# Patient Record
Sex: FEMALE | Race: WHITE | HISPANIC OR LATINO | Employment: UNEMPLOYED | URBAN - METROPOLITAN AREA
[De-identification: names, ages, dates, MRNs, and addresses within clinical notes are randomized per-mention and may not be internally consistent; named-entity substitution may affect disease eponyms.]

---

## 2017-01-11 ENCOUNTER — ALLSCRIPTS OFFICE VISIT (OUTPATIENT)
Dept: OTHER | Facility: OTHER | Age: 47
End: 2017-01-11

## 2017-01-11 ENCOUNTER — TRANSCRIBE ORDERS (OUTPATIENT)
Dept: ADMINISTRATIVE | Facility: HOSPITAL | Age: 47
End: 2017-01-11

## 2017-01-11 DIAGNOSIS — C50.812 MALIGNANT NEOPLASM OF OVERLAPPING SITES OF LEFT FEMALE BREAST (HCC): Primary | ICD-10-CM

## 2017-04-20 ENCOUNTER — GENERIC CONVERSION - ENCOUNTER (OUTPATIENT)
Dept: OTHER | Facility: OTHER | Age: 47
End: 2017-04-20

## 2017-04-20 ENCOUNTER — APPOINTMENT (OUTPATIENT)
Dept: RADIATION ONCOLOGY | Facility: CLINIC | Age: 47
End: 2017-04-20
Attending: RADIOLOGY
Payer: COMMERCIAL

## 2017-04-20 PROCEDURE — 99214 OFFICE O/P EST MOD 30 MIN: CPT | Performed by: RADIOLOGY

## 2017-04-28 ENCOUNTER — ALLSCRIPTS OFFICE VISIT (OUTPATIENT)
Dept: OTHER | Facility: OTHER | Age: 47
End: 2017-04-28

## 2017-05-22 ENCOUNTER — ALLSCRIPTS OFFICE VISIT (OUTPATIENT)
Dept: OTHER | Facility: OTHER | Age: 47
End: 2017-05-22

## 2017-07-10 ENCOUNTER — HOSPITAL ENCOUNTER (OUTPATIENT)
Dept: MAMMOGRAPHY | Facility: CLINIC | Age: 47
Discharge: HOME | End: 2017-07-10
Payer: COMMERCIAL

## 2017-07-10 DIAGNOSIS — C50.812 MALIGNANT NEOPLASM OF OVERLAPPING SITES OF LEFT FEMALE BREAST (HCC): ICD-10-CM

## 2017-07-10 PROCEDURE — G0279 TOMOSYNTHESIS, MAMMO: HCPCS

## 2017-07-10 PROCEDURE — G0206 DX MAMMO INCL CAD UNI: HCPCS

## 2017-07-12 ENCOUNTER — ALLSCRIPTS OFFICE VISIT (OUTPATIENT)
Dept: OTHER | Facility: OTHER | Age: 47
End: 2017-07-12

## 2017-09-26 ENCOUNTER — ALLSCRIPTS OFFICE VISIT (OUTPATIENT)
Dept: OTHER | Facility: OTHER | Age: 47
End: 2017-09-26

## 2017-10-27 NOTE — PROGRESS NOTES
Assessment  1  Encounter to discuss breast reconstruction (V65 49) (Z71 89)    Plan  Carcinoma of left breast, estrogen receptor positive, Encounter to discuss breast  reconstruction, Malignant neoplasm of overlapping sites of left female breast    · Breast prosthesis, mastectomy bra, with integrated breast prosthesis form, bilateral, any  size, any type; Status:Complete - Retrospective By Protocol Authorization;   Done:  [id number]   Perform:Not Applicable; Order Comments:left breast prosthesis with mastectomy bra; Y:[id number]; Last Updated By:Jesus Rose; 9/26/2017 10:25:18 AM;Ordered; For:Carcinoma of left breast, estrogen receptor positive, Encounter to discuss breast reconstruction, Malignant neoplasm of overlapping sites of left female breast; Ordered By:Zena Morel;    Discussion/Summary  Discussion Summary:   Please see history of present illness  At her previous visit we discussed a variety of options for breast reconstruction, her preference at that time was to first try a mastectomy bra and prosthesis  It sounds like there was some confusion with her insurance and she was unable to obtain the bra and prosthesis, however since that time she has located a facility that will accept her insurance  Her preference would be to try the mastectomy bra and prosthesis for 2 months and to follow up here in the office to discuss options going forward in regard to continued use of the mastectomy bra and prosthesis versus potential breast reconstruction   Counseling Documentation With Imm: The patient, patient's family was counseled regarding instructions for management,-- patient and family education,-- impressions,-- risks and benefits of treatment options  total time of encounter was 25 minutes-- and-- 15 minutes was spent counseling  Chief Complaint  Chief Complaint Free Text Note Form: Status post left mastectomy/radiation, without reconstruction        History of Present Illness  HPI: [place] returns for a follow-up visit, she is again accompanied by her daughter  We utilized the blue phone translation service, [employer] #[id number]  Active Problems  1  Acute pain (338 19) (R52)   2  BRCA1 negative (V82 71) (Z13 71)   3  BRCA2 negative (V82 71) (Z13 71)   4  Carcinoma of left breast, estrogen receptor positive (174 9,V86 0) (C50 912,Z17 0)   5  Encounter to discuss breast reconstruction (V65 49) (Z71 89)   6  Limb swelling (729 81) (M79 89)   7  Malignant neoplasm of overlapping sites of left female breast (174 8) (C50 812)   8  Prophylactic use of anastrozole (Arimidex) (V07 52) (J25 478)    Past Medical History  1  History of Age At First Period 13 Years Old (Menarche)   2  History of Age At First Pregnancy 19 Years Old   3  History of Bulky or enlarged uterus (621 2) (N85 2)   4  History of Carcinoma of upper-outer quadrant of left female breast (174 4) (C50 412)   5  History of Granulation tissue (701 5) (L92 9)   6  History of  3 (V22 2) (Z33 1)   7  History of chemotherapy (V87 41) (Z92 21)   8  History of malignant neoplasm of female breast (V10 3) (Z85 3)   9  History of radiation therapy (V15 3) (Z92 3)   10  History of Leiomyoma of uterus (218 9) (D25 9)   11  History of Mammogram abnormal (793 80) (R92 8)   12  History of Postoperative visit (V58 49) (Z48 89)    Surgical History  1  History of Appendectomy   2  History of  Section   3  History of Exploratory Laparotomy   4  History of Percutaneous Portal Vein Catheter Placement   5  History of [place] Lymph Node Biopsy   6  History of Simple Mastectomy Left Breast   7  History of Total Abdominal Hysterectomy With Removal Of Both Ovaries   8  History of Tunneled Central IV Removal With Port    Family History  Mother    1  Family history of Lung cancer  Family History    2  Family history of Heart Disease (V17 49)   3   Family history of Lung Cancer (V16 1)    Social History   · Native Language Belarusian   · Never A Smoker   · Never Drank Alcohol    Current Meds   1  Anastrozole 1 MG Oral Tablet; Take 1 tablet by mouth  every day; Therapy: 29JIU2013 to (Evaluate:92Reg3148)  Requested for: 17Tig6026; Last   Rx:15Tnw7098 Ordered    Allergies  1  Aspirin TABS    Vitals  Vital Signs    Recorded: 73FRX8341 10:03AM   BP Comments unobtainable   Height 5 ft 4 in   Weight 160 lb 8 oz   BMI Calculated 27 55   BSA Calculated 1 78     Future Appointments    Date/Time Provider Specialty Site   11/20/2017 10:20 AM JING Aleman   Hematology Oncology [facility]   01/18/2018 11:30 AM Jeferson Aviles MD Surgical Oncology [facility]     Signatures   Electronically signed by : JING Ashby ; Sep 26 2017 10:36AM EST                       (Author)

## 2017-11-20 ENCOUNTER — ALLSCRIPTS OFFICE VISIT (OUTPATIENT)
Dept: OTHER | Facility: OTHER | Age: 47
End: 2017-11-20

## 2017-11-21 NOTE — PROGRESS NOTES
Assessment    1  Carcinoma of left breast, estrogen receptor positive (174 9,V86 0) (C50 912,Z17 0)    Plan  Carcinoma of left breast, estrogen receptor positive    · Follow-up visit in 6 months Evaluation and Treatment  Follow-up  Status: Complete Done: 12NEJ5586 10:40AM   Ordered;Carcinoma of left breast, estrogen receptor positive; Ordered By: Shelby Sotelo Performed:  Due: 09CZC6051; Last Updated By: Neri Abrams; 11/20/2017 11:27:18 AM                  Hematology/Oncology Follow up Evaluation and Treatment  Follow-up  Status: Hold For - Scheduling  Requested for: 09Kpq1566 Ordered; For: Infiltrating Duct Carcinoma Of The Breast (174 9); Ordered By: Shelby Sotelo  Performed:   Due: 65CCH2057   Discussion/Summary  Discussion Summary:   A 50year old surgically postmenopausal woman with locally advanced left breast cancer, grade 2, ER/PA positive HER-2 negative disease  She has no evidence of BRCA mutation  She underwent neoadjuvant chemotherapy with AC followed by paclitaxel resulting in clinical partial response  However, when she had mastectomy, she had significant residual disease, as well as 2 positive lymph node  She completed postmastectomy radiation therapy  She was previously on adjuvant hormonal therapy with tamoxifen, until she underwent hysterectomy and bilateral oophorectomy  Currently, she is on anastrozole with minimal side effects  She has no evidence recurrent disease, based on her symptomatology and physical examinations  I recommended her to continue with anastrozole 1 mg once a day  Because of her high risk disease, I would strongly consider 10 years of aromatase inhibitors  I will see her again in 6 months for routine follow-up  Chief Complaint  Chief Complaint: Chief Complaint:  The patient presents to the office today with 1  Locally advanced left breast cancer with skin involvement, grade 2, ER/PA positive HER-2 negative disease  Diagnosed in June 2013  BRCA mutation negative  Chief Complaint Free Text Note Form: Locally advanced left breast cancer with skin involvement, ER/[place] positive HER-2 negative disease  Diagnosed in June 2013  History of Present Illness  HPI: A 53year old premenopausal  speaking woman with recent diagnosis of locally advanced left breast cancer    She first noticed a lump which was small approximately 2 years ago  Over the time she developed very large breast mass with skin involvement as well as skin dimpling  She also developed of breast pain  She underwent biopsy which showed invasive ductal carcinoma with grade 2  This was ER/[place] positive HER-2 negative disease  She was seen by Dr Mai Heard who thought that neoadjuvant chemotherapy is the most appropriate treatment  Therefore she was referred to me  She underwent CT scan of chest abdomen and pelvis as well as bone scan both of which were negative for distant metastasis  She has no weight loss fever chills or night sweats  She denied any respiratory symptoms  She has no bone pain  Her performance status is normal  she has no family history of breast cancer or ovarian cancer  Previous Therapy:   1  Neoadjuvant chemotherapy with dose dense a c  X4 followed by weekly paclitaxel x12  Completed in December 2013  adjuvant hormonal therapy with tamoxifen from March 2014 through May 2015  postmastectomy radiation therapy completed in May 2014  Current Therapy: Adjuvant hormonal therapy with anastrozole 1 mg once a day since May 2015  Disease Status: 1  Partial response on neoadjuvant chemotherapy  ENMANUEL, status post mastectomy with sentinel lymph node biopsy  Interval History: A 50year old surgically postmenopausal woman with locally advanced left breast cancer, grade 2, ER/[place] positive HER-2 negative disease  She had very large left breast mass which occupied her entire left breast at the time of initial presentation   She had neoadjuvant chemotherapy with dose dense AC followed by paclitaxel resulting in clinical response  She underwent left mastectomy with sentinel lymph node biopsy  She still had significant residual tumor measuring 11 5 cm with multiple residual foci of tumor with 2 sentinel lymph node with subcentimeter metastatic disease  She did not have reconstruction  Since May 2014, she was on adjuvant hormonal therapy with tamoxifen , since she was perimenopausal at that time  She had very large fibroid in the uterus  In February 2015, she underwent hysterectomy and bilateral oophorectomy  Subsequently, adjuvant hormonal therapy was changed to anastrozole  She came in today for followup  He continued to have mild hot flashes  She has occasional left knee pain  Otherwise, she feels well  Her weight is stable  She has no cough, sputum production or shortness of breath  Her performance status is normal       Review of Systems  Complete-Female:  Constitutional: Hot flashes, but-- as noted in HPI  Eyes: No complaints of eye pain, no red eyes, no eyesight problems, no discharge, no dry eyes, no itching of eyes  ENT: no complaints of earache, no loss of hearing, no nose bleeds, no nasal discharge, no sore throat, no hoarseness  Cardiovascular: No complaints of slow heart rate, no fast heart rate, no chest pain, no palpitations, no leg claudication, no lower extremity edema  Respiratory: No complaints of shortness of breath, no wheezing, no cough, no SOB on exertion, no orthopnea, no PND  Gastrointestinal: No complaints of abdominal pain, no constipation, no nausea or vomiting, no diarrhea, no bloody stools  Genitourinary: No complaints of dysuria, no incontinence, no pelvic pain, no dysmenorrhea, no vaginal discharge or bleeding  Musculoskeletal: No complaints of arthralgias, no myalgias, no joint swelling or stiffness, no limb pain or swelling  Integumentary: No complaints of skin rash or lesions, no itching, no skin wounds, no breast pain or lump    Neurological: No complaints of headache, no confusion, no convulsions, no numbness, no dizziness or fainting, no tingling, no limb weakness, no difficulty walking  Psychiatric: Not suicidal, no sleep disturbance, no anxiety or depression, no change in personality, no emotional problems  Endocrine: No complaints of proptosis, no hot flashes, no muscle weakness, no deepening of the voice, no feelings of weakness  Hematologic/Lymphatic: No complaints of swollen glands, no swollen glands in the neck, does not bleed easily, does not bruise easily  ROS Reviewed:   ROS reviewed  Active Problems  1  Acute pain (338 19) (R52)   2  BRCA1 negative (V82 71) (Z13 71)   3  BRCA2 negative (V82 71) (Z13 71)   4  Carcinoma of left breast, estrogen receptor positive (174 9,V86 0) (C50 912,Z17 0)   5  Encounter to discuss breast reconstruction (V65 49) (Z71 89)   6  Limb swelling (729 81) (M79 89)   7  Malignant neoplasm of overlapping sites of left female breast (174 8) (C50 812)   8  Prophylactic use of anastrozole (Arimidex) (V07 52) (R60 554)    Past Medical History  1  History of Age At First Period 13 Years Old (Menarche)   2  History of Age At First Pregnancy 19 Years Old   3  History of Bulky or enlarged uterus (621 2) (N85 2)   4  History of Carcinoma of upper-outer quadrant of left female breast (174 4) (C50 412)   5  History of Granulation tissue (701 5) (L92 9)   6  History of  3 (V22 2) (Z33 1)   7  History of chemotherapy (V87 41) (Z92 21)   8  History of malignant neoplasm of female breast (V10 3) (Z85 3)   9  History of radiation therapy (V15 3) (Z92 3)   10  History of Leiomyoma of uterus (218 9) (D25 9)   11  History of Mammogram abnormal (793 80) (R92 8)   12  History of Postoperative visit (V58 49) (Z48 89)  Past Medical History Reviewed: The past medical history was reviewed and updated today  Surgical History    1  History of Appendectomy   2  History of  Section   3  History of Exploratory Laparotomy   4   History of Percutaneous Portal Vein Catheter Placement   5  History of [place] Lymph Node Biopsy   6  History of Simple Mastectomy Left Breast   7  History of Total Abdominal Hysterectomy With Removal Of Both Ovaries   8  History of Tunneled Central IV Removal With Port    Family History  Mother    1  Family history of Lung cancer  Family History    2  Family history of Heart Disease (V17 49)   3  Family history of Lung Cancer (V16 1)  Family History Reviewed: The family history was reviewed and updated today  Social History     · Native Language Bulgarian   · Never A Smoker   · Never Drank Alcohol  Social History Reviewed: The social history was reviewed and updated today  The social history was reviewed and is unchanged  Current Meds   1  Anastrozole 1 MG Oral Tablet; Take 1 tablet by mouth  every day; Therapy: 79ZEO3260 to (Evaluate:34Dan5915)  Requested for: 96Gtc7905; Last Rx:24Oja5938 Ordered  Medication List Reviewed: The medication list was reviewed and updated today  Allergies  1  Aspirin TABS    Vitals  Vital Signs    Recorded: 20Nov2017 10:58AM   Temperature 97 7 F   Heart Rate 128   Respiration 16   Systolic 510   Diastolic 90   Height 5 ft 4 in   Weight 159 lb    BMI Calculated 27 29   BSA Calculated 1 77   O2 Saturation 100       Physical Exam   Constitutional  General appearance: No acute distress, well appearing and well nourished  Eyes  Conjunctiva and lids: No swelling, erythema or discharge  Pupils and irises: Equal, round and reactive to light  Ears, Nose, Mouth, and Throat  External inspection of ears and nose: Normal    Otoscopic examination: Tympanic membranes translucent with normal light reflex  Canals patent without erythema  Nasal mucosa, septum, and turbinates: Normal without edema or erythema  Oropharynx: Normal with no erythema, edema, exudate or lesions  Pulmonary  Respiratory effort: No increased work of breathing or signs of respiratory distress     Auscultation of lungs: Clear to auscultation  Cardiovascular  Palpation of heart: Normal PMI, no thrills  Auscultation of heart: Normal rate and rhythm, normal S1 and S2, without murmurs  Examination of extremities for edema and/or varicosities: Normal    Carotid pulses: Normal    Abdomen  Abdomen: Non-tender, no masses  Liver and spleen: No hepatomegaly or splenomegaly  Lymphatic  Palpation of lymph nodes in neck: No lymphadenopathy  Musculoskeletal  Gait and station: Normal    Digits and nails: Normal without clubbing or cyanosis  Inspection/palpation of joints, bones, and muscles: Normal    Skin  Skin and subcutaneous tissue: Normal without rashes or lesions  Neurologic  Cranial nerves: Cranial nerves 2-12 intact  Reflexes: 2+ and symmetric  Sensation: No sensory loss  Psychiatric  Orientation to person, place, and time: Normal    Mood and affect: Normal    Additional Exam:  Breast exam; status post left mastectomy without reconstruction  No palpable abnormalities in her left chest wall  No palpable metastatic very adenopathy  Right breast exam is negative  Diabetic Foot Screen: Normal       ECOG 0       Results/Data  Results Form:   Results  Pathology Uterus bilateral fallopian tube and ovaries showed no evidence of malignancy  Radiology Mammography in July 2017 was benign  BI-RAD 1        Future Appointments    Date/Time Provider Specialty Site   01/18/2018 11:30 AM Ciara Allen MD Surgical Oncology [facility]       Signatures   Electronically signed by : JING Guevara ; Nov 20 2017 11:29AM EST                       (Author)

## 2018-01-12 VITALS
HEART RATE: 105 BPM | HEIGHT: 64 IN | BODY MASS INDEX: 27.86 KG/M2 | WEIGHT: 163.2 LBS | OXYGEN SATURATION: 99 % | RESPIRATION RATE: 18 BRPM | SYSTOLIC BLOOD PRESSURE: 148 MMHG | DIASTOLIC BLOOD PRESSURE: 88 MMHG

## 2018-01-12 VITALS — HEIGHT: 64 IN | BODY MASS INDEX: 27.4 KG/M2 | WEIGHT: 160.5 LBS

## 2018-01-12 VITALS
DIASTOLIC BLOOD PRESSURE: 90 MMHG | SYSTOLIC BLOOD PRESSURE: 134 MMHG | HEART RATE: 115 BPM | WEIGHT: 159 LBS | BODY MASS INDEX: 27.14 KG/M2 | RESPIRATION RATE: 18 BRPM | TEMPERATURE: 98 F | HEIGHT: 64 IN | OXYGEN SATURATION: 98 %

## 2018-01-12 VITALS — WEIGHT: 163.2 LBS | BODY MASS INDEX: 27.86 KG/M2 | HEIGHT: 64 IN

## 2018-01-13 VITALS
SYSTOLIC BLOOD PRESSURE: 144 MMHG | HEIGHT: 64 IN | TEMPERATURE: 97.7 F | OXYGEN SATURATION: 100 % | BODY MASS INDEX: 27.14 KG/M2 | RESPIRATION RATE: 16 BRPM | DIASTOLIC BLOOD PRESSURE: 90 MMHG | WEIGHT: 159 LBS | HEART RATE: 128 BPM

## 2018-01-13 VITALS
HEIGHT: 64 IN | RESPIRATION RATE: 18 BRPM | WEIGHT: 161 LBS | SYSTOLIC BLOOD PRESSURE: 138 MMHG | TEMPERATURE: 97 F | DIASTOLIC BLOOD PRESSURE: 80 MMHG | BODY MASS INDEX: 27.49 KG/M2 | OXYGEN SATURATION: 100 % | HEART RATE: 118 BPM

## 2018-01-14 VITALS
TEMPERATURE: 98.9 F | DIASTOLIC BLOOD PRESSURE: 88 MMHG | RESPIRATION RATE: 16 BRPM | HEIGHT: 64 IN | BODY MASS INDEX: 28.22 KG/M2 | WEIGHT: 165.31 LBS | SYSTOLIC BLOOD PRESSURE: 138 MMHG | HEART RATE: 108 BPM | OXYGEN SATURATION: 98 %

## 2018-01-18 ENCOUNTER — GENERIC CONVERSION - ENCOUNTER (OUTPATIENT)
Dept: OTHER | Facility: OTHER | Age: 48
End: 2018-01-18

## 2018-01-18 ENCOUNTER — TRANSCRIBE ORDERS (OUTPATIENT)
Dept: ADMINISTRATIVE | Facility: HOSPITAL | Age: 48
End: 2018-01-18

## 2018-01-18 DIAGNOSIS — C50.812 MALIGNANT NEOPLASM OF OVERLAPPING SITES OF LEFT FEMALE BREAST, UNSPECIFIED ESTROGEN RECEPTOR STATUS (HCC): Primary | ICD-10-CM

## 2018-01-24 VITALS
DIASTOLIC BLOOD PRESSURE: 90 MMHG | BODY MASS INDEX: 26.29 KG/M2 | RESPIRATION RATE: 16 BRPM | HEIGHT: 64 IN | WEIGHT: 154 LBS | TEMPERATURE: 98 F | HEART RATE: 124 BPM | SYSTOLIC BLOOD PRESSURE: 142 MMHG

## 2018-02-26 ENCOUNTER — OFFICE VISIT (OUTPATIENT)
Dept: PLASTIC SURGERY | Facility: CLINIC | Age: 48
End: 2018-02-26
Payer: COMMERCIAL

## 2018-02-26 VITALS — HEIGHT: 65 IN | WEIGHT: 156.2 LBS | BODY MASS INDEX: 26.02 KG/M2

## 2018-02-26 DIAGNOSIS — Z71.89 ENCOUNTER TO DISCUSS BREAST RECONSTRUCTION: Primary | ICD-10-CM

## 2018-02-26 PROCEDURE — 99214 OFFICE O/P EST MOD 30 MIN: CPT | Performed by: SURGERY

## 2018-02-26 RX ORDER — ANASTROZOLE 1 MG/1
1 TABLET ORAL DAILY
COMMUNITY
Start: 2015-05-11 | End: 2018-05-21 | Stop reason: SDUPTHER

## 2018-02-27 NOTE — PROGRESS NOTES
Assessment/Plan:  Please see HPI  Her daughter is present with her again today, and helps with translation/interpretation  Mamadou Soni has been utilizing a mastectomy bra and prosthesis without difficulty, however she is interested in ultimately undergoing breast reconstruction  Given the prior radiation, lack of abdominal pannus, etc, we have discussed the to some its Reagan flap and tissue expander reconstruction  We again discussed the procedure, out was performed, as well as potential risks, complications and limitations  They voiced their understanding, however given the potential from this interpretation we have agreed to schedule another visit in our best let him office where we will have the translation line available  There are no diagnoses linked to this encounter  Subjective:  Status post mastectomy and radiation without reconstruction     Patient ID: Gaby Larkin is a 50 y o  female  HPI status post mastectomy and radiation without reconstruction    The following portions of the patient's history were reviewed and updated as appropriate: allergies, current medications, past family history, past medical history, past social history, past surgical history and problem list     Review of Systems   Constitutional: Negative for chills and fever  HENT: Negative for hearing loss  Eyes: Negative for discharge and visual disturbance  Respiratory: Negative for chest tightness and shortness of breath  Cardiovascular: Negative for chest pain and leg swelling  Genitourinary: Negative for dysuria  Musculoskeletal: Negative for gait problem  Skin: Negative for rash  Allergic/Immunologic: Negative for immunocompromised state  Neurological: Negative for seizures and headaches  Hematological: Does not bruise/bleed easily  Psychiatric/Behavioral: Positive for dysphoric mood  The patient is not nervous/anxious            Objective:  See above      Ht 5' 5" (1 651 m)   Wt 70 9 kg (156 lb 3 2 oz)   BMI 25 99 kg/m²          Physical Exam   Constitutional: She is oriented to person, place, and time  She appears well-developed and well-nourished  HENT:   Head: Normocephalic  Eyes: Pupils are equal, round, and reactive to light  Neck: Normal range of motion  Neurological: She is alert and oriented to person, place, and time  Skin: Skin is warm  Psychiatric: She has a normal mood and affect

## 2018-04-19 PROBLEM — C50.912 CARCINOMA OF LEFT BREAST, ESTROGEN RECEPTOR POSITIVE (HCC): Status: ACTIVE | Noted: 2017-07-12

## 2018-04-19 PROBLEM — Z17.0 CARCINOMA OF LEFT BREAST, ESTROGEN RECEPTOR POSITIVE (HCC): Status: ACTIVE | Noted: 2017-07-12

## 2018-04-23 ENCOUNTER — RADIATION ONCOLOGY FOLLOW-UP (OUTPATIENT)
Dept: RADIATION ONCOLOGY | Facility: CLINIC | Age: 48
End: 2018-04-23

## 2018-04-23 ENCOUNTER — APPOINTMENT (OUTPATIENT)
Dept: RADIATION ONCOLOGY | Facility: CLINIC | Age: 48
End: 2018-04-23
Attending: RADIOLOGY
Payer: COMMERCIAL

## 2018-04-23 VITALS
DIASTOLIC BLOOD PRESSURE: 74 MMHG | SYSTOLIC BLOOD PRESSURE: 136 MMHG | BODY MASS INDEX: 25.87 KG/M2 | RESPIRATION RATE: 18 BRPM | TEMPERATURE: 98.5 F | HEART RATE: 110 BPM | HEIGHT: 65 IN | WEIGHT: 155.3 LBS

## 2018-04-23 DIAGNOSIS — C50.812 CARCINOMA OF OVERLAPPING SITES OF LEFT BREAST IN FEMALE, ESTROGEN RECEPTOR POSITIVE (HCC): Primary | ICD-10-CM

## 2018-04-23 DIAGNOSIS — Z17.0 CARCINOMA OF OVERLAPPING SITES OF LEFT BREAST IN FEMALE, ESTROGEN RECEPTOR POSITIVE (HCC): Primary | ICD-10-CM

## 2018-04-23 PROCEDURE — 99215 OFFICE O/P EST HI 40 MIN: CPT | Performed by: RADIOLOGY

## 2018-04-23 NOTE — PROGRESS NOTES
Dayton Iverson  1970   Ms Wilson Samuels is a 50 y o  female       Chief Complaint   Patient presents with    Breast Cancer     radiation follow up        No matching staging information was found for the patient  Oncology History    Stage IIIA left breast carcinoma who completed post-mastectomy and post chemotherapy radiation therapy in May 2014  Last seen on 4/20/17    7/10/17 Right mammogram - benign     17 Follow up with Dr Verlena Apgar and returns 18  continue with anastrozole 1 mg once a day; recommending 10 years     18 Surgical Oncology follow up with Syeda Marc  Returns 18 Follow up with Dr Danielle Looney and returns 18  Discussing reconstruction    Patient is strongly considering reconstruction surgery  States she feels "alot of heat" from left chest area  Occasional dizziness from anastrozole, and when she gets dizziness, hands turn red  Frequent hot flashes  2 months ago she had a small amount of vaginal bleeding, it did not last longer than 1 day  She did not report this to any of her doctors at the time  18 Mammogram scheduled    [facility] # [id number]        Carcinoma of left breast, estrogen receptor positive ([facility] [address] )    2013 Initial Diagnosis     Carcinoma of left breast, estrogen receptor positive ([facility] [address] )         2013 Biopsy     Left breast biopsy  Invasive mammary carcinoma with lobular features  Grade 2  ER/IN positive  HER2 negative         2013 - 2013 Chemotherapy     Dose dense AC x4 followed by paclitaxel x12         2014 Surgery     Left mastectomy with sentinel node biopsy  Invasive lobular carcinoma  Grade 2  Invasive carcinoma involves the dermis         2014 -  Cancer Staged     IIIA ypT3, ypN1a (i+) (sn) G2         4/3/2014 - 2014 Radiation     Chest wall and draining lymph nodes    Total dose 6040 cGy         2014 - 2015 Hormone Therapy     Tamoxifen         2015 -  Hormone Therapy     anastrozole Clinical Trial: no        Screening  Tobacco  Current tobacco user: no  If yes, brief counseling provided: NA    Hypertension  Hypertension screening performed: yes  Normotensive:  no  If no, referred to PCP: yes    Depression Screening  Screened for depression using PHQ-2: yes    Screened for depression using PHQ-9:  n/a  Screening positive or negative:  negative  If score >4, was any of the following actions taken? Additional evaluation for depression, suicide risk assesment, referral to PCP or psychiatry, medication started:  n/a    Advanced Care Planning for Patients >65 years  Advanced Care Planning Discussed:  n/a  Patient named surrogate decision maker or care plan in chart: n/a      Health Maintenance   Topic Date Due    HIV SCREENING  1970    PNEUMOCOCCAL POLYSACCHARIDE VACCINE X 2 AGE 11-64 YEARS IMMUNOCOMPROMISED (1) 04/29/1979    Depression Screening PHQ-9  04/29/1980    DTaP,Tdap,and Td Vaccines (1 - Tdap) 04/29/1989    INFLUENZA VACCINE  09/01/2017       Patient Active Problem List   Diagnosis    [facility]     Past Medical History:   Diagnosis Date    [facility]     History of external beam radiation therapy      Past Surgical History:   Procedure Laterality Date    APPENDECTOMY      HYSTERECTOMY      MASTECTOMY      OOPHORECTOMY       Family History   Problem Relation Age of Onset    Lung cancer Mother      Social History     Social History    Marital status: /Civil Union     Spouse name: N/A    Number of children: N/A    Years of education: N/A     Occupational History    Not on file       Social History Main Topics    Smoking status: Never Smoker    Smokeless tobacco: Never Used    Alcohol use No    Drug use: No    Sexual activity: Not on file     Other Topics Concern    Not on file     Social History Narrative    No narrative on file       Current Outpatient Prescriptions:     anastrozole (ARIMIDEX) 1 mg tablet, Take 1 tablet by mouth daily, Disp: , Rfl:   Allergies   Allergen Reactions    Aspirin      Other reaction(s): Palpitations    Penicillins        Review of Systems:  Review of Systems   Constitutional: Positive for fatigue  HENT: Negative  Eyes: Negative  Respiratory: Positive for shortness of breath (occasional)  Cardiovascular: Negative  Gastrointestinal: Positive for constipation (mild)  Endocrine: Negative  Genitourinary: Positive for vaginal bleeding (2 months ago)  Musculoskeletal: Positive for arthralgias  Skin: Positive for color change (hyperpigmentation to left chest wall)  Allergic/Immunologic: Negative  Neurological: Positive for dizziness  Hematological: Negative  Psychiatric/Behavioral: Negative  Vitals:    04/23/18 1121   BP: 136/74   Pulse: (!) 110   Resp: 18   Temp: 98 5 °F (36 9 °C)   TempSrc: Temporal   Weight: 70 4 kg (155 lb 4 8 oz)   Height: 5' 5" (1 651 m)       Imaging:No results found

## 2018-04-24 NOTE — PROGRESS NOTES
Follow-up - Radiation Oncology   Zulay Davenport 1970 50 y o  female [id number]    CC: Follow-Up for Stage IIIA left breast carcinoma who completed post-mastectomy and post chemotherapy radiation therapy in May 2014  History of Present Illness     Zulay Davenport is a 50y o  year old female with a locally advanced stage IIIA left breast carcinoma who initially presented with a 7 cm mass that was thought to be T4, N0, M0 disease  Due to the size of her mass and partial fixation to the underlying musculature, recommendations were made for neoadjuvant chemotherapy  She received neoadjuvant chemotherapy starting July 29, 2013 that was completed on December 9, 2013  She had a partial response but still required a mastectomy  She had mastectomy with sentinel lymph node biopsy February 18, 2014 which revealed residual disease measuring 11 5 cm and two out of three sentinel lymph nodes positive for metastatic disease with one lymph node showing extranodal extension  Her margins of resection were negative with the closest margin being the deep margin and 2 mm  Her disease is hormone receptor positive and recommendations were made for Tamoxifen which she just started March 16, 2014  We recommended post-mastectomy radiation therapy for her stage IIIA, ypT3, ypN1a, (i+) (sn), M0, grade 2 disease  She completed treatment on May 20, 2014 and returns today for follow-up examination  She was last seen on 4/20/17    Interval History:  7/10/17 Right mammogram - benign      11/20/17 Follow up with Dr Ricky Shelby and returns 5/21/18  continue with anastrozole 1 mg once a day; recommending 10 years      1/18/18 Surgical Oncology follow up with Parvez Plunkett  Returns 7/23/18 2/26/18 Follow up with Dr Dawna Selby and returns 5/1/18  Discussing reconstruction with latissimus dorsi flap and tissue expander      Patient is strongly considering reconstruction surgery  States she feels "alot of heat" from left chest area at times  She denies any right breast masses nor left chest wall nodules  Occasional dizziness from anastrozole, and when she gets dizziness, hands turn red  Frequent hot flashes  2 months ago she had a small amount of vaginal bleeding, it did not last longer than 1 day  She did not report this to any of her doctors at the time  She reports no additional vaginal bleeding since that time  She denies any vaginal discharge  She states she needs to make a follow-up gyn appointment      7/17/18 Mammogram scheduled     Knowta  # [id number]    Screening  Tobacco  Current tobacco user: no  If yes, brief counseling provided: NA     Hypertension  Hypertension screening performed: yes  Normotensive:  no  If no, referred to PCP: yes     Depression Screening  Screened for depression using PHQ-2: yes     Screened for depression using PHQ-9:  n/a  Screening positive or negative:  negative  If score >4, was any of the following actions taken? Additional evaluation for depression, suicide risk assesment, referral to PCP or psychiatry, medication started:  n/a     Advanced Care Planning for Patients >65 years  Advanced Care Planning Discussed:  n/a  Patient named surrogate decision maker or care plan in chart: n/a     Historical Information      Carcinoma of left breast, estrogen receptor positive ([facility] [address] )    6/18/2013 Initial Diagnosis     Carcinoma of left breast, estrogen receptor positive ([facility] [address] )         6/18/2013 Biopsy     Left breast biopsy  Invasive mammary carcinoma with lobular features  Grade 2  ER/WI positive  HER2 negative         7/29/2013 - 12/9/2013 Chemotherapy     Dose dense AC x4 followed by paclitaxel x12         2/18/2014 Surgery     Left mastectomy with sentinel node biopsy  Invasive lobular carcinoma  Grade 2  Invasive carcinoma involves the dermis         2/18/2014 -  Cancer Staged     IIIA ypT3, ypN1a (i+) (sn) G2         4/3/2014 - 5/20/2014 Radiation     Chest wall and draining lymph nodes  Total dose 6040 cGy         5/19/2014 - 5/2015 Hormone Therapy     Tamoxifen         5/2015 -  Hormone Therapy     anastrozole             Past Medical History:   Diagnosis Date    Breast cancer ([facility] [address] )     History of external beam radiation therapy      Past Surgical History:   Procedure Laterality Date    APPENDECTOMY      HYSTERECTOMY      MASTECTOMY      OOPHORECTOMY         Social History   History   Alcohol Use No     History   Drug Use No     History   Smoking Status    Never Smoker   Smokeless Tobacco    Never Used     Meds/Allergies     Current Outpatient Prescriptions:     anastrozole (ARIMIDEX) 1 mg tablet, Take 1 tablet by mouth daily, Disp: , Rfl:   Allergies   Allergen Reactions    Aspirin      Other reaction(s): Palpitations    Penicillins      Review of Systems   Constitutional: Positive for fatigue  HENT: Negative  Eyes: Negative  Respiratory: Positive for shortness of breath (occasional)  Cardiovascular: Negative  Gastrointestinal: Positive for constipation (mild)  Endocrine: Negative  Genitourinary: Positive for vaginal bleeding (2 months ago)  Musculoskeletal: Positive for arthralgias  Skin: Positive for color change (hyperpigmentation to left chest wall)  Allergic/Immunologic: Negative  Neurological: Positive for dizziness  Hematological: Negative  Psychiatric/Behavioral: Negative  OBJECTIVE:   /74   Pulse (!) 110   Temp 98 5 °F (36 9 °C) (Temporal)   Resp 18   Ht 5' 5" (1 651 m)   Wt 70 4 kg (155 lb 4 8 oz)   BMI 25 84 kg/m²   Pain Assessment:  0  ECOG/Zubrod/WHO: 0 - Asymptomatic    Physical Exam   Constitutional: She is oriented to person, place, and time  She appears well-developed and well-nourished  No distress  HENT:   Head: Normocephalic and atraumatic  Mouth/Throat: No oropharyngeal exudate  Eyes: Conjunctivae and EOM are normal  Pupils are equal, round, and reactive to light  No scleral icterus     Neck: Normal range of motion  Neck supple  No tracheal deviation present  No thyromegaly present  Cardiovascular: Normal rate, regular rhythm and normal heart sounds  Pulmonary/Chest: Effort normal and breath sounds normal  No respiratory distress  She has no wheezes  She has no rales  There are no right breast masses and there is no discharge from the right areola  The left chest wall reveals a well-healed mastectomy incision with no underlying induration nor any nodularity  There is hyperpigmentation of the skin consistent with post treatment changes on the left side  Abdominal: Soft  Bowel sounds are normal  She exhibits no distension and no mass  There is no tenderness  Musculoskeletal: Normal range of motion  She exhibits no edema or tenderness  Lymphadenopathy:     She has no cervical adenopathy  Neurological: She is alert and oriented to person, place, and time  No cranial nerve deficit  Coordination normal    Skin: Skin is warm and dry  No rash noted  She is not diaphoretic  No erythema  No pallor  Psychiatric: She has a normal mood and affect  Her behavior is normal  Judgment and thought content normal    Nursing note and vitals reviewed  RESULTS    Lab Results: No results found for this or any previous visit (from the past 672 hour(s))  Imaging Studies:  Mammo Diagnostic Right W 3d & Cad    Result Date: 7/10/2017  Patient History: Patient has history of breast cancer at age 37 and had previous chemotherapy at age 38  No known family history of cancer  Malignant mastectomy of the left breast, February 18, 2014  Malignant breast guidance of the left breast, June 18, 2013  Pathology Report of both breasts, June 18, 2013  Chemotherapy, 2013  Radiation therapy of the left breast, 2013  Taking tamoxifen for 2 years beginning at age 53  Reason for exam: history of breast cancer, mastectomy  24-year-old woman status post left mastectomy in 2014    She presents for annual evaluation of the right breast  Mammo Diagnostic Right W DBT and CAD: July 10, 2017 - Check In #: [de-identified] 2D/3D Procedure 3D views: MLO view(s) were taken of the right breast  2D views: CC and XCCL view(s) were taken of the right breast  Technologist: HUMBERTO Peter (R)(M) Prior study comparison: July 8, 2016, mammo diagnostic right W CAD performed at [address] [place] [address]  July 8, 2015, right breast unilateral diagnostic mammogram performed at [address] [place] [address]  June 30, 2014, right breast unilateral diagnostic mammogram performed at [address] [place] [address]  June 11, 2013, bilateral WB digitl bilat jey performed at [address] [place] [address]  There are scattered fibroglandular densities  There are no suspicious masses, grouped microcalcifications or areas of distortion in the right breast  IMPRESSION: No mammographic evidence of malignancy in the right breast  ACR BI-RADS® Assessments: BiRad:1 - Negative Recommendation: Follow-up diagnostic mammogram of the right breast in 1 year  The patient is scheduled in a reminder system  8-10% of cancers will be missed on mammography  Management of a palpable abnormality must be based on clinical grounds  Patients will be notified of their results via letter from our facility  Accredited by [employer] of Radiology and FDA  Transcription Location: [address] [place], [place] [address]way: [id number] Risk Value(s): Myriad Table: 4 7%, MRS : Based on personal and/or family history, consideration of hereditary risk assessment may be warranted  Assessment/Plan:  No orders of the defined types were placed in this encounter  Roberto Queen is a 50y o  year old female with a stage IIIA left breast carcinoma who completed post-mastectomy and post chemotherapy radiation therapy in May 2014  She is doing well and has no clinical evidence of any recurrent disease  She will continue to apply moisturizer and massage the left chest wall on a daily basis   She will continue anastrozole daily  She had a negative right breast mammogram in 2017  She has an appointment to see Dr Verlena Apgar for follow-up in 1 month  She has an appointment to see Dr Priya Coles in 3 months with a repeat mammogram  She is considering reconstruction of her left chest wall and has a follow-up appointment with Dr Danielle Looney on May 1, 2018  She will return here for follow-up in 12 months  Isaias Hopkins MD  3/45/4991,0:32 PM    Portions of the record may have been created with voice recognition software   Occasional wrong word or "sound a like" substitutions may have occurred due to the inherent limitations of voice recognition software   Read the chart carefully and recognize, using context, where substitutions have occurred

## 2018-05-01 ENCOUNTER — OFFICE VISIT (OUTPATIENT)
Dept: PLASTIC SURGERY | Facility: CLINIC | Age: 48
End: 2018-05-01
Payer: COMMERCIAL

## 2018-05-01 DIAGNOSIS — Z71.89 ENCOUNTER TO DISCUSS BREAST RECONSTRUCTION: Primary | ICD-10-CM

## 2018-05-01 PROCEDURE — 99214 OFFICE O/P EST MOD 30 MIN: CPT | Performed by: SURGERY

## 2018-05-01 NOTE — PROGRESS NOTES
Assessment/Plan:  Please see HP I  We had a lengthy discussion utilizing [employer] service David Smith number [id number]  We discussed the anticipated latissimus dorsi flap procedure, tissue expander, potential risks, complications and limitations  These included, but are not limited to infection, bleeding, scarring, asymmetry, contour deformity, wound healing problems, partial or complete flap loss, seroma, hematoma, need for multiple stages of surgery including expander/implant exchange, likely fat grafting, surgery on the opposite breast, etc etc   We talked about the use of drains, the anticipated length of stay 2-3 days, recovery, etc   She had a chance to it ask questions, they have been answered to her satisfaction  I would prefer that we see her 1 additional time with a family member present prior to scheduling her for the procedure  She is in agreement with this plan  There are no diagnoses linked to this encounter  Subjective:  Absent left breast status post mastectomy and radiation     Patient ID: Laura Vail is a 46 y o  female  HPI she presents today to discuss latissimus dorsi flap reconstruction, her daughter had accompanied her on her previous visits and was not able to be present today    The following portions of the patient's history were reviewed and updated as appropriate: allergies, current medications, past family history, past medical history, past social history, past surgical history and problem list     Review of Systems       Objective: There were no vitals taken for this visit  Physical Exam   Constitutional: She appears well-developed  HENT:   Head: Normocephalic  Eyes: Pupils are equal, round, and reactive to light  Neck: Normal range of motion  Cardiovascular: Normal rate and regular rhythm  Pulmonary/Chest: Effort normal and breath sounds normal    Abdominal: Soft  Musculoskeletal: Normal range of motion  Neurological: She is alert  Skin: Skin is warm and dry  The absent left breast status post mastectomy and radiation, stigmata of radiation with hyperpigmentation, fibrosis is minimal to moderate    Latissimus Reagan contractions are strong

## 2018-05-18 ENCOUNTER — OFFICE VISIT (OUTPATIENT)
Dept: PLASTIC SURGERY | Facility: CLINIC | Age: 48
End: 2018-05-18
Payer: COMMERCIAL

## 2018-05-18 VITALS — WEIGHT: 157 LBS | HEIGHT: 65 IN | BODY MASS INDEX: 26.16 KG/M2

## 2018-05-18 DIAGNOSIS — Z71.89 ENCOUNTER TO DISCUSS BREAST RECONSTRUCTION: Primary | ICD-10-CM

## 2018-05-18 PROCEDURE — 99214 OFFICE O/P EST MOD 30 MIN: CPT | Performed by: SURGERY

## 2018-05-18 NOTE — LETTER
May 18, 2018     Roseanna Belle MD  [address] [facility] [id number]    Patient: Michelle Fletcher   YOB: 1970   Date of Visit: 5/18/2018       Dear Dr Brett Chan: Thank you for referring Debi Becerril to me for evaluation  Below are my notes for this consultation  If you have questions, please do not hesitate to call me  I look forward to following your patient along with you           Sincerely,        Guero Kruger MD        CC: No Recipients

## 2018-05-18 NOTE — PROGRESS NOTES
Assessment/Plan:  Please see HPI  She remains interested in latissimus Reagan/tissue expander reconstruction of the left breast   She has some recent complaints of dizziness, this occurs intermittently, she has no other associated symptoms  She has not been seen by her family physician for the past year or so, and will make an appointment to see Dr Maurice Downing for medical clearance  We will await the results of that visit prior to scheduling her surgery  There are no diagnoses linked to this encounter  Subjective:  Preoperative visit prior to left breast reconstruction with latissimus Reagan flap/tissue expander     Patient ID: Chong Carrel is a 46 y o  female  HPI she had been seen recently utilizing the  service via phone, I have asked her to return with a family member for further discussion  She presents today with her daughter Diego Dean  The following portions of the patient's history were reviewed and updated as appropriate: allergies, current medications, past family history, past medical history, past social history, past surgical history and problem list     Review of Systems   Constitutional: Negative for chills and fever  HENT: Negative for hearing loss  Eyes: Positive for visual disturbance  Negative for discharge  Respiratory: Negative for chest tightness and shortness of breath  Cardiovascular: Negative for chest pain and leg swelling  Gastrointestinal: Negative for blood in stool, constipation, diarrhea and nausea  Endocrine: Negative for cold intolerance  Genitourinary: Negative for dysuria  Musculoskeletal: Negative for gait problem  Skin: Negative for rash  Allergic/Immunologic: Negative for immunocompromised state  Neurological: Positive for dizziness  Negative for seizures and headaches  Hematological: Does not bruise/bleed easily  Psychiatric/Behavioral: Positive for dysphoric mood  The patient is nervous/anxious  Objective:      Ht 5' 5" (1 651 m)   Wt 71 2 kg (157 lb)   BMI 26 13 kg/m²          Physical Exam   Constitutional: She is oriented to person, place, and time  She appears well-developed and well-nourished  HENT:   Head: Normocephalic  Neck: Normal range of motion  Cardiovascular: Normal rate and regular rhythm  Pulmonary/Chest: Effort normal and breath sounds normal    Abdominal: Soft  Musculoskeletal: Normal range of motion  Neurological: She is alert and oriented to person, place, and time  Skin: Skin is warm and dry     Absent left breast status post mastectomy, grade 2/3 ptosis right breast

## 2018-05-21 ENCOUNTER — TELEPHONE (OUTPATIENT)
Dept: HEMATOLOGY ONCOLOGY | Facility: CLINIC | Age: 48
End: 2018-05-21

## 2018-05-21 ENCOUNTER — OFFICE VISIT (OUTPATIENT)
Dept: HEMATOLOGY ONCOLOGY | Facility: CLINIC | Age: 48
End: 2018-05-21
Payer: COMMERCIAL

## 2018-05-21 VITALS
OXYGEN SATURATION: 99 % | HEART RATE: 115 BPM | WEIGHT: 155 LBS | DIASTOLIC BLOOD PRESSURE: 100 MMHG | RESPIRATION RATE: 18 BRPM | SYSTOLIC BLOOD PRESSURE: 148 MMHG | TEMPERATURE: 97.7 F | BODY MASS INDEX: 25.83 KG/M2 | HEIGHT: 65 IN

## 2018-05-21 DIAGNOSIS — C50.812 CARCINOMA OF OVERLAPPING SITES OF LEFT BREAST IN FEMALE, ESTROGEN RECEPTOR POSITIVE (HCC): Primary | ICD-10-CM

## 2018-05-21 DIAGNOSIS — Z17.0 CARCINOMA OF OVERLAPPING SITES OF LEFT BREAST IN FEMALE, ESTROGEN RECEPTOR POSITIVE (HCC): Primary | ICD-10-CM

## 2018-05-21 PROCEDURE — 99214 OFFICE O/P EST MOD 30 MIN: CPT | Performed by: INTERNAL MEDICINE

## 2018-05-21 RX ORDER — IBUPROFEN 200 MG
1 CAPSULE ORAL DAILY
COMMUNITY
End: 2018-07-30 | Stop reason: SDUPTHER

## 2018-05-21 RX ORDER — VENLAFAXINE HYDROCHLORIDE 37.5 MG/1
37.5 TABLET, EXTENDED RELEASE ORAL
Qty: 90 TABLET | Refills: 1 | Status: SHIPPED | OUTPATIENT
Start: 2018-05-21 | End: 2018-05-23

## 2018-05-21 RX ORDER — ANASTROZOLE 1 MG/1
1 TABLET ORAL DAILY
Qty: 90 TABLET | Refills: 1 | Status: SHIPPED | OUTPATIENT
Start: 2018-05-21 | End: 2018-11-26 | Stop reason: SDUPTHER

## 2018-05-21 NOTE — PROGRESS NOTES
Hematology / Oncology Outpatient Follow Up Note    Janak Bello 46 y o  female :1970 LEQ:[id number]         Date:  2018    Assessment / Plan:  A 46year old surgically postmenopausal woman with locally advanced left breast cancer, grade 2, ER/LA positive HER-2 negative disease  She has no evidence of BRCA mutation  She underwent neoadjuvant chemotherapy with AC followed by paclitaxel resulting in clinical partial response  However, when she had mastectomy, she had significant residual disease, as well as 2 positive lymph node  She completed postmastectomy radiation therapy  She was previously on adjuvant hormonal therapy with tamoxifen, until she underwent hysterectomy and bilateral oophorectomy  Currently, she is on anastrozole with significant hot flashes  We discussed antidepressant, venlafaxine to alleviate hot flashes  She wished to take this medications  I recommended her to continue with anastrozole 1 mg once a day  I also spoke with her brother, Manuel Hansen, who translated for us  I will see her again in 6 months for routine follow-up  She is in agreement with my recommendations  Subjective:     HPI:  A 53year old premenopausal  speaking woman with recent diagnosis of locally advanced left breast cancer    She first noticed a lump which was small approximately 2 years ago  Over the time she developed very large breast mass with skin involvement as well as skin dimpling  She also developed of breast pain  She underwent biopsy which showed invasive ductal carcinoma with grade 2  This was ER/LA positive HER-2 negative disease  She was seen by Dr Samantha Alvarenga who thought that neoadjuvant chemotherapy is the most appropriate treatment  Therefore she was referred to me  She underwent CT scan of chest abdomen and pelvis as well as bone scan both of which were negative for distant metastasis  She has no weight loss fever chills or night sweats  She denied any respiratory symptoms   She has no bone pain  Her performance status is normal  she has no family history of breast cancer or ovarian cancer  Interval History:  A 46year old surgically postmenopausal woman with locally advanced left breast cancer, grade 2, ER/[place] positive HER-2 negative disease  She had very large left breast mass which occupied her entire left breast at the time of initial presentation  She had neoadjuvant chemotherapy with dose dense AC followed by paclitaxel resulting in clinical response  She underwent left mastectomy with sentinel lymph node biopsy  She still had significant residual tumor measuring 11 5 cm with multiple residual foci of tumor with 2 sentinel lymph node with subcentimeter metastatic disease  She did not have reconstruction  Since May 2014, she was on adjuvant hormonal therapy with tamoxifen , since she was perimenopausal at that time  She had very large fibroid in the uterus  In February 2015, she underwent hysterectomy and bilateral oophorectomy  Subsequently, adjuvant hormonal therapy was changed to anastrozole  She came in today for followup  She generally feels well  However, she continued to have significant hot flashes  She also has mild-to-moderate musculoskeletal symptoms  She denied any pain  She has no respiratory symptoms  Her weight is stable  She takes calcium vitamin D on a regular basis  Her performance status is normal         Objective:     Primary Diagnosis:     1  Locally advanced left breast cancer with skin involvement, grade 2, ER/[place] positive HER-2 negative disease  Diagnosed in June 2013  2  BRCA mutation negative  Cancer Staging:  Cancer Staging  No matching staging information was found for the patient  Previous Hematologic/ Oncologic Treatment:     1  Neoadjuvant chemotherapy with dose dense a c  X4 followed by weekly paclitaxel x12  Completed in December 2013  2  adjuvant hormonal therapy with tamoxifen from March 2014 through May 2015    3  postmastectomy radiation therapy completed in May 2014  Current Hematologic/ Oncologic Treatment:      Adjuvant hormonal therapy with anastrozole 1 mg once a day since May 2015  Disease Status:     1  Partial response on neoadjuvant chemotherapy  2  ENMANUEL, status post mastectomy with sentinel lymph node biopsy  Test Results:    Pathology:        Radiology:    Mammography in July 2017 was benign  BI-RAD 1  Laboratory:        Physical Exam:      General Appearance:    Alert, oriented        Eyes:    PERRL   Ears:    Normal external ear canals, both ears   Nose:   Nares normal, septum midline   Throat:   Mucosa moist  Pharynx without injection  Neck:   Supple       Lungs:     Clear to auscultation bilaterally   Chest Wall:    No tenderness or deformity    Heart:    Regular rate and rhythm       Abdomen:     Soft, non-tender, bowel sounds +, no organomegaly           Extremities:   Extremities no cyanosis or edema       Skin:   no rash or icterus  Lymph nodes:   Cervical, supraclavicular, and axillary nodes normal   Neurologic:   CNII-XII intact, normal strength, sensation and reflexes     Throughout          Breast exam:   status post left mastectomy without reconstruction  No palpable abnormalities in her left chest wall  No palpable metastatic very adenopathy  Right breast exam is negative  ROS: Review of Systems   Constitutional:        Hot flashes   Musculoskeletal:        Mild musculoskeletal symptoms  All other systems reviewed and are negative  Imaging: No results found        Labs:   Lab Results   Component Value Date    WBC 4 19 (L) 02/03/2015    HGB 11 0 (L) 02/03/2015    HCT 33 6 (L) 02/03/2015    MCV 89 02/03/2015     02/03/2015     Lab Results   Component Value Date     02/03/2015    K 3 9 02/03/2015     02/03/2015    CO2 31 02/03/2015    ANIONGAP 3 (L) 02/03/2015    BUN 6 02/03/2015    CREATININE 0 53 (L) 02/03/2015    GLUCOSE 89 02/03/2015    CALCIUM 8 6 02/03/2015    AST 28 01/27/2015    ALT 33 01/27/2015    ALKPHOS 85 01/27/2015    PROT 8 5 (H) 01/27/2015    BILITOT 0 2 01/27/2015       Lab Results   Component Value Date    HCG <2 02/19/2014         Current Medications: Reviewed  Allergies: Reviewed  PMH/FH/SH:  Reviewed      Vital Sign:    Body surface area is 1 77 meters squared      Wt Readings from Last 3 Encounters:   05/21/18 70 3 kg (155 lb)   05/18/18 71 2 kg (157 lb)   04/23/18 70 4 kg (155 lb 4 8 oz)        Temp Readings from Last 3 Encounters:   05/21/18 97 7 °F (36 5 °C) (Tympanic)   04/23/18 98 5 °F (36 9 °C) (Temporal)   01/18/18 98 °F (36 7 °C)        BP Readings from Last 3 Encounters:   05/21/18 148/100   04/23/18 136/74   01/18/18 142/90         Pulse Readings from Last 3 Encounters:   05/21/18 (!) 115   04/23/18 (!) 110   01/18/18 (!) 124     @FUDAFKZ0(7)@

## 2018-05-23 DIAGNOSIS — T50.905A HOT FLASH DUE TO MEDICATION: Primary | ICD-10-CM

## 2018-05-23 DIAGNOSIS — R23.2 HOT FLASH DUE TO MEDICATION: Primary | ICD-10-CM

## 2018-05-23 RX ORDER — VENLAFAXINE HYDROCHLORIDE 37.5 MG/1
37.5 CAPSULE, EXTENDED RELEASE ORAL DAILY
Qty: 90 CAPSULE | Refills: 1 | Status: SHIPPED | OUTPATIENT
Start: 2018-05-23 | End: 2018-11-26

## 2018-05-30 ENCOUNTER — DOCUMENTATION (OUTPATIENT)
Dept: HEMATOLOGY ONCOLOGY | Facility: CLINIC | Age: 48
End: 2018-05-30

## 2018-05-30 NOTE — PROGRESS NOTES
On 5/21/2018 received notification from clinical that [employer] is requesting an authorization for the venlafaxine 37 5 mg  Sent auth form to Dr Rufus Cranker for signature  5/23/2018 resent the form to the dr for his signature, received it back, and submitted for au th    Received denial of tablets  The medication must be written for capsules since the capsules are formulary  Notified clinical to send a rx for the capsules instead of the tablet  5/29/2018 reprinted [employer] form for capsules,sent it for dr signature, received the form back, and submitted for auth  5/30/2018 received notification from [facility] that no Felipe Kinds is needed for the capsules since they are formulary  Notified clinical    Called [employer] at 10:37 ([phone]) informed them no Felipe Kinds is needed  Ruddy Mendez was stating that she was still having trouble getting the claim to go through  Sent the notification from [employer] over to her so they call the insurance to find out why they can not get the claim to go through

## 2018-06-14 ENCOUNTER — TELEPHONE (OUTPATIENT)
Dept: HEMATOLOGY ONCOLOGY | Facility: CLINIC | Age: 48
End: 2018-06-14

## 2018-06-14 NOTE — TELEPHONE ENCOUNTER
Hey!! Thank you for the message!! The patient doesn't speak english  Can you call and let her know, one of the uses for the Venlafaxine is for hot flashes, and that's the medication Dr Leonora Gomes uses to help with them

## 2018-06-14 NOTE — TELEPHONE ENCOUNTER
Stated that she had asked Dr Farzana Alarcon for a medication for hot flashes  She was prescribed venlafaxine but she read on the bottle that it also says that its for Depression, anxiety, panic attacks etc   She is concerned because she doesn't have any of that  Also the side effects on the bottle are concerning to her  She wanted to know if he could prescribe something else  She stated that she has a friend that is on the Primrose 1000mg and wanted to know if that would be ok for her to take

## 2018-06-14 NOTE — TELEPHONE ENCOUNTER
I would tell her no  The Venlafaxine will help with the hot flashes, which is what she wanted the assistance with

## 2018-06-22 LAB
ALBUMIN SERPL BCP-MCNC: 4.3 G/DL (ref 3–5.2)
ALP SERPL-CCNC: 77 U/L (ref 43–122)
ALT SERPL W P-5'-P-CCNC: 43 U/L (ref 9–52)
ANION GAP SERPL CALCULATED.3IONS-SCNC: 12 MMOL/L (ref 5–14)
AST SERPL W P-5'-P-CCNC: 20 U/L (ref 14–36)
BILIRUB SERPL-MCNC: 0.2 MG/DL
BUN SERPL-MCNC: 13 MG/DL (ref 5–25)
CALCIUM SERPL-MCNC: 9.6 MG/DL (ref 8.4–10.2)
CHLORIDE SERPL-SCNC: 103 MEQ/L (ref 97–108)
CHOLEST SERPL-MCNC: 166 MG/DL
CHOLEST/HDLC SERPL: 2.4 {RATIO}
CO2 SERPL-SCNC: 27 MMOL/L (ref 22–30)
CREATINE, SERUM (HISTORICAL): 0.68 MG/DL (ref 0.6–1.2)
DEPRECATED RDW RBC AUTO: 14 %
EGFR (HISTORICAL): >60 ML/MIN/1.73 M2
GLUCOSE SERPL-MCNC: 81 MG/DL (ref 70–99)
HCT VFR BLD AUTO: 40.2 % (ref 36–46)
HDLC SERPL-MCNC: 68 MG/DL
HGB BLD-MCNC: 13.3 G/DL (ref 12–16)
LDL/HDL RATIO (HISTORICAL): 1.3
LDLC SERPL CALC-MCNC: 88 MG/DL
MCH RBC QN AUTO: 29.3 PG (ref 26–34)
MCHC RBC AUTO-ENTMCNC: 33 % (ref 31–36)
MCV RBC AUTO: 89 FL (ref 80–100)
PLATELET # BLD AUTO: 221 K/MCL (ref 150–450)
POTASSIUM SERPL-SCNC: 4.2 MEQ/L (ref 3.6–5)
RBC # BLD AUTO: 4.52 M/MCL (ref 4–5.2)
SODIUM SERPL-SCNC: 142 MEQ/L (ref 137–147)
TOTAL PROTEIN (HISTORICAL): 8.1 G/DL (ref 5.9–8.4)
TRIGL SERPL-MCNC: 52 MG/DL
VLDLC SERPL CALC-MCNC: 10 MG/DL (ref 0–40)
WBC # BLD AUTO: 5.3 K/MCL (ref 4.5–11)

## 2018-07-02 ENCOUNTER — TELEPHONE (OUTPATIENT)
Dept: FAMILY MEDICINE CLINIC | Facility: CLINIC | Age: 48
End: 2018-07-02

## 2018-07-02 DIAGNOSIS — E28.39 ESTROGEN DEFICIENCY: Primary | ICD-10-CM

## 2018-07-02 NOTE — TELEPHONE ENCOUNTER
Patient called into the office requesting results  Please contact pt with results           Patient then called back into the office again stating that the medication Dr Mau Tian sent called   Jed Wall (pt doesn't want to take)  Pt requesting the following:  Calcium 500mg

## 2018-07-05 RX ORDER — CALCIUM CARBONATE 200(500)MG
1 TABLET,CHEWABLE ORAL DAILY
Qty: 30 TABLET | Refills: 3 | Status: SHIPPED | OUTPATIENT
Start: 2018-07-05 | End: 2018-11-26

## 2018-07-05 NOTE — TELEPHONE ENCOUNTER
Called the patient , she does not speak english, gave er daughter on the phone, advised about blood results  Thank you

## 2018-07-17 ENCOUNTER — HOSPITAL ENCOUNTER (OUTPATIENT)
Dept: MAMMOGRAPHY | Facility: CLINIC | Age: 48
Discharge: HOME | End: 2018-07-17
Payer: COMMERCIAL

## 2018-07-17 DIAGNOSIS — C50.812 MALIGNANT NEOPLASM OF OVERLAPPING SITES OF LEFT FEMALE BREAST (HCC): ICD-10-CM

## 2018-07-17 PROCEDURE — 77065 DX MAMMO INCL CAD UNI: CPT

## 2018-07-17 PROCEDURE — G0279 TOMOSYNTHESIS, MAMMO: HCPCS

## 2018-07-18 PROBLEM — Z79.811 USE OF ANASTROZOLE (ARIMIDEX): Status: ACTIVE | Noted: 2018-07-18

## 2018-07-23 ENCOUNTER — OFFICE VISIT (OUTPATIENT)
Dept: SURGICAL ONCOLOGY | Facility: CLINIC | Age: 48
End: 2018-07-23
Payer: COMMERCIAL

## 2018-07-23 VITALS
RESPIRATION RATE: 16 BRPM | SYSTOLIC BLOOD PRESSURE: 142 MMHG | WEIGHT: 156 LBS | HEIGHT: 65 IN | HEART RATE: 80 BPM | BODY MASS INDEX: 25.99 KG/M2 | DIASTOLIC BLOOD PRESSURE: 84 MMHG | TEMPERATURE: 98.4 F

## 2018-07-23 DIAGNOSIS — Z79.811 USE OF ANASTROZOLE (ARIMIDEX): ICD-10-CM

## 2018-07-23 DIAGNOSIS — L81.9 PIGMENTED SKIN LESION: ICD-10-CM

## 2018-07-23 DIAGNOSIS — C50.812 CARCINOMA OF OVERLAPPING SITES OF LEFT BREAST IN FEMALE, ESTROGEN RECEPTOR POSITIVE (HCC): Primary | ICD-10-CM

## 2018-07-23 DIAGNOSIS — Z17.0 CARCINOMA OF OVERLAPPING SITES OF LEFT BREAST IN FEMALE, ESTROGEN RECEPTOR POSITIVE (HCC): Primary | ICD-10-CM

## 2018-07-23 PROCEDURE — 99214 OFFICE O/P EST MOD 30 MIN: CPT | Performed by: SURGERY

## 2018-07-23 NOTE — LETTER
July 23, 2018     Roberto Aguilar MD  [address] [facility] [address]    Patient: Lauren Doughertyelor   YOB: 1970   Date of Visit: 7/23/2018       Dear Dr Meron Soriano: Thank you for referring Nida Murphy to me for evaluation  Below are my notes for this consultation  If you have questions, please do not hesitate to call me  I look forward to following your patient along with you  Sincerely,        Eulalia Vallejo MD        CC: No Recipients  Eulalia Vallejo MD  7/23/2018 11:18 AM  Sign at close encounter     Surgical Oncology Follow Up       [address] [place] [address] [address]  1970  [id number]  [address] [place] [address] SURGICAL ONCOLOGY [address] [address]    Chief Complaint   Patient presents with    Breast Cancer     6 month follow up    Follow-up          Assessment & Plan:   Patient presents for a follow-up visit  Her mammogram from this month showed no worrisome findings  She has no complaints referable to her breast or her mastectomy site  She continues to take her anti hormonal therapy and does complain of some hot flashes  She has seen Dr Francisca Cruz who is coordinated medical clearance for reconstruction which she is undergone and they will be scheduling that surgery in the near future  Patient also brings to our attention a mole on her left back which has always been there though she thinks it may be turning slightly darker  The conversations were done with the language line using a net  [id number]  Given the atypical appearance of the left back small of recommended she see a dermatologist   We will see her back in 6 months  She is agreeable to seeing Maryam Leiva at that visit      Cancer History:        Carcinoma of left breast, estrogen receptor positive ([facility] [address] )    6/18/2013 Biopsy     Left breast biopsy  Invasive mammary carcinoma with lobular features  Grade 2  ER/ID positive  HER2 negative         7/29/2013 - 12/9/2013 Chemotherapy     Dose dense AC x4 followed by paclitaxel x12         2/18/2014 Surgery     Left mastectomy with sentinel node biopsy  Invasive lobular carcinoma  Grade 2  Invasive carcinoma involves the dermis         2/18/2014 -  Cancer Staged     IIIA ypT3, ypN1a (i+) (sn) G2         4/3/2014 - 5/20/2014 Radiation     Chest wall and draining lymph nodes  Total dose 6040 cGy         5/19/2014 - 5/2015 Hormone Therapy     Tamoxifen         5/2015 -  Hormone Therapy     anastrozole               Interval History:   See above    Review of Systems:   Review of Systems   Constitutional: Negative for activity change, appetite change and fatigue  HENT: Negative  Eyes: Negative  Respiratory: Negative for cough, shortness of breath and wheezing  Cardiovascular: Negative for chest pain and leg swelling  Gastrointestinal: Negative  Endocrine: Negative  Genitourinary: Negative  Musculoskeletal:        No new changes or complaints of bone pain   Skin: Negative  Allergic/Immunologic: Negative  Neurological: Negative  Hematological: Negative  Psychiatric/Behavioral: Negative          Past Medical History     Patient Active Problem List   Diagnosis    Carcinoma of left breast, estrogen receptor positive (HCC)    Use of anastrozole (Arimidex)     Past Medical History:   Diagnosis Date    Breast cancer ([facility] [address] ) 07/2013    left-chemotherapy    Fibroma     History of external beam radiation therapy      Past Surgical History:   Procedure Laterality Date    APPENDECTOMY      HYSTERECTOMY      MASTECTOMY      OOPHORECTOMY       Family History   Problem Relation Age of Onset    Lung cancer Mother      Social History     Social History    Marital status: /Civil Union     Spouse name: N/A    Number of children: N/A    Years of education: N/A     Occupational History          unemployed     Social History Main Topics    Smoking status: Never Smoker    Smokeless tobacco: Never Used    Alcohol use No    Drug use: No    Sexual activity: Not on file     Other Topics Concern    Not on file     Social History Narrative    No narrative on file       Current Outpatient Prescriptions:     anastrozole (ARIMIDEX) 1 mg tablet, Take 1 tablet (1 mg total) by mouth daily, Disp: 90 tablet, Rfl: 1    calcium carbonate (OS-RAFA) 1250 (500 Ca) MG tablet, Take 1 tablet by mouth daily, Disp: , Rfl:     calcium carbonate (TUMS) 500 mg chewable tablet, Chew 1 tablet (500 mg total) daily, Disp: 30 tablet, Rfl: 3    venlafaxine (EFFEXOR-XR) 37 5 mg 24 hr capsule, Take 1 capsule (37 5 mg total) by mouth daily, Disp: 90 capsule, Rfl: 1  Allergies   Allergen Reactions    Aspirin Rash     Other reaction(s): Palpitations       Physical Exam:     Vitals:    07/23/18 1045   BP: 142/84   Pulse: 80   Resp: 16   Temp: 98 4 °F (36 9 °C)     Physical Exam   Constitutional: She is oriented to person, place, and time  She appears well-developed and well-nourished  HENT:   Head: Normocephalic and atraumatic  Mouth/Throat: Oropharynx is clear and moist    Eyes: EOM are normal  Pupils are equal, round, and reactive to light  Neck: Normal range of motion  Neck supple  No JVD present  No tracheal deviation present  No thyromegaly present  Cardiovascular: Normal rate, regular rhythm, normal heart sounds and intact distal pulses  Exam reveals no gallop and no friction rub  No murmur heard  Pulmonary/Chest: Effort normal and breath sounds normal  No respiratory distress  She has no wheezes  She has no rales  Examination of the left mastectomy site shows no evidence of local regional recurrence  The right breast shows no skin changes dominant masses or axillary adenopathy  She does have a mole on her right breast which she says is been stable    This brought up the question regarding the mole on her left back which was evaluated is pigmented and somewhat atypical    Abdominal: Soft  She exhibits no distension and no mass  There is no hepatomegaly  There is no tenderness  There is no rebound and no guarding  Musculoskeletal: Normal range of motion  She exhibits no edema or tenderness  Lymphadenopathy:     She has no cervical adenopathy  Neurological: She is alert and oriented to person, place, and time  No cranial nerve deficit  Skin: Skin is warm and dry  No rash noted  No erythema  Psychiatric: She has a normal mood and affect  Her behavior is normal    Vitals reviewed  Results:             Advance Care Planning/Advance Directives:  I discussed the disease status, treatment plans and follow-up with the patient

## 2018-07-23 NOTE — PROGRESS NOTES
Surgical Oncology Follow Up       [address] [facility]  [facility] SURGICAL ONCOL[facility] [address] [place] Alexis  1970  [id number]  [address] [facility] [facility]  [facility] SURGICAL ONCOLOGY [place] [id number]    Chief Complaint   Patient presents with    Breast Cancer     6 month follow up    Follow-up          Assessment & Plan:   Patient presents for a follow-up visit  Her mammogram from this month showed no worrisome findings  She has no complaints referable to her breast or her mastectomy site  She continues to take her anti hormonal therapy and does complain of some hot flashes  She has seen Dr Sharmila Morrell who is coordinated medical clearance for reconstruction which she is undergone and they will be scheduling that surgery in the near future  Patient also brings to our attention a mole on her left back which has always been there though she thinks it may be turning slightly darker  The conversations were done with the language line using a net  [id number]  Given the atypical appearance of the left back small of recommended she see a dermatologist   We will see her back in 6 months  She is agreeable to seeing Trinity Stevenson at that visit  Cancer History:        Carcinoma of left breast, estrogen receptor positive ([facility] [address] )    6/18/2013 Biopsy     Left breast biopsy  Invasive mammary carcinoma with lobular features  Grade 2  ER/AK positive  HER2 negative         7/29/2013 - 12/9/2013 Chemotherapy     Dose dense AC x4 followed by paclitaxel x12         2/18/2014 Surgery     Left mastectomy with sentinel node biopsy  Invasive lobular carcinoma  Grade 2  Invasive carcinoma involves the dermis         2/18/2014 -  Cancer Staged     IIIA ypT3, ypN1a (i+) (sn) G2         4/3/2014 - 5/20/2014 Radiation     Chest wall and draining lymph nodes    Total dose 6040 cGy         5/19/2014 - 5/2015 Hormone Therapy Tamoxifen         5/2015 -  Hormone Therapy     anastrozole               Interval History:   See above    Review of Systems:   Review of Systems   Constitutional: Negative for activity change, appetite change and fatigue  HENT: Negative  Eyes: Negative  Respiratory: Negative for cough, shortness of breath and wheezing  Cardiovascular: Negative for chest pain and leg swelling  Gastrointestinal: Negative  Endocrine: Negative  Genitourinary: Negative  Musculoskeletal:        No new changes or complaints of bone pain   Skin: Negative  Allergic/Immunologic: Negative  Neurological: Negative  Hematological: Negative  Psychiatric/Behavioral: Negative          Past Medical History     Patient Active Problem List   Diagnosis    Carcinoma of left breast, estrogen receptor positive ([facility] [address] )    Use of anastrozole (Arimidex)     Past Medical History:   Diagnosis Date    [facility]2013    left-chemotherapy    Fibroma     History of external beam radiation therapy      Past Surgical History:   Procedure Laterality Date    APPENDECTOMY      HYSTERECTOMY      MASTECTOMY      OOPHORECTOMY       Family History   Problem Relation Age of Onset    Lung cancer Mother      Social History     Social History    Marital status: /Civil Union     Spouse name: N/A    Number of children: N/A    Years of education: N/A     Occupational History          unemployed     Social History Main Topics    Smoking status: Never Smoker    Smokeless tobacco: Never Used    Alcohol use No    Drug use: No    Sexual activity: Not on file     Other Topics Concern    Not on file     Social History Narrative    No narrative on file       Current Outpatient Prescriptions:     anastrozole (ARIMIDEX) 1 mg tablet, Take 1 tablet (1 mg total) by mouth daily, Disp: 90 tablet, Rfl: 1    calcium carbonate (OS-RAFA) 1250 (500 Ca) MG tablet, Take 1 tablet by mouth daily, Disp: , Rfl:     calcium carbonate (TUMS) 500 mg chewable tablet, Chew 1 tablet (500 mg total) daily, Disp: 30 tablet, Rfl: 3    venlafaxine (EFFEXOR-XR) 37 5 mg 24 hr capsule, Take 1 capsule (37 5 mg total) by mouth daily, Disp: 90 capsule, Rfl: 1  Allergies   Allergen Reactions    Aspirin Rash     Other reaction(s): Palpitations       Physical Exam:     Vitals:    07/23/18 1045   BP: 142/84   Pulse: 80   Resp: 16   Temp: 98 4 °F (36 9 °C)     Physical Exam   Constitutional: She is oriented to person, place, and time  She appears well-developed and well-nourished  HENT:   Head: Normocephalic and atraumatic  Mouth/Throat: Oropharynx is clear and moist    Eyes: EOM are normal  Pupils are equal, round, and reactive to light  Neck: Normal range of motion  Neck supple  No JVD present  No tracheal deviation present  No thyromegaly present  Cardiovascular: Normal rate, regular rhythm, normal heart sounds and intact distal pulses  Exam reveals no gallop and no friction rub  No murmur heard  Pulmonary/Chest: Effort normal and breath sounds normal  No respiratory distress  She has no wheezes  She has no rales  Examination of the left mastectomy site shows no evidence of local regional recurrence  The right breast shows no skin changes dominant masses or axillary adenopathy  She does have a mole on her right breast which she says is been stable  This brought up the question regarding the mole on her left back which was evaluated is pigmented and somewhat atypical    Abdominal: Soft  She exhibits no distension and no mass  There is no hepatomegaly  There is no tenderness  There is no rebound and no guarding  Musculoskeletal: Normal range of motion  She exhibits no edema or tenderness  Lymphadenopathy:     She has no cervical adenopathy  Neurological: She is alert and oriented to person, place, and time  No cranial nerve deficit  Skin: Skin is warm and dry  No rash noted  No erythema     Psychiatric: She has a normal mood and affect  Her behavior is normal    Vitals reviewed  Results:             Advance Care Planning/Advance Directives:  I discussed the disease status, treatment plans and follow-up with the patient

## 2018-07-30 ENCOUNTER — OFFICE VISIT (OUTPATIENT)
Dept: FAMILY MEDICINE CLINIC | Facility: CLINIC | Age: 48
End: 2018-07-30
Payer: COMMERCIAL

## 2018-07-30 VITALS
HEART RATE: 128 BPM | DIASTOLIC BLOOD PRESSURE: 86 MMHG | HEIGHT: 65 IN | SYSTOLIC BLOOD PRESSURE: 150 MMHG | TEMPERATURE: 98.1 F | OXYGEN SATURATION: 99 % | RESPIRATION RATE: 16 BRPM | WEIGHT: 160 LBS | BODY MASS INDEX: 26.66 KG/M2

## 2018-07-30 DIAGNOSIS — I10 HYPERTENSION, UNSPECIFIED TYPE: Primary | ICD-10-CM

## 2018-07-30 PROCEDURE — 1036F TOBACCO NON-USER: CPT | Performed by: INTERNAL MEDICINE

## 2018-07-30 PROCEDURE — 3008F BODY MASS INDEX DOCD: CPT | Performed by: INTERNAL MEDICINE

## 2018-07-30 PROCEDURE — 99213 OFFICE O/P EST LOW 20 MIN: CPT | Performed by: INTERNAL MEDICINE

## 2018-07-30 RX ORDER — LANOLIN ALCOHOL/MO/W.PET/CERES
1 CREAM (GRAM) TOPICAL 2 TIMES DAILY
Qty: 60 TABLET | Refills: 2 | Status: SHIPPED | OUTPATIENT
Start: 2018-07-30 | End: 2020-02-17 | Stop reason: SDUPTHER

## 2018-07-30 RX ORDER — HYDROCHLOROTHIAZIDE 12.5 MG/1
12.5 TABLET ORAL DAILY
Qty: 30 TABLET | Refills: 1 | Status: SHIPPED | OUTPATIENT
Start: 2018-07-30 | End: 2018-11-26

## 2018-07-30 NOTE — ASSESSMENT & PLAN NOTE
Patient has elevated BP at every visit to the office with normal BP reading at home  Considering elevated BP in the office, will start on low dose HCTZ a day 12 5 mg  Patient did not want to start medication at this time  Advised her that the was a study recently, which showed that the people with situational HTN have increased incidence of the heart events  Patient agreed to think about starting medication until next appointment  Follow up in 4 weeks

## 2018-07-30 NOTE — PATIENT INSTRUCTIONS
Hipertensión crónica   CUIDADO AMBULATORIO:   Hipertensión  es la presión arterial romero  La presión arterial es la fuerza que ejerce la laurel contra las spear de las arterias  La presión arterial normal debería estar a menos de 120/80  La pre-hipertensión estaría entre 120/80 y 139/ 80  La presión arterial romero estaría a 140/90 o más romero  La hipertensión causa que troy presión arterial se eleve tanto que troy corazón se ve forzado a trabajar [employer] de lo normal  [place] puede dañar troy corazón  La hipertensión crónica es gabriella condición de mo plazo que usted puede controlar con un estilo de angela dereck o con medicamentos  La presión [place] a proteger cassie órganos jesus troy corazón, pulmones, cerebro, y riñones  Los síntomas más comunes incluyen los siguientes:   · Dolor de gracia     · Visión borrosa    · Dolor de pecho     · Mareos o debilidad     · Dificultad para respirar     · Hemorragias nasales (sangrado de la Jeanann Dials)  Llame al 911 en carmelo de presentar lo siguiente:   · Usted tiene malestar en el pecho que se siente jesus estrujamiento, presión, Randi Ponto o dolor  · Usted se siente confundido o tiene dificultad para hablar  · Repentinamente se siente aturdido o con dificultad para respirar  · Usted tiene dolor o [employer] espalda, [place], [place], abdomen o [place]  Busque atención médica de inmediato si:   · Usted tiene un kaylee dolor de gracia o pérdida de la visión  · Usted tiene debilidad en un brazo o en gabriella pierna  Pregúntele a troy Ailyn Vazquez vitaminas y minerales son adecuados para usted  · Usted se siente mareado, confundido, somnoliento o jesus si se fuera a desmayar  · Usted se ha tomado troy medicamento para la presión arterial anny troy presión arterial todavía está más romero de lo que le indicó troy médico     · Usted tiene preguntas o inquietudes acerca de troy condición o cuidado    El tratamiento para la hipertensión crónica  puede incluir medicamentos para bajar la presión arterial y reducir el nivel de colesterol  Un nivel bajo de colesterol ayuda a prevenir enfermedades cardíacas y facilita el control de la presión arterial  La enfermedad cardíaca puede dificultar el control de la presión arterial  Es probable que usted también necesite hacer algunos cambios en troy estilo de angela  Tómese cassie medicamentos exactamente jesus se lo indicaron  Controle la hipertensión crónica:  Hable con troy médico sobre las siguientes recomendaciones y otras formas de controlar la hipertensión:  · Tómese la presión arterial en troy casa  Siéntese y descanse por 5 minutos antes de tomarse la presión arterial  Extienda troy brazo y apóyelo en gabriella superficie plana  Troy brazo debe estar a la misma altura que troy corazón  Siga las instrucciones que vienen con el monitor para la presión arterial o tensiómetro  Si es posible tome por lo menos 2 lecturas de la presión cada vez  Tómese la presión arterial por lo Lindsey Shell al día a la misma hora todos los días, gabriella en la mañana y la otra en la noche  Mantenga un registro de las lecturas de troy presión arterial y llévelo consigo a cassie consultas  Pregúntele a troy médico cuál debería ser troy presión arterial            · Limite el sodio (la sal) jesus se le haya indicado  Demasiado sodio puede afectar el equilibrio de líquidos  Revise las etiquetas para buscar alimentos bajos en sodio o sin sal agregada  Algunos alimentos bajos en sodio utilizan sales de potasio para añadir sabor  Demasiado potasio también puede causar problemas de [place]  Troy médico le dirá qué cantidad de sodio y potasio es montez para el consumo en un día  Él puede recomendarle que limite el sodio a 2,300 mg al día  · Siga el plan de comidas recomendado por troy médico   Un dietista o médico puede darle más información sobre planes de bajo contenido de sodio o el plan de alimentación DASH (enfoques dietéticos para detener la hipertensión)   El plan DASH es bajo en sodio, grasas saturadas y grasa total  Es alto en potasio, calcio y [place]  · Ejercítese para mantener un peso saludable  Realice actividad física por lo menos 30 minutos al día, la mayoría de los días de la [place]  [place] ayudará a bajar jeronimo presión arterial  Pida más información acerca de un plan de ejercicio adecuado para usted  · [address] [facility] estr[address]  [place] podría ayudarlo a bajar jeronimo presión arterial  Aprenda sobre formas de relajarse, jesus respiración profunda o escuchar música  · Limite el consumo de alcohol  Las mujeres deberían limitar el consumo de alcohol a 1 bebida por día  Los hombres deberían limitar el consumo de alcohol a 2 tragos al día  Un trago equivale a 12 onzas de cerveza, 5 onzas de vino o 1 onza y ½ de licor  · No fume  La nicotina y otros químicos en los cigarrillos y cigarros pueden aumentar jeronimo presión arterial y también pueden provocar daño al pulmón  Pida información a jeronimo médico si usted actualmente fuma y necesita ayuda para dejar de fumar  Los cigarrillos electrónicos o tabaco sin humo todavía contienen nicotina  Consulte con jeronimo médico antes de [employer]  Acuda a cassie consultas de control con jeronimo médico según le indicaron  Usted tendrá que regresar para que le revisen la presión arterial y para que le amy otras pruebas de laboratorio  Anote cassie preguntas para que se acuerde de hacerlas lori cassie visitas  © 2017 [address] Information is for End User's use only and may not be sold, redistributed or otherwise used for commercial purposes  All illustrations and images included in CareNotes® are the copyrighted property of A D A M , Inc  or Scott Lisa  Esta información es sólo para uso en educación  Jeronimo intención no es darle un consejo médico sobre enfermedades o tratamientos  Colsulte con jeronimo Zelpha Roch farmacéutico antes de seguir cualquier régimen médico para saber si es seguro y efectivo para usted

## 2018-07-30 NOTE — PROGRESS NOTES
Assessment/Plan:    Hypertension  Patient has elevated BP at every visit to the office with normal BP reading at home  Considering elevated BP in the office, will start on low dose HCTZ a day 12 5 mg  Patient did not want to start medication at this time  Advised her that the was a study recently, which showed that the people with situational HTN have increased incidence of the heart events  Patient agreed to think about starting medication until next appointment  Follow up in 4 weeks  Diagnoses and all orders for this visit:    Hypertension, unspecified type  -     hydrochlorothiazide (HYDRODIURIL) 12 5 mg tablet; Take 1 tablet (12 5 mg total) by mouth daily  -     calcium citrate-vitamin D (CITRACAL+D) 315-200 MG-UNIT per tablet; Take 1 tablet by mouth 2 (two) times a day          Subjective:      Patient ID: Rhonda Mccall is a 46 y o  female  Patient was checking BP at home  BP readings 130-140/70-80  Denies chest pain, SOB on exertion  The following portions of the patient's history were reviewed and updated as appropriate: allergies, current medications and problem list     Review of Systems   Constitutional: Negative for activity change, appetite change, fever and unexpected weight change  HENT: Negative for ear pain, hearing loss and sore throat  Eyes: Negative for pain and visual disturbance  Respiratory: Negative for cough, shortness of breath and wheezing  Cardiovascular: Negative for chest pain, palpitations and leg swelling  Gastrointestinal: Negative for abdominal pain, blood in stool, constipation, diarrhea, nausea and vomiting  Genitourinary: Negative for dysuria  Musculoskeletal: Negative for arthralgias, joint swelling and myalgias  Skin: Negative for rash and wound  Neurological: Negative for dizziness, weakness and numbness  Hematological: Does not bruise/bleed easily  Psychiatric/Behavioral: Negative for behavioral problems           Objective:      /86 (BP Location: Right arm, Patient Position: Sitting, Cuff Size: Adult)   Pulse (!) 128   Temp 98 1 °F (36 7 °C) (Tympanic)   Resp 16   Ht 5' 5" (1 651 m)   Wt 72 6 kg (160 lb)   SpO2 99%   BMI 26 63 kg/m²          Physical Exam   Constitutional: She appears well-developed and well-nourished  No distress  HENT:   Head: Normocephalic and atraumatic  Eyes: Conjunctivae and EOM are normal  Pupils are equal, round, and reactive to light  Neck: Normal range of motion  Neck supple  Cardiovascular: Normal rate, regular rhythm and normal heart sounds  Exam reveals no gallop and no friction rub  No murmur heard  Pulmonary/Chest: Effort normal and breath sounds normal  No respiratory distress  She has no wheezes  She has no rales  Abdominal: Soft  Bowel sounds are normal  She exhibits no distension and no mass  There is no tenderness  There is no rebound and no guarding  Musculoskeletal: She exhibits no edema, tenderness or deformity  Neurological: She is alert  Skin: No rash noted  She is not diaphoretic  No erythema

## 2018-11-26 ENCOUNTER — OFFICE VISIT (OUTPATIENT)
Dept: HEMATOLOGY ONCOLOGY | Facility: CLINIC | Age: 48
End: 2018-11-26
Payer: COMMERCIAL

## 2018-11-26 VITALS
WEIGHT: 159 LBS | BODY MASS INDEX: 26.49 KG/M2 | HEIGHT: 65 IN | DIASTOLIC BLOOD PRESSURE: 94 MMHG | OXYGEN SATURATION: 98 % | TEMPERATURE: 97.6 F | HEART RATE: 122 BPM | SYSTOLIC BLOOD PRESSURE: 144 MMHG | RESPIRATION RATE: 16 BRPM

## 2018-11-26 DIAGNOSIS — C50.812 CARCINOMA OF OVERLAPPING SITES OF LEFT BREAST IN FEMALE, ESTROGEN RECEPTOR POSITIVE (HCC): Primary | ICD-10-CM

## 2018-11-26 DIAGNOSIS — Z17.0 CARCINOMA OF OVERLAPPING SITES OF LEFT BREAST IN FEMALE, ESTROGEN RECEPTOR POSITIVE (HCC): Primary | ICD-10-CM

## 2018-11-26 PROCEDURE — 99214 OFFICE O/P EST MOD 30 MIN: CPT | Performed by: INTERNAL MEDICINE

## 2018-11-26 RX ORDER — ANASTROZOLE 1 MG/1
1 TABLET ORAL DAILY
Qty: 90 TABLET | Refills: 3 | Status: SHIPPED | OUTPATIENT
Start: 2018-11-26 | End: 2019-06-04 | Stop reason: SDUPTHER

## 2018-11-26 NOTE — PROGRESS NOTES
Hematology / Oncology Outpatient Follow Up Note    Donna Hall 46 y o  female :1970 [id number]         Date:  2018    Assessment / Plan:  A 46year old surgically postmenopausal woman with locally advanced left breast cancer, grade 2, ER/MO positive HER-2 negative disease  She has no evidence of BRCA mutation  She underwent neoadjuvant chemotherapy with AC followed by paclitaxel resulting in clinical partial response  However, when she had mastectomy, she had significant residual disease, as well as 2 positive lymph node  She completed postmastectomy radiation therapy  She was previously on adjuvant hormonal therapy with tamoxifen, until she underwent hysterectomy and bilateral oophorectomy  Currently, she is on anastrozole with significant hot flashes  Clinically, she has no evidence recurrent disease  Because of her risk of disease, she may stay on adjuvant hormonal therapy for 10 years  I recommended her to continue with anastrozole 1 mg once a day  She is in agreement with my recommendation  I will see her again in a year for routine follow-up           Subjective:      HPI:  A 53year old premenopausal  speaking woman with recent diagnosis of locally advanced left breast cancer    She first noticed a lump which was small approximately 2 years ago  Over the time she developed very large breast mass with skin involvement as well as skin dimpling  She also developed of breast pain  She underwent biopsy which showed invasive ductal carcinoma with grade 2  This was ER/MO positive HER-2 negative disease  She was seen by Dr Kiana Dubon who thought that neoadjuvant chemotherapy is the most appropriate treatment  Therefore she was referred to me  She underwent CT scan of chest abdomen and pelvis as well as bone scan both of which were negative for distant metastasis  She has no weight loss fever chills or night sweats  She denied any respiratory symptoms   She has no bone pain  Her performance status is normal  she has no family history of breast cancer or ovarian cancer             Interval History:  A 46year old surgically postmenopausal woman with locally advanced left breast cancer, grade 2, ER/[place] positive HER-2 negative disease  She had very large left breast mass which occupied her entire left breast at the time of initial presentation  She had neoadjuvant chemotherapy with dose dense AC followed by paclitaxel resulting in clinical response  She underwent left mastectomy with sentinel lymph node biopsy  She still had significant residual tumor measuring 11 5 cm with multiple residual foci of tumor with 2 sentinel lymph node with subcentimeter metastatic disease  She did not have reconstruction  Since May 2014, she was on adjuvant hormonal therapy with tamoxifen , since she was perimenopausal at that time  She had very large fibroid in the uterus  In February 2015, she underwent hysterectomy and bilateral oophorectomy  Subsequently, adjuvant hormonal therapy was changed to anastrozole  She came in today for followup  She feels well  However, she continued to have moderate hot flashes  She has very minimal musculoskeletal symptoms  She denied bone pain  Her weight is stable  She has no respiratory symptoms  Her performance status is normal                            Objective:      Primary Diagnosis:      1  Locally advanced left breast cancer with skin involvement, grade 2, ER/[place] positive HER-2 negative disease  Diagnosed in June 2013  2  BRCA mutation negative       Cancer Staging:  Cancer Staging  No matching staging information was found for the patient         Previous Hematologic/ Oncologic Treatment:      1  Neoadjuvant chemotherapy with dose dense a c  X4 followed by weekly paclitaxel x12  Completed in December 2013  2  adjuvant hormonal therapy with tamoxifen from March 2014 through May 2015  3  postmastectomy radiation therapy completed in May 2014     Current Hematologic/ Oncologic Treatment:       Adjuvant hormonal therapy with anastrozole 1 mg once a day since May 2015       Disease Status:      1  Partial response on neoadjuvant chemotherapy  2  ENMANUEL, status post mastectomy with sentinel lymph node biopsy       Test Results:     Pathology:           Radiology:     Mammography in July 2018 was benign  BI-RAD 1       Laboratory:           Physical Exam:        General Appearance:    Alert, oriented          Eyes:    PERRL   Ears:    Normal external ear canals, both ears   Nose:   Nares normal, septum midline   Throat:   Mucosa moist  Pharynx without injection  Neck:   Supple         Lungs:     Clear to auscultation bilaterally   Chest Wall:    No tenderness or deformity    Heart:    Regular rate and rhythm         Abdomen:     Soft, non-tender, bowel sounds +, no organomegaly               Extremities:   Extremities no cyanosis or edema         Skin:   no rash or icterus  Lymph nodes:   Cervical, supraclavicular, and axillary nodes normal   Neurologic:   CNII-XII intact, normal strength, sensation and reflexes     Throughout             Breast exam:   status post left mastectomy without reconstruction  No palpable abnormalities in her left chest wall  No palpable metastatic very adenopathy  Right breast exam is negative  ROS: Review of Systems        Imaging: No results found        Labs:   Lab Results   Component Value Date    WBC 5 3 06/22/2018    HGB 13 3 06/22/2018    HCT 40 2 06/22/2018    MCV 89 06/22/2018     06/22/2018     Lab Results   Component Value Date     06/22/2018    K 4 2 06/22/2018     06/22/2018    CO2 27 06/22/2018    ANIONGAP 12 06/22/2018    BUN 13 06/22/2018    CREATININE 0 53 (L) 02/03/2015    GLUCOSE 81 06/22/2018    CALCIUM 9 6 06/22/2018    AST 20 06/22/2018    ALT 43 06/22/2018    ALKPHOS 77 06/22/2018    PROT 8 1 06/22/2018    BILITOT 0 2 06/22/2018         Lab Results   Component Value Date HCG <2 02/19/2014       Current Medications: Reviewed  Allergies: Reviewed  PMH/FH/SH:  Reviewed      Vital Sign:    Body surface area is 1 79 meters squared      Wt Readings from Last 3 Encounters:   11/26/18 72 1 kg (159 lb)   07/30/18 72 6 kg (160 lb)   07/23/18 70 8 kg (156 lb)        Temp Readings from Last 3 Encounters:   11/26/18 97 6 °F (36 4 °C) (Tympanic)   07/30/18 98 1 °F (36 7 °C) (Tympanic)   07/23/18 98 4 °F (36 9 °C)        BP Readings from Last 3 Encounters:   11/26/18 144/94   07/30/18 150/86   07/23/18 142/84         Pulse Readings from Last 3 Encounters:   11/26/18 (!) 122   07/30/18 (!) 128   07/23/18 80     @LASTSAO2(3)@

## 2019-02-05 ENCOUNTER — OFFICE VISIT (OUTPATIENT)
Dept: SURGICAL ONCOLOGY | Facility: CLINIC | Age: 49
End: 2019-02-05
Payer: COMMERCIAL

## 2019-02-05 VITALS
HEART RATE: 112 BPM | DIASTOLIC BLOOD PRESSURE: 80 MMHG | TEMPERATURE: 98.2 F | SYSTOLIC BLOOD PRESSURE: 140 MMHG | WEIGHT: 159 LBS | HEIGHT: 65 IN | RESPIRATION RATE: 16 BRPM | BODY MASS INDEX: 26.49 KG/M2

## 2019-02-05 DIAGNOSIS — Z17.0 CARCINOMA OF OVERLAPPING SITES OF LEFT BREAST IN FEMALE, ESTROGEN RECEPTOR POSITIVE (HCC): Primary | ICD-10-CM

## 2019-02-05 DIAGNOSIS — Z79.811 USE OF ANASTROZOLE (ARIMIDEX): ICD-10-CM

## 2019-02-05 DIAGNOSIS — C50.812 CARCINOMA OF OVERLAPPING SITES OF LEFT BREAST IN FEMALE, ESTROGEN RECEPTOR POSITIVE (HCC): Primary | ICD-10-CM

## 2019-02-05 PROCEDURE — 99214 OFFICE O/P EST MOD 30 MIN: CPT | Performed by: NURSE PRACTITIONER

## 2019-02-05 NOTE — PROGRESS NOTES
Surgical Oncology Follow Up       [address] [facility]  CANCER CARE [facility] SURGICAL ONCOLOGY [address] Renee Beatty,[facility] Marceil Osler  1970  [id number]  [address] [place] [address],[address] [facility]  CANCER [facility] SURGICAL ONCOLOGY [place] [id number]    Chief Complaint   Patient presents with    Breast Cancer     Pt is here for 6 month follow up        Assessment/Plan:  1  Carcinoma of overlapping sites of left breast in female, estrogen receptor positive ([facility] [address] )  - Mammo diagnostic right w 3d & cad; Future  - 1 year f/u visit    2  Use of anastrozole (Arimidex)  - continue use    Discussion/Summary: Patient is a 46year old female who presents today for a 6 month follow-up for left breast cancer diagnosed in 2013  She underwent neoadjuvant chemotherapy and underwent a left mastectomy and sentinel node biopsy in 2014  Her tumor size was 11 5 cm and she had 2 of 3 positive lymph nodes  It was determined she would not benefit from a complete axillary dissection but rather completed post mastectomy radiation therapy  She tested negative for the BRCA genetic mutation  She is currently taking anastrozole  She had a right 3D diagnostic mammogram on July 17, 2018 which was BI-RADS 1  She has no new complaints today  Reports occasional headaches which is stable, denies back pain, bone pain, cough, shortness of breath, abdominal pain  Reports occasional hot classes which she attributes to anastrozole use  She does not appreciate any changes on her self breast exam   There are no worrisome findings on today's exam   She is now over 5 years from her diagnosis and therefore we will order a right 3D diagnostic mammogram for July 2019 and we will see the patient back in 1 year or sooner if the need arises  She will also be following with a dermatologist in May for a left back mole  She was instructed to call with any new concerns or symptoms    She is in agreement with this plan   All of her questions were answered     Tawana Rojas #[id number] utilized for today's visit  History of Present Illness:        Carcinoma of left breast, estrogen receptor positive ([facility] [address] )    6/18/2013 Biopsy     Left breast biopsy  Invasive mammary carcinoma with lobular features  Grade 2  ER/[place] positive  HER2 negative         7/29/2013 - 12/9/2013 Chemotherapy     Dose dense AC x4 followed by paclitaxel x12         2/18/2014 Surgery     Left mastectomy with sentinel node biopsy  Invasive lobular carcinoma  Grade 2  Invasive carcinoma involves the dermis         2/18/2014 -  Cancer Staged     IIIA ypT3, ypN1a (i+) (sn) G2         4/3/2014 - 5/20/2014 Radiation     Chest wall and draining lymph nodes  Total dose 6040 cGy         5/19/2014 - 5/2015 Hormone Therapy     Tamoxifen         5/2015 -  Hormone Therapy     anastrozole              -Interval History:  Patient presents today for six-month follow-up visit for left breast cancer diagnosed in 2013  She had a right 3D diagnostic mammogram performed in July 2018 which was BI-RADS 1  She has no new complaints today  She reports hot flashes which she attributes to anastrozole use  Denies persistent headaches, back pain, bone pain, cough, shortness of breath, abdominal pain  She states that she becomes extremely anxious when coming to doctor's appointments and was noted to be tachycardic at 112- She denies chest pain or persistent symptoms  Review of Systems:  Review of Systems   Constitutional: Negative for activity change, appetite change, chills, fatigue, fever and unexpected weight change  HENT: Negative for trouble swallowing  Eyes: Negative for pain, redness and visual disturbance  Respiratory: Negative for cough, shortness of breath and wheezing  Cardiovascular: Negative for chest pain, palpitations and leg swelling  Gastrointestinal: Negative for abdominal pain, constipation, diarrhea, nausea and vomiting     Endocrine: Negative for cold intolerance and heat intolerance  Musculoskeletal: Negative for arthralgias, back pain, gait problem and myalgias  Skin: Negative for color change and rash  Neurological: Positive for headaches (occasional)  Negative for dizziness, syncope, light-headedness and numbness  Hematological: Negative for adenopathy  Psychiatric/Behavioral: Negative for agitation and confusion  All other systems reviewed and are negative        Patient Active Problem List   Diagnosis    Carcinoma of left breast, estrogen receptor positive (HCC)    Use of anastrozole (Arimidex)    Limb swelling    Malignant neoplasm of overlapping sites of left female breast ([facility] )    Tachycardia    Uterine leiomyoma    Hypertension     Past Medical History:   Diagnosis Date    Breast cancer ([place] [id number] ) 07/2013    left-chemotherapy    Fibroma     History of external beam radiation therapy      Past Surgical History:   Procedure Laterality Date    APPENDECTOMY      HYSTERECTOMY      MASTECTOMY      OOPHORECTOMY       Family History   Problem Relation Age of Onset    Lung cancer Mother      Social History     Social History    Marital status: /Civil Union     Spouse name: N/A    Number of children: N/A    Years of education: N/A     Occupational History          unemployed     Social History Main Topics    Smoking status: Never Smoker    Smokeless tobacco: Never Used    Alcohol use No    Drug use: No    Sexual activity: Not on file     Other Topics Concern    Not on file     Social History Narrative    No narrative on file       Current Outpatient Prescriptions:     anastrozole (ARIMIDEX) 1 mg tablet, Take 1 tablet (1 mg total) by mouth daily, Disp: 90 tablet, Rfl: 3    calcium citrate-vitamin D (CITRACAL+D) 315-200 MG-UNIT per tablet, Take 1 tablet by mouth 2 (two) times a day, Disp: 60 tablet, Rfl: 2  Allergies   Allergen Reactions    Aspirin Rash     Other reaction(s): Palpitations     Vitals:    02/05/19 1436 BP:    Pulse: (!) 112   Resp:    Temp:        Physical Exam   Constitutional: She is oriented to person, place, and time  Vital signs are normal  She appears well-developed and well-nourished  No distress  HENT:   Head: Normocephalic and atraumatic  Neck: Normal range of motion  Cardiovascular: Normal rate, regular rhythm and normal heart sounds  Pulmonary/Chest: Effort normal and breath sounds normal    Left mastectomy site free of masses, skin nodules or skin changes  Right breast examined in the sitting and supine position  There are no masses, skin nodules, nipple changes or nipple discharge  There is no bilateral supraclavicular or axillary lymphadenopathy noted  Abdominal: Soft  Normal appearance  She exhibits no mass  There is no hepatosplenomegaly  There is no tenderness  Musculoskeletal: Normal range of motion  Lymphadenopathy:     She has no axillary adenopathy  Right: No supraclavicular adenopathy present  Left: No supraclavicular adenopathy present  Neurological: She is alert and oriented to person, place, and time  Skin: Skin is warm, dry and intact  No rash noted  She is not diaphoretic  Left upper back skin lesion- has become darker per patient- seeing a dermatologist in May   Psychiatric: She has a normal mood and affect  Her speech is normal    Vitals reviewed  Advance Care Planning/Advance Directives:  Discussed disease status, cancer treatment plans and/or cancer treatment goals with the patient

## 2019-03-15 ENCOUNTER — OFFICE VISIT (OUTPATIENT)
Dept: PLASTIC SURGERY | Facility: CLINIC | Age: 49
End: 2019-03-15
Payer: COMMERCIAL

## 2019-03-15 VITALS — BODY MASS INDEX: 27.22 KG/M2 | WEIGHT: 163.4 LBS | HEIGHT: 65 IN

## 2019-03-15 DIAGNOSIS — Z71.89 ENCOUNTER TO DISCUSS BREAST RECONSTRUCTION: Primary | ICD-10-CM

## 2019-03-15 PROCEDURE — 99215 OFFICE O/P EST HI 40 MIN: CPT | Performed by: SURGERY

## 2019-03-15 NOTE — PROGRESS NOTES
Assessment/Plan:  Please see HPI  She remains interested in reconstructing the absent left breast/radiated chest with a latissimus dorsi flap and tissue expander  We utilized the heather,  King Nagi [id number]  We discussed the procedure, how it is performed, as well as potential risks, complications and limitations including, but not limited to infection, bleeding, scarring, asymmetry, contour deformity, seroma, hematoma, wound healing problems, need for multiple stages, i e  fat grafting, expander/implant exchange, etc   She understands, and is interested in proceeding  Her questions have been answered to her satisfaction and consent was obtained  She will work with our surgical coordinators in regard to scheduling a date for the procedure after she has been seen by her family physician for medical clearance  There are no diagnoses linked to this encounter  Subjective:   Absent left breast     Patient ID: Seth Sahni is a 46 y o  female  HPI status post left mastectomy and radiation without reconstruction    The following portions of the patient's history were reviewed and updated as appropriate: allergies, current medications, past family history, past medical history, past social history, past surgical history and problem list     Review of Systems   Constitutional: Negative for chills and fever  HENT: Negative for hearing loss  Eyes: Positive for visual disturbance  Negative for discharge  Respiratory: Negative for chest tightness and shortness of breath  Cardiovascular: Negative for chest pain and leg swelling  Genitourinary: Negative for dysuria  Musculoskeletal: Negative for gait problem  Skin: Negative for rash  Allergic/Immunologic: Negative for immunocompromised state  Neurological: Negative for seizures and headaches  Hematological: Does not bruise/bleed easily  Psychiatric/Behavioral: Positive for dysphoric mood  The patient is nervous/anxious  Objective:      Ht 5' 5" (1 651 m)   Wt 74 1 kg (163 lb 6 4 oz)   BMI 27 19 kg/m²          Physical Exam   Constitutional: She appears well-developed and well-nourished  HENT:   Head: Normocephalic  Eyes: Pupils are equal, round, and reactive to light  Neck: Normal range of motion  Pulmonary/Chest: Effort normal    Abdominal: Soft  Musculoskeletal: Normal range of motion  Neurological: She is alert  Skin: Skin is warm  Absent left breast, stigmata of prior radiation with hyperpigmentation and fibrosis, latissimus dorsi contractions strong   Psychiatric: She has a normal mood and affect

## 2019-03-26 PROBLEM — Z71.89 OTHER SPECIFIED COUNSELING: Status: ACTIVE | Noted: 2019-03-26

## 2019-03-28 ENCOUNTER — TELEPHONE (OUTPATIENT)
Dept: FAMILY MEDICINE CLINIC | Facility: CLINIC | Age: 49
End: 2019-03-28

## 2019-03-28 NOTE — TELEPHONE ENCOUNTER
We receive a fax from the [facility]'s  plastic and reconstructive surgery  Requesting a clearence for surgery  Pt last OV 07/2018 with Dr Claudene Punt  Contact pt to make an appt didn't  Answer leave a vm to call the office   Thx

## 2019-04-17 ENCOUNTER — HOSPITAL ENCOUNTER (OUTPATIENT)
Facility: HOSPITAL | Age: 49
Setting detail: OUTPATIENT SURGERY
End: 2019-04-17
Attending: SURGERY | Admitting: SURGERY
Payer: COMMERCIAL

## 2019-04-25 ENCOUNTER — APPOINTMENT (OUTPATIENT)
Dept: RADIATION ONCOLOGY | Facility: CLINIC | Age: 49
End: 2019-04-25
Attending: RADIOLOGY
Payer: COMMERCIAL

## 2019-04-25 VITALS
SYSTOLIC BLOOD PRESSURE: 140 MMHG | TEMPERATURE: 98.4 F | HEART RATE: 122 BPM | BODY MASS INDEX: 27.46 KG/M2 | WEIGHT: 165 LBS | DIASTOLIC BLOOD PRESSURE: 90 MMHG

## 2019-04-25 DIAGNOSIS — C50.812 CARCINOMA OF OVERLAPPING SITES OF LEFT BREAST IN FEMALE, ESTROGEN RECEPTOR POSITIVE (HCC): Primary | ICD-10-CM

## 2019-04-25 DIAGNOSIS — Z17.0 CARCINOMA OF OVERLAPPING SITES OF LEFT BREAST IN FEMALE, ESTROGEN RECEPTOR POSITIVE (HCC): Primary | ICD-10-CM

## 2019-04-25 PROCEDURE — 99215 OFFICE O/P EST HI 40 MIN: CPT | Performed by: RADIOLOGY

## 2019-05-01 PROBLEM — Z85.3 PERSONAL HISTORY OF MALIGNANT NEOPLASM OF BREAST: Status: ACTIVE | Noted: 2019-04-17

## 2019-05-03 ENCOUNTER — TRANSCRIBE ORDERS (OUTPATIENT)
Dept: INTERNAL MEDICINE CLINIC | Facility: CLINIC | Age: 49
End: 2019-05-03

## 2019-05-03 DIAGNOSIS — L81.9 DISORDER OF PIGMENTATION: Primary | ICD-10-CM

## 2019-05-07 ENCOUNTER — TELEPHONE (OUTPATIENT)
Dept: PLASTIC SURGERY | Facility: CLINIC | Age: 49
End: 2019-05-07

## 2019-05-10 ENCOUNTER — ANNUAL EXAM (OUTPATIENT)
Dept: FAMILY MEDICINE CLINIC | Facility: CLINIC | Age: 49
End: 2019-05-10

## 2019-05-10 VITALS
RESPIRATION RATE: 16 BRPM | SYSTOLIC BLOOD PRESSURE: 140 MMHG | OXYGEN SATURATION: 97 % | BODY MASS INDEX: 27.79 KG/M2 | WEIGHT: 167 LBS | HEART RATE: 124 BPM | TEMPERATURE: 97.5 F | DIASTOLIC BLOOD PRESSURE: 80 MMHG

## 2019-05-10 DIAGNOSIS — Z01.419 WELL FEMALE EXAM WITH ROUTINE GYNECOLOGICAL EXAM: Primary | ICD-10-CM

## 2019-05-10 DIAGNOSIS — Z11.51 SPECIAL SCREENING EXAMINATION FOR HUMAN PAPILLOMAVIRUS (HPV): ICD-10-CM

## 2019-05-10 PROCEDURE — G0124 SCREEN C/V THIN LAYER BY MD: HCPCS | Performed by: FAMILY MEDICINE

## 2019-05-10 PROCEDURE — 99396 PREV VISIT EST AGE 40-64: CPT | Performed by: FAMILY MEDICINE

## 2019-05-10 PROCEDURE — G0145 SCR C/V CYTO,THINLAYER,RESCR: HCPCS | Performed by: FAMILY MEDICINE

## 2019-05-10 PROCEDURE — 87624 HPV HI-RISK TYP POOLED RSLT: CPT | Performed by: FAMILY MEDICINE

## 2019-05-10 RX ORDER — OYSTER SHELL CALCIUM WITH VITAMIN D 500; 200 MG/1; [IU]/1
TABLET, FILM COATED ORAL EVERY 12 HOURS
COMMUNITY
Start: 2018-06-22 | End: 2021-11-22 | Stop reason: ALTCHOICE

## 2019-05-13 LAB
HPV HR 12 DNA CVX QL NAA+PROBE: NEGATIVE
HPV16 DNA CVX QL NAA+PROBE: NEGATIVE
HPV18 DNA CVX QL NAA+PROBE: NEGATIVE

## 2019-05-14 ENCOUNTER — HOSPITAL ENCOUNTER (OUTPATIENT)
Dept: NON INVASIVE DIAGNOSTICS | Facility: HOSPITAL | Age: 49
Discharge: HOME | End: 2019-05-14
Attending: SURGERY
Payer: COMMERCIAL

## 2019-05-14 ENCOUNTER — APPOINTMENT (OUTPATIENT)
Dept: LAB | Facility: HOSPITAL | Age: 49
End: 2019-05-14
Attending: SURGERY
Payer: COMMERCIAL

## 2019-05-14 DIAGNOSIS — Z71.89 OTHER SPECIFIED COUNSELING: ICD-10-CM

## 2019-05-14 LAB
ALBUMIN SERPL BCP-MCNC: 4.1 G/DL (ref 3.5–5)
ANION GAP SERPL CALCULATED.3IONS-SCNC: 10 MMOL/L (ref 4–13)
BASOPHILS # BLD AUTO: 0.03 THOUSANDS/ΜL (ref 0–0.1)
BASOPHILS NFR BLD AUTO: 1 % (ref 0–1)
BUN SERPL-MCNC: 13 MG/DL (ref 5–25)
CALCIUM ALBUM COR SERPL-MCNC: 10.2 MG/DL (ref 8.3–10.1)
CALCIUM SERPL-MCNC: 10.3 MG/DL (ref 8.3–10.1)
CALCIUM SERPL-MCNC: 10.3 MG/DL (ref 8.3–10.1)
CHLORIDE SERPL-SCNC: 101 MMOL/L (ref 100–108)
CO2 SERPL-SCNC: 30 MMOL/L (ref 21–32)
CREAT SERPL-MCNC: 0.73 MG/DL (ref 0.6–1.3)
EOSINOPHIL # BLD AUTO: 0.01 THOUSAND/ΜL (ref 0–0.61)
EOSINOPHIL NFR BLD AUTO: 0 % (ref 0–6)
ERYTHROCYTE [DISTWIDTH] IN BLOOD BY AUTOMATED COUNT: 13.1 % (ref 11.6–15.1)
GFR SERPL CREATININE-BSD FRML MDRD: 96 ML/MIN/1.73SQ M
GLUCOSE P FAST SERPL-MCNC: 85 MG/DL (ref 65–99)
HCT VFR BLD AUTO: 45.2 % (ref 34.8–46.1)
HGB BLD-MCNC: 13.9 G/DL (ref 11.5–15.4)
IMM GRANULOCYTES # BLD AUTO: 0.01 THOUSAND/UL (ref 0–0.2)
IMM GRANULOCYTES NFR BLD AUTO: 0 % (ref 0–2)
LAB AP GYN PRIMARY INTERPRETATION: NORMAL
LYMPHOCYTES # BLD AUTO: 2.31 THOUSANDS/ΜL (ref 0.6–4.47)
LYMPHOCYTES NFR BLD AUTO: 42 % (ref 14–44)
Lab: NORMAL
MCH RBC QN AUTO: 29 PG (ref 26.8–34.3)
MCHC RBC AUTO-ENTMCNC: 30.8 G/DL (ref 31.4–37.4)
MCV RBC AUTO: 94 FL (ref 82–98)
MONOCYTES # BLD AUTO: 0.58 THOUSAND/ΜL (ref 0.17–1.22)
MONOCYTES NFR BLD AUTO: 11 % (ref 4–12)
NEUTROPHILS # BLD AUTO: 2.57 THOUSANDS/ΜL (ref 1.85–7.62)
NEUTS SEG NFR BLD AUTO: 46 % (ref 43–75)
NRBC BLD AUTO-RTO: 0 /100 WBCS
PLATELET # BLD AUTO: 270 THOUSANDS/UL (ref 149–390)
PMV BLD AUTO: 10.8 FL (ref 8.9–12.7)
POTASSIUM SERPL-SCNC: 4.1 MMOL/L (ref 3.5–5.3)
RBC # BLD AUTO: 4.79 MILLION/UL (ref 3.81–5.12)
SODIUM SERPL-SCNC: 141 MMOL/L (ref 136–145)
WBC # BLD AUTO: 5.51 THOUSAND/UL (ref 4.31–10.16)

## 2019-05-14 PROCEDURE — 82040 ASSAY OF SERUM ALBUMIN: CPT

## 2019-05-14 PROCEDURE — NC001 PR NO CHARGE: Performed by: PHYSICIAN ASSISTANT

## 2019-05-14 PROCEDURE — 36415 COLL VENOUS BLD VENIPUNCTURE: CPT

## 2019-05-14 PROCEDURE — 80048 BASIC METABOLIC PNL TOTAL CA: CPT

## 2019-05-14 PROCEDURE — 85025 COMPLETE CBC W/AUTO DIFF WBC: CPT

## 2019-05-14 RX ORDER — SODIUM CHLORIDE, SODIUM LACTATE, POTASSIUM CHLORIDE, CALCIUM CHLORIDE 600; 310; 30; 20 MG/100ML; MG/100ML; MG/100ML; MG/100ML
125 INJECTION, SOLUTION INTRAVENOUS CONTINUOUS
Status: CANCELLED | OUTPATIENT
Start: 2019-05-14

## 2019-05-14 RX ORDER — CEFAZOLIN SODIUM 1 G/50ML
1000 SOLUTION INTRAVENOUS ONCE
Status: CANCELLED | OUTPATIENT
Start: 2019-05-14 | End: 2019-05-14

## 2019-05-16 ENCOUNTER — TELEPHONE (OUTPATIENT)
Dept: PLASTIC SURGERY | Facility: CLINIC | Age: 49
End: 2019-05-16

## 2019-05-17 ENCOUNTER — CONSULT (OUTPATIENT)
Dept: FAMILY MEDICINE CLINIC | Facility: CLINIC | Age: 49
End: 2019-05-17

## 2019-05-17 VITALS
HEART RATE: 88 BPM | RESPIRATION RATE: 18 BRPM | SYSTOLIC BLOOD PRESSURE: 130 MMHG | BODY MASS INDEX: 27.66 KG/M2 | OXYGEN SATURATION: 98 % | WEIGHT: 166 LBS | HEIGHT: 65 IN | TEMPERATURE: 98 F | DIASTOLIC BLOOD PRESSURE: 80 MMHG

## 2019-05-17 DIAGNOSIS — Z17.0 CARCINOMA OF OVERLAPPING SITES OF LEFT BREAST IN FEMALE, ESTROGEN RECEPTOR POSITIVE (HCC): ICD-10-CM

## 2019-05-17 DIAGNOSIS — Z01.818 PRE-OP EXAM: Primary | ICD-10-CM

## 2019-05-17 DIAGNOSIS — C50.812 CARCINOMA OF OVERLAPPING SITES OF LEFT BREAST IN FEMALE, ESTROGEN RECEPTOR POSITIVE (HCC): ICD-10-CM

## 2019-05-17 PROCEDURE — 99214 OFFICE O/P EST MOD 30 MIN: CPT | Performed by: PHYSICIAN ASSISTANT

## 2019-06-04 ENCOUNTER — TELEPHONE (OUTPATIENT)
Dept: HEMATOLOGY ONCOLOGY | Facility: CLINIC | Age: 49
End: 2019-06-04

## 2019-06-04 DIAGNOSIS — C50.812 CARCINOMA OF OVERLAPPING SITES OF LEFT BREAST IN FEMALE, ESTROGEN RECEPTOR POSITIVE (HCC): Primary | ICD-10-CM

## 2019-06-04 DIAGNOSIS — Z17.0 CARCINOMA OF OVERLAPPING SITES OF LEFT BREAST IN FEMALE, ESTROGEN RECEPTOR POSITIVE (HCC): Primary | ICD-10-CM

## 2019-06-04 RX ORDER — ANASTROZOLE 1 MG/1
1 TABLET ORAL DAILY
Qty: 90 TABLET | Refills: 3 | Status: SHIPPED | OUTPATIENT
Start: 2019-06-04 | End: 2020-06-23 | Stop reason: SDUPTHER

## 2019-07-23 ENCOUNTER — HOSPITAL ENCOUNTER (OUTPATIENT)
Dept: MAMMOGRAPHY | Facility: CLINIC | Age: 49
Discharge: HOME | End: 2019-07-23
Payer: COMMERCIAL

## 2019-07-23 VITALS — HEIGHT: 65 IN | BODY MASS INDEX: 27.66 KG/M2 | WEIGHT: 166 LBS

## 2019-07-23 DIAGNOSIS — C50.812 CARCINOMA OF OVERLAPPING SITES OF LEFT BREAST IN FEMALE, ESTROGEN RECEPTOR POSITIVE (HCC): ICD-10-CM

## 2019-07-23 DIAGNOSIS — Z17.0 CARCINOMA OF OVERLAPPING SITES OF LEFT BREAST IN FEMALE, ESTROGEN RECEPTOR POSITIVE (HCC): ICD-10-CM

## 2019-07-23 PROCEDURE — 77065 DX MAMMO INCL CAD UNI: CPT

## 2019-07-23 PROCEDURE — G0279 TOMOSYNTHESIS, MAMMO: HCPCS

## 2019-08-15 ENCOUNTER — TELEPHONE (OUTPATIENT)
Dept: HEMATOLOGY ONCOLOGY | Facility: CLINIC | Age: 49
End: 2019-08-15

## 2019-08-15 NOTE — TELEPHONE ENCOUNTER
Called patient on 8/15 left a message stating that her 11/25 appointment with dr malcolm needed to be rescheduled-due to dr malcolm not being in the office that day  I reschedule patient's appointment for 12/2 at 9:20am  Stated htat if this date and or time does not work for patient to please give our office a call back

## 2019-12-20 DIAGNOSIS — C50.812 CARCINOMA OF OVERLAPPING SITES OF LEFT BREAST IN FEMALE, ESTROGEN RECEPTOR POSITIVE (HCC): Primary | ICD-10-CM

## 2019-12-20 DIAGNOSIS — Z17.0 CARCINOMA OF OVERLAPPING SITES OF LEFT BREAST IN FEMALE, ESTROGEN RECEPTOR POSITIVE (HCC): Primary | ICD-10-CM

## 2020-01-16 ENCOUNTER — OFFICE VISIT (OUTPATIENT)
Dept: HEMATOLOGY ONCOLOGY | Facility: CLINIC | Age: 50
End: 2020-01-16
Payer: COMMERCIAL

## 2020-01-16 VITALS
TEMPERATURE: 97.1 F | BODY MASS INDEX: 27.49 KG/M2 | HEART RATE: 121 BPM | OXYGEN SATURATION: 99 % | RESPIRATION RATE: 18 BRPM | HEIGHT: 65 IN | SYSTOLIC BLOOD PRESSURE: 138 MMHG | WEIGHT: 165 LBS | DIASTOLIC BLOOD PRESSURE: 88 MMHG

## 2020-01-16 DIAGNOSIS — C50.812 MALIGNANT NEOPLASM OF OVERLAPPING SITES OF LEFT BREAST IN FEMALE, ESTROGEN RECEPTOR POSITIVE (HCC): Primary | ICD-10-CM

## 2020-01-16 DIAGNOSIS — Z17.0 MALIGNANT NEOPLASM OF OVERLAPPING SITES OF LEFT BREAST IN FEMALE, ESTROGEN RECEPTOR POSITIVE (HCC): Primary | ICD-10-CM

## 2020-01-16 PROCEDURE — 99214 OFFICE O/P EST MOD 30 MIN: CPT | Performed by: INTERNAL MEDICINE

## 2020-01-16 NOTE — PROGRESS NOTES
Hematology / Oncology Outpatient Follow Up Note    Tai Duarte 44 y o  female :1970 [id number]         Date:  2020    Assessment / Plan:  A 45 year old surgically postmenopausal woman with locally advanced left breast cancer, grade 2, ER/[place] positive HER-2 negative disease  She has no evidence of BRCA mutation  She underwent neoadjuvant chemotherapy with AC followed by paclitaxel resulting in clinical partial response  However, when she had mastectomy, she had significant residual disease, as well as 2 positive lymph node  She completed postmastectomy radiation therapy  She was previously on adjuvant hormonal therapy with tamoxifen, until she underwent hysterectomy and bilateral oophorectomy for fibroid  Currently, she is on anastrozole with stable hot flashes  Clinically, she has no evidence recurrent disease  I recommended her to continue with anastrozole 1 mg once a day until   She is in agreement with my recommendation  I will see her again in a year for routine follow-up         Subjective:      HPI: [de-identified] 53year old premenopausal  speaking woman with recent diagnosis of locally advanced left breast cancer    She first noticed a lump which was small approximately 2 years ago  Over the time she developed very large breast mass with skin involvement as well as skin dimpling  She also developed of breast pain  She underwent biopsy which showed invasive ductal carcinoma with grade 2  This was ER/RI positive HER-2 negative disease  She was seen by Dr David Childress who thought that neoadjuvant chemotherapy is the most appropriate treatment  Therefore she was referred to me  She underwent CT scan of chest abdomen and pelvis as well as bone scan both of which were negative for distant metastasis  She has no weight loss fever chills or night sweats  She denied any respiratory symptoms  She has no bone pain   Her performance status is normal  she has no family history of breast cancer or ovarian cancer             Interval History:  A 45 year old surgically postmenopausal woman with locally advanced left breast cancer, grade 2, ER/[place] positive HER-2 negative disease  She had very large left breast mass which occupied her entire left breast at the time of initial presentation  She had neoadjuvant chemotherapy with dose dense AC followed by paclitaxel resulting in clinical response  She underwent left mastectomy with sentinel lymph node biopsy  She still had significant residual tumor measuring 11 5 cm with multiple residual foci of tumor with 2 sentinel lymph node with subcentimeter metastatic disease  She did not have reconstruction  Since May 2014, she was on adjuvant hormonal therapy with tamoxifen , since she was perimenopausal at that time  She had very large fibroid in the uterus  In February 2015, she underwent hysterectomy and bilateral oophorectomy  Subsequently, adjuvant hormonal therapy was changed to anastrozole  She came in today for followup  She continued to do well  She has stable hot flashes  She denied musculoskeletal symptoms  She has no complaint of pain  Her weight is stable  She has normal performance status         Objective:      Primary Diagnosis:      1  Locally advanced left breast cancer with skin involvement, grade 2, ER/[place] positive HER-2 negative disease  Diagnosed in June 2013  2  BRCA mutation negative       Cancer Staging:  Cancer Staging  No matching staging information was found for the patient         Previous Hematologic/ Oncologic Treatment:      1  Neoadjuvant chemotherapy with dose dense a c  X4 followed by weekly paclitaxel x12  Completed in December 2013  2  adjuvant hormonal therapy with tamoxifen from March 2014 through May 2015    3  postmastectomy radiation therapy completed in May 2014       Current Hematologic/ Oncologic Treatment:       Adjuvant hormonal therapy with anastrozole 1 mg once a day since May 2015       Disease Status:      1  Partial response on neoadjuvant chemotherapy  2  ENMANUEL, status post mastectomy with sentinel lymph node biopsy       Test Results:     Pathology:           Radiology:     Mammography in July 2019  was benign  BI-RAD 1       Laboratory:           Physical Exam:        General Appearance:    Alert, oriented          Eyes:    PERRL   Ears:    Normal external ear canals, both ears   Nose:   Nares normal, septum midline   Throat:   Mucosa moist  Pharynx without injection  Neck:   Supple         Lungs:     Clear to auscultation bilaterally   Chest Wall:    No tenderness or deformity    Heart:    Regular rate and rhythm         Abdomen:     Soft, non-tender, bowel sounds +, no organomegaly               Extremities:   Extremities no cyanosis or edema         Skin:   no rash or icterus  Lymph nodes:   Cervical, supraclavicular, and axillary nodes normal   Neurologic:   CNII-XII intact, normal strength, sensation and reflexes     Throughout             Breast exam:   status post left mastectomy without reconstruction  No palpable abnormalities in her left chest wall  No palpable metastatic very adenopathy  Right breast exam is negative  ROS: Review of Systems   Constitutional:        Hot flashes   All other systems reviewed and are negative  Imaging: No results found        Labs:   Lab Results   Component Value Date    WBC 5 51 05/14/2019    HGB 13 9 05/14/2019    HCT 45 2 05/14/2019    MCV 94 05/14/2019     05/14/2019     Lab Results   Component Value Date     06/22/2018    K 4 1 05/14/2019     05/14/2019    CO2 30 05/14/2019    ANIONGAP 12 06/22/2018    BUN 13 05/14/2019    CREATININE 0 73 05/14/2019    GLUCOSE 81 06/22/2018    GLUF 85 05/14/2019    CALCIUM 10 3 (H) 05/14/2019    CORRECTEDCA 10 2 (H) 05/14/2019    AST 20 06/22/2018    ALT 43 06/22/2018    ALKPHOS 77 06/22/2018    PROT 8 1 06/22/2018    BILITOT 0 2 06/22/2018    EGFR 96 05/14/2019         Lab Results   Component Value Date HCG <2 02/19/2014         Current Medications: Reviewed  Allergies: Reviewed  PMH/FH/SH:  Reviewed      Vital Sign:    Body surface area is 1 82 meters squared      Wt Readings from Last 3 Encounters:   01/16/20 74 8 kg (165 lb)   07/23/19 75 3 kg (166 lb)   05/17/19 75 3 kg (166 lb)        Temp Readings from Last 3 Encounters:   01/16/20 (!) 97 1 °F (36 2 °C) (Tympanic)   05/17/19 98 °F (36 7 °C) (Skin)   05/10/19 97 5 °F (36 4 °C)        BP Readings from Last 3 Encounters:   01/16/20 138/88   05/17/19 130/80   05/10/19 140/80         Pulse Readings from Last 3 Encounters:   01/16/20 (!) 121   05/17/19 88   05/10/19 (!) 124     @LASTSAO2(3)@

## 2020-02-06 ENCOUNTER — OFFICE VISIT (OUTPATIENT)
Dept: SURGICAL ONCOLOGY | Facility: CLINIC | Age: 50
End: 2020-02-06
Payer: COMMERCIAL

## 2020-02-06 VITALS
TEMPERATURE: 98 F | DIASTOLIC BLOOD PRESSURE: 98 MMHG | HEART RATE: 114 BPM | HEIGHT: 65 IN | BODY MASS INDEX: 27.16 KG/M2 | RESPIRATION RATE: 16 BRPM | SYSTOLIC BLOOD PRESSURE: 142 MMHG | WEIGHT: 163 LBS

## 2020-02-06 DIAGNOSIS — Z17.0 CARCINOMA OF OVERLAPPING SITES OF LEFT BREAST IN FEMALE, ESTROGEN RECEPTOR POSITIVE (HCC): Primary | ICD-10-CM

## 2020-02-06 DIAGNOSIS — C50.812 CARCINOMA OF OVERLAPPING SITES OF LEFT BREAST IN FEMALE, ESTROGEN RECEPTOR POSITIVE (HCC): Primary | ICD-10-CM

## 2020-02-06 DIAGNOSIS — Z79.811 USE OF ANASTROZOLE (ARIMIDEX): ICD-10-CM

## 2020-02-06 DIAGNOSIS — Z12.31 VISIT FOR SCREENING MAMMOGRAM: ICD-10-CM

## 2020-02-06 PROCEDURE — 1036F TOBACCO NON-USER: CPT | Performed by: NURSE PRACTITIONER

## 2020-02-06 PROCEDURE — 3077F SYST BP >= 140 MM HG: CPT | Performed by: NURSE PRACTITIONER

## 2020-02-06 PROCEDURE — 3080F DIAST BP >= 90 MM HG: CPT | Performed by: NURSE PRACTITIONER

## 2020-02-06 PROCEDURE — 99214 OFFICE O/P EST MOD 30 MIN: CPT | Performed by: NURSE PRACTITIONER

## 2020-02-06 PROCEDURE — 3008F BODY MASS INDEX DOCD: CPT | Performed by: NURSE PRACTITIONER

## 2020-02-06 NOTE — PROGRESS NOTES
Surgical Oncology Follow Up       [address]  [facility] [place]  [address] [facility] [place] [address]    Jane Ordonez  1970  [id number]  [address] [facility] [facility]  [facility] [place]  [phone]    Chief Complaint   Patient presents with    Breast Cancer     Pt is here for a one year follow up       Assessment/Plan:  1  Carcinoma of overlapping sites of left breast in female, estrogen receptor positive ([facility] )  - 1 year f/u visit    2  Use of anastrozole (Arimidex)  - Continue use per medical oncology    3  Visit for screening mammogram  - Mammo screening right w 3d & cad; Future      Discussion/Summary: Patient is a 44year old female who presents today for a one year follow-up for left breast cancer diagnosed in 2013  She underwent neoadjuvant chemotherapy and underwent a left mastectomy and sentinel node biopsy in 2014  Her tumor size was 11 5 cm and she had 2 of 3 positive lymph nodes  It was determined she would not benefit from a complete axillary dissection but rather completed post mastectomy radiation therapy  She tested negative for the BRCA genetic mutation  She is currently taking anastrozole  She had a right 3D diagnostic mammogram on 7/23/ 2019 which was BIRADS 1, category 3 density  She has no new complaints today and there are no concerns on today's exam   I will order a right 3D screening mammogram for July we will plan to see the patient back in 1 year or sooner if the need arises  She was instructed to call with any new concerns or symptoms prior to that time  All of her questions were answered today   # [id number] for used for today's visit  History of Present Illness:        Carcinoma of left breast, estrogen receptor positive ([facility] )    6/18/2013 Biopsy     Left breast biopsy  Invasive mammary carcinoma with lobular features  Grade 2  ER/MA positive    HER2 negative 7/29/2013 - 12/9/2013 Chemotherapy     Dose dense AC x4 followed by paclitaxel x12      2/18/2014 Surgery     Left mastectomy with sentinel node biopsy  Invasive lobular carcinoma  Grade 2  Invasive carcinoma involves the dermis      2/18/2014 -  Cancer Staged     IIIA ypT3, ypN1a (i+) (sn) G2      4/3/2014 - 5/20/2014 Radiation     Chest wall and draining lymph nodes  Total dose 6040 cGy      5/19/2014 - 5/2015 Hormone Therapy     Tamoxifen      5/2015 -  Hormone Therapy     anastrozole           -Interval History:  Patient presents today for a six-month follow-up visit for left breast cancer diagnosed in 2013  She notices no changes on self-exam   Denies headaches, back pain, bone pain, cough, shortness of breath, abdominal pain  She had a right 3D diagnostic mammogram on 7/23/ 2019 which was BIRADS 1, category 3 density  She continues to take anastrozole  She states that she becomes tachycardic at all of her appointments but at home her heart rate is normal     Review of Systems:  Review of Systems   Constitutional: Negative for activity change, appetite change, chills, fatigue, fever and unexpected weight change  HENT: Negative for trouble swallowing  Eyes: Negative for pain, redness and visual disturbance  Respiratory: Negative for cough, shortness of breath and wheezing  Cardiovascular: Negative for chest pain, palpitations and leg swelling  Gastrointestinal: Negative for abdominal pain, constipation, diarrhea, nausea and vomiting  Endocrine: Negative for cold intolerance and heat intolerance  Musculoskeletal: Negative for arthralgias, back pain, gait problem and myalgias  Skin: Negative for color change and rash  Neurological: Negative for dizziness, syncope, light-headedness, numbness and headaches  Hematological: Negative for adenopathy  Psychiatric/Behavioral: Negative for agitation and confusion  All other systems reviewed and are negative        Patient Active Problem List Diagnosis    Carcinoma of left breast, estrogen receptor positive (HCC)    Use of anastrozole (Arimidex)    Limb swelling    Malignant neoplasm of overlapping sites of left female breast ([facility] )    Tachycardia    Uterine leiomyoma    Hypertension    Other specified counseling    Personal history of malignant neoplasm of breast    Well female exam with routine gynecological exam     Past Medical History:   Diagnosis Date    Breast cancer ([facility] ) 07/2013    left-chemotherapy    Fibroma     History of chemotherapy     History of external beam radiation therapy      Past Surgical History:   Procedure Laterality Date    APPENDECTOMY      BREAST SURGERY  2014    Left breast 2/2 breast CA    HYSTERECTOMY  2015    Due to fibroma    MASTECTOMY      OOPHORECTOMY       Family History   Problem Relation Age of Onset    Lung cancer Mother     No Known Problems Father     No Known Problems Sister     No Known Problems Daughter     Cancer Maternal Grandmother     Uterine cancer Maternal Aunt      Social History     Socioeconomic History    Marital status: /Civil Union     Spouse name: Not on file    Number of children: 3    Years of education: Not on file    Highest education level: 12th grade   Occupational History     Comment: unemployed   Social Needs    Financial resource strain: Not on file    Food insecurity:     Worry: Not on file     Inability: Not on file   [employer] needs:     Medical: Not on file     Non-medical: Not on file   Tobacco Use    Smoking status: Never Smoker    Smokeless tobacco: Never Used   Substance and Sexual Activity    Alcohol use: No     Frequency: Never    Drug use: No    Sexual activity: Yes     Partners: Male     Birth control/protection: Surgical   Lifestyle    Physical activity:     Days per week: Not on file     Minutes per session: Not on file    Stress: Not on file   Relationships    Social connections:     Talks on phone: Not on file     Gets together: Not on file     Attends Quaker service: Not on file     Active member of club or organization: Not on file     Attends meetings of clubs or organizations: Not on file     Relationship status: Not on file    Intimate partner violence:     Fear of current or ex partner: Not on file     Emotionally abused: Not on file     Physically abused: Not on file     Forced sexual activity: Not on file   Other Topics Concern    Not on file   Social History Narrative    Not on file       Current Outpatient Medications:     anastrozole (ARIMIDEX) 1 mg tablet, Take 1 tablet (1 mg total) by mouth daily, Disp: 90 tablet, Rfl: 3    calcium citrate-vitamin D (CITRACAL+D) 315-200 MG-UNIT per tablet, Take 1 tablet by mouth 2 (two) times a day, Disp: 60 tablet, Rfl: 2    calcium-vitamin D (OSCAL 500/200 D-3) 500 mg-200 units per tablet, Every 12 hours, Disp: , Rfl:   Allergies   Allergen Reactions    Aspirin Rash     Other reaction(s): Palpitations     There were no vitals filed for this visit  Physical Exam   Constitutional: She is oriented to person, place, and time  Vital signs are normal  She appears well-developed and well-nourished  No distress  HENT:   Head: Normocephalic and atraumatic  Neck: Normal range of motion  Cardiovascular: Normal rate, regular rhythm and normal heart sounds  Pulmonary/Chest: Effort normal and breath sounds normal    Left mastectomy site free of masses, skin nodules or skin changes  Right breast examined in the sitting and supine position  There are no masses, skin nodules, nipple changes or nipple discharge  There is no bilateral supraclavicular or axillary lymphadenopathy noted  Abdominal: Soft  Normal appearance  She exhibits no mass  There is no hepatosplenomegaly  There is no tenderness  Musculoskeletal: Normal range of motion  Lymphadenopathy:     She has no axillary adenopathy  Right: No supraclavicular adenopathy present          Left: No supraclavicular adenopathy present  Neurological: She is alert and oriented to person, place, and time  Skin: Skin is warm, dry and intact  No rash noted  She is not diaphoretic  Psychiatric: She has a normal mood and affect  Her speech is normal    Vitals reviewed  Advance Care Planning/Advance Directives:  Discussed disease status, cancer treatment plans and/or cancer treatment goals with the patient

## 2020-02-14 ENCOUNTER — TELEPHONE (OUTPATIENT)
Dept: FAMILY MEDICINE CLINIC | Facility: CLINIC | Age: 50
End: 2020-02-14

## 2020-02-14 DIAGNOSIS — I10 HYPERTENSION, UNSPECIFIED TYPE: ICD-10-CM

## 2020-02-14 NOTE — TELEPHONE ENCOUNTER
calcium citrate-vitamin D (CITRACAL+D) 315-200 MG-UNIT per tablet    I inform pharmacy patient should contact us and schedule an appointment, Pt last seen 6 months ago  I tried to call patient myself and schedule an appointment but telephone number on file not working I will mail out letter

## 2020-02-17 RX ORDER — LANOLIN ALCOHOL/MO/W.PET/CERES
1 CREAM (GRAM) TOPICAL 2 TIMES DAILY
Qty: 60 TABLET | Refills: 2 | Status: SHIPPED | OUTPATIENT
Start: 2020-02-17 | End: 2022-06-21 | Stop reason: SDUPTHER

## 2020-05-05 NOTE — TELEPHONE ENCOUNTER
Can she take the Primrose as well? HISTORY  52y Female with COVID-19(+) pneumonia requiring intubation.    24 HOUR EVENTS:    SUBJECTIVE/ROS: Due to intubation with sedation, subjective information was not able to be obtained from the patient. History was obtained, to the extent possible, from review of the chart and collateral sources of information.      NEURO  Exam: sedated, no acute distress  Meds: acetaminophen    Suspension .. 650 milliGRAM(s) Oral every 6 hours PRN Temp greater or equal to 38C (100.4F)  fentaNYL   Infusion 0.5 MICROgram(s)/kG/Hr IV Continuous <Continuous>    Adequacy of sedation and pain control has been assessed and adjusted.      RESPIRATORY  RR: 35 (17 - 35)  SpO2: 93% (86% - 95%)  Exam: without dissynchrony  Mechanical Ventilation: Mode: AC/ CMV (Assist Control/ Continuous Mandatory Ventilation), RR (machine): 35, TV (machine): 400, FiO2: 100, PEEP: 10  Predicted Body Weight:  Tidal Volume/PBW ratio:  PaO2 to FiO2 ratio:  Extubation readiness assessed.  ABG - ( 05 May 2020 04:02 )  pH: 7.14  /  pCO2: 66    /  pO2: 175   / HCO3: 21    / Base Excess: -5.4  /  SaO2: NR      Lactate: x                CARDIOVASCULAR  HR: 100 (72 - 124)  ABP: 115/61 (82/44 - 132/62)  ABP(mean): 80 (56 - 88)  Cardiac Rhythm: sinus  Most recent QTc:   Meds: midodrine  norepinephrine Infusion (4.04 mL/Hr)        GI/NUTRITION  Diet: Diet, NPO with Tube Feed:   Tube Feeding Modality: Nasogastric  Nepro with Carb Steady  Total Volume for 24 Hours (mL): 960  Continuous  Starting Tube Feed Rate mL per Hour: 10  Increase Tube Feed Rate by (mL): 10     Every hour  Until Goal Tube Feed Rate (mL per Hour): 40  Tube Feed Duration (in Hours): 24  Tube Feed Start Time: 09:30  No Carb Prosource (1pkg = 15gms Protein)     Qty per Day:  2 (05-04-20 @ 09:09)    Most recent bowel movement:  Meds/stress ulcer prophylaxis: calcium acetate 667 milliGRAM(s) Oral three times a day with meals  pantoprazole  Injectable 40 milliGRAM(s) IV Push daily  polyethylene glycol 3350 17 Gram(s) Oral daily  senna 2 Tablet(s) Oral at bedtime        GENITOURINARY  I&O's Detail    05-04 @ 07:01  -  05-05 @ 07:00  --------------------------------------------------------  IN:    fentaNYL  Infusion: 905.1 mL    Nepro with Carb Steady: 460 mL    norepinephrine Infusion: 99 mL  Total IN: 1464.1 mL    OUT:  Total OUT: 0 mL    Total NET: 1464.1 mL          05-05    128<L>  |  94<L>  |  54<H>  ----------------------------<  100<H>  4.8   |  20<L>  |  5.92<H>    Ca    8.7      05 May 2020 06:31  Phos  8.5     05-05  Mg     2.8     05-05    TPro  8.1  /  Alb  1.9<L>  /  TBili  3.5<H>  /  DBili  x   /  AST  66<H>  /  ALT  55  /  AlkPhos  272<H>  05-05    [x] Stephenson catheter, indication: urine output monitoring in critically ill patient.  Meds: calcium acetate 667 milliGRAM(s) Oral three times a day with meals          HEMATOLOGIC  Meds/VTE prophylaxis: apixaban 2.5 milliGRAM(s) Oral two times a day                          8.7    15.10 )-----------( 274      ( 05 May 2020 06:31 )             29.4     PT/INR - ( 04 May 2020 06:30 )   PT: 14.0 sec;   INR: 1.23 ratio         PTT - ( 04 May 2020 06:30 )  PTT:31.8 sec  [id number] @ 09:39        INFECTIOUS DISEASES  T(C): 37.3, Max: 37.3 (05-05-20 @ 03:44)  WBC Count: 15.10 (05-05-20 @ 06:31)  WBC Count: 13.15 (05-04-20 @ 06:30)    Recent Cultures:    Meds: cefepime   IVPB 2000 milliGRAM(s) IV Intermittent <User Schedule>    Procalcitonin, Serum: 2.42 ng/mL (05-04-20 @ 09:40)        ENDOCRINE  Fingersticks: 85, 133, 104, 96, 105  Meds:       ACCESS DEVICES:  [ ] Peripheral IV  [x] Central Venous Line	[ ] R	[ ] L	[ ] IJ	[ ] Fem	[ ] SC	Placed:   [x] Arterial Line		[ ] R	[ ] L	[ ] Fem	[ ] Rad	[ ] Ax	Placed:   [ ] Shiley HD Access             [ ] R [ ] L [ ] IJ  [ ] Fem     Placed:  [x] Urinary Catheter, Date Placed:   Necessity of urinary, arterial, and venous catheters discussed.      CODE STATUS:     IMAGING: HISTORY  52y Female with COVID-19(+) pneumonia requiring intubation.    24 HOUR EVENTS:    SUBJECTIVE/ROS: Due to intubation with sedation, subjective information was not able to be obtained from the patient. History was obtained, to the extent possible, from review of the chart and collateral sources of information.      NEURO  Exam: sedated, no acute distress  Meds: acetaminophen    Suspension .. 650 milliGRAM(s) Oral every 6 hours PRN Temp greater or equal to 38C (100.4F)  fentaNYL   Infusion 0.5 MICROgram(s)/kG/Hr IV Continuous <Continuous>    Adequacy of sedation and pain control has been assessed and adjusted.      RESPIRATORY  RR: 35 (17 - 35)  SpO2: 93% (86% - 95%)  Exam: without dissynchrony  Mechanical Ventilation: Mode: AC/ CMV (Assist Control/ Continuous Mandatory Ventilation), RR (machine): 32/30/100/10  Predicted Body Weight:  Tidal Volume/PBW ratio:  PaO2 to FiO2 ratio:  Extubation readiness assessed.  ABG - ( 05 May 2020 04:02 )  pH: 7.14  /  pCO2: 66    /  pO2: 175   / HCO3: 21    / Base Excess: -5.4  /  SaO2: NR      Lactate: x                CARDIOVASCULAR  HR: 100 (72 - 124)  ABP: 115/61 (82/44 - 132/62)  ABP(mean): 80 (56 - 88)  Cardiac Rhythm: sinus  Most recent QTc:   Meds: midodrine  norepinephrine Infusion (4.04 mL/Hr)        GI/NUTRITION  Diet: Diet, NPO with Tube Feed:   Tube Feeding Modality: Nasogastric  Nepro with Carb Steady  Total Volume for 24 Hours (mL): 960  Continuous  Starting Tube Feed Rate mL per Hour: 10  Increase Tube Feed Rate by (mL): 10     Every hour  Until Goal Tube Feed Rate (mL per Hour): 40  Tube Feed Duration (in Hours): 24  Tube Feed Start Time: 09:30  No Carb Prosource (1pkg = 15gms Protein)     Qty per Day:  2 (05-04-20 @ 09:09)    Most recent bowel movement:  Meds/stress ulcer prophylaxis: calcium acetate 667 milliGRAM(s) Oral three times a day with meals  pantoprazole  Injectable 40 milliGRAM(s) IV Push daily  polyethylene glycol 3350 17 Gram(s) Oral daily  senna 2 Tablet(s) Oral at bedtime        GENITOURINARY  I&O's Detail    05-04 @ 07:01  -  05-05 @ 07:00  --------------------------------------------------------  IN:    fentaNYL  Infusion: 905.1 mL    Nepro with Carb Steady: 460 mL    norepinephrine Infusion: 99 mL  Total IN: 1464.1 mL    OUT:  Total OUT: 0 mL    Total NET: 1464.1 mL          05-05    128<L>  |  94<L>  |  54<H>  ----------------------------<  100<H>  4.8   |  20<L>  |  5.92<H>    Ca    8.7      05 May 2020 06:31  Phos  8.5     05-05  Mg     2.8     05-05    TPro  8.1  /  Alb  1.9<L>  /  TBili  3.5<H>  /  DBili  x   /  AST  66<H>  /  ALT  55  /  AlkPhos  272<H>  05-05    [x] Renner catheter, indication: urine output monitoring in critically ill patient.  Meds: calcium acetate 667 milliGRAM(s) Oral three times a day with meals          HEMATOLOGIC  Meds/VTE prophylaxis: apixaban 2.5 milliGRAM(s) Oral two times a day                          8.7    15.10 )-----------( 274      ( 05 May 2020 06:31 )             29.4     PT/INR - ( 04 May 2020 06:30 )   PT: 14.0 sec;   INR: 1.23 ratio         PTT - ( 04 May 2020 06:30 )  PTT:31.8 sec  [id number] @ 09:39        INFECTIOUS DISEASES  T(C): 37.3, Max: 37.3 (05-05-20 @ 03:44)  WBC Count: 15.10 (05-05-20 @ 06:31)  WBC Count: 13.15 (05-04-20 @ 06:30)    Recent Cultures:    Meds: cefepime   IVPB 2000 milliGRAM(s) IV Intermittent <User Schedule>    Procalcitonin, Serum: 2.42 ng/mL (05-04-20 @ 09:40)        ENDOCRINE  Fingersticks: 85, 133, 104, 96, 105  Meds:       ACCESS DEVICES:  CENTRAL LINE: [x ] YES LOCATION: [facility]  DATE INSERTED: 4/30  REMOVE: NO     RENNER: [ x] YES    REMOVE: NO     A-LINE: [ x] YES LOCATION: R axillary  DATE INSERTED: 4/26  REMOVE: NO  EXPLAIN:  [ ] Shiley HD Access             [x ] R [ ] L [ ] IJ  [ ] Fem     Placed:4/24  [x] Urinary Catheter, Date Placed:   Necessity of urinary, arterial, and venous catheters discussed.      CODE STATUS: FULL CODE     IMAGING:

## 2020-06-23 ENCOUNTER — TELEPHONE (OUTPATIENT)
Dept: HEMATOLOGY ONCOLOGY | Facility: CLINIC | Age: 50
End: 2020-06-23

## 2020-06-23 DIAGNOSIS — Z17.0 CARCINOMA OF OVERLAPPING SITES OF LEFT BREAST IN FEMALE, ESTROGEN RECEPTOR POSITIVE (HCC): ICD-10-CM

## 2020-06-23 DIAGNOSIS — C50.812 CARCINOMA OF OVERLAPPING SITES OF LEFT BREAST IN FEMALE, ESTROGEN RECEPTOR POSITIVE (HCC): ICD-10-CM

## 2020-06-23 RX ORDER — ANASTROZOLE 1 MG/1
1 TABLET ORAL DAILY
Qty: 90 TABLET | Refills: 3 | Status: SHIPPED | OUTPATIENT
Start: 2020-06-23 | End: 2021-06-29 | Stop reason: SDUPTHER

## 2020-07-27 ENCOUNTER — HOSPITAL ENCOUNTER (OUTPATIENT)
Dept: MAMMOGRAPHY | Facility: CLINIC | Age: 50
Discharge: HOME | End: 2020-07-27
Payer: COMMERCIAL

## 2020-07-27 VITALS — HEIGHT: 64 IN | TEMPERATURE: 97 F | BODY MASS INDEX: 28 KG/M2 | WEIGHT: 164 LBS

## 2020-07-27 DIAGNOSIS — Z12.31 VISIT FOR SCREENING MAMMOGRAM: ICD-10-CM

## 2020-07-27 PROCEDURE — 77063 BREAST TOMOSYNTHESIS BI: CPT

## 2020-07-27 PROCEDURE — 77067 SCR MAMMO BI INCL CAD: CPT

## 2020-08-04 PROBLEM — Z71.89 OTHER SPECIFIED COUNSELING: Status: RESOLVED | Noted: 2019-03-26 | Resolved: 2020-08-04

## 2020-08-05 ENCOUNTER — OFFICE VISIT (OUTPATIENT)
Dept: FAMILY MEDICINE CLINIC | Facility: CLINIC | Age: 50
End: 2020-08-05

## 2020-08-05 VITALS
DIASTOLIC BLOOD PRESSURE: 90 MMHG | OXYGEN SATURATION: 98 % | WEIGHT: 165 LBS | HEART RATE: 106 BPM | RESPIRATION RATE: 17 BRPM | HEIGHT: 64 IN | BODY MASS INDEX: 28.17 KG/M2 | TEMPERATURE: 97.9 F | SYSTOLIC BLOOD PRESSURE: 158 MMHG

## 2020-08-05 DIAGNOSIS — R03.0 ELEVATED BLOOD PRESSURE READING IN OFFICE WITHOUT DIAGNOSIS OF HYPERTENSION: ICD-10-CM

## 2020-08-05 DIAGNOSIS — Z02.4 ENCOUNTER FOR DRIVER'S LICENSE HISTORY AND PHYSICAL: Primary | ICD-10-CM

## 2020-08-05 PROCEDURE — 1036F TOBACCO NON-USER: CPT | Performed by: FAMILY MEDICINE

## 2020-08-05 PROCEDURE — 3008F BODY MASS INDEX DOCD: CPT | Performed by: NURSE PRACTITIONER

## 2020-08-05 PROCEDURE — 3077F SYST BP >= 140 MM HG: CPT | Performed by: FAMILY MEDICINE

## 2020-08-05 PROCEDURE — 99213 OFFICE O/P EST LOW 20 MIN: CPT | Performed by: FAMILY MEDICINE

## 2020-08-05 PROCEDURE — 3008F BODY MASS INDEX DOCD: CPT | Performed by: FAMILY MEDICINE

## 2020-08-05 PROCEDURE — 3080F DIAST BP >= 90 MM HG: CPT | Performed by: FAMILY MEDICINE

## 2020-08-05 NOTE — PROGRESS NOTES
Assessment/Plan:    Encounter for 's license history and physical  Form filled out   No hx of convulsions, epilepsy, or other medical conditions impairing her from driving     Elevated blood pressure reading in office without diagnosis of hypertension  Elevated BP in the office, normal at home   Upon rechecking BP, noted to trend down to 142/90  Will not start antihypertensive regimen at this time   Avised low salt diet and daily exercise   F/u in 3 months        Diagnoses and all orders for this visit:    Encounter for 's license history and physical    Elevated blood pressure reading in office without diagnosis of hypertension          Subjective:      Patient ID: Cora Cohn is a 44 y o  female who presents today for DL physical      HPI     Patient states she is overall doing well  She is awaiting breast reconstruction which was cancelled in May 2019  Tolerates well Anastrozole for the past 5 yrs with intermittent hot flashes  Was told that she will be taken off the medication soon  Denies any complaints or concerns at this time  States she feels nervous when she comes to doctor's office and her Bp as well as HR are elevated  She is not on any HTN meds  The following portions of the patient's history were reviewed and updated as appropriate: allergies, current medications, past family history, past medical history, past social history, past surgical history and problem list     Review of Systems   Constitutional: Negative for chills, fatigue and fever  HENT: Negative for sore throat and trouble swallowing  Eyes: Negative for visual disturbance  Respiratory: Negative for cough and shortness of breath  Cardiovascular: Negative for chest pain, palpitations and leg swelling  Gastrointestinal: Negative for abdominal distention, abdominal pain, blood in stool, constipation, diarrhea, nausea and vomiting  Genitourinary: Negative for dysuria and hematuria     Musculoskeletal: Negative for gait problem  Skin: Negative for rash  Neurological: Negative for dizziness, weakness and headaches  Psychiatric/Behavioral: Negative for agitation  Objective:      /90 (BP Location: Left arm, Patient Position: Sitting, Cuff Size: Adult)   Pulse (!) 106   Temp 97 9 °F (36 6 °C) (Temporal)   Resp 17   Ht 5' 4" (1 626 m)   Wt 74 8 kg (165 lb)   SpO2 98%   BMI 28 32 kg/m²      Physical Exam   Constitutional: She is oriented to person, place, and time  She appears well-developed  No distress  HENT:   Head: Normocephalic and atraumatic  Nose: Nose normal    Eyes: Conjunctivae are normal    Neck: Normal range of motion  Neck supple  Cardiovascular: Regular rhythm, normal heart sounds and normal pulses  Tachycardia present  Pulmonary/Chest: Effort normal and breath sounds normal  No respiratory distress  Abdominal: Soft  Bowel sounds are normal  There is no abdominal tenderness  There is no guarding  Musculoskeletal: Normal range of motion  General: No tenderness  Right lower leg: No edema  Left lower leg: No edema  Neurological: She is alert and oriented to person, place, and time  Skin: Skin is warm  She is not diaphoretic  Psychiatric: Her behavior is normal  Judgment and thought content normal    Nursing note and vitals reviewed

## 2020-08-06 PROBLEM — Z02.4 ENCOUNTER FOR DRIVER'S LICENSE HISTORY AND PHYSICAL: Status: ACTIVE | Noted: 2020-08-06

## 2020-08-06 PROBLEM — R03.0 ELEVATED BLOOD PRESSURE READING IN OFFICE WITHOUT DIAGNOSIS OF HYPERTENSION: Status: ACTIVE | Noted: 2018-07-30

## 2020-08-06 NOTE — ASSESSMENT & PLAN NOTE
Form filled out   No hx of convulsions, epilepsy, or other medical conditions impairing her from driving

## 2020-08-06 NOTE — ASSESSMENT & PLAN NOTE
Elevated BP in the office, normal at home   Upon rechecking BP, noted to trend down to 142/90  Will not start antihypertensive regimen at this time   Avised low salt diet and daily exercise   F/u in 3 months

## 2021-01-15 ENCOUNTER — TELEPHONE (OUTPATIENT)
Dept: HEMATOLOGY ONCOLOGY | Facility: CLINIC | Age: 51
End: 2021-01-15

## 2021-01-18 ENCOUNTER — OFFICE VISIT (OUTPATIENT)
Dept: HEMATOLOGY ONCOLOGY | Facility: CLINIC | Age: 51
End: 2021-01-18
Payer: COMMERCIAL

## 2021-01-18 VITALS
HEART RATE: 131 BPM | WEIGHT: 166 LBS | DIASTOLIC BLOOD PRESSURE: 98 MMHG | OXYGEN SATURATION: 98 % | HEIGHT: 64 IN | BODY MASS INDEX: 28.34 KG/M2 | RESPIRATION RATE: 18 BRPM | SYSTOLIC BLOOD PRESSURE: 162 MMHG | TEMPERATURE: 97.5 F

## 2021-01-18 DIAGNOSIS — Z17.0 MALIGNANT NEOPLASM OF OVERLAPPING SITES OF LEFT BREAST IN FEMALE, ESTROGEN RECEPTOR POSITIVE (HCC): Primary | ICD-10-CM

## 2021-01-18 DIAGNOSIS — C50.812 MALIGNANT NEOPLASM OF OVERLAPPING SITES OF LEFT BREAST IN FEMALE, ESTROGEN RECEPTOR POSITIVE (HCC): Primary | ICD-10-CM

## 2021-01-18 PROCEDURE — 3080F DIAST BP >= 90 MM HG: CPT | Performed by: INTERNAL MEDICINE

## 2021-01-18 PROCEDURE — 3077F SYST BP >= 140 MM HG: CPT | Performed by: INTERNAL MEDICINE

## 2021-01-18 PROCEDURE — 3008F BODY MASS INDEX DOCD: CPT | Performed by: INTERNAL MEDICINE

## 2021-01-18 PROCEDURE — 99214 OFFICE O/P EST MOD 30 MIN: CPT | Performed by: INTERNAL MEDICINE

## 2021-01-18 NOTE — PROGRESS NOTES
Hematology / Oncology Outpatient Follow Up Note    Mikaela Frias 44 y o  female :1970 CL:[id number]         Date:  2021    Assessment / Plan:  A 51 year old surgically postmenopausal woman with locally advanced left breast cancer, grade 2, ER/LA positive HER-2 negative disease, diagnosed in   She has no evidence of BRCA mutation  She underwent neoadjuvant chemotherapy with AC followed by paclitaxel resulting in clinical partial response  However, when she had mastectomy, she had significant residual disease, as well as 2 positive lymph node  She completed postmastectomy radiation therapy  She was previously on adjuvant hormonal therapy with tamoxifen, until she underwent hysterectomy and bilateral oophorectomy for fibroid  Currently, she is on anastrozole with stable hot flashes  She has no evidence recurrent disease, based on her symptoms in physical examinations  I recommended her to continue with anastrozole 1 mg once a day  She is in agreement with my recommendations  I will see her again in a year for routine follow-up        Subjective:      HPI: [de-identified] 53year old premenopausal  speaking woman with recent diagnosis of locally advanced left breast cancer    She first noticed a lump which was small approximately 2 years ago  Over the time she developed very large breast mass with skin involvement as well as skin dimpling  She also developed of breast pain  She underwent biopsy which showed invasive ductal carcinoma with grade 2  This was ER/LA positive HER-2 negative disease  She was seen by Dr Ralph Rivero who thought that neoadjuvant chemotherapy is the most appropriate treatment  Therefore she was referred to me  She underwent CT scan of chest abdomen and pelvis as well as bone scan both of which were negative for distant metastasis  She has no weight loss fever chills or night sweats  She denied any respiratory symptoms  She has no bone pain   Her performance status is normal  she has no family history of breast cancer or ovarian cancer             Interval History:  A 51 year old surgically postmenopausal woman with locally advanced left breast cancer, grade 2, ER/SD positive HER-2 negative disease  She had very large left breast mass which occupied her entire left breast at the time of initial presentation  She had neoadjuvant chemotherapy with dose dense AC followed by paclitaxel resulting in clinical response  She underwent left mastectomy with sentinel lymph node biopsy  She still had significant residual tumor measuring 11 5 cm with multiple residual foci of tumor with 2 sentinel lymph node with subcentimeter metastatic disease  She did not have reconstruction  Since May 2014, she was on adjuvant hormonal therapy with tamoxifen , since she was perimenopausal at that time  She had very large fibroid in the uterus  In February 2015, she underwent hysterectomy and bilateral oophorectomy  Subsequently, adjuvant hormonal therapy was changed to anastrozole  She came in today for followup  She feels well with no new complaint  She continued to have stable hot flashes  She denied bone pain  She has no respiratory symptoms  Her weight is stable  Her performance status is normal        Objective:      Primary Diagnosis:      1  Locally advanced left breast cancer with skin involvement, grade 2, ER/SD positive HER-2 negative disease  Diagnosed in June 2013  2  BRCA mutation negative       Cancer Staging:  Cancer Staging  No matching staging information was found for the patient         Previous Hematologic/ Oncologic Treatment:      1  Neoadjuvant chemotherapy with dose dense AC X4 followed by weekly paclitaxel x12  Completed in December 2013  2  adjuvant hormonal therapy with tamoxifen from March 2014 through May 2015    3  postmastectomy radiation therapy completed in May 2014       Current Hematologic/ Oncologic Treatment:       Adjuvant hormonal therapy with anastrozole 1 mg once a day since May 2015       Disease Status:      1  Partial response on neoadjuvant chemotherapy  2  ENMANUEL, status post mastectomy with sentinel lymph node biopsy       Test Results:     Pathology:           Radiology:     Mammography in July 2020 was benign  BI-RAD 1       Laboratory:           Physical Exam:        General Appearance:    Alert, oriented          Eyes:    PERRL   Ears:    Normal external ear canals, both ears   Nose:   Nares normal, septum midline   Throat:   Mucosa moist  Pharynx without injection  Neck:   Supple         Lungs:     Clear to auscultation bilaterally   Chest Wall:    No tenderness or deformity    Heart:    Regular rate and rhythm         Abdomen:     Soft, non-tender, bowel sounds +, no organomegaly               Extremities:   Extremities no cyanosis or edema         Skin:   no rash or icterus  Lymph nodes:   Cervical, supraclavicular, and axillary nodes normal   Neurologic:   CNII-XII intact, normal strength, sensation and reflexes     Throughout             Breast exam:   status post left mastectomy without reconstruction  No palpable abnormalities in her left chest wall  No palpable metastatic very adenopathy  Right breast exam is negative  ROS: Review of Systems   All other systems reviewed and are negative  Imaging: No results found        Labs:   Lab Results   Component Value Date    WBC 5 51 05/14/2019    HGB 13 9 05/14/2019    HCT 45 2 05/14/2019    MCV 94 05/14/2019     05/14/2019     Lab Results   Component Value Date     06/22/2018    K 4 1 05/14/2019     05/14/2019    CO2 30 05/14/2019    ANIONGAP 12 06/22/2018    BUN 13 05/14/2019    CREATININE 0 73 05/14/2019    GLUCOSE 81 06/22/2018    GLUF 85 05/14/2019    CALCIUM 10 3 (H) 05/14/2019    CORRECTEDCA 10 2 (H) 05/14/2019    AST 20 06/22/2018    ALT 43 06/22/2018    ALKPHOS 77 06/22/2018    PROT 8 1 06/22/2018    BILITOT 0 2 06/22/2018    EGFR 96 05/14/2019         Lab Results   Component Value Date    HCG <2 02/19/2014         Current Medications: Reviewed  Allergies: Reviewed  PMH/FH/SH:  Reviewed      Vital Sign:    Body surface area is 1 81 meters squared      Wt Readings from Last 3 Encounters:   01/18/21 75 3 kg (166 lb)   08/05/20 74 8 kg (165 lb)   07/27/20 74 4 kg (164 lb)        Temp Readings from Last 3 Encounters:   01/18/21 97 5 °F (36 4 °C) (Tympanic Core)   08/05/20 97 9 °F (36 6 °C) (Temporal)   07/27/20 (!) 97 °F (36 1 °C)        BP Readings from Last 3 Encounters:   01/18/21 162/98   08/05/20 158/90   02/06/20 142/98         Pulse Readings from Last 3 Encounters:   01/18/21 (!) 131   08/05/20 (!) 106   02/06/20 (!) 114     @KTBNRXI5(3)@

## 2021-02-09 ENCOUNTER — TELEPHONE (OUTPATIENT)
Dept: SURGICAL ONCOLOGY | Facility: CLINIC | Age: 51
End: 2021-02-09

## 2021-02-09 NOTE — TELEPHONE ENCOUNTER
Patient is not  having any symptoms of  fever, cough, shortness of breath, chills, repeated shaking with chills, muscle pain, headache, sore throat, or new loss of taste/smell  Patient has not been tested for COVID -19  Patient has not been in contact with someone confirmed to have COVID-19  Patient has not travelled out of state in the past 14 days  Reviewed masking policy with patient  Reviewed visitor policy with patient

## 2021-02-11 ENCOUNTER — OFFICE VISIT (OUTPATIENT)
Dept: SURGICAL ONCOLOGY | Facility: CLINIC | Age: 51
End: 2021-02-11
Payer: COMMERCIAL

## 2021-02-11 VITALS
TEMPERATURE: 97.7 F | HEART RATE: 110 BPM | HEIGHT: 64 IN | BODY MASS INDEX: 26.31 KG/M2 | WEIGHT: 154.1 LBS | SYSTOLIC BLOOD PRESSURE: 160 MMHG | RESPIRATION RATE: 18 BRPM | DIASTOLIC BLOOD PRESSURE: 88 MMHG

## 2021-02-11 DIAGNOSIS — Z12.31 VISIT FOR SCREENING MAMMOGRAM: ICD-10-CM

## 2021-02-11 DIAGNOSIS — C50.812 CARCINOMA OF OVERLAPPING SITES OF LEFT BREAST IN FEMALE, ESTROGEN RECEPTOR POSITIVE (HCC): Primary | ICD-10-CM

## 2021-02-11 DIAGNOSIS — Z17.0 CARCINOMA OF OVERLAPPING SITES OF LEFT BREAST IN FEMALE, ESTROGEN RECEPTOR POSITIVE (HCC): Primary | ICD-10-CM

## 2021-02-11 DIAGNOSIS — Z79.811 USE OF ANASTROZOLE (ARIMIDEX): ICD-10-CM

## 2021-02-11 PROCEDURE — 1036F TOBACCO NON-USER: CPT | Performed by: NURSE PRACTITIONER

## 2021-02-11 PROCEDURE — 99214 OFFICE O/P EST MOD 30 MIN: CPT | Performed by: NURSE PRACTITIONER

## 2021-02-11 PROCEDURE — 3008F BODY MASS INDEX DOCD: CPT | Performed by: NURSE PRACTITIONER

## 2021-02-11 NOTE — PROGRESS NOTES
Surgical Oncology Follow Up       [address] [facility]  [facility] [place]  [address]  [place]  [place] [id number]    Thamas Bending  1970  [id number]  [address] [facility],[address] [place] [address]  [facility] [place]  [address] [place] [id number]    Chief Complaint   Patient presents with    Breast Cancer       Assessment/Plan:  1  Carcinoma of overlapping sites of left breast in female, estrogen receptor positive ([facility] [address] )  - 1 year f/u visit    2  Use of anastrozole (Arimidex)  - Continue use per medical oncology    3  Visit for screening mammogram  - Mammo screening right w 3d & cad; Future      Discussion/Summary: Patient is a 51 year old female who presents today for a one year follow-up for left breast cancer diagnosed in 2013  She underwent neoadjuvant chemotherapy and underwent a left mastectomy and sentinel node biopsy in 2014  Her tumor size was 11 5 cm and she had 2 of 3 positive lymph nodes  It was determined she would not benefit from a complete axillary dissection but rather completed post mastectomy radiation therapy  She tested negative for the BRCA genetic mutation  She is currently taking anastrozole   She had a right 3D diagnostic mammogram on  July 27, 2020 which was BI-RADS 1, category 3 density  She offers no new complaints today and there are no concerns on today's exam   I will order a right screening mammogram for July and I will plan to see the patient back again in 1 year or sooner should the need arise  She was instructed to call with any new concerns or symptoms prior to that time  All of her questions were answered today   # [id number] used for today's visit  History of Present Illness:     Oncology History   Carcinoma of left breast, estrogen receptor positive ([facility] [address] )   6/18/2013 Biopsy    Left breast biopsy  Invasive mammary carcinoma with lobular features  Grade 2  ER/[place] positive    HER2 negative     7/29/2013 - 12/9/2013 Chemotherapy    Dose dense AC x4 followed by paclitaxel x12     2/18/2014 Surgery    Left mastectomy with sentinel node biopsy  Invasive lobular carcinoma  Grade 2  Invasive carcinoma involves the dermis     2/18/2014 -  Cancer Staged    IIIA ypT3, ypN1a (i+) (sn) G2     4/3/2014 - 5/20/2014 Radiation    Chest wall and draining lymph nodes  Total dose 6040 cGy     5/19/2014 - 5/2015 Hormone Therapy    Tamoxifen     5/2015 -  Hormone Therapy    anastrozole           -Interval History:  Patient presents today for follow-up visit for left breast cancer diagnosed in 2013  She notices no changes on her self exam   She continues to take anastrozole  She denies persistent headaches, back pain or bone pain, cough or shortness of breath, abdominal pain  She does report hot flashes  Review of Systems:  Review of Systems   Constitutional: Negative for activity change, appetite change, chills, fatigue, fever and unexpected weight change  Respiratory: Negative for cough and shortness of breath  Cardiovascular: Negative for chest pain  Gastrointestinal: Negative for abdominal pain, constipation, diarrhea, nausea and vomiting  Endocrine: Positive for heat intolerance (Hot flashes from anastrozole use)  Musculoskeletal: Negative for arthralgias, back pain, gait problem and myalgias  Skin: Negative for color change and rash  Neurological: Negative for dizziness and headaches  Hematological: Negative for adenopathy  Psychiatric/Behavioral: Negative for agitation and confusion  All other systems reviewed and are negative        Patient Active Problem List   Diagnosis    Carcinoma of left breast, estrogen receptor positive (HCC)    Use of anastrozole (Arimidex)    Limb swelling    Malignant neoplasm of overlapping sites of left female breast ([facility] [address] )    Uterine leiomyoma    Elevated blood pressure reading in office without diagnosis of hypertension    Personal history of malignant neoplasm of breast    Well female exam with routine gynecological exam    Encounter for 's license history and physical     Past Medical History:   Diagnosis Date    Breast cancer ([facility] [address] ) 07/2013    left-chemotherapy    Fibroma     History of chemotherapy     History of external beam radiation therapy      Past Surgical History:   Procedure Laterality Date    APPENDECTOMY      BREAST SURGERY  2014    Left breast 2/2 breast CA    HYSTERECTOMY  2015    Due to fibroma    MASTECTOMY      OOPHORECTOMY       Family History   Problem Relation Age of Onset    Lung cancer Mother     No Known Problems Father     No Known Problems Sister     No Known Problems Daughter     Cancer Maternal Grandmother     Uterine cancer Maternal Aunt      Social History     Socioeconomic History    Marital status: /Civil Union     Spouse name: Not on file    Number of children: 3    Years of education: Not on file    Highest education level: 12th grade   Occupational History     Comment: unemployed   Social Needs    Financial resource strain: Not on file    Food insecurity     Worry: Not on file     Inability: Not on file   [employer] needs     Medical: Not on file     Non-medical: Not on file   Tobacco Use    Smoking status: Never Smoker    Smokeless tobacco: Never Used   Substance and Sexual Activity    Alcohol use: No     Frequency: Never    Drug use: No    Sexual activity: Yes     Partners: Male     Birth control/protection: Surgical   Lifestyle    Physical activity     Days per week: Not on file     Minutes per session: Not on file    Stress: Not on file   Relationships    Social connections     Talks on phone: Not on file     Gets together: Not on file     Attends Tenriism service: Not on file     Active member of club or organization: Not on file     Attends meetings of clubs or organizations: Not on file     Relationship status: Not on file    Intimate partner violence     Fear of current or ex partner: Not on file     Emotionally abused: Not on file     Physically abused: Not on file     Forced sexual activity: Not on file   Other Topics Concern    Not on file   Social History Narrative    Not on file       Current Outpatient Medications:     anastrozole (ARIMIDEX) 1 mg tablet, Take 1 tablet (1 mg total) by mouth daily, Disp: 90 tablet, Rfl: 3    calcium citrate-vitamin D (CITRACAL+D) 315-200 MG-UNIT per tablet, Take 1 tablet by mouth 2 (two) times a day, Disp: 60 tablet, Rfl: 2    calcium-vitamin D (OSCAL 500/200 D-3) 500 mg-200 units per tablet, Every 12 hours, Disp: , Rfl:   Allergies   Allergen Reactions    Aspirin Rash     Other reaction(s): Palpitations     Vitals:    02/11/21 1157   BP: 160/88   Pulse: (!) 110   Resp: 18   Temp: 97 7 °F (36 5 °C)       Physical Exam  Vitals signs reviewed  Constitutional:       General: She is not in acute distress  Appearance: Normal appearance  She is well-developed  She is not diaphoretic  HENT:      Head: Normocephalic and atraumatic  Neck:      Musculoskeletal: Normal range of motion  Cardiovascular:      Rate and Rhythm: Regular rhythm  Tachycardia present  Heart sounds: Normal heart sounds  Comments: Pt reports tachycardia from nervousness at appts only    Pulmonary:      Effort: Pulmonary effort is normal       Breath sounds: Normal breath sounds  Chest:      Breasts:         Right: No swelling, bleeding, inverted nipple, mass, nipple discharge, skin change or tenderness  Comments: Left mastectomy site free of masses, skin changes, skin lesions  Abdominal:      Palpations: Abdomen is soft  There is no mass  Tenderness: There is no abdominal tenderness  Musculoskeletal: Normal range of motion  Lymphadenopathy:      Upper Body:      Right upper body: No supraclavicular or axillary adenopathy  Left upper body: No supraclavicular or axillary adenopathy  Skin:     General: Skin is warm and dry        Findings: No rash  Neurological:      Mental Status: She is alert and oriented to person, place, and time  Psychiatric:         Speech: Speech normal              Advance Care Planning/Advance Directives:  Discussed disease status, cancer treatment plans and/or cancer treatment goals with the patient

## 2021-04-01 ENCOUNTER — TELEPHONE (OUTPATIENT)
Dept: PLASTIC SURGERY | Facility: CLINIC | Age: 51
End: 2021-04-01

## 2021-04-02 ENCOUNTER — OFFICE VISIT (OUTPATIENT)
Dept: PLASTIC SURGERY | Facility: CLINIC | Age: 51
End: 2021-04-02
Payer: COMMERCIAL

## 2021-04-02 VITALS
WEIGHT: 164 LBS | HEART RATE: 130 BPM | HEIGHT: 65 IN | BODY MASS INDEX: 27.32 KG/M2 | DIASTOLIC BLOOD PRESSURE: 88 MMHG | TEMPERATURE: 97.2 F | SYSTOLIC BLOOD PRESSURE: 135 MMHG

## 2021-04-02 DIAGNOSIS — Z90.10 STATUS POST MASTECTOMY, UNSPECIFIED LATERALITY: Primary | ICD-10-CM

## 2021-04-02 PROCEDURE — 99213 OFFICE O/P EST LOW 20 MIN: CPT | Performed by: SURGERY

## 2021-04-02 PROCEDURE — 3008F BODY MASS INDEX DOCD: CPT | Performed by: SURGERY

## 2021-04-02 PROCEDURE — 1036F TOBACCO NON-USER: CPT | Performed by: SURGERY

## 2021-04-02 NOTE — PROGRESS NOTES
Evelyne Spurling returns with her daughter, Michael Em  We were unable to connect with the  service/ [facility]  Due to this, we agreed to schedule another date to discuss breast reconstruction options  This will need to be in the [place] office so that we may utilize the  service

## 2021-06-29 ENCOUNTER — TELEPHONE (OUTPATIENT)
Dept: HEMATOLOGY ONCOLOGY | Facility: CLINIC | Age: 51
End: 2021-06-29

## 2021-06-29 DIAGNOSIS — C50.812 CARCINOMA OF OVERLAPPING SITES OF LEFT BREAST IN FEMALE, ESTROGEN RECEPTOR POSITIVE (HCC): ICD-10-CM

## 2021-06-29 DIAGNOSIS — Z17.0 CARCINOMA OF OVERLAPPING SITES OF LEFT BREAST IN FEMALE, ESTROGEN RECEPTOR POSITIVE (HCC): ICD-10-CM

## 2021-06-29 RX ORDER — ANASTROZOLE 1 MG/1
1 TABLET ORAL DAILY
Qty: 90 TABLET | Refills: 3 | Status: SHIPPED | OUTPATIENT
Start: 2021-06-29

## 2021-06-29 RX ORDER — ANASTROZOLE 1 MG/1
1 TABLET ORAL DAILY
Qty: 90 TABLET | Refills: 3 | Status: CANCELLED | OUTPATIENT
Start: 2021-06-29

## 2021-06-29 NOTE — TELEPHONE ENCOUNTER
1/17/22 next follow up appointment  Prescription pended to Dr Lukasz Banda RN notified as Jerson Olivas

## 2021-06-29 NOTE — TELEPHONE ENCOUNTER
Medication Refill     Who is Calling  pharmacy    Medication  anastrozole (ARIMIDEX) 1 mg tablet      How many pills left  n/a   Preferred Pharmacy / Address  Emeterio Ferrara [facility], [address] [place] [address]    Call back number [phone]   Relevant Information

## 2021-07-09 ENCOUNTER — OFFICE VISIT (OUTPATIENT)
Dept: PLASTIC SURGERY | Facility: CLINIC | Age: 51
End: 2021-07-09
Payer: COMMERCIAL

## 2021-07-09 DIAGNOSIS — Z90.10 STATUS POST MASTECTOMY, UNSPECIFIED LATERALITY: Primary | ICD-10-CM

## 2021-07-09 PROCEDURE — 1036F TOBACCO NON-USER: CPT | Performed by: SURGERY

## 2021-07-09 PROCEDURE — 99215 OFFICE O/P EST HI 40 MIN: CPT | Performed by: SURGERY

## 2021-07-09 NOTE — PROGRESS NOTES
Assessment/Plan: please see HPI  We discussed latissimus dorsi flap/tissue expander reconstruction, how the procedures performed, where the incisions /scars will be located, as well as potential risks, complications  And limitations-including, but not limited to infection, bleeding, scarring, asymmetry, contour deformity, the need for multiple procedures including the initial flap/ expander placement, need for multiple subsequent visits for tissue expansion, expander/ implant exchange, the likelihood of revisional procedures and fat grafting, as well as surgery on the contralateral breast such as mastopexy for symmetry purposes  We spent considerable time discussing the significant limitations given the prior history of radiation, the likelihood that nipple reconstruction would not be performed given the prior radiation, however she would course be a candidate for nipple areolar complex tattooing  We reviewed some photographs of latissimus dorsi reconstruction, they would like some additional time to contemplate the potential risks, versus the benefit of reconstruction, etcetera  We mutually agreed to meet again in 3-4 months  There are no diagnoses linked to this encounter  Subjective:   Status post left mastectomy/radiation, without reconstruction     Patient ID: Rich Hubbard is a 46 y o  female  HPI Jannet Zarate returns today for further discussion regarding latissimus dorsi flap/ tissue expander reconstruction of the previously radiated left chest/breast   She is accompanied by her daughter Kristi Howard  Today we utilized the Eliseo Wallace [id number]  Served as our   The following portions of the patient's history were reviewed and updated as appropriate: allergies, current medications, past family history, past medical history, past social history, past surgical history and problem list     Review of Systems   Constitutional: Negative for chills and fever  HENT: Negative for hearing loss  Eyes: Positive for visual disturbance  Negative for discharge  Respiratory: Negative for chest tightness and shortness of breath  Cardiovascular: Negative for chest pain and leg swelling  Gastrointestinal: Positive for constipation  Negative for blood in stool, diarrhea and nausea  Genitourinary: Negative for dysuria  Musculoskeletal: Negative for gait problem  Skin: Negative for rash  Allergic/Immunologic: Negative for immunocompromised state  Neurological: Negative for seizures and headaches  Hematological: Does not bruise/bleed easily  Psychiatric/Behavioral: Negative for dysphoric mood  The patient is nervous/anxious  Objective: There were no vitals taken for this visit  Physical Exam  Constitutional:       Appearance: Normal appearance  HENT:      Head: Normocephalic and atraumatic  Eyes:      Extraocular Movements: Extraocular movements intact  Pupils: Pupils are equal, round, and reactive to light  Cardiovascular:      Rate and Rhythm: Normal rate  Pulmonary:      Effort: Pulmonary effort is normal    Abdominal:      Palpations: Abdomen is soft  Musculoskeletal:         General: Normal range of motion  Cervical back: Normal range of motion  Skin:     General: Skin is warm  Comments: Absent left breast, stigmata of prior radiation with hyperpigmentation and fibrosis, see previous photos   Neurological:      Mental Status: She is alert     Psychiatric:         Mood and Affect: Mood normal

## 2021-08-09 ENCOUNTER — HOSPITAL ENCOUNTER (OUTPATIENT)
Dept: RADIOLOGY | Age: 51
Discharge: HOME | End: 2021-08-09
Payer: COMMERCIAL

## 2021-08-09 VITALS — BODY MASS INDEX: 27.32 KG/M2 | HEIGHT: 65 IN | WEIGHT: 164 LBS

## 2021-08-09 DIAGNOSIS — Z12.31 VISIT FOR SCREENING MAMMOGRAM: ICD-10-CM

## 2021-08-09 DIAGNOSIS — Z12.31 ENCOUNTER FOR SCREENING MAMMOGRAM FOR MALIGNANT NEOPLASM OF BREAST: ICD-10-CM

## 2021-08-09 PROCEDURE — 77063 BREAST TOMOSYNTHESIS BI: CPT

## 2021-08-09 PROCEDURE — 77067 SCR MAMMO BI INCL CAD: CPT

## 2021-08-11 PROBLEM — E66.3 OVERWEIGHT (BMI 25.0-29.9): Status: ACTIVE | Noted: 2021-08-11

## 2021-08-12 ENCOUNTER — OFFICE VISIT (OUTPATIENT)
Dept: FAMILY MEDICINE CLINIC | Facility: CLINIC | Age: 51
End: 2021-08-12

## 2021-08-12 VITALS
WEIGHT: 164.2 LBS | SYSTOLIC BLOOD PRESSURE: 142 MMHG | HEART RATE: 116 BPM | OXYGEN SATURATION: 99 % | TEMPERATURE: 97.5 F | BODY MASS INDEX: 27.36 KG/M2 | DIASTOLIC BLOOD PRESSURE: 86 MMHG | RESPIRATION RATE: 20 BRPM | HEIGHT: 65 IN

## 2021-08-12 DIAGNOSIS — Z11.4 ENCOUNTER FOR SCREENING FOR HIV: ICD-10-CM

## 2021-08-12 DIAGNOSIS — R03.0 ELEVATED BLOOD PRESSURE READING IN OFFICE WITHOUT DIAGNOSIS OF HYPERTENSION: Primary | ICD-10-CM

## 2021-08-12 DIAGNOSIS — E66.3 OVERWEIGHT (BMI 25.0-29.9): ICD-10-CM

## 2021-08-12 DIAGNOSIS — Z11.59 ENCOUNTER FOR HEPATITIS C SCREENING TEST FOR LOW RISK PATIENT: ICD-10-CM

## 2021-08-12 DIAGNOSIS — Z12.11 ENCOUNTER FOR SCREENING COLONOSCOPY: ICD-10-CM

## 2021-08-12 DIAGNOSIS — R30.0 DYSURIA: ICD-10-CM

## 2021-08-12 LAB
SL AMB  POCT GLUCOSE, UA: NORMAL
SL AMB LEUKOCYTE ESTERASE,UA: NEGATIVE
SL AMB POCT BILIRUBIN,UA: NEGATIVE
SL AMB POCT BLOOD,UA: NEGATIVE
SL AMB POCT CLARITY,UA: CLEAR
SL AMB POCT COLOR,UA: YELLOW
SL AMB POCT KETONES,UA: NEGATIVE
SL AMB POCT NITRITE,UA: NEGATIVE
SL AMB POCT PH,UA: 8
SL AMB POCT SPECIFIC GRAVITY,UA: 1
SL AMB POCT URINE PROTEIN: NEGATIVE
SL AMB POCT UROBILINOGEN: NORMAL

## 2021-08-12 PROCEDURE — 3079F DIAST BP 80-89 MM HG: CPT | Performed by: FAMILY MEDICINE

## 2021-08-12 PROCEDURE — 3077F SYST BP >= 140 MM HG: CPT | Performed by: FAMILY MEDICINE

## 2021-08-12 PROCEDURE — 81002 URINALYSIS NONAUTO W/O SCOPE: CPT | Performed by: FAMILY MEDICINE

## 2021-08-12 PROCEDURE — 99213 OFFICE O/P EST LOW 20 MIN: CPT | Performed by: FAMILY MEDICINE

## 2021-08-12 NOTE — ASSESSMENT & PLAN NOTE
BP goal <150/90  Patient gets nervous when she comes for doctor appt   Currently BP stable, at goal   Will not start antihypertensive regimen at this time   Avised low salt diet and daily exercise   F/u in 3 months

## 2021-08-12 NOTE — PATIENT INSTRUCTIONS
Lifestyle Medicine Tip Sheet- [place]    1  Coma alimentos predominantemente menos procesados, jesus comida rápida, cenas de televisión y tocino  2  Coma cerca de la naturaleza (mercados de agricultores, productos frescos o congelados)    3  Coma gabriella dieta predominantemente basada en plantas  a  Verdes frondosos oscuros gosia   b  Frutas y vegetales  c   Granos integrales: kavita integral, apenas, bayas de kavita, quinua, abilio cortada en rebecca, arroz integral, pasta integral   d  Legumbres: frijoles, frijoles pintos, frijoles blancos, frijoles negros, garbanzos (garbanzos), frijoles lima (maduros, secos), arvejas, lentejas y edamame (frijoles de soya verdes)      4  Al menos la mitad del plato debe contener frutas o verduras  5  El líquido debe ser predominantemente agua (limite el refresco y [place])    6  Celine el tamaño de la porción  7  ¿Qué alimentos keke evitar o limitar? - Grasas: Específicamente saturadas y grasas trans  Se encuentran en margarinas, muchas comidas rápidas y algunos productos horneados comprados en la rojelio  Las grasas saturadas y grasas trans pueden elevar troy nivel de colesterol y troy probabilidad de contraer enfermedades cardíacas  - Cuando cocine, es mejor no usar aceites, anny si es necesario, trate de limitar la cantidad de aceite utilizado, ya que el aceite contiene muchas calorías por volumen y es muy poco saludable cuando se calienta lori la cocción   - Azúcar: limite o evite el azúcar, los dulces y los granos refinados  Los granos refinados se encuentran en el pan brown, el arroz brown, la mayoría de las formas de pasta y la mayoría de los alimentos "aperitivos" envasados  - Intente no cocinar con dedra y evite agregar dedra adicional a cassie comidas  - Lindsayia Will: los estudios cramer demostrado que comer mucha hilary Garcia riesgo de ciertos problemas de [place], jesus enfermedades cardíacas y cáncer  8  7-9 horas de sueño    9   Ejercicio diario mínimo de 30 minutos (caminando alrededor de la diego)    10  Socialización (amigos y familiares)    - Explora tu vecindario  Ve al parque, pasa tiempo en la biblioteca  Si está interesado, puede leer más sobre las opciones de alimentos saludables en los siguientes sitios web:  a  NutritionContrib  org  b  Home cooking recipes: https://www Health As We Age/  c  http://johnathon info/  d  Familydoctor  org

## 2021-08-12 NOTE — ASSESSMENT & PLAN NOTE
BMI Counseling: Body mass index is 27 32 kg/m²  The BMI is above normal  Nutrition recommendations include reducing portion sizes, decreasing overall calorie intake, 3-5 servings of fruits/vegetables daily, reducing fast food intake, consuming healthier snacks, decreasing soda and/or juice intake, moderation in carbohydrate intake, increasing intake of lean protein and reducing intake of saturated fat and trans fat  Exercise recommendations include exercising 3-5 times per week

## 2021-08-12 NOTE — PROGRESS NOTES
Assessment/Plan:    Elevated blood pressure reading in office without diagnosis of hypertension  BP goal <150/90  Patient gets nervous when she comes for doctor appt  Currently BP stable, at goal   Will not start antihypertensive regimen at this time   Avised low salt diet and daily exercise   F/u in 3 months     Overweight (BMI 25 0-29  9)  BMI Counseling: Body mass index is 27 32 kg/m²  The BMI is above normal  Nutrition recommendations include reducing portion sizes, decreasing overall calorie intake, 3-5 servings of fruits/vegetables daily, reducing fast food intake, consuming healthier snacks, decreasing soda and/or juice intake, moderation in carbohydrate intake, increasing intake of lean protein and reducing intake of saturated fat and trans fat  Exercise recommendations include exercising 3-5 times per week  Diagnoses and all orders for this visit:    Elevated blood pressure reading in office without diagnosis of hypertension    Overweight (BMI 25 0-29  9)    Dysuria  Comments:  Resolved  POCT urine dip unremarkable  Increase PO intake and monitor if sx recur  Orders:  -     POCT urine dip    Encounter for screening for HIV  -     HIV 1/2 Antigen/Antibody (4th Generation) w Reflex [facility]; Future    Encounter for hepatitis C screening test for low risk patient  -     Hepatitis C antibody; Future    Encounter for screening colonoscopy  -     Ambulatory referral to General Surgery; Future          Subjective:      Patient ID: Altaf Milligan is a 46 y o  female  HPI     Patient presents today to the office for chronic conditions f/u visit  She endorses no immediate concerns at this time  Preventative Care  HIV: will order today  Hep C Ab: will order today  Colonoscopy: will place referral today  Mammogram: 08/09/2021 with normal findings  Current concerns   Patient noted she had dysuria and urinary frequency 2 weeks which now has resolved  Denies fever, hematuria, suprapubic pain   Currently she has no urinary sx  She increased her PO water intake  The following portions of the patient's history were reviewed and updated as appropriate: allergies, current medications, past family history, past medical history, past social history, past surgical history and problem list     Review of Systems   Constitutional: Negative for chills, fatigue and fever  HENT: Negative for sore throat and trouble swallowing  Eyes: Negative for visual disturbance  Respiratory: Negative for cough and shortness of breath  Cardiovascular: Negative for chest pain and leg swelling  Gastrointestinal: Negative for abdominal pain, blood in stool, constipation, diarrhea, nausea and vomiting  Genitourinary: Positive for dysuria (2 weeks ago, now resolved)  Negative for hematuria  Musculoskeletal: Negative for gait problem  Skin: Negative for rash  Neurological: Negative for dizziness and headaches  Psychiatric/Behavioral: Negative for agitation  Objective:      /86 (BP Location: Right arm, Patient Position: Sitting, Cuff Size: Standard)   Pulse (!) 116   Temp 97 5 °F (36 4 °C) (Temporal)   Resp 20   Ht 5' 5" (1 651 m)   Wt 74 5 kg (164 lb 3 2 oz)   SpO2 99%   BMI 27 32 kg/m²          Physical Exam  Vitals and nursing note reviewed  Constitutional:       General: She is not in acute distress  Appearance: Normal appearance  She is well-developed  She is not ill-appearing, toxic-appearing or diaphoretic  HENT:      Head: Normocephalic and atraumatic  Nose: Nose normal    Eyes:      General:         Right eye: No discharge  Left eye: No discharge  Conjunctiva/sclera: Conjunctivae normal    Cardiovascular:      Rate and Rhythm: Normal rate and regular rhythm  Heart sounds: Normal heart sounds  Pulmonary:      Effort: Pulmonary effort is normal  No respiratory distress  Breath sounds: Normal breath sounds     Abdominal:      General: Bowel sounds are normal  Palpations: Abdomen is soft  Tenderness: There is no abdominal tenderness  Musculoskeletal:         General: No tenderness  Normal range of motion  Cervical back: Normal range of motion and neck supple  Right lower leg: No edema  Left lower leg: No edema  Skin:     General: Skin is warm  Findings: No rash  Neurological:      Mental Status: She is alert and oriented to person, place, and time  Psychiatric:         Mood and Affect: Mood normal          Behavior: Behavior normal          Thought Content:  Thought content normal          Judgment: Judgment normal

## 2021-08-20 ENCOUNTER — APPOINTMENT (OUTPATIENT)
Dept: LAB | Facility: HOSPITAL | Age: 51
End: 2021-08-20
Payer: COMMERCIAL

## 2021-08-20 DIAGNOSIS — Z11.59 ENCOUNTER FOR HEPATITIS C SCREENING TEST FOR LOW RISK PATIENT: ICD-10-CM

## 2021-08-20 DIAGNOSIS — Z11.4 ENCOUNTER FOR SCREENING FOR HIV: ICD-10-CM

## 2021-08-20 LAB — HCV AB SER QL: NORMAL

## 2021-08-20 PROCEDURE — 86803 HEPATITIS C AB TEST: CPT

## 2021-08-20 PROCEDURE — 36415 COLL VENOUS BLD VENIPUNCTURE: CPT

## 2021-08-20 PROCEDURE — 87389 HIV-1 AG W/HIV-1&-2 AB AG IA: CPT

## 2021-08-21 LAB — HIV 1+2 AB+HIV1 P24 AG SERPL QL IA: NORMAL

## 2021-08-27 ENCOUNTER — TELEPHONE (OUTPATIENT)
Dept: FAMILY MEDICINE CLINIC | Facility: CLINIC | Age: 51
End: 2021-08-27

## 2021-09-01 ENCOUNTER — PREP FOR PROCEDURE (OUTPATIENT)
Dept: SURGERY | Facility: CLINIC | Age: 51
End: 2021-09-01

## 2021-09-01 ENCOUNTER — CONSULT (OUTPATIENT)
Dept: SURGERY | Facility: CLINIC | Age: 51
End: 2021-09-01
Payer: COMMERCIAL

## 2021-09-01 VITALS
HEART RATE: 98 BPM | WEIGHT: 164 LBS | DIASTOLIC BLOOD PRESSURE: 88 MMHG | BODY MASS INDEX: 27.32 KG/M2 | SYSTOLIC BLOOD PRESSURE: 140 MMHG | TEMPERATURE: 97.4 F | HEIGHT: 65 IN

## 2021-09-01 DIAGNOSIS — Z12.11 ENCOUNTER FOR SCREENING COLONOSCOPY: Primary | ICD-10-CM

## 2021-09-01 PROCEDURE — 99244 OFF/OP CNSLTJ NEW/EST MOD 40: CPT | Performed by: SURGERY

## 2021-09-01 RX ORDER — POLYETHYLENE GLYCOL 3350 17 G/17G
238 POWDER, FOR SOLUTION ORAL SEE ADMIN INSTRUCTIONS
Qty: 238 G | Refills: 0 | Status: SHIPPED | OUTPATIENT
Start: 2021-09-01 | End: 2021-11-03 | Stop reason: HOSPADM

## 2021-09-02 NOTE — PROGRESS NOTES
Assessment/Plan:  Discussed risks and benefits of colonoscopy including low risk of bleeding if polypectomy performed, abdominal discomfort/bloating and very small risk of colon perforation  Explained bowel prep in detail and gave instructions detailing appropriate pre-procedure diet and medication use  Prescription for bowel prep will be sent to the pharmacy  Procedure will be scheduled at earliest convenience  No problem-specific Assessment & Plan notes found for this encounter  Diagnoses and all orders for this visit:    Encounter for screening colonoscopy  -     Ambulatory referral to General Surgery  -     polyethylene glycol (GLYCOLAX) 17 GM/SCOOP powder; Take 238 g by mouth see administration instructions Mix 238 g with 64 oz of clear liquid  Drink half starting at noon on the day prior to colonoscopy  Drink remaining half 6 hours prior to procedure on day of colonoscopy  -     bisacodyl (DULCOLAX) 5 mg EC tablet; Take 1 tablet (5 mg total) by mouth see administration instructions Take 2 tablets at 12:00 p m  on day prior to colonoscopy  Take 2 tablets at 4:00 p m  on day prior to colonoscopy          Subjective:      Patient ID: Jeramy Novoa is a 46 y o  female  She presents to discuss screening colonoscopy  She has had no prior colon cancer screening  She does have a history of breast cancer status post left mastectomy and radiation  She denies any abdominal pain or rectal pain, no blood in her stool  A chart review was performed and previous primary care visit notes were reviewed  All applicable imaging studies were reviewed and images were reviewed personally  All applicable laboratory studies were reviewed personally  Care everywhere review was performed if  available and all pertinent notes were reviewed      The following portions of the patient's history were reviewed and updated as appropriate:   She  has a past medical history of Breast cancer ([facility] [address] ) (07/2013), Fibroma, History of chemotherapy, and History of external beam radiation therapy  She   Patient Active Problem List    Diagnosis Date Noted    Overweight (BMI 25 0-29 9) 08/11/2021    Encounter for 's license history and physical 08/06/2020    Well female exam with routine gynecological exam 05/10/2019    Personal history of malignant neoplasm of breast 04/17/2019    Elevated blood pressure reading in office without diagnosis of hypertension 07/30/2018    Use of anastrozole (Arimidex) 07/18/2018    Carcinoma of left breast, estrogen receptor positive ([facility] [address] ) 07/12/2017    Malignant neoplasm of overlapping sites of left female breast [facility] ) 01/08/2015    Uterine leiomyoma 10/15/2014    Limb swelling 11/20/2013     She  has a past surgical history that includes Mastectomy; Appendectomy; Oophorectomy; Hysterectomy (2015); and Breast surgery (2014)  Her family history includes Cancer in her maternal grandmother; Lung cancer in her mother; No Known Problems in her daughter, father, and sister; Uterine cancer in her maternal aunt  She  reports that she has never smoked  She has never used smokeless tobacco  She reports that she does not drink alcohol and does not use drugs  Current Outpatient Medications   Medication Sig Dispense Refill    anastrozole (ARIMIDEX) 1 mg tablet Take 1 tablet (1 mg total) by mouth daily 90 tablet 3    calcium citrate-vitamin D (CITRACAL+D) 315-200 MG-UNIT per tablet Take 1 tablet by mouth 2 (two) times a day 60 tablet 2    calcium-vitamin D (OSCAL 500/200 D-3) 500 mg-200 units per tablet Every 12 hours      bisacodyl (DULCOLAX) 5 mg EC tablet Take 1 tablet (5 mg total) by mouth see administration instructions Take 2 tablets at 12:00 p m  on day prior to colonoscopy    Take 2 tablets at 4:00 p m  on day prior to colonoscopy 4 tablet 0    polyethylene glycol (GLYCOLAX) 17 GM/SCOOP powder Take 238 g by mouth see administration instructions Mix 238 g with 64 oz of clear liquid  Drink half starting at noon on the day prior to colonoscopy  Drink remaining half 6 hours prior to procedure on day of colonoscopy  238 g 0     No current facility-administered medications for this visit  She is allergic to aspirin       Review of Systems   Gastrointestinal: Negative for abdominal distention, abdominal pain, anal bleeding, blood in stool, constipation, diarrhea and rectal pain  All other systems reviewed and are negative  Objective:      /88 (BP Location: Left arm, Patient Position: Sitting, Cuff Size: Adult)   Pulse 98   Temp (!) 97 4 °F (36 3 °C) (Tympanic)   Ht 5' 5" (1 651 m)   Wt 74 4 kg (164 lb)   BMI 27 29 kg/m²          Physical Exam  Vitals reviewed  Constitutional:       General: She is not in acute distress  Appearance: Normal appearance  She is normal weight  HENT:      Head: Normocephalic and atraumatic  Eyes:      Extraocular Movements: Extraocular movements intact  Conjunctiva/sclera: Conjunctivae normal       Pupils: Pupils are equal, round, and reactive to light  Cardiovascular:      Rate and Rhythm: Regular rhythm  Tachycardia present  Pulmonary:      Effort: Pulmonary effort is normal       Breath sounds: Normal breath sounds  Abdominal:      General: Abdomen is flat  There is no distension  Palpations: Abdomen is soft  Tenderness: There is no abdominal tenderness  Musculoskeletal:         General: Normal range of motion  Cervical back: Normal range of motion and neck supple  Skin:     General: Skin is warm and dry  Neurological:      General: No focal deficit present  Mental Status: She is alert and oriented to person, place, and time

## 2021-09-08 ENCOUNTER — TELEPHONE (OUTPATIENT)
Dept: OTOLARYNGOLOGY | Facility: CLINIC | Age: 51
End: 2021-09-08

## 2021-09-20 ENCOUNTER — TELEPHONE (OUTPATIENT)
Dept: PREADMISSION TESTING | Facility: HOSPITAL | Age: 51
End: 2021-09-20

## 2021-10-11 ENCOUNTER — ANESTHESIA (OUTPATIENT)
Dept: ANESTHESIOLOGY | Facility: HOSPITAL | Age: 51
End: 2021-10-11

## 2021-10-11 ENCOUNTER — ANESTHESIA EVENT (OUTPATIENT)
Dept: ANESTHESIOLOGY | Facility: HOSPITAL | Age: 51
End: 2021-10-11

## 2021-10-11 RX ORDER — SODIUM CHLORIDE 9 MG/ML
125 INJECTION, SOLUTION INTRAVENOUS CONTINUOUS
Status: CANCELLED | OUTPATIENT
Start: 2021-10-11

## 2021-11-02 ENCOUNTER — TELEPHONE (OUTPATIENT)
Dept: GASTROENTEROLOGY | Facility: HOSPITAL | Age: 51
End: 2021-11-02

## 2021-11-03 ENCOUNTER — HOSPITAL ENCOUNTER (OUTPATIENT)
Dept: GASTROENTEROLOGY | Facility: HOSPITAL | Age: 51
Setting detail: OUTPATIENT SURGERY
Discharge: HOME | End: 2021-11-03
Attending: SURGERY | Admitting: SURGERY
Payer: COMMERCIAL

## 2021-11-03 ENCOUNTER — ANESTHESIA (OUTPATIENT)
Dept: GASTROENTEROLOGY | Facility: HOSPITAL | Age: 51
End: 2021-11-03

## 2021-11-03 ENCOUNTER — ANESTHESIA EVENT (OUTPATIENT)
Dept: GASTROENTEROLOGY | Facility: HOSPITAL | Age: 51
End: 2021-11-03

## 2021-11-03 VITALS
TEMPERATURE: 97.8 F | RESPIRATION RATE: 20 BRPM | OXYGEN SATURATION: 99 % | HEIGHT: 65 IN | SYSTOLIC BLOOD PRESSURE: 134 MMHG | WEIGHT: 164 LBS | HEART RATE: 90 BPM | BODY MASS INDEX: 27.32 KG/M2 | DIASTOLIC BLOOD PRESSURE: 80 MMHG

## 2021-11-03 DIAGNOSIS — Z12.11 ENCOUNTER FOR SCREENING COLONOSCOPY: ICD-10-CM

## 2021-11-03 PROCEDURE — G0121 COLON CA SCRN NOT HI RSK IND: HCPCS | Performed by: SURGERY

## 2021-11-03 RX ORDER — PROPOFOL 10 MG/ML
INJECTION, EMULSION INTRAVENOUS AS NEEDED
Status: DISCONTINUED | OUTPATIENT
Start: 2021-11-03 | End: 2021-11-03

## 2021-11-03 RX ORDER — SODIUM CHLORIDE 9 MG/ML
125 INJECTION, SOLUTION INTRAVENOUS CONTINUOUS
Status: DISCONTINUED | OUTPATIENT
Start: 2021-11-03 | End: 2021-11-07 | Stop reason: HOSPADM

## 2021-11-03 RX ORDER — LIDOCAINE HYDROCHLORIDE 10 MG/ML
INJECTION, SOLUTION EPIDURAL; INFILTRATION; INTRACAUDAL; PERINEURAL AS NEEDED
Status: DISCONTINUED | OUTPATIENT
Start: 2021-11-03 | End: 2021-11-03

## 2021-11-03 RX ADMIN — PROPOFOL 100 MG: 10 INJECTION, EMULSION INTRAVENOUS at 10:31

## 2021-11-03 RX ADMIN — SODIUM CHLORIDE 125 ML/HR: 0.9 INJECTION, SOLUTION INTRAVENOUS at 10:16

## 2021-11-03 RX ADMIN — LIDOCAINE HYDROCHLORIDE 50 MG: 10 INJECTION, SOLUTION EPIDURAL; INFILTRATION; INTRACAUDAL; PERINEURAL at 10:31

## 2021-11-03 RX ADMIN — PROPOFOL 50 MG: 10 INJECTION, EMULSION INTRAVENOUS at 10:52

## 2021-11-03 RX ADMIN — PROPOFOL 50 MG: 10 INJECTION, EMULSION INTRAVENOUS at 10:34

## 2021-11-03 RX ADMIN — PROPOFOL 50 MG: 10 INJECTION, EMULSION INTRAVENOUS at 10:37

## 2021-11-03 RX ADMIN — PROPOFOL 50 MG: 10 INJECTION, EMULSION INTRAVENOUS at 10:42

## 2021-11-03 RX ADMIN — PROPOFOL 50 MG: 10 INJECTION, EMULSION INTRAVENOUS at 10:48

## 2021-11-03 RX ADMIN — PROPOFOL 50 MG: 10 INJECTION, EMULSION INTRAVENOUS at 10:45

## 2021-11-16 ENCOUNTER — TELEPHONE (OUTPATIENT)
Dept: SURGERY | Facility: CLINIC | Age: 51
End: 2021-11-16

## 2021-11-22 ENCOUNTER — OFFICE VISIT (OUTPATIENT)
Dept: FAMILY MEDICINE CLINIC | Facility: CLINIC | Age: 51
End: 2021-11-22

## 2021-11-22 VITALS
HEART RATE: 121 BPM | SYSTOLIC BLOOD PRESSURE: 138 MMHG | BODY MASS INDEX: 26.96 KG/M2 | HEIGHT: 65 IN | DIASTOLIC BLOOD PRESSURE: 92 MMHG | RESPIRATION RATE: 18 BRPM | OXYGEN SATURATION: 99 % | WEIGHT: 161.8 LBS | TEMPERATURE: 97.1 F

## 2021-11-22 DIAGNOSIS — Z00.00 ENCOUNTER FOR ANNUAL PHYSICAL EXAM: Primary | ICD-10-CM

## 2021-11-22 DIAGNOSIS — R03.0 ELEVATED BLOOD PRESSURE READING IN OFFICE WITHOUT DIAGNOSIS OF HYPERTENSION: ICD-10-CM

## 2021-11-22 PROCEDURE — 99396 PREV VISIT EST AGE 40-64: CPT | Performed by: FAMILY MEDICINE

## 2022-01-12 ENCOUNTER — TELEPHONE (OUTPATIENT)
Dept: HEMATOLOGY ONCOLOGY | Facility: CLINIC | Age: 52
End: 2022-01-12

## 2022-01-12 NOTE — TELEPHONE ENCOUNTER
Called and left a message with patient's daughter that Dr Gerard Lam has some openings in his [place] scheduled incase they would like to come in for an earlier appointment  We had rescheduled Shannan once and pushed her appointment back so we wanted to give her the opportunity to come into the office sooner if it worked with her schedule

## 2022-01-21 ENCOUNTER — OFFICE VISIT (OUTPATIENT)
Dept: HEMATOLOGY ONCOLOGY | Facility: CLINIC | Age: 52
End: 2022-01-21
Payer: COMMERCIAL

## 2022-01-21 VITALS
HEART RATE: 124 BPM | DIASTOLIC BLOOD PRESSURE: 82 MMHG | BODY MASS INDEX: 27.32 KG/M2 | RESPIRATION RATE: 18 BRPM | OXYGEN SATURATION: 99 % | HEIGHT: 65 IN | WEIGHT: 164 LBS | SYSTOLIC BLOOD PRESSURE: 142 MMHG | TEMPERATURE: 97.5 F

## 2022-01-21 DIAGNOSIS — C50.812 MALIGNANT NEOPLASM OF OVERLAPPING SITES OF LEFT BREAST IN FEMALE, ESTROGEN RECEPTOR POSITIVE (HCC): Primary | ICD-10-CM

## 2022-01-21 DIAGNOSIS — Z17.0 MALIGNANT NEOPLASM OF OVERLAPPING SITES OF LEFT BREAST IN FEMALE, ESTROGEN RECEPTOR POSITIVE (HCC): Primary | ICD-10-CM

## 2022-01-21 PROCEDURE — 99214 OFFICE O/P EST MOD 30 MIN: CPT | Performed by: INTERNAL MEDICINE

## 2022-01-21 NOTE — PROGRESS NOTES
Hematology / Oncology Outpatient Follow Up Note    Redd Pineda 46 y o  female :1970 OFO:[id number]         Date:  2022    Assessment / Plan:  A 46 year old surgically postmenopausal woman with locally advanced left breast cancer, grade 2, ER/IN positive HER-2 negative disease, diagnosed in   She has no evidence of BRCA mutation  She underwent neoadjuvant chemotherapy with AC followed by paclitaxel resulting in clinical partial response  However, when she had mastectomy, she had significant residual disease, as well as 2 positive lymph node  She completed postmastectomy radiation therapy  She was previously on adjuvant hormonal therapy with tamoxifen, until she underwent hysterectomy and bilateral oophorectomy for fibroid  Currently, she is on anastrozole with stable hot flashes  Clinically, she has no evidence recurrent disease  I recommended her to continue the anastrozole 1 mg once a day  She is in agreement with my recommendation  I will see her again in a year for routine follow-up         Subjective:      HPI: [de-identified] 53year old premenopausal  speaking woman with recent diagnosis of locally advanced left breast cancer    She first noticed a lump which was small approximately 2 years ago  Over the time she developed very large breast mass with skin involvement as well as skin dimpling  She also developed of breast pain  She underwent biopsy which showed invasive ductal carcinoma with grade 2  This was ER/IN positive HER-2 negative disease  She was seen by Dr Wiley Dawkins who thought that neoadjuvant chemotherapy is the most appropriate treatment  Therefore she was referred to me  She underwent CT scan of chest abdomen and pelvis as well as bone scan both of which were negative for distant metastasis  She has no weight loss fever chills or night sweats  She denied any respiratory symptoms  She has no bone pain   Her performance status is normal  she has no family history of breast cancer or ovarian cancer             Interval History:  A 46 year old surgically postmenopausal woman with locally advanced left breast cancer, grade 2, ER/WI positive HER-2 negative disease  She had very large left breast mass which occupied her entire left breast at the time of initial presentation  She had neoadjuvant chemotherapy with dose dense AC followed by paclitaxel resulting in clinical response  She underwent left mastectomy with sentinel lymph node biopsy  She still had significant residual tumor measuring 11 5 cm with multiple residual foci of tumor with 2 sentinel lymph node with subcentimeter metastatic disease  She did not have reconstruction  Since May 2014, she was on adjuvant hormonal therapy with tamoxifen, since she was perimenopausal at that time  She had very large fibroid in the uterus  In February 2015, she underwent hysterectomy and bilateral oophorectomy  Subsequently, adjuvant hormonal therapy was changed to anastrozole  She came in today for followup  She feels well with no new complaint  She has no bone pain  She denied any respiratory symptoms  Her weight is stable  Her performance status is normal         Objective:      Primary Diagnosis:      1  Locally advanced left breast cancer with skin involvement, grade 2, ER/WI positive HER-2 negative disease  Diagnosed in June 2013  2  BRCA mutation negative       Cancer Staging:  Cancer Staging  No matching staging information was found for the patient         Previous Hematologic/ Oncologic Treatment:      1  Neoadjuvant chemotherapy with dose dense AC X4 followed by weekly paclitaxel x12  Completed in December 2013  2  adjuvant hormonal therapy with tamoxifen from March 2014 through May 2015    3  postmastectomy radiation therapy completed in May 2014       Current Hematologic/ Oncologic Treatment:       Adjuvant hormonal therapy with anastrozole 1 mg once a day since May 2015       Disease Status:      1  Partial response on neoadjuvant chemotherapy  2  ENMANUEL, status post mastectomy with sentinel lymph node biopsy       Test Results:     Pathology:           Radiology:     Mammography in  August 2021 was benign  BI-RAD 1       Laboratory:           Physical Exam:        General Appearance:    Alert, oriented          Eyes:    PERRL   Ears:    Normal external ear canals, both ears   Nose:   Nares normal, septum midline   Throat:   Mucosa moist  Pharynx without injection  Neck:   Supple         Lungs:     Clear to auscultation bilaterally   Chest Wall:    No tenderness or deformity    Heart:    Regular rate and rhythm         Abdomen:     Soft, non-tender, bowel sounds +, no organomegaly               Extremities:   Extremities no cyanosis or edema         Skin:   no rash or icterus  Lymph nodes:   Cervical, supraclavicular, and axillary nodes normal   Neurologic:   CNII-XII intact, normal strength, sensation and reflexes     Throughout             Breast exam:   status post left mastectomy without reconstruction  No palpable abnormalities in her left chest wall  No palpable metastatic very adenopathy  Right breast exam is negative                ROS: Review of Systems   All other systems reviewed and are negative  Imaging: No results found        Labs:   Lab Results   Component Value Date    WBC 5 51 05/14/2019    HGB 13 9 05/14/2019    HCT 45 2 05/14/2019    MCV 94 05/14/2019     05/14/2019     Lab Results   Component Value Date     06/22/2018    K 4 1 05/14/2019     05/14/2019    CO2 30 05/14/2019    ANIONGAP 12 06/22/2018    BUN 13 05/14/2019    CREATININE 0 73 05/14/2019    GLUCOSE 81 06/22/2018    GLUF 85 05/14/2019    CALCIUM 10 3 (H) 05/14/2019    CORRECTEDCA 10 2 (H) 05/14/2019    AST 20 06/22/2018    ALT 43 06/22/2018    ALKPHOS 77 06/22/2018    PROT 8 1 06/22/2018    BILITOT 0 2 06/22/2018    EGFR 96 05/14/2019         Lab Results   Component Value Date    HCG <2 02/19/2014         Current Medications: Reviewed  Allergies: Reviewed  PMH/FH/SH:  Reviewed      Vital Sign:    Body surface area is 1 82 meters squared      Wt Readings from Last 3 Encounters:   01/21/22 74 4 kg (164 lb)   11/22/21 73 4 kg (161 lb 12 8 oz)   11/03/21 74 4 kg (164 lb)        Temp Readings from Last 3 Encounters:   01/21/22 97 5 °F (36 4 °C) (Tympanic Core)   11/22/21 (!) 97 1 °F (36 2 °C) (Temporal)   11/03/21 97 8 °F (36 6 °C) (Temporal)        BP Readings from Last 3 Encounters:   01/21/22 142/82   11/22/21 138/92   11/03/21 134/80         Pulse Readings from Last 3 Encounters:   01/21/22 (!) 124   11/22/21 (!) 121   11/03/21 90     @LASTSAO2(3)@

## 2022-01-22 NOTE — PROGRESS NOTES
Assessment/Plan:     Diagnoses and all orders for this visit:    Seborrheic keratoses, inflamed  Comments:  Lesion present on the RLQ, irritated by pants  Patient underwent shave lesion procedure  No need to send out the specimen for analysis due to benign nature    Skin lesion of neck  Comments:  Patient underwent shave lesion biopsy  Tissue biopsy sent due to umbilicated center to r/o skin malignancy   Orders:  -     Tissue Exam; Future  -     Tissue Exam    Skin lesion of face  Comments:  Most likely sebaceous cyst vs lipoma  Will send referral to dermatology for further management  Orders:  -     Ambulatory Referral to Dermatology; Future          Subjective:      Patient ID: Donna Hall is a 46 y o  female  HPI     Patient presents today to the office for lesion removal on the neck, abdomen and face  Mentions she has a few skin tags on the face, one irritated skin lesion on the posterior neck, and one on the RLQ  Patient also notes she has a new skin lesion on the forehead  She inquires if she can have this one removed as well today in the office  The following portions of the patient's history were reviewed and updated as appropriate: allergies, current medications, past family history, past medical history, past social history, past surgical history and problem list     Review of Systems   Constitutional: Negative for chills and fever  HENT: Negative for trouble swallowing  Respiratory: Negative for cough and shortness of breath  Cardiovascular: Negative for chest pain and leg swelling  Gastrointestinal: Negative for abdominal pain, blood in stool, constipation, diarrhea, nausea and vomiting  Genitourinary: Negative for dysuria and hematuria  Musculoskeletal: Negative for gait problem  Skin: Negative for rash  Skin tag posterior neck, face and one lesion on her abdomen   Neurological: Negative for dizziness and headaches  Psychiatric/Behavioral: Negative for agitation  Objective:    /88 (BP Location: Right arm, Patient Position: Sitting, Cuff Size: Standard)   Pulse (!) 124   Temp 97 6 °F (36 4 °C) (Temporal)   Resp 18   Ht 5' 5" (1 651 m)   Wt 73 9 kg (163 lb)   SpO2 99%   BMI 27 12 kg/m²      Physical Exam  Vitals and nursing note reviewed  Constitutional:       General: She is not in acute distress  Appearance: Normal appearance  She is well-developed  She is not ill-appearing, toxic-appearing or diaphoretic  HENT:      Head: Normocephalic and atraumatic  Nose: Nose normal    Eyes:      General:         Right eye: No discharge  Left eye: No discharge  Extraocular Movements: Extraocular movements intact  Cardiovascular:      Rate and Rhythm: Regular rhythm  Tachycardia present  Heart sounds: Normal heart sounds  Comments: Nervous about procedure  Pulmonary:      Effort: Pulmonary effort is normal  No respiratory distress  Breath sounds: Normal breath sounds  Musculoskeletal:         General: Normal range of motion  Cervical back: Normal range of motion and neck supple  Right lower leg: No edema  Left lower leg: No edema  Skin:     General: Skin is warm  Findings: Lesion (stuck on appearance, circular with regular margins, greasy lesion bellow belly button consistent with seborrheic keratosis  Pearly papule, umbilicated at center present on the back of neck) present  No rash  Comments: One skin lesion, mobile on the forehead  Please see photo   Neurological:      Mental Status: She is alert and oriented to person, place, and time  Psychiatric:         Mood and Affect: Mood normal          Behavior: Behavior normal          Thought Content: Thought content normal          Judgment: Judgment normal            Shave lesion    Date/Time: 1/24/2022 10:40 AM  Performed by: Adal Schwarz MD  Authorized by: Lissette Chavez MD   Universal Protocol:  Consent: Verbal consent obtained   Written consent not obtained  Risks and benefits: risks, benefits and alternatives were discussed  Consent given by: patient  Time out: Immediately prior to procedure a "time out" was called to verify the correct patient, procedure, equipment, support staff and site/side marked as required  Timeout called at: 1/24/2022 11:00 AM   Patient understanding: patient states understanding of the procedure being performed  Relevant documents: relevant documents present and verified  Site marked: the operative site was marked  Patient identity confirmed: verbally with patient      Number of Lesions: 2  Lesion 1:     Body area: head/neck    Head/neck location: neck    Initial size (mm): 10    Malignancy: malignancy unknown      Destruction method: shave removal    Lesion 2:     Body area: trunk    Trunk location: abdomen    Initial size (mm): 20    Malignancy: benign lesion      Destruction method: shave removal      Comments:  Patient tolerated the procedure well without immediate complications  Lesion Destruction    Date/Time: 1/24/2022 10:40 AM  Performed by: Vandana Kumar MD  Authorized by: Earline Topete MD   Universal Protocol:  Consent: Verbal consent obtained  Written consent not obtained  Risks and benefits: risks, benefits and alternatives were discussed  Consent given by: patient  Time out: Immediately prior to procedure a "time out" was called to verify the correct patient, procedure, equipment, support staff and site/side marked as required    Timeout called at: 1/24/2022 11:10 AM   Patient understanding: patient states understanding of the procedure being performed  Relevant documents: relevant documents present and verified  Site marked: the operative site was marked  Patient identity confirmed: verbally with patient      Procedure Details - Lesion Destruction:     Number of Lesions:  4  Lesion 1:     Body area:  [address]    Head/neck location:  L cheek    Malignancy: benign lesion      Destruction method: scissors used for extraction    Lesion 2:     Body area:  Head/neck    Head/neck location:  L cheek    Malignancy: benign lesion      Destruction method: scissors used for extraction    Lesion 3:     Body area:  Head/neck    Head/neck location:  L cheek    Malignancy: benign lesion      Destruction method: scissors used for extraction    Lesion 4:     Body area:  Head/neck    Head/neck location:  L cheek    Malignancy: benign lesion      Destruction method: scissors used for extraction

## 2022-01-24 ENCOUNTER — PROCEDURE VISIT (OUTPATIENT)
Dept: FAMILY MEDICINE CLINIC | Facility: CLINIC | Age: 52
End: 2022-01-24

## 2022-01-24 VITALS
WEIGHT: 163 LBS | SYSTOLIC BLOOD PRESSURE: 146 MMHG | DIASTOLIC BLOOD PRESSURE: 88 MMHG | HEART RATE: 124 BPM | TEMPERATURE: 97.6 F | BODY MASS INDEX: 27.16 KG/M2 | OXYGEN SATURATION: 99 % | HEIGHT: 65 IN | RESPIRATION RATE: 18 BRPM

## 2022-01-24 DIAGNOSIS — L98.9 SKIN LESION OF FACE: ICD-10-CM

## 2022-01-24 DIAGNOSIS — L98.9 SKIN LESION OF NECK: ICD-10-CM

## 2022-01-24 DIAGNOSIS — L82.0 SEBORRHEIC KERATOSES, INFLAMED: Primary | ICD-10-CM

## 2022-01-24 PROCEDURE — 88305 TISSUE EXAM BY PATHOLOGIST: CPT | Performed by: STUDENT IN AN ORGANIZED HEALTH CARE EDUCATION/TRAINING PROGRAM

## 2022-01-24 PROCEDURE — 11305 SHAVE SKIN LESION 0.5 CM/<: CPT | Performed by: INTERNAL MEDICINE

## 2022-01-24 PROCEDURE — 3077F SYST BP >= 140 MM HG: CPT | Performed by: INTERNAL MEDICINE

## 2022-01-24 PROCEDURE — 11300 SHAVE SKIN LESION 0.5 CM/<: CPT | Performed by: INTERNAL MEDICINE

## 2022-01-24 PROCEDURE — 99213 OFFICE O/P EST LOW 20 MIN: CPT | Performed by: INTERNAL MEDICINE

## 2022-01-24 PROCEDURE — 3079F DIAST BP 80-89 MM HG: CPT | Performed by: INTERNAL MEDICINE

## 2022-01-24 PROCEDURE — 17000 DESTRUCT PREMALG LESION: CPT | Performed by: INTERNAL MEDICINE

## 2022-01-24 PROCEDURE — 17110 DESTRUCTION B9 LES UP TO 14: CPT | Performed by: INTERNAL MEDICINE

## 2022-01-24 NOTE — PATIENT INSTRUCTIONS
Biopsia de piel   LO QUE NECESITA SABER:   Gabriella biopsia de piel es un procedimiento para extraer un pequeño trozo de piel con el fin de analizarlo  Es posible que se quite gabriella parte o la totalidad de la lesión de la piel (el área de la piel afectada)  Gabriella biopsia con sacabocados permite sandee un trozo muy pequeño de piel en todo troy espesor  INSTRUCCIONES SOBRE EL [facility]:   Medicamentos:   · AINEs (Analgésicos antiinflamatorios no esteroides) jesus el ibuprofeno, ayudan a disminuir la inflamación, el dolor y la fiebre  Jeronimo medicamento esta disponible con o sin gabriella receta médica  Los AINEs pueden causar sangrado estomacal o problemas renales en ciertas personas  Si usted esta tomando un anticoágulante,  siempre  pregunte si los AINEs son seguros para usted  Siempre nasir la etiqueta de jeronimo medicamento y [place] instrucciones  No administre jeronimo medicamento a niños menores de 6 meses de angela sin antes obtener la autorización de troy médico      · Acetaminofeno:  Jeronimo medicamento disminuye el dolor  El acetaminofeno puede obtener sin receta médica  Pregunte la cantidad y la frecuencia con que debe tomarlos  [address]  El acetaminofén puede causar daño en el hígado cuando no se modesto de forma correcta  · Gallina cassie medicamentos jesus se le haya indicado  Consulte con troy médico si usted keila que troy medicamento no le está ayudando o si presenta efectos secundarios  Infórmele si es alérgico a algún medicamento  Mantenga gabriella lista actualizada de los [employer], las vitaminas y los productos herbales que modesto  Incluya los siguientes datos de los medicamentos: cantidad, frecuencia y motivo de administración  Traiga con usted la lista o los envases de la píldoras a cassie citas de seguimiento  Lleve la lista de los medicamentos con usted en carmelo de gabriella emergencia    Programe gabriella khadra de seguimiento con troy médico o especialista, según lo indicado:  Es posible que usted necesite regresar para que [place] puntos de sutura  Anote cassie preguntas para que se acuerde de hacerlas lori cassie visitas  Cuidado de la herida:  Lave cuidadosamente la herida con agua y [place]  Seque el área y póngale vendajes nuevos y limpios jesus le indicaron  Cambie cassie vendajes cuando se mojen o ensucien  Pregúntele a jeronimo [place] vitaminas y minerales son adecuados para usted  · Usted tiene fiebre  · Usted tiene más hinchazón, enrojecimiento o sangrado en la herida  · Siente dolor que no desaparece o que no se calma con analgésicos  · La herida supura un líquido amarillento o verdoso  · Usted tiene preguntas o inquietudes acerca de jeronimo condición o cuidado  Regrese a la nikolay de emergencias si:   · Tiene líneas ogden en la piel que salen del área de la herida  · La [place] empapa el vendaje  © 2017 [address] [employer] Information is for End User's use only and may not be sold, redistributed or otherwise used for commercial purposes  All illustrations and images included in CareNotes® are the copyrighted property of A D A M , Inc  or Scott Lisa  Esta información es sólo para uso en educación  Jeronimo intención no es darle un consejo médico sobre enfermedades o tratamientos  Colsulte con jeronimo Zita Bauer farmacéutico antes de seguir cualquier régimen médico para saber si es seguro y efectivo para usted

## 2022-02-15 ENCOUNTER — OFFICE VISIT (OUTPATIENT)
Dept: PLASTIC SURGERY | Facility: CLINIC | Age: 52
End: 2022-02-15
Payer: COMMERCIAL

## 2022-02-15 DIAGNOSIS — Z90.10 STATUS POST MASTECTOMY, UNSPECIFIED LATERALITY: Primary | ICD-10-CM

## 2022-02-15 PROCEDURE — 99215 OFFICE O/P EST HI 40 MIN: CPT | Performed by: SURGERY

## 2022-02-15 NOTE — PROGRESS NOTES
Assessment/Plan:  Please see HPI  We again had a thorough discussion regarding reconstruction options, specifically latissimus dorsi flap reconstruction utilizing a tissue expander, I described the expansion process, postop recovery, use of drains, etcetera  She is interested in proceeding, however I  would like her to least discuss the option of  microsurgical reconstruction with a micro surgeon so that she is fully aware of the spectrum of potential reconstruction options  Given that we currently do not have a micro surgeon in the practice, I suggested that she consider seeing Dr Trina Baeza at [facility], she would prefer to keep her care closer to home and is interested in proceeding with latissimus dorsi flap reconstruction of the absent left breast, she is amenable however, to discussing microsurgical options, we are hopeful to have a micro surgeon on board in short order  She is agreeable to scheduling a follow-up visit in 4 months for further discussion, and knows to call should she have any additional questions  This visit was of 60 minutes duration, all of which was spent in direct patient counseling       There are no diagnoses linked to this encounter  Subjective:  Status post left mastectomy/radiation, without reconstruction     Patient ID: Lula Bradley is a 46 y o  female  HPI Francisca Puentes returns for further discussion regarding potential latissimus dorsi flap/expander reconstruction of the absent left breast   She is accompanied by her daughter Sergei Singh  BPM served as , and we also had a discussion with in the existing patient (LD) who had previously undergone latissimus dorsi reconstruction      The following portions of the patient's history were reviewed and updated as appropriate: allergies, current medications, past family history, past medical history, past social history, past surgical history and problem list     Review of Systems   Constitutional: Negative for chills and fever    HENT: Negative for hearing loss  Eyes: Positive for visual disturbance  Negative for discharge  Respiratory: Negative for chest tightness and shortness of breath  Cardiovascular: Negative for chest pain and leg swelling  Gastrointestinal: Negative for blood in stool, constipation, diarrhea and nausea  Genitourinary: Negative for dysuria  Musculoskeletal: Negative for gait problem  Skin: Negative for rash  Allergic/Immunologic: Negative for immunocompromised state  Neurological: Negative for seizures and headaches  Hematological: Does not bruise/bleed easily  Psychiatric/Behavioral: Positive for dysphoric mood  The patient is nervous/anxious  Objective: There were no vitals taken for this visit  Physical Exam  Constitutional:       Appearance: Normal appearance  HENT:      Head: Normocephalic and atraumatic  Eyes:      Extraocular Movements: Extraocular movements intact  Pupils: Pupils are equal, round, and reactive to light  Pulmonary:      Effort: Pulmonary effort is normal    Abdominal:      General: Abdomen is flat  Musculoskeletal:         General: Normal range of motion  Cervical back: Normal range of motion and neck supple  Skin:     General: Skin is warm  Comments: Absent breast, well-healed mastectomy site, stigmata prior radiation with fibrosis and hyperpigmentation, ptotic right breast, see previous visit for photos   Neurological:      Mental Status: She is alert and oriented to person, place, and time     Psychiatric:         Mood and Affect: Mood normal

## 2022-02-17 ENCOUNTER — OFFICE VISIT (OUTPATIENT)
Dept: SURGICAL ONCOLOGY | Facility: CLINIC | Age: 52
End: 2022-02-17
Payer: COMMERCIAL

## 2022-02-17 VITALS
RESPIRATION RATE: 18 BRPM | BODY MASS INDEX: 27.66 KG/M2 | DIASTOLIC BLOOD PRESSURE: 100 MMHG | OXYGEN SATURATION: 99 % | HEART RATE: 122 BPM | TEMPERATURE: 97.5 F | HEIGHT: 65 IN | WEIGHT: 166 LBS | SYSTOLIC BLOOD PRESSURE: 162 MMHG

## 2022-02-17 DIAGNOSIS — C50.812 CARCINOMA OF OVERLAPPING SITES OF LEFT BREAST IN FEMALE, ESTROGEN RECEPTOR POSITIVE (HCC): Primary | ICD-10-CM

## 2022-02-17 DIAGNOSIS — Z17.0 CARCINOMA OF OVERLAPPING SITES OF LEFT BREAST IN FEMALE, ESTROGEN RECEPTOR POSITIVE (HCC): Primary | ICD-10-CM

## 2022-02-17 DIAGNOSIS — Z79.811 USE OF ANASTROZOLE (ARIMIDEX): ICD-10-CM

## 2022-02-17 DIAGNOSIS — Z12.31 VISIT FOR SCREENING MAMMOGRAM: ICD-10-CM

## 2022-02-17 PROCEDURE — 99214 OFFICE O/P EST MOD 30 MIN: CPT | Performed by: NURSE PRACTITIONER

## 2022-02-17 NOTE — PROGRESS NOTES
Surgical Oncology Follow Up       [address] [facility]  [facility] [place]  [address] [facility] [id number]    Benito Bullard  1970  [id number]  [address] [place] [address]  [facility] [place]  [address] [facility] [id number]    Chief Complaint   Patient presents with    Breast Cancer     1 year follow up        Assessment/Plan:  1  Carcinoma of overlapping sites of left breast in female, estrogen receptor positive ([facility] [address] )  - 1 year f/u visit    2  Use of anastrozole (Arimidex)  - Continue use per medical oncology    3  Visit for screening mammogram  - Mammo screening right w 3d & cad; Future      Discussion/Summary: Patient is a 48 year old female who presents today for a one year follow-up for left breast cancer diagnosed in 2013  She underwent neoadjuvant chemotherapy and underwent a left mastectomy and sentinel node biopsy in 2014  Her tumor size was 11 5 cm and she had 2 of 3 positive lymph nodes  It was determined she would not benefit from a complete axillary dissection but rather completed post mastectomy radiation therapy  She tested negative for the BRCA genetic mutation  She is currently taking anastrozole   She had a right 3D diagnostic mammogram on   August 9, 2021 which was BI-RADS 1, category 2 density  She offers no new complaints today and there are no concerns on today's exam   I will make arrangements for her mammogram in August and plan to see her back in 1 year for a survivorship visit or sooner should the need arise  She was instructed to call with any new concerns or symptoms prior to that time  All of her questions were answered today  History of Present Illness:     Oncology History   Carcinoma of left breast, estrogen receptor positive ([facility] [address] )   6/18/2013 Biopsy    Left breast biopsy  Invasive mammary carcinoma with lobular features  Grade 2  ER/NC positive    HER2 negative     7/29/2013 - 12/9/2013 Chemotherapy    Dose dense AC x4 followed by paclitaxel x12     2/18/2014 Surgery    Left mastectomy with sentinel node biopsy  Invasive lobular carcinoma  Grade 2  Invasive carcinoma involves the dermis     2/18/2014 -  Cancer Staged    IIIA ypT3, ypN1a (i+) (sn) G2     4/3/2014 - 5/20/2014 Radiation    Chest wall and draining lymph nodes  Total dose 6040 cGy     5/19/2014 - 5/2015 Hormone Therapy    Tamoxifen     5/2015 -  Hormone Therapy    anastrozole           -Interval History:  Patient presents today for follow-up visit for left breast cancer  She notices no changes on her self breast exam   She denies persistent headaches, back pain or bone pain, cough or shortness of breath, abdominal pain  She had a right mammogram in August which was BI-RADS 2  Review of Systems:  Review of Systems   Constitutional: Negative for activity change, appetite change, chills, fatigue, fever and unexpected weight change  Respiratory: Negative for cough and shortness of breath  Cardiovascular: Positive for palpitations (only when comes for appts)  Negative for chest pain  Gastrointestinal: Negative for abdominal pain, constipation, diarrhea, nausea and vomiting  Musculoskeletal: Negative for arthralgias, back pain, gait problem and myalgias  Skin: Negative for color change and rash  Neurological: Negative for dizziness and headaches  Hematological: Negative for adenopathy  Psychiatric/Behavioral: Negative for agitation and confusion  All other systems reviewed and are negative        Patient Active Problem List   Diagnosis    Carcinoma of left breast, estrogen receptor positive (HCC)    Use of anastrozole (Arimidex)    Limb swelling    Malignant neoplasm of overlapping sites of left female breast ([address] )    Uterine leiomyoma    Elevated blood pressure reading in office without diagnosis of hypertension    Personal history of malignant neoplasm of breast    Well female exam with routine gynecological exam    Encounter for 's license history and physical    Overweight (BMI 25 0-29  9)     Past Medical History:   Diagnosis Date    Breast cancer ([facility] [address] ) 07/2013    left-chemotherapy    Fibroma     History of chemotherapy     History of external beam radiation therapy      Past Surgical History:   Procedure Laterality Date    APPENDECTOMY      BREAST SURGERY  2014    Left breast 2/2 breast CA    HYSTERECTOMY  2015    Due to fibroma    MASTECTOMY      OOPHORECTOMY       Family History   Problem Relation Age of Onset    Lung cancer Mother     No Known Problems Father     No Known Problems Sister     No Known Problems Daughter     Cancer Maternal Grandmother     Uterine cancer Maternal Aunt      Social History     Socioeconomic History    Marital status: /Civil Union     Spouse name: Not on file    Number of children: 3    Years of education: Not on file    Highest education level: 12th grade   Occupational History     Comment: unemployed   Tobacco Use    Smoking status: Never Smoker    Smokeless tobacco: Never Used   Vaping Use    Vaping Use: Not on file   Substance and Sexual Activity    Alcohol use: No    Drug use: No    Sexual activity: Yes     Partners: Male     Birth control/protection: Surgical   Other Topics Concern    Not on file   Social History Narrative    Not on file     Social Determinants of Health     Financial Resource Strain: Medium Risk    Difficulty of Paying Living Expenses: Somewhat hard   Food Insecurity: Unknown    Worried About Running Out of Food in the Last Year: Patient refused    Ran Out of Food in the Last Year: Patient refused   Transportation Needs: No Transportation Needs    Lack of Transportation (Medical): No    Lack of Transportation (Non-Medical):  No   Physical Activity: Not on file   Stress: Not on file   Social Connections: Not on file   Intimate Partner Violence: Not on file   Housing Stability: Not on file       Current Outpatient Medications:     anastrozole (ARIMIDEX) 1 mg tablet, Take 1 tablet (1 mg total) by mouth daily, Disp: 90 tablet, Rfl: 3    calcium citrate-vitamin D (CITRACAL+D) 315-200 MG-UNIT per tablet, Take 1 tablet by mouth 2 (two) times a day, Disp: 60 tablet, Rfl: 2  Allergies   Allergen Reactions    Aspirin Rash     Other reaction(s): Palpitations     Vitals:    02/17/22 1451   BP: 162/100   Pulse: (!) 122   Resp: 18   Temp: 97 5 °F (36 4 °C)   SpO2: 99%       Physical Exam  Vitals reviewed  Constitutional:       General: She is not in acute distress  Appearance: Normal appearance  She is well-developed  She is not diaphoretic  HENT:      Head: Normocephalic and atraumatic  Cardiovascular:      Rate and Rhythm: Regular rhythm  Tachycardia present  Heart sounds: Normal heart sounds  Comments: Pt reports tachycardia from nervousness at appts only    Pulmonary:      Effort: Pulmonary effort is normal       Breath sounds: Normal breath sounds  Chest:   Breasts:      Right: No swelling, bleeding, inverted nipple, mass, nipple discharge, skin change, tenderness, axillary adenopathy or supraclavicular adenopathy  Left: No axillary adenopathy or supraclavicular adenopathy  Comments: Left mastectomy site free of masses, skin changes, skin lesions  Abdominal:      Palpations: Abdomen is soft  There is no mass  Tenderness: There is no abdominal tenderness  Musculoskeletal:         General: Normal range of motion  Cervical back: Normal range of motion  Lymphadenopathy:      Upper Body:      Right upper body: No supraclavicular or axillary adenopathy  Left upper body: No supraclavicular or axillary adenopathy  Skin:     General: Skin is warm and dry  Findings: No rash  Neurological:      Mental Status: She is alert and oriented to person, place, and time     Psychiatric:         Speech: Speech normal            Advance Care Planning/Advance Directives:  Discussed disease status, cancer treatment plans and/or cancer treatment goals with the patient

## 2022-06-17 ENCOUNTER — OFFICE VISIT (OUTPATIENT)
Dept: PLASTIC SURGERY | Facility: CLINIC | Age: 52
End: 2022-06-17
Payer: COMMERCIAL

## 2022-06-17 VITALS
WEIGHT: 166 LBS | HEART RATE: 120 BPM | SYSTOLIC BLOOD PRESSURE: 140 MMHG | TEMPERATURE: 98 F | DIASTOLIC BLOOD PRESSURE: 80 MMHG | HEIGHT: 65 IN | BODY MASS INDEX: 27.66 KG/M2

## 2022-06-17 DIAGNOSIS — Z42.1 AFTERCARE POSTMASTECTOMY FOR BREAST RECONSTRUCTION: Primary | ICD-10-CM

## 2022-06-17 PROCEDURE — 99214 OFFICE O/P EST MOD 30 MIN: CPT | Performed by: SURGERY

## 2022-06-17 NOTE — PROGRESS NOTES
Assessment/Plan:  Please see HPI  We again discussed options for breast reconstruction, and with the pending addition of a fellowship trained micro surgeon to our practice, I have suggested that [facility] meet with Dr Dhaval Schaefer for further discussion/Education regarding potential microsurgical options  At this point she feels she would prefer to proceed with latissimus based reconstruction, however again, so that she understands all of the options, we will have her meet with Dr Dhaval Schaefer, and will then determine how [facility] would prefer to proceed  She understands, and their questions were answered to their satisfaction  We will help her arrange consultation with Dr Dhaval Schaefer  There are no diagnoses linked to this encounter  Subjective:  Breast reconstruction     Patient ID: Raymundo Teixeira is a 45 y o  female  HPI [facility] returns today for further discussion regarding breast reconstruction options, she is again accompanied by her daughter Marcia Osman PAC servesas   The following portions of the patient's history were reviewed and updated as appropriate: allergies, current medications, past family history, past medical history, past social history, past surgical history and problem list     Review of Systems   Constitutional: Negative for chills and fever  HENT: Negative for hearing loss  Eyes: Positive for visual disturbance  Negative for discharge  Respiratory: Negative for chest tightness and shortness of breath  Cardiovascular: Negative for chest pain and leg swelling  Gastrointestinal: Negative for blood in stool, constipation, diarrhea and nausea  Genitourinary: Negative for dysuria  Musculoskeletal: Negative for gait problem  Skin: Negative for rash  Allergic/Immunologic: Negative for immunocompromised state  Neurological: Positive for headaches  Negative for seizures  Hematological: Does not bruise/bleed easily     Psychiatric/Behavioral: Negative for dysphoric mood  The patient is nervous/anxious  Objective:      /80   Pulse (!) 120   Temp 98 °F (36 7 °C)   Ht 5' 5" (1 651 m)   Wt 75 3 kg (166 lb)   BMI 27 62 kg/m²          Physical Exam  Constitutional:       Appearance: Normal appearance  HENT:      Head: Normocephalic  Eyes:      Pupils: Pupils are equal, round, and reactive to light  Cardiovascular:      Rate and Rhythm: Normal rate  Pulmonary:      Effort: Pulmonary effort is normal    Abdominal:      Palpations: Abdomen is soft  Musculoskeletal:         General: Normal range of motion  Cervical back: Normal range of motion and neck supple  Skin:     General: Skin is warm  Neurological:      Mental Status: She is alert and oriented to person, place, and time     Psychiatric:         Mood and Affect: Mood normal

## 2022-06-21 DIAGNOSIS — I10 HYPERTENSION, UNSPECIFIED TYPE: ICD-10-CM

## 2022-06-21 RX ORDER — LANOLIN ALCOHOL/MO/W.PET/CERES
1 CREAM (GRAM) TOPICAL 2 TIMES DAILY
Qty: 60 TABLET | Refills: 3 | Status: SHIPPED | OUTPATIENT
Start: 2022-06-21

## 2022-08-10 ENCOUNTER — HOSPITAL ENCOUNTER (OUTPATIENT)
Dept: RADIOLOGY | Age: 52
Discharge: HOME | End: 2022-08-10
Payer: COMMERCIAL

## 2022-08-10 VITALS — HEIGHT: 65 IN | BODY MASS INDEX: 26.99 KG/M2 | WEIGHT: 162 LBS

## 2022-08-10 DIAGNOSIS — Z12.31 VISIT FOR SCREENING MAMMOGRAM: ICD-10-CM

## 2022-08-10 PROCEDURE — 77063 BREAST TOMOSYNTHESIS BI: CPT

## 2022-08-10 PROCEDURE — 77067 SCR MAMMO BI INCL CAD: CPT

## 2022-11-03 ENCOUNTER — OFFICE VISIT (OUTPATIENT)
Dept: PLASTIC SURGERY | Facility: CLINIC | Age: 52
End: 2022-11-03

## 2022-11-03 VITALS
DIASTOLIC BLOOD PRESSURE: 94 MMHG | HEIGHT: 65 IN | WEIGHT: 155 LBS | TEMPERATURE: 97.9 F | HEART RATE: 122 BPM | BODY MASS INDEX: 25.83 KG/M2 | SYSTOLIC BLOOD PRESSURE: 139 MMHG

## 2022-11-03 DIAGNOSIS — Z71.89 ENCOUNTER TO DISCUSS BREAST RECONSTRUCTION: ICD-10-CM

## 2022-11-03 DIAGNOSIS — Z85.3 HISTORY OF LEFT BREAST CANCER: Primary | ICD-10-CM

## 2022-11-03 DIAGNOSIS — Z92.3 HISTORY OF RADIATION THERAPY: ICD-10-CM

## 2022-11-03 DIAGNOSIS — Z90.12 ACQUIRED ABSENCE OF LEFT BREAST: ICD-10-CM

## 2022-11-05 NOTE — PROGRESS NOTES
Plastic Surgery H&P  [address] [place] [address], [address]      Assessment/Plan:    Assessment:  1  History of left breast cancer  2  Acquired absence of left breast  3  History of radiation therapy to left chest wall  4  Encounter to discuss breast reconstruction     Plan: We had a lengthy discussion regarding all potential reconstructive options  Due to her history of radiation therapy, autologous tissue reconstruction was recommended  Therefore, we discussed latissimus dorsi myocutaneous flap versus NASIR free flap reconstruction  All details of each of these procedures was discussed at length, including operative and recovery time, as well as all potential risks, benefits and alternatives  I specifically stated that with the latissimus dorsi flap, she would likely need a small implant to provide volume and projection of the breast   I discussed that it is my preference to place a tissue expander 1st to create an appropriate pocket for the implant placement  Therefore, this would be a 2 staged procedure  In regard to the microsurgical breast reconstruction, I discussed the risks of venous and arterial thrombosis requiring return to the operating room, and potential for total flap loss:  1-3%  The patient noted her understanding  She had opportunity for questions  All were answered  It appears that she is leaning towards latissimus dorsi flap reconstruction  She will follow up with Dr Valerie Sales for further surgical planning  She is welcome to call or return with any additional questions or concerns  A total of 60 mins was spent on the encounter, including reviewing the patient's records, documentation, and time spent in counseling coordination of care which represent greater than 50% of the visit  35 mins was spent in counseling and discussion of surgical procedures      Lucho      Adán Caro is a 45 y o  female who is referred by   Frankie Higgins in consultation for 2nd opinion regarding left breast reconstruction  Patient has a history of left breast cancer  She underwent a left mastectomy and adjuvant radiation therapy approximately 9 years ago  She never had breast reconstruction  She is now interested in proceeding with left breast reconstruction  She presents today to discuss her reconstructive options  Patient presents with her daughter at today's visit  A  was used for this visit      Past Medical History:   Diagnosis Date   • Breast cancer ([facility] [address] ) 07/2013    left-chemotherapy   • Fibroma    • History of chemotherapy    • History of external beam radiation therapy      Past Surgical History:   Procedure Laterality Date   • APPENDECTOMY     • BREAST SURGERY  2014    Left breast 2/2 breast CA   • HYSTERECTOMY  2015    Due to fibroma   • MASTECTOMY Left 2013   • OOPHORECTOMY         Current Outpatient Medications:   •  anastrozole (ARIMIDEX) 1 mg tablet, Take 1 tablet (1 mg total) by mouth daily (Patient not taking: Reported on 11/3/2022), Disp: 90 tablet, Rfl: 3  •  calcium citrate-vitamin D (CITRACAL+D) 315-200 MG-UNIT per tablet, Take 1 tablet by mouth 2 (two) times a day (Patient not taking: Reported on 11/3/2022), Disp: 60 tablet, Rfl: 3    Allergies   Allergen Reactions   • Aspirin Rash     Other reaction(s): Palpitations       Family History   Problem Relation Age of Onset   • Lung cancer Mother    • No Known Problems Father    • No Known Problems Sister    • No Known Problems Daughter    • No Known Problems Daughter    • Cancer Maternal Grandmother    • No Known Problems Maternal Grandfather    • No Known Problems Paternal Grandmother    • No Known Problems Paternal Grandfather    • Uterine cancer Maternal Aunt    • No Known Problems Son    • No Known Problems Paternal Aunt        Social History     Socioeconomic History   • Marital status: /Civil Union     Spouse name: Not on file   • Number of children: 3 • Years of education: Not on file   • Highest education level: 12th grade   Occupational History     Comment: unemployed   Tobacco Use   • Smoking status: Never Smoker   • Smokeless tobacco: Never Used   Vaping Use   • Vaping Use: Not on file   Substance and Sexual Activity   • Alcohol use: No   • Drug use: No   • Sexual activity: Yes     Partners: Male     Birth control/protection: Surgical   Other Topics Concern   • Not on file   Social History Narrative   • Not on file     Social Determinants of Health     Financial Resource Strain: Medium Risk   • Difficulty of Paying Living Expenses: Somewhat hard   Food Insecurity: Unknown   • Worried About Running Out of Food in the Last Year: Patient refused   • Ran Out of Food in the Last Year: Patient refused   Transportation Needs: No Transportation Needs   • Lack of Transportation (Medical): No   • Lack of Transportation (Non-Medical): No   Physical Activity: Not on file   Stress: Not on file   Social Connections: Not on file   Intimate Partner Violence: Not on file   Housing Stability: Not on file       Review of Systems  Pertinent items are noted in HPI  Objective     /94   Pulse (!) 122   Temp 97 9 °F (36 6 °C) (Tympanic)   Ht 5' 5" (1 651 m)   Wt 70 3 kg (155 lb)   BMI 25 79 kg/m²     General:  alert and oriented, in no acute distress   Skin:  normal and no rash or abnormalities   Eyes: conjunctivae/corneas clear  PERRL, EOM's intact  Fundi benign  Mouth: MMM no lesions   Lymph Nodes:  Cervical, supraclavicular, and axillary nodes normal    Lungs:  clear to auscultation bilaterally   Heart:  regular rate and rhythm, S1, S2 normal, no murmur, click, rub or gallop   Abdomen: soft, non-tender; bowel sounds normal; no masses,  no organomegaly there is a vertical lower midline scar from prior C-sections which is thickened and tethered  There is moderate adiposity and skin laxity of the lower abdominal wall     CVA:  absent   Genitourinary: defer exam Extremities:  extremities normal, warm and well-perfused; no cyanosis, clubbing, or edema   Neurologic:  Alert and oriented x3  Gait normal  Reflexes and motor strength normal and symmetric  Cranial nerves 2-12 and sensation grossly intact  Psychiatric:  normal mood, behavior, speech, dress, and thought processes   Right breast:  Soft, no masses palpable, no axillary adenopathy palpable  Right breast is small in volume, with mild pseudo ptosis, and grade 1 ptosis present  Left breast:  There is acquired absence of the left breast, there is a well-healed transverse mastectomy scar, there is diffuse hyperpigmentation of the left anterior chest wall extending through the axillary area consistent with prior radiation therapy

## 2023-01-11 ENCOUNTER — OFFICE VISIT (OUTPATIENT)
Dept: PLASTIC SURGERY | Facility: CLINIC | Age: 53
End: 2023-01-11

## 2023-01-11 VITALS
TEMPERATURE: 98.2 F | WEIGHT: 154 LBS | SYSTOLIC BLOOD PRESSURE: 140 MMHG | HEIGHT: 65 IN | DIASTOLIC BLOOD PRESSURE: 96 MMHG | HEART RATE: 110 BPM | RESPIRATION RATE: 16 BRPM | BODY MASS INDEX: 25.66 KG/M2

## 2023-01-11 DIAGNOSIS — Z42.1 AFTERCARE POSTMASTECTOMY FOR BREAST RECONSTRUCTION: Primary | ICD-10-CM

## 2023-01-11 NOTE — PROGRESS NOTES
Assessment/Plan: Please see HPI  She is interested in proceeding with left breast reconstruction utilizing a latissimus dorsi flap and tissue expander  We discussed the procedure in detail, how it is performed, where the incision/scars will be located, as well as potential risks, complications and limitations, all of which are increased due to her history of prior radiation  These would include, but are not limited to infection, bleeding, scarring, asymmetry, contour deformity, wound healing problems, total flap loss, need for multiple procedures, hematoma, seroma, potential for wound breakdown which may require expander/implant removal, potential revisional surgical procedures, she is aware that the reconstruction will be planned in a staged fashion such that the latissimus dorsi flap and expander would be the first stage, followed by serial expander fills, and eventual expander/implant exchange, and potential benefit from balancing surgery on the right breast   We reviewed diagrams and photographs of reasonable results of latissimus dorsi flap reconstruction cases, their questions were answered to their satisfaction and consent was obtained  She will work with our coordinator regarding date for the procedure  We will have her return for preoperative visit/preoperative markings       There are no diagnoses linked to this encounter  Subjective: Status post left mastectomy/radiation  Carl Low is accompanied today by her daughter  The entire visit was conducted utilizing [employer] translation line #[id number]/[facility]  Medical assistant/SS was present for the entire visit  Patient ID: Gerber Borges is a 45 y o  female  HPI Carl Low presents today for a preoperative visit to discuss left breast reconstruction options  She had previously been seen by Dr [place] in regard to microsurgical options, her preference would be to proceed with latissimus dorsi flap/tissue expander reconstruction      The following portions of the patient's history were reviewed and updated as appropriate: allergies, current medications, past family history, past medical history, past social history, past surgical history and problem list     Review of Systems   Constitutional: Negative for chills and fever  HENT: Negative for hearing loss  Eyes: Positive for visual disturbance  Negative for discharge  Respiratory: Negative for chest tightness and shortness of breath  Cardiovascular: Negative for chest pain and leg swelling  Gastrointestinal: Negative for blood in stool, constipation, diarrhea and nausea  Genitourinary: Negative for dysuria  Musculoskeletal: Negative for gait problem  Skin: Negative for rash  Allergic/Immunologic: Negative for immunocompromised state  Neurological: Negative for seizures and headaches  Hematological: Does not bruise/bleed easily  Psychiatric/Behavioral: Positive for dysphoric mood  The patient is nervous/anxious  Objective:      /96 (BP Location: Left arm, Patient Position: Sitting, Cuff Size: Standard)   Pulse (!) 110   Temp 98 2 °F (36 8 °C) (Tympanic)   Resp 16   Ht 5' 5" (1 651 m)   Wt 69 9 kg (154 lb)   BMI 25 63 kg/m²          Physical Exam  Constitutional:       Appearance: Normal appearance  HENT:      Head: Normocephalic  Eyes:      Extraocular Movements: Extraocular movements intact  Pupils: Pupils are equal, round, and reactive to light  Cardiovascular:      Rate and Rhythm: Normal rate  Pulmonary:      Effort: Pulmonary effort is normal    Abdominal:      Palpations: Abdomen is soft  Musculoskeletal:         General: Normal range of motion  Cervical back: Normal range of motion and neck supple  Skin:     General: Skin is warm        Comments: Absent left breast, status postmastectomy and radiation, stigmata of prior radiation with hyperpigmentation fibrosis, very thin skin flaps, see photos   Neurological:      Mental Status: She is alert and oriented to person, place, and time     Psychiatric:         Mood and Affect: Mood normal

## 2023-01-23 ENCOUNTER — OFFICE VISIT (OUTPATIENT)
Dept: HEMATOLOGY ONCOLOGY | Facility: CLINIC | Age: 53
End: 2023-01-23

## 2023-01-23 VITALS
TEMPERATURE: 97.4 F | HEIGHT: 65 IN | HEART RATE: 118 BPM | RESPIRATION RATE: 16 BRPM | DIASTOLIC BLOOD PRESSURE: 72 MMHG | BODY MASS INDEX: 25.83 KG/M2 | WEIGHT: 155 LBS | SYSTOLIC BLOOD PRESSURE: 138 MMHG | OXYGEN SATURATION: 100 %

## 2023-01-23 DIAGNOSIS — Z17.0 MALIGNANT NEOPLASM OF OVERLAPPING SITES OF LEFT BREAST IN FEMALE, ESTROGEN RECEPTOR POSITIVE (HCC): Primary | ICD-10-CM

## 2023-01-23 DIAGNOSIS — C50.812 CARCINOMA OF OVERLAPPING SITES OF LEFT BREAST IN FEMALE, ESTROGEN RECEPTOR POSITIVE (HCC): ICD-10-CM

## 2023-01-23 DIAGNOSIS — C50.812 MALIGNANT NEOPLASM OF OVERLAPPING SITES OF LEFT BREAST IN FEMALE, ESTROGEN RECEPTOR POSITIVE (HCC): Primary | ICD-10-CM

## 2023-01-23 DIAGNOSIS — Z17.0 CARCINOMA OF OVERLAPPING SITES OF LEFT BREAST IN FEMALE, ESTROGEN RECEPTOR POSITIVE (HCC): ICD-10-CM

## 2023-01-23 RX ORDER — ANASTROZOLE 1 MG/1
1 TABLET ORAL DAILY
Qty: 90 TABLET | Refills: 3 | Status: SHIPPED | OUTPATIENT
Start: 2023-01-23

## 2023-01-23 NOTE — PROGRESS NOTES
Hematology / Oncology Outpatient Follow Up Note    Freda Henry 45 y o  female :1970 [id number]         Date:  2023    Assessment / Plan:  A 49 year old surgically postmenopausal woman with locally advanced left breast cancer, grade 2, ER/IL positive HER-2 negative disease, diagnosed in   She has no evidence of BRCA mutation  She underwent neoadjuvant chemotherapy with AC followed by paclitaxel resulting in clinical partial response  However, when she had mastectomy, she had significant residual disease, as well as 2 positive lymph node  She completed postmastectomy radiation therapy  She was previously on adjuvant hormonal therapy with tamoxifen, until she underwent hysterectomy and bilateral oophorectomy for fibroid  Subsequently, she was on anastrozole since  through 2022  For some reason, she stopped anastrozole  Clinically, she has no evidence of recurrent disease  I recommended her to restart anastrozole 1 mg once a day for 1 more year to complete 10 years of adjuvant hormonal therapy  She is in agreement with my recommendation  I will see her again in a year for routine follow-up       Subjective:      HPI: [de-identified] 53year old premenopausal  speaking woman with recent diagnosis of locally advanced left breast cancer    She first noticed a lump which was small approximately 2 years ago  Over the time she developed very large breast mass with skin involvement as well as skin dimpling  She also developed of breast pain  She underwent biopsy which showed invasive ductal carcinoma with grade 2  This was ER/IL positive HER-2 negative disease  She was seen by Dr Jovanni Whiting who thought that neoadjuvant chemotherapy is the most appropriate treatment  Therefore she was referred to me  She underwent CT scan of chest abdomen and pelvis as well as bone scan both of which were negative for distant metastasis  She has no weight loss fever chills or night sweats   She denied any respiratory symptoms  She has no bone pain  Her performance status is normal  she has no family history of breast cancer or ovarian cancer             Interval History:  A 49 year old surgically postmenopausal woman with locally advanced left breast cancer, grade 2, ER/UT positive HER-2 negative disease  She had very large left breast mass which occupied her entire left breast at the time of initial presentation  She had neoadjuvant chemotherapy with dose dense AC followed by paclitaxel resulting in clinical response  She underwent left mastectomy with sentinel lymph node biopsy  She still had significant residual tumor measuring 11 5 cm with multiple residual foci of tumor with 2 sentinel lymph node with subcentimeter metastatic disease  She did not have reconstruction  Since May 2014, she was on adjuvant hormonal therapy with tamoxifen, since she was perimenopausal at that time  She had very large fibroid in the uterus  In February 2015, she underwent hysterectomy and bilateral oophorectomy  Subsequently, adjuvant hormonal therapy was changed to anastrozole  She came in today for followup  For some reason, she stopped anastrozole in October 2022  She feels well with no new complaints  She denied any pain  Her weight is stable  She has no respiratory symptoms  She is going to have left breast reconstruction probably in March 2023  Her performance status is normal       Objective:      Primary Diagnosis:      1  Locally advanced left breast cancer with skin involvement, grade 2, ER/UT positive HER-2 negative disease  Diagnosed in June 2013  2  BRCA mutation negative       Cancer Staging:  Cancer Staging  No matching staging information was found for the patient         Previous Hematologic/ Oncologic Treatment:      1  Neoadjuvant chemotherapy with dose dense AC X4 followed by weekly paclitaxel x12  Completed in December 2013  2  adjuvant hormonal therapy with tamoxifen from March 2014 through May 2015    3  postmastectomy radiation therapy completed in May 2014       Current Hematologic/ Oncologic Treatment:       Adjuvant hormonal therapy with anastrozole 1 mg once a day since May 2015       Disease Status:      1  Partial response on neoadjuvant chemotherapy  2  ENMANUEL, status post mastectomy with sentinel lymph node biopsy       Test Results:     Pathology:           Radiology:     Mammography in  August 2022 was benign  BI-RAD 1       Laboratory:           Physical Exam:        General Appearance:    Alert, oriented          Eyes:    PERRL   Ears:    Normal external ear canals, both ears   Nose:   Nares normal, septum midline   Throat:   Mucosa moist  Pharynx without injection  Neck:   Supple         Lungs:     Clear to auscultation bilaterally   Chest Wall:    No tenderness or deformity    Heart:    Regular rate and rhythm         Abdomen:     Soft, non-tender, bowel sounds +, no organomegaly               Extremities:   Extremities no cyanosis or edema         Skin:   no rash or icterus  Lymph nodes:   Cervical, supraclavicular, and axillary nodes normal   Neurologic:   CNII-XII intact, normal strength, sensation and reflexes     Throughout             Breast exam:   status post left mastectomy without reconstruction  No palpable abnormalities in her left chest wall  No palpable metastatic very adenopathy  Right breast exam is negative                ROS: Review of Systems   All other systems reviewed and are negative  Imaging: No results found        Labs:   Lab Results   Component Value Date    WBC 5 51 05/14/2019    HGB 13 9 05/14/2019    HCT 45 2 05/14/2019    MCV 94 05/14/2019     05/14/2019     Lab Results   Component Value Date     06/22/2018    K 4 1 05/14/2019     05/14/2019    CO2 30 05/14/2019    ANIONGAP 12 06/22/2018    BUN 13 05/14/2019    CREATININE 0 73 05/14/2019    GLUCOSE 81 06/22/2018    GLUF 85 05/14/2019    CALCIUM 10 3 (H) 05/14/2019    CORRECTEDCA 10 2 (H) 05/14/2019    AST 20 06/22/2018    ALT 43 06/22/2018    ALKPHOS 77 06/22/2018    PROT 8 1 06/22/2018    BILITOT 0 2 06/22/2018    EGFR 96 05/14/2019         Lab Results   Component Value Date    HCG <2 02/19/2014         Current Medications: Reviewed  Allergies: Reviewed  PMH/FH/SH:  Reviewed      Vital Sign:    Body surface area is 1 77 meters squared      Wt Readings from Last 3 Encounters:   01/23/23 70 3 kg (155 lb)   01/11/23 69 9 kg (154 lb)   11/03/22 70 3 kg (155 lb)        Temp Readings from Last 3 Encounters:   01/23/23 (!) 97 4 °F (36 3 °C) (Temporal)   01/11/23 98 2 °F (36 8 °C) (Tympanic)   11/03/22 97 9 °F (36 6 °C) (Tympanic)        BP Readings from Last 3 Encounters:   01/23/23 138/72   01/11/23 140/96   11/03/22 139/94         Pulse Readings from Last 3 Encounters:   01/23/23 (!) 118   01/11/23 (!) 110   11/03/22 (!) 122     @HWASQVS0(3)@

## 2023-02-15 ENCOUNTER — PATIENT OUTREACH (OUTPATIENT)
Dept: CASE MANAGEMENT | Facility: OTHER | Age: 53
End: 2023-02-15

## 2023-02-15 ENCOUNTER — ONCOLOGY SURVIVORSHIP (OUTPATIENT)
Dept: SURGICAL ONCOLOGY | Facility: CLINIC | Age: 53
End: 2023-02-15

## 2023-02-15 VITALS
WEIGHT: 155 LBS | BODY MASS INDEX: 25.83 KG/M2 | HEIGHT: 65 IN | SYSTOLIC BLOOD PRESSURE: 144 MMHG | OXYGEN SATURATION: 99 % | RESPIRATION RATE: 16 BRPM | DIASTOLIC BLOOD PRESSURE: 80 MMHG | HEART RATE: 112 BPM | TEMPERATURE: 98.8 F

## 2023-02-15 DIAGNOSIS — Z12.31 VISIT FOR SCREENING MAMMOGRAM: ICD-10-CM

## 2023-02-15 DIAGNOSIS — C50.812 CARCINOMA OF OVERLAPPING SITES OF LEFT BREAST IN FEMALE, ESTROGEN RECEPTOR POSITIVE (HCC): Primary | ICD-10-CM

## 2023-02-15 DIAGNOSIS — Z17.0 CARCINOMA OF OVERLAPPING SITES OF LEFT BREAST IN FEMALE, ESTROGEN RECEPTOR POSITIVE (HCC): Primary | ICD-10-CM

## 2023-02-15 DIAGNOSIS — Z79.811 USE OF ANASTROZOLE (ARIMIDEX): ICD-10-CM

## 2023-02-15 NOTE — PROGRESS NOTES
Assessment/Plan:    Diagnoses and all orders for this visit:    Carcinoma of overlapping sites of left breast in female, estrogen receptor positive [facility])    Patient is a 45year old female that was diagnosed with a left breast cancer in 2013  Her pathology revealed invasive mammary carcinoma, ER/MA positive, HER-2 negative  She underwent neoadjuvant chemotherapy and underwent a left mastectomy and sentinel node biopsy in 2014  Her tumor size was 11 5 cm and she had 2 of 3 positive lymph nodes  It was determined she would not benefit from a complete axillary dissection but rather completed post mastectomy radiation therapy  She tested negative for the BRCA genetic mutation  She is currently taking anastrozole  She had a right 3d screening mammogram in August of 2022, BIRADS 2  She offers no new complaints today  She is planning on undergoing reconstruction in the near future  There are no worrisome findings on today's exam  Breast cancer survivorship visit performed today and treatment summary and care plan were reviewed with patient  She is up to date on colorectal and cervical cancer screenings  She is interested in the [facility] program but states she has to arrange for transportation before scheduling  I have also recommended updating her genetic testing and she is agreeable; referral placed  Script given for mammogram and I will see her back in 1 year or sooner should the need arise  [facility] nterpreter # [id number] utilized for today's visit  -     Ambulatory Referral to Oncology Genetics; Future  -     Ambulatory referral to oncology social worker; Future    Use of anastrozole (Arimidex)    Visit for screening mammogram  -     Mammo screening right w 3d & cad; Future        REASON FOR VISIT:   Survivorship      Previous therapy:  Oncology History   Carcinoma of left breast, estrogen receptor positive ([facility] [address] )   6/18/2013 Biopsy    Left breast biopsy  Invasive mammary carcinoma with lobular features  Grade 2  ER 90%  %    HER2 negative     7/29/2013 - 12/9/2013 Chemotherapy    Dose dense AC x4 followed by paclitaxel x12     8/2013 Genetic Testing    Negative for BRCA 1 and BRCA 2 mutation     2/18/2014 Surgery    Left mastectomy with sentinel node biopsy  - Margins clear  - 2/3 lymph nodes  Dr Ryan [place]     2/18/2014 -  Cancer Staged    IIIA ypT3, ypN1a (i+) (sn) G2     4/3/2014 - 5/20/2014 Radiation    Chest wall and draining lymph nodes  Total dose 6040 cGy  Dr Rafi Morrell     5/19/2014 - 5/2015 Hormone Therapy    Tamoxifen     5/2015 -  Hormone Therapy    Anastrozole   Dr Christiana Merino           Patient ID: Salo Nava is a 45 y o  female  [facility]  She notices no changes on self exam  She had a right sided mammogram in August which was BIRADS 2  She met with plastic surgery and is planning for lat dorsi flap reconstruction in the future  Review of Systems   Constitutional: Negative for activity change, appetite change, chills, fatigue, fever and unexpected weight change  Respiratory: Negative for cough and shortness of breath  Cardiovascular: Negative for chest pain  Gastrointestinal: Negative for abdominal pain, constipation, diarrhea, nausea and vomiting  Musculoskeletal: Negative for arthralgias, back pain, gait problem and myalgias  Skin: Negative for color change and rash  Neurological: Negative for dizziness and headaches  Hematological: Negative for adenopathy  Psychiatric/Behavioral: Negative for agitation and confusion  All other systems reviewed and are negative  Objective:    Blood pressure 144/80, pulse (!) 112, temperature 98 8 °F (37 1 °C), temperature source Tympanic, resp  rate 16, height 5' 5" (1 651 m), weight 70 3 kg (155 lb), SpO2 99 %  Body mass index is 25 79 kg/m²  Physical Exam  Vitals reviewed  Constitutional:       General: She is not in acute distress  Appearance: Normal appearance  She is well-developed   She is not diaphoretic  HENT:      Head: Normocephalic and atraumatic  Cardiovascular:      Rate and Rhythm: Regular rhythm  Tachycardia present  Heart sounds: Normal heart sounds  Comments: Pt reports tachycardia from nervousness at appts only    Pulmonary:      Effort: Pulmonary effort is normal       Breath sounds: Normal breath sounds  Chest:   Breasts:     Right: No swelling, bleeding, inverted nipple, mass, nipple discharge, skin change or tenderness  Comments: Left mastectomy site free of masses, skin changes, skin lesions  Abdominal:      Palpations: Abdomen is soft  There is no mass  Tenderness: There is no abdominal tenderness  Musculoskeletal:         General: Normal range of motion  Cervical back: Normal range of motion  Lymphadenopathy:      Upper Body:      Right upper body: No supraclavicular or axillary adenopathy  Left upper body: No supraclavicular or axillary adenopathy  Skin:     General: Skin is warm and dry  Findings: No rash  Neurological:      Mental Status: She is alert and oriented to person, place, and time     Psychiatric:         Speech: Speech normal          Discussed symptoms related to disease recurrence, Yes    Evaluated for late effects related to cancer treatment, Yes, describe: discussed effects of surgery, chemo, RT, AI therapy    Screening current for cervical cancer, Yes, describe: pap  5/2019    Screening current for colon cancer, Yes, describe: colonoscopy 11/2021, repeat in 10 years    Cancer rehabilitation services addressed, Yes, describe: patient will explore transportation options and call to schedule appt if able    Screening current for osteoporosis, Yes, describe: encouraged patient to discuss DXA with pcp at next visit    Oncology Treatment Summary reviewed with patient and copy provided, Yes    Referral placed for psychosocial evaluation/screening to oncology social work  Yes    I have spent 60 minutes with Patient  today in which greater than 50% of this time was spent in counseling/coordination of care regarding breast cancer survivorship

## 2023-02-16 ENCOUNTER — TELEPHONE (OUTPATIENT)
Dept: GENETICS | Facility: CLINIC | Age: 53
End: 2023-02-16

## 2023-02-16 NOTE — TELEPHONE ENCOUNTER
I called Kirby Ramos to schedule a new patient appointment with the Cancer Risk and Genetics Program       Outcome:   I left a voice message encouraging the patient to call the genetics team at [phone] to schedule this appointment  Follow-up:   At this time the referral will be closed and we will wait to hear back from the patient regarding scheduling this appointment

## 2023-03-02 ENCOUNTER — PATIENT OUTREACH (OUTPATIENT)
Dept: CASE MANAGEMENT | Facility: OTHER | Age: 53
End: 2023-03-02

## 2023-03-02 NOTE — PROGRESS NOTES
OSW placed outreach TC to pt this morning using the  service- # [id number]  The  received pts VM  She left a detailed message stating who is calling and that OSW will call again at a later time

## 2023-03-09 ENCOUNTER — PATIENT OUTREACH (OUTPATIENT)
Dept: CASE MANAGEMENT | Facility: OTHER | Age: 53
End: 2023-03-09

## 2023-03-14 ENCOUNTER — TELEPHONE (OUTPATIENT)
Dept: HEMATOLOGY ONCOLOGY | Facility: CLINIC | Age: 53
End: 2023-03-14

## 2023-03-14 NOTE — TELEPHONE ENCOUNTER
Patient Call Form   Reason for patient call? Patient is calling in requesting a letter of care     Patient's primary oncologist? Rosio Stinson, [address]    Name of person the patient was calling for? Does the patient issue require a call back? Yes   If call back required, inform patient that the message will be forwarded to the team and someone will get back to them as soon as possible    Did you relay this information to the patient?  Yes

## 2023-04-26 ENCOUNTER — TELEPHONE (OUTPATIENT)
Dept: PLASTIC SURGERY | Facility: CLINIC | Age: 53
End: 2023-04-26

## 2023-05-22 ENCOUNTER — PREP FOR PROCEDURE (OUTPATIENT)
Dept: PLASTIC SURGERY | Facility: CLINIC | Age: 53
End: 2023-05-22

## 2023-05-22 DIAGNOSIS — Z85.3 PERSONAL HISTORY OF MALIGNANT NEOPLASM OF BREAST: Primary | ICD-10-CM

## 2023-06-19 ENCOUNTER — CONSULT (OUTPATIENT)
Dept: FAMILY MEDICINE CLINIC | Facility: CLINIC | Age: 53
End: 2023-06-19

## 2023-06-19 VITALS
WEIGHT: 156 LBS | HEART RATE: 102 BPM | OXYGEN SATURATION: 98 % | RESPIRATION RATE: 18 BRPM | DIASTOLIC BLOOD PRESSURE: 86 MMHG | SYSTOLIC BLOOD PRESSURE: 138 MMHG | BODY MASS INDEX: 25.99 KG/M2 | HEIGHT: 65 IN | TEMPERATURE: 97.8 F

## 2023-06-19 DIAGNOSIS — R35.0 URINARY FREQUENCY: ICD-10-CM

## 2023-06-19 DIAGNOSIS — Z01.818 PREOP EXAMINATION: Primary | ICD-10-CM

## 2023-06-19 PROCEDURE — 99243 OFF/OP CNSLTJ NEW/EST LOW 30: CPT

## 2023-06-19 NOTE — PROGRESS NOTES
Presurgical Evaluation    Subjective:      Patient ID: Melody Rodriguez is a 52 y o  female  Chief Complaint   Patient presents with   • Pre-op Exam       Melody Rodriguez 52 y o  female  has a past medical history of Breast cancer ([address] ) (07/2013), Fibroma, History of chemotherapy, and History of external beam radiation therapy  Presenting today for PreOp clearance  Pt reports over the past two weeks has been experiencing urinary burning, urgency and frequency  Denies fever, fatigue, headaches, dizziness, blurred vision, nausea, palpitation, chest pain, SOB, urinary changes, weakness, bowel changes, sleep problems,  sick contacts, red flag signs,  or recent travel  Overall patient reports feeling well   Patient has no further complaints other than what is mentioned in the ROS  Urinary Frequency   This is a new problem  The current episode started 1 to 4 weeks ago (2 weeks)  The problem has been gradually improving  The quality of the pain is described as burning  The pain is at a severity of 0/10  The patient is experiencing no pain  There has been no fever  She is not sexually active  There is no history of pyelonephritis  Associated symptoms include chills, frequency and urgency  Pertinent negatives include no discharge, flank pain, hematuria, nausea or vomiting  She has tried nothing for the symptoms  The treatment provided no relief         The following portions of the patient's history were reviewed and updated as appropriate: allergies, current medications, past family history, past medical history, past social history, past surgical history and problem list     Procedure date: 07/17/23     Surgeon: Rasta Fontana MD  Planned procedure:LEFT BREAST RECONSTRUCTION  WITH LATISSIMUS DORSI FLAP, TISSUE EXPANDER, POSSIBLE ACELLULAR DERMAL MATRIX  Diagnosis for procedure:  Personal history of malignant neoplasm of breast    Prior anesthesia: Yes   General; Complications:  None / Samuel Heal well    CAD History: None   NOTE: Patient should see Cardiology if time available before surgery, and if appropriate  Pulmonary History: None    Renal history: None    Diabetes History:  None     Neurological History: None     On Immunosuppressant meds/biologics: Yes      Review of Systems   Constitutional: Positive for chills  Negative for fever  HENT: Negative for ear pain and sore throat  Eyes: Negative for pain and visual disturbance  Respiratory: Negative for cough and shortness of breath  Cardiovascular: Negative for chest pain and palpitations  Gastrointestinal: Negative for abdominal pain, nausea and vomiting  Genitourinary: Positive for frequency and urgency  Negative for dysuria, flank pain and hematuria  Musculoskeletal: Negative for arthralgias and back pain  Skin: Negative for color change and rash  Neurological: Negative for seizures and syncope  All other systems reviewed and are negative  Current Outpatient Medications   Medication Sig Dispense Refill   • anastrozole (ARIMIDEX) 1 mg tablet Take 1 tablet (1 mg total) by mouth daily 90 tablet 3   • calcium citrate-vitamin D (CITRACAL+D) 315-200 MG-UNIT per tablet Take 1 tablet by mouth 2 (two) times a day 60 tablet 3     No current facility-administered medications for this visit  Allergies on file:   Aspirin    Patient Active Problem List   Diagnosis   • Carcinoma of left breast, estrogen receptor positive (HCC)   • Use of anastrozole (Arimidex)   • Limb swelling   • Malignant neoplasm of overlapping sites of left female breast [facility])   • Uterine leiomyoma   • Elevated blood pressure reading in office without diagnosis of hypertension   • Personal history of malignant neoplasm of breast   • Well female exam with routine gynecological exam   • Encounter for 's license history and physical   • Overweight (BMI 25 0-29  9)   • Preop examination   • Urinary frequency        Past Medical History:   Diagnosis Date   • [facility] ([facility] ) "07/2013    left-chemotherapy   • Fibroma    • History of chemotherapy    • History of external beam radiation therapy        Past Surgical History:   Procedure Laterality Date   • APPENDECTOMY     • BREAST SURGERY  2014    Left breast 2/2 breast CA   • HYSTERECTOMY  2015    Due to fibroma   • MASTECTOMY Left 2013   • OOPHORECTOMY         Family History   Problem Relation Age of Onset   • Lung cancer Mother    • No Known Problems Father    • No Known Problems Sister    • No Known Problems Daughter    • No Known Problems Daughter    • Cancer Maternal Grandmother    • No Known Problems Maternal Grandfather    • No Known Problems Paternal Grandmother    • No Known Problems Paternal Grandfather    • Uterine cancer Maternal Aunt    • No Known Problems Son    • No Known Problems Paternal Aunt        Social History     Tobacco Use   • Smoking status: Never     Passive exposure: Never   • Smokeless tobacco: Never   Substance Use Topics   • Alcohol use: No   • Drug use: No       Objective:    Vitals:    06/19/23 1100 06/19/23 1120   BP: 148/90 138/86   BP Location: Right arm    Patient Position: Sitting    Cuff Size: Standard    Pulse: 102    Resp: 18    Temp: 97 8 °F (36 6 °C)    TempSrc: Temporal    SpO2: 98%    Weight: 70 8 kg (156 lb)    Height: 5' 5\" (1 651 m)         Physical Exam  Vitals and nursing note reviewed  Constitutional:       General: She is not in acute distress  Appearance: Normal appearance  She is not ill-appearing  HENT:      Head: Normocephalic and atraumatic  Right Ear: Tympanic membrane, ear canal and external ear normal       Left Ear: Tympanic membrane, ear canal and external ear normal       Nose: Nose normal       Mouth/Throat:      Mouth: Mucous membranes are moist    Eyes:      General:         Right eye: No discharge  Left eye: No discharge  Pupils: Pupils are equal, round, and reactive to light  Cardiovascular:      Rate and Rhythm: Normal rate and regular rhythm   " Pulses: Normal pulses  Heart sounds: Normal heart sounds  Pulmonary:      Effort: Pulmonary effort is normal  No respiratory distress  Breath sounds: Normal breath sounds  No wheezing  Abdominal:      General: Bowel sounds are normal       Palpations: Abdomen is soft  Tenderness: There is no abdominal tenderness  There is no right CVA tenderness or left CVA tenderness  Musculoskeletal:         General: Normal range of motion  Cervical back: Normal range of motion  Skin:     General: Skin is warm and dry  Neurological:      General: No focal deficit present  Mental Status: She is alert and oriented to person, place, and time             Preop labs/testing available and reviewed: no  Lab Results   Component Value Date     06/22/2018    SODIUM 139 06/20/2023    K 3 9 06/20/2023     06/20/2023    CO2 28 06/20/2023    ANIONGAP 12 06/22/2018    AGAP 4 06/20/2023    BUN 11 06/20/2023    CREATININE 0 84 06/20/2023    GLUF 79 06/20/2023    CALCIUM 9 7 06/20/2023    AST 20 06/20/2023    ALT 23 06/20/2023    ALKPHOS 111 06/20/2023    PROT 8 1 06/22/2018    TP 8 5 (H) 06/20/2023    BILITOT 0 2 06/22/2018    TBILI 0 43 06/20/2023    EGFR 78 06/20/2023     Lab Results   Component Value Date    WBC 5 72 06/20/2023    HGB 12 4 06/20/2023    HCT 41 7 06/20/2023    MCV 94 06/20/2023     06/20/2023     Lab Results   Component Value Date    HGBA1C 5 1 06/20/2023     UA   Component Ref Range & Units 6/20/23  9:49 AM   Color, UA  Light Yellow    Clarity, UA  Clear    Specific [place], UA 1 003 - 1 030 1 014    pH, UA 4 5, 5 0, 5 5, 6 0, 6 5, 7 0, 7 5, 8 0 6 0    Leukocytes, UA Negative Negative    Nitrite, UA Negative Negative    Protein, UA Negative mg/dl Negative    Glucose, UA Negative mg/dl Negative    Ketones, UA Negative mg/dl Negative    Urobilinogen, UA <2 0 mg/dl mg/dl <2 0    Bilirubin, UA Negative Negative    Occult Blood, UA Negative Negative                   EKG no    Echo no    Stress test/cath no    PFT/Chet no    Functional capacity: Shovel snow                          6 Mets   Pick the highest level patient can comfortably perform   4 mets or greater for surgery    RCRI  High Risk surgery? 1 Point  CAD History:         1 Point   MI; Positive Stress Test; CP due to Mi;  Nitrate Usage to control Angina; Pathologic Q wave on EKG  CHF Active:         1 Point   Pulm Edema; Paroxysmal Nocturnal Dyspnea;  Bibasilar Rales (crackles);S3; CHF on CXR  Cerebrovascular Disease (TIA or CVA):     1 Point  DM on Insulin:        1 Point  Serum Creat >2 0 mg/dl:       1 Point          Total Points: 1     Scorin: Class I, Very Low Risk (0 4%)     1: Class II, Low risk (0 9%)     2: Class III Moderate (6 6%)     3: Class IV High (>11%)      JOANNE Risk:  GFR:        For PCP:  If GFR>60, Hold ACE/ARB/Diuretic on the day of surgery, and NSAIDS 10 days before  If GFR<45, Consider PRE and POST op Nephrology Consult  If 46 <GFR> 59 : Has Patient had JOANNE in last 6 Months? no   If YES: Preop Nephrology consult   If No:  Tamara [facility] Nephrology consult  Assessment/Plan:    Patient is medically optimized (cleared) for the planned procedure  Further testing/evaluation is not required  Postop concerns: no    Problem List Items Addressed This Visit        Other    Preop examination - Primary     PreOp work up ordered- future, if no abnormalities pt cleared for sx            Relevant Orders    CBC and differential    Comprehensive metabolic panel    Hemoglobin A1C    ECG 12 lead    Urinary frequency     Urinary frequency, urgency and burning with urination   UA with reflex - future          Relevant Orders    UA w Reflex to Microscopic w Reflex to Culture -Lab Collect   Other Visit Diagnoses     BMI 25 0-25 9,adult        Relevant Orders    Lipid panel    TSH, 3rd generation with Free T4 reflex           Diagnoses and all orders for this visit:    Preop examination  -     CBC "and differential; Future  -     Comprehensive metabolic panel; Future  -     Hemoglobin A1C; Future  -     ECG 12 lead; Future    Urinary frequency  -     UA w Reflex to Microscopic w Reflex to Culture -Lab Collect; Future    BMI 25 0-25 9,adult  -     Lipid panel; Future  -     TSH, 3rd generation with Free T4 reflex; Future          No outpatient medications have been marked as taking for the 6/19/23 encounter (Appointment) with Kiara Morrell  NOTE: Please use the above to review important meds for your specialty, the remainder \"per anesthesia Guidelines  \"    NOTE: Please place an Inbasket message for \"SOC\" pool for complicated patients      "

## 2023-06-20 ENCOUNTER — LAB (OUTPATIENT)
Dept: LAB | Facility: CLINIC | Age: 53
End: 2023-06-20
Payer: COMMERCIAL

## 2023-06-20 ENCOUNTER — APPOINTMENT (OUTPATIENT)
Dept: LAB | Facility: HOSPITAL | Age: 53
End: 2023-06-20
Payer: COMMERCIAL

## 2023-06-20 DIAGNOSIS — Z01.818 PREOP EXAMINATION: ICD-10-CM

## 2023-06-20 DIAGNOSIS — R35.0 URINARY FREQUENCY: ICD-10-CM

## 2023-06-20 LAB
ALBUMIN SERPL BCP-MCNC: 3.6 G/DL (ref 3.5–5)
ALP SERPL-CCNC: 111 U/L (ref 46–116)
ALT SERPL W P-5'-P-CCNC: 23 U/L (ref 12–78)
ANION GAP SERPL CALCULATED.3IONS-SCNC: 4 MMOL/L
AST SERPL W P-5'-P-CCNC: 20 U/L (ref 5–45)
BASOPHILS # BLD AUTO: 0.02 THOUSANDS/ÂΜL (ref 0–0.1)
BASOPHILS NFR BLD AUTO: 0 % (ref 0–1)
BILIRUB SERPL-MCNC: 0.43 MG/DL (ref 0.2–1)
BILIRUB UR QL STRIP: NEGATIVE
BUN SERPL-MCNC: 11 MG/DL (ref 5–25)
CALCIUM SERPL-MCNC: 9.7 MG/DL (ref 8.3–10.1)
CHLORIDE SERPL-SCNC: 107 MMOL/L (ref 96–108)
CHOLEST SERPL-MCNC: 149 MG/DL
CLARITY UR: CLEAR
CO2 SERPL-SCNC: 28 MMOL/L (ref 21–32)
COLOR UR: NORMAL
CREAT SERPL-MCNC: 0.84 MG/DL (ref 0.6–1.3)
EOSINOPHIL # BLD AUTO: 0.13 THOUSAND/ÂΜL (ref 0–0.61)
EOSINOPHIL NFR BLD AUTO: 2 % (ref 0–6)
ERYTHROCYTE [DISTWIDTH] IN BLOOD BY AUTOMATED COUNT: 13.2 % (ref 11.6–15.1)
GFR SERPL CREATININE-BSD FRML MDRD: 78 ML/MIN/1.73SQ M
GLUCOSE P FAST SERPL-MCNC: 79 MG/DL (ref 65–99)
GLUCOSE UR STRIP-MCNC: NEGATIVE MG/DL
HCT VFR BLD AUTO: 41.7 % (ref 34.8–46.1)
HDLC SERPL-MCNC: 63 MG/DL
HGB BLD-MCNC: 12.4 G/DL (ref 11.5–15.4)
HGB UR QL STRIP.AUTO: NEGATIVE
IMM GRANULOCYTES # BLD AUTO: 0.02 THOUSAND/UL (ref 0–0.2)
IMM GRANULOCYTES NFR BLD AUTO: 0 % (ref 0–2)
KETONES UR STRIP-MCNC: NEGATIVE MG/DL
LDLC SERPL CALC-MCNC: 75 MG/DL (ref 0–100)
LEUKOCYTE ESTERASE UR QL STRIP: NEGATIVE
LYMPHOCYTES # BLD AUTO: 2.21 THOUSANDS/ÂΜL (ref 0.6–4.47)
LYMPHOCYTES NFR BLD AUTO: 39 % (ref 14–44)
MCH RBC QN AUTO: 27.9 PG (ref 26.8–34.3)
MCHC RBC AUTO-ENTMCNC: 29.7 G/DL (ref 31.4–37.4)
MCV RBC AUTO: 94 FL (ref 82–98)
MONOCYTES # BLD AUTO: 0.6 THOUSAND/ÂΜL (ref 0.17–1.22)
MONOCYTES NFR BLD AUTO: 11 % (ref 4–12)
NEUTROPHILS # BLD AUTO: 2.74 THOUSANDS/ÂΜL (ref 1.85–7.62)
NEUTS SEG NFR BLD AUTO: 48 % (ref 43–75)
NITRITE UR QL STRIP: NEGATIVE
NONHDLC SERPL-MCNC: 86 MG/DL
NRBC BLD AUTO-RTO: 0 /100 WBCS
PH UR STRIP.AUTO: 6 [PH]
PLATELET # BLD AUTO: 380 THOUSANDS/UL (ref 149–390)
PMV BLD AUTO: 10.2 FL (ref 8.9–12.7)
POTASSIUM SERPL-SCNC: 3.9 MMOL/L (ref 3.5–5.3)
PROT SERPL-MCNC: 8.5 G/DL (ref 6.4–8.4)
PROT UR STRIP-MCNC: NEGATIVE MG/DL
RBC # BLD AUTO: 4.45 MILLION/UL (ref 3.81–5.12)
SODIUM SERPL-SCNC: 139 MMOL/L (ref 135–147)
SP GR UR STRIP.AUTO: 1.01 (ref 1–1.03)
TRIGL SERPL-MCNC: 53 MG/DL
UROBILINOGEN UR STRIP-ACNC: <2 MG/DL
WBC # BLD AUTO: 5.72 THOUSAND/UL (ref 4.31–10.16)

## 2023-06-20 PROCEDURE — 80061 LIPID PANEL: CPT

## 2023-06-20 PROCEDURE — 81003 URINALYSIS AUTO W/O SCOPE: CPT

## 2023-06-20 PROCEDURE — 85025 COMPLETE CBC W/AUTO DIFF WBC: CPT

## 2023-06-20 PROCEDURE — 83036 HEMOGLOBIN GLYCOSYLATED A1C: CPT

## 2023-06-20 PROCEDURE — 80053 COMPREHEN METABOLIC PANEL: CPT

## 2023-06-20 PROCEDURE — 84443 ASSAY THYROID STIM HORMONE: CPT

## 2023-06-20 PROCEDURE — 36415 COLL VENOUS BLD VENIPUNCTURE: CPT

## 2023-06-21 LAB
EST. AVERAGE GLUCOSE BLD GHB EST-MCNC: 100 MG/DL
HBA1C MFR BLD: 5.1 %
TSH SERPL DL<=0.05 MIU/L-ACNC: 2.17 UIU/ML (ref 0.45–4.5)

## 2023-06-21 NOTE — RESULT ENCOUNTER NOTE
CBC -normal no signs of anemia   CMP-Liver/kidney function stable  Cholesterol -normal   Thyroid -normal   A1C(diabetes) - 5 1 normal   Urine - neg for infection

## 2023-06-29 ENCOUNTER — TELEPHONE (OUTPATIENT)
Dept: FAMILY MEDICINE CLINIC | Facility: CLINIC | Age: 53
End: 2023-06-29

## 2023-06-29 NOTE — TELEPHONE ENCOUNTER
PCP SIGNATURE NEEDED FOR [facility] FORM RECEIVED VIA FAX AND PLACED IN PCP FOLDER TO BE DELIVERED AT ASSIGNED TIMES

## 2023-07-03 NOTE — TELEPHONE ENCOUNTER
FAXED ON 07/03/23 TO [facility] with 06/19/23 Consult Notes at [phone]. FAX CONFIRMATION RECEIVED.       Form scanned into pt chart

## 2023-08-14 ENCOUNTER — HOSPITAL ENCOUNTER (OUTPATIENT)
Dept: MAMMOGRAPHY | Facility: CLINIC | Age: 53
Discharge: HOME | End: 2023-08-14
Payer: COMMERCIAL

## 2023-08-14 VITALS — WEIGHT: 156 LBS | BODY MASS INDEX: 25.99 KG/M2 | HEIGHT: 65 IN

## 2023-08-14 DIAGNOSIS — Z12.31 VISIT FOR SCREENING MAMMOGRAM: ICD-10-CM

## 2023-08-14 PROCEDURE — 77067 SCR MAMMO BI INCL CAD: CPT

## 2023-08-14 PROCEDURE — 77063 BREAST TOMOSYNTHESIS BI: CPT

## 2023-09-19 ENCOUNTER — TELEPHONE (OUTPATIENT)
Dept: FAMILY MEDICINE CLINIC | Facility: CLINIC | Age: 53
End: 2023-09-19

## 2023-09-19 PROBLEM — R52 BODY ACHES: Status: ACTIVE | Noted: 2023-09-19

## 2023-09-19 NOTE — TELEPHONE ENCOUNTER
09/19/23    PT LEFT MESSAGE REQUESTING TO [facility] AN MDAISON      SPOKE TO PT     INFORMED PT THE REASON OF MY CALL    APPT [facility] 09/20/23

## 2023-09-19 NOTE — ASSESSMENT & PLAN NOTE
· Onset  · Localization  · Associated with  · Triggers  · Improves with  · Denies sick contacts or recent travel  · Physical exam significant for/ unremarkable    Plan:

## 2023-09-19 NOTE — PROGRESS NOTES
Name: Lashonda Verdin      : 1970      MRN: [id number]  Encounter Provider: Sydney Sanford MD  Encounter Date: 2023   Encounter department: [address] [facility],[address]     1. Viral infection  Assessment & Plan:  · Generalized body and subjective fevers for 1 week. Reports dry cough for the first 2 days which has now resolved  · Reports that her grandson had the same symptoms and is now feeling well  · Patient reports that she feels like she is improving  · Lung auscultation, heart, ears, nose and mouth examination were unremarkable    Plan:  · Supportive care with Tylenol 650 mg q 8h PRN  · Oral hydration   · Follow up in 2 months for annual physical     Orders:  -     acetaminophen (TYLENOL) 650 mg CR tablet; Take 1 tablet (650 mg total) by mouth every 8 (eight) hours as needed for mild pain    2. Use of anastrozole (Arimidex)  -     calcium citrate-vitamin D (Calcitrate Plus D) 315 mg-5 mcg tablet; Take 1 tablet by mouth 2 (two) times a day         Subjective      Homero Keenan is a 52 y.o female with PMH of breast cancer and overweight who presents today for evaluation of body aches. Patient reports generalized body aches, mostly in the back, for the past week associated with subjective fevers. Patient states that a week ago she had a dry cough, but it is not present anymore. Patient reports that she is improving. She reports that her grandson was also sick with the same symptoms, who does not have any more symptoms. Review of Systems   Constitutional: Positive for fever. Negative for chills. HENT: Negative for ear pain, rhinorrhea, sinus pain, sore throat and trouble swallowing. Eyes: Negative for pain and visual disturbance. Respiratory: Negative for cough and shortness of breath. Cardiovascular: Negative for chest pain and palpitations. Gastrointestinal: Negative for abdominal pain, constipation, diarrhea, nausea and vomiting.    Genitourinary: Negative for dysuria and hematuria. Musculoskeletal: Negative for arthralgias and back pain. Skin: Negative for color change and rash. Neurological: Negative for seizures and syncope. All other systems reviewed and are negative. Current Outpatient Medications on File Prior to Visit   Medication Sig   • anastrozole (ARIMIDEX) 1 mg tablet Take 1 tablet (1 mg total) by mouth daily   • [DISCONTINUED] calcium citrate-vitamin D (CITRACAL+D) 315-200 MG-UNIT per tablet Take 1 tablet by mouth 2 (two) times a day       Objective     /80 (BP Location: Left arm, Patient Position: Sitting, Cuff Size: Standard)   Pulse 99   Temp 98 °F (36.7 °C) (Temporal)   Resp 22   Ht 5' 5" (1.651 m)   Wt 68 kg (150 lb)   SpO2 99%   BMI 24.96 kg/m²     Physical Exam  Constitutional:       General: She is not in acute distress. Appearance: Normal appearance. She is not toxic-appearing. HENT:      Head: Normocephalic and atraumatic. Right Ear: Tympanic membrane normal.      Left Ear: Tympanic membrane normal.      Nose: Nose normal. No congestion or rhinorrhea. Mouth/Throat:      Mouth: Mucous membranes are moist.      Pharynx: Oropharynx is clear. No oropharyngeal exudate or posterior oropharyngeal erythema. Eyes:      General: No scleral icterus. Conjunctiva/sclera: Conjunctivae normal.   Cardiovascular:      Rate and Rhythm: Normal rate and regular rhythm. Pulses: Normal pulses. Heart sounds: Normal heart sounds. No murmur heard. No gallop. Pulmonary:      Effort: Pulmonary effort is normal. No respiratory distress. Breath sounds: Normal breath sounds. No wheezing or rales. Abdominal:      General: Abdomen is flat. Bowel sounds are normal. There is no distension. Palpations: Abdomen is soft. Tenderness: There is no abdominal tenderness. There is no guarding. Musculoskeletal:      Cervical back: Normal range of motion and neck supple.       Right lower leg: No edema. Left lower leg: No edema. Skin:     General: Skin is warm and dry. Capillary Refill: Capillary refill takes less than 2 seconds. Coloration: Skin is not jaundiced or pale. Neurological:      General: No focal deficit present. Mental Status: She is alert and oriented to person, place, and time.    Psychiatric:         Mood and Affect: Mood normal.         Behavior: Behavior normal.       Lucius Molina MD

## 2023-09-20 ENCOUNTER — OFFICE VISIT (OUTPATIENT)
Dept: FAMILY MEDICINE CLINIC | Facility: CLINIC | Age: 53
End: 2023-09-20

## 2023-09-20 VITALS
TEMPERATURE: 98 F | HEART RATE: 99 BPM | BODY MASS INDEX: 24.99 KG/M2 | DIASTOLIC BLOOD PRESSURE: 80 MMHG | HEIGHT: 65 IN | OXYGEN SATURATION: 99 % | RESPIRATION RATE: 22 BRPM | WEIGHT: 150 LBS | SYSTOLIC BLOOD PRESSURE: 158 MMHG

## 2023-09-20 DIAGNOSIS — Z79.811 USE OF ANASTROZOLE (ARIMIDEX): ICD-10-CM

## 2023-09-20 DIAGNOSIS — B34.9 VIRAL INFECTION: Primary | ICD-10-CM

## 2023-09-20 PROBLEM — R52 BODY ACHES: Status: RESOLVED | Noted: 2023-09-19 | Resolved: 2023-09-20

## 2023-09-20 PROCEDURE — 3079F DIAST BP 80-89 MM HG: CPT | Performed by: FAMILY MEDICINE

## 2023-09-20 PROCEDURE — 3077F SYST BP >= 140 MM HG: CPT | Performed by: FAMILY MEDICINE

## 2023-09-20 PROCEDURE — 99213 OFFICE O/P EST LOW 20 MIN: CPT | Performed by: FAMILY MEDICINE

## 2023-09-20 RX ORDER — LANOLIN ALCOHOL/MO/W.PET/CERES
1 CREAM (GRAM) TOPICAL 2 TIMES DAILY
Qty: 60 TABLET | Refills: 0 | Status: SHIPPED | OUTPATIENT
Start: 2023-09-20

## 2023-09-20 RX ORDER — SENNOSIDES 8.6 MG
650 CAPSULE ORAL EVERY 8 HOURS PRN
Qty: 30 TABLET | Refills: 0 | Status: SHIPPED | OUTPATIENT
Start: 2023-09-20

## 2023-09-20 NOTE — ASSESSMENT & PLAN NOTE
· Generalized body and subjective fevers for 1 week.  Reports dry cough for the first 2 days which has now resolved  · Reports that her grandson had the same symptoms and is now feeling well  · Patient reports that she feels like she is improving  · Lung auscultation, heart, ears, nose and mouth examination were unremarkable    Plan:  · Supportive care with Tylenol 650 mg q 8h PRN  · Oral hydration   · Follow up in 2 months for annual physical

## 2023-10-06 ENCOUNTER — APPOINTMENT (EMERGENCY)
Dept: CT IMAGING | Facility: HOSPITAL | Age: 53
End: 2023-10-06
Payer: COMMERCIAL

## 2023-10-06 ENCOUNTER — HOSPITAL ENCOUNTER (INPATIENT)
Facility: HOSPITAL | Age: 53
LOS: 1 days | Discharge: LEFT BEFORE DISCHARGE | End: 2023-10-06
Attending: EMERGENCY MEDICINE | Admitting: INTERNAL MEDICINE
Payer: COMMERCIAL

## 2023-10-06 VITALS
TEMPERATURE: 97.8 F | HEART RATE: 111 BPM | RESPIRATION RATE: 18 BRPM | DIASTOLIC BLOOD PRESSURE: 82 MMHG | WEIGHT: 144 LBS | BODY MASS INDEX: 23.99 KG/M2 | SYSTOLIC BLOOD PRESSURE: 129 MMHG | HEIGHT: 65 IN | OXYGEN SATURATION: 100 %

## 2023-10-06 DIAGNOSIS — R91.8 LUNG MASS: ICD-10-CM

## 2023-10-06 DIAGNOSIS — S22.070A CLOSED WEDGE COMPRESSION FRACTURE OF T10 VERTEBRA, INITIAL ENCOUNTER (HCC): ICD-10-CM

## 2023-10-06 DIAGNOSIS — R93.89 ABNORMAL CT SCAN: ICD-10-CM

## 2023-10-06 DIAGNOSIS — C79.9 METASTATIC CANCER (HCC): ICD-10-CM

## 2023-10-06 DIAGNOSIS — J90 PLEURAL EFFUSION: ICD-10-CM

## 2023-10-06 DIAGNOSIS — N30.90 CYSTITIS: Primary | ICD-10-CM

## 2023-10-06 PROBLEM — M54.9 ACUTE BACK PAIN: Status: ACTIVE | Noted: 2023-10-06

## 2023-10-06 PROBLEM — N32.89 BLADDER WALL THICKENING: Status: ACTIVE | Noted: 2023-10-06

## 2023-10-06 LAB
ALBUMIN SERPL BCP-MCNC: 4.2 G/DL (ref 3.5–5)
ALP SERPL-CCNC: 123 U/L (ref 34–104)
ALT SERPL W P-5'-P-CCNC: 19 U/L (ref 7–52)
ANION GAP SERPL CALCULATED.3IONS-SCNC: 13 MMOL/L
AST SERPL W P-5'-P-CCNC: 26 U/L (ref 13–39)
BASOPHILS # BLD AUTO: 0.02 THOUSANDS/ÂΜL (ref 0–0.1)
BASOPHILS NFR BLD AUTO: 0 % (ref 0–1)
BILIRUB SERPL-MCNC: 0.45 MG/DL (ref 0.2–1)
BILIRUB UR QL STRIP: NEGATIVE
BUN SERPL-MCNC: 10 MG/DL (ref 5–25)
CALCIUM SERPL-MCNC: 10 MG/DL (ref 8.4–10.2)
CHLORIDE SERPL-SCNC: 95 MMOL/L (ref 96–108)
CLARITY UR: CLEAR
CO2 SERPL-SCNC: 25 MMOL/L (ref 21–32)
COLOR UR: YELLOW
CREAT SERPL-MCNC: 0.77 MG/DL (ref 0.6–1.3)
EOSINOPHIL # BLD AUTO: 0 THOUSAND/ÂΜL (ref 0–0.61)
EOSINOPHIL NFR BLD AUTO: 0 % (ref 0–6)
ERYTHROCYTE [DISTWIDTH] IN BLOOD BY AUTOMATED COUNT: 15.4 % (ref 11.6–15.1)
FLUAV RNA RESP QL NAA+PROBE: NEGATIVE
FLUBV RNA RESP QL NAA+PROBE: NEGATIVE
GFR SERPL CREATININE-BSD FRML MDRD: 87 ML/MIN/1.73SQ M
GLUCOSE SERPL-MCNC: 92 MG/DL (ref 65–140)
GLUCOSE UR STRIP-MCNC: NEGATIVE MG/DL
HCT VFR BLD AUTO: 36.4 % (ref 34.8–46.1)
HGB BLD-MCNC: 11 G/DL (ref 11.5–15.4)
HGB UR QL STRIP.AUTO: NEGATIVE
IMM GRANULOCYTES # BLD AUTO: 0.06 THOUSAND/UL (ref 0–0.2)
IMM GRANULOCYTES NFR BLD AUTO: 1 % (ref 0–2)
KETONES UR STRIP-MCNC: NEGATIVE MG/DL
LEUKOCYTE ESTERASE UR QL STRIP: NEGATIVE
LYMPHOCYTES # BLD AUTO: 2.78 THOUSANDS/ÂΜL (ref 0.6–4.47)
LYMPHOCYTES NFR BLD AUTO: 28 % (ref 14–44)
MCH RBC QN AUTO: 25.8 PG (ref 26.8–34.3)
MCHC RBC AUTO-ENTMCNC: 30.2 G/DL (ref 31.4–37.4)
MCV RBC AUTO: 85 FL (ref 82–98)
MONOCYTES # BLD AUTO: 1.07 THOUSAND/ÂΜL (ref 0.17–1.22)
MONOCYTES NFR BLD AUTO: 11 % (ref 4–12)
NEUTROPHILS # BLD AUTO: 6.07 THOUSANDS/ÂΜL (ref 1.85–7.62)
NEUTS SEG NFR BLD AUTO: 60 % (ref 43–75)
NITRITE UR QL STRIP: NEGATIVE
NRBC BLD AUTO-RTO: 0 /100 WBCS
PH UR STRIP.AUTO: 5.5 [PH] (ref 4.5–8)
PLATELET # BLD AUTO: 473 THOUSANDS/UL (ref 149–390)
PMV BLD AUTO: 8.5 FL (ref 8.9–12.7)
POTASSIUM SERPL-SCNC: 3.8 MMOL/L (ref 3.5–5.3)
PROT SERPL-MCNC: 9.1 G/DL (ref 6.4–8.4)
PROT UR STRIP-MCNC: NEGATIVE MG/DL
RBC # BLD AUTO: 4.26 MILLION/UL (ref 3.81–5.12)
RSV RNA RESP QL NAA+PROBE: NEGATIVE
SARS-COV-2 RNA RESP QL NAA+PROBE: NEGATIVE
SODIUM SERPL-SCNC: 133 MMOL/L (ref 135–147)
SP GR UR STRIP.AUTO: <=1.005 (ref 1–1.03)
UROBILINOGEN UR QL STRIP.AUTO: 0.2 E.U./DL
WBC # BLD AUTO: 10 THOUSAND/UL (ref 4.31–10.16)

## 2023-10-06 PROCEDURE — G1004 CDSM NDSC: HCPCS

## 2023-10-06 PROCEDURE — 81003 URINALYSIS AUTO W/O SCOPE: CPT

## 2023-10-06 PROCEDURE — 99285 EMERGENCY DEPT VISIT HI MDM: CPT

## 2023-10-06 PROCEDURE — 0241U HB NFCT DS VIR RESP RNA 4 TRGT: CPT

## 2023-10-06 PROCEDURE — 96360 HYDRATION IV INFUSION INIT: CPT

## 2023-10-06 PROCEDURE — 99284 EMERGENCY DEPT VISIT MOD MDM: CPT

## 2023-10-06 PROCEDURE — 80053 COMPREHEN METABOLIC PANEL: CPT

## 2023-10-06 PROCEDURE — 96361 HYDRATE IV INFUSION ADD-ON: CPT

## 2023-10-06 PROCEDURE — 74176 CT ABD & PELVIS W/O CONTRAST: CPT

## 2023-10-06 PROCEDURE — 85025 COMPLETE CBC W/AUTO DIFF WBC: CPT

## 2023-10-06 PROCEDURE — 36415 COLL VENOUS BLD VENIPUNCTURE: CPT

## 2023-10-06 PROCEDURE — 99223 1ST HOSP IP/OBS HIGH 75: CPT | Performed by: INTERNAL MEDICINE

## 2023-10-06 PROCEDURE — 71260 CT THORAX DX C+: CPT

## 2023-10-06 RX ORDER — CEPHALEXIN 250 MG/1
500 CAPSULE ORAL ONCE
Status: COMPLETED | OUTPATIENT
Start: 2023-10-06 | End: 2023-10-06

## 2023-10-06 RX ORDER — OXYCODONE HYDROCHLORIDE 5 MG/1
5 TABLET ORAL EVERY 6 HOURS PRN
Status: DISCONTINUED | OUTPATIENT
Start: 2023-10-06 | End: 2023-10-06 | Stop reason: HOSPADM

## 2023-10-06 RX ORDER — ENOXAPARIN SODIUM 100 MG/ML
40 INJECTION SUBCUTANEOUS DAILY
Status: DISCONTINUED | OUTPATIENT
Start: 2023-10-07 | End: 2023-10-06 | Stop reason: HOSPADM

## 2023-10-06 RX ORDER — ANASTROZOLE 1 MG/1
1 TABLET ORAL DAILY
Status: DISCONTINUED | OUTPATIENT
Start: 2023-10-07 | End: 2023-10-06 | Stop reason: HOSPADM

## 2023-10-06 RX ORDER — ACETAMINOPHEN 650 MG/20.3ML
650 SUSPENSION ORAL EVERY 6 HOURS PRN
Status: DISCONTINUED | OUTPATIENT
Start: 2023-10-06 | End: 2023-10-06 | Stop reason: HOSPADM

## 2023-10-06 RX ORDER — CEPHALEXIN 500 MG/1
500 CAPSULE ORAL EVERY 12 HOURS SCHEDULED
Qty: 14 CAPSULE | Refills: 0 | Status: ON HOLD | OUTPATIENT
Start: 2023-10-06 | End: 2023-10-13

## 2023-10-06 RX ADMIN — SODIUM CHLORIDE 1000 ML: 0.9 INJECTION, SOLUTION INTRAVENOUS at 13:07

## 2023-10-06 RX ADMIN — IOHEXOL 85 ML: 350 INJECTION, SOLUTION INTRAVENOUS at 15:47

## 2023-10-06 RX ADMIN — CEPHALEXIN 500 MG: 250 CAPSULE ORAL at 17:43

## 2023-10-06 NOTE — ASSESSMENT & PLAN NOTE
· Large right lung mass extending from mediastinum to the lateral chest wall due to primary lung cancer versus breast cancer   · This is also associated with extensive bony metastatic disease. · Consult hematology and IR.   · She will need biopsy to determine type of cancer and treatment plan

## 2023-10-06 NOTE — ASSESSMENT & PLAN NOTE
· She has incidental bladder wall thickening on CT but with normal urinalysis. · Hold off on further antibiotic  · Follow-up on urine culture.

## 2023-10-06 NOTE — ED PROVIDER NOTES
History  Chief Complaint   Patient presents with   • Fever     Pt reports subjective fever "for a few week". Pt reports positive sick exposure to grandchildren who have also had fevers. Tylenol taken this AM   • Back Pain     Pt reporting R lower back pain. States has had increased frequency with urination. 52 YOF with PMH hysterectomy, breast cancer s/p chemo and radiation presents today with fever and back pain x2 weeks. Reports subjective fever- has not measured her temperature at home. Reports the pain in her back is mostly on the right side. Admits to urinary frequency and dysuria as well. Denies any hematuria, urgency, chest pain, nausea, vomiting, or diarrhea. Prior to Admission Medications   Prescriptions Last Dose Informant Patient Reported?  Taking?   acetaminophen (TYLENOL) 650 mg CR tablet   No No   Sig: Take 1 tablet (650 mg total) by mouth every 8 (eight) hours as needed for mild pain   anastrozole (ARIMIDEX) 1 mg tablet   No No   Sig: Take 1 tablet (1 mg total) by mouth daily   calcium citrate-vitamin D (Calcitrate Plus D) 315 mg-5 mcg tablet   No No   Sig: Take 1 tablet by mouth 2 (two) times a day      Facility-Administered Medications: None       Past Medical History:   Diagnosis Date   • Breast cancer ([facility]) 07/2013    left-chemotherapy   • Fibroma    • History of chemotherapy    • History of external beam radiation therapy        Past Surgical History:   Procedure Laterality Date   • APPENDECTOMY     • BREAST SURGERY  2014    Left breast 2/2 breast CA   • HYSTERECTOMY  2015    Due to fibroma   • MASTECTOMY Left 2013   • OOPHORECTOMY         Family History   Problem Relation Age of Onset   • Lung cancer Mother    • No Known Problems Father    • No Known Problems Sister    • No Known Problems Daughter    • No Known Problems Daughter    • Cancer Maternal Grandmother    • No Known Problems Maternal Grandfather    • No Known Problems Paternal Grandmother    • No Known Problems Paternal Grandfather    • Uterine cancer Maternal Aunt    • No Known Problems Son    • No Known Problems Paternal Aunt      I have reviewed and agree with the history as documented. E-Cigarette/Vaping   • E-Cigarette Use Never User      E-Cigarette/Vaping Substances   • Nicotine No    • THC No    • CBD No    • Flavoring No    • Other No    • Unknown No      Social History     Tobacco Use   • Smoking status: Never     Passive exposure: Never   • Smokeless tobacco: Never   Vaping Use   • Vaping Use: Never used   Substance Use Topics   • Alcohol use: No   • Drug use: No       Review of Systems   Constitutional: Positive for fever. Negative for chills. Gastrointestinal: Negative for abdominal pain, nausea and vomiting. Genitourinary: Positive for dysuria and frequency. Negative for hematuria and urgency. Musculoskeletal: Positive for back pain. All other systems reviewed and are negative. Physical Exam  Physical Exam  Vitals and nursing note reviewed. Constitutional:       General: She is not in acute distress. Appearance: Normal appearance. She is well-developed. She is not ill-appearing. HENT:      Head: Normocephalic and atraumatic. Eyes:      Conjunctiva/sclera: Conjunctivae normal.   Cardiovascular:      Rate and Rhythm: Normal rate and regular rhythm. Heart sounds: No murmur heard. Pulmonary:      Effort: Pulmonary effort is normal. No respiratory distress. Breath sounds: Normal breath sounds. Abdominal:      Palpations: Abdomen is soft. Tenderness: There is no abdominal tenderness. There is right CVA tenderness. Musculoskeletal:         General: No swelling. Cervical back: Normal range of motion and neck supple. Skin:     General: Skin is warm and dry. Capillary Refill: Capillary refill takes less than 2 seconds. Neurological:      Mental Status: She is alert.    Psychiatric:         Mood and Affect: Mood normal.         Behavior: Behavior normal.         Vital Signs  ED Triage Vitals [10/06/23 1239]   Temperature Pulse Respirations Blood Pressure SpO2   99 °F (37.2 °C) (!) 118 18 142/78 100 %      Temp Source Heart Rate Source Patient Position - Orthostatic VS BP Location FiO2 (%)   Oral Monitor Sitting Right arm --      Pain Score       --           Vitals:    10/06/23 1239 10/06/23 1630   BP: 142/78 140/76   Pulse: (!) 118 (!) 116   Patient Position - Orthostatic VS: Sitting Lying         Visual Acuity      ED Medications  Medications   cephalexin (KEFLEX) capsule 500 mg (has no administration in time range)   sodium chloride 0.9 % bolus 1,000 mL (1,000 mL Intravenous New Bag 10/6/23 1307)   iohexol (OMNIPAQUE) 350 MG/ML injection (SINGLE-DOSE) 85 mL (85 mL Intravenous Given 10/6/23 1547)       Diagnostic Studies  Results Reviewed     Procedure Component Value Units Date/Time    FLU/RSV/COVID - if FLU/RSV clinically relevant [[id number]]  (Normal) Collected: 10/06/23 1306    Lab Status: Final result Specimen: Nares from Nasopharyngeal Swab Updated: 10/06/23 1403     SARS-CoV-2 Negative     INFLUENZA A PCR Negative     INFLUENZA B PCR Negative     RSV PCR Negative    Narrative:      FOR PEDIATRIC PATIENTS - copy/paste COVID Guidelines URL to browser: [email or web address]    SARS-CoV-2 assay is a Nucleic Acid Amplification assay intended for the  qualitative detection of nucleic acid from SARS-CoV-2 in nasopharyngeal  swabs. Results are for the presumptive identification of SARS-CoV-2 RNA. Positive results are indicative of infection with SARS-CoV-2, the virus  causing COVID-19, but do not rule out bacterial infection or co-infection  with other viruses. Laboratories within the [facility] and its  territories are required to report all positive results to the appropriate  public health authorities.  Negative results do not preclude SARS-CoV-2  infection and should not be used as the sole basis for treatment or other  patient management decisions. Negative results must be combined with  clinical observations, patient history, and epidemiological information. This test has not been FDA cleared or approved. This test has been authorized by FDA under an Emergency Use Authorization  (EUA). This test is only authorized for the duration of time the  declaration that circumstances exist justifying the authorization of the  emergency use of an in vitro diagnostic tests for detection of SARS-CoV-2  virus and/or diagnosis of COVID-19 infection under section 564(b)(1) of  the Act, 21 U. S.C. 362FGF-3(B)(5), unless the authorization is terminated  or revoked sooner. The test has been validated but independent review by FDA  and CLIA is pending. Test performed using [employer]: This RT-PCR assay targets N2,  a region unique to SARS-CoV-2. A conserved region in the E-gene was chosen  for pan-Sarbecovirus detection which includes SARS-CoV-2. According to CMS-2020-01-R, this platform meets the definition of high-throughput technology.     Comprehensive metabolic panel [[id number]]  (Abnormal) Collected: 10/06/23 1306    Lab Status: Final result Specimen: Blood from Arm, Right Updated: 10/06/23 1334     Sodium 133 mmol/L      Potassium 3.8 mmol/L      Chloride 95 mmol/L      CO2 25 mmol/L      ANION GAP 13 mmol/L      BUN 10 mg/dL      Creatinine 0.77 mg/dL      Glucose 92 mg/dL      Calcium 10.0 mg/dL      AST 26 U/L      ALT 19 U/L      Alkaline Phosphatase 123 U/L      Total Protein 9.1 g/dL      Albumin 4.2 g/dL      Total Bilirubin 0.45 mg/dL      eGFR 87 ml/min/1.73sq m     Narrative:      [place] guidelines for Chronic Kidney Disease (CKD):   •  Stage 1 with normal or high GFR (GFR > 90 mL/min/1.73 square meters)  •  Stage 2 Mild CKD (GFR = 60-89 mL/min/1.73 square meters)  •  Stage 3A Moderate CKD (GFR = 45-59 mL/min/1.73 square meters)  •  Stage 3B Moderate CKD (GFR = 30-44 mL/min/1.73 square meters)  •  Stage 4 Severe CKD (GFR = 15-29 mL/min/1.73 square meters)  •  Stage 5 End Stage CKD (GFR <15 mL/min/1.73 square meters)  Note: GFR calculation is accurate only with a steady state creatinine    CBC and differential [[id number]]  (Abnormal) Collected: 10/06/23 1306    Lab Status: Final result Specimen: Blood from Arm, Right Updated: 10/06/23 1319     WBC 10.00 Thousand/uL      RBC 4.26 Million/uL      Hemoglobin 11.0 g/dL      Hematocrit 36.4 %      MCV 85 fL      MCH 25.8 pg      MCHC 30.2 g/dL      RDW 15.4 %      MPV 8.5 fL      Platelets 183 Thousands/uL      nRBC 0 /100 WBCs      Neutrophils Relative 60 %      Immat GRANS % 1 %      Lymphocytes Relative 28 %      Monocytes Relative 11 %      Eosinophils Relative 0 %      Basophils Relative 0 %      Neutrophils Absolute 6.07 Thousands/µL      Immature Grans Absolute 0.06 Thousand/uL      Lymphocytes Absolute 2.78 Thousands/µL      Monocytes Absolute 1.07 Thousand/µL      Eosinophils Absolute 0.00 Thousand/µL      Basophils Absolute 0.02 Thousands/µL     Urine Macroscopic, POC [[id number]] Collected: 10/06/23 1310    Lab Status: Final result Specimen: Urine Updated: 10/06/23 1311     Color, UA Yellow     Clarity, UA Clear     pH, UA 5.5     Leukocytes, UA Negative     Nitrite, UA Negative     Protein, UA Negative mg/dl      Glucose, UA Negative mg/dl      Ketones, UA Negative mg/dl      Urobilinogen, UA 0.2 E.U./dl      Bilirubin, UA Negative     Occult Blood, UA Negative     Specific Gravity, UA <=1.005    Narrative:      CLIN[facility]K RESULT                 CT chest with contrast   Final Result by Karon Skinner MD (10/06 1621)      7.8 x 5.1 cm right middle lobe mass extending to the lateral chest wall and mediastinum and across the major fissure into the right lower lobe, compatible with primary lung cancer or metastatic breast cancer with probable metastatic disease to a lower    esophageal node.       Extensive lytic bone metastases with anterior compression deformity at T10. Small right effusion. I notified Vibha Hunter by secure text on 10/6/2023 4:18 PM and she responded immediately. Workstation performed: [id number]         CT renal stone study abdomen pelvis wo contrast   Final Result by Wendy Ocasio MD (10/06 1411)      New extensive osseous lytic lesions throughout the visualized osseous structures. This is suspicious for either extensive metastatic disease or myeloma. Histologic sampling would offer a more definitive diagnosis. Small right pleural effusion with partially visualized patchy nodular airspace consolidation in the right lower lobe. Correlation for pneumonia advised. Soft tissue nodular density to the right of the distal esophagus at the level of the diaphragm, new since the prior study, indeterminate. An enlarged metastatic lymph node is possible. Consider a follow-up chest CT study for additional evaluation. No CT evidence of nephrolithiasis or obstructive uropathy. Left renal cyst. Mild bladder wall thickening. Correlation for cystitis advised. Workstation performed: [id number]                    Procedures  Procedures         ED Course  ED Course as of 10/06/23 1725   Fri Oct 06, 2023   1414 CT renal stone study abdomen pelvis wo contrast  New extensive osseous lytic lesions throughout the visualized osseous structures. This is suspicious for either extensive metastatic disease or myeloma. Histologic sampling would offer a more definitive diagnosis.     Small right pleural effusion with partially visualized patchy nodular airspace consolidation in the right lower lobe. Correlation for pneumonia advised.     Soft tissue nodular density to the right of the distal esophagus at the level of the diaphragm, new since the prior study, indeterminate. An enlarged metastatic lymph node is possible.  Consider a follow-up chest CT study for additional evaluation.     No CT evidence of nephrolithiasis or obstructive uropathy. Left renal cyst. Mild bladder wall thickening. Correlation for cystitis advised. 1424 Will get CT scan of chest.    1424 Reached out to on call oncology- they will let office know to contact the pt    1622 CT chest with contrast  7.8 x 5.1 cm right middle lobe mass extending to the lateral chest wall and mediastinum and across the major fissure into the right lower lobe, compatible with primary lung cancer or metastatic breast cancer with probable metastatic disease to a lower   esophageal node.     Extensive lytic bone metastases with anterior compression deformity at T10.     Small right effusion. 1628 TT to oncology- recommend SLIM admission   1720 Pt admitted to [facility] under Dr. Deonte Anglin 22yo+    Flowsheet Row Most Recent Value   Initial Alcohol Screen: US AUDIT-C     1. How often do you have a drink containing alcohol? 0 Filed at: 10/06/2023 1418   2. How many drinks containing alcohol do you have on a typical day you are drinking? 0 Filed at: 10/06/2023 1418   3b. FEMALE Any Age, or MALE 65+: How often do you have 4 or more drinks on one occassion? 0 Filed at: 10/06/2023 1418   Audit-C Score 0 Filed at: 10/06/2023 1418   ALEJO: How many times in the past year have you. .. Used an illegal drug or used a prescription medication for non-medical reasons? Never Filed at: 10/06/2023 1418                    Medical Decision Making  52 YOF with PMH hysterectomy, breast cancer s/p chemo and radiation presents today with fever and back pain x2 weeks along with urinary frequency and dysuria. CT scan showed cystitis- getting treated wtih Keflex. However it has also shown extensive metastatic disease wtih large lung mass, T10 compression from mets, other numerous osseous mets. I discussed wt oncology and they recocmend admission for faster work up given extent of the cancer now. Pt was admitted to [facility] under Dr. Katherin Hemphill.      Abnormal CT scan: acute illness or injury  Closed wedge compression fracture of T10 vertebra, initial encounter [facility]): acute illness or injury  Cystitis: acute illness or injury  Lung mass: undiagnosed new problem with uncertain prognosis  Metastatic cancer ([address]): undiagnosed new problem with uncertain prognosis  Pleural effusion: undiagnosed new problem with uncertain prognosis  Amount and/or Complexity of Data Reviewed  Labs: ordered. Radiology: ordered. Decision-making details documented in ED Course. Risk  Prescription drug management. Decision regarding hospitalization.           Disposition  Final diagnoses:   Cystitis   Pleural effusion   Abnormal CT scan   Metastatic cancer (HCC)   Lung mass   Closed wedge compression fracture of T10 vertebra, initial encounter ([address]) - from metastatic disease      Time reflects when diagnosis was documented in both MDM as applicable and the Disposition within this note     Time User Action Codes Description Comment    10/6/2023  2:16 PM Inell Edu Add [N30.90] Cystitis     10/6/2023  2:21 PM Inell Edu Add [J90] Recurrent right pleural effusion     10/6/2023  2:21 PM Inell Edu Remove [J90] Recurrent right pleural effusion     10/6/2023  2:21 PM Inell Edu Add [J90] Pleural effusion     10/6/2023  2:23 PM Inell Edu Add [R93.89] Abnormal CT scan     10/6/2023  4:24 PM Inell Edu Add [C79.9] Metastatic cancer ([address])     10/6/2023  4:28 PM Inell Edu Add [R91.8] Lung mass     10/6/2023  4:29 PM Inell Edu Add [S22.070A] Closed wedge compression fracture of T10 vertebra, initial encounter ([address])     10/6/2023  4:29 PM Inell Edu Modify [S22.070A] Closed wedge compression fracture of T10 vertebra, initial encounter [facility]) from metastatic disease       ED Disposition     ED Disposition   Admit    Condition   Stable    Date/Time   Fri Oct 6, 2023  5:19 PM    Comment   Case was discussed with JESÚS and the patient's admission status was agreed to be Admission Status: inpatient status to the service of Dr. Katherin Hemphill . Follow-up Information     Follow up With Specialties Details Why Contact Info Additional [address] [facility] Schedule an appointment as soon as possible for a visit   [address] [place] [id number] [phone] [facility] [place], [place], [address], [phone]          Patient's Medications   Discharge Prescriptions    CEPHALEXIN (KEFLEX) 500 MG CAPSULE    Take 1 capsule (500 mg total) by mouth every 12 (twelve) hours for 7 days       Start Date: 10/6/2023 End Date: 10/13/2023       Order Dose: 500 mg       Quantity: 14 capsule    Refills: 0       No discharge procedures on file.     PDMP Review     None          ED Provider  Electronically Signed by           Jorge Alberto Childress PA-C  10/06/23 7524

## 2023-10-06 NOTE — ASSESSMENT & PLAN NOTE
· History of breast cancer status post motor treatment with left mastectomy chemo and radiation. · On chronic treatment with anastrozole prior to admission. · He now has a large right lung mass due to possible recurrence of breast cancer versus new lung primary  · She will need tissue sampling to determine type of cancer and treatment plan.   · Oncology consult

## 2023-10-06 NOTE — H&P
[facility]  H&P  Name: Carolynn Rankin 52 y.o. female I MRN: [id number]  Unit/Bed#: E2 -01 I Date of Admission: 10/6/2023   Date of Service: 10/6/2023 I Hospital Day: 0      Assessment/Plan   Mass of right lung  Assessment & Plan  · Large right lung mass extending from mediastinum to the lateral chest wall due to primary lung cancer versus breast cancer   · This is also associated with extensive bony metastatic disease. · Consult hematology and IR. · She will need biopsy to determine type of cancer and treatment plan    Acute back pain  Assessment & Plan  · Persistent back pain for the past 2 weeks related to bony metastasis and large right lung mass  · She reported that the painwill improve with Tylenol. · Continue Tylenol for now for mild pain. · Add low-dose oxycodone for severe pain    Carcinoma of left breast, estrogen receptor positive   Assessment & Plan  · History of breast cancer status post motor treatment with left mastectomy chemo and radiation. · On chronic treatment with anastrozole prior to admission. · He now has a large right lung mass due to possible recurrence of breast cancer versus new lung primary  · She will need tissue sampling to determine type of cancer and treatment plan. · Oncology consult    Bladder wall thickening  Assessment & Plan  · She has incidental bladder wall thickening on CT but with normal urinalysis. · Hold off on further antibiotic  · Follow-up on urine culture. Sinus tachycardia  Assessment & Plan  She has tachycardia on exam.  I reviewed all her EKGs on file and these all showed sinus tachycardia. We will obtain another EKG now  Sinus tachycardia likely related to large lung mass metastatic disease.   She is not hypoxic,  without hemoptysis, without leg swelling  Check echocardiogram to see if the tumor is impinging on the heart       VTE Pharmacologic Prophylaxis: VTE Score: 3 Moderate Risk (Score 3-4) - Pharmacological DVT Prophylaxis Ordered: enoxaparin (Lovenox). Code Status: Level 1 - Full Code full code    Anticipated Length of Stay: Patient will be admitted on an inpatient basis with an anticipated length of stay of greater than 2 midnights secondary to Lung mass with metastatic disease. Chief Complaint: Neck pain    History of Present Illness:  Saskia Chan is a 52 y.o. female with a PMH of breast cancer, treated with left mastectomy, chemo and radiation approximately 10 years ago, currently maintained on anastrozole who presents with persistent upper and lower back pain for the past 2 weeks. This would occur on and off throughout the day and also at night which would wake her up from sleep. It would be as high as 8-9/10 in intensity and would be relieved by Tylenol. This was accompanied by subjective on and off fevers. She presented to the ER today and was found to have large right lung mass with metastatic disease. It also showed mild bladder wall thickening with normal urinalysis. When asked her specifically if she had burning or dysuria she could not be an answer. I talked to her via  [id number]. She asked what  can be possibly done for her and I told her that we need to consult oncology and she will need a biopsy determine what type of cancer she has this is recurrence of her breast cancer or totally new lung primary malignancy. Review of Systems:  Review of Systems   Constitutional: Negative. HENT: Negative. Eyes: Negative. Respiratory: Negative. Cardiovascular: Negative. Gastrointestinal: Negative. Genitourinary: Negative. Musculoskeletal: Positive for back pain. Neurological: Negative. Psychiatric/Behavioral: Negative. All other systems reviewed and are negative.       Past Medical and Surgical History:   Past Medical History:   Diagnosis Date   • Breast cancer ([address]) 07/2013    left-chemotherapy   • Fibroma    • History of chemotherapy    • History of external beam radiation therapy        Past Surgical History:   Procedure Laterality Date   • APPENDECTOMY     • BREAST SURGERY  2014    Left breast 2/2 breast CA   • HYSTERECTOMY  2015    Due to fibroma   • MASTECTOMY Left 2013   • OOPHORECTOMY         Meds/Allergies:  Prior to Admission medications    Medication Sig Start Date End Date Taking? Authorizing Provider   cephalexin (KEFLEX) 500 mg capsule Take 1 capsule (500 mg total) by mouth every 12 (twelve) hours for 7 days 10/6/23 10/13/23 Yes Leila Mcgarry PA-C   acetaminophen (TYLENOL) 650 mg CR tablet Take 1 tablet (650 mg total) by mouth every 8 (eight) hours as needed for mild pain 9/20/23   Edla Beryle Proffer, MD   anastrozole (ARIMIDEX) 1 mg tablet Take 1 tablet (1 mg total) by mouth daily 1/23/23   Rosemary Nazario MD   calcium citrate-vitamin D (Calcitrate Plus D) 315 mg-5 mcg tablet Take 1 tablet by mouth 2 (two) times a day 9/20/23   [address], MD     I have reviewed home medications with a medical source (PCP, Pharmacy, other). Allergies:    Allergies   Allergen Reactions   • Aspirin Rash     Other reaction(s): Palpitations       Social History:  Marital Status: /Civil Union   Occupation:  employed  Patient Pre-hospital Living Situation: Home  Patient Pre-hospital Level of Mobility: walks  Patient Pre-hospital Diet Restrictions: None  Substance Use History:   Social History     Substance and Sexual Activity   Alcohol Use No     Social History     Tobacco Use   Smoking Status Never   • Passive exposure: Never   Smokeless Tobacco Never     Social History     Substance and Sexual Activity   Drug Use No       Family History:  Family History   Problem Relation Age of Onset   • Lung cancer Mother    • No Known Problems Father    • No Known Problems Sister    • No Known Problems Daughter    • No Known Problems Daughter    • Cancer Maternal Grandmother    • No Known Problems Maternal Grandfather    • No Known Problems Paternal Grandmother    • No Known Problems Paternal Grandfather    • Uterine cancer Maternal Aunt    • No Known Problems Son    • No Known Problems Paternal Aunt        Physical Exam:     Vitals:   Blood Pressure: 129/82 (10/06/23 1843)  Pulse: (!) 111 (10/06/23 1843)  Temperature: 97.8 °F (36.6 °C) (10/06/23 1843)  Temp Source: Temporal (10/06/23 1843)  Respirations: 18 (10/06/23 1843)  Height: 5' 5" (165.1 cm) (10/06/23 1826)  Weight - Scale: 65.3 kg (144 lb) (10/06/23 1826)  SpO2: 100 % (10/06/23 1843)    Physical Exam  Vitals reviewed. Constitutional:       Appearance: She is not ill-appearing. HENT:      Head: Normocephalic and atraumatic. Nose: No congestion or rhinorrhea. Eyes:      General: No scleral icterus. Cardiovascular:      Rate and Rhythm: Normal rate and regular rhythm. Pulmonary:      Breath sounds: No wheezing, rhonchi or rales. Comments: + Tenderness to palpation in the right upper posterior chest, right flank, left flank  Musculoskeletal:      Cervical back: Neck supple. Skin:     General: Skin is warm and dry. Coloration: Skin is not jaundiced or pale. Neurological:      Mental Status: She is oriented to person, place, and time.    Psychiatric:         Mood and Affect: Mood normal.         Behavior: Behavior normal.         Additional Data:     Lab Results:  Results from last 7 days   Lab Units 10/06/23  1306   WBC Thousand/uL 10.00   HEMOGLOBIN g/dL 11.0*   HEMATOCRIT % 36.4   PLATELETS Thousands/uL 473*   NEUTROS PCT % 60   LYMPHS PCT % 28   MONOS PCT % 11   EOS PCT % 0     Results from last 7 days   Lab Units 10/06/23  1306   SODIUM mmol/L 133*   POTASSIUM mmol/L 3.8   CHLORIDE mmol/L 95*   CO2 mmol/L 25   BUN mg/dL 10   CREATININE mg/dL 0.77   ANION GAP mmol/L 13   CALCIUM mg/dL 10.0   ALBUMIN g/dL 4.2   TOTAL BILIRUBIN mg/dL 0.45   ALK PHOS U/L 123*   ALT U/L 19   AST U/L 26   GLUCOSE RANDOM mg/dL 92                       Lines/Drains:  Invasive Devices     Peripheral Intravenous Line Duration           Peripheral IV 10/06/23 Right Antecubital <1 day                    Imaging: Reviewed radiology reports from this admission including: chest CT scan  CT chest with contrast   Final Result by Sonia Hubbard MD (10/06 1621)      7.8 x 5.1 cm right middle lobe mass extending to the lateral chest wall and mediastinum and across the major fissure into the right lower lobe, compatible with primary lung cancer or metastatic breast cancer with probable metastatic disease to a lower    esophageal node. Extensive lytic bone metastases with anterior compression deformity at T10. Small right effusion. I notified Kimberlee Godinez by secure text on 10/6/2023 4:18 PM and she responded immediately. Workstation performed: [id number]         CT renal stone study abdomen pelvis wo contrast   Final Result by Alok Pate MD (10/06 1411)      New extensive osseous lytic lesions throughout the visualized osseous structures. This is suspicious for either extensive metastatic disease or myeloma. Histologic sampling would offer a more definitive diagnosis. Small right pleural effusion with partially visualized patchy nodular airspace consolidation in the right lower lobe. Correlation for pneumonia advised. Soft tissue nodular density to the right of the distal esophagus at the level of the diaphragm, new since the prior study, indeterminate. An enlarged metastatic lymph node is possible. Consider a follow-up chest CT study for additional evaluation. No CT evidence of nephrolithiasis or obstructive uropathy. Left renal cyst. Mild bladder wall thickening. Correlation for cystitis advised. Workstation performed: [id number]             EKG and Other Studies Reviewed on Admission:   ·  old EKGs reviewed.   Past 3 EKGs all showed sinus tachycardia  · Repeat EKG ordered and pending    ** Please Note: This note has been constructed using a voice recognition system.  **

## 2023-10-06 NOTE — ASSESSMENT & PLAN NOTE
· Persistent back pain for the past 2 weeks related to bony metastasis and large right lung mass  · She reported that the painwill improve with Tylenol. · Continue Tylenol for now for mild pain.   · Add low-dose oxycodone for severe pain

## 2023-10-06 NOTE — ASSESSMENT & PLAN NOTE
She has tachycardia on exam.  I reviewed all her EKGs on file and these all showed sinus tachycardia. We will obtain another EKG now  Sinus tachycardia likely related to large lung mass metastatic disease.   She is not hypoxic,  without hemoptysis, without leg swelling  Check echocardiogram to see if the tumor is impinging on the heart

## 2023-10-07 ENCOUNTER — HOSPITAL ENCOUNTER (INPATIENT)
Facility: HOSPITAL | Age: 53
LOS: 3 days | Discharge: HOME | DRG: 694 | End: 2023-10-10
Attending: EMERGENCY MEDICINE | Admitting: INTERNAL MEDICINE
Payer: COMMERCIAL

## 2023-10-07 DIAGNOSIS — C79.9 METASTASIS (HCC): ICD-10-CM

## 2023-10-07 DIAGNOSIS — B34.9 VIRAL INFECTION: ICD-10-CM

## 2023-10-07 DIAGNOSIS — M54.9 BACK PAIN: Primary | ICD-10-CM

## 2023-10-07 DIAGNOSIS — R91.8 MASS OF RIGHT LUNG: ICD-10-CM

## 2023-10-07 LAB
ANION GAP SERPL CALCULATED.3IONS-SCNC: 12 MMOL/L
ATRIAL RATE: 115 BPM
BASOPHILS # BLD AUTO: 0.03 THOUSANDS/ÂΜL (ref 0–0.1)
BASOPHILS NFR BLD AUTO: 0 % (ref 0–1)
BUN SERPL-MCNC: 8 MG/DL (ref 5–25)
CALCIUM SERPL-MCNC: 9.5 MG/DL (ref 8.4–10.2)
CHLORIDE SERPL-SCNC: 98 MMOL/L (ref 96–108)
CO2 SERPL-SCNC: 25 MMOL/L (ref 21–32)
CREAT SERPL-MCNC: 0.7 MG/DL (ref 0.6–1.3)
EOSINOPHIL # BLD AUTO: 0 THOUSAND/ÂΜL (ref 0–0.61)
EOSINOPHIL NFR BLD AUTO: 0 % (ref 0–6)
ERYTHROCYTE [DISTWIDTH] IN BLOOD BY AUTOMATED COUNT: 15.3 % (ref 11.6–15.1)
GFR SERPL CREATININE-BSD FRML MDRD: 97 ML/MIN/1.73SQ M
GLUCOSE SERPL-MCNC: 117 MG/DL (ref 65–140)
HCT VFR BLD AUTO: 34 % (ref 34.8–46.1)
HGB BLD-MCNC: 10.8 G/DL (ref 11.5–15.4)
IMM GRANULOCYTES # BLD AUTO: 0.06 THOUSAND/UL (ref 0–0.2)
IMM GRANULOCYTES NFR BLD AUTO: 1 % (ref 0–2)
LYMPHOCYTES # BLD AUTO: 1.92 THOUSANDS/ÂΜL (ref 0.6–4.47)
LYMPHOCYTES NFR BLD AUTO: 25 % (ref 14–44)
MCH RBC QN AUTO: 26.2 PG (ref 26.8–34.3)
MCHC RBC AUTO-ENTMCNC: 31.8 G/DL (ref 31.4–37.4)
MCV RBC AUTO: 83 FL (ref 82–98)
MONOCYTES # BLD AUTO: 0.8 THOUSAND/ÂΜL (ref 0.17–1.22)
MONOCYTES NFR BLD AUTO: 10 % (ref 4–12)
NEUTROPHILS # BLD AUTO: 5.01 THOUSANDS/ÂΜL (ref 1.85–7.62)
NEUTS SEG NFR BLD AUTO: 64 % (ref 43–75)
NRBC BLD AUTO-RTO: 0 /100 WBCS
P AXIS: 65 DEGREES
PLATELET # BLD AUTO: 516 THOUSANDS/UL (ref 149–390)
PMV BLD AUTO: 9.1 FL (ref 8.9–12.7)
POTASSIUM SERPL-SCNC: 4 MMOL/L (ref 3.5–5.3)
PR INTERVAL: 152 MS
QRS AXIS: 58 DEGREES
QRSD INTERVAL: 78 MS
QT INTERVAL: 312 MS
QTC INTERVAL: 431 MS
RBC # BLD AUTO: 4.12 MILLION/UL (ref 3.81–5.12)
SODIUM SERPL-SCNC: 135 MMOL/L (ref 135–147)
T WAVE AXIS: 48 DEGREES
VENTRICULAR RATE: 115 BPM
WBC # BLD AUTO: 7.82 THOUSAND/UL (ref 4.31–10.16)

## 2023-10-07 PROCEDURE — 93010 ELECTROCARDIOGRAM REPORT: CPT | Performed by: INTERNAL MEDICINE

## 2023-10-07 PROCEDURE — 93005 ELECTROCARDIOGRAM TRACING: CPT

## 2023-10-07 PROCEDURE — 80048 BASIC METABOLIC PNL TOTAL CA: CPT | Performed by: EMERGENCY MEDICINE

## 2023-10-07 PROCEDURE — 36415 COLL VENOUS BLD VENIPUNCTURE: CPT | Performed by: EMERGENCY MEDICINE

## 2023-10-07 PROCEDURE — 99284 EMERGENCY DEPT VISIT MOD MDM: CPT

## 2023-10-07 PROCEDURE — 99222 1ST HOSP IP/OBS MODERATE 55: CPT | Performed by: INTERNAL MEDICINE

## 2023-10-07 PROCEDURE — 85025 COMPLETE CBC W/AUTO DIFF WBC: CPT | Performed by: EMERGENCY MEDICINE

## 2023-10-07 PROCEDURE — 99285 EMERGENCY DEPT VISIT HI MDM: CPT | Performed by: EMERGENCY MEDICINE

## 2023-10-07 RX ORDER — ENOXAPARIN SODIUM 100 MG/ML
40 INJECTION SUBCUTANEOUS DAILY
Status: DISCONTINUED | OUTPATIENT
Start: 2023-10-08 | End: 2023-10-10 | Stop reason: HOSPADM

## 2023-10-07 RX ORDER — ACETAMINOPHEN 325 MG/1
650 TABLET ORAL EVERY 6 HOURS PRN
Status: DISCONTINUED | OUTPATIENT
Start: 2023-10-07 | End: 2023-10-10 | Stop reason: HOSPADM

## 2023-10-07 RX ORDER — ANASTROZOLE 1 MG/1
1 TABLET ORAL DAILY
Status: DISCONTINUED | OUTPATIENT
Start: 2023-10-08 | End: 2023-10-10 | Stop reason: HOSPADM

## 2023-10-07 RX ORDER — OXYCODONE HYDROCHLORIDE 5 MG/1
5 TABLET ORAL EVERY 6 HOURS PRN
Status: DISCONTINUED | OUTPATIENT
Start: 2023-10-07 | End: 2023-10-10 | Stop reason: HOSPADM

## 2023-10-07 RX ADMIN — ACETAMINOPHEN 325MG 650 MG: 325 TABLET ORAL at 20:14

## 2023-10-07 NOTE — DISCHARGE SUMMARY
[facility]  Discharge- Carolina Pedroza 1970, 52 y.o. female MRN: [id number]  Unit/Bed#: E2 -01 Encounter: [id number]  Primary Care Provider: Cruz Pillai MD   Date and time admitted to hospital: 10/6/2023 12:41 PM    Medical Problems     Resolved Problems  Date Reviewed: 10/6/2023   None       Discharging Physician / Practitioner: Brandon Hopkins PA-C  PCP: Cruz Pillai MD  Admission Date:   Admission Orders (From admission, onward)     Ordered        10/06/23 1719  INPATIENT ADMISSION  Once                      Discharge Date: 10/06/23  LEFT AMA    Consultations During Hospital Stay:  · Hem/onc  · ir    Procedures Performed:   · None left AMA    Significant Findings / Test Results:   Ct chest- w/iv contrast 7.8 x 5.1 cm right middle lobe mass extending to the lateral chest wall and mediastinum and across the major fissure into the right lower lobe, compatible with primary lung cancer or metastatic breast cancer with probable metastatic disease to a lower   esophageal node.     Extensive lytic bone metastases with anterior compression deformity at T10.     Small right effusion. CT A/P w/o contrast renal protocol- New extensive osseous lytic lesions throughout the visualized osseous structures. This is suspicious for either extensive metastatic disease or myeloma. Histologic sampling would offer a more definitive diagnosis.     Small right pleural effusion with partially visualized patchy nodular airspace consolidation in the right lower lobe. Correlation for pneumonia advised.     Soft tissue nodular density to the right of the distal esophagus at the level of the diaphragm, new since the prior study, indeterminate. An enlarged metastatic lymph node is possible. Consider a follow-up chest CT study for additional evaluation. No sepsis criteria throughout stay.   Negative covid/flu/rsv    Incidental Findings:   ·    ·     Test Results Pending at Discharge (will require follow up):   ·      Outpatient Tests Requested:  ·     Complications:      Reason for Admission: new lung mass w/osseous mets    Hospital Course:   Billy Clark is a 52 y.o. female patient who originally presented to the hospital on 10/6/2023 due to neck pain. Per initial HPI patient has a history of breast cancer treated with mastectomy chemo and radiation on anastrozole with 2 weeks of upper and lower back pain as well as neck pain. She also had subjective fevers. Her pain was well treated with Tylenol. She came to the ED and was found to have diffuse osseous mets throughout her spine as well as lung mass pleural effusion lymphadenopathy adjacent to esophagus. She was admitted with plans for IR biopsy and oncology consultation. Shortly after arriving to the floor patient requested to leave [address] [place] [address] if she needed proper clothing and to clean her house. She did come with a suitcase which is noted by staff as well as myself at time of discharge. Discussed with patient at bedside in [place] as well as her spouse and daughter who are at bedside. Confirmed with patient that it was okay to discuss risk of leaving AMA in light of her diagnosis which which patient was amenable. Patient is alert and oriented she request that she leaves the patient did not realize that she was going to have to stay on proper clothing as well as to have to her house. She reports that she has a son at home although she confirms he is older. D/w pt it may be more prudent if her spouse or daughter helps with these tasks as this is a delay in her care and also would potentially involve missing occult infection with her subjective fevers but pt was adamant that she would return in am after she did these things. AMA form signed after r/b were explained by myself regarding respiratory distress, delay in care occult infection or development of sepsis.       Left AMA    Please see above list of diagnoses and related plan for additional information. Condition at Discharge: serious    Discharge Day Visit / Exam:   * Please refer to separate progress note for these details *    Discussion with Family: Updated  ( and daughter) at bedside. Discharge instructions/Information to patient and family:   See after visit summary for information provided to patient and family. Provisions for Follow-Up Care:  See after visit summary for information related to follow-up care and any pertinent home health orders. Disposition:   Home w/AMA    Planned Readmission:    in AM hopefully if pt returns. Discharge Statement:  I spent  minutes discharging the patient. This time was spent on the day of discharge. I had direct contact with the patient on the day of discharge. Greater than 50% of the total time was spent examining patient, answering all patient questions, arranging and discussing plan of care with patient as well as directly providing post-discharge instructions. Additional time then spent on discharge activities. Discharge Medications:  See after visit summary for reconciled discharge medications provided to patient and/or family.       **Please Note: This note may have been constructed using a voice recognition system**

## 2023-10-07 NOTE — PROGRESS NOTES
Patient requesting to leave and come back tomorrow.  #[id number] used to educate patient on risks of leaving and necessity of hospital stay. Patient states she needs to go home and get clothing and she will be back tomorrow. JESÚS made aware.

## 2023-10-07 NOTE — ASSESSMENT & PLAN NOTE
Bladder wall thickening with normal urinalysis and no leukocytosis  Low suspicion for UTI.     Observe off antibiotics

## 2023-10-07 NOTE — ED NOTES
Multiple family members at bedside. Pt states she will stay for admission today.      Bryce Garcia RN  10/07/23 3612

## 2023-10-07 NOTE — ASSESSMENT & PLAN NOTE
Remote history of breast cancer treated with  mastectomy, chemo and radiation approximately 10 years ago. She is maintained on anastrozole daily and followed with Dr. Glenroy Pérez. She now has new large lung mass with metastatic disease to the lymph node and bone.     This could be recurrent breast cancer or new lung cancer

## 2023-10-07 NOTE — UTILIZATION REVIEW
Notification of Unplanned, Urgent, or   Emergency Inpatient Admission   [address]  [facility], [facility]  Tax ID: [id number]  NPI: [id number]  Place of Service: [address]  Admission Level of Care: Inpatient  Place of Service Code: 24     Attending Physician Information  Attending Name and NPI#: [id number], [place] [[id number]]  Phone: [phone]     Admission Information  Inpatient Admission Date/Time: 10/6/23  5:19 PM  Discharge Date/Time: 10/6/2023  9:19 PM  Admitting Diagnosis Code/Description:  Back pain [M54.9]  Lung mass [R91.8]  Metastatic cancer ([address]) [C79.9]  Pleural effusion [J90]  Cystitis [N30.90]  Abnormal CT scan [R93.89]  Fever [R50.9]  Closed wedge compression fracture of T10 vertebra, initial encounter [facility]) [S22.070A]     Utilization Review Contact  Ly Lynne, Utilization   Phone: [phone]  Fax: [phone]  Email: [email or web address]  Contact for approvals/pending authorizations, clinical reviews, and discharge. Physician Advisory Services Contact  Medical Necessity Denial & Gswf-ze-Ewdf Discussion  Phone: [phone]  Fax: [phone]  Email: [email or web address]     DISCHARGE SUPPORT TEAM:  For Patients Discharge Needs & Updates  Phone: [phone] opt. 2 Fax: [phone]  Email: [email or web address]

## 2023-10-07 NOTE — PLAN OF CARE
Problem: PAIN - ADULT  Goal: Verbalizes/displays adequate comfort level or baseline comfort level  Description: Interventions:  - Encourage patient to monitor pain and request assistance  - Assess pain using appropriate pain scale  - Administer analgesics based on type and severity of pain and evaluate response  - Implement non-pharmacological measures as appropriate and evaluate response  - Consider cultural and social influences on pain and pain management  - Notify physician/advanced practitioner if interventions unsuccessful or patient reports new pain  Outcome: Progressing     Problem: SAFETY ADULT  Goal: Patient will remain free of falls  Description: INTERVENTIONS:  - Educate patient/family on patient safety including physical limitations  - Instruct patient to call for assistance with activity   - Consult OT/PT to assist with strengthening/mobility   - Keep Call bell within reach  - Keep bed low and locked with side rails adjusted as appropriate  - Keep care items and personal belongings within reach  - Initiate and maintain comfort rounds  - Make Fall Risk Sign visible to staff  - Offer Toileting every 2 Hours, in advance of need  - Initiate/Maintain bed alarm  - Obtain necessary fall risk management equipment: non slip socks, call bell   - Apply yellow socks and bracelet for high fall risk patients  - Consider moving patient to room near nurses station  Outcome: Progressing  Goal: Maintain or return to baseline ADL function  Description: INTERVENTIONS:  -  Assess patient's ability to carry out ADLs; assess patient's baseline for ADL function and identify physical deficits which impact ability to perform ADLs (bathing, care of mouth/teeth, toileting, grooming, dressing, etc.)  - Assess/evaluate cause of self-care deficits   - Assess range of motion  - Assess patient's mobility; develop plan if impaired  - Assess patient's need for assistive devices and provide as appropriate  - Encourage maximum independence but intervene and supervise when necessary  - Involve family in performance of ADLs  - Assess for home care needs following discharge   - Consider OT consult to assist with ADL evaluation and planning for discharge  - Provide patient education as appropriate  Outcome: Progressing  Goal: Maintains/Returns to pre admission functional level  Description: INTERVENTIONS:  - Perform BMAT or MOVE assessment daily.   - Set and communicate daily mobility goal to care team and patient/family/caregiver. - Collaborate with rehabilitation services on mobility goals if consulted  - Perform Range of Motion 3 times a day. - Reposition patient every 2 hours.   - Dangle patient 3 times a day  - Stand patient 3 times a day  - Ambulate patient 3 times a day  - Out of bed to chair 3 times a day   - Out of bed for meals 3 times a day  - Out of bed for toileting  - Record patient progress and toleration of activity level   Outcome: Progressing

## 2023-10-07 NOTE — UTILIZATION REVIEW
Initial Clinical Review    Admission: Date/Time/Statement:   Admission Orders (From admission, onward)     Ordered        10/06/23 1719  INPATIENT ADMISSION  Once                      Orders Placed This Encounter   Procedures   • INPATIENT ADMISSION     Standing Status:   Standing     Number of Occurrences:   1     Order Specific Question:   Level of Care     Answer:   Med Surg [16]     Order Specific Question:   Estimated length of stay     Answer:   More than 2 Midnights     Order Specific Question:   Certification     Answer:   I certify that inpatient services are medically necessary for this patient for a duration of greater than two midnights. See H&P and MD Progress Notes for additional information about the patient's course of treatment. ED Arrival Information     Expected   -    Arrival   10/6/2023 12:24    Acuity   Urgent            Means of arrival   Walk-In    Escorted by   Family Member    Service   Hospitalist    Admission type   Emergency            Arrival complaint   Fever           Chief Complaint   Patient presents with   • Fever     Pt reports subjective fever "for a few week". Pt reports positive sick exposure to grandchildren who have also had fevers. Tylenol taken this AM   • Back Pain     Pt reporting R lower back pain. States has had increased frequency with urination. Initial Presentation: 52 y.o. female w/ PMH of breast ca s/p L mastectomy on anastrozole presented to the ED from home w/ persistent upper and lower back pain for the past 2 weeks. Pt reports pain is on and off throughout the day and also at night which would wake her up from sleep, pain intensity as high as 8-9/10 relieved by Tylenol. In the ED, she  found to have large right lung mass with metastatic disease. Also showed mild bladder wall thickening with normal urinalysis. EKG w/ Sinus tachycardia likely related to large lung mass metastatic disease.   On exam,  + Tenderness to palpation in the right upper posterior chest, right flank, left flank. Given 1L IVf bolus, po Keflex. Admitted as Inpatient for evaluation and treatment of mass of R lung, acute back pain, bladder wall thickening, sinus tachycardia. Plan: Consult hematology and IR. Consult Oncology. will need biopsy to determine type of cancer and treatment plan. Pain control prn. Hold off on further antibiotic. Follow-up on urine culture. obtain another EKG now. Check echocardiogram to see if the tumor is impinging on the heart. 10/06: Patient LEFT AMA. ED Triage Vitals   Temperature Pulse Respirations Blood Pressure SpO2   10/06/23 1239 10/06/23 1239 10/06/23 1239 10/06/23 1239 10/06/23 1239   99 °F (37.2 °C) (!) 118 18 142/78 100 %      Temp Source Heart Rate Source Patient Position - Orthostatic VS BP Location FiO2 (%)   10/06/23 1239 10/06/23 1239 10/06/23 1239 10/06/23 1239 --   Oral Monitor Sitting Right arm       Pain Score       10/06/23 1826       6          Wt Readings from Last 1 Encounters:   10/06/23 65.3 kg (144 lb)     Additional Vital Signs:   Date/Time Temp Pulse Resp BP MAP (mmHg) SpO2 O2 Device Patient Position - Orthostatic VS   10/06/23 1843 97.8 °F (36.6 °C) 111 Abnormal  18 129/82 -- 100 % None (Room air) Lying   10/06/23 1630 -- 116 Abnormal  18 140/76 102 100 % None (Room air) Lying         Pertinent Labs/Diagnostic Test Results:   CT chest with contrast   Final Result by Angela Wan MD (10/06 1621)      7.8 x 5.1 cm right middle lobe mass extending to the lateral chest wall and mediastinum and across the major fissure into the right lower lobe, compatible with primary lung cancer or metastatic breast cancer with probable metastatic disease to a lower    esophageal node. Extensive lytic bone metastases with anterior compression deformity at T10. Small right effusion. I notified Kymberly Banks by secure text on 10/6/2023 4:18 PM and she responded immediately.                Workstation performed: [id number]         CT renal stone study abdomen pelvis wo contrast   Final Result by Prince Childers MD (10/06 1411)      New extensive osseous lytic lesions throughout the visualized osseous structures. This is suspicious for either extensive metastatic disease or myeloma. Histologic sampling would offer a more definitive diagnosis. Small right pleural effusion with partially visualized patchy nodular airspace consolidation in the right lower lobe. Correlation for pneumonia advised. Soft tissue nodular density to the right of the distal esophagus at the level of the diaphragm, new since the prior study, indeterminate. An enlarged metastatic lymph node is possible. Consider a follow-up chest CT study for additional evaluation. No CT evidence of nephrolithiasis or obstructive uropathy. Left renal cyst. Mild bladder wall thickening. Correlation for cystitis advised.                   Workstation performed: [id number]           Results from last 7 days   Lab Units 10/06/23  1306   SARS-COV-2  Negative     Results from last 7 days   Lab Units 10/06/23  1306   WBC Thousand/uL 10.00   HEMOGLOBIN g/dL 11.0*   HEMATOCRIT % 36.4   PLATELETS Thousands/uL 473*   NEUTROS ABS Thousands/µL 6.07         Results from last 7 days   Lab Units 10/06/23  1306   SODIUM mmol/L 133*   POTASSIUM mmol/L 3.8   CHLORIDE mmol/L 95*   CO2 mmol/L 25   ANION GAP mmol/L 13   BUN mg/dL 10   CREATININE mg/dL 0.77   EGFR ml/min/1.73sq m 87   CALCIUM mg/dL 10.0     Results from last 7 days   Lab Units 10/06/23  1306   AST U/L 26   ALT U/L 19   ALK PHOS U/L 123*   TOTAL PROTEIN g/dL 9.1*   ALBUMIN g/dL 4.2   TOTAL BILIRUBIN mg/dL 0.45         Results from last 7 days   Lab Units 10/06/23  1306   GLUCOSE RANDOM mg/dL 92                Results from last 7 days   Lab Units 10/06/23  1310   CLARITY UA  Clear   COLOR UA  Yellow   SPEC GRAV UA  <=1.005   PH UA  5.5   GLUCOSE UA mg/dl Negative   KETONES UA mg/dl Negative   BLOOD UA  Negative PROTEIN UA mg/dl Negative   NITRITE UA  Negative   BILIRUBIN UA  Negative   UROBILINOGEN UA E.U./dl 0.2   LEUKOCYTES UA  Negative     Results from last 7 days   Lab Units 10/06/23  1306   INFLUENZA A PCR  Negative   INFLUENZA B PCR  Negative   RSV PCR  Negative       ED Treatment:   Medication Administration from 10/06/2023 1224 to 10/06/2023 1806       Date/Time Order Dose Route Action     10/06/2023 1749 EDT sodium chloride 0.9 % bolus 1,000 mL 0 mL Intravenous Stopped     10/06/2023 1307 EDT sodium chloride 0.9 % bolus 1,000 mL 1,000 mL Intravenous New Bag     10/06/2023 1547 EDT iohexol (OMNIPAQUE) 350 MG/ML injection (SINGLE-DOSE) 85 mL 85 mL Intravenous Given     10/06/2023 1743 EDT cephalexin (KEFLEX) capsule 500 mg 500 mg Oral Given        Past Medical History:   Diagnosis Date   • Breast cancer ([address] [facility]) 07/2013    left-chemotherapy   • Fibroma    • History of chemotherapy    • History of external beam radiation therapy      Present on Admission:  **None**      Admitting Diagnosis: Back pain [M54.9]  Lung mass [R91.8]  Metastatic cancer (HCC) [C79.9]  Pleural effusion [J90]  Cystitis [N30.90]  Abnormal CT scan [R93.89]  Fever [R50.9]  Closed wedge compression fracture of T10 vertebra, initial encounter ([address] [facility]) [S22.070A]  Age/Sex: 52 y.o. female  Admission Orders:    Scheduled Medications:  anastrozole (ARIMIDEX) tablet 1 mg daily po  enoxaparin (LOVENOX) subcutaneous injection 40 mg daily SC    Continuous IV Infusions: none      PRN Meds:        None    Network Utilization Review Department  ATTENTION: Please call with any questions or concerns to [phone] and carefully listen to the prompts so that you are directed to the right person. All voicemails are confidential.   For Discharge needs, contact Care Management DC Support Team at [phone] opt.  2  Send all requests for admission clinical reviews, approved or denied determinations and any other requests to dedicated fax number below belonging to the [place] where the patient is receiving treatment.  List of dedicated fax numbers for the Facilities:  [place] DENIALS (Administrative/Medical Necessity) [phone]   DISCHARGE SUPPORT TEAM (NETWORK) [address] Ven[address]ata Collins (Maternity/NICU/Pediatrics) [phone]   [address] [place] [address] Drive [address] [facility] 1000 [facility] [phone]   1502 [facility] [address] [facility] [address] [address] [facility] [address] [place] [address] [facility] [phone]

## 2023-10-07 NOTE — ASSESSMENT & PLAN NOTE
Secondary to osseous metastatic disease.   Start Tylenol for mild pain, oxycodone 2.5 mg for moderate pain, 5 mg for severe pain

## 2023-10-07 NOTE — ASSESSMENT & PLAN NOTE
She has had persistent sinus tachycardia even on previous EKGs. Order echocardiogram  due to the large size of the mass to see if this is impinging on the heart/pericardium  BP stable.

## 2023-10-07 NOTE — ED PROVIDER NOTES
History  Chief Complaint   Patient presents with   • Back Pain     Patient reports seen yesterday here for lower back pain. Patient states she was given oral medications and was advised she can return to ER today. History provided by:  Patient   used: Yes (iPad #: [id number])    Back Pain  Quality:  Aching  Pain severity:  Moderate  Onset quality:  Gradual  Timing:  Intermittent  Progression:  Waxing and waning  Chronicity:  New  Context comment:  Lesions on spine  Relieved by:  Nothing  Worsened by:  Nothing  Ineffective treatments:  None tried  Associated symptoms: no abdominal pain, no chest pain, no dysuria, no fever and no headaches    Risk factors: hx of cancer    Risk factors comment:  Osseous mets on CT yesterday      Prior to Admission Medications   Prescriptions Last Dose Informant Patient Reported? Taking?    Multiple Vitamin (MULTIVITAMIN ADULT PO)   Yes Yes   Sig: Take 1 tablet by mouth in the morning   acetaminophen (TYLENOL) 650 mg CR tablet   No Yes   Sig: Take 1 tablet (650 mg total) by mouth every 8 (eight) hours as needed for mild pain   anastrozole (ARIMIDEX) 1 mg tablet   No Yes   Sig: Take 1 tablet (1 mg total) by mouth daily   calcium citrate-vitamin D (Calcitrate Plus D) 315 mg-5 mcg tablet Not Taking  No No   Sig: Take 1 tablet by mouth 2 (two) times a day   Patient not taking: Reported on 10/7/2023   cephalexin (KEFLEX) 500 mg capsule Not Taking  No No   Sig: Take 1 capsule (500 mg total) by mouth every 12 (twelve) hours for 7 days   Patient not taking: Reported on 10/7/2023      Facility-Administered Medications: None       Past Medical History:   Diagnosis Date   • Breast cancer ([address]) 07/2013    left-chemotherapy   • Fibroma    • History of chemotherapy    • History of external beam radiation therapy        Past Surgical History:   Procedure Laterality Date   • APPENDECTOMY     • BREAST SURGERY  2014    Left breast 2/2 breast CA   • HYSTERECTOMY  2015    Due to fibroma   • MASTECTOMY Left 2013   • OOPHORECTOMY         Family History   Problem Relation Age of Onset   • Lung cancer Mother    • No Known Problems Father    • No Known Problems Sister    • No Known Problems Daughter    • No Known Problems Daughter    • Cancer Maternal Grandmother    • No Known Problems Maternal Grandfather    • No Known Problems Paternal Grandmother    • No Known Problems Paternal Grandfather    • Uterine cancer Maternal Aunt    • No Known Problems Son    • No Known Problems Paternal Aunt      I have reviewed and agree with the history as documented. E-Cigarette/Vaping   • E-Cigarette Use Never User      E-Cigarette/Vaping Substances   • Nicotine No    • THC No    • CBD No    • Flavoring No    • Other No    • Unknown No      Social History     Tobacco Use   • Smoking status: Never     Passive exposure: Never   • Smokeless tobacco: Never   Vaping Use   • Vaping Use: Never used   Substance Use Topics   • Alcohol use: No   • Drug use: No       Review of Systems   Constitutional: Negative for chills and fever. HENT: Negative for facial swelling, sore throat and trouble swallowing. Eyes: Negative for pain and visual disturbance. Respiratory: Negative for cough and shortness of breath. Cardiovascular: Negative for chest pain and leg swelling. Gastrointestinal: Negative for abdominal pain, diarrhea, nausea and vomiting. Genitourinary: Negative for dysuria and flank pain. Musculoskeletal: Positive for back pain. Negative for neck pain and neck stiffness. Skin: Negative for pallor and rash. Allergic/Immunologic: Negative for environmental allergies and immunocompromised state. Neurological: Negative for dizziness and headaches. Hematological: Negative for adenopathy. Does not bruise/bleed easily. Psychiatric/Behavioral: Negative for agitation and behavioral problems. All other systems reviewed and are negative.       Physical Exam  Physical Exam  Vitals and nursing note reviewed. Constitutional:       General: She is not in acute distress. Appearance: She is well-developed. HENT:      Head: Normocephalic and atraumatic. Eyes:      Extraocular Movements: Extraocular movements intact. Conjunctiva/sclera: Conjunctivae normal.   Cardiovascular:      Rate and Rhythm: Normal rate and regular rhythm. Heart sounds: No murmur heard. Pulmonary:      Effort: Pulmonary effort is normal. No respiratory distress. Abdominal:      Palpations: Abdomen is soft. Tenderness: There is no abdominal tenderness. There is no guarding or rebound. Musculoskeletal:         General: No swelling. Normal range of motion. Cervical back: Normal range of motion and neck supple. Skin:     General: Skin is warm and dry. Capillary Refill: Capillary refill takes less than 2 seconds. Neurological:      General: No focal deficit present. Mental Status: She is alert and oriented to person, place, and time.    Psychiatric:         Mood and Affect: Mood normal.         Behavior: Behavior normal.         Vital Signs  ED Triage Vitals [10/07/23 1507]   Temperature Pulse Respirations Blood Pressure SpO2   98.5 °F (36.9 °C) (!) 124 18 144/68 100 %      Temp Source Heart Rate Source Patient Position - Orthostatic VS BP Location FiO2 (%)   Oral Monitor Sitting Left arm --      Pain Score       5           Vitals:    10/07/23 1507 10/07/23 1735 10/07/23 1815   BP: 144/68 135/80 135/93   Pulse: (!) 124 105 (!) 110   Patient Position - Orthostatic VS: Sitting  Sitting         Visual Acuity      ED Medications  Medications   acetaminophen (TYLENOL) tablet 650 mg (650 mg Oral Given 10/7/23 2014)   anastrozole (ARIMIDEX) tablet 1 mg (has no administration in time range)   enoxaparin (LOVENOX) subcutaneous injection 40 mg (has no administration in time range)   oxyCODONE (ROXICODONE) split tablet 2.5 mg (has no administration in time range)   oxyCODONE (ROXICODONE) IR tablet 5 mg (has no administration in time range)       Diagnostic Studies  Results Reviewed     Procedure Component Value Units Date/Time    Basic metabolic panel [[id number]] Collected: 10/07/23 1630    Lab Status: Final result Specimen: Blood from Arm, Right Updated: 10/07/23 1657     Sodium 135 mmol/L      Potassium 4.0 mmol/L      Chloride 98 mmol/L      CO2 25 mmol/L      ANION GAP 12 mmol/L      BUN 8 mg/dL      Creatinine 0.70 mg/dL      Glucose 117 mg/dL      Calcium 9.5 mg/dL      eGFR 97 ml/min/1.73sq m     Narrative:      [place] guidelines for Chronic Kidney Disease (CKD):   •  Stage 1 with normal or high GFR (GFR > 90 mL/min/1.73 square meters)  •  Stage 2 Mild CKD (GFR = 60-89 mL/min/1.73 square meters)  •  Stage 3A Moderate CKD (GFR = 45-59 mL/min/1.73 square meters)  •  Stage 3B Moderate CKD (GFR = 30-44 mL/min/1.73 square meters)  •  Stage 4 Severe CKD (GFR = 15-29 mL/min/1.73 square meters)  •  Stage 5 End Stage CKD (GFR <15 mL/min/1.73 square meters)  Note: GFR calculation is accurate only with a steady state creatinine    CBC and differential [[id number]]  (Abnormal) Collected: 10/07/23 1630    Lab Status: Final result Specimen: Blood from Arm, Right Updated: 10/07/23 1637     WBC 7.82 Thousand/uL      RBC 4.12 Million/uL      Hemoglobin 10.8 g/dL      Hematocrit 34.0 %      MCV 83 fL      MCH 26.2 pg      MCHC 31.8 g/dL      RDW 15.3 %      MPV 9.1 fL      Platelets 211 Thousands/uL      nRBC 0 /100 WBCs      Neutrophils Relative 64 %      Immat GRANS % 1 %      Lymphocytes Relative 25 %      Monocytes Relative 10 %      Eosinophils Relative 0 %      Basophils Relative 0 %      Neutrophils Absolute 5.01 Thousands/µL      Immature Grans Absolute 0.06 Thousand/uL      Lymphocytes Absolute 1.92 Thousands/µL      Monocytes Absolute 0.80 Thousand/µL      Eosinophils Absolute 0.00 Thousand/µL      Basophils Absolute 0.03 Thousands/µL                  No orders to display Procedures  Procedures         ED Course                               SBIRT 20yo+    Flowsheet Row Most Recent Value   Initial Alcohol Screen: US AUDIT-C     1. How often do you have a drink containing alcohol? 0 Filed at: 10/07/2023 1505   2. How many drinks containing alcohol do you have on a typical day you are drinking? 0 Filed at: 10/07/2023 1506   3b. FEMALE Any Age, or MALE 65+: How often do you have 4 or more drinks on one occassion? 0 Filed at: 10/07/2023 1506   Audit-C Score 0 Filed at: 10/07/2023 1506   ALEJO: How many times in the past year have you. .. Used an illegal drug or used a prescription medication for non-medical reasons? Never Filed at: 10/07/2023 1507                    Medical Decision Making  Patient is a 68-year-old female, history of breast cancer, new osseous lesions/mets on CT scan yesterday, was seen here for back pain, was admitted for work-up, signed AMA from the floor last night, comes back for readmission, states she had to take care of stuff at home, willing to be admitted. Exam, patient is conscious, alert, vital signs are stable, no acute distress, O2 sats 100%, no increased work of breathing, no midline C-spine tenderness, no CVA tenderness. Impression: New osseous lesions, possible metastasis, admission for work-up. Back pain: acute illness or injury  Amount and/or Complexity of Data Reviewed  Labs: ordered. Risk  Decision regarding hospitalization.           Disposition  Final diagnoses:   Back pain   Metastasis ([address]) - Osseous lesions     Time reflects when diagnosis was documented in both MDM as applicable and the Disposition within this note     Time User Action Codes Description Comment    10/7/2023  3:53 PM Evone Room Add [M54.9] Back pain     10/7/2023  4:24 PM Evone Room Add [C79.9] Metastasis ([address])     10/7/2023  4:25 PM Evone Room Modify [C79.9] Metastasis ([address]) Osseous lesions    10/7/2023  5:23 PM Raford Flicker Add [R91.8] Mass of right lung       ED Disposition     ED Disposition   Admit    Condition   Stable    Date/Time   Sat Oct 7, 2023  3:53 PM    Comment   Case was discussed with Dr. Bouchra Shankar and the patient's admission status was agreed to be Admission Status: inpatient status to the service of Dr. Bouchra Shankar. Follow-up Information    None         Current Discharge Medication List      CONTINUE these medications which have NOT CHANGED    Details   acetaminophen (TYLENOL) 650 mg CR tablet Take 1 tablet (650 mg total) by mouth every 8 (eight) hours as needed for mild pain  Qty: 30 tablet, Refills: 0    Associated Diagnoses: Viral infection      anastrozole (ARIMIDEX) 1 mg tablet Take 1 tablet (1 mg total) by mouth daily  Qty: 90 tablet, Refills: 3    Associated Diagnoses: Carcinoma of overlapping sites of left breast in female, estrogen receptor positive       Multiple Vitamin (MULTIVITAMIN ADULT PO) Take 1 tablet by mouth in the morning      calcium citrate-vitamin D (Calcitrate Plus D) 315 mg-5 mcg tablet Take 1 tablet by mouth 2 (two) times a day  Qty: 60 tablet, Refills: 0    Associated Diagnoses: Use of anastrozole (Arimidex)      cephalexin (KEFLEX) 500 mg capsule Take 1 capsule (500 mg total) by mouth every 12 (twelve) hours for 7 days  Qty: 14 capsule, Refills: 0    Associated Diagnoses: Cystitis             No discharge procedures on file.     PDMP Review     None          ED Provider  Electronically Signed by           Steffany Rosario MD  10/07/23 8785

## 2023-10-07 NOTE — H&P
[facility]  H&P  Name: Naun Pineda 52 y.o. female I MRN: [id number]  Unit/Bed#: ED-03 I Date of Admission: 10/7/2023   Date of Service: 10/7/2023 I Hospital Day: 0      Assessment/Plan   Mass of right lung  Assessment & Plan  · Large right lung mass with extensive osseous metastasis. · This could be either recurrent breast cancer versus new lung cancer with metastatic disease. · She is hemodynamically stable with no hypoxia hemoptysis or respiratory distress,  · Consult hematology  · Consult IR for biopsy. Acute back pain  Assessment & Plan  Secondary to osseous metastatic disease. Start Tylenol for mild pain, oxycodone 2.5 mg for moderate pain, 5 mg for severe pain    Carcinoma of left breast, estrogen receptor positive   Assessment & Plan  Remote history of breast cancer treated with  mastectomy, chemo and radiation approximately 10 years ago. She is maintained on anastrozole daily and followed with Dr. Vladimir Moore. She now has new large lung mass with metastatic disease to the lymph node and bone. This could be recurrent breast cancer or new lung cancer     Bladder wall thickening  Assessment & Plan  Bladder wall thickening with normal urinalysis and no leukocytosis  Low suspicion for UTI. Observe off antibiotics    Sinus tachycardia  Assessment & Plan  She has had persistent sinus tachycardia even on previous EKGs. Order echocardiogram  due to the large size of the mass to see if this is impinging on the heart/pericardium  BP stable. VTE Pharmacologic Prophylaxis: VTE Score: 3 Moderate Risk (Score 3-4) - Pharmacological DVT Prophylaxis Ordered: enoxaparin (Lovenox). Code Status: Prior full code  Discussion with family: Updated  (sister) at bedside. Anticipated Length of Stay: Patient will be admitted on an inpatient basis with an anticipated length of stay of greater than 2 midnights secondary to Metastatic disease.     Chief Complaint: She is ready to be admitted    History of Present Illness:  Ricardo Leslie is a 52 y.o. female with a PMH of  left breast cancer status post mastectomy, chemo and radiation who  was admitted yesterday for persistent right upper posterior chest wall and mid to upper back pain for the past 2 weeks. This would be as high as 8-9/10 in intensity be relieved by Tylenol. This was accompanied by on and off subjective fevers. She was found to have large right lung mass with metastatic disease to the lymph node into the bones and was admitted for work-up. Shortly after being admitted and arriving at the [address] [facility], she changed her mind and signed out [facility]. Today she came back with her family and she  admitted that she left because she had a severe panic attack yesterday after hearing the bad news. She is now ready to be admitted and pursue her work-up. Prior to this admission she was only taking anastrozole for her history of breast cancer. She also followed with Dr. Shell Sullivan. Review of Systems:  Review of Systems   Constitutional: Negative. HENT: Negative. Eyes: Negative. Respiratory: Negative. Cardiovascular: Negative. Gastrointestinal: Negative. Genitourinary: Negative. Musculoskeletal: Positive for back pain. Neurological: Negative. Psychiatric/Behavioral: Negative. All other systems reviewed and are negative. Past Medical and Surgical History:   Past Medical History:   Diagnosis Date   • Breast cancer ([address]) 07/2013    left-chemotherapy   • Fibroma    • History of chemotherapy    • History of external beam radiation therapy        Past Surgical History:   Procedure Laterality Date   • APPENDECTOMY     • BREAST SURGERY  2014    Left breast 2/2 breast CA   • HYSTERECTOMY  2015    Due to fibroma   • MASTECTOMY Left 2013   • OOPHORECTOMY         Meds/Allergies:  Prior to Admission medications    Medication Sig Start Date End Date Taking?  Authorizing Provider acetaminophen (TYLENOL) 650 mg CR tablet Take 1 tablet (650 mg total) by mouth every 8 (eight) hours as needed for mild pain 9/20/23  Yes Lisset Martinez MD   anastrozole (ARIMIDEX) 1 mg tablet Take 1 tablet (1 mg total) by mouth daily 1/23/23  Yes Phyllis Martinez MD   Multiple Vitamin (MULTIVITAMIN ADULT PO) Take 1 tablet by mouth in the morning   Yes Historical Provider, MD   calcium citrate-vitamin D (Calcitrate Plus D) 315 mg-5 mcg tablet Take 1 tablet by mouth 2 (two) times a day  Patient not taking: Reported on 10/7/2023 9/20/23   Todd Thakkar MD   cephalexin [facility]) 500 mg capsule Take 1 capsule (500 mg total) by mouth every 12 (twelve) hours for 7 days  Patient not taking: Reported on 10/7/2023 10/6/23 10/13/23  Bebeto Frost PA-C     I have reviewed home medications with a medical source (PCP, Pharmacy, other). Allergies:    Allergies   Allergen Reactions   • Aspirin Rash     Other reaction(s): Palpitations       Social History:  Marital Status: /Civil Union   Occupation: Employed   Patient Pre-hospital Living Situation: Home  Patient Pre-hospital Level of Mobility: walks  Patient Pre-hospital Diet Restrictions: None  Substance Use History:   Social History     Substance and Sexual Activity   Alcohol Use No     Social History     Tobacco Use   Smoking Status Never   • Passive exposure: Never   Smokeless Tobacco Never     Social History     Substance and Sexual Activity   Drug Use No       Family History:  Family History   Problem Relation Age of Onset   • Lung cancer Mother    • No Known Problems Father    • No Known Problems Sister    • No Known Problems Daughter    • No Known Problems Daughter    • Cancer Maternal Grandmother    • No Known Problems Maternal Grandfather    • No Known Problems Paternal Grandmother    • No Known Problems Paternal Grandfather    • Uterine cancer Maternal Aunt    • No Known Problems Son    • No Known Problems Paternal Aunt        Physical Exam: Vitals:   Blood Pressure: 144/68 (10/07/23 1507)  Pulse: (!) 124 (10/07/23 1507)  Temperature: 98.5 °F (36.9 °C) (10/07/23 1507)  Temp Source: Oral (10/07/23 1507)  Respirations: 18 (10/07/23 1507)  Weight - Scale: 65.2 kg (143 lb 11.8 oz) (10/07/23 1507)  SpO2: 100 % (10/07/23 1507)    Physical Exam  Vitals reviewed. Constitutional:       Appearance: She is not ill-appearing. HENT:      Head: Normocephalic and atraumatic. Nose: No congestion or rhinorrhea. Eyes:      General: No scleral icterus. Cardiovascular:      Rate and Rhythm: Regular rhythm. Tachycardia present. Pulmonary:      Effort: No respiratory distress. Breath sounds: Normal breath sounds. No wheezing. Musculoskeletal:         General: No swelling or tenderness. Cervical back: Neck supple. Right lower leg: No edema. Left lower leg: No edema. Skin:     General: Skin is warm and dry. Coloration: Skin is not jaundiced or pale. Neurological:      Mental Status: She is oriented to person, place, and time.    Psychiatric:         Mood and Affect: Mood normal.         Behavior: Behavior normal.       Additional Data:     Lab Results:  Results from last 7 days   Lab Units 10/07/23  1630   WBC Thousand/uL 7.82   HEMOGLOBIN g/dL 10.8*   HEMATOCRIT % 34.0*   PLATELETS Thousands/uL 516*   NEUTROS PCT % 64   LYMPHS PCT % 25   MONOS PCT % 10   EOS PCT % 0     Results from last 7 days   Lab Units 10/07/23  1630 10/06/23  1306   SODIUM mmol/L 135 133*   POTASSIUM mmol/L 4.0 3.8   CHLORIDE mmol/L 98 95*   CO2 mmol/L 25 25   BUN mg/dL 8 10   CREATININE mg/dL 0.70 0.77   ANION GAP mmol/L 12 13   CALCIUM mg/dL 9.5 10.0   ALBUMIN g/dL  --  4.2   TOTAL BILIRUBIN mg/dL  --  0.45   ALK PHOS U/L  --  123*   ALT U/L  --  19   AST U/L  --  26   GLUCOSE RANDOM mg/dL 117 92                       Lines/Drains:  Invasive Devices     Peripheral Intravenous Line  Duration           Peripheral IV 10/06/23 Right Antecubital 1 day Peripheral IV 10/07/23 Right;Ventral (anterior) Forearm <1 day                    Imaging: Personally reviewed the following imaging: chest CT scan and abdominal/pelvic CT  No orders to display   CT images findings discussed with the patient and family at the bedside in simple terms    EKG and Other Studies Reviewed on Admission:   ·  previous EKGs also sinus tachycardia. · Repeat EKG ordered today. · EKG was ordered yesterday was not done since she left AMA. ** Please Note: This note has been constructed using a voice recognition system.  **

## 2023-10-07 NOTE — ASSESSMENT & PLAN NOTE
· Large right lung mass with extensive osseous metastasis. · This could be either recurrent breast cancer versus new lung cancer with metastatic disease. · She is hemodynamically stable with no hypoxia hemoptysis or respiratory distress,  · Consult hematology  · Consult IR for biopsy.

## 2023-10-08 ENCOUNTER — APPOINTMENT (INPATIENT)
Dept: NON INVASIVE DIAGNOSTICS | Facility: HOSPITAL | Age: 53
DRG: 694 | End: 2023-10-08
Payer: COMMERCIAL

## 2023-10-08 LAB
AORTIC ROOT: 2.3 CM
APICAL FOUR CHAMBER EJECTION FRACTION: 64 %
E WAVE DECELERATION TIME: 57 MS
E/A RATIO: 1.19
FRACTIONAL SHORTENING: 43 % (ref 28–44)
INTERVENTRICULAR SEPTUM IN DIASTOLE (PARASTERNAL SHORT AXIS VIEW): 0.7 CM
INTERVENTRICULAR SEPTUM: 0.7 CM (ref 0.6–1.1)
LAAS-AP2: 8.9 CM2
LAAS-AP4: 16.4 CM2
LEFT ATRIUM AREA SYSTOLE SINGLE PLANE A4C: 16.3 CM2
LEFT ATRIUM SIZE: 3.2 CM
LEFT ATRIUM VOLUME (MOD BIPLANE): 34 ML
LEFT ATRIUM VOLUME INDEX (MOD BIPLANE): 19.8 ML/M2
LEFT INTERNAL DIMENSION IN SYSTOLE: 2.6 CM (ref 2.1–4)
LEFT VENTRICULAR INTERNAL DIMENSION IN DIASTOLE: 4.6 CM (ref 3.5–6)
LEFT VENTRICULAR POSTERIOR WALL IN END DIASTOLE: 0.7 CM
LEFT VENTRICULAR STROKE VOLUME: 75 ML
LVSV (TEICH): 75 ML
MV E'TISSUE VEL-SEP: 13 CM/S
MV PEAK A VEL: 0.54 M/S
MV PEAK E VEL: 64 CM/S
MV STENOSIS PRESSURE HALF TIME: 17 MS
MV VALVE AREA P 1/2 METHOD: 12.94 CM2
RIGHT ATRIUM AREA SYSTOLE A4C: 13.6 CM2
RIGHT VENTRICLE ID DIMENSION: 3 CM
SL CV LEFT ATRIUM LENGTH A2C: 3.3 CM
SL CV PED ECHO LEFT VENTRICLE DIASTOLIC VOLUME (MOD BIPLANE) 2D: 99 ML
SL CV PED ECHO LEFT VENTRICLE SYSTOLIC VOLUME (MOD BIPLANE) 2D: 24 ML
TRICUSPID ANNULAR PLANE SYSTOLIC EXCURSION: 2.2 CM

## 2023-10-08 PROCEDURE — 93306 TTE W/DOPPLER COMPLETE: CPT | Performed by: INTERNAL MEDICINE

## 2023-10-08 PROCEDURE — 99255 IP/OBS CONSLTJ NEW/EST HI 80: CPT | Performed by: INTERNAL MEDICINE

## 2023-10-08 PROCEDURE — NC001 PR NO CHARGE: Performed by: RADIOLOGY

## 2023-10-08 PROCEDURE — 99232 SBSQ HOSP IP/OBS MODERATE 35: CPT | Performed by: STUDENT IN AN ORGANIZED HEALTH CARE EDUCATION/TRAINING PROGRAM

## 2023-10-08 PROCEDURE — 93306 TTE W/DOPPLER COMPLETE: CPT

## 2023-10-08 RX ADMIN — ACETAMINOPHEN 325MG 650 MG: 325 TABLET ORAL at 22:11

## 2023-10-08 RX ADMIN — ENOXAPARIN SODIUM 40 MG: 40 INJECTION SUBCUTANEOUS at 09:03

## 2023-10-08 RX ADMIN — ACETAMINOPHEN 325MG 650 MG: 325 TABLET ORAL at 09:08

## 2023-10-08 RX ADMIN — ANASTROZOLE 1 MG: 1 TABLET, COATED ORAL at 09:03

## 2023-10-08 NOTE — ASSESSMENT & PLAN NOTE
Patient is a 78-year-old female with history of left breast cancer status postmastectomy and due to her ED as a result of right upper quadrant posterior chest wall and mid to upper back pain for the last 2 weeks. Ridging findings show evidence of a large right lung mass with extensive osseous meta stasis. · Besides pain, patient currently on room air. · Interventional radiology was consulted for biopsy. · Pulmonary was consulted who will consider performing biopsy via bronchoscopy should IR biopsy yield negative results  · Hematology-oncology evaluation pending.   · Continue supportive care

## 2023-10-08 NOTE — PLAN OF CARE
Problem: PAIN - ADULT  Goal: Verbalizes/displays adequate comfort level or baseline comfort level  Description: Interventions:  - Encourage patient to monitor pain and request assistance  - Assess pain using appropriate pain scale  - Administer analgesics based on type and severity of pain and evaluate response  - Implement non-pharmacological measures as appropriate and evaluate response  - Consider cultural and social influences on pain and pain management  - Notify physician/advanced practitioner if interventions unsuccessful or patient reports new pain  10/8/2023 0935 by Zoie Arevalo RN  Outcome: Progressing  10/8/2023 0934 by Zoie Arevalo RN  Outcome: Progressing     Problem: SAFETY ADULT  Goal: Patient will remain free of falls  Description: INTERVENTIONS:  - Educate patient/family on patient safety including physical limitations  - Instruct patient to call for assistance with activity   - Consult OT/PT to assist with strengthening/mobility   - Keep Call bell within reach  - Keep bed low and locked with side rails adjusted as appropriate  - Keep care items and personal belongings within reach  - Initiate and maintain comfort rounds  - Make Fall Risk Sign visible to staff  10/8/2023 0934 by Zoie Arevalo RN  Outcome: Progressing     Problem: Knowledge Deficit  Goal: Patient/family/caregiver demonstrates understanding of disease process, treatment plan, medications, and discharge instructions  Description: Complete learning assessment and assess knowledge base.   Interventions:  - Provide teaching at level of understanding  - Provide teaching via preferred learning methods  Outcome: Progressing     Problem: DISCHARGE PLANNING  Goal: Discharge to home or other facility with appropriate resources  Description: INTERVENTIONS:  - Identify barriers to discharge w/patient and caregiver  - Arrange for needed discharge resources and transportation as appropriate  - Identify discharge learning needs (meds, wound care, etc.)  - Arrange for interpretive services to assist at discharge as needed  - Refer to Case Management Department for coordinating discharge planning if the patient needs post-hospital services based on physician/advanced practitioner order or complex needs related to functional status, cognitive ability, or social support system  Outcome: Progressing

## 2023-10-08 NOTE — UTILIZATION REVIEW
Initial Clinical Review    Admission: Date/Time/Statement:   Admission Orders (From admission, onward)     Ordered        10/07/23 1626  INPATIENT ADMISSION  Once                      Orders Placed This Encounter   Procedures   • INPATIENT ADMISSION     Standing Status:   Standing     Number of Occurrences:   1     Order Specific Question:   Level of Care     Answer:   Med Surg [16]     Order Specific Question:   Estimated length of stay     Answer:   More than 2 Midnights     Order Specific Question:   Certification     Answer:   I certify that inpatient services are medically necessary for this patient for a duration of greater than two midnights. See H&P and MD Progress Notes for additional information about the patient's course of treatment. ED Arrival Information     Expected   -    Arrival   10/7/2023 14:51    Acuity   Urgent            Means of arrival   Walk-In    Escorted by   Family Member    Service   Hospitalist    Admission type   Emergency            Arrival complaint   fallow up           Chief Complaint   Patient presents with   • Back Pain     Patient reports seen yesterday here for lower back pain. Patient states she was given oral medications and was advised she can return to ER today. Initial Presentation: 52 y.o. female w/ PMH of L breast ca s/p mastectomy, chemo and radiation presented to ED from home w/  large right lung mass with metastatic disease to the lymph node into the bones found on CT scan yesterday. Pt was admitted yesterday d/t persistent right upper posterior chest wall and mid to upper back pain x 2 weeks. CT showed Large R lung mass. Left AMA. Reports that she had a severe anxiety attack yesterday after hearing the bad news. She is now ready to be admitted and pursue her work-up. In the ED, on exam, tachycardia w/ , normal breath sounds. Admitted as Inpatient for Mass of R lung, acute back pain. Plan: Consult hematology. Consult IR for biopsy.  Start Tylenol for mild pain, oxycodone 2.5 mg for moderate pain, 5 mg for severe pain. Order echocardiogram .    Date: 10/08  Day 2:   IR Consult: CF findings of new right lung mass and multiple osseous lytic lesions. Biopsy possibly on Tues. Can target osseous lesions. Pulmonology Consult: Right hilar lung mass with adenopathy and distal atelectasis. Findings are highly suspicious for primary bronchogenic carcinoma with distant bony metastases. bone biopsy to be performed on Tuesday. bronchoscopy if bone biopsy is negative. On exam, Breath sounds equal bilaterally. Clear to auscultation. ED Triage Vitals [10/07/23 1507]   Temperature Pulse Respirations Blood Pressure SpO2   98.5 °F (36.9 °C) (!) 124 18 144/68 100 %      Temp Source Heart Rate Source Patient Position - Orthostatic VS BP Location FiO2 (%)   Oral Monitor Sitting Left arm --      Pain Score       5          Wt Readings from Last 1 Encounters:   10/08/23 64.9 kg (143 lb)     Additional Vital Signs:   Date/Time Temp Pulse Resp BP MAP (mmHg) SpO2 O2 Device Patient Position - Orthostatic VS   10/08/23 1410 -- 100 -- 126/81 -- -- -- --   10/08/23 0707 97.7 °F (36.5 °C) 96 14 126/81 90 99 % None (Room air) Lying   10/07/23 2305 97.2 °F (36.2 °C) Abnormal  97 16 109/64 80 97 % None (Room air) Lying   10/07/23 1815 97.5 °F (36.4 °C) 110 Abnormal  18 135/93 102 100 % None (Room air) Sitting   10/07/23 1735 -- 105 16 135/80 -- 99 % -- --         Pertinent Labs/Diagnostic Test Results:   IR biopsy bone    (Results Pending)     10/06   CT renal stone study abdomen pelvis:  New extensive osseous lytic lesions throughout the visualized osseous structures. This is suspicious for either extensive metastatic disease or myeloma. Histologic sampling would offer a more definitive diagnosis.     Small right pleural effusion with partially visualized patchy nodular airspace consolidation in the right lower lobe.  Correlation for pneumonia advised.     Soft tissue nodular density to the right of the distal esophagus at the level of the diaphragm, new since the prior study, indeterminate. An enlarged metastatic lymph node is possible. Consider a follow-up chest CT study for additional evaluation.     No CT evidence of nephrolithiasis or obstructive uropathy. Left renal cyst. Mild bladder wall thickening. Correlation for cystitis advised. CT chest:  7.8 x 5.1 cm right middle lobe mass extending to the lateral chest wall and mediastinum and across the major fissure into the right lower lobe, compatible with primary lung cancer or metastatic breast cancer with probable metastatic disease to a lower   esophageal node.     Extensive lytic bone metastases with anterior compression deformity at T10.     Small right effusion.     Results from last 7 days   Lab Units 10/06/23  1306   SARS-COV-2  Negative     Results from last 7 days   Lab Units 10/07/23  1630 10/06/23  1306   WBC Thousand/uL 7.82 10.00   HEMOGLOBIN g/dL 10.8* 11.0*   HEMATOCRIT % 34.0* 36.4   PLATELETS Thousands/uL 516* 473*   NEUTROS ABS Thousands/µL 5.01 6.07         Results from last 7 days   Lab Units 10/07/23  1630 10/06/23  1306   SODIUM mmol/L 135 133*   POTASSIUM mmol/L 4.0 3.8   CHLORIDE mmol/L 98 95*   CO2 mmol/L 25 25   ANION GAP mmol/L 12 13   BUN mg/dL 8 10   CREATININE mg/dL 0.70 0.77   EGFR ml/min/1.73sq m 97 87   CALCIUM mg/dL 9.5 10.0     Results from last 7 days   Lab Units 10/06/23  1306   AST U/L 26   ALT U/L 19   ALK PHOS U/L 123*   TOTAL PROTEIN g/dL 9.1*   ALBUMIN g/dL 4.2   TOTAL BILIRUBIN mg/dL 0.45         Results from last 7 days   Lab Units 10/07/23  1630 10/06/23  1306   GLUCOSE RANDOM mg/dL 117 92                Results from last 7 days   Lab Units 10/06/23  1310   CLARITY UA  Clear   COLOR UA  Yellow   SPEC GRAV UA  <=1.005   PH UA  5.5   GLUCOSE UA mg/dl Negative   KETONES UA mg/dl Negative   BLOOD UA  Negative   PROTEIN UA mg/dl Negative   NITRITE UA  Negative   BILIRUBIN UA  Negative UROBILINOGEN UA E.U./dl 0.2   LEUKOCYTES UA  Negative     Results from last 7 days   Lab Units 10/06/23  1306   INFLUENZA A PCR  Negative   INFLUENZA B PCR  Negative   RSV PCR  Negative            ED Treatment:   Medication Administration from 10/07/2023 1450 to 10/07/2023 1756     None        Past Medical History:   Diagnosis Date   • Breast cancer ([address]) 07/2013    left-chemotherapy   • Fibroma    • History of chemotherapy    • History of external beam radiation therapy      Present on Admission:  • Mass of right lung  • Acute back pain  • Sinus tachycardia  • Bladder wall thickening      Admitting Diagnosis: Back pain [M54.9]  Metastasis (HCC) [C79.9]  Mass of right lung [R91.8]  Age/Sex: 52 y.o. female  Admission Orders:    Scheduled Medications:  anastrozole, 1 mg, Oral, Daily  enoxaparin, 40 mg, Subcutaneous, Daily      Continuous IV Infusions: none     PRN Meds:  acetaminophen, 650 mg, Oral, Q6H PRN 10/07 x 1, 10/08 x 1  oxyCODONE, 5 mg, Oral, Q6H PRN  oxyCODONE, 2.5 mg, Oral, Q6H PRN        IP CONSULT TO ONCOLOGY  INPATIENT CONSULT TO IR  IP CONSULT TO PULMONOLOGY    Network Utilization Review Department  ATTENTION: Please call with any questions or concerns to [phone] and carefully listen to the prompts so that you are directed to the right person. All voicemails are confidential.   For Discharge needs, contact Care Management DC Support Team at [phone] opt. 2  Send all requests for admission clinical reviews, approved or denied determinations and any other requests to dedicated fax number below belonging to the campus where the patient is receiving treatment.  List of dedicated fax numbers for the Facilities:  [place] DENIALS (Administrative/Medical Necessity) [phone]   DISCHARGE SUPPORT TEAM (NETWORK) [id number] (Maternity/NICU/Pediatrics) [address] [phone]   [address] [phone]   [address] [place] [address] [phone]   [address] [address] [phone]

## 2023-10-08 NOTE — CONSULTS
Consultation - [address] 52 y.o. female MRN: [id number]  Unit/Bed#: E2 -01 Encounter: [id number]  Referring physician: Fredrick Jimenes internal medicine in [facility]  Date of service 10/8/2023  Reason for Consult: Most likely new lung cancer with metastatic disease. HPI: Kamlesh Palmer is a 52y.o. year old female. I saw patient earlier today in the afternoon. Patient's  and daughter were in the room. Patient's daughter is bilingual and she helped with interpretation. Patient is on adjuvant Arimidex for breast cancer from Dr. Alcye Richardson. Here are the notes of Dr. Alyce Richardson of patient's last visit with him in January 2023:   urgically postmenopausal woman with locally advanced left breast cancer, grade 2, ER/IL positive HER-2 negative disease, diagnosed in 2013. She has no evidence of BRCA mutation. She underwent neoadjuvant chemotherapy with AC followed by paclitaxel resulting in clinical partial response. However, when she had mastectomy, she had significant residual disease, as well as 2 positive lymph node. She completed postmastectomy radiation therapy. She was previously on adjuvant hormonal therapy with tamoxifen, until she underwent hysterectomy and bilateral oophorectomy for fibroid. Subsequently, she was on anastrozole since 2015 through October 2022. For some reason, she stopped anastrozole. Clinically, she has no evidence of recurrent disease. I recommended her to restart anastrozole 1 mg once a day for 1 more year to complete 10 years of adjuvant hormonal therapy. Now she has a new large mass in right lung with regional lymphadenopathy and metastatic disease to bones/thoracic spine with compression of T10. Patient has pain in her mid back that is manageable now with pain medications. She does not have pulmonary symptoms. She is a non-smoker. She is maintaining her weight.     ROS:  10/08/23 Reviewed 12 systems: See symptoms in HPI  Presently no other neurological, cardiac, pulmonary, GI and  symptoms other than listed in HPI. Other symptoms are in HPI. No  fever, chills, bleeding,  skin rash, weight loss, night sweats, arthritic symptoms,  tiredness , weakness, numbness, claudication and gait problem. No frequent infections. Not unusually sensitive to heat or cold. No swelling of the ankles. No swollen glands. Patient is anxious.        Historical Information   Past Medical History:   Diagnosis Date   • Breast cancer ([address]) 07/2013    left-chemotherapy   • Fibroma    • History of chemotherapy    • History of external beam radiation therapy      Past Surgical History:   Procedure Laterality Date   • APPENDECTOMY     • BREAST SURGERY  2014    Left breast 2/2 breast CA   • HYSTERECTOMY  2015    Due to fibroma   • MASTECTOMY Left 2013   • OOPHORECTOMY       Social History   Social History     Substance and Sexual Activity   Alcohol Use No     Social History     Substance and Sexual Activity   Drug Use No     Social History     Tobacco Use   Smoking Status Never   • Passive exposure: Never   Smokeless Tobacco Never     Family History:   Family History   Problem Relation Age of Onset   • Lung cancer Mother    • No Known Problems Father    • No Known Problems Sister    • No Known Problems Daughter    • No Known Problems Daughter    • Cancer Maternal Grandmother    • No Known Problems Maternal Grandfather    • No Known Problems Paternal Grandmother    • No Known Problems Paternal Grandfather    • Uterine cancer Maternal Aunt    • No Known Problems Son    • No Known Problems Paternal Aunt          Current Facility-Administered Medications:   •  acetaminophen (TYLENOL) tablet 650 mg, 650 mg, Oral, Q6H PRN, Rahul Blevins MD, 650 mg at 10/08/23 0908  •  anastrozole (ARIMIDEX) tablet 1 mg, 1 mg, Oral, Daily, Ivis Schreiber MD, 1 mg at 10/08/23 3788  •  enoxaparin (LOVENOX) subcutaneous injection 40 mg, 40 mg, Subcutaneous, Daily, Giovanny Mitchell Ranjan Zapata MD, 40 mg at 10/08/23 0646  •  oxyCODONE (ROXICODONE) IR tablet 5 mg, 5 mg, Oral, Q6H PRN, Chuy Fermin MD  •  oxyCODONE (ROXICODONE) split tablet 2.5 mg, 2.5 mg, Oral, Q6H PRN, Chuy Fermin MD    Allergies   Allergen Reactions   • Aspirin Rash     Other reaction(s): Palpitations     @ ROS@  Physical Exam:  Vitals:    10/07/23 2305 10/08/23 0707 10/08/23 1410 10/08/23 1500   BP: 109/64 126/81 126/81 142/92   BP Location: Right arm Right arm  Right arm   Pulse: 97 96 100 105   Resp: 16 14  16   Temp: (!) 97.2 °F (36.2 °C) 97.7 °F (36.5 °C)  (!) 96.9 °F (36.1 °C)   TempSrc: Temporal Temporal  Temporal   SpO2: 97% 99%  100%   Weight:   64.9 kg (143 lb)    Height:   5' 5" (1.651 m)      Alert, oriented, not in distress, vitals are above, no icterus, no oral thrush, no palpable neck mass, decreased breath sounds right upper lung, clear left lung fields, regular heart rate, abdomen  soft and non tender, no palpable abdominal mass, no ascites, no edema of ankles, no calf tenderness, no focal neurological deficit, no skin rash, no palpable lymphadenopathy in the neck and axillary areas,  no clubbing. Patient is anxious. Performance status 1 at present with control of pain. Lab Results: I have reviewed all pertinent labs.   LABS:  Results for orders placed or performed during the hospital encounter of 10/07/23   CBC and differential   Result Value Ref Range    WBC 7.82 4.31 - 10.16 Thousand/uL    RBC 4.12 3.81 - 5.12 Million/uL    Hemoglobin 10.8 (L) 11.5 - 15.4 g/dL    Hematocrit 34.0 (L) 34.8 - 46.1 %    MCV 83 82 - 98 fL    MCH 26.2 (L) 26.8 - 34.3 pg    MCHC 31.8 31.4 - 37.4 g/dL    RDW 15.3 (H) 11.6 - 15.1 %    MPV 9.1 8.9 - 12.7 fL    Platelets 123 (H) 764 - 390 Thousands/uL    nRBC 0 /100 WBCs    Neutrophils Relative 64 43 - 75 %    Immat GRANS % 1 0 - 2 %    Lymphocytes Relative 25 14 - 44 %    Monocytes Relative 10 4 - 12 %    Eosinophils Relative 0 0 - 6 %    Basophils Relative 0 0 - 1 %    Neutrophils Absolute 5.01 1.85 - 7.62 Thousands/µL    Immature Grans Absolute 0.06 0.00 - 0.20 Thousand/uL    Lymphocytes Absolute 1.92 0.60 - 4.47 Thousands/µL    Monocytes Absolute 0.80 0.17 - 1.22 Thousand/µL    Eosinophils Absolute 0.00 0.00 - 0.61 Thousand/µL    Basophils Absolute 0.03 0.00 - 0.10 Thousands/µL   Basic metabolic panel   Result Value Ref Range    Sodium 135 135 - 147 mmol/L    Potassium 4.0 3.5 - 5.3 mmol/L    Chloride 98 96 - 108 mmol/L    CO2 25 21 - 32 mmol/L    ANION GAP 12 mmol/L    BUN 8 5 - 25 mg/dL    Creatinine 0.70 0.60 - 1.30 mg/dL    Glucose 117 65 - 140 mg/dL    Calcium 9.5 8.4 - 10.2 mg/dL    eGFR 97 ml/min/1.73sq m   ECG 12 lead   Result Value Ref Range    Ventricular Rate 115 BPM    Atrial Rate 115 BPM    WV Interval 152 ms    QRSD Interval 78 ms    QT Interval 312 ms    QTC Interval 431 ms    P Axis 65 degrees    QRS Axis 58 degrees    T Wave Axis 48 degrees   Echo complete w/ contrast if indicated   Result Value Ref Range    LA Volume Index (BP) 19.8 mL/m2    LA size 3.2 cm    LVPWd 0.70 cm    Left Atrium Area-systolic Apical Two Chamber 8.9 cm2    Left Atrium Area-systolic Four Chamber 73.1 cm2    MV E' Tissue Velocity Septal 13 cm/s    Tricuspid annular plane systolic excursion 4.04 cm    IVSd 2.73 cm    LV DIASTOLIC VOLUME (MOD BIPLANE) 2D 99 mL    LEFT VENTRICLE SYSTOLIC VOLUME (MOD BIPLANE) 2D 24 mL    Left ventricular stroke volume (2D) 75.00 mL    A4C EF 64 %    LA length (A2C) 3.30 cm    LVIDd 4.60 cm    IVS 0.7 cm    LVIDS 2.60 cm    FS 43 28 - 44 %    LA volume (BP) 34 mL    Ao root 2.30 cm    RVID d 3.0 cm    MV valve area p 1/2 method 12.94 cm2    E/A ratio 1.19     E wave deceleration time 57 ms    MV Peak E Jaden 64 cm/s    MV Peak A Jaden 0.54 m/s    BING A4C 16.3 cm2    RAA A4C 13.6 cm2    MV stenosis pressure 1/2 time 17 ms    LVSV, 2D 75 mL         Imaging Studies: I have personally reviewed pertinent reports.     IMPRESSION:     7.8 x 5.1 cm right middle lobe mass extending to the lateral chest wall and mediastinum and across the major fissure into the right lower lobe, compatible with primary lung cancer or metastatic breast cancer with probable metastatic disease to a lower   esophageal node.     Extensive lytic bone metastases with anterior compression deformity at T10.     Small right effusion.     I notified Bebeto Frost by secure text on 10/6/2023 4:18 PM and she responded immediately.              Workstation performed: [id number]        Imaging    CT chest with contrast (Order: [id number]) - 10/6/2023    Status: Final result     PACS Images     Show images for CT renal stone study abdomen pelvis wo contrast  Study Result    Narrative & Impression   CT ABDOMEN AND PELVIS WITHOUT IV CONTRAST - LOW DOSE RENAL STONE     INDICATION:   Flank pain, kidney stone suspected  back pain, urinary frequency.        COMPARISON: 2/2/2015.     TECHNIQUE:  Low radiation dose thin section CT examination of the abdomen and pelvis was performed without intravenous or oral contrast according to a protocol specifically designed to evaluate for urinary tract calculus. Axial, sagittal, and coronal 2D   reformatted images were created from the source data and submitted for interpretation. Evaluation for pathology in the abdomen and pelvis that is unrelated to urinary tract calculi is limited.     Radiation dose length product (DLP) for this visit:  280 mGy-cm . This examination, like all CT scans performed in the [facility], was performed utilizing techniques to minimize radiation dose exposure, including the use of iterative   reconstruction and automated exposure control.     URINARY TRACT FINDINGS:     RIGHT KIDNEY AND URETER:  No urinary tract calculi. No hydronephrosis or hydroureter.     LEFT KIDNEY AND URETER:  No urinary tract calculi. No hydronephrosis or hydroureter.  There is a 1.4 cm cyst laterally in the left kidney, slightly increased in size.     URINARY BLADDER: No bladder calculus but there may be mild bladder wall thickening. Correlation for cystitis advised.        ADDITIONAL FINDINGS:     LOWER CHEST: Small right pleural effusion. Patchy nodular airspace consolidation partially seen in the right upper lobe. Correlation for pneumonia advised.     SOLID VISCERA: Limited low radiation dose noncontrast CT evaluation demonstrates no clinically significant abnormality of the imaged portions of the liver, spleen, pancreas, or adrenal glands.     GALLBLADDER/BILIARY TREE:  No calcified gallstones. No pericholecystic inflammatory change. No biliary dilatation.     STOMACH AND BOWEL: There is no bowel obstruction or focal inflammatory process.     APPENDIX:  No findings to suggest appendicitis.     ABDOMINOPELVIC CAVITY: There is no significant free fluid or free air. There is a 2.9 x 1.4 cm soft tissue density seen to the right of the distal esophagus and to the right of the descending thoracic aorta at the level of the diaphragm on series 2,   image 16. This is indeterminate. This could reflect an enlarged lymph node. No other enlarged lymph nodes are seen.     VESSELS: Unremarkable for patient's age.     REPRODUCTIVE ORGANS: Status post hysterectomy     ABDOMINAL WALL/INGUINAL REGIONS:  Unremarkable.     OSSEOUS STRUCTURES: There are extensive lytic lesions throughout the visualized osseous structures, new since the prior study. The findings most likely reflect either metastatic disease or myeloma.     IMPRESSION:     New extensive osseous lytic lesions throughout the visualized osseous structures. This is suspicious for either extensive metastatic disease or myeloma. Histologic sampling would offer a more definitive diagnosis.     Small right pleural effusion with partially visualized patchy nodular airspace consolidation in the right lower lobe.  Correlation for pneumonia advised.     Soft tissue nodular density to the right of the distal esophagus at the level of the diaphragm, new since the prior study, indeterminate. An enlarged metastatic lymph node is possible. Consider a follow-up chest CT study for additional evaluation.     No CT evidence of nephrolithiasis or obstructive uropathy. Left renal cyst. Mild bladder wall thickening. Correlation for cystitis advised.                 Workstation performed: [id number]        Imaging    CT renal stone study abdomen pelvis wo contrast (Order: [id number]) - 10/6/2023  IMPRESSION:  No mammographic evidence of malignancy.           ASSESSMENT/BI-RADS CATEGORY:  Right: 1 - Negative  Overall: 1 - Negative     RECOMMENDATION:       - Routine screening mammogram in 1 year for the right breast.     Workstation ID: [id number]        Imaging    Mammo screening right w 3d & cad (Order: [id number]) - 8/14/2023      Pathology, and Other Studies: I have personally reviewed pertinent reports. Reviewed test results. Assessment and Plan:  See diagnoses, orders instructions below   Patient is on adjuvant Arimidex for breast cancer from Dr. Alvin Gaitan. Here are the notes of Dr. Alvin Gaitan of patient's last visit with him in January 2023:   urgically postmenopausal woman with locally advanced left breast cancer, grade 2, ER/MS positive HER-2 negative disease, diagnosed in 2013. She has no evidence of BRCA mutation. She underwent neoadjuvant chemotherapy with AC followed by paclitaxel resulting in clinical partial response. However, when she had mastectomy, she had significant residual disease, as well as 2 positive lymph node. She completed postmastectomy radiation therapy. She was previously on adjuvant hormonal therapy with tamoxifen, until she underwent hysterectomy and bilateral oophorectomy for fibroid. Subsequently, she was on anastrozole since 2015 through October 2022. For some reason, she stopped anastrozole. Clinically, she has no evidence of recurrent disease.   I recommended her to restart anastrozole 1 mg once a day for 1 more year to complete 10 years of adjuvant hormonal therapy. Now she has a new large mass in right lung with regional lymphadenopathy and metastatic disease to bones/thoracic spine with compression of T10. Patient has pain in her mid back that is manageable now with pain medications. She does not have pulmonary symptoms. She is a non-smoker. She is maintaining her weight. Physical examination and test results are as recorded and discussed in detail. In my opinion she has a new lung cancer unrelated to her previous breast cancer and she needs tissue diagnosis and to find out extent of her disease. I see there is a consult for IR. I would also suggest whole-body bone scan, MRI scan of thoracic spine and MRI scan of the brain. Please send tissue sample for molecular testing/NGS. She may need palliative radiation to T10 in addition to systemic therapy and systemic therapy will depend upon type of malignancy and NGS. All discussed with patient and her family in detail. Questions were answered. .  Suspecting stage IV lung cancer  . Suspecting metastatic disease to bones. .  Previous history of breast cancer         Patient will continue to follow with  primary physician and other consultants. Patient voiced understanding and agrees      Disclaimer: This document was prepared using a dictation device. If a word or phrase is confusing, or does not make sense, this is likely due to recognition error which was not discovered during the providers review. If you believe an error has occurred, please Contact me through [employer] service for [place]? cation. Counseling / Coordination of Care  . Desmond Gibson   Provided counseling and support

## 2023-10-08 NOTE — ASSESSMENT & PLAN NOTE
Bladder wall thickening with normal urinalysis and no leukocytosis.   · Low suspicion for UTI, no indication for antibiotics at this time

## 2023-10-08 NOTE — PLAN OF CARE
Problem: PAIN - ADULT  Goal: Verbalizes/displays adequate comfort level or baseline comfort level  Description: Interventions:  - Encourage patient to monitor pain and request assistance  - Assess pain using appropriate pain scale  - Administer analgesics based on type and severity of pain and evaluate response  - Implement non-pharmacological measures as appropriate and evaluate response  - Consider cultural and social influences on pain and pain management  - Notify physician/advanced practitioner if interventions unsuccessful or patient reports new pain  Outcome: Progressing     Problem: SAFETY ADULT  Goal: Patient will remain free of falls  Description: INTERVENTIONS:  - Educate patient/family on patient safety including physical limitations  - Instruct patient to call for assistance with activity   - Consult OT/PT to assist with strengthening/mobility   - Keep Call bell within reach  - Keep bed low and locked with side rails adjusted as appropriate  - Keep care items and personal belongings within reach  - Initiate and maintain comfort rounds  - Make Fall Risk Sign visible to staff  - Offer Toileting every 2 Hours, in advance of need  - Initiate/Maintain bed alarm  - Obtain necessary fall risk management equipment  - Apply yellow socks and bracelet for high fall risk patients  - Consider moving patient to room near nurses station  Outcome: Progressing  Goal: Maintain or return to baseline ADL function  Description: INTERVENTIONS:  -  Assess patient's ability to carry out ADLs; assess patient's baseline for ADL function and identify physical deficits which impact ability to perform ADLs (bathing, care of mouth/teeth, toileting, grooming, dressing, etc.)  - Assess/evaluate cause of self-care deficits   - Assess range of motion  - Assess patient's mobility; develop plan if impaired  - Assess patient's need for assistive devices and provide as appropriate  - Encourage maximum independence but intervene and supervise when necessary  - Involve family in performance of ADLs  - Assess for home care needs following discharge   - Consider OT consult to assist with ADL evaluation and planning for discharge  - Provide patient education as appropriate  Outcome: Progressing  Goal: Maintains/Returns to pre admission functional level  Description: INTERVENTIONS:  - Perform BMAT or MOVE assessment daily.   - Set and communicate daily mobility goal to care team and patient/family/caregiver. - Collaborate with rehabilitation services on mobility goals if consulted  - Perform Range of Motion 3 times a day. - Reposition patient every 2 hours.   - Dangle patient 3 times a day  - Stand patient 3 times a day  - Ambulate patient 3 times a day  - Out of bed to chair 3 times a day   - Out of bed for meals 3 times a day  - Out of bed for toileting  - Record patient progress and toleration of activity level   Outcome: Progressing

## 2023-10-08 NOTE — TELEMEDICINE
e-Consult ([facility])  - Interventional Radiology  Isela Holbrook 52 y.o. female MRN: [id number]  Unit/Bed#: E2 -01 Encounter: [id number]          Interventional Radiology has been consulted to evaluate Isela Holbrook    We were consulted by Dr. Sultana Ernst concerning this patient with new lesions. Inpatient Consult to IR  Consult performed by: Ansuh John DO  Consult ordered by: Ashwin Monteiro MD        10/08/23    Assessment/Recommendation:   52 y F with history of breast CA presents with posterior chest wall pain with CF findings of new right lung mass and multiple osseous lytic lesions. Request for biopsy. Can perform this week, possibly Tue. Can target osseous lesions. 5-10 minutes, >50% of the total time devoted to medical consultative verbal/EMR discussion between providers. Written report will be generated in the EMR. Thank you for allowing Interventional Radiology to participate in the care of Isela Holbrook. Please don't hesitate to call or TigerText us with any questions.      Anshu John DO

## 2023-10-08 NOTE — CONSULTS
Consultation - Pulmonary Medicine   Jj Nelson 52 y.o. female MRN: [id number]  Unit/Bed#: E2 -01 Encounter: [id number]      Physician Requesting Consult: Dr. Luis Silver  Reason for Consult: Lung mass    Assessment/Plan:    1. Right hilar lung mass with adenopathy and distal atelectasis  • Findings are highly suspicious for primary bronchogenic carcinoma with distant bony metastases  • This would be amenable to bronchoscopic biopsy. • Would pursue bronchoscopy if bone biopsy is negative  • This could be done as an outpatient after discharge  2. Bony metastasis  • Has recommended bone biopsy to be performed on Tuesday. • If diagnostic, would not need bronchoscopy as this would serve as staging and primary diagnostic test  3. History of breast cancer, remote  • Breast cancer history is greater than 10 years ago and was considered cured  • Has not had any problems with mammography  4. Primary breast cancer, although possible, is considered less likely  ______________________________________________________________________    HPI: [employer]  was utilized for history. Jj Nelson is a 52 y.o. female who presented for back pain. She had CT imaging with suggested bony metastases and ultimately was admitted for work-up and management. She has remote history of breast cancer which was treated with chemotherapy and radiation in the past.  This was more than 10 years ago. She had been maintained on anastrozole. Further work-up with chest CAT scan revealed right hilar mass with atelectasis suggesting primary bronchogenic carcinoma. Also found to have para-aortic/paraesophageal adenopathy which is confluent and masslike as well. Multiple lytic bony metastases were also noted    She is a lifelong non-smoker. She has no history of occupational exposure to dust, fumes, or other known pulmonary carcinogens. No headache or neurologic symptoms. No fever, chills, or infection symptoms.   Denies abdominal pain. She has been losing some weight inconsistently    Review of Systems:  Full 12 point review of systems was performed. Aside from what was mentioned in the HPI, it is otherwise negative. Historical Information   Past Medical History:   Diagnosis Date   • Breast cancer ([address]) 07/2013    left-chemotherapy   • Fibroma    • History of chemotherapy    • History of external beam radiation therapy      Past Surgical History:   Procedure Laterality Date   • APPENDECTOMY     • BREAST SURGERY  2014    Left breast 2/2 breast CA   • HYSTERECTOMY  2015    Due to fibroma   • MASTECTOMY Left 2013   • OOPHORECTOMY       Social History   Social History     Substance and Sexual Activity   Alcohol Use No     Social History     Tobacco Use   Smoking Status Never   • Passive exposure: Never   Smokeless Tobacco Never       Family History:   Family History   Problem Relation Age of Onset   • Lung cancer Mother    • No Known Problems Father    • No Known Problems Sister    • No Known Problems Daughter    • No Known Problems Daughter    • Cancer Maternal Grandmother    • No Known Problems Maternal Grandfather    • No Known Problems Paternal Grandmother    • No Known Problems Paternal Grandfather    • Uterine cancer Maternal Aunt    • No Known Problems Son    • No Known Problems Paternal Aunt        Medications: The patient's active and prehospital medications were reviewed.   Current Facility-Administered Medications   Medication Dose Route Frequency Provider Last Rate   • acetaminophen  650 mg Oral Q6H PRN Shabnam Hill MD     • anastrozole  1 mg Oral Daily Shabnam Hill MD     • enoxaparin  40 mg Subcutaneous Daily Shabnam Hill MD     • oxyCODONE  5 mg Oral Q6H PRN Shabnam Hill MD     • oxyCODONE  2.5 mg Oral Q6H PRN Shabnam Hill MD           PhysicalExamination:  Vitals:   Vitals:    10/07/23 1735 10/07/23 1815 10/07/23 2305 10/08/23 0707   BP: 135/80 135/93 109/64 126/81   BP Location:  Right arm Right arm Right arm   Pulse: 105 (!) 110 97 96   Resp: 16 18 16 14   Temp:  97.5 °F (36.4 °C) (!) 97.2 °F (36.2 °C) 97.7 °F (36.5 °C)   TempSrc:  Temporal Temporal Temporal   SpO2: 99% 100% 97% 99%   Weight:         Body mass index is 23.92 kg/m². Temp  Min: 97.2 °F (36.2 °C)  Max: 98.5 °F (36.9 °C)       SpO2: 99 %,   SpO2 Activity: At Rest,   O2 Device: None (Room air)    Gen: She appears comfortable on room air. No respiratory distress  HEENT: Gaze. No scleral icterus  Neck: Symmetric chest wall excursion. No adenopathy appreciated  Chest: Breath sounds equal bilaterally. Clear to auscultation  Cardiac: Regular  Abd: Soft, benign  Ext: No edema  Neuro: Nonfocal  Skin: No rash    Diagnostic Data:  CBC:   Results from last 7 days   Lab Units 10/07/23  1630 10/06/23  1306   WBC Thousand/uL 7.82 10.00   HEMOGLOBIN g/dL 10.8* 11.0*   HEMATOCRIT % 34.0* 36.4   PLATELETS Thousands/uL 516* 473*       CMP:   Results from last 7 days   Lab Units 10/07/23  1630 10/06/23  1306   SODIUM mmol/L 135 133*   POTASSIUM mmol/L 4.0 3.8   CHLORIDE mmol/L 98 95*   CO2 mmol/L 25 25   BUN mg/dL 8 10   CREATININE mg/dL 0.70 0.77   CALCIUM mg/dL 9.5 10.0   ALK PHOS U/L  --  123*   ALT U/L  --  19   AST U/L  --  26         Microbiology:          Results from last 7 days   Lab Units 10/06/23  1306   SARS-COV-2  Negative   INFLUENZA A PCR  Negative   INFLUENZA B PCR  Negative   RSV PCR  Negative       Imaging: The pertinent imaging studies were reviewed personally on the PACS system  CAT scan was viewed on the PACS system and does showRight hilar lung mass with likely distal atelectasis transfer text. There is confluent adenopathy in the periaortic/distal esophageal region.   Probable lytic metastases noted      Blake Cannon MD

## 2023-10-08 NOTE — PROGRESS NOTES
[facility]  Progress Note  Name: Bk Willard  MRN: [id number]  Unit/Bed#: E2 -01 I Date of Admission: 10/7/2023   Date of Service: 10/8/2023 I Hospital Day: 1    Assessment/Plan   * Mass of right lung  Assessment & Plan  Patient is a 64-year-old female with history of left breast cancer status postmastectomy and due to her ED as a result of right upper quadrant posterior chest wall and mid to upper back pain for the last 2 weeks. Ridging findings show evidence of a large right lung mass with extensive osseous meta stasis. · Besides pain, patient currently on room air. · Interventional radiology was consulted for biopsy. · Pulmonary was consulted who will consider performing biopsy via bronchoscopy should IR biopsy yield negative results  · Hematology-oncology evaluation pending. · Continue supportive care    Acute back pain  Assessment & Plan  Likely secondary to possible metastatic disease  · Continue pain control    Bladder wall thickening  Assessment & Plan  Bladder wall thickening with normal urinalysis and no leukocytosis. · Low suspicion for UTI, no indication for antibiotics at this time    Carcinoma of left breast, estrogen receptor positive   Assessment & Plan  Remote history of breast cancer treated with  mastectomy, chemo and radiation approximately 10 years ago.   · Maintained on anastrozole daily  · Continue outpatient follow-up           VTE Pharmacologic Prophylaxis:   Pharmacologic: Enoxaparin (Lovenox)  Mechanical VTE Prophylaxis in Place: Yes    Discussions with Specialists or Other Care Team Provider: nursing    Education and Discussions with Family / Patient: patient, family    Current Length of Stay: 1 day(s)    Current Patient Status: Inpatient   Certification Statement: The patient will continue to require additional inpatient hospital stay due to ir biopsy, hematology evaluation    Discharge Plan: active    Code Status: Level 1 - Full Code      Subjective:   Patient seen and examined at bedside with her family. Patient reports that her pain is better controlled today with her current regimen. She is aware of the treatment plan. I utilized [employer]  services to communicate with her. Objective:     Vitals:   Temp (24hrs), Av.3 °F (36.3 °C), Min:96.9 °F (36.1 °C), Max:97.7 °F (36.5 °C)    Temp:  [96.9 °F (36.1 °C)-97.7 °F (36.5 °C)] 96.9 °F (36.1 °C)  HR:  [] 105  Resp:  [14-18] 16  BP: (109-142)/(64-93) 142/92  SpO2:  [97 %-100 %] 100 %  Body mass index is 23.8 kg/m². Input and Output Summary (last 24 hours): Intake/Output Summary (Last 24 hours) at 10/8/2023 1656  Last data filed at 10/8/2023 7524  Gross per 24 hour   Intake 240 ml   Output --   Net 240 ml       Physical Exam:     Physical Exam  Vitals reviewed. HENT:      Head: Normocephalic. Nose: Nose normal.      Mouth/Throat:      Mouth: Mucous membranes are moist.   Eyes:      General: No scleral icterus. Cardiovascular:      Rate and Rhythm: Normal rate and regular rhythm. Pulmonary:      Effort: Pulmonary effort is normal. No respiratory distress. Breath sounds: No wheezing or rales. Abdominal:      General: There is no distension. Palpations: Abdomen is soft. Tenderness: There is no abdominal tenderness. Musculoskeletal:      Right lower leg: No edema. Left lower leg: No edema. Skin:     General: Skin is warm. Neurological:      Mental Status: She is alert. Mental status is at baseline.    Psychiatric:         Mood and Affect: Mood normal.         Behavior: Behavior normal.       Additional Data:     Labs:    Results from last 7 days   Lab Units 10/07/23  1630   WBC Thousand/uL 7.82   HEMOGLOBIN g/dL 10.8*   HEMATOCRIT % 34.0*   PLATELETS Thousands/uL 516*   NEUTROS PCT % 64   LYMPHS PCT % 25   MONOS PCT % 10   EOS PCT % 0     Results from last 7 days   Lab Units 10/07/23  1630 10/06/23  1306   SODIUM mmol/L 135 133* POTASSIUM mmol/L 4.0 3.8   CHLORIDE mmol/L 98 95*   CO2 mmol/L 25 25   BUN mg/dL 8 10   CREATININE mg/dL 0.70 0.77   ANION GAP mmol/L 12 13   CALCIUM mg/dL 9.5 10.0   ALBUMIN g/dL  --  4.2   TOTAL BILIRUBIN mg/dL  --  0.45   ALK PHOS U/L  --  123*   ALT U/L  --  19   AST U/L  --  26   GLUCOSE RANDOM mg/dL 117 92                           * I Have Reviewed All Lab Data Listed Above. * Additional Pertinent Lab Tests Reviewed: [address] Admission Reviewed      Lines:   Invasive Devices     Peripheral Intravenous Line  Duration           Peripheral IV 10/06/23 Right Antecubital 2 days    Peripheral IV 10/07/23 Right;Ventral (anterior) Forearm 1 day                   Imaging:    Imaging Reports Reviewed Today Include: ct chest, ct renal stone study    Recent Cultures (last 7 days):           Last 24 Hours Medication List:   Current Facility-Administered Medications   Medication Dose Route Frequency Provider Last Rate   • acetaminophen  650 mg Oral Q6H PRN Anita Jaramillo MD     • anastrozole  1 mg Oral Daily Anita Jaramillo MD     • enoxaparin  40 mg Subcutaneous Daily Anita Jaramillo MD     • oxyCODONE  5 mg Oral Q6H PRN Anita Jaramillo MD     • oxyCODONE  2.5 mg Oral Q6H PRN Anita Jaramillo MD          Today, Patient Was Seen By: Jed Hinds MD    ** Please Note: Dictation voice to text software may have been used in the creation of this document.  **

## 2023-10-08 NOTE — ASSESSMENT & PLAN NOTE
Remote history of breast cancer treated with  mastectomy, chemo and radiation approximately 10 years ago.   · Maintained on anastrozole daily  · Continue outpatient follow-up

## 2023-10-09 ENCOUNTER — APPOINTMENT (OUTPATIENT)
Dept: NUCLEAR MEDICINE | Facility: HOSPITAL | Age: 53
DRG: 694 | End: 2023-10-09
Payer: COMMERCIAL

## 2023-10-09 ENCOUNTER — APPOINTMENT (INPATIENT)
Dept: NUCLEAR MEDICINE | Facility: HOSPITAL | Age: 53
DRG: 694 | End: 2023-10-09
Payer: COMMERCIAL

## 2023-10-09 ENCOUNTER — APPOINTMENT (INPATIENT)
Dept: CT IMAGING | Facility: HOSPITAL | Age: 53
DRG: 694 | End: 2023-10-09
Attending: RADIOLOGY
Payer: COMMERCIAL

## 2023-10-09 LAB
INR PPP: 1.06 (ref 0.84–1.19)
PLATELET # BLD AUTO: 462 THOUSANDS/UL (ref 149–390)
PMV BLD AUTO: 8.7 FL (ref 8.9–12.7)
PROTHROMBIN TIME: 13.8 SECONDS (ref 11.6–14.5)

## 2023-10-09 PROCEDURE — 99152 MOD SED SAME PHYS/QHP 5/>YRS: CPT | Performed by: RADIOLOGY

## 2023-10-09 PROCEDURE — A9503 TC99M MEDRONATE: HCPCS

## 2023-10-09 PROCEDURE — 88344 IMHCHEM/IMCYTCHM EA MLT ANTB: CPT | Performed by: PATHOLOGY

## 2023-10-09 PROCEDURE — 88307 TISSUE EXAM BY PATHOLOGIST: CPT | Performed by: PATHOLOGY

## 2023-10-09 PROCEDURE — 20220 BONE BIOPSY TROCAR/NDL SUPFC: CPT | Performed by: RADIOLOGY

## 2023-10-09 PROCEDURE — 88342 IMHCHEM/IMCYTCHM 1ST ANTB: CPT | Performed by: PATHOLOGY

## 2023-10-09 PROCEDURE — 88341 IMHCHEM/IMCYTCHM EA ADD ANTB: CPT | Performed by: PATHOLOGY

## 2023-10-09 PROCEDURE — 85610 PROTHROMBIN TIME: CPT | Performed by: RADIOLOGY

## 2023-10-09 PROCEDURE — 99232 SBSQ HOSP IP/OBS MODERATE 35: CPT | Performed by: INTERNAL MEDICINE

## 2023-10-09 PROCEDURE — 85049 AUTOMATED PLATELET COUNT: CPT | Performed by: INTERNAL MEDICINE

## 2023-10-09 PROCEDURE — G1004 CDSM NDSC: HCPCS

## 2023-10-09 PROCEDURE — 77012 CT SCAN FOR NEEDLE BIOPSY: CPT | Performed by: RADIOLOGY

## 2023-10-09 PROCEDURE — 78306 BONE IMAGING WHOLE BODY: CPT

## 2023-10-09 PROCEDURE — 88360 TUMOR IMMUNOHISTOCHEM/MANUAL: CPT | Performed by: PATHOLOGY

## 2023-10-09 PROCEDURE — 88374 M/PHMTRC ALYS ISHQUANT/SEMIQ: CPT | Performed by: PATHOLOGY

## 2023-10-09 PROCEDURE — 99153 MOD SED SAME PHYS/QHP EA: CPT

## 2023-10-09 PROCEDURE — 99152 MOD SED SAME PHYS/QHP 5/>YRS: CPT

## 2023-10-09 PROCEDURE — 88311 DECALCIFY TISSUE: CPT | Performed by: PATHOLOGY

## 2023-10-09 RX ORDER — LIDOCAINE WITH 8.4% SOD BICARB 0.9%(10ML)
SYRINGE (ML) INJECTION AS NEEDED
Status: COMPLETED | OUTPATIENT
Start: 2023-10-09 | End: 2023-10-09

## 2023-10-09 RX ORDER — FENTANYL CITRATE 50 UG/ML
INJECTION, SOLUTION INTRAMUSCULAR; INTRAVENOUS AS NEEDED
Status: COMPLETED | OUTPATIENT
Start: 2023-10-09 | End: 2023-10-09

## 2023-10-09 RX ORDER — MIDAZOLAM HYDROCHLORIDE 2 MG/2ML
INJECTION, SOLUTION INTRAMUSCULAR; INTRAVENOUS AS NEEDED
Status: COMPLETED | OUTPATIENT
Start: 2023-10-09 | End: 2023-10-09

## 2023-10-09 RX ADMIN — ANASTROZOLE 1 MG: 1 TABLET, COATED ORAL at 08:48

## 2023-10-09 RX ADMIN — FENTANYL CITRATE 50 MCG: 50 INJECTION INTRAMUSCULAR; INTRAVENOUS at 13:56

## 2023-10-09 RX ADMIN — MIDAZOLAM 0.5 MG: 1 INJECTION INTRAMUSCULAR; INTRAVENOUS at 14:01

## 2023-10-09 RX ADMIN — FENTANYL CITRATE 25 MCG: 50 INJECTION INTRAMUSCULAR; INTRAVENOUS at 14:01

## 2023-10-09 RX ADMIN — Medication 10 ML: at 13:59

## 2023-10-09 RX ADMIN — MIDAZOLAM 1 MG: 1 INJECTION INTRAMUSCULAR; INTRAVENOUS at 13:56

## 2023-10-09 NOTE — PLAN OF CARE
Problem: PAIN - ADULT  Goal: Verbalizes/displays adequate comfort level or baseline comfort level  Description: Interventions:  - Encourage patient to monitor pain and request assistance  - Assess pain using appropriate pain scale  - Administer analgesics based on type and severity of pain and evaluate response  - Implement non-pharmacological measures as appropriate and evaluate response  - Consider cultural and social influences on pain and pain management  - Notify physician/advanced practitioner if interventions unsuccessful or patient reports new pain  Outcome: Progressing     Problem: SAFETY ADULT  Goal: Patient will remain free of falls  Description: INTERVENTIONS:  - Educate patient/family on patient safety including physical limitations  - Instruct patient to call for assistance with activity   - Consult OT/PT to assist with strengthening/mobility   - Keep Call bell within reach  - Keep bed low and locked with side rails adjusted as appropriate  - Keep care items and personal belongings within reach  - Initiate and maintain comfort rounds  - Offer Toileting every 2 Hours, in advance of need  Outcome: Progressing     Problem: Knowledge Deficit  Goal: Patient/family/caregiver demonstrates understanding of disease process, treatment plan, medications, and discharge instructions  Description: Complete learning assessment and assess knowledge base.   Interventions:  - Provide teaching at level of understanding  - Provide teaching via preferred learning methods  Outcome: Progressing     Problem: DISCHARGE PLANNING  Goal: Discharge to home or other facility with appropriate resources  Description: INTERVENTIONS:  - Identify barriers to discharge w/patient and caregiver  - Arrange for needed discharge resources and transportation as appropriate  - Identify discharge learning needs  - Arrange for interpretive services ([place]) to assist at discharge as needed  - Refer to Case Management Department for coordinating discharge planning if the patient needs post-hospital services based on physician/advanced practitioner order or complex needs related to functional status, cognitive ability, or social support system  Outcome: Progressing

## 2023-10-09 NOTE — UTILIZATION REVIEW
Notification of Unplanned, Urgent, or   Emergency Inpatient Admission   [address] [facility], [facility]  Tax ID: [id number]  NPI: [id number]  Place of Service: [address]  Admission Level of Care: Inpatient  Place of Service Code: 21     Attending Physician Information  Attending Name and NPI#: Sue Maciel [[id number]  Phone: [phone]     Admission Information  Inpatient Admission Date/Time: 10/7/23  4:26 PM  Discharge Date/Time: No discharge date for patient encounter. Admitting Diagnosis Code/Description:  Back pain [M54.9]  Metastasis ([address]) [C79.9]  Mass of right lung [R91.8]     Utilization Review Contact  Didi Allison Utilization   Phone: [phone]  Fax: [phone]  Email: [email or web address]  Contact for approvals/pending authorizations, clinical reviews, and discharge. Physician Advisory Services Contact  Medical Necessity Denial & Xnnv-we-Dpss Discussion  Phone: [phone]  Fax: [phone]  Email: [email or web address]     DISCHARGE SUPPORT TEAM:  For Patients Discharge Needs & Updates  Phone: [phone] opt. 2 Fax: [phone]  Email: [email or web address]

## 2023-10-09 NOTE — DISCHARGE INSTRUCTIONS
POST BIOPSY    Care after your procedure:    1. Limit your activities for 24 hours after your biopsy. 2. No driving day of biopsy. 3. Return to your normal diet. Small sips of flat soda will help with mild nausea. 4. Remove band-aid or dressing 24 hours after procedure. Contact Interventional Radiology at [phone] Bert PATIENTS: Contact Interventional Radiology at [phone]) Alex Flaherty PATIENTS: Contact Interventional Radiology at [phone]) if:    1. Difficulty breathing, nausea or vomiting. 2. Chills or fever above 101 degrees F.     3. Pain at biopsy site not relieved by medication. 4. Develop any redness, swelling, heat, unusual drainage, heavy bruising or bleeding from biopsy site. Moderate Sedation   WHAT YOU NEED TO KNOW:   Moderate sedation, or conscious sedation, is medicine used during procedures to help you feel relaxed and calm. You will be awake and able to follow directions without anxiety or pain. You will remember little to none of the procedure. You may feel tired, weak, or unsteady on your feet after you get sedation. You may also have trouble concentrating or short-term memory loss. These symptoms should go away in 24 hours or less. DISCHARGE INSTRUCTIONS:   Call 911 or have someone else call for any of the following: You have sudden trouble breathing. You cannot be woken. Seek care immediately if:   You have a severe headache or dizziness. Your heart is beating faster than usual.  Contact your healthcare provider if:   You have a fever. You have nausea or are vomiting for more than 8 hours after the procedure. Your skin is itchy, swollen, or you have a rash. You have questions or concerns about your condition or care. Self-care:   Have someone stay with you for 24 hours. This person can drive you to errands and help you do things around the house. This person can also watch for problems.       Rest and do quiet activities for 24 hours. Do not exercise, ride a bike, or play sports. Stand up slowly to prevent dizziness and falls. Take short walks around the house with another person. Slowly return to your usual activities the next day. Do not drive or use dangerous machines or tools for 24 hours. You may injure yourself or others. Examples include a lawnmower, saw, or drill. Do not return to work for 24 hours if you use dangerous machines or tools for work. Do not make important decisions for 24 hours. For example, do not sign important papers or invest money. Drink liquids as directed. Liquids help flush the sedation medicine out of your body. Ask how much liquid to drink each day and which liquids are best for you. Eat small, frequent meals to prevent nausea and vomiting. Start with clear liquids such as juice or broth. If you do not vomit after clear liquids, you can eat your usual foods. Do not drink alcohol or take medicines that make you drowsy. This includes medicines that help you sleep and anxiety medicines. Ask your healthcare provider if it is safe for you to take pain medicine. Follow up with your healthcare provider as directed: Write down your questions so you remember to ask them during your visits. © 2017 [facility] Information is for End User's use only and may not be sold, redistributed or otherwise used for commercial purposes. All illustrations and images included in CareNotes® are the copyrighted property of [employer].D.A[employer]M., Inc. or Scott [employer]. The above information is an  only. It is not intended as medical advice for individual conditions or treatments. Talk to your doctor, nurse or pharmacist before following any medical regimen to see if it is safe and effective for you.

## 2023-10-09 NOTE — PLAN OF CARE
Problem: PAIN - ADULT  Goal: Verbalizes/displays adequate comfort level or baseline comfort level  Description: Interventions:  - Encourage patient to monitor pain and request assistance  - Assess pain using appropriate pain scale  - Administer analgesics based on type and severity of pain and evaluate response  - Implement non-pharmacological measures as appropriate and evaluate response  - Consider cultural and social influences on pain and pain management  - Notify physician/advanced practitioner if interventions unsuccessful or patient reports new pain  Outcome: Progressing     Problem: SAFETY ADULT  Goal: Patient will remain free of falls  Description: INTERVENTIONS:  - Educate patient/family on patient safety including physical limitations  - Instruct patient to call for assistance with activity   - Consult OT/PT to assist with strengthening/mobility   - Keep Call bell within reach  - Keep bed low and locked with side rails adjusted as appropriate  - Keep care items and personal belongings within reach  - Initiate and maintain comfort rounds  - Offer Toileting every 2 Hours, in advance of need  Outcome: Progressing  Goal: Maintains/Returns to pre admission functional level  Description: INTERVENTIONS:  - Perform BMAT or MOVE assessment daily.   - Set and communicate daily mobility goal to care team and patient/family/caregiver. - Collaborate with rehabilitation services on mobility goals if consulted  - Remind patient to turn and reposition every 2 hours while in bed.    - Ambulate patient 3 times a day  - Out of bed to chair 3 times a day  - Out of bed for meals  - Out of bed for toileting  - Record patient progress and toleration of activity level   Outcome: Progressing     Problem: COPING  Goal: Pt/Family able to verbalize concerns and demonstrate effective coping strategies  Description: INTERVENTIONS:  - Assist patient/family to identify coping skills, available support systems and cultural and spiritual values  - Provide emotional support, including active listening and acknowledgement of concerns of patient and caregivers  - Reduce environmental stimuli, as able  - Provide patient education  - Assess for spiritual pain/suffering and initiate spiritual care, including notification of Pastoral Care or nicholas based community as needed  - Assess effectiveness of coping strategies  Outcome: Progressing  Goal: Will report anxiety at manageable levels  Description: INTERVENTIONS:  - Administer medication as ordered  - Teach and encourage coping skills  - Provide emotional support  - Assess patient/family for anxiety and ability to cope  Outcome: Progressing

## 2023-10-09 NOTE — RESTORATIVE TECHNICIAN NOTE
Restorative Technician Note      Patient Name: Declan Butts     Restorative Tech Visit Date: 10/09/23  Note Type: Mobility  Patient Position Upon Consult: Supine  Mobility / Activity Provided: assisted pt in ambulating around [facility] 2, pt states she can do more but is waiting until she is able to eat  Activity Performed: Ambulated  Assistive Device: Other (Comment) (none)  Patient Position at End of Consult: Supine;  All needs within reach

## 2023-10-09 NOTE — ASSESSMENT & PLAN NOTE
Patient is a 69-year-old female with history of left breast cancer status postmastectomy and due to her ED as a result of right upper quadrant posterior chest wall and mid to upper back pain for the last 2 weeks. Ridging findings show evidence of a large right lung mass with extensive osseous meta stasis. Yarelis Puentes · Interventional radiology was consulted for biopsy.     · Pulmonary was consulted who will consider performing biopsy via bronchoscopy should IR biopsy yield negative results  · Oncology recommends MRI brain and thoracic spine, bone scan

## 2023-10-09 NOTE — BRIEF OP NOTE (RAD/CATH)
INTERVENTIONAL RADIOLOGY PROCEDURE NOTE    Date: 10/9/2023    Procedure:   Procedure Summary     Date:  Room / Location:     Anesthesia Start:  Anesthesia Stop:     Procedure:  Diagnosis:     Scheduled Providers:  Responsible Provider:     Anesthesia Type: Not recorded ASA Status: Not recorded          Preoperative diagnosis:   1. Back pain    2. Metastasis ([address])    3. Mass of right lung    4. Metastasis ([address])         Postoperative diagnosis: Same. Surgeon: Rosario Chen MD     Assistant: None. No qualified resident was available. Blood loss: Minimal    Specimens: 12-gauge core biopsy in formalin    Findings: Pathologic lesions in the posterior right iliac spine sampled with CT guidance. 12-gauge core samples were obtained and submitted directly to the lab in formalin. Complications: None immediate.     Anesthesia: conscious sedation

## 2023-10-09 NOTE — PROGRESS NOTES
[facility]  Progress Note  Name: Tavon Robison  MRN: [id number]  Unit/Bed#: E2 -01 I Date of Admission: 10/7/2023   Date of Service: 10/9/2023 I Hospital Day: 2    Assessment/Plan   * Mass of right lung  Assessment & Plan  Patient is a 38-year-old female with history of left breast cancer status postmastectomy and due to her ED as a result of right upper quadrant posterior chest wall and mid to upper back pain for the last 2 weeks. Ridging findings show evidence of a large right lung mass with extensive osseous meta stasis. [facility] Speaker · Interventional radiology was consulted for biopsy. · Pulmonary was consulted who will consider performing biopsy via bronchoscopy should IR biopsy yield negative results  · Oncology recommends MRI brain and thoracic spine, bone scan    Acute back pain  Assessment & Plan  Likely secondary to possible metastatic disease  · Adequately controlled with current regimen    Carcinoma of left breast, estrogen receptor positive   Assessment & Plan  Remote history of breast cancer treated with  mastectomy, chemo and radiation approximately 10 years ago. · Maintained on anastrozole daily  · Continue outpatient follow-up       VTE Pharmacologic Prophylaxis: lovenox     Patient Centered Rounds:  Patient care rounds were performed with nursing    Education and Discussions with Family / Patient: Patient and family at the bedside     Time Spent for Care: I have spent a total time of 39 minutes on 10/09/23 in caring for this patient including Diagnostic results, Risks and benefits of tx options, Counseling / Coordination of care, Documenting in the medical record, Reviewing / ordering tests, medicine, procedures  , Obtaining or reviewing history   and Communicating with other healthcare professionals .       Current Length of Stay: 2 day(s)    Current Patient Status: Inpatient   Certification Statement: The patient will continue to require additional inpatient hospital stay due to need for inpatient procedures    Discharge Plan: Hopeful discharge in next 24 hours    Code Status: Level 1 - Full Code      Subjective:   Patient seen and evaluated at bedside. Much more comfortable today. Objective:     Vitals:   Temp (24hrs), Av.3 °F (36.3 °C), Min:96.9 °F (36.1 °C), Max:97.6 °F (36.4 °C)    Temp:  [96.9 °F (36.1 °C)-97.6 °F (36.4 °C)] 97.3 °F (36.3 °C)  HR:  [] 104  Resp:  [12-16] 12  BP: (126-142)/(81-92) 128/86  SpO2:  [100 %] 100 %  Body mass index is 23.8 kg/m². Input and Output Summary (last 24 hours):     No intake or output data in the 24 hours ending 10/09/23 1254    Physical Exam:     Physical Exam  Vitals reviewed. Constitutional:       General: She is not in acute distress. Appearance: She is well-developed. She is not ill-appearing, toxic-appearing or diaphoretic. HENT:      Head: Normocephalic and atraumatic. Mouth/Throat:      Mouth: Mucous membranes are moist.   Eyes:      General: No scleral icterus. Extraocular Movements: Extraocular movements intact. Cardiovascular:      Rate and Rhythm: Normal rate and regular rhythm. Heart sounds: Normal heart sounds. Pulmonary:      Effort: Pulmonary effort is normal. No respiratory distress. Breath sounds: Normal breath sounds. No wheezing or rales. Abdominal:      General: There is no distension. Palpations: Abdomen is soft. Tenderness: There is no abdominal tenderness. There is no guarding or rebound. Musculoskeletal:         General: No swelling, tenderness or deformity. Skin:     General: Skin is warm and dry. Neurological:      General: No focal deficit present. Mental Status: She is alert. Mental status is at baseline. Psychiatric:         Mood and Affect: Mood normal.         Behavior: Behavior normal.         Thought Content: Thought content normal.         Judgment: Judgment normal.         Additional Data:     Labs:  I have reviewed pertinent results     Results from last 7 days   Lab Units 10/09/23  0645 10/07/23  1630   WBC Thousand/uL  --  7.82   HEMOGLOBIN g/dL  --  10.8*   HEMATOCRIT %  --  34.0*   PLATELETS Thousands/uL 462* 516*   NEUTROS PCT %  --  64   LYMPHS PCT %  --  25   MONOS PCT %  --  10   EOS PCT %  --  0     Results from last 7 days   Lab Units 10/07/23  1630 10/06/23  1306   SODIUM mmol/L 135 133*   POTASSIUM mmol/L 4.0 3.8   CHLORIDE mmol/L 98 95*   CO2 mmol/L 25 25   BUN mg/dL 8 10   CREATININE mg/dL 0.70 0.77   ANION GAP mmol/L 12 13   CALCIUM mg/dL 9.5 10.0   ALBUMIN g/dL  --  4.2   TOTAL BILIRUBIN mg/dL  --  0.45   ALK PHOS U/L  --  123*   ALT U/L  --  19   AST U/L  --  26   GLUCOSE RANDOM mg/dL 117 92     Results from last 7 days   Lab Units 10/09/23  0645   INR  1.06                     Imaging: I have reviewed pertinent imaging       Recent Cultures (last 7 days):           Last 24 Hours Medication List:   Current Facility-Administered Medications   Medication Dose Route Frequency Provider Last Rate   • acetaminophen  650 mg Oral Q6H PRN Claire Flynn MD     • anastrozole  1 mg Oral Daily Claire Flynn MD     • enoxaparin  40 mg Subcutaneous Daily Claire Flynn MD     • oxyCODONE  5 mg Oral Q6H PRN Claire Flynn MD     • oxyCODONE  2.5 mg Oral Q6H PRN Claire Flynn MD          Today, Patient Was Seen By: Merline Sames, DO    ** Please Note: Dictation voice to text software may have been used in the creation of this document.  **

## 2023-10-09 NOTE — UTILIZATION REVIEW
NOTIFICATION OF ADMISSION DISCHARGE   This is a Notification of Discharge from [facility] Jade julia. Please be advised that this patient has been discharge from our facility. Below you will find the admission and discharge date and time including the patient’s disposition. UTILIZATION REVIEW CONTACT:  Portia Moser  Utilization   Network Utilization Review Department  Phone: [phone] x carefully listen to the prompts. All voicemails are confidential.  Email: [email or web address]. Kuaidi Dache     ADMISSION INFORMATION  PRESENTATION DATE: 10/6/2023 12:41 PM  OBERVATION ADMISSION DATE:   INPATIENT ADMISSION DATE: 10/6/23  5:19 PM   DISCHARGE DATE: 10/6/2023  9:19 PM   DISPOSITION:Left against medical advice or discontinued care    Network Utilization Review Department  ATTENTION: Please call with any questions or concerns to [phone] and carefully listen to the prompts so that you are directed to the right person. All voicemails are confidential.   For Discharge needs, contact Care Management DC Support Team at [phone] opt. 2  Send all requests for admission clinical reviews, approved or denied determinations and any other requests to dedicated fax number below belonging to the campus where the patient is receiving treatment.  List of dedicated fax numbers for the Facilities:  [place] DENIALS (Administrative/Medical Necessity) [phone]   DISCHARGE SUPPORT TEAM (Network) [phone]   [address] [facility] (Maternity/NICU/Pediatrics) [phone]   [address] [place] [address] Drive [phone]   [facility] [phone] [address] [place] [phone]   [address] [address] [facility] [phone]

## 2023-10-10 ENCOUNTER — APPOINTMENT (INPATIENT)
Dept: MRI IMAGING | Facility: HOSPITAL | Age: 53
DRG: 694 | End: 2023-10-10
Payer: COMMERCIAL

## 2023-10-10 ENCOUNTER — TELEPHONE (OUTPATIENT)
Dept: HEMATOLOGY ONCOLOGY | Facility: CLINIC | Age: 53
End: 2023-10-10

## 2023-10-10 ENCOUNTER — TELEPHONE (OUTPATIENT)
Dept: FAMILY MEDICINE CLINIC | Facility: CLINIC | Age: 53
End: 2023-10-10

## 2023-10-10 VITALS
SYSTOLIC BLOOD PRESSURE: 142 MMHG | BODY MASS INDEX: 23.82 KG/M2 | DIASTOLIC BLOOD PRESSURE: 94 MMHG | WEIGHT: 143 LBS | RESPIRATION RATE: 16 BRPM | OXYGEN SATURATION: 99 % | TEMPERATURE: 97.6 F | HEIGHT: 65 IN | HEART RATE: 117 BPM

## 2023-10-10 PROCEDURE — 72146 MRI CHEST SPINE W/O DYE: CPT

## 2023-10-10 PROCEDURE — 0QB23ZX EXCISION OF RIGHT PELVIC BONE, PERCUTANEOUS APPROACH, DIAGNOSTIC: ICD-10-PCS | Performed by: INTERNAL MEDICINE

## 2023-10-10 PROCEDURE — 99239 HOSP IP/OBS DSCHRG MGMT >30: CPT | Performed by: INTERNAL MEDICINE

## 2023-10-10 PROCEDURE — A9585 GADOBUTROL INJECTION: HCPCS | Performed by: INTERNAL MEDICINE

## 2023-10-10 PROCEDURE — 99232 SBSQ HOSP IP/OBS MODERATE 35: CPT | Performed by: INTERNAL MEDICINE

## 2023-10-10 PROCEDURE — 70553 MRI BRAIN STEM W/O & W/DYE: CPT

## 2023-10-10 RX ORDER — OXYCODONE HYDROCHLORIDE 5 MG/1
2.5 TABLET ORAL EVERY 6 HOURS PRN
Qty: 10 TABLET | Refills: 0 | Status: SHIPPED | OUTPATIENT
Start: 2023-10-10 | End: 2023-10-20

## 2023-10-10 RX ORDER — GADOBUTROL 604.72 MG/ML
6 INJECTION INTRAVENOUS
Status: COMPLETED | OUTPATIENT
Start: 2023-10-10 | End: 2023-10-10

## 2023-10-10 RX ORDER — SENNOSIDES 8.6 MG
650 CAPSULE ORAL EVERY 8 HOURS PRN
Qty: 30 TABLET | Refills: 0 | Status: SHIPPED | OUTPATIENT
Start: 2023-10-10

## 2023-10-10 RX ADMIN — GADOBUTROL 6 ML: 604.72 INJECTION INTRAVENOUS at 10:01

## 2023-10-10 RX ADMIN — ACETAMINOPHEN 325MG 650 MG: 325 TABLET ORAL at 08:43

## 2023-10-10 RX ADMIN — ANASTROZOLE 1 MG: 1 TABLET, COATED ORAL at 08:45

## 2023-10-10 NOTE — ASSESSMENT & PLAN NOTE
Patient is a 40-year-old female with history of left breast cancer status postmastectomy and due to her ED as a result of right upper quadrant posterior chest wall and mid to upper back pain for the last 2 weeks. Ridging findings show evidence of a large right lung mass with extensive osseous meta stasis. Perri Lezama · Interventional radiology was consulted for biopsy.   Performed 10/9/23-results pending  · Pulmonary was consulted who will consider performing biopsy via bronchoscopy should IR biopsy yield negative results  · MRI brain unremarkable for metastasis  · MRI Spine with widespread thoracic, cervical and lumbar spinal metastasis, osseous metastasis in partially imaged rib cage and scapula  · Outpatient follow-up with hematology/oncology and pulmonology

## 2023-10-10 NOTE — TELEPHONE ENCOUNTER
Hosp: [facility]  Admitted: 10/7/23  D/C'd: 10/10/23 to: HOME  Verify how patient is feeling and if they are in need of anything before their visit. Please send appointment date and patient questions/concerns to JHONATHAN to complete TCM.

## 2023-10-10 NOTE — ASSESSMENT & PLAN NOTE
Likely related to osseous metastasis  · Well-controlled with as needed Tylenol   · Provided prescription for Tylenol and short course of as needed oxycodone  · Referred to palliative care for symptom management

## 2023-10-10 NOTE — PLAN OF CARE
Problem: PAIN - ADULT  Goal: Verbalizes/displays adequate comfort level or baseline comfort level  Description: Interventions:  - Encourage patient to monitor pain and request assistance  - Assess pain using appropriate pain scale  - Administer analgesics based on type and severity of pain and evaluate response  - Implement non-pharmacological measures as appropriate and evaluate response  - Consider cultural and social influences on pain and pain management  - Notify physician/advanced practitioner if interventions unsuccessful or patient reports new pain  Outcome: Progressing     Problem: SAFETY ADULT  Goal: Patient will remain free of falls  Description: INTERVENTIONS:  - Educate patient/family on patient safety including physical limitations  - Instruct patient to call for assistance with activity   - Consult OT/PT to assist with strengthening/mobility   - Keep Call bell within reach  - Keep bed low and locked with side rails adjusted as appropriate  - Keep care items and personal belongings within reach  - Initiate and maintain comfort rounds  - Offer Toileting every 2 Hours, in advance of need  Outcome: Progressing  Goal: Maintains/Returns to pre admission functional level  Description: INTERVENTIONS:  - Perform BMAT or MOVE assessment daily.   - Set and communicate daily mobility goal to care team and patient/family/caregiver. - Collaborate with rehabilitation services on mobility goals if consulted  - Remind patient to turn and reposition every 2 hours while in bed.    - Ambulate patient 3 times a day  - Out of bed to chair 3 times a day  - Out of bed for meals  - Out of bed for toileting  - Record patient progress and toleration of activity level   Outcome: Progressing     Problem: Knowledge Deficit  Goal: Patient/family/caregiver demonstrates understanding of disease process, treatment plan, medications, and discharge instructions  Description: Complete learning assessment and assess knowledge base.  Interventions:  - Provide teaching at level of understanding  - Provide teaching via preferred learning methods  Outcome: Progressing     Problem: COPING  Goal: Pt/Family able to verbalize concerns and demonstrate effective coping strategies  Description: INTERVENTIONS:  - Assist patient/family to identify coping skills, available support systems and cultural and spiritual values  - Provide emotional support, including active listening and acknowledgement of concerns of patient and caregivers  - Reduce environmental stimuli, as able  - Provide patient education  - Assess for spiritual pain/suffering and initiate spiritual care, including notification of Pastoral Care or [place] based community as needed  - Assess effectiveness of coping strategies  Outcome: Progressing  Goal: Will report anxiety at manageable levels  Description: INTERVENTIONS:  - Administer medication as ordered  - Teach and encourage coping skills  - Provide emotional support  - Assess patient/family for anxiety and ability to cope  Outcome: Progressing

## 2023-10-10 NOTE — TELEPHONE ENCOUNTER
MOY  DR magdi SIMS   Who are you speaking with? Patient   If it is not the patient, are they listed on an active communication consent form? Yes   Is this a MOY or DR magdi SIMS MOY   Which provider is patient currently scheduled or established with? Dr. Abdiel Sharma   What is the original appointment date and time? 1/25/23   At which location is the appointment scheduled to take place? [facility]   Which provider is the patient transitioning care to? Dr. Gerard Thomas   What is the new appointment date and time? 10/23/23   At which location is the new appointment scheduled to take place? [facility]   What is the reason for this change?  Was advised from the doctor she needed a sooner appt/ New referral in

## 2023-10-10 NOTE — PROGRESS NOTES
Progress Note - Pulmonary   Chioma Ochoa 52 y.o. female MRN: [id number]  Unit/Bed#: E2 -01 Encounter: [id number]    Assessment/Plan:    1. Acute pulmonary insufficiency likely multifaceted as listed below        -Currently on room air-94%, does not wear home O2        -Continue saturations greater than 88%        -Pulmonary toileting: Deep breathing cough, OOB as tolerated, IS Q 1 hr    2. Right hilar lung mass w/ lymphadenopathy        -7.8x 5.1 centimeter right middle lobe lung mass extending into mediastinum        -Concerning for bronchogenic carcinoma with metastasis        -We will await bone marrow biopsy        -Bone marrow biopsy negative-will need EBUS        -MRI of brain and thoracic spine scheduled for today        -Oncology following    3. Bony metastasis        -10/9/2023-right iliac spine biopsy        -Pathology still pending        -Continue analgesics as needed      -Outpatient follow-up or discharge instruction  -Pulmonary will sign off        Chief Complaint:    "I am having a little pleuritic pain"    Subjective:    Brayan Krishnan was comfortably sitting in her bed. She reports she is having a little bit of pain where the bone marrow was. No significant overnight events reported. Currently denying any fevers, chills, abscess, headaches and night sweats, or palpations. Objective:    Vitals: Blood pressure 142/94, pulse (!) 117, temperature 97.6 °F (36.4 °C), temperature source Temporal, resp. rate 16, height 5' 5" (1.651 m), weight 64.9 kg (143 lb), SpO2 99 %. ra,Body mass index is 23.8 kg/m². No intake or output data in the 24 hours ending 10/10/23 0917    Invasive Devices     Peripheral Intravenous Line  Duration           Peripheral IV 10/06/23 Right Antecubital 3 days    Peripheral IV 10/07/23 Right;Ventral (anterior) Forearm 2 days                Physical Exam:   Physical Exam  Constitutional:       General: She is not in acute distress. Appearance: Normal appearance.  She is normal weight. She is not ill-appearing. HENT:      Head: Normocephalic and atraumatic. Nose: Nose normal. No congestion or rhinorrhea. Mouth/Throat:      Mouth: Mucous membranes are dry. Pharynx: Oropharynx is clear. No oropharyngeal exudate or posterior oropharyngeal erythema. Cardiovascular:      Rate and Rhythm: Normal rate and regular rhythm. Pulses: Normal pulses. Heart sounds: Normal heart sounds. No murmur heard. No friction rub. No gallop. Pulmonary:      Effort: Pulmonary effort is normal. No tachypnea, bradypnea, accessory muscle usage or respiratory distress. Breath sounds: Normal breath sounds. No stridor, decreased air movement or transmitted upper airway sounds. No decreased breath sounds, wheezing, rhonchi or rales. Comments: Overall clear breath sounds  Chest:      Chest wall: No tenderness. Abdominal:      General: Abdomen is flat. Bowel sounds are normal. There is no distension. Palpations: Abdomen is soft. There is no mass. Musculoskeletal:         General: No swelling or tenderness. Normal range of motion. Cervical back: Normal range of motion. No rigidity or tenderness. Skin:     General: Skin is warm and dry. Coloration: Skin is not jaundiced or pale. Neurological:      General: No focal deficit present. Mental Status: She is alert and oriented to person, place, and time. Mental status is at baseline. Psychiatric:         Mood and Affect: Mood normal.         Behavior: Behavior normal.         Labs: I have personally reviewed pertinent lab results. , ABG: No results found for: "PHART", "ZLC2NWS", "PO2ART", "KIC8GWY", "K8VRVBCR", "BEART", "SOURCE", BNP: No results found for: "BNP", CBC: No results found for: "WBC", "HGB", "HCT", "MCV", "PLT", "ADJUSTEDWBC", "RBC", "MCH", "MCHC", "RDW", "MPV", "NRBC", CMP: No results found for: "SODIUM", "K", "CL", "CO2", "ANIONGAP", "BUN", "CREATININE", "GLUCOSE", "CALCIUM", "AST", "ALT", "ALKPHOS", "PROT", "BILITOT", "EGFR"      Imaging and other studies: I have personally reviewed pertinent films in PACS     Chest CT-right middle lobe lung mass

## 2023-10-10 NOTE — DISCHARGE INSTR - AVS FIRST PAGE
Dear Naun Pineda,     It was our pleasure to care for you here at [facility]. It is our hope that we were always able to exceed the expected standards for your care during your stay. You were hospitalized due to new cancer diagnosis. You were cared for on the *** floor under the service of Berna Bartholomew DO with the [facility] who covers for your primary care physician (PCP), Fanny Guerrier MD, while you were hospitalized. If you have any questions or concerns related to this hospitalization, you may contact us at [phone]. For follow up as well as medication refills, we recommend that you follow up with your primary care physician. A registered nurse will reach out to you by phone within a few days after your discharge to answer any additional questions that you may have after going home. However, at this time we provide for you here, the most important instructions / recommendations at discharge:     Notable Medication Adjustments -   Tylenol 650 mg every 6 hours for mild to moderate pain do not exceed more than 4 g in 1 day  Short course of oxycodone 2.5 mg every 6 hours as needed for severe breakthrough pain  Testing Required after Discharge -   No further testing at this time   Important Follow Up Information -   The oncology office will call you to set up an appointment  Please review this entire after visit summary as additional general instructions including medication list, appointments, activity, diet, any pertinent wound care, and other additional recommendations from your care team that may be provided for you.       Sincerely,     Berna Bartholomew DO

## 2023-10-10 NOTE — PLAN OF CARE
Problem: PAIN - ADULT  Goal: Verbalizes/displays adequate comfort level or baseline comfort level  Description: Interventions:  - Encourage patient to monitor pain and request assistance  - Assess pain using appropriate pain scale  - Administer analgesics based on type and severity of pain and evaluate response  - Implement non-pharmacological measures as appropriate and evaluate response  - Consider cultural and social influences on pain and pain management  - Notify physician/advanced practitioner if interventions unsuccessful or patient reports new pain  Outcome: Progressing     Problem: DISCHARGE PLANNING  Goal: Discharge to home or other facility with appropriate resources  Description: INTERVENTIONS:  - Identify barriers to discharge w/patient and caregiver  - Arrange for needed discharge resources and transportation as appropriate  - Identify discharge learning needs  - Arrange for interpretive services (South African) to assist at discharge as needed  - Refer to Case Management Department for coordinating discharge planning if the patient needs post-hospital services based on physician/advanced practitioner order or complex needs related to functional status, cognitive ability, or social support system  Outcome: Progressing     Problem: COPING  Goal: Will report anxiety at manageable levels  Description: INTERVENTIONS:  - Administer medication as ordered  - Teach and encourage coping skills  - Provide emotional support  - Assess patient/family for anxiety and ability to cope  Outcome: Progressing

## 2023-10-10 NOTE — DISCHARGE SUMMARY
[address] [facility]  Discharge- Omie Hazard 1970, 52 y.o. female MRN: [id number]  Unit/Bed#: E2 -01 Encounter: [id number]  Primary Care Provider: Landen Hamilton MD   Date and time admitted to hospital: 10/7/2023  3:10 PM    * Mass of right lung  Assessment & Plan  Patient is a 57-year-old female with history of left breast cancer status postmastectomy and due to her ED as a result of right upper quadrant posterior chest wall and mid to upper back pain for the last 2 weeks. Ridging findings show evidence of a large right lung mass with extensive osseous meta stasis. Stacie Rosado · Interventional radiology was consulted for biopsy. Performed 10/9/23-results pending  · Pulmonary was consulted who will consider performing biopsy via bronchoscopy should IR biopsy yield negative results  · MRI brain unremarkable for metastasis  · MRI Spine with widespread thoracic, cervical and lumbar spinal metastasis, osseous metastasis in partially imaged rib cage and scapula  · Outpatient follow-up with hematology/oncology and pulmonology    Acute back pain  Assessment & Plan  Likely related to osseous metastasis  · Well-controlled with as needed Tylenol   · Provided prescription for Tylenol and short course of as needed oxycodone  · Referred to palliative care for symptom management    Carcinoma of left breast, estrogen receptor positive   Assessment & Plan  Remote history of breast cancer treated with  mastectomy, chemo and radiation approximately 10 years ago.   · Maintained on anastrozole daily  · Continue outpatient follow-up      Discharging Physician / Practitioner: Mary Jeong DO  PCP: Landen Hamilton MD  Admission Date:   Admission Orders (From admission, onward)     Ordered        10/07/23 1626  INPATIENT ADMISSION  Once                      Discharge Date: 10/10/23    Medical Problems     Resolved Problems  Date Reviewed: 10/8/2023   None         Consultations During Hospital Stay:   Surendar Che TO ONCOLOGY  INPATIENT CONSULT TO IR  IP CONSULT TO PULMONOLOGY    Procedures Performed: IR biopsy    Significant Findings / Test Results:     MRI brain w wo contrast    Result Date: 10/10/2023  Impression: 1. No intracranial metastasis. 2.  Nonspecific solitary focus of FLAIR hyperintensity in the right frontal subcortical white matter likely sequela of remote ischemic/inflammatory insult or microangiopathy. 3.  Chiari I malformation. Workstation performed: [id number]     MRI thoracic spine wo contrast    Result Date: 10/10/2023  Impression: 1. Partially limited assessment due to lack of IV contrast. 2.  Widespread thoracic and visualized cervical and lumbar spine metastasis. Pathologic wedge compression fracture at level T10 with moderate height loss. T7 superior endplate pathologic fracture with minimal height loss. 3.  Left posterior vertebral body buckling at level T6 without apparent epidural tumor. Left posterior superior vertebral body buckling/cortical disruption at level T8 with possible minimal epidural tumor. 4.  Osseous metastasis in the partially imaged rib cage and scapula. 5.  Right lung mass and right pleural effusion. See recent chest CT report. Workstation performed: [id number]     IR biopsy bone    Result Date: 10/10/2023  Impression: Impression: Image-guided bone biopsy right posterior iliac spine Workstation performed: [id number]     NM bone scan whole body    Result Date: 10/9/2023  Impression: Widespread osseous metastatic disease as above Workstation performed: [id number]     Test Results Pending at Discharge (will require follow up): Bx results      Outpatient Tests Requested: None    Reason for Admission: Back Pain     Hospital Course:     Kin Sequeira is a 52 y.o. female With past medical history of breast cancer status postmastectomy and chemo/radiation who presented with acute back pain x2 weeks. Patient was found to have a large right lung mass with extensive osseous metastasis. Pulmonology, IR, medical oncology consulted. Patient underwent IR biopsy of right posterior iliac spine. Nuclear medicine bone scan as well as MRI spine confirmed diffuse osseous metastasis. Patient will follow-up with oncology and pulmonology outpatient to discuss biopsy results and treatment plan. Please see above list of diagnoses and related plan for additional information. Condition at Discharge: stable     Discharge Day Visit / Exam:     Subjective: Seen and evaluated at bedside. She feels well. Inadequately controlled. We discussed MRI findings. Vitals: Blood Pressure: 142/94 (10/10/23 0735)  Pulse: (!) 117 (10/10/23 0735)  Temperature: 97.6 °F (36.4 °C) (10/10/23 0735)  Temp Source: Temporal (10/10/23 0735)  Respirations: 16 (10/10/23 0735)  Height: 5' 5" (165.1 cm) (10/08/23 1410)  Weight - Scale: 64.9 kg (143 lb) (10/08/23 1410)  SpO2: 99 % (10/10/23 0735)  Exam:   Physical Exam  Vitals reviewed. Constitutional:       General: She is not in acute distress. Appearance: She is well-developed. She is not ill-appearing, toxic-appearing or diaphoretic. HENT:      Head: Normocephalic and atraumatic. Mouth/Throat:      Mouth: Mucous membranes are moist.   Eyes:      General: No scleral icterus. Extraocular Movements: Extraocular movements intact. Cardiovascular:      Rate and Rhythm: Normal rate and regular rhythm. Heart sounds: Normal heart sounds. Pulmonary:      Effort: Pulmonary effort is normal. No respiratory distress. Breath sounds: Normal breath sounds. No wheezing or rales. Abdominal:      General: There is no distension. Palpations: Abdomen is soft. Tenderness: There is no abdominal tenderness. There is no guarding or rebound. Musculoskeletal:         General: No swelling, tenderness or deformity. Skin:     General: Skin is warm and dry. Neurological:      General: No focal deficit present. Mental Status: She is alert.  Mental status is at baseline. Psychiatric:         Mood and Affect: Mood normal.         Behavior: Behavior normal.         Thought Content: Thought content normal.         Judgment: Judgment normal.           Discharge instructions/Information to patient and family:   See after visit summary for information provided to patient and family. Provisions for Follow-Up Care:  See after visit summary for information related to follow-up care and any pertinent home health orders. Disposition:     Home     Discharge Statement:  I spent 60 minutes discharging the patient. This time was spent on the day of discharge. I had direct contact with the patient on the day of discharge. Greater than 50% of the total time was spent examining patient, answering all patient questions, arranging and discussing plan of care with patient as well as directly providing post-discharge instructions. Additional time then spent on discharge activities. Discharge Medications:  See after visit summary for reconciled discharge medications provided to patient and family.       ** Please Note: This note has been constructed using a voice recognition system **

## 2023-10-11 ENCOUNTER — TRANSITIONAL CARE MANAGEMENT (OUTPATIENT)
Dept: FAMILY MEDICINE CLINIC | Facility: CLINIC | Age: 53
End: 2023-10-11

## 2023-10-11 NOTE — UTILIZATION REVIEW
NOTIFICATION OF ADMISSION DISCHARGE   This is a Notification of Discharge from [address] [facility]. Please be advised that this patient has been discharge from our facility. Below you will find the admission and discharge date and time including the patient’s disposition. UTILIZATION REVIEW CONTACT:  Delisa Reyes  Utilization   Network Utilization Review Department  Phone: [phone] x carefully listen to the prompts. All voicemails are confidential.  Email: [email or web address]     ADMISSION INFORMATION  PRESENTATION DATE: 10/7/2023  3:10 PM  OBERVATION ADMISSION DATE:   INPATIENT ADMISSION DATE: 10/7/23  4:26 PM   DISCHARGE DATE: 10/10/2023  2:30 PM   DISPOSITION:Home/Self Care    Network Utilization Review Department  ATTENTION: Please call with any questions or concerns to [phone] and carefully listen to the prompts so that you are directed to the right person. All voicemails are confidential.   For Discharge needs, contact Care Management DC Support Team at [phone] opt. 2  Send all requests for admission clinical reviews, approved or denied determinations and any other requests to dedicated fax number below belonging to the campus where the patient is receiving treatment.  List of dedicated fax numbers for the Facilities:  [place] DENIALS (Administrative/Medical Necessity) [phone]   DISCHARGE SUPPORT TEAM (Network) [phone] [address] [facility] (Maternity/NICU/Pediatrics) [phone]   [address] [facility] [address] [phone] [id number] [facility] [phone]   [address] [place] [address]  [address]  [phone]

## 2023-10-12 PROCEDURE — 88342 IMHCHEM/IMCYTCHM 1ST ANTB: CPT | Performed by: PATHOLOGY

## 2023-10-12 PROCEDURE — 88341 IMHCHEM/IMCYTCHM EA ADD ANTB: CPT | Performed by: PATHOLOGY

## 2023-10-12 PROCEDURE — 88307 TISSUE EXAM BY PATHOLOGIST: CPT | Performed by: PATHOLOGY

## 2023-10-12 PROCEDURE — 88344 IMHCHEM/IMCYTCHM EA MLT ANTB: CPT | Performed by: PATHOLOGY

## 2023-10-12 PROCEDURE — 88311 DECALCIFY TISSUE: CPT | Performed by: PATHOLOGY

## 2023-10-13 ENCOUNTER — TELEPHONE (OUTPATIENT)
Dept: HEMATOLOGY ONCOLOGY | Facility: CLINIC | Age: 53
End: 2023-10-13

## 2023-10-13 ENCOUNTER — OFFICE VISIT (OUTPATIENT)
Dept: FAMILY MEDICINE CLINIC | Facility: CLINIC | Age: 53
End: 2023-10-13

## 2023-10-13 VITALS
WEIGHT: 142 LBS | TEMPERATURE: 98.1 F | SYSTOLIC BLOOD PRESSURE: 140 MMHG | BODY MASS INDEX: 23.66 KG/M2 | DIASTOLIC BLOOD PRESSURE: 86 MMHG | HEIGHT: 65 IN | HEART RATE: 134 BPM | OXYGEN SATURATION: 98 % | RESPIRATION RATE: 16 BRPM

## 2023-10-13 DIAGNOSIS — Z17.0 CARCINOMA OF LEFT BREAST IN FEMALE, ESTROGEN RECEPTOR POSITIVE, UNSPECIFIED SITE OF BREAST: ICD-10-CM

## 2023-10-13 DIAGNOSIS — C50.912 CARCINOMA OF LEFT BREAST IN FEMALE, ESTROGEN RECEPTOR POSITIVE, UNSPECIFIED SITE OF BREAST: ICD-10-CM

## 2023-10-13 DIAGNOSIS — R91.8 MASS OF RIGHT LUNG: Primary | ICD-10-CM

## 2023-10-13 DIAGNOSIS — M54.9 ACUTE BACK PAIN, UNSPECIFIED BACK LOCATION, UNSPECIFIED BACK PAIN LATERALITY: ICD-10-CM

## 2023-10-13 PROCEDURE — 99496 TRANSJ CARE MGMT HIGH F2F 7D: CPT | Performed by: FAMILY MEDICINE

## 2023-10-13 NOTE — TELEPHONE ENCOUNTER
I called Yasmine Garcia in response to a referral that was received for patient to establish care with Thoracic Surgery. Outreach was made to schedule a consultation. I left a voicemail explaining the reason for my call and advised patient to call [facility] at [phone]. Another attempt will be made to contact patient.

## 2023-10-13 NOTE — TELEPHONE ENCOUNTER
Call Transfer   Who are you speaking with? Patient   If it is not the patient, are they listed on an active communication consent form? N/A   Who is the patients HemOnc/SurgOnc provider? N/A   What is the reason for this call? Patient requested to be transferred to Pulmonary    Person/Department that the call was transferred to? Time that call was transferred? Pulmonary @3:07PM    Your call will be transferred now. If you receive a voicemail, please leave a detailed message and a member of the team will return your call as soon as possible. Did you relay this information to the caller?   Yes

## 2023-10-13 NOTE — PROGRESS NOTES
Assessment & Plan     1. Mass of right lung  Assessment & Plan:  Patient reports that she has lost 13 pounds intentionally in order to have a better weight. CT chest: 7.8 x 5.1 cm right middle lobe mass extending to the lateral chest wall and mediastinum and across the major fissure into the right lower lobe, compatible with primary lung cancer or metastatic breast cancer with probable metastatic disease to a lower   esophageal node. Extensive lytic bone metastases with anterior compression deformity at T10. Iliac crest right biopsy: Metastatic lobular carcinoma of the breast. * Estrogen, progesterone & HER2 receptor studies pending, to be described in a separate receptor report. MRI spine: widespread thoracic, lumbar and cervical mets; osseous mets in partially imaged rib cage and scapula    -Pulmonology appointment 10/17/23  -Has appointment with Heme/Onc 10/23/23  -MRI brain unremarkable for brain mets      2. Acute back pain, unspecified back location, unspecified back pain laterality  Assessment & Plan:  Patient reports improvement of back pain. Home medications: Tylenol 650 mg q8h, oxycodone 5 mg IR prn  Patient reports she has not taken her oxycodone pills as she has not had significant pain since discharge     -Continue home medications   -Follow up w/heme onc       3. Carcinoma of left breast in female, estrogen receptor positive, unspecified site of breast   Assessment & Plan:  Left mastectomy 10 years ago due to prior history of estrogen receptor positive of left breast.  Home medications: Anastrozole qd    -Continue follow up with heme/onc  -Continue home medications             Subjective     Transitional Care Management Review:   Alisson Hoang is a 52 y.o. female here for TCM follow up.      During the TCM phone call patient stated:  TCM Call       Date and time call was made  10/11/2023 11:17 AM    Hospital care reviewed  Records reviewed    Patient was hospitialized at  [address] [place] [address] Date of Admission  10/07/23    Date of discharge  10/10/23    Diagnosis  mass of right lung    Disposition  Home    Were the patients medications reviewed and updated  Yes    Current Symptoms  None          TCM Call       Scheduled for follow up? Yes    I have advised the patient to call PCP with any new or worsening symptoms  JHONATHAN BECKFORD    Living Arrangements  Family members    Counseling  Patient          Ghanshyam Hodges is a 51 yo f with a pmh of breast cancerstatus postmastectomy and chemo/radion therapy who was recently hospitalized due to acute and sever back pain for apx 2 weeks. She was found to have a large right lung mass w/ extensive bone mets. She underwent IR biopsy of right posterior iliac spine  and mri which confirmed osseous mets. Patient currently reports significant improvemnet of back pain with tylenol. She is worried about results indicating cancer with mets. Patient reports a good support group and is surrounded with the love of her  and children. Review of Systems   Constitutional:  Negative for chills and fever. HENT:  Negative for ear pain and sore throat. Eyes:  Negative for pain and visual disturbance. Respiratory:  Negative for cough and shortness of breath. Cardiovascular:  Negative for chest pain and palpitations. Gastrointestinal:  Negative for abdominal pain and vomiting. Genitourinary:  Negative for dysuria and hematuria. Musculoskeletal:  Positive for back pain. Negative for arthralgias. Skin:  Negative for color change and rash. Neurological:  Negative for seizures and syncope. All other systems reviewed and are negative. Objective     /86 (BP Location: Left arm, Patient Position: Sitting, Cuff Size: Standard)   Pulse (!) 134   Temp 98.1 °F (36.7 °C) (Temporal)   Resp 16   Ht 5' 5" (1.651 m)   Wt 64.4 kg (142 lb)   SpO2 98%   BMI 23.63 kg/m²      Physical Exam  Vitals reviewed. HENT:      Head: Normocephalic.       Nose: Nose normal.   Eyes:      Conjunctiva/sclera: Conjunctivae normal.   Cardiovascular:      Rate and Rhythm: Normal rate and regular rhythm. Pulses: Normal pulses. Pulmonary:      Effort: Pulmonary effort is normal.   Abdominal:      General: Bowel sounds are normal.   Genitourinary:     General: Normal vulva. Musculoskeletal:         General: Normal range of motion. Cervical back: Normal range of motion. Skin:     General: Skin is warm. Capillary Refill: Capillary refill takes less than 2 seconds. Neurological:      General: No focal deficit present. Mental Status: She is alert.    Psychiatric:         Behavior: Behavior normal.       Medications have been reviewed by provider in current encounter    Laura Olivier MD

## 2023-10-13 NOTE — ASSESSMENT & PLAN NOTE
Patient reports that she has lost 13 pounds intentionally in order to have a better weight. CT chest: 7.8 x 5.1 cm right middle lobe mass extending to the lateral chest wall and mediastinum and across the major fissure into the right lower lobe, compatible with primary lung cancer or metastatic breast cancer with probable metastatic disease to a lower   esophageal node. Extensive lytic bone metastases with anterior compression deformity at T10. Iliac crest right biopsy: Metastatic lobular carcinoma of the breast. * Estrogen, progesterone & HER2 receptor studies pending, to be described in a separate receptor report.   MRI spine: widespread thoracic, lumbar and cervical mets; osseous mets in partially imaged rib cage and scapula    -Pulmonology appointment 10/17/23  -Has appointment with Heme/Onc 10/23/23  -MRI brain unremarkable for brain mets

## 2023-10-13 NOTE — ASSESSMENT & PLAN NOTE
Patient reports improvement of back pain.    Home medications: Tylenol 650 mg q8h, oxycodone 5 mg IR prn  Patient reports she has not taken her oxycodone pills as she has not had significant pain since discharge     -Continue home medications   -Follow up w/heme onc

## 2023-10-13 NOTE — TELEPHONE ENCOUNTER
I called Odilon Gee in response to a referral that was received for patient to establish care with Thoracic Surgery. Outreach was made to complete patient's intake questionnaire . Patient's intake questionnaire was reviewed and complete. Patient's intake has been sent to the team for clinical review.

## 2023-10-13 NOTE — TELEPHONE ENCOUNTER
Patient Call    Who are you speaking with? Patient and Daughter Sadi Bay   If it is not the patient, are they listed on an active communication consent form? N/A   What is the reason for this call? I called Odalys Minerva in regards to the referral for [facility] to be seen by Thoracic/ Med Onc. I informed patient that she did not need to be seen by Thoracic and she already has an appointment to follow up with Med Onc on 10/23/23 with Dr. Bush Doing. Patient verbalized her understanding. Does this require a call back? No   If a call back is required, please list best call back number N/a   If a call back is required, advise that a message will be forwarded to their care team and someone will return their call as soon as possible. Did you relay this information to the patient? N/A           Patient states she has used [facility] in the past.  Patient would like [facility] for this appointment.     Monday 10/23/23 at 10:20am Dr Millie Brannon

## 2023-10-13 NOTE — ASSESSMENT & PLAN NOTE
Left mastectomy 10 years ago due to prior history of estrogen receptor positive of left breast.  Home medications: Anastrozole qd    -Continue follow up with heme/onc  -Continue home medications

## 2023-10-17 ENCOUNTER — OFFICE VISIT (OUTPATIENT)
Dept: PULMONOLOGY | Facility: CLINIC | Age: 53
End: 2023-10-17
Payer: COMMERCIAL

## 2023-10-17 VITALS
HEIGHT: 65 IN | BODY MASS INDEX: 24.83 KG/M2 | SYSTOLIC BLOOD PRESSURE: 138 MMHG | HEART RATE: 127 BPM | DIASTOLIC BLOOD PRESSURE: 80 MMHG | WEIGHT: 149 LBS | OXYGEN SATURATION: 98 % | TEMPERATURE: 97.8 F

## 2023-10-17 DIAGNOSIS — C50.919 MALIGNANT NEOPLASM OF FEMALE BREAST, UNSPECIFIED ESTROGEN RECEPTOR STATUS, UNSPECIFIED LATERALITY, UNSPECIFIED SITE OF BREAST (HCC): Primary | ICD-10-CM

## 2023-10-17 DIAGNOSIS — J18.1 LUNG CONSOLIDATION (HCC): ICD-10-CM

## 2023-10-17 DIAGNOSIS — J90 PLEURAL EFFUSION, RIGHT: ICD-10-CM

## 2023-10-17 PROCEDURE — 99214 OFFICE O/P EST MOD 30 MIN: CPT | Performed by: INTERNAL MEDICINE

## 2023-10-17 NOTE — PROGRESS NOTES
Pulmonary Follow Up Note   Chioma Ochoa 52 y.o. female MRN: [id number]  10/17/2023      Assessment:    1. Malignant neoplasm of female breast, unspecified estrogen receptor status, unspecified laterality, unspecified site of breast ([address])  -I discussed with her that the results of the recent bone biopsy of the right iliac crest was positive for metastatic breast cancer. She has diffuse disease throughout her bones.  -We will obtain a PET/CT  -She has a follow-up scheduled with oncology on 10/23/2023  -     NM PET CT skull base to mid thigh; Future; Expected date: 10/17/2023    2. Pleural effusion, right  -She has a small right-sided pleural effusion. She has minimal symptoms. I will plan to repeat her chest x-ray in 1 month and if this has significantly accumulated and we could consider thoracentesis followed by a tunneled pleural catheter  -Order placed for repeat chest x-ray in 1 month  -     XR chest pa & lateral; Future; Expected date: 11/07/2023    3. Lung consolidation ([address])  -The right middle lobe. This is likely the focus of metastatic breast cancer.  -Plan for PET CT of the chest  -If enlarges will certainly begin to cause airway compromise and may require stent or radiation    Plan:    Diagnoses and all orders for this visit:    Malignant neoplasm of female breast, unspecified estrogen receptor status, unspecified laterality, unspecified site of breast ([address])  -     NM PET CT skull base to mid thigh; Future    Pleural effusion, right  -     XR chest pa & lateral; Future    Lung consolidation ([facility])        No follow-ups on file. History of Present Illness   HPI:  Chioma Ochoa is a 52 y.o. female who presents for hospital follow-up after she was recently admitted with a lung consolidation and multiple bone lesions. She underwent biopsy. Since then she reports that her breathing is okay. She has an occasional seldom cough. She denies any recent acute illnesses.     Review of Systems   Constitutional: Negative for chills and fever. HENT:  Negative for congestion, postnasal drip and rhinorrhea. Eyes:  Negative for itching. Respiratory:  Negative for cough, shortness of breath, wheezing and stridor. Cardiovascular:  Negative for chest pain, palpitations and leg swelling. Gastrointestinal:  Negative for abdominal distention, abdominal pain, nausea and vomiting. Genitourinary:  Negative for dysuria and flank pain. Musculoskeletal:  Negative for arthralgias and myalgias. Skin:  Negative for color change. Neurological:  Negative for dizziness, light-headedness and headaches. Psychiatric/Behavioral: Negative.          Historical Information   Past Medical History:   Diagnosis Date    Breast cancer ([address] [facility]) 07/2013    left-chemotherapy    Fibroma     History of chemotherapy     History of external beam radiation therapy      Past Surgical History:   Procedure Laterality Date    APPENDECTOMY      BREAST SURGERY  2014    Left breast 2/2 breast CA    HYSTERECTOMY  2015    Due to fibroma    IR BIOPSY BONE  10/9/2023    MASTECTOMY Left 2013    OOPHORECTOMY       Family History   Problem Relation Age of Onset    Lung cancer Mother     No Known Problems Father     No Known Problems Sister     No Known Problems Daughter     No Known Problems Daughter     Cancer Maternal Grandmother     No Known Problems Maternal Grandfather     No Known Problems Paternal Grandmother     No Known Problems Paternal Grandfather     Uterine cancer Maternal Aunt     No Known Problems Son     No Known Problems Paternal Aunt          Meds/Allergies     Current Outpatient Medications:     acetaminophen (TYLENOL) 650 mg CR tablet, Take 1 tablet (650 mg total) by mouth every 8 (eight) hours as needed for mild pain or moderate pain, Disp: 30 tablet, Rfl: 0    anastrozole (ARIMIDEX) 1 mg tablet, Take 1 tablet (1 mg total) by mouth daily, Disp: 90 tablet, Rfl: 3    Multiple Vitamin (MULTIVITAMIN ADULT PO), Take 1 tablet by mouth in the morning, Disp: , Rfl:     calcium citrate-vitamin D (Calcitrate Plus D) 315 mg-5 mcg tablet, Take 1 tablet by mouth 2 (two) times a day (Patient not taking: Reported on 10/7/2023), Disp: 60 tablet, Rfl: 0    oxyCODONE (ROXICODONE) 5 immediate release tablet, Take 0.5 tablets (2.5 mg total) by mouth every 6 (six) hours as needed for severe pain for up to 10 days Max Daily Amount: 10 mg (Patient not taking: Reported on 10/17/2023), Disp: 10 tablet, Rfl: 0  Allergies   Allergen Reactions    Aspirin Rash     Other reaction(s): Palpitations       Vitals: Blood pressure 138/80, pulse (!) 127, temperature 97.8 °F (36.6 °C), temperature source Tympanic, height 5' 5" (1.651 m), weight 67.6 kg (149 lb), SpO2 98 %. Body mass index is 24.79 kg/m². Oxygen Therapy  SpO2: 98 %  Oxygen Therapy: None (Room air)      Physical Exam  Physical Exam  Constitutional:       General: She is not in acute distress. Appearance: She is not diaphoretic. HENT:      Head: Normocephalic and atraumatic. Nose: Nose normal.      Mouth/Throat:      Pharynx: No oropharyngeal exudate. Eyes:      General: No scleral icterus. Conjunctiva/sclera: Conjunctivae normal.      Pupils: Pupils are equal, round, and reactive to light. Neck:      Thyroid: No thyromegaly. Vascular: No JVD. Trachea: No tracheal deviation. Cardiovascular:      Rate and Rhythm: Normal rate and regular rhythm. Heart sounds: Normal heart sounds. No murmur heard. No friction rub. No gallop. Pulmonary:      Effort: Pulmonary effort is normal. No respiratory distress. Breath sounds: Normal breath sounds. No stridor. No wheezing or rales. Abdominal:      General: Bowel sounds are normal. There is no distension. Palpations: Abdomen is soft. Tenderness: There is no abdominal tenderness. There is no guarding or rebound. Musculoskeletal:         General: No deformity. Normal range of motion.       Cervical back: Normal range of motion and neck supple. Lymphadenopathy:      Cervical: No cervical adenopathy. Skin:     General: Skin is warm. Findings: No erythema or rash. Neurological:      Mental Status: She is alert and oriented to person, place, and time. Cranial Nerves: No cranial nerve deficit. Sensory: No sensory deficit. Labs: I have personally reviewed pertinent lab results. Lab Results   Component Value Date    WBC 7.82 10/07/2023    HGB 10.8 (L) 10/07/2023    HCT 34.0 (L) 10/07/2023    MCV 83 10/07/2023     (H) 10/09/2023     Lab Results   Component Value Date    GLUCOSE 81 06/22/2018    CALCIUM 9.5 10/07/2023     06/22/2018    K 4.0 10/07/2023    CO2 25 10/07/2023    CL 98 10/07/2023    BUN 8 10/07/2023    CREATININE 0.70 10/07/2023     No results found for: "IGE"  Lab Results   Component Value Date    ALT 19 10/06/2023    AST 26 10/06/2023    ALKPHOS 123 (H) 10/06/2023    BILITOT 0.2 06/22/2018         Imaging and other studies: I have personally reviewed pertinent reports. and I have personally reviewed pertinent films in PACS  CT of the chest-right middle lobe dense consolidation    Pulmonary function testing: None on file    EKG, Pathology, and Other Studies: I have personally reviewed pertinent reports.    and I have personally reviewed pertinent films in PACS    Elba Valentine MD  Pulmonary and Critical Care   [facility] Pulmonary & Critical Care [facility]

## 2023-10-23 ENCOUNTER — TELEPHONE (OUTPATIENT)
Dept: HEMATOLOGY ONCOLOGY | Facility: CLINIC | Age: 53
End: 2023-10-23

## 2023-10-23 ENCOUNTER — OFFICE VISIT (OUTPATIENT)
Dept: HEMATOLOGY ONCOLOGY | Facility: CLINIC | Age: 53
End: 2023-10-23
Payer: COMMERCIAL

## 2023-10-23 VITALS
OXYGEN SATURATION: 99 % | SYSTOLIC BLOOD PRESSURE: 134 MMHG | WEIGHT: 140 LBS | HEART RATE: 129 BPM | HEIGHT: 65 IN | DIASTOLIC BLOOD PRESSURE: 80 MMHG | TEMPERATURE: 97.2 F | RESPIRATION RATE: 18 BRPM | BODY MASS INDEX: 23.32 KG/M2

## 2023-10-23 DIAGNOSIS — G89.3 PAIN FROM BONE METASTASES (HCC): ICD-10-CM

## 2023-10-23 DIAGNOSIS — C50.912 CARCINOMA OF LEFT BREAST IN FEMALE, ESTROGEN RECEPTOR POSITIVE, UNSPECIFIED SITE OF BREAST: ICD-10-CM

## 2023-10-23 DIAGNOSIS — Z85.3 PERSONAL HISTORY OF MALIGNANT NEOPLASM OF BREAST: Primary | ICD-10-CM

## 2023-10-23 DIAGNOSIS — Z79.811 USE OF ANASTROZOLE (ARIMIDEX): ICD-10-CM

## 2023-10-23 DIAGNOSIS — C50.812 MALIGNANT NEOPLASM OF OVERLAPPING SITES OF LEFT BREAST IN FEMALE, ESTROGEN RECEPTOR POSITIVE: ICD-10-CM

## 2023-10-23 DIAGNOSIS — D64.9 NORMOCYTIC ANEMIA: ICD-10-CM

## 2023-10-23 DIAGNOSIS — C79.51 PAIN FROM BONE METASTASES (HCC): ICD-10-CM

## 2023-10-23 DIAGNOSIS — Z17.0 MALIGNANT NEOPLASM OF OVERLAPPING SITES OF LEFT BREAST IN FEMALE, ESTROGEN RECEPTOR POSITIVE: ICD-10-CM

## 2023-10-23 DIAGNOSIS — C50.919 RECURRENT MALIGNANT NEOPLASM OF BREAST, UNSPECIFIED LATERALITY (HCC): ICD-10-CM

## 2023-10-23 DIAGNOSIS — R91.8 RIGHT LOWER LOBE LUNG MASS: Primary | ICD-10-CM

## 2023-10-23 DIAGNOSIS — Z17.0 CARCINOMA OF LEFT BREAST IN FEMALE, ESTROGEN RECEPTOR POSITIVE, UNSPECIFIED SITE OF BREAST: ICD-10-CM

## 2023-10-23 PROCEDURE — 99215 OFFICE O/P EST HI 40 MIN: CPT | Performed by: INTERNAL MEDICINE

## 2023-10-23 RX ORDER — LAMOTRIGINE 25 MG/1
500 TABLET ORAL ONCE
Status: CANCELLED | OUTPATIENT
Start: 2023-10-30

## 2023-10-23 RX ORDER — LANOLIN ALCOHOL/MO/W.PET/CERES
1 CREAM (GRAM) TOPICAL 2 TIMES DAILY
Qty: 180 TABLET | Refills: 0 | Status: SHIPPED | OUTPATIENT
Start: 2023-10-23 | End: 2024-01-21

## 2023-10-23 NOTE — TELEPHONE ENCOUNTER
What would be a preferred day of the week that would work best for your infusion appointment? Monday or Tuesday  Do you prefer mornings or afternoons for your appointments? Early AM  Are there any days or dates that do not work for your schedule, including any upcoming vacations? N/A  We are going to try our best to schedule you at the infusion center closest to your home. In the event that we are unable to what would be your next preferred infusion site or sites? [facility]  Do you have transportation to take you to all of your appointments?  yes

## 2023-10-23 NOTE — PROGRESS NOTES
Hematology/Oncology Outpatient Follow-up  [facility] 52 y.o. female 1970 [id number]    Date:  10/23/2023        Assessment and Plan:  1. Right lower lobe lung mass  I had a lengthy discussion with the patient and her daughter and friend. The friend helped with the interpretation during this office visit. We did review the imaging of the CT scan of the chest extensively. She seems to have a rather large right lower lobe lung mass which is very suspicious for malignant process. The patient was told that we will need to pursue a biopsy from the lung mass to rule out second lung primary versus metastatic disease from her breast cancer. She will be seeing the interventional radiology team to pursue the IR guided biopsy hopefully as soon as possible. - Ambulatory referral to Interventional Radiology; Future    2. Recurrent malignant neoplasm of breast, unspecified laterality ([address])  I did review the pathology with the patient which was taken from the right iliac bone lesion and seems to be compatible with ER positive lobular carcinoma which is similar to the prior history of lobular carcinoma diagnosed in 2013. The patient was told that we are dealing with what it seems to be a recurrence of her breast cancer. She was told that we will send the biopsy for molecular evaluation which may guide with the treatment options in the future. At this point she will be treated with ribociclib 600 mg daily 3 weeks on 1 week off along with Faslodex. We had extensive discussion about prognosis. She was told that her disease at this point is incurable. However, treatable. The patient had multiple questions. All the questions were answered and translated through her friend. - Cancer antigen 15-3  - Cancer antigen 27.29  - Vitamin D 25 hydroxy; Future    3. Pain from bone metastases [facility])  She seems to be symptomatic from her widespread metastatic disease to the bone.   We did discuss pursuing Xgeva on every 4-week basis.  She will be also seeing the palliative care team for symptom management. At this point she seems to have a prescription for oxycodone which can be used along with the Tylenol.  - Ambulatory referral to Palliative Care; Future  - LD,Blood; Future    4. Normocytic anemia  Anemia was found which will be worked up. - Folate; Future  - Vitamin B12; Future  - Reticulocyte Count with Retic HGB; Future  - Iron Panel (Includes Ferritin, Iron Sat%, Iron, and TIBC); Future  - CBC and differential; Future    5. Use of anastrozole (Arimidex)  She was encouraged to take vitamin D supplements. - calcium citrate-vitamin D (Calcitrate Plus D) 315 mg-5 mcg tablet; Take 1 tablet by mouth 2 (two) times a day  Dispense: 180 tablet; Refill: 0        HPI:  The patient came in today to establish oncological care since her oncologist left the practice. Oncology history:   52year old surgically postmenopausal woman with locally advanced left breast cancer, grade 2, ER/DE positive HER-2 negative disease, diagnosed in 2013. She has no evidence of BRCA mutation. She underwent neoadjuvant chemotherapy with AC followed by paclitaxel resulting in clinical partial response. However, when she had mastectomy, she had significant residual disease, as well as 2 positive lymph node. She completed postmastectomy radiation therapy. She was previously on adjuvant hormonal therapy with tamoxifen, until she underwent hysterectomy and bilateral oophorectomy for fibroid. Subsequently, she was on anastrozole since 2015 through October 2022. For some reason, she stopped anastrozole and was placed back on it. Interval history:  The patient apparently was found to have a large mass in the right lung with regional lymphadenopathy and metastatic bone disease during the recent hospitalization.   CT scan of the chest on 10/6/2023 showed:  IMPRESSION:     7.8 x 5.1 cm right middle lobe mass extending to the lateral chest wall and mediastinum and across the major fissure into the right lower lobe, compatible with primary lung cancer or metastatic breast cancer with probable metastatic disease to a lower   esophageal node. Extensive lytic bone metastases with anterior compression deformity at T10. Small right effusion. Bone scan on 10/9/2023 showed widespread osseous metastasis. MRI of the brain was negative for metastatic disease. Biopsy of the right iliac bone lesion on 10/9/2023 showed metastatic lobular carcinoma, ER +40%, NM -0%, HER2/hari 2+ by IHC and negative by FISH amplification. Thoracic MRI was done on 10/10/2023 when showed:  IMPRESSION:     1. Partially limited assessment due to lack of IV contrast.  2.  Widespread thoracic and visualized cervical and lumbar spine metastasis. Pathologic wedge compression fracture at level T10 with moderate height loss. T7 superior endplate pathologic fracture with minimal height loss. 3.  Left posterior vertebral body buckling at level T6 without apparent epidural tumor. Left posterior superior vertebral body buckling/cortical disruption at level T8 with possible minimal epidural tumor. 4.  Osseous metastasis in the partially imaged rib cage and scapula. 5.  Right lung mass and right pleural effusion. Oncology History   Carcinoma of left breast, estrogen receptor positive    6/18/2013 Biopsy    Left breast biopsy  Invasive mammary carcinoma with lobular features. Grade 2. ER 90%  %    HER2 negative     7/29/2013 - 12/9/2013 Chemotherapy    Dose dense AC x4 followed by paclitaxel x12     8/2013 Genetic Testing    Negative for BRCA 1 and BRCA 2 mutation     2/18/2014 Surgery    Left mastectomy with sentinel node biopsy  - Margins clear  - 2/3 lymph nodes  Dr. Refugio Valencia     2/18/2014 -  Cancer Staged    IIIA ypT3, ypN1a (i+) (sn) G2     4/3/2014 - 5/20/2014 Radiation    Chest wall and draining lymph nodes.   Total dose 6040 cGy  Dr. Emmanuel Quesada     5/19/2014 - 5/2015 Hormone Therapy Tamoxifen     5/2015 -  Hormone Therapy    Anastrozole   Dr. Agustina Kay             ROS: Review of Systems   Constitutional:  Negative for chills and fever. HENT:  Negative for ear pain and sore throat. Eyes:  Negative for pain and visual disturbance. Respiratory:  Negative for cough and shortness of breath. Cardiovascular:  Negative for chest pain and palpitations. Gastrointestinal:  Negative for abdominal pain and vomiting. Genitourinary:  Negative for dysuria and hematuria. Musculoskeletal:  Positive for back pain. Negative for arthralgias. Skin:  Negative for color change and rash. Neurological:  Negative for seizures and syncope. All other systems reviewed and are negative.       Past Medical History:   Diagnosis Date    Breast cancer ([address]) 07/2013    left-chemotherapy    Fibroma     History of chemotherapy     History of external beam radiation therapy        Past Surgical History:   Procedure Laterality Date    APPENDECTOMY      BREAST SURGERY  2014    Left breast 2/2 breast CA    HYSTERECTOMY  2015    Due to fibroma    IR BIOPSY BONE  10/9/2023    MASTECTOMY Left 2013    OOPHORECTOMY         Social History     Socioeconomic History    Marital status: /Civil Union     Spouse name: None    Number of children: 3    Years of education: None    Highest education level: 12th grade   Occupational History     Comment: unemployed   Tobacco Use    Smoking status: Never     Passive exposure: Never    Smokeless tobacco: Never   Vaping Use    Vaping Use: Never used   Substance and Sexual Activity    Alcohol use: No    Drug use: No    Sexual activity: Yes     Partners: Male     Birth control/protection: Surgical   Other Topics Concern    None   Social History Narrative    None     Social Determinants of Health     Financial Resource Strain: Low Risk  (6/19/2023)    Overall Financial Resource Strain (CARDIA)     Difficulty of Paying Living Expenses: Not very hard   Food Insecurity: No Food Insecurity (6/19/2023)    Hunger Vital Sign     Worried About Running Out of Food in the Last Year: Never true     Ran Out of Food in the Last Year: Never true   Transportation Needs: No Transportation Needs (6/19/2023)    PRAPARE - Transportation     Lack of Transportation (Medical): No     Lack of Transportation (Non-Medical): No   Physical Activity: Not on file   Stress: Not on file   Social Connections: Not on file   Intimate Partner Violence: Not on file   Housing Stability: Not on file       Family History   Problem Relation Age of Onset    Lung cancer Mother     No Known Problems Father     No Known Problems Sister     No Known Problems Daughter     No Known Problems Daughter     Cancer Maternal Grandmother     No Known Problems Maternal Grandfather     No Known Problems Paternal Grandmother     No Known Problems Paternal Grandfather     Uterine cancer Maternal Aunt     No Known Problems Son     No Known Problems Paternal Aunt        Allergies   Allergen Reactions    Aspirin Rash     Other reaction(s): Palpitations         Current Outpatient Medications:     acetaminophen (TYLENOL) 650 mg CR tablet, Take 1 tablet (650 mg total) by mouth every 8 (eight) hours as needed for mild pain or moderate pain, Disp: 30 tablet, Rfl: 0    anastrozole (ARIMIDEX) 1 mg tablet, Take 1 tablet (1 mg total) by mouth daily, Disp: 90 tablet, Rfl: 3    calcium citrate-vitamin D (Calcitrate Plus D) 315 mg-5 mcg tablet, Take 1 tablet by mouth 2 (two) times a day, Disp: 180 tablet, Rfl: 0    Multiple Vitamin (MULTIVITAMIN ADULT PO), Take 1 tablet by mouth in the morning, Disp: , Rfl:       Physical Exam:  /80 (BP Location: Right arm, Patient Position: Sitting, Cuff Size: Adult)   Pulse (!) 129   Temp (!) 97.2 °F (36.2 °C)   Resp 18   Ht 5' 5" (1.651 m)   Wt 63.5 kg (140 lb)   SpO2 99%   BMI 23.30 kg/m²     Physical Exam  Constitutional:       General: She is not in acute distress. Appearance: She is well-developed. She is not diaphoretic. HENT:      Head: Normocephalic and atraumatic. Nose: Nose normal.   Eyes:      General: No scleral icterus. Right eye: No discharge. Left eye: No discharge. Conjunctiva/sclera: Conjunctivae normal.      Pupils: Pupils are equal, round, and reactive to light. Neck:      Thyroid: No thyromegaly. Vascular: No JVD. Trachea: No tracheal deviation. Cardiovascular:      Rate and Rhythm: Normal rate and regular rhythm. Heart sounds: Normal heart sounds. No murmur heard. No friction rub. Pulmonary:      Effort: Pulmonary effort is normal. No respiratory distress. Breath sounds: Normal breath sounds. No stridor. No wheezing or rales. Chest:      Chest wall: No tenderness. Abdominal:      General: There is no distension. Palpations: Abdomen is soft. There is no hepatomegaly or splenomegaly. Tenderness: There is no abdominal tenderness. There is no guarding or rebound. Musculoskeletal:         General: No tenderness or deformity. Normal range of motion. Cervical back: Normal range of motion and neck supple. Lymphadenopathy:      Cervical: No cervical adenopathy. Skin:     General: Skin is warm and dry. Coloration: Skin is not pale. Findings: No erythema or rash. Neurological:      Mental Status: She is alert and oriented to person, place, and time. Cranial Nerves: No cranial nerve deficit. Coordination: Coordination normal.      Deep Tendon Reflexes: Reflexes are normal and symmetric. Psychiatric:         Behavior: Behavior normal.         Thought Content:  Thought content normal.         Judgment: Judgment normal.           Labs:  Lab Results   Component Value Date    WBC 7.82 10/07/2023    HGB 10.8 (L) 10/07/2023    HCT 34.0 (L) 10/07/2023    MCV 83 10/07/2023     (H) 10/09/2023     Lab Results   Component Value Date     06/22/2018    K 4.0 10/07/2023    CL 98 10/07/2023    CO2 25 10/07/2023    ANIONGAP 12 06/22/2018    BUN 8 10/07/2023    CREATININE 0.70 10/07/2023    GLUCOSE 81 06/22/2018    GLUF 79 06/20/2023    CALCIUM 9.5 10/07/2023    CORRECTEDCA 10.2 (H) 05/14/2019    AST 26 10/06/2023    ALT 19 10/06/2023    ALKPHOS 123 (H) 10/06/2023    PROT 8.1 06/22/2018    BILITOT 0.2 06/22/2018    EGFR 97 10/07/2023     No results found for: "TSH"    Patient voiced understanding and agreement in the above discussion. Aware to contact our office with questions/symptoms in the interim.

## 2023-10-23 NOTE — PROGRESS NOTES
Chemotherapy education provided to patient and daughter    Plan is for Xgeva, Faslodex, and Ribociclib       Provided and reviewed medication information sheets, phone contact sheet and diahrrea management sheet to patient. Chemotherapy consent signed.       No additional questions at this time

## 2023-10-24 DIAGNOSIS — G89.3 PAIN FROM BONE METASTASES (HCC): ICD-10-CM

## 2023-10-24 DIAGNOSIS — C50.912 CARCINOMA OF LEFT BREAST IN FEMALE, ESTROGEN RECEPTOR POSITIVE, UNSPECIFIED SITE OF BREAST: ICD-10-CM

## 2023-10-24 DIAGNOSIS — Z85.3 PERSONAL HISTORY OF MALIGNANT NEOPLASM OF BREAST: Primary | ICD-10-CM

## 2023-10-24 DIAGNOSIS — Z17.0 CARCINOMA OF LEFT BREAST IN FEMALE, ESTROGEN RECEPTOR POSITIVE, UNSPECIFIED SITE OF BREAST: ICD-10-CM

## 2023-10-24 DIAGNOSIS — Z17.0 MALIGNANT NEOPLASM OF OVERLAPPING SITES OF LEFT BREAST IN FEMALE, ESTROGEN RECEPTOR POSITIVE: Primary | ICD-10-CM

## 2023-10-24 DIAGNOSIS — C79.51 PAIN FROM BONE METASTASES (HCC): ICD-10-CM

## 2023-10-24 DIAGNOSIS — C50.812 MALIGNANT NEOPLASM OF OVERLAPPING SITES OF LEFT BREAST IN FEMALE, ESTROGEN RECEPTOR POSITIVE: Primary | ICD-10-CM

## 2023-10-24 RX ORDER — SODIUM CHLORIDE 9 MG/ML
20 INJECTION, SOLUTION INTRAVENOUS ONCE
Status: CANCELLED | OUTPATIENT
Start: 2023-10-30

## 2023-10-24 NOTE — TELEPHONE ENCOUNTER
Tried calling patient with [phone] [address] . Patient answered and requested one moment to review the schedule. She then disconnected the phone. Another attempt to contact patient was made and no answer to her phone. Call was disconnected.

## 2023-10-26 ENCOUNTER — HOSPITAL ENCOUNTER (OUTPATIENT)
Dept: NUCLEAR MEDICINE | Facility: HOSPITAL | Age: 53
Discharge: HOME | End: 2023-10-26
Attending: INTERNAL MEDICINE
Payer: COMMERCIAL

## 2023-10-26 DIAGNOSIS — C50.919 MALIGNANT NEOPLASM OF FEMALE BREAST, UNSPECIFIED ESTROGEN RECEPTOR STATUS, UNSPECIFIED LATERALITY, UNSPECIFIED SITE OF BREAST (HCC): ICD-10-CM

## 2023-10-26 LAB — GLUCOSE SERPL-MCNC: 98 MG/DL (ref 65–140)

## 2023-10-26 PROCEDURE — G1004 CDSM NDSC: HCPCS

## 2023-10-26 PROCEDURE — A9552 F18 FDG: HCPCS

## 2023-10-26 PROCEDURE — 78815 PET IMAGE W/CT SKULL-THIGH: CPT

## 2023-10-26 PROCEDURE — 82948 REAGENT STRIP/BLOOD GLUCOSE: CPT

## 2023-10-27 ENCOUNTER — PREP FOR PROCEDURE (OUTPATIENT)
Dept: RADIOLOGY | Facility: HOSPITAL | Age: 53
End: 2023-10-27

## 2023-10-27 DIAGNOSIS — Z17.0 MALIGNANT NEOPLASM OF OVERLAPPING SITES OF LEFT BREAST IN FEMALE, ESTROGEN RECEPTOR POSITIVE: Primary | ICD-10-CM

## 2023-10-27 DIAGNOSIS — Z17.0 CARCINOMA OF LEFT BREAST IN FEMALE, ESTROGEN RECEPTOR POSITIVE, UNSPECIFIED SITE OF BREAST: Primary | ICD-10-CM

## 2023-10-27 DIAGNOSIS — C50.812 MALIGNANT NEOPLASM OF OVERLAPPING SITES OF LEFT BREAST IN FEMALE, ESTROGEN RECEPTOR POSITIVE: Primary | ICD-10-CM

## 2023-10-27 DIAGNOSIS — C50.912 CARCINOMA OF LEFT BREAST IN FEMALE, ESTROGEN RECEPTOR POSITIVE, UNSPECIFIED SITE OF BREAST: Primary | ICD-10-CM

## 2023-10-27 RX ORDER — SODIUM CHLORIDE 9 MG/ML
30 INJECTION, SOLUTION INTRAVENOUS CONTINUOUS
OUTPATIENT
Start: 2023-10-27

## 2023-10-28 ENCOUNTER — HOSPITAL ENCOUNTER (EMERGENCY)
Facility: HOSPITAL | Age: 53
Discharge: HOME | End: 2023-10-28
Attending: EMERGENCY MEDICINE | Admitting: EMERGENCY MEDICINE
Payer: COMMERCIAL

## 2023-10-28 ENCOUNTER — APPOINTMENT (EMERGENCY)
Dept: CT IMAGING | Facility: HOSPITAL | Age: 53
End: 2023-10-28
Payer: COMMERCIAL

## 2023-10-28 VITALS
OXYGEN SATURATION: 97 % | DIASTOLIC BLOOD PRESSURE: 67 MMHG | TEMPERATURE: 97.7 F | HEART RATE: 115 BPM | RESPIRATION RATE: 18 BRPM | SYSTOLIC BLOOD PRESSURE: 135 MMHG

## 2023-10-28 DIAGNOSIS — M54.9 BACK PAIN: Primary | ICD-10-CM

## 2023-10-28 DIAGNOSIS — C79.51 METASTASIS TO SPINAL COLUMN (HCC): ICD-10-CM

## 2023-10-28 PROCEDURE — 72131 CT LUMBAR SPINE W/O DYE: CPT

## 2023-10-28 PROCEDURE — 99284 EMERGENCY DEPT VISIT MOD MDM: CPT | Performed by: EMERGENCY MEDICINE

## 2023-10-28 PROCEDURE — 96372 THER/PROPH/DIAG INJ SC/IM: CPT

## 2023-10-28 PROCEDURE — 99284 EMERGENCY DEPT VISIT MOD MDM: CPT

## 2023-10-28 PROCEDURE — 72128 CT CHEST SPINE W/O DYE: CPT

## 2023-10-28 PROCEDURE — G1004 CDSM NDSC: HCPCS

## 2023-10-28 RX ORDER — OXYCODONE HYDROCHLORIDE 5 MG/1
5 TABLET ORAL ONCE
Status: COMPLETED | OUTPATIENT
Start: 2023-10-28 | End: 2023-10-28

## 2023-10-28 RX ORDER — IBUPROFEN 600 MG/1
600 TABLET ORAL EVERY 6 HOURS PRN
Qty: 30 TABLET | Refills: 0 | Status: SHIPPED | OUTPATIENT
Start: 2023-10-28

## 2023-10-28 RX ORDER — OXYCODONE HYDROCHLORIDE 5 MG/1
5 TABLET ORAL EVERY 4 HOURS PRN
Qty: 20 TABLET | Refills: 0 | Status: SHIPPED | OUTPATIENT
Start: 2023-10-28 | End: 2023-11-07

## 2023-10-28 RX ORDER — DOCUSATE SODIUM 100 MG/1
100 CAPSULE, LIQUID FILLED ORAL EVERY 12 HOURS
Qty: 60 CAPSULE | Refills: 0 | Status: SHIPPED | OUTPATIENT
Start: 2023-10-28

## 2023-10-28 RX ORDER — KETOROLAC TROMETHAMINE 30 MG/ML
30 INJECTION, SOLUTION INTRAMUSCULAR; INTRAVENOUS ONCE
Status: COMPLETED | OUTPATIENT
Start: 2023-10-28 | End: 2023-10-28

## 2023-10-28 RX ADMIN — KETOROLAC TROMETHAMINE 30 MG: 30 INJECTION, SOLUTION INTRAMUSCULAR; INTRAVENOUS at 14:21

## 2023-10-28 RX ADMIN — OXYCODONE HYDROCHLORIDE 5 MG: 5 TABLET ORAL at 14:21

## 2023-10-28 NOTE — ED PROVIDER NOTES
History  Chief Complaint   Patient presents with    Back Pain     Back pain r/t cancer, pt reports pain increasing. Biopsy 2 weeks ago. Has not been taking her pain as prescribed medications "because they make me drowsy". 44y F here for back pain - mildly increased since having a biopsy 10/9 to confirm bony metastatic spread of her previously treated breast cancer. Pt initially reported her pain was the same but then stated her pain was mildly increased since the biopsy. Takes acetaminophen and occas oxycodone but states it doesn't take the pain completely away and makes her sleepy. Denies any f/c/s, no redness or swelling over the biopsy site. Denies any radiation of the pain, denies any extremity weakness. Reports intermittent tingling in the right leg, but no numbness and her only trouble walking is 2/2 to the back pain. Denies any b/b incont, no saddle/perineal anesthesia/paresthesias. Hasn't taken anything for her pain yet today. Hx - in 2013 dxd w/ locally advanced left breast cancer, grade 2, ER/NE positive HER-2 negative disease, diagnosed in 2013. She has no evidence of BRCA mutation. She underwent neoadjuvant chemotherapy with AC followed by paclitaxel resulting in clinical partial response. However, when she had mastectomy, she had significant residual disease, as well as 2 positive lymph node. She completed postmastectomy radiation therapy. She was previously on adjuvant hormonal therapy with tamoxifen, until she underwent hysterectomy and bilateral oophorectomy for fibroid. Subsequently, she was on anastrozole since 2015 through October 2022. She stopped her anastrazole in October 2022 for unknown reasons. She was admitted 10/6/ - 10/10 for evaluation after CT found mult lytic lesions throughout her spine concerning for mets and a new lung mass w/ effusion.   The biopsy done 10/9 showed findings compatible with ER positive lobular carcinoma which is similar to the prior history of lobular carcinoma diagnosed in 2013. As per the 10/23 Heme/Onc note: "At this point she will be treated with ribociclib 600 mg daily 3 weeks on 1 week off along with Faslodex. We had extensive discussion about prognosis. She was told that her disease at this point is incurable. However, treatable". Pt has her first infusion scheduled for 10/30        History provided by:  Patient, relative and medical records   used: Yes ([id number])    Back Pain      Prior to Admission Medications   Prescriptions Last Dose Informant Patient Reported? Taking?    Multiple Vitamin (MULTIVITAMIN ADULT PO)  Self Yes No   Sig: Take 1 tablet by mouth in the morning   Ribociclib Succ, 600 MG Dose, 200 MG TBPK   No No   Sig: Take 600 mg by mouth in the morning For 3 weeks and then 1 week off   acetaminophen (TYLENOL) 650 mg CR tablet  Self No No   Sig: Take 1 tablet (650 mg total) by mouth every 8 (eight) hours as needed for mild pain or moderate pain   anastrozole (ARIMIDEX) 1 mg tablet  Self No No   Sig: Take 1 tablet (1 mg total) by mouth daily   calcium citrate-vitamin D (Calcitrate Plus D) 315 mg-5 mcg tablet   No No   Sig: Take 1 tablet by mouth 2 (two) times a day      Facility-Administered Medications: None       Past Medical History:   Diagnosis Date    Breast cancer ([address]) 07/2013    left-chemotherapy    Fibroma     History of chemotherapy     History of external beam radiation therapy        Past Surgical History:   Procedure Laterality Date    APPENDECTOMY      BREAST SURGERY  2014    Left breast 2/2 breast CA    HYSTERECTOMY  2015    Due to fibroma    IR BIOPSY BONE  10/9/2023    MASTECTOMY Left 2013    OOPHORECTOMY         Family History   Problem Relation Age of Onset    Lung cancer Mother     No Known Problems Father     No Known Problems Sister     No Known Problems Daughter     No Known Problems Daughter     Cancer Maternal Grandmother     No Known Problems Maternal Grandfather     No Known Problems Paternal Grandmother     No Known Problems Paternal Grandfather     Uterine cancer Maternal Aunt     No Known Problems Son     No Known Problems Paternal Aunt      I have reviewed and agree with the history as documented. E-Cigarette/Vaping    E-Cigarette Use Never User      E-Cigarette/Vaping Substances    Nicotine No     THC No     CBD No     Flavoring No     Other No     Unknown No      Social History     Tobacco Use    Smoking status: Never     Passive exposure: Never    Smokeless tobacco: Never   Vaping Use    Vaping Use: Never used   Substance Use Topics    Alcohol use: No    Drug use: No       Review of Systems   Musculoskeletal:  Positive for back pain. All other systems reviewed and are negative. Physical Exam  Physical Exam  Vitals and nursing note reviewed. Constitutional:       General: She is not in acute distress. Appearance: Normal appearance. She is not ill-appearing, toxic-appearing or diaphoretic. HENT:      Nose: Nose normal.   Eyes:      Conjunctiva/sclera: Conjunctivae normal.   Cardiovascular:      Rate and Rhythm: Regular rhythm. Tachycardia present. Pulmonary:      Effort: Pulmonary effort is normal.   Abdominal:      Palpations: Abdomen is soft. Musculoskeletal:         General: No swelling. Cervical back: Neck supple. Thoracic back: Tenderness present. Decreased range of motion. Lumbar back: Tenderness and bony tenderness present. Decreased range of motion. Back:    Skin:     General: Skin is warm. Neurological:      General: No focal deficit present. Mental Status: She is alert and oriented to person, place, and time. Sensory: No sensory deficit. Motor: No weakness.    Psychiatric:         Mood and Affect: Mood normal.         Vital Signs  ED Triage Vitals   Temperature Pulse Respirations Blood Pressure SpO2   10/28/23 1259 10/28/23 1253 10/28/23 1253 10/28/23 1253 10/28/23 1253   97.7 °F (36.5 °C) (!) 122 18 130/73 99 %      Temp Source Heart Rate Source Patient Position - Orthostatic VS BP Location FiO2 (%)   10/28/23 1259 10/28/23 1253 10/28/23 1253 10/28/23 1253 --   Oral Monitor Sitting Right arm       Pain Score       10/28/23 1421       10 - Worst Possible Pain           Vitals:    10/28/23 1253 10/28/23 1531   BP: 130/73 135/67   Pulse: (!) 122 (!) 115   Patient Position - Orthostatic VS: Sitting Lying         Visual Acuity      ED Medications  Medications   oxyCODONE (ROXICODONE) IR tablet 5 mg (5 mg Oral Given 10/28/23 1421)   ketorolac (TORADOL) injection 30 mg (30 mg Intramuscular Given 10/28/23 1421)       Diagnostic Studies  Results Reviewed       None                   CT spine thoracic & lumbar wo contrast   ED Interpretation by Patric Watts DO (10/28 1538)   See below      Final Result by Jody Guo MD (10/28 1520)   Diffuse thoracolumbar destructive metastatic lesions are unchanged since recent MRI 10/6/2023. Compression fractures at T10 and T7 are unchanged. Diffuse metastatic disease throughout the sacrum and iliac. Workstation performed: [id number]                    Procedures  Procedures         ED Course  ED Course as of 10/28/23 2102   Sat Oct 28, 2023   1530 Pt persistently tachycardic since her admission earlier this month (outpt visits, etc). No hypoxia, tachypnea or chest pain, no need to eval for PE at this time   1538 CT spine thoracic & lumbar wo contrast  Unchanged from previous    D/w Dr. Carlos Abernathy, Palliative care. Will place Ambulatory referral as STAT and he will alert the office to reach out on Monday and get her scheduled for eval/intake ASAP                                             Medical Decision Making  Back pain w/ known spinal mets. Will get ct t/l spine to eval for any changes francesco w/ biopsy on the 9th.      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 Details: recent admission reviewed  Radiology: ordered. Decision-making details documented in ED Course. Discussion of management or test interpretation with external provider(s): D/w Dr. Celeste Bower, palliative care    Risk  OTC drugs. Prescription drug management. Disposition  Final diagnoses:   Back pain   Metastasis to spinal column [facility])     Time reflects when diagnosis was documented in both MDM as applicable and the Disposition within this note       Time User Action Codes Description Comment    10/28/2023  3:39 PM Ava Grissom Add [M54.9] Back pain     10/28/2023  3:40 PM Ava Grissom Add [C79.51] Metastasis to [facility])           ED Disposition       ED Disposition   Discharge    Condition   Stable    Date/Time   Sat Oct 28, 2023  3:39 PM    [address] [facility]  discharge to home/self care.                    Follow-up Information    None         Discharge Medication List as of 10/28/2023  3:57 PM        START taking these medications    Details   docusate sodium (COLACE) 100 mg capsule Take 1 capsule (100 mg total) by mouth every 12 (twelve) hours, Starting Sat 10/28/2023, Normal      ibuprofen (MOTRIN) 600 mg tablet Take 1 tablet (600 mg total) by mouth every 6 (six) hours as needed for moderate pain, Starting Sat 10/28/2023, Normal      oxyCODONE (Roxicodone) 5 immediate release tablet Take 1 tablet (5 mg total) by mouth every 4 (four) hours as needed for severe pain for up to 10 days Max Daily Amount: 30 mg, Starting Sat 10/28/2023, Until Tue 11/7/2023 at 2359, Normal           CONTINUE these medications which have NOT CHANGED    Details   acetaminophen (TYLENOL) 650 mg CR tablet Take 1 tablet (650 mg total) by mouth every 8 (eight) hours as needed for mild pain or moderate pain, Starting Tue 10/10/2023, Normal      anastrozole (ARIMIDEX) 1 mg tablet Take 1 tablet (1 mg total) by mouth daily, Starting Mon 1/23/2023, Normal      calcium citrate-vitamin D (Calcitrate Plus D) 315 mg-5 mcg tablet Take 1 tablet by mouth 2 (two) times a day, Starting Mon 10/23/2023, Until Sun 1/21/2024, Normal      Multiple Vitamin (MULTIVITAMIN ADULT PO) Take 1 tablet by mouth in the morning, Historical Med      Ribociclib Succ, 600 MG Dose, 200 MG TBPK Take 600 mg by mouth in the morning For 3 weeks and then 1 week off, Starting Fri 10/27/2023, Normal                 PDMP Review       None            ED Provider  Electronically Signed by             Patric Watts DO  10/28/23 2103

## 2023-10-28 NOTE — DISCHARGE INSTRUCTIONS
Alguien de [employer] debería comunicarse con usted el lunes para programar gabriella khadra lo antes posible. Ltanya Matthias con el manejo del dolor y con atención adicional relacionada con troy cáncer recurrente. Puede alternar paracetamol 1000 mg e ibuprofeno 600 mg cada 3 horas para el dolor. Trate de cronometrar troy ibuprofeno para que pueda tomarlo cuando coma. Además, puede aplicar hielo lori 15 a 20 minutos cada 1 a 2 horas mientras está despierto para un control adicional del dolor. Si todavía tiene un dolor significativo a pesar de las [employer], puede sandee 5 mg de oxicodona cada 4 a 6 horas para el dolor irruptivo/intenso. No conduzca ni opere maquinaria mientras esté tomando [employer]. Causará estreñimiento, así que asegúrese de comenzar a sandee un ablandador de heces y un laxante de inmediato.

## 2023-10-30 ENCOUNTER — HOSPITAL ENCOUNTER (OUTPATIENT)
Dept: INFUSION CENTER | Facility: HOSPITAL | Age: 53
Discharge: HOME | End: 2023-10-30
Attending: INTERNAL MEDICINE
Payer: COMMERCIAL

## 2023-10-30 VITALS
OXYGEN SATURATION: 98 % | HEART RATE: 121 BPM | DIASTOLIC BLOOD PRESSURE: 90 MMHG | SYSTOLIC BLOOD PRESSURE: 143 MMHG | TEMPERATURE: 98 F | RESPIRATION RATE: 18 BRPM

## 2023-10-30 DIAGNOSIS — C79.51 PAIN FROM BONE METASTASES (HCC): ICD-10-CM

## 2023-10-30 DIAGNOSIS — C50.912 CARCINOMA OF LEFT BREAST IN FEMALE, ESTROGEN RECEPTOR POSITIVE, UNSPECIFIED SITE OF BREAST: ICD-10-CM

## 2023-10-30 DIAGNOSIS — Z17.0 MALIGNANT NEOPLASM OF OVERLAPPING SITES OF LEFT BREAST IN FEMALE, ESTROGEN RECEPTOR POSITIVE: Primary | ICD-10-CM

## 2023-10-30 DIAGNOSIS — Z85.3 PERSONAL HISTORY OF MALIGNANT NEOPLASM OF BREAST: ICD-10-CM

## 2023-10-30 DIAGNOSIS — C50.812 MALIGNANT NEOPLASM OF OVERLAPPING SITES OF LEFT BREAST IN FEMALE, ESTROGEN RECEPTOR POSITIVE: Primary | ICD-10-CM

## 2023-10-30 DIAGNOSIS — Z17.0 CARCINOMA OF LEFT BREAST IN FEMALE, ESTROGEN RECEPTOR POSITIVE, UNSPECIFIED SITE OF BREAST: ICD-10-CM

## 2023-10-30 DIAGNOSIS — G89.3 PAIN FROM BONE METASTASES (HCC): ICD-10-CM

## 2023-10-30 PROCEDURE — 96402 CHEMO HORMON ANTINEOPL SQ/IM: CPT

## 2023-10-30 PROCEDURE — 96365 THER/PROPH/DIAG IV INF INIT: CPT

## 2023-10-30 RX ORDER — LAMOTRIGINE 25 MG/1
500 TABLET ORAL ONCE
OUTPATIENT
Start: 2023-11-13

## 2023-10-30 RX ORDER — ZOLEDRONIC ACID 0.04 MG/ML
4 INJECTION, SOLUTION INTRAVENOUS ONCE
OUTPATIENT
Start: 2023-11-27 | End: 2023-11-27

## 2023-10-30 RX ORDER — LAMOTRIGINE 25 MG/1
500 TABLET ORAL ONCE
Status: COMPLETED | OUTPATIENT
Start: 2023-10-30 | End: 2023-10-30

## 2023-10-30 RX ORDER — SODIUM CHLORIDE 9 MG/ML
20 INJECTION, SOLUTION INTRAVENOUS ONCE
OUTPATIENT
Start: 2023-11-27

## 2023-10-30 RX ORDER — SODIUM CHLORIDE 9 MG/ML
20 INJECTION, SOLUTION INTRAVENOUS ONCE
Status: COMPLETED | OUTPATIENT
Start: 2023-10-30 | End: 2023-10-30

## 2023-10-30 RX ADMIN — ZOLEDRONIC ACID 4 MG: 4 INJECTION, SOLUTION, CONCENTRATE INTRAVENOUS at 10:44

## 2023-10-30 RX ADMIN — FULVESTRANT 500 MG: 50 INJECTION, SOLUTION INTRAMUSCULAR at 11:22

## 2023-10-30 RX ADMIN — SODIUM CHLORIDE 20 ML/HR: 0.9 INJECTION, SOLUTION INTRAVENOUS at 10:43

## 2023-10-30 NOTE — PROGRESS NOTES
Pt tolerated treatment today with no adverse reactions. AVS provided and reviewed with pt and her guest. Left unit ambulatory with a steady gait.

## 2023-11-03 ENCOUNTER — TELEPHONE (OUTPATIENT)
Dept: HEMATOLOGY ONCOLOGY | Facility: CLINIC | Age: 53
End: 2023-11-03

## 2023-11-03 ENCOUNTER — TELEPHONE (OUTPATIENT)
Dept: INFUSION CENTER | Facility: HOSPITAL | Age: 53
End: 2023-11-03

## 2023-11-03 NOTE — TELEPHONE ENCOUNTER
Initial Outreach - [facility] in lengthy conversation with pt via [employer]      Consent uploaded: yes    Medication provided by: [facility] - scheduled for delivery 11/4     Confirmed drug, dose and schedule: yes, 600 mg (200 mg x3) 21 days on followed by 7 days off     Start date: 11/4    Adherence: language barrier - Telugu speaking only    Pt instructed not to cut/crush and take pill whole: yes    Pt instructed not to double up if a dose is missed: yes    Med storage, handling and disposal discussed: yes    Pt instructed to notify office prior to any procedures: yes    Confirmed pt reviewed med teaching sheet: Pt provider her email address but email came back undeliverable. Will mail [facility] Patient Brochure to her home. Side effect discussion: reviewed    Pt knows how to refill med: will follow     Pt has contact info for office RN, Homar and Oral Chemo Coordinator: yes     Other topics discussed: Pt states one site where she received Faslodex hurts more than the other. Denies redness or sign of infection. No fever. She will monitor and call with any further concerns. Patient asked about other meds on her med list - advised okay to take, but she should call us before taking anything new. She asked about hair loss - advised this is possible but more likely to thin than have total loss. Pt asked what to do if she has troublebreathing - instructed pt to go to ED for any difficulty breathing or other serious concern, otherwise can call. Provided my direct line and [employer] as well. Pt aware of follow up appt/labs/testing: Pt asked about rescheduling an Infusion appt - [facility] at [facility] to call her using  line.      Plan for next call: approx 7 days after starting med

## 2023-11-06 ENCOUNTER — TELEPHONE (OUTPATIENT)
Dept: HEMATOLOGY ONCOLOGY | Facility: CLINIC | Age: 53
End: 2023-11-06

## 2023-11-06 ENCOUNTER — HOSPITAL ENCOUNTER (INPATIENT)
Facility: HOSPITAL | Age: 53
LOS: 4 days | Discharge: HOME | DRG: 720 | End: 2023-11-10
Attending: EMERGENCY MEDICINE | Admitting: INTERNAL MEDICINE
Payer: COMMERCIAL

## 2023-11-06 ENCOUNTER — APPOINTMENT (EMERGENCY)
Dept: CT IMAGING | Facility: HOSPITAL | Age: 53
DRG: 720 | End: 2023-11-06
Payer: COMMERCIAL

## 2023-11-06 DIAGNOSIS — J90 PLEURAL EFFUSION: ICD-10-CM

## 2023-11-06 DIAGNOSIS — B34.9 VIRAL INFECTION: ICD-10-CM

## 2023-11-06 DIAGNOSIS — M54.9 BACK PAIN: ICD-10-CM

## 2023-11-06 DIAGNOSIS — R91.8 RIGHT LOWER LOBE LUNG MASS: ICD-10-CM

## 2023-11-06 DIAGNOSIS — R91.8 MASS OF RIGHT LUNG: ICD-10-CM

## 2023-11-06 DIAGNOSIS — G89.3 PAIN FROM BONE METASTASES (HCC): ICD-10-CM

## 2023-11-06 DIAGNOSIS — R79.89 LOW SERUM SODIUM: ICD-10-CM

## 2023-11-06 DIAGNOSIS — J18.9 PNEUMONIA: ICD-10-CM

## 2023-11-06 DIAGNOSIS — C79.51 PAIN FROM BONE METASTASES (HCC): ICD-10-CM

## 2023-11-06 DIAGNOSIS — C79.51 METASTASIS TO SPINAL COLUMN (HCC): ICD-10-CM

## 2023-11-06 DIAGNOSIS — R00.0 TACHYCARDIA: ICD-10-CM

## 2023-11-06 DIAGNOSIS — R06.00 DYSPNEA: Primary | ICD-10-CM

## 2023-11-06 LAB
2HR DELTA HS TROPONIN: 0 NG/L
ALBUMIN SERPL BCP-MCNC: 3.6 G/DL (ref 3.5–5)
ALP SERPL-CCNC: 126 U/L (ref 34–104)
ALT SERPL W P-5'-P-CCNC: 32 U/L (ref 7–52)
ANION GAP SERPL CALCULATED.3IONS-SCNC: 9 MMOL/L
APTT PPP: 35 SECONDS (ref 23–37)
AST SERPL W P-5'-P-CCNC: 54 U/L (ref 13–39)
ATRIAL RATE: 119 BPM
ATRIAL RATE: 120 BPM
BASOPHILS # BLD AUTO: 0.03 THOUSANDS/ÂΜL (ref 0–0.1)
BASOPHILS NFR BLD AUTO: 0 % (ref 0–1)
BILIRUB SERPL-MCNC: 0.47 MG/DL (ref 0.2–1)
BNP SERPL-MCNC: 33 PG/ML (ref 0–100)
BUN SERPL-MCNC: 10 MG/DL (ref 5–25)
CALCIUM SERPL-MCNC: 8.9 MG/DL (ref 8.4–10.2)
CARDIAC TROPONIN I PNL SERPL HS: 4 NG/L
CARDIAC TROPONIN I PNL SERPL HS: 4 NG/L
CHLORIDE SERPL-SCNC: 98 MMOL/L (ref 96–108)
CO2 SERPL-SCNC: 23 MMOL/L (ref 21–32)
CREAT SERPL-MCNC: 0.66 MG/DL (ref 0.6–1.3)
EOSINOPHIL # BLD AUTO: 0 THOUSAND/ÂΜL (ref 0–0.61)
EOSINOPHIL NFR BLD AUTO: 0 % (ref 0–6)
ERYTHROCYTE [DISTWIDTH] IN BLOOD BY AUTOMATED COUNT: 16.9 % (ref 11.6–15.1)
GFR SERPL CREATININE-BSD FRML MDRD: 99 ML/MIN/1.73SQ M
GLUCOSE SERPL-MCNC: 154 MG/DL (ref 65–140)
HCT VFR BLD AUTO: 30.9 % (ref 34.8–46.1)
HGB BLD-MCNC: 9.6 G/DL (ref 11.5–15.4)
IMM GRANULOCYTES # BLD AUTO: 0.2 THOUSAND/UL (ref 0–0.2)
IMM GRANULOCYTES NFR BLD AUTO: 2 % (ref 0–2)
INR PPP: 1.14 (ref 0.84–1.19)
LYMPHOCYTES # BLD AUTO: 1.82 THOUSANDS/ÂΜL (ref 0.6–4.47)
LYMPHOCYTES NFR BLD AUTO: 18 % (ref 14–44)
MCH RBC QN AUTO: 25.5 PG (ref 26.8–34.3)
MCHC RBC AUTO-ENTMCNC: 31.1 G/DL (ref 31.4–37.4)
MCV RBC AUTO: 82 FL (ref 82–98)
MONOCYTES # BLD AUTO: 1.15 THOUSAND/ÂΜL (ref 0.17–1.22)
MONOCYTES NFR BLD AUTO: 12 % (ref 4–12)
NEUTROPHILS # BLD AUTO: 6.73 THOUSANDS/ÂΜL (ref 1.85–7.62)
NEUTS SEG NFR BLD AUTO: 68 % (ref 43–75)
NRBC BLD AUTO-RTO: 0 /100 WBCS
P AXIS: 59 DEGREES
P AXIS: 65 DEGREES
PLATELET # BLD AUTO: 343 THOUSANDS/UL (ref 149–390)
PMV BLD AUTO: 9.3 FL (ref 8.9–12.7)
POTASSIUM SERPL-SCNC: 4.2 MMOL/L (ref 3.5–5.3)
PR INTERVAL: 144 MS
PR INTERVAL: 154 MS
PROT SERPL-MCNC: 7.8 G/DL (ref 6.4–8.4)
PROTHROMBIN TIME: 14.8 SECONDS (ref 11.6–14.5)
QRS AXIS: 69 DEGREES
QRS AXIS: 74 DEGREES
QRSD INTERVAL: 70 MS
QRSD INTERVAL: 70 MS
QT INTERVAL: 294 MS
QT INTERVAL: 300 MS
QTC INTERVAL: 415 MS
QTC INTERVAL: 422 MS
RBC # BLD AUTO: 3.77 MILLION/UL (ref 3.81–5.12)
SODIUM SERPL-SCNC: 130 MMOL/L (ref 135–147)
T WAVE AXIS: 18 DEGREES
T WAVE AXIS: 31 DEGREES
VENTRICULAR RATE: 119 BPM
VENTRICULAR RATE: 120 BPM
WBC # BLD AUTO: 9.93 THOUSAND/UL (ref 4.31–10.16)

## 2023-11-06 PROCEDURE — 36415 COLL VENOUS BLD VENIPUNCTURE: CPT | Performed by: EMERGENCY MEDICINE

## 2023-11-06 PROCEDURE — 80053 COMPREHEN METABOLIC PANEL: CPT | Performed by: EMERGENCY MEDICINE

## 2023-11-06 PROCEDURE — 36556 INSERT NON-TUNNEL CV CATH: CPT | Performed by: EMERGENCY MEDICINE

## 2023-11-06 PROCEDURE — 83880 ASSAY OF NATRIURETIC PEPTIDE: CPT | Performed by: EMERGENCY MEDICINE

## 2023-11-06 PROCEDURE — 93010 ELECTROCARDIOGRAM REPORT: CPT | Performed by: STUDENT IN AN ORGANIZED HEALTH CARE EDUCATION/TRAINING PROGRAM

## 2023-11-06 PROCEDURE — 85730 THROMBOPLASTIN TIME PARTIAL: CPT | Performed by: EMERGENCY MEDICINE

## 2023-11-06 PROCEDURE — 84484 ASSAY OF TROPONIN QUANT: CPT | Performed by: EMERGENCY MEDICINE

## 2023-11-06 PROCEDURE — 85610 PROTHROMBIN TIME: CPT | Performed by: EMERGENCY MEDICINE

## 2023-11-06 PROCEDURE — 06HN33Z INSERTION OF INFUSION DEVICE INTO LEFT FEMORAL VEIN, PERCUTANEOUS APPROACH: ICD-10-PCS | Performed by: EMERGENCY MEDICINE

## 2023-11-06 PROCEDURE — 96374 THER/PROPH/DIAG INJ IV PUSH: CPT

## 2023-11-06 PROCEDURE — 99285 EMERGENCY DEPT VISIT HI MDM: CPT | Performed by: EMERGENCY MEDICINE

## 2023-11-06 PROCEDURE — 85025 COMPLETE CBC W/AUTO DIFF WBC: CPT | Performed by: EMERGENCY MEDICINE

## 2023-11-06 PROCEDURE — 96361 HYDRATE IV INFUSION ADD-ON: CPT

## 2023-11-06 PROCEDURE — 93005 ELECTROCARDIOGRAM TRACING: CPT

## 2023-11-06 PROCEDURE — 71275 CT ANGIOGRAPHY CHEST: CPT

## 2023-11-06 PROCEDURE — 99285 EMERGENCY DEPT VISIT HI MDM: CPT

## 2023-11-06 RX ORDER — LIDOCAINE HYDROCHLORIDE 10 MG/ML
INJECTION, SOLUTION EPIDURAL; INFILTRATION; INTRACAUDAL; PERINEURAL
Status: COMPLETED
Start: 2023-11-06 | End: 2023-11-06

## 2023-11-06 RX ORDER — ONDANSETRON 2 MG/ML
4 INJECTION INTRAMUSCULAR; INTRAVENOUS EVERY 6 HOURS PRN
Status: DISCONTINUED | OUTPATIENT
Start: 2023-11-06 | End: 2023-11-10 | Stop reason: HOSPADM

## 2023-11-06 RX ORDER — HYDROMORPHONE HCL/PF 1 MG/ML
0.5 SYRINGE (ML) INJECTION ONCE
Status: COMPLETED | OUTPATIENT
Start: 2023-11-06 | End: 2023-11-06

## 2023-11-06 RX ORDER — HEPARIN SODIUM 5000 [USP'U]/ML
5000 INJECTION, SOLUTION INTRAVENOUS; SUBCUTANEOUS EVERY 8 HOURS SCHEDULED
Status: DISCONTINUED | OUTPATIENT
Start: 2023-11-07 | End: 2023-11-10 | Stop reason: HOSPADM

## 2023-11-06 RX ADMIN — SODIUM CHLORIDE 500 ML: 0.9 INJECTION, SOLUTION INTRAVENOUS at 22:01

## 2023-11-06 RX ADMIN — LIDOCAINE HYDROCHLORIDE 50 MG: 10 INJECTION, SOLUTION EPIDURAL; INFILTRATION; INTRACAUDAL at 22:08

## 2023-11-06 RX ADMIN — IOHEXOL 85 ML: 350 INJECTION, SOLUTION INTRAVENOUS at 22:27

## 2023-11-06 RX ADMIN — HYDROMORPHONE HYDROCHLORIDE 0.5 MG: 1 INJECTION, SOLUTION INTRAMUSCULAR; INTRAVENOUS; SUBCUTANEOUS at 22:36

## 2023-11-06 NOTE — Clinical Note
Case was discussed with JESÚS and the patient's admission status was agreed to be Admission Status: inpatient status to the service of Dr. Nupur Hodges .

## 2023-11-06 NOTE — TELEPHONE ENCOUNTER
Called pt again via [employer] . Pt started Farrah Mccain on Saturday 11/4. She reports fatigue, shortness of breath and states her neck hurts - like a heaviness on the left side of her neck up to her head. I asked if she is able to climb a flight of stairs without stopping and she said no, she does not think so. Advised I will review with Dr. Geremias Pineda and call her back either today or tomorrow morning. Instructed pt to go to the ED if symptoms worsen before she hears back from us and she verbalized understanding.

## 2023-11-07 ENCOUNTER — APPOINTMENT (INPATIENT)
Dept: RADIOLOGY | Facility: HOSPITAL | Age: 53
DRG: 720 | End: 2023-11-07
Attending: INTERNAL MEDICINE
Payer: COMMERCIAL

## 2023-11-07 ENCOUNTER — APPOINTMENT (INPATIENT)
Dept: RADIOLOGY | Facility: HOSPITAL | Age: 53
DRG: 720 | End: 2023-11-07
Payer: COMMERCIAL

## 2023-11-07 PROBLEM — R06.00 DYSPNEA: Status: ACTIVE | Noted: 2023-11-07

## 2023-11-07 PROBLEM — J90 PLEURAL EFFUSION ON RIGHT: Status: ACTIVE | Noted: 2023-11-07

## 2023-11-07 LAB
4HR DELTA HS TROPONIN: 1 NG/L
ANION GAP SERPL CALCULATED.3IONS-SCNC: 9 MMOL/L
ATRIAL RATE: 121 BPM
BACTERIA UR QL AUTO: ABNORMAL /HPF
BASOPHILS # BLD AUTO: 0.03 THOUSANDS/ÂΜL (ref 0–0.1)
BASOPHILS NFR BLD AUTO: 0 % (ref 0–1)
BILIRUB UR QL STRIP: NEGATIVE
BUN SERPL-MCNC: 6 MG/DL (ref 5–25)
CALCIUM SERPL-MCNC: 8.4 MG/DL (ref 8.4–10.2)
CARDIAC TROPONIN I PNL SERPL HS: 5 NG/L
CHLORIDE SERPL-SCNC: 99 MMOL/L (ref 96–108)
CLARITY UR: CLEAR
CO2 SERPL-SCNC: 24 MMOL/L (ref 21–32)
COLOR UR: YELLOW
CREAT SERPL-MCNC: 0.58 MG/DL (ref 0.6–1.3)
EOSINOPHIL # BLD AUTO: 0 THOUSAND/ÂΜL (ref 0–0.61)
EOSINOPHIL NFR BLD AUTO: 0 % (ref 0–6)
ERYTHROCYTE [DISTWIDTH] IN BLOOD BY AUTOMATED COUNT: 17 % (ref 11.6–15.1)
GFR SERPL CREATININE-BSD FRML MDRD: 104 ML/MIN/1.73SQ M
GLUCOSE SERPL-MCNC: 122 MG/DL (ref 65–140)
GLUCOSE UR STRIP-MCNC: NEGATIVE MG/DL
HCT VFR BLD AUTO: 29.7 % (ref 34.8–46.1)
HGB BLD-MCNC: 9.1 G/DL (ref 11.5–15.4)
HGB UR QL STRIP.AUTO: ABNORMAL
IMM GRANULOCYTES # BLD AUTO: 0.15 THOUSAND/UL (ref 0–0.2)
IMM GRANULOCYTES NFR BLD AUTO: 2 % (ref 0–2)
KETONES UR STRIP-MCNC: ABNORMAL MG/DL
LDH SERPL-CCNC: 328 U/L (ref 140–271)
LEUKOCYTE ESTERASE UR QL STRIP: NEGATIVE
LYMPHOCYTES # BLD AUTO: 1.53 THOUSANDS/ÂΜL (ref 0.6–4.47)
LYMPHOCYTES NFR BLD AUTO: 16 % (ref 14–44)
MCH RBC QN AUTO: 25.3 PG (ref 26.8–34.3)
MCHC RBC AUTO-ENTMCNC: 30.6 G/DL (ref 31.4–37.4)
MCV RBC AUTO: 83 FL (ref 82–98)
MONOCYTES # BLD AUTO: 0.82 THOUSAND/ÂΜL (ref 0.17–1.22)
MONOCYTES NFR BLD AUTO: 8 % (ref 4–12)
MUCOUS THREADS UR QL AUTO: ABNORMAL
NEUTROPHILS # BLD AUTO: 7.22 THOUSANDS/ÂΜL (ref 1.85–7.62)
NEUTS SEG NFR BLD AUTO: 74 % (ref 43–75)
NITRITE UR QL STRIP: NEGATIVE
NON-SQ EPI CELLS URNS QL MICRO: ABNORMAL /HPF
NRBC BLD AUTO-RTO: 0 /100 WBCS
P AXIS: 55 DEGREES
PH UR STRIP.AUTO: 6.5 [PH]
PLATELET # BLD AUTO: 472 THOUSANDS/UL (ref 149–390)
PLATELET # BLD AUTO: 484 THOUSANDS/UL (ref 149–390)
PMV BLD AUTO: 8.3 FL (ref 8.9–12.7)
PMV BLD AUTO: 8.6 FL (ref 8.9–12.7)
POTASSIUM SERPL-SCNC: 3.9 MMOL/L (ref 3.5–5.3)
PR INTERVAL: 138 MS
PROCALCITONIN SERPL-MCNC: 0.1 NG/ML
PROCALCITONIN SERPL-MCNC: 0.11 NG/ML
PROT SERPL-MCNC: 7.3 G/DL (ref 6.4–8.4)
PROT UR STRIP-MCNC: ABNORMAL MG/DL
QRS AXIS: 59 DEGREES
QRSD INTERVAL: 66 MS
QT INTERVAL: 282 MS
QTC INTERVAL: 400 MS
RBC # BLD AUTO: 3.59 MILLION/UL (ref 3.81–5.12)
RBC #/AREA URNS AUTO: ABNORMAL /HPF
SODIUM SERPL-SCNC: 132 MMOL/L (ref 135–147)
SP GR UR STRIP.AUTO: 1.02 (ref 1–1.03)
T WAVE AXIS: 28 DEGREES
UROBILINOGEN UR STRIP-ACNC: <2 MG/DL
VENTRICULAR RATE: 121 BPM
WBC # BLD AUTO: 9.75 THOUSAND/UL (ref 4.31–10.16)
WBC #/AREA URNS AUTO: ABNORMAL /HPF

## 2023-11-07 PROCEDURE — 85025 COMPLETE CBC W/AUTO DIFF WBC: CPT | Performed by: PHYSICIAN ASSISTANT

## 2023-11-07 PROCEDURE — 93010 ELECTROCARDIOGRAM REPORT: CPT | Performed by: STUDENT IN AN ORGANIZED HEALTH CARE EDUCATION/TRAINING PROGRAM

## 2023-11-07 PROCEDURE — 83615 LACTATE (LD) (LDH) ENZYME: CPT | Performed by: PHYSICIAN ASSISTANT

## 2023-11-07 PROCEDURE — 93005 ELECTROCARDIOGRAM TRACING: CPT

## 2023-11-07 PROCEDURE — 84155 ASSAY OF PROTEIN SERUM: CPT | Performed by: NURSE PRACTITIONER

## 2023-11-07 PROCEDURE — 84145 PROCALCITONIN (PCT): CPT | Performed by: PHYSICIAN ASSISTANT

## 2023-11-07 PROCEDURE — 76937 US GUIDE VASCULAR ACCESS: CPT

## 2023-11-07 PROCEDURE — NC001 PR NO CHARGE: Performed by: RADIOLOGY

## 2023-11-07 PROCEDURE — 0BDF8ZX EXTRACTION OF RIGHT LOWER LUNG LOBE, VIA NATURAL OR ARTIFICIAL OPENING ENDOSCOPIC, DIAGNOSTIC: ICD-10-PCS | Performed by: INTERNAL MEDICINE

## 2023-11-07 PROCEDURE — 87040 BLOOD CULTURE FOR BACTERIA: CPT | Performed by: INTERNAL MEDICINE

## 2023-11-07 PROCEDURE — C1751 CATH, INF, PER/CENT/MIDLINE: HCPCS

## 2023-11-07 PROCEDURE — 81001 URINALYSIS AUTO W/SCOPE: CPT | Performed by: INTERNAL MEDICINE

## 2023-11-07 PROCEDURE — 99255 IP/OBS CONSLTJ NEW/EST HI 80: CPT | Performed by: INTERNAL MEDICINE

## 2023-11-07 PROCEDURE — 36415 COLL VENOUS BLD VENIPUNCTURE: CPT | Performed by: PHYSICIAN ASSISTANT

## 2023-11-07 PROCEDURE — 83615 LACTATE (LD) (LDH) ENZYME: CPT | Performed by: NURSE PRACTITIONER

## 2023-11-07 PROCEDURE — 99223 1ST HOSP IP/OBS HIGH 75: CPT | Performed by: INTERNAL MEDICINE

## 2023-11-07 PROCEDURE — 87070 CULTURE OTHR SPECIMN AEROBIC: CPT | Performed by: INTERNAL MEDICINE

## 2023-11-07 PROCEDURE — 80048 BASIC METABOLIC PNL TOTAL CA: CPT | Performed by: PHYSICIAN ASSISTANT

## 2023-11-07 PROCEDURE — 84484 ASSAY OF TROPONIN QUANT: CPT | Performed by: PHYSICIAN ASSISTANT

## 2023-11-07 PROCEDURE — 85049 AUTOMATED PLATELET COUNT: CPT | Performed by: PHYSICIAN ASSISTANT

## 2023-11-07 PROCEDURE — 83625 ASSAY OF LDH ENZYMES: CPT | Performed by: PHYSICIAN ASSISTANT

## 2023-11-07 RX ORDER — OXYCODONE HYDROCHLORIDE 5 MG/1
5 TABLET ORAL EVERY 4 HOURS PRN
Status: DISCONTINUED | OUTPATIENT
Start: 2023-11-07 | End: 2023-11-07

## 2023-11-07 RX ORDER — OXYCODONE HYDROCHLORIDE 5 MG/1
5 TABLET ORAL EVERY 4 HOURS PRN
Status: DISCONTINUED | OUTPATIENT
Start: 2023-11-07 | End: 2023-11-10 | Stop reason: HOSPADM

## 2023-11-07 RX ORDER — TEMAZEPAM 7.5 MG/1
7.5 CAPSULE ORAL
Status: DISCONTINUED | OUTPATIENT
Start: 2023-11-07 | End: 2023-11-10 | Stop reason: HOSPADM

## 2023-11-07 RX ORDER — CEFEPIME HYDROCHLORIDE 2 G/50ML
2000 INJECTION, SOLUTION INTRAVENOUS EVERY 12 HOURS
Status: DISCONTINUED | OUTPATIENT
Start: 2023-11-07 | End: 2023-11-10 | Stop reason: HOSPADM

## 2023-11-07 RX ORDER — LIDOCAINE 50 MG/G
2 PATCH TOPICAL DAILY
Status: DISCONTINUED | OUTPATIENT
Start: 2023-11-07 | End: 2023-11-10 | Stop reason: HOSPADM

## 2023-11-07 RX ORDER — ACETAMINOPHEN 325 MG/1
975 TABLET ORAL EVERY 8 HOURS SCHEDULED
Status: DISCONTINUED | OUTPATIENT
Start: 2023-11-07 | End: 2023-11-10 | Stop reason: HOSPADM

## 2023-11-07 RX ORDER — ANASTROZOLE 1 MG/1
1 TABLET ORAL DAILY
Status: DISCONTINUED | OUTPATIENT
Start: 2023-11-07 | End: 2023-11-10 | Stop reason: HOSPADM

## 2023-11-07 RX ORDER — SODIUM CHLORIDE 9 MG/ML
100 INJECTION, SOLUTION INTRAVENOUS CONTINUOUS
Status: DISPENSED | OUTPATIENT
Start: 2023-11-07 | End: 2023-11-09

## 2023-11-07 RX ORDER — ACETAMINOPHEN 325 MG/1
650 TABLET ORAL EVERY 6 HOURS PRN
Status: DISCONTINUED | OUTPATIENT
Start: 2023-11-07 | End: 2023-11-07

## 2023-11-07 RX ORDER — IBUPROFEN 600 MG/1
600 TABLET ORAL EVERY 6 HOURS PRN
Status: DISCONTINUED | OUTPATIENT
Start: 2023-11-07 | End: 2023-11-08

## 2023-11-07 RX ADMIN — CEFEPIME HYDROCHLORIDE 2000 MG: 2 INJECTION, SOLUTION INTRAVENOUS at 22:30

## 2023-11-07 RX ADMIN — OXYCODONE HYDROCHLORIDE 5 MG: 5 TABLET ORAL at 04:47

## 2023-11-07 RX ADMIN — HEPARIN SODIUM 5000 UNITS: 5000 INJECTION INTRAVENOUS; SUBCUTANEOUS at 06:09

## 2023-11-07 RX ADMIN — OXYCODONE HYDROCHLORIDE 5 MG: 5 TABLET ORAL at 22:26

## 2023-11-07 RX ADMIN — ACETAMINOPHEN 325MG 650 MG: 325 TABLET ORAL at 10:33

## 2023-11-07 RX ADMIN — SODIUM CHLORIDE 100 ML/HR: 0.9 INJECTION, SOLUTION INTRAVENOUS at 11:46

## 2023-11-07 RX ADMIN — ACETAMINOPHEN 325MG 975 MG: 325 TABLET ORAL at 21:28

## 2023-11-07 RX ADMIN — ACETAMINOPHEN 325MG 650 MG: 325 TABLET ORAL at 02:08

## 2023-11-07 RX ADMIN — HEPARIN SODIUM 5000 UNITS: 5000 INJECTION INTRAVENOUS; SUBCUTANEOUS at 22:25

## 2023-11-07 RX ADMIN — SODIUM CHLORIDE 100 ML/HR: 0.9 INJECTION, SOLUTION INTRAVENOUS at 22:27

## 2023-11-07 RX ADMIN — LIDOCAINE PATCH 5% 2 PATCH: 700 PATCH TOPICAL at 15:30

## 2023-11-07 RX ADMIN — HEPARIN SODIUM 5000 UNITS: 5000 INJECTION INTRAVENOUS; SUBCUTANEOUS at 15:30

## 2023-11-07 RX ADMIN — HEPARIN SODIUM 5000 UNITS: 5000 INJECTION INTRAVENOUS; SUBCUTANEOUS at 00:55

## 2023-11-07 RX ADMIN — ACETAMINOPHEN 325MG 975 MG: 325 TABLET ORAL at 15:29

## 2023-11-07 RX ADMIN — CEFEPIME HYDROCHLORIDE 2000 MG: 2 INJECTION, SOLUTION INTRAVENOUS at 11:51

## 2023-11-07 NOTE — ASSESSMENT & PLAN NOTE
Patient was diagnosed with widely metastatic disease to her bones, secondary to primary recurrent breast cancer  CT scan had diffuse thoracolumbar destructive metastatic lesions, compression fractures at T10, and T7. Diffuse metastatic disease throughout the sacrum and iliac  PET CT scan 10/26 revealed multifocal hypermetabolic/metastatic disease involving the chest and fairly diffuse disease involving the skeletal system  MRI 10/9 revealed widespread thoracic, cervical, and lumbar spine metastasis. Wedge compression fracture at T10. T7 superior endplate pathologic fracture.   Possible minimal epidural tumor at T8  Pt denies any numbness, new focal weakness  Continue pain control  Palliative care consultation

## 2023-11-07 NOTE — PLAN OF CARE
Problem: Potential for Falls  Goal: Patient will remain free of falls  Description: INTERVENTIONS:  - Educate patient/family on patient safety including physical limitations  - Instruct patient to call for assistance with activity   - Consult OT/PT to assist with strengthening/mobility   - Keep Call bell within reach  - Keep bed low and locked with side rails adjusted as appropriate  - Keep care items and personal belongings within reach  - Initiate and maintain comfort rounds  - Make Fall Risk Sign visible to staff  - Offer Toileting every 2 Hours, in advance of need  - Initiate/Maintain bed alarm  - Obtain necessary fall risk management equipment: alarm   - Apply yellow socks and bracelet for high fall risk patients  - Consider moving patient to room near nurses station  11/7/2023 0302 by Washington Lieberman RN  Outcome: Progressing  11/7/2023 0302 by Washington Lieberman RN  Outcome: Progressing     Problem: MOBILITY - ADULT  Goal: Maintain or return to baseline ADL function  Description: INTERVENTIONS:  -  Assess patient's ability to carry out ADLs; assess patient's baseline for ADL function and identify physical deficits which impact ability to perform ADLs (bathing, care of mouth/teeth, toileting, grooming, dressing, etc.)  - Assess/evaluate cause of self-care deficits   - Assess range of motion  - Assess patient's mobility; develop plan if impaired  - Assess patient's need for assistive devices and provide as appropriate  - Encourage maximum independence but intervene and supervise when necessary  - Involve family in performance of ADLs  - Assess for home care needs following discharge   - Consider OT consult to assist with ADL evaluation and planning for discharge  - Provide patient education as appropriate  11/7/2023 0302 by Washington Lieberman RN  Outcome: Progressing  11/7/2023 0302 by Washington Lieberman RN  Outcome: Progressing  Goal: Maintains/Returns to pre admission functional level  Description: INTERVENTIONS:  - Perform BMAT or MOVE assessment daily.   - Set and communicate daily mobility goal to care team and patient/family/caregiver. - Collaborate with rehabilitation services on mobility goals if consulted  - Perform Range of Motion 4 times a day. - Reposition patient every 2 hours.   - Dangle patient 3 times a day  - Stand patient 3 times a day  - Ambulate patient 3 times a day  - Out of bed to chair 3 times a day   - Out of bed for meals 3 times a day  - Out of bed for toileting  - Record patient progress and toleration of activity level   11/7/2023 0302 by Pedro Luis Jasso RN  Outcome: Progressing  11/7/2023 0302 by Pedro Luis Jasso RN  Outcome: Progressing     Problem: PAIN - ADULT  Goal: Verbalizes/displays adequate comfort level or baseline comfort level  Description: Interventions:  - Encourage patient to monitor pain and request assistance  - Assess pain using appropriate pain scale  - Administer analgesics based on type and severity of pain and evaluate response  - Implement non-pharmacological measures as appropriate and evaluate response  - Consider cultural and social influences on pain and pain management  - Notify physician/advanced practitioner if interventions unsuccessful or patient reports new pain  11/7/2023 0302 by Pedro Luis Jasso RN  Outcome: Progressing  11/7/2023 0302 by Pedro Luis Jasso RN  Outcome: Progressing     Problem: INFECTION - ADULT  Goal: Absence or prevention of progression during hospitalization  Description: INTERVENTIONS:  - Assess and monitor for signs and symptoms of infection  - Monitor lab/diagnostic results  - Monitor all insertion sites, i.e. indwelling lines, tubes, and drains  - Monitor endotracheal if appropriate and nasal secretions for changes in amount and color  - [place] appropriate cooling/warming therapies per order  - Administer medications as ordered  - Instruct and encourage patient and family to use good hand hygiene technique  - Identify and instruct in appropriate isolation precautions for identified infection/condition  11/7/2023 0302 by Yosef Roger RN  Outcome: Progressing  11/7/2023 0302 by Yosef Roger RN  Outcome: Progressing     Problem: SAFETY ADULT  Goal: Patient will remain free of falls  Description: INTERVENTIONS:  - Educate patient/family on patient safety including physical limitations  - Instruct patient to call for assistance with activity   - Consult OT/PT to assist with strengthening/mobility   - Keep Call bell within reach  - Keep bed low and locked with side rails adjusted as appropriate  - Keep care items and personal belongings within reach  - Initiate and maintain comfort rounds  - Make Fall Risk Sign visible to staff  - Offer Toileting every 2 Hours, in advance of need  - Initiate/Maintain bed alarm  - Obtain necessary fall risk management equipment: alarms   - Apply yellow socks and bracelet for high fall risk patients  - Consider moving patient to room near nurses station  11/7/2023 0302 by Yosef Roger RN  Outcome: Progressing  11/7/2023 0302 by Yosef Roger RN  Outcome: Progressing  Goal: Maintain or return to baseline ADL function  Description: INTERVENTIONS:  -  Assess patient's ability to carry out ADLs; assess patient's baseline for ADL function and identify physical deficits which impact ability to perform ADLs (bathing, care of mouth/teeth, toileting, grooming, dressing, etc.)  - Assess/evaluate cause of self-care deficits   - Assess range of motion  - Assess patient's mobility; develop plan if impaired  - Assess patient's need for assistive devices and provide as appropriate  - Encourage maximum independence but intervene and supervise when necessary  - Involve family in performance of ADLs  - Assess for home care needs following discharge   - Consider OT consult to assist with ADL evaluation and planning for discharge  - Provide patient education as appropriate  11/7/2023 0302 by Yosef Roger RN  Outcome: Progressing  11/7/2023 0302 by Alix Muller RN  Outcome: Progressing  Goal: Maintains/Returns to pre admission functional level  Description: INTERVENTIONS:  - Perform BMAT or MOVE assessment daily.   - Set and communicate daily mobility goal to care team and patient/family/caregiver. - Collaborate with rehabilitation services on mobility goals if consulted  - Perform Range of Motion 4 times a day. - Reposition patient every 2 hours. - Dangle patient 3 times a day  - Stand patient 3 times a day  - Ambulate patient 3 times a day  - Out of bed to chair 3 times a day   - Out of bed for meals 3 times a day  - Out of bed for toileting  - Record patient progress and toleration of activity level   11/7/2023 0302 by Alix Muller RN  Outcome: Progressing  11/7/2023 0302 by Alix Muller RN  Outcome: Progressing     Problem: DISCHARGE PLANNING  Goal: Discharge to home or other facility with appropriate resources  Description: INTERVENTIONS:  - Identify barriers to discharge w/patient and caregiver  - Arrange for needed discharge resources and transportation as appropriate  - Identify discharge learning needs (meds, wound care, etc.)  - Arrange for interpretive services to assist at discharge as needed  - Refer to Case Management Department for coordinating discharge planning if the patient needs post-hospital services based on physician/advanced practitioner order or complex needs related to functional status, cognitive ability, or social support system  11/7/2023 0302 by Alix Muller RN  Outcome: Progressing  11/7/2023 0302 by Alix Muller RN  Outcome: Progressing     Problem: Knowledge Deficit  Goal: Patient/family/caregiver demonstrates understanding of disease process, treatment plan, medications, and discharge instructions  Description: Complete learning assessment and assess knowledge base.   Interventions:  - Provide teaching at level of understanding  - Provide teaching via preferred learning methods  11/7/2023 0302 by Aisha Hernandez RN  Outcome: Progressing  11/7/2023 0302 by Aisha Hernandez RN  Outcome: Progressing     Problem: CARDIOVASCULAR - ADULT  Goal: Maintains optimal cardiac output and hemodynamic stability  Description: INTERVENTIONS:  - Monitor I/O, vital signs and rhythm  - Monitor for S/S and trends of decreased cardiac output  - Administer and titrate ordered vasoactive medications to optimize hemodynamic stability  - Assess quality of pulses, skin color and temperature  - Assess for signs of decreased coronary artery perfusion  - Instruct patient to report change in severity of symptoms  11/7/2023 0302 by Aisha Hernandez RN  Outcome: Progressing  11/7/2023 0302 by Aisha Hernandez RN  Outcome: Progressing  Goal: Absence of cardiac dysrhythmias or at baseline rhythm  Description: INTERVENTIONS:  - Continuous cardiac monitoring, vital signs, obtain 12 lead EKG if ordered  - Administer antiarrhythmic and heart rate control medications as ordered  - Monitor electrolytes and administer replacement therapy as ordered  11/7/2023 0302 by Aisha Hernandez RN  Outcome: Progressing  11/7/2023 0302 by Aisha Hernandez RN  Outcome: Progressing

## 2023-11-07 NOTE — ASSESSMENT & PLAN NOTE
follows with oncology for probable recurrent breast cancer, with diffuse bone mets, and right lung mass  Right lung mass positive on PET scan: Possible metastatic versus lung primary  CT scan on admission revealed, "stable size of right middle lobe mass extending to the lateral chest wall and mediastinum and crossing the major fissure into the right lower lobe"  Outpatient orders for IR lung biopsy: Discussed with IR, they recommend pulmonary consultation for bronchoscopy with biopsy rather than percutaneous biopsy  Patient presented with fever, and sepsis: Possible postobstructive pneumonia  Started on antibiotics

## 2023-11-07 NOTE — ASSESSMENT & PLAN NOTE
Patient presented with evolving sepsis with tachycardia, and then developed a fever of 102  CT scan with right lung mass, in the setting of metastatic breast cancer, with possible lung metastasis versus second lung primary  Sepsis possibly secondary to postobstructive pneumonia  Check blood cultures x2  Check sputum cultures  Consulted for evaluation for bronchoscopy  Continue broad-spectrum antibiotics  Femoral central line placed in ER due to lack of IV access:  d/w IR:  midline requested for today to dc femoral line

## 2023-11-07 NOTE — ASSESSMENT & PLAN NOTE
CT scan on admission revealed moderate right pleural effusion  Likely malignant effusion  Patient also presented with a fever: Could also be secondary to pneumonia  IR consulted for thoracentesis

## 2023-11-07 NOTE — ED PROVIDER NOTES
History  Chief Complaint   Patient presents with    Shortness of Breath     SOB and back pain x2 days. Started taking a new medication for breast cancer, and ever since has been having SOB and neck pain. Denies fevers. Advised by oncology to come to ED for further evaluation. History provided by:  Patient   used: Yes (iPad # [id number] Louise Madrid)    Shortness of Breath  Severity:  Moderate  Onset quality:  Gradual  Duration:  2 days  Timing:  Intermittent  Progression:  Waxing and waning  Chronicity:  New  Context comment:  SOB for past 2 days, feels after she started taking new Chemo drug for breast cancr  Relieved by:  Nothing  Worsened by:  Nothing  Ineffective treatments:  None tried  Associated symptoms: no abdominal pain, no chest pain, no cough, no fever, no headaches, no neck pain, no rash, no sore throat, no syncope and no vomiting    Risk factors comment:  Breast cancer      Prior to Admission Medications   Prescriptions Last Dose Informant Patient Reported? Taking?    Multiple Vitamin (MULTIVITAMIN ADULT PO) 11/6/2023 Self Yes Yes   Sig: Take 1 tablet by mouth in the morning   Ribociclib Succ, 600 MG Dose, 200 MG TBPK Past Week  No Yes   Sig: Take 600 mg by mouth in the morning For 3 weeks and then 1 week off   acetaminophen (TYLENOL) 650 mg CR tablet 11/6/2023 Self No Yes   Sig: Take 1 tablet (650 mg total) by mouth every 8 (eight) hours as needed for mild pain or moderate pain   anastrozole (ARIMIDEX) 1 mg tablet 11/6/2023 Self No Yes   Sig: Take 1 tablet (1 mg total) by mouth daily   calcium citrate-vitamin D (Calcitrate Plus D) 315 mg-5 mcg tablet 11/6/2023  No Yes   Sig: Take 1 tablet by mouth 2 (two) times a day   docusate sodium (COLACE) 100 mg capsule 11/6/2023  No Yes   Sig: Take 1 capsule (100 mg total) by mouth every 12 (twelve) hours   ibuprofen (MOTRIN) 600 mg tablet 11/6/2023  No Yes   Sig: Take 1 tablet (600 mg total) by mouth every 6 (six) hours as needed for moderate pain   oxyCODONE (Roxicodone) 5 immediate release tablet Past Week  No Yes   Sig: Take 1 tablet (5 mg total) by mouth every 4 (four) hours as needed for severe pain for up to 10 days Max Daily Amount: 30 mg      Facility-Administered Medications: None       Past Medical History:   Diagnosis Date    Breast cancer ([address]) 07/2013    left-chemotherapy    Fibroma     History of chemotherapy     History of external beam radiation therapy        Past Surgical History:   Procedure Laterality Date    APPENDECTOMY      BREAST SURGERY  2014    Left breast 2/2 breast CA    HYSTERECTOMY  2015    Due to fibroma    IR BIOPSY BONE  10/9/2023    IR THORACENTESIS  11/8/2023    MASTECTOMY Left 2013    OOPHORECTOMY         Family History   Problem Relation Age of Onset    Lung cancer Mother     No Known Problems Father     No Known Problems Sister     No Known Problems Daughter     No Known Problems Daughter     Cancer Maternal Grandmother     No Known Problems Maternal Grandfather     No Known Problems Paternal Grandmother     No Known Problems Paternal Grandfather     Uterine cancer Maternal Aunt     No Known Problems Son     No Known Problems Paternal Aunt      I have reviewed and agree with the history as documented. E-Cigarette/Vaping    E-Cigarette Use Never User      E-Cigarette/Vaping Substances    Nicotine No     THC No     CBD No     Flavoring No     Other No     Unknown No      Social History     Tobacco Use    Smoking status: Never     Passive exposure: Never    Smokeless tobacco: Never   Vaping Use    Vaping Use: Never used   Substance Use Topics    Alcohol use: Not Currently    Drug use: No       Review of Systems   Constitutional:  Negative for chills and fever. HENT:  Negative for facial swelling, sore throat and trouble swallowing. Eyes:  Negative for pain and visual disturbance. Respiratory:  Positive for shortness of breath. Negative for cough.     Cardiovascular:  Negative for chest pain, leg swelling and syncope. Gastrointestinal:  Negative for abdominal pain, diarrhea, nausea and vomiting. Genitourinary:  Negative for dysuria and flank pain. Musculoskeletal:  Negative for back pain, neck pain and neck stiffness. Skin:  Negative for pallor and rash. Allergic/Immunologic: Negative for environmental allergies and immunocompromised state. Neurological:  Negative for dizziness and headaches. Hematological:  Negative for adenopathy. Does not bruise/bleed easily. Psychiatric/Behavioral:  Negative for agitation and behavioral problems. All other systems reviewed and are negative. Physical Exam  Physical Exam  Vitals and nursing note reviewed. Constitutional:       General: She is not in acute distress. Appearance: She is well-developed. HENT:      Head: Normocephalic and atraumatic. Eyes:      Extraocular Movements: Extraocular movements intact. Cardiovascular:      Rate and Rhythm: Normal rate and regular rhythm. Heart sounds: Normal heart sounds. Pulmonary:      Effort: Pulmonary effort is normal.      Breath sounds: Normal breath sounds. Abdominal:      Palpations: Abdomen is soft. Tenderness: There is no abdominal tenderness. There is no guarding or rebound. Musculoskeletal:         General: Normal range of motion. Cervical back: Normal range of motion and neck supple. Skin:     General: Skin is warm and dry. Neurological:      General: No focal deficit present. Mental Status: She is alert and oriented to person, place, and time.    Psychiatric:         Mood and Affect: Mood normal.         Behavior: Behavior normal.         Vital Signs  ED Triage Vitals   Temperature Pulse Respirations Blood Pressure SpO2   11/06/23 1812 11/06/23 1812 11/06/23 1812 11/06/23 1812 11/06/23 1812   98.2 °F (36.8 °C) (!) 122 18 139/69 100 %      Temp Source Heart Rate Source Patient Position - Orthostatic VS BP Location FiO2 (%)   11/07/23 0355 11/06/23 2044 11/06/23 2044 11/06/23 2044 --   Oral Monitor;Right Sitting Right arm       Pain Score       11/06/23 2236       10 - Worst Possible Pain           Vitals:    11/10/23 2129 11/11/23 0804 11/11/23 1033 11/11/23 1528   BP: 120/62 130/68 119/58 138/74   Pulse: 60 61 63 62   Patient Position - Orthostatic VS:             Visual Acuity      ED Medications  Medications   sodium chloride 0.9 % infusion ( Intravenous Anesthesia Volume Adjustment 11/9/23 1601)   sodium chloride 0.9 % bolus 500 mL (0 mL Intravenous Stopped 11/6/23 2350)   lidocaine (PF) (XYLOCAINE-MPF) 1 % injection **ADS Override Pull** (50 mg  Given by Other 11/6/23 2208)   HYDROmorphone (DILAUDID) injection 0.5 mg (0.5 mg Intravenous Given 11/6/23 2236)   iohexol (OMNIPAQUE) 350 MG/ML injection (MULTI-DOSE) 85 mL (85 mL Intravenous Given 11/6/23 2227)   lidocaine 1% buffered (10 mL Infiltration Given 11/8/23 1123)       Diagnostic Studies  Results Reviewed       Procedure Component Value Units Date/Time    Body fluid culture (Pleural Fluid Culture) and Gram stain [[id number]] Collected: 11/08/23 1124    Lab Status: Final result Specimen: Body Fluid from Pleural, Right Updated: 11/11/23 0734     Body Fluid Culture, Sterile No growth     Gram Stain Result 1+ Polys      No bacteria seen    Lactate dehydrogenase, isoenzymes [[id number]]  (Abnormal) Collected: 11/07/23 0444    Lab Status: Final result Specimen: Blood from Central Venous Line Updated: 11/08/23 2006      IU/L      LD-1 13 %      LD-2 22 %      LD-3 27 %      LD-4 16 %      LD-5 22 %     Narrative:      Performed at:  [address]  [id number]  : Karyna Cervantes MD, Phone:  [phone]    Glucose, body fluid [[id number]] Collected: 11/08/23 1124    Lab Status: Final result Specimen: Body Fluid from Other Updated: 11/08/23 1808     Glucose, Fluid 112 mg/dL     LD (LDH), Body Fluid [[id number]] Collected: 11/08/23 1124    Lab Status: Final result Specimen:  Body Fluid from Other Updated: 11/08/23 1808     LD, Fluid 286 U/L     Total Protein, body fluid [[id number]] Collected: 11/08/23 1124    Lab Status: Final result Specimen: Body Fluid from Other Updated: 11/08/23 1808     Protein, Fluid 4.3 g/dL     Body fluid white cell count with differential [[id number]]  (Abnormal) Collected: 11/08/23 1124    Lab Status: Final result Specimen: Body Fluid from Pleural, Right Updated: 11/08/23 1320     Site Pleural-Right     Color, Fluid Yellow     Clarity, Fluid Cloudy     WBC, Fluid 1,986 /ul     pH, body fluid [[id number]] Collected: 11/08/23 1124    Lab Status: Final result Specimen:  Body Fluid from Other Updated: 11/08/23 1210     PH BODY FLUID 7.6    Procalcitonin [[id number]]  (Normal) Collected: 11/07/23 0444    Lab Status: Final result Specimen: Blood from Central Venous Line Updated: 11/07/23 0653     Procalcitonin 0.11 ng/ml     Basic metabolic panel [[id number]]  (Abnormal) Collected: 11/07/23 0444    Lab Status: Final result Specimen: Blood from Central Venous Line Updated: 11/07/23 0646     Sodium 132 mmol/L      Potassium 3.9 mmol/L      Chloride 99 mmol/L      CO2 24 mmol/L      ANION GAP 9 mmol/L      BUN 6 mg/dL      Creatinine 0.58 mg/dL      Glucose 122 mg/dL      Calcium 8.4 mg/dL      eGFR 104 ml/min/1.73sq m     Narrative:      [place] guidelines for Chronic Kidney Disease (CKD):     Stage 1 with normal or high GFR (GFR > 90 mL/min/1.73 square meters)    Stage 2 Mild CKD (GFR = 60-89 mL/min/1.73 square meters)    Stage 3A Moderate CKD (GFR = 45-59 mL/min/1.73 square meters)    Stage 3B Moderate CKD (GFR = 30-44 mL/min/1.73 square meters)    Stage 4 Severe CKD (GFR = 15-29 mL/min/1.73 square meters)    Stage 5 End Stage CKD (GFR <15 mL/min/1.73 square meters)  Note: GFR calculation is accurate only with a steady state creatinine    CBC and differential [[id number]]  (Abnormal) Collected: 11/07/23 0444    Lab Status: Final result Specimen: Blood from Central Venous Line Updated: 11/07/23 0607     WBC 9.75 Thousand/uL      RBC 3.59 Million/uL      Hemoglobin 9.1 g/dL      Hematocrit 29.7 %      MCV 83 fL      MCH 25.3 pg      MCHC 30.6 g/dL      RDW 17.0 %      MPV 8.6 fL      Platelets 298 Thousands/uL      nRBC 0 /100 WBCs      Neutrophils Relative 74 %      Immat GRANS % 2 %      Lymphocytes Relative 16 %      Monocytes Relative 8 %      Eosinophils Relative 0 %      Basophils Relative 0 %      Neutrophils Absolute 7.22 Thousands/µL      Immature Grans Absolute 0.15 Thousand/uL      Lymphocytes Absolute 1.53 Thousands/µL      Monocytes Absolute 0.82 Thousand/µL      Eosinophils Absolute 0.00 Thousand/µL      Basophils Absolute 0.03 Thousands/µL     Procalcitonin [[id number]]  (Normal) Collected: 11/07/23 0054    Lab Status: Final result Specimen: Blood from Central Venous Line Updated: 11/07/23 0132     Procalcitonin 0.10 ng/ml     HS Troponin I 4hr [[id number]]  (Normal) Collected: 11/07/23 0054    Lab Status: Final result Specimen: Blood from Central Venous Line Updated: 11/07/23 0131     hs TnI 4hr 5 ng/L      Delta 4hr hsTnI 1 ng/L     Platelet count [[id number]]  (Abnormal) Collected: 11/07/23 0054    Lab Status: Final result Specimen: Blood from Central Venous Line Updated: 11/07/23 0106     Platelets 779 Thousands/uL      MPV 8.3 fL     HS Troponin I 2hr [[id number]]  (Normal) Collected: 11/06/23 2204    Lab Status: Final result Specimen: Blood from Central Venous Line Updated: 11/06/23 2232     hs TnI 2hr 4 ng/L      Delta 2hr hsTnI 0 ng/L     Protime-INR [[id number]]  (Abnormal) Collected: 11/06/23 2204    Lab Status: Final result Specimen: Blood from Arm, Right Updated: 11/06/23 2228     Protime 14.8 seconds      INR 1.14    APTT [[id number]]  (Normal) Collected: 11/06/23 2204    Lab Status: Final result Specimen: Blood from Arm, Right Updated: 11/06/23 2228     PTT 35 seconds     Comprehensive metabolic panel [[id number]]  (Abnormal) Collected: 11/06/23 1944    Lab Status: Final result Specimen: Blood from Arm, Right Updated: 11/06/23 2015     Sodium 130 mmol/L      Potassium 4.2 mmol/L      Chloride 98 mmol/L      CO2 23 mmol/L      ANION GAP 9 mmol/L      BUN 10 mg/dL      Creatinine 0.66 mg/dL      Glucose 154 mg/dL      Calcium 8.9 mg/dL      AST 54 U/L      ALT 32 U/L      Alkaline Phosphatase 126 U/L      Total Protein 7.8 g/dL      Albumin 3.6 g/dL      Total Bilirubin 0.47 mg/dL      eGFR 99 ml/min/1.73sq m     Narrative:      National Kid[employer] guidelines for Chronic Kidney Disease (CKD):     Stage 1 with normal or high GFR (GFR > 90 mL/min/1.73 square meters)    Stage 2 Mild CKD (GFR = 60-89 mL/min/1.73 square meters)    Stage 3A Moderate CKD (GFR = 45-59 mL/min/1.73 square meters)    Stage 3B Moderate CKD (GFR = 30-44 mL/min/1.73 square meters)    Stage 4 Severe CKD (GFR = 15-29 mL/min/1.73 square meters)    Stage 5 End Stage CKD (GFR <15 mL/min/1.73 square meters)  Note: GFR calculation is accurate only with a steady state creatinine    HS Troponin 0hr (reflex protocol) [[id number]]  (Normal) Collected: 11/06/23 1944    Lab Status: Final result Specimen: Blood from Arm, Right Updated: 11/06/23 2015     hs TnI 0hr 4 ng/L     B-Type Natriuretic Peptide(BNP) [[id number]]  (Normal) Collected: 11/06/23 1944    Lab Status: Final result Specimen: Blood from Arm, Right Updated: 11/06/23 2014     BNP 33 pg/mL     CBC and differential [[id number]]  (Abnormal) Collected: 11/06/23 1944    Lab Status: Final result Specimen: Blood from Arm, Right Updated: 11/06/23 1950     WBC 9.93 Thousand/uL      RBC 3.77 Million/uL      Hemoglobin 9.6 g/dL      Hematocrit 30.9 %      MCV 82 fL      MCH 25.5 pg      MCHC 31.1 g/dL      RDW 16.9 %      MPV 9.3 fL      Platelets 771 Thousands/uL      nRBC 0 /100 WBCs      Neutrophils Relative 68 %      Immat GRANS % 2 %      Lymphocytes Relative 18 %      Monocytes Relative 12 %      Eosinophils Relative 0 % Basophils Relative 0 %      Neutrophils Absolute 6.73 Thousands/µL      Immature Grans Absolute 0.20 Thousand/uL      Lymphocytes Absolute 1.82 Thousands/µL      Monocytes Absolute 1.15 Thousand/µL      Eosinophils Absolute 0.00 Thousand/µL      Basophils Absolute 0.03 Thousands/µL                    IR IN-Patient Thoracentesis   Final Result by Claudette Hickey MD (11/08 1147)      Ultrasound-guided right thoracentesis with drainage of 220 mL of clear yellow pleural fluid. Plan:      Resume care by clinical team.      Workstation performed: [id number]         CTA ED chest PE study   Final Result by South Small MD (11/06 2259)      No evidence of pulmonary embolism      Stable size of right middle lobe mass extending to the lateral chest wall and mediastinum and crossing the major fissure into the right lower lobe. Extensive lytic bone metastasis again seen.       Moderate right pleural effusion      Increased size of right lower esophageal wall      Workstation performed: [id number]         IR other    (Results Pending)              Procedures  ECG 12 Lead Documentation Only    Date/Time: 11/6/2023 7:56 PM    Performed by: Karina Jackson MD  Authorized by: Karina Jackson MD    Indications / Diagnosis:  Dyspnea  ECG reviewed by me, the ED Provider: yes    Patient location:  ED  Interpretation:     Interpretation: normal    Rate:     ECG rate assessment: tachycardic    Rhythm:     Rhythm: sinus tachycardia    Ectopy:     Ectopy: none    QRS:     QRS axis:  Normal  Conduction:     Conduction: normal    ST segments:     ST segments:  Normal  T waves:     T waves: normal    Central Line    Date/Time: 11/6/2023 10:10 PM    Performed by: Karina Jackson MD  Authorized by: Karina Jackson MD    Patient location:  ED  Other Assisting Provider: No    Consent:     Consent obtained:  Written    Consent given by:  Patient    Risks discussed:  Bleeding, arterial puncture and infection    Alternatives discussed:  No treatment  Universal protocol:     Patient identity confirmed:  Verbally with patient  Pre-procedure details:     Skin preparation:  2% chlorhexidine    Skin preparation agent: Skin preparation agent completely dried prior to procedure    Indications:     Central line indications: no peripheral vascular access      Site selection rationale:  CTA Chest PE Study required  Anesthesia (see MAR for exact dosages): Anesthesia method:  None  Procedure details:     Location:  Left femoral    Vessel type: vein      Laterality:  Left    Approach: percutaneous technique used      Patient position:  Flat    Catheter type:  Triple lumen    Catheter size:  7 Fr    Landmarks identified: yes      Ultrasound guidance: yes      Ultrasound image availability:  Not obtained due to urgency    Manometry confirmation: no      Number of attempts:  2    Successful placement: yes    Post-procedure details:     Post-procedure:  Dressing applied    Assessment:  Blood return through all ports and free fluid flow    Post-procedure complications: none      Patient tolerance of procedure: Tolerated well, no immediate complications  Comments:      ANNE-MARIE Gayle was present at bedside during the whole procedure. ED Course  ED Course as of 11/11/23 2213   [facility] Nov 06, 2023 1951 WBC: 9.93   1951 Hemoglobin(!): 9.6   1951 Platelet Count: 553  CBC reviewed. 2021 Sodium(!): 130   2021 Potassium: 4.2   2021 BUN: 10   2021 Creatinine: 0.66   2021 Glucose, Random(!): 154   2021 hs TnI 0hr: 4   2021 BNP: 33  CMP, Trop, BNP reviewed. Na 130, will give 500 ml NSS.   2207 Patient persistently tachycardic with shortness of breath, unable to get IV access after multiple trials by several nurses using ultrasound as well, setting of breast cancer, mets, need to rule out PE; left femoral central line was inserted after signed procedure consent by patient.  CTA pending, case discussed with Dr. Darin Rawls for follow up of CT results and disposition. PERC Rule for PE      Flowsheet Row Most Recent Value   PERC Rule for PE    Age >=50 1 Filed at: 11/06/2023 1926   HR >=100 1 Filed at: 11/06/2023 1926   O2 Sat on room air < 95% 0 Filed at: 11/06/2023 1926   History of PE or DVT 0 Filed at: 11/06/2023 1926   Recent trauma or surgery 0 Filed at: 11/06/2023 1926   Hemoptysis 0 Filed at: 11/06/2023 1926   Exogenous estrogen 0 Filed at: 11/06/2023 1926   Unilateral leg swelling 0 Filed at: 11/06/2023 1926   PERC Rule for PE Results 2 Filed at: 11/06/2023 Chikis Mauricior' Criteria for PE      Flowsheet Row Most Recent Value   Wells' Criteria for PE    Clinical signs and symptoms of DVT 0 Filed at: 11/06/2023 1926   PE is primary diagnosis or equally likely 3 Filed at: 11/06/2023 1926   HR >100 1.5 Filed at: 11/06/2023 1926   Immobilization at least 3 days or Surgery in the previous 4 weeks 0 Filed at: 11/06/2023 1926   Previous, objectively diagnosed PE or DVT 0 Filed at: 11/06/2023 1926   Hemoptysis 0 Filed at: 11/06/2023 1926   Malignancy with treatment within 6 months or palliative 1 Filed at: 11/06/2023 1926   Khanh' Criteria Total 5.5 Filed at: 11/06/2023 [address] is a 78-year-old English-speaking female, translation iPad used for communication, history of breast cancer, on chemotherapy, comes in with complaints of dyspnea, patient states that she feels her symptoms started couple of days back, after she recently started taking new chemo drug Kisqali (Ribociclib), shortness of breath has been intermittent, denies chest pain, fever, cough, abdominal pain, back pain. On exam, patient is conscious, alert, vital signs noted for tachycardia, O2 sats 100% on room air, lungs are clear, heart sounds are regular, abdomen soft nontender, no calf tenderness or swelling.   Impression: Dyspnea, ACS, CHF, PE, anemia, electrolyte imbalance, we will check labs, EKG, CTA chest PE study.    Problems Addressed:  Dyspnea: acute illness or injury  Low serum sodium: acute illness or injury  Tachycardia: acute illness or injury    Amount and/or Complexity of Data Reviewed  Labs: ordered. Decision-making details documented in ED Course. Radiology: ordered. Decision-making details documented in ED Course. ECG/medicine tests: ordered and independent interpretation performed. Decision-making details documented in ED Course. Risk  Prescription drug management. Decision regarding hospitalization.              Disposition  Final diagnoses:   Dyspnea   Tachycardia   Low serum sodium   Pleural effusion   Mass of right lung     Time reflects when diagnosis was documented in both MDM as applicable and the Disposition within this note       Time User Action Codes Description Comment    11/6/2023  7:23 PM Noah Haydenel Add [R06.00] Dyspnea     11/6/2023  7:23 PM Noah Haydenel Add [R00.0] Tachycardia     11/6/2023  8:37 PM Noah Haydenel Add [R79.89] Low serum sodium     11/6/2023 11:14 PM Rigoberto Herman Add [J90] Pleural effusion     11/6/2023 11:15 PM Daniel Weinerin Add [R91.8] Mass of right lung     11/7/2023  8:23 AM Cathleen Geovani Add [R91.8] Right lower lobe lung mass     11/7/2023  2:03 PM Cathleen Geovani Add [G89.3,  C79.51] Pain from bone metastases ([address])     11/8/2023  7:54 PM Cathleen Geovani Add [M54.9] Back pain     11/8/2023  7:54 PM Cathleen Geovani Add [B34.9] Viral infection     11/8/2023  7:55 PM Cathleen Geovani Add [C79.51] Metastasis to spinal column ([address])     11/9/2023  3:54 PM Esparza Blanks Modify [R06.00] Dyspnea     11/9/2023  3:54 PM Newt Estrella Schwab Modify [R00.0] Tachycardia     11/9/2023  3:54 PM NewEbony Islas Modify [R79.89] Low serum sodium     11/9/2023  3:54 PM Newt Estrella Schwab Modify [J90] Pleural effusion     11/9/2023  3:54 PM Esparza Blanks Modify [R91.8] Mass of right lung     11/9/2023  3:54 PM Isaias Haider Modify [R91.8] Right lower lobe lung mass     11/9/2023  3:54 PM Redd Landis Modify [G89.3,  C79.51] Pain from bone metastases ([address])     11/9/2023  3:54 PM Redd Landis Modify [M54.9] Back pain     11/9/2023  3:54 PM Ulises NasimChava bañuelos Modify [B34.9] Viral infection     11/9/2023  3:54 PM Chava Ruffin Modify [C79.51] Metastasis to spinal column ([address])     11/10/2023 10:38 AM Delene McFarland, Alyce L Modify [R06.00] Dyspnea     11/10/2023 10:38 AM Delene McFarland, Alyce L Modify [R00.0] Tachycardia     11/10/2023 10:38 AM Ambron, Alyce L Modify [R79.89] Low serum sodium     11/10/2023 10:38 AM Ambron, Alyce L Modify [J90] Pleural effusion     11/10/2023 10:38 AM Ambron, Alyce L Modify [R91.8] Mass of right lung     11/10/2023 10:38 AM Ambron, Alyce L Modify [R91.8] Right lower lobe lung mass     11/10/2023 10:38 AM Ambron, Alyce L Modify [G89.3,  C79.51] Pain from bone metastases ([address])     11/10/2023 10:38 AM Delene McFarland, Alyce L Modify [M54.9] Back pain     11/10/2023 10:38 AM Ambron, Alyce L Modify [B34.9] Viral infection     11/10/2023 10:38 AM Ambron, Alyce L Modify [C79.51] Metastasis to spinal column ([address])     11/10/2023 10:38 AM Gualberto CASTILLO Add [J18.9] Pneumonia           ED Disposition       ED Disposition   Admit    Condition   Stable    Date/Time   Mon Nov 6, 2023 6529    Comment   Case was discussed with JESÚS and the patient's admission status was agreed to be Admission Status: inpatient status to the service of Dr. Malcom Gonzalez.                Follow-up Information       Follow up With Specialties Details Why Contact Info    Jimmie Gaspar MD Pulmonary Disease, Pulmonology, Critical Care Medicine, Intensive Care Follow up  [address] [facility]  [facility]      Jack Austin MD Hematology and Oncology, Hematology, Oncology, Internal Medicine Follow up  [address]  [phone]              Discharge Medication List as of 11/10/2023 11:26 AM        START taking these medications    Details acetaminophen (TYLENOL) 325 mg tablet Take 3 tablets (975 mg total) by mouth every 8 (eight) hours, Starting Wed 11/8/2023, Normal      levofloxacin (LEVAQUIN) 750 mg tablet Take 1 tablet (750 mg total) by mouth every 24 hours for 1 day, Starting Fri 11/10/2023, Until Sat 11/11/2023, Normal      lidocaine (LIDODERM) 5 % Apply 2 patches topically over 12 hours daily Remove & Discard patch within 12 hours or as directed by MD Do not start before November 9, 2023., Starting u 11/9/2023, Normal           CONTINUE these medications which have CHANGED    Details   ibuprofen (MOTRIN) 600 mg tablet Take 1 tablet (600 mg total) by mouth every 6 (six) hours as needed for moderate pain Take with food, Starting Wed 11/8/2023, Normal      oxyCODONE (Roxicodone) 5 immediate release tablet Take 1 tablet (5 mg total) by mouth every 4 (four) hours as needed for severe pain for up to 10 days Max Daily Amount: 30 mg, Starting Fri 11/10/2023, Until Mon 11/20/2023 at 2359, Normal           CONTINUE these medications which have NOT CHANGED    Details   anastrozole (ARIMIDEX) 1 mg tablet Take 1 tablet (1 mg total) by mouth daily, Starting Mon 1/23/2023, Normal      calcium citrate-vitamin D (Calcitrate Plus D) 315 mg-5 mcg tablet Take 1 tablet by mouth 2 (two) times a day, Starting Mon 10/23/2023, Until Sun 1/21/2024, Normal      docusate sodium (COLACE) 100 mg capsule Take 1 capsule (100 mg total) by mouth every 12 (twelve) hours, Starting Sat 10/28/2023, Normal      Multiple Vitamin (MULTIVITAMIN ADULT PO) Take 1 tablet by mouth in the morning, Historical Med      Ribociclib Succ, 600 MG Dose, 200 MG TBPK Take 600 mg by mouth in the morning For 3 weeks and then 1 week off, Starting Fri 10/27/2023, Normal           STOP taking these medications       acetaminophen (TYLENOL) 650 mg CR tablet Comments:   Reason for Stopping:               Outpatient Discharge Orders   Discharge Diet     Activity as tolerated       PDMP Review Value Time User    PDMP Reviewed  Yes 11/8/2023  8:39 AM Elie Langston MD            ED Provider  Electronically Signed by             Amari Prescott MD  11/07/23 1200       Amari Prescott MD  11/11/23 2185

## 2023-11-07 NOTE — ASSESSMENT & PLAN NOTE
Hx of ER/WV+ lobular carcinoma w/residual tumor previously and now iliac crest bone marrow bx concerning for ER+ lobular carcinoma concerning for stage 4 malignancy  -started on ribociclib 600 mg daily 3 weeks on 1 week off along with Faslodex.   -continue arimidex

## 2023-11-07 NOTE — CONSULTS
Consultation - Pulmonary Medicine   Carolynn Rankin 52 y.o. female MRN: [id number]  Unit/Bed#: [place] [address] -01 Encounter: [id number]      Assessment/Plan:    1. Acute pulmonary insufficiency likely multifaceted as listed below        -Currently maintained on room air-94%, does not wear home O2        -Maintain saturations greater than 88%        -Initiate pulmonary toileting: Incentive spirometry 3-hour, out of bed as tolerated, deep breathing cough    2. Moderate right-sided pleural effusion        -Concern for possible malignancy        -IR consulted for thoracentesis        -We will await pleural fluid studies        -If reaccumulates will need to consider Pleurx catheter if malignant    3. Right lung mass       -Unfortunately PET scan avid       -Second lung primary vs metastasis       -If mass were to enlarge could invade airway she may require stenting or radiation       -If indicated could have outpatient EBUS    4.  Recurrent cancer with bone metastasis        -Initially diagnosed 2013        -Follows with Dr. Paige France        -Currently initiated on: ribociclib 600 mg daily 3 weeks on 1 week off along with Faslodex        -Palliative care following          History of Present Illness   Physician Requesting Consult: Natalie Carranza MD  Reason for Consult / Principal Problem: Effusion  Hx and PE limited by: Language barrier  Chief Complaint: " I feel little short of breath"  HPI: Carolynn Rankin is a 52 y.o.  female who presented to [address] with complaints of shortness of breath. Patient has past medical history of breast cancer lung cancer. Presents to emergency department with shortness of breath and some mild chest discomfort. Patient reports that she felt her shortness of breath occurring approximately 2 to 3 days ago which began to increase in severity. Patient reports she started taking a new medication for breast cancer and since then she has developed increasing shortness of breath. Upon ED admission patient was noted to have moderate-sized pleural effusion. Pulmonary was consulted for right-sided effusion. Today upon examination patient reported that she was extremely fatigued and short of breath. Patient reports that she feels short of breath with minimal exertion. Patient did have significantly diminished breath sounds bilaterally at the bases. Patient currently denying any fevers, chills, mops this, headaches, night sweats, pleuritic chest pain, or palpations. From a pulmonary standpoint, patient follows with Dr. Ba Sanchez for her effusion. .  Patient has been a lifelong non-smoker and has never been diagnosed with COPD or asthma. Patient has never had any formal PFT testing. Patient is currently not maintained on any inhaled or oxygen therapies. 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 Patient denies any symptoms of GERD, LUCIE, seasonal allergies, or postnasal drip. Patient denies any recent acute exposures to dust, mold, spices, or silica. Inpatient consult to Pulmonology  Consult performed by: JANNETH Prajapati  Consult ordered by: Kaila Thornton MD          Review of Systems   Constitutional:  Negative for chills and fever. HENT:  Negative for ear pain and sore throat. Eyes:  Negative for pain and visual disturbance. Respiratory:  Positive for cough. Negative for apnea, choking, chest tightness, shortness of breath, wheezing and stridor. Cardiovascular:  Negative for chest pain and palpitations. Gastrointestinal:  Negative for abdominal pain and vomiting. Genitourinary:  Negative for dysuria and hematuria. Musculoskeletal:  Negative for arthralgias and back pain. Skin:  Negative for color change and rash. Neurological:  Negative for seizures and syncope. All other systems reviewed and are negative.       Historical Information   Past Medical History:   Diagnosis Date    Breast cancer ([address]) 07/2013 left-chemotherapy    Fibroma     History of chemotherapy     History of external beam radiation therapy      Past Surgical History:   Procedure Laterality Date    APPENDECTOMY      BREAST SURGERY  2014    Left breast 2/2 breast CA    HYSTERECTOMY  2015    Due to fibroma    IR BIOPSY BONE  10/9/2023    MASTECTOMY Left 2013    OOPHORECTOMY       Social History   Social History     Substance and Sexual Activity   Alcohol Use Not Currently     Social History     Substance and Sexual Activity   Drug Use No     Social History     Tobacco Use   Smoking Status Never    Passive exposure: Never   Smokeless Tobacco Never     E-Cigarette/Vaping    E-Cigarette Use Never User      E-Cigarette/Vaping Substances    Nicotine No     THC No     CBD No     Flavoring No     Other No     Unknown No      Occupational History: Noncontributory    Family History:   Family History   Problem Relation Age of Onset    Lung cancer Mother     No Known Problems Father     No Known Problems Sister     No Known Problems Daughter     No Known Problems Daughter     Cancer Maternal Grandmother     No Known Problems Maternal Grandfather     No Known Problems Paternal Grandmother     No Known Problems Paternal Grandfather     Uterine cancer Maternal Aunt     No Known Problems Son     No Known Problems Paternal Aunt        Meds/Allergies   pertinent pulmonary meds have been reviewed    Allergies   Allergen Reactions    Aspirin Rash     Other reaction(s): Palpitations       Objective   Vitals: Blood pressure 122/79, pulse (!) 131, temperature (!) 102.4 °F (39.1 °C), resp. rate 16, SpO2 97 %. ,There is no height or weight on file to calculate BMI.     Intake/Output Summary (Last 24 hours) at 11/7/2023 1131  Last data filed at 11/7/2023 0601  Gross per 24 hour   Intake 980 ml   Output 500 ml   Net 480 ml     Invasive Devices       Central Venous Catheter Line  Duration             [facility] Central Lines 11/06/23 Triple <1 day                    Physical Exam  Constitutional:       General: She is not in acute distress. Appearance: Normal appearance. She is normal weight. She is not ill-appearing. HENT:      Head: Normocephalic and atraumatic. Nose: Nose normal. No congestion or rhinorrhea. Mouth/Throat:      Mouth: Mucous membranes are moist.      Pharynx: Oropharynx is clear. No oropharyngeal exudate or posterior oropharyngeal erythema. Cardiovascular:      Rate and Rhythm: Normal rate and regular rhythm. Pulses: Normal pulses. Heart sounds: Normal heart sounds. No murmur heard. No friction rub. No gallop. Pulmonary:      Effort: Pulmonary effort is normal. No tachypnea, bradypnea, accessory muscle usage or respiratory distress. Breath sounds: Decreased air movement present. No stridor. Decreased breath sounds present. No wheezing, rhonchi or rales. Comments: Significantly diminished aeration throughout lung  Chest:      Chest wall: No tenderness. Abdominal:      General: Abdomen is flat. Bowel sounds are normal. There is no distension. Palpations: Abdomen is soft. There is no mass. Musculoskeletal:         General: No swelling or tenderness. Normal range of motion. Cervical back: Normal range of motion. No rigidity or tenderness. Skin:     General: Skin is warm and dry. Coloration: Skin is not jaundiced or pale. Neurological:      General: No focal deficit present. Mental Status: She is alert and oriented to person, place, and time. Mental status is at baseline. Psychiatric:         Mood and Affect: Mood normal.         Behavior: Behavior normal.         Lab Results: I have personally reviewed pertinent lab results. , ABG: No results found for: "PHART", "LSD5ORP", "PO2ART", "VIH5FMF", "G0XOTWII", "BEART", "SOURCE", BNP:   Lab Results   Component Value Date    BNP 33 11/06/2023   , CBC:   Lab Results   Component Value Date    WBC 9.75 11/07/2023    HGB 9.1 (L) 11/07/2023    HCT 29.7 (L) 11/07/2023    MCV 83 11/07/2023     (H) 11/07/2023    RBC 3.59 (L) 11/07/2023    MCH 25.3 (L) 11/07/2023    MCHC 30.6 (L) 11/07/2023    RDW 17.0 (H) 11/07/2023    MPV 8.6 (L) 11/07/2023    NRBC 0 11/07/2023   , CMP:   Lab Results   Component Value Date    SODIUM 132 (L) 11/07/2023    K 3.9 11/07/2023    CL 99 11/07/2023    CO2 24 11/07/2023    BUN 6 11/07/2023    CREATININE 0.58 (L) 11/07/2023    CALCIUM 8.4 11/07/2023    AST 54 (H) 11/06/2023    ALT 32 11/06/2023    ALKPHOS 126 (H) 11/06/2023    EGFR 104 11/07/2023           Imaging Studies: I have personally reviewed pertinent films in PACS    CTA chest right middle lobe lung mass right pleural effusion      EKG, Pathology, and Other Studies: I have personally reviewed pertinent films in PACS     10/8/2023-Echo EF 64%    Pulmonary Results (PFTs, PSG): I have personally reviewed pertinent films in PACS       No PFTs recorded    Code Status: Level 1 - Full Code    Portions of the record may have been created with voice recognition software. Occasional wrong word or "sound a like" substitutions may have occurred due to the inherent limitations of voice recognition software. Read the chart carefully and recognize, using context, where substitutions have occurred.

## 2023-11-07 NOTE — ASSESSMENT & PLAN NOTE
Sinus tach on EKG possibly reactive from effusion.   No fevers monitor off abx for now on telemetry for arrhythmia

## 2023-11-07 NOTE — ASSESSMENT & PLAN NOTE
Patient follows with Dr. Josey Corrigan from oncology  At most recent office visit 10/23/2023 she was diagnosed with metastatic cancer, presumed to be recurrent breast primary, with mets to the bones and lung  Biopsy from right iliac bone consistent with ER positive lobular carcinoma: Similar to her previous history diagnosed in 2013  started on ribociclib 600 mg daily 3 weeks on 1 week off along with Faslodex: Most recent infusion 10/30  Continued on arimidex  Outpatient oncology plan was to pursue biopsy of lung mass to rule out second primary  Current imaging this admission reveals diffuse bone mets, right lung mass, and increasing size of right lower esophageal wall: Likely metastatic involvement of breast primary: Also undergoing work-up for possible second primary lung malignancy  Pt is agreeable to meeting with palliative care.   Due to transportation issues she has not been able to get to office visit

## 2023-11-07 NOTE — ED NOTES
IV accesses attempted multiple times by multiple RN's with no success, MD will attempt placing Central Line on pt.      Qi Minium  06/51/12 0214       Qi Minium  01/99/91 [id number]

## 2023-11-07 NOTE — ED NOTES
Multiple IV attempts by multiple RNs and ultrasound used. No success.       Sharmaine Wallace RN  11/06/23 2046

## 2023-11-07 NOTE — ED CARE HANDOFF
Emergency Department Sign Out Note        Sign out and transfer of care from Dr. Catherine Arias. See Separate Emergency Department note. The patient, Lelo Diggs, was evaluated by the previous provider for SOB. Workup Completed:  labs    ED Course / Workup Pending (followup):  CT for PE    10:06 PM  Patient signed out to me. We will follow-up on her CT scan for PE. Labs reviewed. Slightly lower hemoglobin from her baseline but not by much. Labs otherwise around her baseline. Given her extremely symptomatic dyspnea, plan is admission to internal medicine once the CT is back. But there is a large pulmonary embolism that needs intervention then she might need transfer to [facility] but otherwise plan would be for admission for further work-up such as echo, etc.    11:17 PM  CT noted. Pleural effusion would explain her persistent shortness of breath. Will admit to internal medicine for further evaluation and treatment.                 PERC Rule for PE      Flowsheet Row Most Recent Value   PERC Rule for PE    Age >=50 1 Filed at: 11/06/2023 1926   HR >=100 1 Filed at: 11/06/2023 1926   O2 Sat on room air < 95% 0 Filed at: 11/06/2023 1926   History of PE or DVT 0 Filed at: 11/06/2023 1926   Recent trauma or surgery 0 Filed at: 11/06/2023 1926   Hemoptysis 0 Filed at: 11/06/2023 1926   Exogenous estrogen 0 Filed at: 11/06/2023 1926   Unilateral leg swelling 0 Filed at: 11/06/2023 1926   PERC Rule for PE Results 2 Filed at: 11/06/2023 Jose Carlos Covarrubias' Criteria for PE      Flowsheet Row Most Recent Value   Wells' Criteria for PE    Clinical signs and symptoms of DVT 0 Filed at: 11/06/2023 1926   PE is primary diagnosis or equally likely 3 Filed at: 11/06/2023 1926   HR >100 1.5 Filed at: 11/06/2023 1926   Immobilization at least 3 days or Surgery in the previous 4 weeks 0 Filed at: 11/06/2023 1926   Previous, objectively diagnosed PE or DVT 0 Filed at: 11/06/2023 1926   Hemoptysis 0 Filed at: 11/06/2023 1926   Malignancy with treatment within 6 months or palliative 1 Filed at: 11/06/2023 1926   Wells' Criteria Total 5.5 Filed at: 11/06/2023 1926                  ED Course as of 11/06/23 2316   Mon Nov 06, 202306, 2023 2311 HS Troponin I 2hr  normal     Procedures  Medical Decision Making  Amount and/or Complexity of Data Reviewed  Labs: ordered. Radiology: ordered. Disposition  Final diagnoses:   Dyspnea   Tachycardia   Low serum sodium     Time reflects when diagnosis was documented in both MDM as applicable and the Disposition within this note       Time User Action Codes Description Comment    11/6/2023  7:23 PM Radha Lipscomb Add [R06.00] Dyspnea     11/6/2023  7:23 PM Radha Lipscomb Add [R00.0] Tachycardia     11/6/2023  8:37 PM Radha Narvaez Add [R79.89] Low serum sodium           ED Disposition       None          Follow-up Information    None       Patient's Medications   Discharge Prescriptions    No medications on file     No discharge procedures on file.        ED Provider  Electronically Signed by     Sophie Avelar DO  11/06/23 1309

## 2023-11-07 NOTE — H&P
[address] [facility]  H&P  Name: Cecilio Alvarez 52 y.o. female I MRN: [id number]  Unit/Bed#: ED-10 I Date of Admission: 11/6/2023   Date of Service: 11/7/2023 I Hospital Day: 1      Assessment/Plan   * Pleural effusion on right  Assessment & Plan  Right pleural effusion in the setting of right lung mass unclear if metastatic from prior breast ca or new lung primary. Reactive vs malignant  Tachycardiac but no fever  -d/w pt that right esophageal ln is also slightly larger at 2.8cm from 2.2 prior. -monitor off abx, consult IR for diagnostic thoracentesis. Will appreciate IR recommendations if they can concomittantly biopsy lung mass again as noted below given prior bone marrow site was concern for breast instead of lung source    Right lower lobe lung mass  Assessment & Plan  In pt w/hx of contralateral breast ca. Had bone marrow bx in 10/23 however demonstrated concern for metastatic breast ca  Hem/onc actually wants molecular testing and repeat bx of mass to r/o primary lung vs mets from breast ca hx  Will ask IR for evaluation if possible to do both this and thoracentesis of right lung as this is scheduled for next week given wide spread mets to assist further in treatment of cancer    Sinus tachycardia  Assessment & Plan  Sinus tach on EKG possibly reactive from effusion. No fevers monitor off abx for now on telemetry for arrhythmia    Malignant neoplasm of overlapping sites of left female breast [facility])  Assessment & Plan  Hx of ER/UT+ lobular carcinoma w/residual tumor previously and now iliac crest bone marrow bx concerning for ER+ lobular carcinoma concerning for stage 4 malignancy  -started on ribociclib 600 mg daily 3 weeks on 1 week off along with Faslodex.   -continue arimidex           VTE Pharmacologic Prophylaxis: VTE Score: 3 Moderate Risk (Score 3-4) - Pharmacological DVT Prophylaxis Ordered: heparin.   Code Status: Level 1 - Full Code   Discussion with family: family at bedside    Anticipated Length of Stay: Patient will be admitted on an inpatient basis with an anticipated length of stay of greater than 2 midnights secondary to lung mass w/new pleural effusion. Total Time Spent on Date of Encounter in care of patient:  mins. This time was spent on one or more of the following: performing physical exam; counseling and coordination of care; obtaining or reviewing history; documenting in the medical record; reviewing/ordering tests, medications or procedures; communicating with other healthcare professionals and discussing with patient's family/caregivers. Chief Complaint: mild sob worsening discomfort in right ribs    History of Present Illness:  Amarilis Tripathi is a 52 y.o. female with a PMH of right lung mass, breast ca w/recently diagnosed bony metastases  who presents with sob/mild chest discomfort that is worsening. Pt Greek speaking only. Conversation occurred between the pt/myself in [address] [address]. Pt was sent in by hem/onc for her sob and was evaluated by pe study which was negative for PE but demonstrated new right pleural effusion. No LE edema. Does have some intermittent sore throat and somec hills but no fevers presently. Reports that this has been since she started her new chemo pill. Femoral access was required and obtained in ed for cta pe study by ed provider. Of note pt is very anxious and repeatedly asks to clarify that she has to stay and why she cannot leave and come back to do this. She did AMA earlier 2 weeks ago on initial diagnosis of lung mass w/bony mets and returned the next day for bone marrow bx confirming likely breast ca origin for bony mets but not if lung mass is mets or a second primary for which she has repeat Ir consult/bx in op setting pending.  Explained to her at length plan to mobilize access to periphery once obtained w/vascular access team.  Pt reports that this is very hard for her due to her prior hx of breast cancer and doing this is now opposed to one week later when her biopsy is to be scheduled gives her some anxiety. D/w her coordination of care benefits of staying and not leaving AMA as well as monitoring for fever and possibly moving up procedure for mass bx requested by hem/onc depending on IR evaluation multiple times as did ER nurses as well. Pt ultimately. acquiescing to stay in hospital for now. We will admit for right pleural effusion. Review of Systems:  Review of Systems   Constitutional:  Positive for chills (occasional chills). HENT:  Positive for sore throat (occasional sore throat). Respiratory:  Positive for cough and shortness of breath. Cardiovascular:  Positive for chest pain (right lateral rib/back pain). Gastrointestinal:  Negative for abdominal pain, diarrhea, nausea and vomiting. Musculoskeletal:  Positive for back pain. All other systems reviewed and are negative. Past Medical and Surgical History:   Past Medical History:   Diagnosis Date    Breast cancer ([address]) 07/2013    left-chemotherapy    Fibroma     History of chemotherapy     History of external beam radiation therapy        Past Surgical History:   Procedure Laterality Date    APPENDECTOMY      BREAST SURGERY  2014    Left breast 2/2 breast CA    HYSTERECTOMY  2015    Due to fibroma    IR BIOPSY BONE  10/9/2023    MASTECTOMY Left 2013    OOPHORECTOMY         Meds/Allergies:  Prior to Admission medications    Medication Sig Start Date End Date Taking?  Authorizing Provider   acetaminophen (TYLENOL) 650 mg CR tablet Take 1 tablet (650 mg total) by mouth every 8 (eight) hours as needed for mild pain or moderate pain 10/10/23   Corrinne Bales, DO   anastrozole (ARIMIDEX) 1 mg tablet Take 1 tablet (1 mg total) by mouth daily 1/23/23   Chinedu Webster MD   calcium citrate-vitamin D (Calcitrate Plus D) 315 mg-5 mcg tablet Take 1 tablet by mouth 2 (two) times a day 10/23/23 1/21/24  Carroll Lau MD   docusate sodium (COLACE) 100 mg capsule Take 1 capsule (100 mg total) by mouth every 12 (twelve) hours 10/28/23   Ava Grissom DO   ibuprofen (MOTRIN) 600 mg tablet Take 1 tablet (600 mg total) by mouth every 6 (six) hours as needed for moderate pain 10/28/23   Ava Grissom DO   Multiple Vitamin (MULTIVITAMIN ADULT PO) Take 1 tablet by mouth in the morning    Historical Provider, MD   oxyCODONE (Roxicodone) 5 immediate release tablet Take 1 tablet (5 mg total) by mouth every 4 (four) hours as needed for severe pain for up to 10 days Max Daily Amount: 30 mg 10/28/23 11/7/23  Ava Grissom DO   Ribociclib Succ, 600 MG Dose, 200 MG TBPK Take 600 mg by mouth in the morning For 3 weeks and then 1 week off 10/27/23   Any Hubbard MD     I have reviewed home medications with patient personally. Allergies:    Allergies   Allergen Reactions    Aspirin Rash     Other reaction(s): Palpitations       Social History:  Marital Status: /Civil Union   Occupation:   Patient Pre-hospital Living Situation:   Patient Pre-hospital Level of Mobility:   Patient Pre-hospital Diet Restrictions:   Substance Use History:   Social History     Substance and Sexual Activity   Alcohol Use No     Social History     Tobacco Use   Smoking Status Never    Passive exposure: Never   Smokeless Tobacco Never     Social History     Substance and Sexual Activity   Drug Use No       Family History:  Family History   Problem Relation Age of Onset    Lung cancer Mother     No Known Problems Father     No Known Problems Sister     No Known Problems Daughter     No Known Problems Daughter     Cancer Maternal Grandmother     No Known Problems Maternal Grandfather     No Known Problems Paternal Grandmother     No Known Problems Paternal Grandfather     Uterine cancer Maternal Aunt     No Known Problems Son     No Known Problems Paternal Aunt        Physical Exam:     Vitals:   Blood Pressure: 137/72 (11/07/23 0030)  Pulse: (!) 126 (11/07/23 0030)  Temperature: 98.2 °F (36.8 °C) (11/06/23 1812)  Respirations: (!) 26 (11/07/23 0030)  SpO2: 100 % (11/07/23 0030)    Physical Exam  Vitals reviewed. Constitutional:       General: She is not in acute distress. Appearance: She is not ill-appearing, toxic-appearing or diaphoretic. HENT:      Head: Normocephalic. Right Ear: External ear normal.      Left Ear: External ear normal.      Nose: Nose normal.   Cardiovascular:      Rate and Rhythm: Regular rhythm. Tachycardia present. Pulmonary:      Effort: No respiratory distress. Breath sounds: No stridor. No wheezing, rhonchi or rales. Comments: Decreased breath sounds in RLL  Abdominal:      General: There is no distension. Palpations: Abdomen is soft. There is no mass. Tenderness: There is no abdominal tenderness. There is no guarding or rebound. Hernia: No hernia is present. Musculoskeletal:      Right lower leg: No edema. Left lower leg: No edema. Skin:     General: Skin is warm and dry. Neurological:      Mental Status: She is alert. Mental status is at baseline.    Psychiatric:         Mood and Affect: Mood normal.      Comments: Anxious w/congruent affect          Additional Data:     Lab Results:  Results from last 7 days   Lab Units 11/06/23  1944   WBC Thousand/uL 9.93   HEMOGLOBIN g/dL 9.6*   HEMATOCRIT % 30.9*   PLATELETS Thousands/uL 343   NEUTROS PCT % 68   LYMPHS PCT % 18   MONOS PCT % 12   EOS PCT % 0     Results from last 7 days   Lab Units 11/06/23  1944   SODIUM mmol/L 130*   POTASSIUM mmol/L 4.2   CHLORIDE mmol/L 98   CO2 mmol/L 23   BUN mg/dL 10   CREATININE mg/dL 0.66   ANION GAP mmol/L 9   CALCIUM mg/dL 8.9   ALBUMIN g/dL 3.6   TOTAL BILIRUBIN mg/dL 0.47   ALK PHOS U/L 126*   ALT U/L 32   AST U/L 54*   GLUCOSE RANDOM mg/dL 154*     Results from last 7 days   Lab Units 11/06/23  2204   INR  1.14                   Lines/Drains:  Invasive Devices       Central Venous Catheter Line  Duration             CVC Central Lines 11/06/23 Triple <1 day              Peripheral Intravenous Line  Duration             Peripheral IV 10/30/23 Dorsal (posterior); Right Hand 7 days                    Central Line:  Goal for removal: Will discontinue when peripheral access obtained. Imaging: Reviewed radiology reports from this admission including: chest CT scanct chest report and images personally reviewed w/pt. On my review there is an effusion on right lung w/right mid/lower lung mass. Reviewed this w/pt personally. CTA ED chest PE study   Final Result by Rehana Tesfaye MD (11/06 2259)      No evidence of pulmonary embolism      Stable size of right middle lobe mass extending to the lateral chest wall and mediastinum and crossing the major fissure into the right lower lobe. Extensive lytic bone metastasis again seen. Moderate right pleural effusion      Increased size of right lower esophageal wall      Workstation performed: [id number]         IR IN-Patient Thoracentesis    (Results Pending)       EKG and Other Studies Reviewed on Admission:   EKG:  sinus tachycardia on my personal review. .    ** Please Note: This note has been constructed using a voice recognition system.  **

## 2023-11-07 NOTE — ASSESSMENT & PLAN NOTE
In pt w/hx of contralateral breast ca.   Had bone marrow bx in 10/23 however demonstrated concern for metastatic breast ca  Hem/onc actually wants molecular testing and repeat bx of mass to r/o primary lung vs mets from breast ca hx  Will ask IR for evaluation if possible to do both this and thoracentesis of right lung as this is scheduled for next week given wide spread mets to assist further in treatment of cancer

## 2023-11-07 NOTE — PROGRESS NOTES
[address] [facility]  Progress Note  Name: Elliot Araujo  MRN: [id number]  Unit/Bed#: [facility] 2 [address] [facility] [place] 203-01 I Date of Admission: 11/6/2023   Date of Service: 11/7/2023 I Hospital Day: 1    Assessment/Plan   * Dyspnea  Assessment & Plan  Patient is a 49-year-old female who presented to the ER for evaluation of dyspnea. Patient was recently diagnosed with recurrence of breast cancer, with "diffuse bone mets, as well as right lung mass either metastatic versus second primary. CT scan on admission was negative for PE. Stable size of right lung mass in the right middle lobe, extending to the lateral chest wall, mediastinum, and right lower lobe.     Patient also had moderate pleural effusion  had evolving sepsis, on admission with fever, and tachycardia  Patient dyspnea is likely multifactorial, secondary to her lung mass, pleural effusion, as well as possible postobstructive pneumonia  Continue evaluation under the guidance of the pulmonology team  Anticipate thoracentesis  Continue antibiotics  Consideration for bronchoscopy    Pleural effusion on right  Assessment & Plan  CT scan on admission revealed moderate right pleural effusion  Likely malignant effusion  Patient also presented with a fever: Could also be secondary to pneumonia  IR consulted for thoracentesis    Right lower lobe lung mass  Assessment & Plan  follows with oncology for probable recurrent breast cancer, with diffuse bone mets, and right lung mass  Right lung mass positive on PET scan: Possible metastatic versus lung primary  CT scan on admission revealed, "stable size of right middle lobe mass extending to the lateral chest wall and mediastinum and crossing the major fissure into the right lower lobe"  Outpatient orders for IR lung biopsy: Discussed with IR, they recommend pulmonary consultation for bronchoscopy with biopsy rather than percutaneous biopsy  Patient presented with fever, and sepsis: Possible postobstructive pneumonia  Started on antibiotics    Sepsis [facility])  Assessment & Plan  Patient presented with evolving sepsis with tachycardia, and then developed a fever of 102  CT scan with right lung mass, in the setting of metastatic breast cancer, with possible lung metastasis versus second lung primary  Sepsis possibly secondary to postobstructive pneumonia  Check blood cultures x2  Check sputum cultures  Consulted for evaluation for bronchoscopy  Continue broad-spectrum antibiotics  Femoral central line placed in ER due to lack of IV access:  d/w IR:  midline requested for today to dc femoral line    Pain from bone metastases [facility])  Assessment & Plan  Patient was diagnosed with widely metastatic disease to her bones, secondary to primary recurrent breast cancer  CT scan had diffuse thoracolumbar destructive metastatic lesions, compression fractures at T10, and T7. Diffuse metastatic disease throughout the sacrum and iliac  PET CT scan 10/26 revealed multifocal hypermetabolic/metastatic disease involving the chest and fairly diffuse disease involving the skeletal system  MRI 10/9 revealed widespread thoracic, cervical, and lumbar spine metastasis. Wedge compression fracture at T10. T7 superior endplate pathologic fracture.   Possible minimal epidural tumor at T8  Pt denies any numbness, new focal weakness  Continue pain control  Palliative care consultation    Malignant neoplasm of overlapping sites of left female breast [facility])  Assessment & Plan  Patient follows with Dr. Geremias Pineda from oncology  At most recent office visit 10/23/2023 she was diagnosed with metastatic cancer, presumed to be recurrent breast primary, with mets to the bones and lung  Biopsy from right iliac bone consistent with ER positive lobular carcinoma: Similar to her previous history diagnosed in 2013  started on ribociclib 600 mg daily 3 weeks on 1 week off along with Faslodex: Most recent infusion 10/30  Continued on arimidex  Outpatient oncology plan was to pursue biopsy of lung mass to rule out second primary  Current imaging this admission reveals diffuse bone mets, right lung mass, and increasing size of right lower esophageal wall: Likely metastatic involvement of breast primary: Also undergoing work-up for possible second primary lung malignancy  Pt is agreeable to meeting with palliative care. Due to transportation issues she has not been able to get to office visit                   Family:  updated pt, daughter and sister Rajeev Dangelo at bedside in [place]. Reviewed all test results and tx plan. Answered all questions    VTE Pharmacologic Prophylaxis: Heparin  VTE Mechanical Prophylaxis: sequential compression device        Certification Statement: The patient will continue to require additional inpatient hospital stay due to need for further acute intervention for sepsis, fever, tachycardia, lung mass    Status: inpatient     Total time spent today including review of outpatient hematology office notes 10/2023, review of outpatient pulmonary office notes with Dr. Viv Saini 10/2023, review of previous MRI report, PET scan report, admission CAT scan report, lab work, discussion with pulmonary team Dr. Disha Oden, and further history gathered from patient and her family at the bedside: Greater than 75 minutes    ===================================================================    Subjective:  Patient is examined and interviewed by me in Trinidadian at the bedside. Patient notes she has pain, predominantly in her lower back. She notes at home the ibuprofen helps her the most.  Tylenol also helps her pain. She states she does not wish to try oxycodone as it gave her side effects in the past.  Patient notes generalized weakness. She denies any focal weakness. She notes since the femoral line was placed in the ER she has had discomfort in her left leg. She denies any numbness in her leg or feet. She denies any pain anywhere else. She denies any chest pain.   Denies any abdominal pain. No current nausea, vomiting, diarrhea or constipation. Denies any dizziness or lightheadedness. She notes she presented to the ER with shortness of breath that worsened the past 2 days. She attributes the dyspnea to her chemotherapy oral pills that she was taking at home. Had her therapy injection 10/30: She notes after that she had nausea but no other symptoms. Physical Exam:   Temp:  [98.2 °F (36.8 °C)-102.4 °F (39.1 °C)] 99.9 °F (37.7 °C)  HR:  [111-131] 131  Resp:  [13-26] 21  BP: (110-140)/(69-85) 114/75    Gen:  Pleasant, non-tachypnic, non-dyspnic. Conversant. Able to speak in complete sentences without a stop for dyspnea. Heart: Tachycardic, regular rhythm, S1S2 present, no murmur, rub or gallop  Lungs: Decreased breath sounds at right base. Decreased air movement. No current wheezes, crackles, rhonchi. No accessory muscle use or respiratory distress. Abd: soft, non-tender, non-distended. NABS, no guarding, rebound or peritoneal signs. Extremities: no clubbing, cyanosis or edema. 2+pedal pulses bilaterally. Full range of motion  Neuro: awake, alert and oriented. Fluent speech. Bilateral ankle flexion 5/5. Moving all 4 extremities. Sensation intact to light touch bilateral lower extremities, and symmetric  Skin: warm and dry: no petechiae, purpura and rash.     LABS:   Results from last 7 days   Lab Units 11/07/23  0444 11/07/23  0054 11/06/23  1944   WBC Thousand/uL 9.75  --  9.93   HEMOGLOBIN g/dL 9.1*  --  9.6*   HEMATOCRIT % 29.7*  --  30.9*   PLATELETS Thousands/uL 484* 472* 343     Results from last 7 days   Lab Units 11/07/23  0444 11/06/23  1944   POTASSIUM mmol/L 3.9 4.2   CHLORIDE mmol/L 99 98   CO2 mmol/L 24 23   BUN mg/dL 6 10   CREATININE mg/dL 0.58* 0.66   CALCIUM mg/dL 8.4 8.9       Hospital Data:    11/6 CTA chest PE study  No evidence of pulmonary embolism  Stable size of right middle lobe mass extending to the lateral chest wall and mediastinum and crossing the major fissure into the right lower lobe. Extensive lytic bone metastasis again seen. Moderate right pleural effusion  Increased size of right lower esophageal wall        ---------------------------------------------------------------------------------------------------------------  This note has been constructed using a voice recognition system.

## 2023-11-07 NOTE — CONSULTS
e-Consult ([facility])  - Interventional Radiology  Veda Sin 52 y.o. female MRN: [id number]  Unit/Bed#: [address] [place] 203[address]01 Encounter: [id number]          Interventional Radiology has been consulted to evaluate Veda Sin    We were consulted by Tobin Willis PA-C concerning this patient with a right lung mass. Inpatient Consult to IR  Consult performed by: Darrel De Paz MD  Consult ordered by: Alejandra Hernandez PA-C        11/07/23    Assessment/Recommendation:   53 yo female with stage 4 metastatic breast cancer with recent bone marrow biopsy confirming diagnosis was admitted with tachycardia. A CT obtained and reviewed shows a small to moderate pleural effusion. There is also a large lung mass extending into the hilum which was positive on a recent outpatient PET/CT scan which was also reviewed. At this time would proceed with the thoracentesis as this potential will improve her clinical symptoms. The fluid will be sent for requested labs including cytology. The lung biopsy should be delayed as this is not an acute issue. The procedure can be done percutaneously, but would likely be safer from an endobronchial approach. Would appreciate oncology and pulmonary input to determine the necessity of a biopsy prior to proceeding with any biopsy. Please reconsult IR if a biopsy is required. This can be arranged as an outpatient procedure. 11-20 minutes, >50% of the total time devoted to medical consultative verbal/EMR discussion between providers. Written report will be generated in the EMR. Thank you for allowing Interventional Radiology to participate in the care of Veda Sin. Please don't hesitate to call or TigerText us with any questions.      Darrel De Paz MD

## 2023-11-07 NOTE — ASSESSMENT & PLAN NOTE
Patient is a 26-year-old female who presented to the ER for evaluation of dyspnea. Patient was recently diagnosed with recurrence of breast cancer, with "diffuse bone mets, as well as right lung mass either metastatic versus second primary. CT scan on admission was negative for PE. Stable size of right lung mass in the right middle lobe, extending to the lateral chest wall, mediastinum, and right lower lobe.     Patient also had moderate pleural effusion  had evolving sepsis, on admission with fever, and tachycardia  Patient dyspnea is likely multifactorial, secondary to her lung mass, pleural effusion, as well as possible postobstructive pneumonia  Continue evaluation under the guidance of the pulmonology team  Anticipate thoracentesis  Continue antibiotics  Consideration for bronchoscopy

## 2023-11-07 NOTE — ASSESSMENT & PLAN NOTE
Right pleural effusion in the setting of right lung mass unclear if metastatic from prior breast ca or new lung primary. Reactive vs malignant  Tachycardiac but no fever  -d/w pt that right esophageal ln is also slightly larger at 2.8cm from 2.2 prior. -monitor off abx, consult IR for diagnostic thoracentesis.   Will appreciate IR recommendations if they can concomittantly biopsy lung mass again as noted below given prior bone marrow site was concern for breast instead of lung source

## 2023-11-08 ENCOUNTER — APPOINTMENT (INPATIENT)
Dept: RADIOLOGY | Facility: HOSPITAL | Age: 53
DRG: 720 | End: 2023-11-08
Payer: COMMERCIAL

## 2023-11-08 ENCOUNTER — ANESTHESIA (INPATIENT)
Dept: GASTROENTEROLOGY | Facility: HOSPITAL | Age: 53
DRG: 720 | End: 2023-11-08
Payer: COMMERCIAL

## 2023-11-08 ENCOUNTER — ANESTHESIA EVENT (INPATIENT)
Dept: GASTROENTEROLOGY | Facility: HOSPITAL | Age: 53
DRG: 720 | End: 2023-11-08
Payer: COMMERCIAL

## 2023-11-08 DIAGNOSIS — R91.8 RIGHT LOWER LOBE LUNG MASS: ICD-10-CM

## 2023-11-08 DIAGNOSIS — C50.912 CARCINOMA OF LEFT BREAST IN FEMALE, ESTROGEN RECEPTOR POSITIVE, UNSPECIFIED SITE OF BREAST: Primary | ICD-10-CM

## 2023-11-08 DIAGNOSIS — G89.3 PAIN FROM BONE METASTASES (HCC): ICD-10-CM

## 2023-11-08 DIAGNOSIS — C79.51 PAIN FROM BONE METASTASES (HCC): ICD-10-CM

## 2023-11-08 DIAGNOSIS — Z17.0 CARCINOMA OF LEFT BREAST IN FEMALE, ESTROGEN RECEPTOR POSITIVE, UNSPECIFIED SITE OF BREAST: Primary | ICD-10-CM

## 2023-11-08 LAB
ANION GAP SERPL CALCULATED.3IONS-SCNC: 6 MMOL/L
APPEARANCE FLD: ABNORMAL
BASOPHILS # BLD AUTO: 0.04 THOUSANDS/ÂΜL (ref 0–0.1)
BASOPHILS NFR BLD AUTO: 0 % (ref 0–1)
BUN SERPL-MCNC: 7 MG/DL (ref 5–25)
CALCIUM SERPL-MCNC: 8.2 MG/DL (ref 8.4–10.2)
CHLORIDE SERPL-SCNC: 103 MMOL/L (ref 96–108)
CO2 SERPL-SCNC: 25 MMOL/L (ref 21–32)
COLOR FLD: YELLOW
CREAT SERPL-MCNC: 0.64 MG/DL (ref 0.6–1.3)
EOSINOPHIL # BLD AUTO: 0 THOUSAND/ÂΜL (ref 0–0.61)
EOSINOPHIL NFR BLD AUTO: 0 % (ref 0–6)
ERYTHROCYTE [DISTWIDTH] IN BLOOD BY AUTOMATED COUNT: 17.2 % (ref 11.6–15.1)
GFR SERPL CREATININE-BSD FRML MDRD: 100 ML/MIN/1.73SQ M
GLUCOSE FLD-MCNC: 112 MG/DL
GLUCOSE SERPL-MCNC: 135 MG/DL (ref 65–140)
HCT VFR BLD AUTO: 27.4 % (ref 34.8–46.1)
HGB BLD-MCNC: 8.5 G/DL (ref 11.5–15.4)
HISTIOCYTES NFR FLD: 65 %
IMM GRANULOCYTES # BLD AUTO: 0.11 THOUSAND/UL (ref 0–0.2)
IMM GRANULOCYTES NFR BLD AUTO: 1 % (ref 0–2)
L PNEUMO1 AG UR QL IA.RAPID: NEGATIVE
LDH FLD L TO P-CCNC: 286 U/L
LDH SERPL-CCNC: 310 IU/L (ref 119–226)
LDH1 CFR SERPL ELPH: 13 % (ref 17–32)
LDH2 CFR SERPL ELPH: 22 % (ref 25–40)
LDH3 CFR SERPL ELPH: 27 % (ref 17–27)
LDH4 CFR SERPL ELPH: 16 % (ref 5–13)
LDH5 CFR SERPL ELPH: 22 % (ref 4–20)
LYMPHOCYTES # BLD AUTO: 1.64 THOUSANDS/ÂΜL (ref 0.6–4.47)
LYMPHOCYTES NFR BLD AUTO: 18 % (ref 14–44)
LYMPHOCYTES NFR BLD AUTO: 5 %
MCH RBC QN AUTO: 25.6 PG (ref 26.8–34.3)
MCHC RBC AUTO-ENTMCNC: 31 G/DL (ref 31.4–37.4)
MCV RBC AUTO: 83 FL (ref 82–98)
MONO+MESO NFR FLD MANUAL: 10 %
MONOCYTES # BLD AUTO: 0.52 THOUSAND/ÂΜL (ref 0.17–1.22)
MONOCYTES NFR BLD AUTO: 6 % (ref 4–12)
NEUTROPHILS # BLD AUTO: 6.99 THOUSANDS/ÂΜL (ref 1.85–7.62)
NEUTS SEG NFR BLD AUTO: 5 %
NEUTS SEG NFR BLD AUTO: 75 % (ref 43–75)
NRBC BLD AUTO-RTO: 0 /100 WBCS
PATHOLOGY REVIEW: YES
PH BODY FLUID: 7.6
PLATELET # BLD AUTO: 434 THOUSANDS/UL (ref 149–390)
PMV BLD AUTO: 8.5 FL (ref 8.9–12.7)
POTASSIUM SERPL-SCNC: 4 MMOL/L (ref 3.5–5.3)
PROT FLD-MCNC: 4.3 G/DL
RBC # BLD AUTO: 3.32 MILLION/UL (ref 3.81–5.12)
S PNEUM AG UR QL: NEGATIVE
SITE: ABNORMAL
SODIUM SERPL-SCNC: 134 MMOL/L (ref 135–147)
TOTAL CELLS COUNTED SPEC: 100
WBC # BLD AUTO: 9.3 THOUSAND/UL (ref 4.31–10.16)
WBC # FLD MANUAL: 1986 /UL
WBC OTHER NFR FLD MANUAL: 15 %

## 2023-11-08 PROCEDURE — 85025 COMPLETE CBC W/AUTO DIFF WBC: CPT | Performed by: INTERNAL MEDICINE

## 2023-11-08 PROCEDURE — 89051 BODY FLUID CELL COUNT: CPT | Performed by: PHYSICIAN ASSISTANT

## 2023-11-08 PROCEDURE — 99255 IP/OBS CONSLTJ NEW/EST HI 80: CPT | Performed by: INTERNAL MEDICINE

## 2023-11-08 PROCEDURE — 82945 GLUCOSE OTHER FLUID: CPT | Performed by: PHYSICIAN ASSISTANT

## 2023-11-08 PROCEDURE — 88342 IMHCHEM/IMCYTCHM 1ST ANTB: CPT | Performed by: STUDENT IN AN ORGANIZED HEALTH CARE EDUCATION/TRAINING PROGRAM

## 2023-11-08 PROCEDURE — 32555 ASPIRATE PLEURA W/ IMAGING: CPT

## 2023-11-08 PROCEDURE — 99232 SBSQ HOSP IP/OBS MODERATE 35: CPT | Performed by: INTERNAL MEDICINE

## 2023-11-08 PROCEDURE — 84157 ASSAY OF PROTEIN OTHER: CPT | Performed by: PHYSICIAN ASSISTANT

## 2023-11-08 PROCEDURE — 87205 SMEAR GRAM STAIN: CPT | Performed by: PHYSICIAN ASSISTANT

## 2023-11-08 PROCEDURE — 32555 ASPIRATE PLEURA W/ IMAGING: CPT | Performed by: RADIOLOGY

## 2023-11-08 PROCEDURE — 88305 TISSUE EXAM BY PATHOLOGIST: CPT | Performed by: STUDENT IN AN ORGANIZED HEALTH CARE EDUCATION/TRAINING PROGRAM

## 2023-11-08 PROCEDURE — 80048 BASIC METABOLIC PNL TOTAL CA: CPT | Performed by: INTERNAL MEDICINE

## 2023-11-08 PROCEDURE — 0W993ZZ DRAINAGE OF RIGHT PLEURAL CAVITY, PERCUTANEOUS APPROACH: ICD-10-PCS | Performed by: RADIOLOGY

## 2023-11-08 PROCEDURE — 83615 LACTATE (LD) (LDH) ENZYME: CPT | Performed by: PHYSICIAN ASSISTANT

## 2023-11-08 PROCEDURE — 87070 CULTURE OTHR SPECIMN AEROBIC: CPT | Performed by: PHYSICIAN ASSISTANT

## 2023-11-08 PROCEDURE — 83986 ASSAY PH BODY FLUID NOS: CPT | Performed by: PHYSICIAN ASSISTANT

## 2023-11-08 PROCEDURE — 88112 CYTOPATH CELL ENHANCE TECH: CPT | Performed by: STUDENT IN AN ORGANIZED HEALTH CARE EDUCATION/TRAINING PROGRAM

## 2023-11-08 PROCEDURE — 88341 IMHCHEM/IMCYTCHM EA ADD ANTB: CPT | Performed by: STUDENT IN AN ORGANIZED HEALTH CARE EDUCATION/TRAINING PROGRAM

## 2023-11-08 PROCEDURE — 87449 NOS EACH ORGANISM AG IA: CPT | Performed by: INTERNAL MEDICINE

## 2023-11-08 RX ORDER — OXYCODONE HYDROCHLORIDE 5 MG/1
5 TABLET ORAL EVERY 4 HOURS PRN
Qty: 20 TABLET | Refills: 0 | OUTPATIENT
Start: 2023-11-08 | End: 2023-11-18

## 2023-11-08 RX ORDER — IBUPROFEN 600 MG/1
600 TABLET ORAL EVERY 6 HOURS PRN
Qty: 60 TABLET | Refills: 0 | Status: SHIPPED | OUTPATIENT
Start: 2023-11-08

## 2023-11-08 RX ORDER — SODIUM CHLORIDE 9 MG/ML
125 INJECTION, SOLUTION INTRAVENOUS CONTINUOUS
Status: CANCELLED | OUTPATIENT
Start: 2023-11-08

## 2023-11-08 RX ORDER — LIDOCAINE WITH 8.4% SOD BICARB 0.9%(10ML)
SYRINGE (ML) INJECTION AS NEEDED
Status: COMPLETED | OUTPATIENT
Start: 2023-11-08 | End: 2023-11-08

## 2023-11-08 RX ORDER — ACETAMINOPHEN 325 MG/1
975 TABLET ORAL EVERY 8 HOURS SCHEDULED
Qty: 90 TABLET | Refills: 0 | Status: SHIPPED | OUTPATIENT
Start: 2023-11-08

## 2023-11-08 RX ORDER — LIDOCAINE 50 MG/G
2 PATCH TOPICAL DAILY
Qty: 60 PATCH | Refills: 0 | Status: SHIPPED | OUTPATIENT
Start: 2023-11-09

## 2023-11-08 RX ADMIN — CEFEPIME HYDROCHLORIDE 2000 MG: 2 INJECTION, SOLUTION INTRAVENOUS at 11:49

## 2023-11-08 RX ADMIN — HEPARIN SODIUM 5000 UNITS: 5000 INJECTION INTRAVENOUS; SUBCUTANEOUS at 22:49

## 2023-11-08 RX ADMIN — RIBOCICLIB 600 MG: 200 TABLET, FILM COATED ORAL at 09:17

## 2023-11-08 RX ADMIN — CEFEPIME HYDROCHLORIDE 2000 MG: 2 INJECTION, SOLUTION INTRAVENOUS at 22:49

## 2023-11-08 RX ADMIN — SODIUM CHLORIDE 100 ML/HR: 0.9 INJECTION, SOLUTION INTRAVENOUS at 22:50

## 2023-11-08 RX ADMIN — OXYCODONE HYDROCHLORIDE 5 MG: 5 TABLET ORAL at 20:04

## 2023-11-08 RX ADMIN — HEPARIN SODIUM 5000 UNITS: 5000 INJECTION INTRAVENOUS; SUBCUTANEOUS at 16:05

## 2023-11-08 RX ADMIN — ANASTROZOLE 1 MG: 1 TABLET, COATED ORAL at 09:17

## 2023-11-08 RX ADMIN — OXYCODONE HYDROCHLORIDE 5 MG: 5 TABLET ORAL at 09:39

## 2023-11-08 RX ADMIN — ACETAMINOPHEN 325MG 975 MG: 325 TABLET ORAL at 13:08

## 2023-11-08 RX ADMIN — ACETAMINOPHEN 325MG 975 MG: 325 TABLET ORAL at 05:30

## 2023-11-08 RX ADMIN — LIDOCAINE PATCH 5% 2 PATCH: 700 PATCH TOPICAL at 09:17

## 2023-11-08 RX ADMIN — Medication 10 ML: at 11:23

## 2023-11-08 RX ADMIN — SODIUM CHLORIDE 100 ML/HR: 0.9 INJECTION, SOLUTION INTRAVENOUS at 09:16

## 2023-11-08 RX ADMIN — ACETAMINOPHEN 325MG 975 MG: 325 TABLET ORAL at 22:49

## 2023-11-08 RX ADMIN — HEPARIN SODIUM 5000 UNITS: 5000 INJECTION INTRAVENOUS; SUBCUTANEOUS at 05:30

## 2023-11-08 NOTE — CONSULTS
Consultation - Palliative & 22 Sharif Leo  52 y.o.  female  [place] [address] [address] [place] [address] Ysabel Castañeda*   MRN: [id number]  Encounter: [id number]    ASSESSMENT:    Patient Active Problem List   Diagnosis    Carcinoma of left breast, estrogen receptor positive     Use of anastrozole (Arimidex)    Limb swelling    Malignant neoplasm of overlapping sites of left female breast ([address])    Sepsis ([address])    Uterine leiomyoma    Elevated blood pressure reading in office without diagnosis of hypertension    Personal history of malignant neoplasm of breast    Overweight (BMI 25.0-29. 9)    Urinary frequency    Viral infection    Bladder wall thickening    Acute back pain    Recurrent malignant neoplasm of breast (HCC)    Right lower lobe lung mass    Pain from bone metastases (HCC)    Normocytic anemia    Pleural effusion on right    Dyspnea     Active problems addressed:  Recurrent breast cancer  Right lower lobe lung mass, suspected metastatic breast cancer versus primary  R pleural effusion  Bone metastases  Dyspnea  Sepsis  Cancer related pain   Drug induced constipation    Consult is for symptom control     PLAN:    1. Symptom management:  Tylenol 975mg PO q8H ATC  Oxycodone IR 5mg PO q4H prn severe pain  IV morphine 2mg q4H prn BT pain  May benefit to steroids for bone pain, but has sepsis/infection so shouldn't be on steroids right now. Can consider as an OP  Do not recommend NSAIDs for long term/continued use given toxicities to the GI, kidney, heart from prolonged use. Will cancel. Encourage oxycodone instead. I offered Home based palliative (having trouble with transportation) and they are interested in this. Referral placed to [facility] and have spoken to their liaison today. She will talk to them today. Consider RT to the back for pain control  D/w. Dr. Franko Quintanilla    Controlled Substance Review    [place] PDMP or [facility] NICK  reviewed: No red flags were identified; safe to proceed with prescription. Sharifa Guo  Written  ID  Drug  QTY Days  Prescriber  RX #  Dispenser  Refill  Daily Dose*  Pymt Type      10/28/2023 10/28/2023 1 Oxycodone Hcl (Ir) 5 Mg Tablet 20.00 4 Vi Viral [id number] [facility] (3502) 0 37.50 MME Medicaid [place]   10/10/2023 10/10/2023 1 Oxycodone Hcl (Ir) 5 Mg Tablet 10.00 5 Pa Cristian [id number] [facility] (2342) 0 15.00 MME [place]     I have reviewed the patient's controlled substance dispensing history in the Prescription Drug Monitoring Program in compliance with the [employer] regulations before prescribing any controlled substances. Code status: Level 1 - Full Code   Decisional apparatus:  Patient does have capacity to make medical decisions on my exam today. If such capacity is lost, patient's substitute decision maker would default to  by [place] Act 169. Advance Directive / Living Will / POLST:  none on file    We appreciate the opportunity to participate in this patient's care. We will continue to follow. Please do not hesitate to contact our on-call provider through our clinic answering service at [phone] should you have acute symptom control concerns. IDENTIFICATION:  Inpatient consult to Palliative Care  Consult performed by: Elie Langston MD  Consult ordered by: Taylor Ny MD        Reason for Consult / Principal Problem: symptom control    HISTORY OF PRESENT ILLNESS:    Eric Mcmillan is a 52 y.o. female with breast cancer (diagnosed in 2013) s/p chemo, mastectomy, RT. Unfortunately she appeared to have recurrence in 10/2023 when a large right lower lobe lung mass with regional lymphadenopathy and metastatic bone disease were found in CT imaging. She was scheduled for an OP IR biopsy for the lung mass, suspected metastatic breast cancer versus primary lung. She was admitted since 11/6 due to dyspnea. CT PE showed Stable size of right middle lobe mass extending to the lateral chest wall and mediastinum and crossing the major fissure into the right lower lobe; moderate R pleural effusion; and extensive bone lesions.  IR and pulmo consulted for bronch with biopsy at 3pm, 11/9. She is also found septic and receiving antibiotics for suspected post-obstructive PNA. Spoke to patient and her  at bedside using [address] [place] [address]  ID #[id number]. Introduced palliative care. She complains of lower back pain, more towards the R side. Identified that this is cancer related pain. Time spent discussing etiology of pain (related to cancer) and overall management of cancer-related pain. Pain pathway also discussed in a patient-friendly matter to better understand the multimodal approach to this. Cancer-directed therapies, ie chemotherapy, radiation therapy, immunotherapy, will make the most significant and long term improvement in cancer pain. Role of opioid and other adjuncts at this time is for lowering pain threshold to a more tolerable level so she can continue to remain functional despite continued presence of pain. This is not meant for complete resolution of pain. Discussed onset, peak, and duration of action of recommended opioid. Discussed short-term side effects of opioids - including constipation, respiratory depression, death -  and long-term side effects of opioids - including constipation, dependence, mood changes, sleep-disordered breathing, fractures. Discussed future weaning of opioids when able. She voiced understanding and wished to proceed. She hesitates on taking opioids but at this time, we have not a lot of non-opioid options that can help with her cancer pain (outside of cancer-directed cares). I explained why NSAIDs are not good to take long term due to its many toxicities to different organs and how opioids are the better option comparatively. She is encouraged to take this only as needed. I also offered/presented to them home based palliative with another institution and they are interested in this. Will refer.     Interview and exam limited by: none    Review of Systems   Constitutional: Positive for activity change and fatigue. Negative for appetite change. HENT:  Negative for trouble swallowing. Respiratory:  Negative for shortness of breath. Cardiovascular:  Negative for chest pain. Gastrointestinal:  Negative for abdominal pain, constipation, diarrhea, nausea and vomiting. Musculoskeletal:  Positive for back pain. Neurological:  Negative for weakness. Psychiatric/Behavioral:  Negative for sleep disturbance. The patient is not nervous/anxious. All other systems reviewed and are negative.       Past Medical History:   Diagnosis Date    Breast cancer ([address]) 07/2013    left-chemotherapy    Fibroma     History of chemotherapy     History of external beam radiation therapy      Past Surgical History:   Procedure Laterality Date    APPENDECTOMY      BREAST SURGERY  2014    Left breast 2/2 breast CA    HYSTERECTOMY  2015    Due to fibroma    IR BIOPSY BONE  10/9/2023    MASTECTOMY Left 2013    OOPHORECTOMY       Social History     Socioeconomic History    Marital status: /Civil Union     Spouse name: Not on file    Number of children: 3    Years of education: Not on file    Highest education level: 12th grade   Occupational History     Comment: unemployed   Tobacco Use    Smoking status: Never     Passive exposure: Never    Smokeless tobacco: Never   Vaping Use    Vaping Use: Never used   Substance and Sexual Activity    Alcohol use: Not Currently    Drug use: No    Sexual activity: Yes     Partners: Male     Birth control/protection: Surgical   Other Topics Concern    Not on file   Social History Narrative    Not on file     Social Determinants of Health     Financial Resource Strain: Low Risk  (6/19/2023)    Overall Financial Resource Strain (CARDIA)     Difficulty of Paying Living Expenses: Not very hard   Food Insecurity: No Food Insecurity (6/19/2023)    Hunger Vital Sign     Worried About Running Out of Food in the Last Year: Never true     [address] in the Last Year: Never true   Transportation Needs: No Transportation Needs (6/19/2023)    PRAPARE - Transportation     Lack of Transportation (Medical): No     Lack of Transportation (Non-Medical): No   Physical Activity: Not on file   Stress: Not on file   Social Connections: Not on file   Intimate Partner Violence: Not on file   Housing Stability: Not on file     Family History   Problem Relation Age of Onset    Lung cancer Mother     No Known Problems Father     No Known Problems Sister     No Known Problems Daughter     No Known Problems Daughter     Cancer Maternal Grandmother     No Known Problems Maternal Grandfather     No Known Problems Paternal Grandmother     No Known Problems Paternal Grandfather     Uterine cancer Maternal Aunt     No Known Problems Son     No Known Problems Paternal Aunt        MEDICATIONS / ALLERGIES:  all current active meds have been reviewed    Allergies   Allergen Reactions    Aspirin Rash     Other reaction(s): Palpitations       OBJECTIVE:  /84   Pulse (!) 109   Temp 98 °F (36.7 °C)   Resp 20   SpO2 99%   Physical Exam:  Constitutional: Appears well-developed and well-nourished. Appears only somewhat sickly looking. Comfortable. Pleasant. In no acute physical or emotional distress. Head: Normocephalic and atraumatic. Eyes: EOM are normal. No ocular discharge. No scleral icterus. Neck: No visible adenopathy or masses. Respiratory: Effort normal. No stridor. No respiratory distress. Gastrointestinal: No abdominal distension. Musculoskeletal: No edema. Pain with deep palpation in the mid-lower back, R side   Neurological: Alert, oriented and appropriately conversant. Able to stand up and walk with no apparent difficulties  Skin: Dry, no diaphoresis. Psychiatric: Displays a normal mood and affect. Behavior, judgment and thought content appear normal.     Lab Results: I have personally reviewed pertinent labs. Imaging Studies: I have personally reviewed pertinent reports. EKG, Pathology, and Other Studies: I have personally reviewed pertinent reports. Counseling / Coordination of Care:  Counseling / Coordination of Care  Total floor / unit time spent today 45 minutes. Greater than 50% of total time was spent with the patient and / or family counseling and / or coordination of care. A description of the counseling / coordination of care: provided medical updates, discussed palliative care, determined competency, determined goals of care, determined POA, determined social/family support, discussed plans of care, discussed symptom management, provided psychosocial support.      Jose A Quesada MD  [facility] [address] [place] and Supportive Care  [phone]

## 2023-11-08 NOTE — ASSESSMENT & PLAN NOTE
CT scan on admission revealed moderate right pleural effusion  Likely malignant effusion  Patient also presented with a fever:effusion also be secondary to pneumonia  IR thoracentesis scheduled today

## 2023-11-08 NOTE — ASSESSMENT & PLAN NOTE
Patient presented with evolving sepsis with tachycardia, and then developed a fever of 102  CT scan with right lung mass, in the setting of metastatic breast cancer, with possible lung metastasis versus second lung primary  Sepsis possibly secondary to postobstructive pneumonia  blood cultures x2: pending  Not producing any sputum  Strep pneumo/legionella : pending  UA: neg N/L  Pulmonary scheduled bronchoscopy  Continue broad-spectrum antibiotics:  procal neg x 2  Femoral central line placed in ER due to lack of IV access:  discontinued following day

## 2023-11-08 NOTE — PLAN OF CARE
Problem: Potential for Falls  Goal: Patient will remain free of falls  Description: INTERVENTIONS:  - Educate patient/family on patient safety including physical limitations  - Instruct patient to call for assistance with activity   - Consult OT/PT to assist with strengthening/mobility   - Keep Call bell within reach  - Keep bed low and locked with side rails adjusted as appropriate  - Keep care items and personal belongings within reach  - Initiate and maintain comfort rounds  - Make Fall Risk Sign visible to staff  - Offer Toileting every  Hours, in advance of need  - Initiate/Maintain alarm  - Obtain necessary fall risk management equipment:   - Apply yellow socks and bracelet for high fall risk patients  - Consider moving patient to room near nurses station  Outcome: Progressing     Problem: MOBILITY - ADULT  Goal: Maintain or return to baseline ADL function  Description: INTERVENTIONS:  -  Assess patient's ability to carry out ADLs; assess patient's baseline for ADL function and identify physical deficits which impact ability to perform ADLs (bathing, care of mouth/teeth, toileting, grooming, dressing, etc.)  - Assess/evaluate cause of self-care deficits   - Assess range of motion  - Assess patient's mobility; develop plan if impaired  - Assess patient's need for assistive devices and provide as appropriate  - Encourage maximum independence but intervene and supervise when necessary  - Involve family in performance of ADLs  - Assess for home care needs following discharge   - Consider OT consult to assist with ADL evaluation and planning for discharge  - Provide patient education as appropriate  Outcome: Progressing  Goal: Maintains/Returns to pre admission functional level  Description: INTERVENTIONS:  - Perform BMAT or MOVE assessment daily.   - Set and communicate daily mobility goal to care team and patient/family/caregiver.    - Collaborate with rehabilitation services on mobility goals if consulted  - Perform Range of Motion  times a day. - Reposition patient every  hours.   - Dangle patient  times a day  - Stand patient  times a day  - Ambulate patient  times a day  - Out of bed to chair  times a day   - Out of bed for meals  times a day  - Out of bed for toileting  - Record patient progress and toleration of activity level   Outcome: Progressing     Problem: PAIN - ADULT  Goal: Verbalizes/displays adequate comfort level or baseline comfort level  Description: Interventions:  - Encourage patient to monitor pain and request assistance  - Assess pain using appropriate pain scale  - Administer analgesics based on type and severity of pain and evaluate response  - Implement non-pharmacological measures as appropriate and evaluate response  - Consider cultural and social influences on pain and pain management  - Notify physician/advanced practitioner if interventions unsuccessful or patient reports new pain  Outcome: Progressing     Problem: INFECTION - ADULT  Goal: Absence or prevention of progression during hospitalization  Description: INTERVENTIONS:  - Assess and monitor for signs and symptoms of infection  - Monitor lab/diagnostic results  - Monitor all insertion sites, i.e. indwelling lines, tubes, and drains  - Monitor endotracheal if appropriate and nasal secretions for changes in amount and color  - [place] appropriate cooling/warming therapies per order  - Administer medications as ordered  - Instruct and encourage patient and family to use good hand hygiene technique  - Identify and instruct in appropriate isolation precautions for identified infection/condition  Outcome: Progressing     Problem: SAFETY ADULT  Goal: Patient will remain free of falls  Description: INTERVENTIONS:  - Educate patient/family on patient safety including physical limitations  - Instruct patient to call for assistance with activity   - Consult OT/PT to assist with strengthening/mobility   - Keep Call bell within reach  - Keep bed low and locked with side rails adjusted as appropriate  - Keep care items and personal belongings within reach  - Initiate and maintain comfort rounds  - Make Fall Risk Sign visible to staff  - Offer Toileting every  Hours, in advance of need  - Initiate/Maintain alarm  - Obtain necessary fall risk management equipment:   - Apply yellow socks and bracelet for high fall risk patients  - Consider moving patient to room near nurses station  Outcome: Progressing  Goal: Maintain or return to baseline ADL function  Description: INTERVENTIONS:  -  Assess patient's ability to carry out ADLs; assess patient's baseline for ADL function and identify physical deficits which impact ability to perform ADLs (bathing, care of mouth/teeth, toileting, grooming, dressing, etc.)  - Assess/evaluate cause of self-care deficits   - Assess range of motion  - Assess patient's mobility; develop plan if impaired  - Assess patient's need for assistive devices and provide as appropriate  - Encourage maximum independence but intervene and supervise when necessary  - Involve family in performance of ADLs  - Assess for home care needs following discharge   - Consider OT consult to assist with ADL evaluation and planning for discharge  - Provide patient education as appropriate  Outcome: Progressing  Goal: Maintains/Returns to pre admission functional level  Description: INTERVENTIONS:  - Perform BMAT or MOVE assessment daily.   - Set and communicate daily mobility goal to care team and patient/family/caregiver. - Collaborate with rehabilitation services on mobility goals if consulted  - Perform Range of Motion  times a day. - Reposition patient every  hours.   - Dangle patient  times a day  - Stand patient  times a day  - Ambulate patient  times a day  - Out of bed to chair  times a day   - Out of bed for meals  times a day  - Out of bed for toileting  - Record patient progress and toleration of activity level   Outcome: Progressing Problem: DISCHARGE PLANNING  Goal: Discharge to home or other facility with appropriate resources  Description: INTERVENTIONS:  - Identify barriers to discharge w/patient and caregiver  - Arrange for needed discharge resources and transportation as appropriate  - Identify discharge learning needs (meds, wound care, etc.)  - Arrange for interpretive services to assist at discharge as needed  - Refer to Case Management Department for coordinating discharge planning if the patient needs post-hospital services based on physician/advanced practitioner order or complex needs related to functional status, cognitive ability, or social support system  Outcome: Progressing     Problem: Knowledge Deficit  Goal: Patient/family/caregiver demonstrates understanding of disease process, treatment plan, medications, and discharge instructions  Description: Complete learning assessment and assess knowledge base.   Interventions:  - Provide teaching at level of understanding  - Provide teaching via preferred learning methods  Outcome: Progressing     Problem: CARDIOVASCULAR - ADULT  Goal: Maintains optimal cardiac output and hemodynamic stability  Description: INTERVENTIONS:  - Monitor I/O, vital signs and rhythm  - Monitor for S/S and trends of decreased cardiac output  - Administer and titrate ordered vasoactive medications to optimize hemodynamic stability  - Assess quality of pulses, skin color and temperature  - Assess for signs of decreased coronary artery perfusion  - Instruct patient to report change in severity of symptoms  Outcome: Progressing  Goal: Absence of cardiac dysrhythmias or at baseline rhythm  Description: INTERVENTIONS:  - Continuous cardiac monitoring, vital signs, obtain 12 lead EKG if ordered  - Administer antiarrhythmic and heart rate control medications as ordered  - Monitor electrolytes and administer replacement therapy as ordered  Outcome: Progressing     Problem: RESPIRATORY - ADULT  Goal: Achieves optimal ventilation and oxygenation  Description: INTERVENTIONS:  - Assess for changes in respiratory status  - Assess for changes in mentation and behavior  - Position to facilitate oxygenation and minimize respiratory effort  - Oxygen administered by appropriate delivery if ordered  - Initiate smoking cessation education as indicated  - Encourage broncho-pulmonary hygiene including cough, deep breathe, Incentive Spirometry  - Assess the need for suctioning and aspirate as needed  - Assess and instruct to report SOB or any respiratory difficulty  - Respiratory Therapy support as indicated  Outcome: Progressing     Problem: METABOLIC, FLUID AND ELECTROLYTES - ADULT  Goal: Electrolytes maintained within normal limits  Description: INTERVENTIONS:  - Monitor labs and assess patient for signs and symptoms of electrolyte imbalances  - Administer electrolyte replacement as ordered  - Monitor response to electrolyte replacements, including repeat lab results as appropriate  - Instruct patient on fluid and nutrition as appropriate  Outcome: Progressing     Problem: HEMATOLOGIC - ADULT  Goal: Maintains hematologic stability  Description: INTERVENTIONS  - Assess for signs and symptoms of bleeding or hemorrhage  - Monitor labs  - Administer supportive blood products/factors as ordered and appropriate  Outcome: Progressing

## 2023-11-08 NOTE — UTILIZATION REVIEW
Notification of Unplanned, Urgent, or   Emergency Inpatient Admission   [address] [place], [place], [facility], [address] [place] [address]  Tax ID: [id number]  NPI: [id number]  Place of Service: [address] [facility]  Admission Level of Care: Inpatient  Place of Service Code: 24     Attending Physician Information  Attending Name and NPI#: Cathleen Olivo, Leatha Gilliam [place]  Phone: [phone]     Admission Information  Inpatient Admission Date/Time: 11/6/23 11:18 PM  Discharge Date/Time: No discharge date for patient encounter. Admitting Diagnosis Code/Description:  Shortness of breath [R06.02]  Dyspnea [R06.00]  Pleural effusion [J90]  Tachycardia [R00.0]  Low serum sodium [R79.89]  Mass of right lung [R91.8]     Utilization Review Contact  Byron Jin Utilization   Phone: [phone]  Fax: [phone]  Email: [email or web address]  Contact for approvals/pending authorizations, clinical reviews, and discharge. Physician Advisory Services Contact  Medical Necessity Denial & Oyvb-va-Fdba Discussion  Phone: [phone]  Fax: [phone]  Email: [email or web address]     DISCHARGE SUPPORT TEAM:  For Patients Discharge Needs & Updates  Phone: [phone] opt. 2 Fax: [phone]  Email: [email or web address]

## 2023-11-08 NOTE — DISCHARGE INSTRUCTIONS
Thoracentesis   WHAT YOU NEED TO KNOW:   A thoracentesis is a procedure to remove extra fluid or air from between your lungs and your inner chest wall. Air or fluid buildup may make it hard for you to breathe. A thoracentesis allows your lungs to expand fully so you can breathe more easily. DISCHARGE INSTRUCTIONS:     Small amount of shoulder pain and bloody sputum is normal after a Thoracentesis. Rest:  Rest when you feel it is needed. Slowly start to do more each day. Return to your daily activities as directed. Resume your normal diet. Small sips of flat soda will help mild nausea. Do not smoke: If you smoke, it is never too late to quit. Ask for information about how to stop smoking if you need help. Contact Interventional Radiology at [phone] Bert PATIENTS: Contact Interventional Radiology at [phone]) Franchesca Gonzalez PATIENTS: Contact Interventional Radiology at [phone]) if:   You have a fever. Your puncture site is red, warm, swollen, or draining pus. You have questions or concerns about your procedure, medicine, or care. Seek care immediately or call 911 if:   Severe chest pain with inspiration and shortness of breath    Large amounts of blood in your sputum    Follow up with your healthcare provider as directed.

## 2023-11-08 NOTE — PROGRESS NOTES
[facility]  Progress Note  Name: Kathleen Goncalves  MRN: [id number]  Unit/Bed#: [facility] 2 [address] [facility] [place] 203-01 I Date of Admission: 11/6/2023   Date of Service: 11/8/2023 I Hospital Day: 2    Assessment/Plan   * Dyspnea  Assessment & Plan  Patient is a 52-year-old female who presented to the ER for evaluation of dyspnea. Patient was recently diagnosed with recurrence of breast cancer, with "diffuse bone mets, as well as right lung mass either metastatic versus second primary. CT scan on admission was negative for PE. Stable size of right lung mass in the right middle lobe, extending to the lateral chest wall, mediastinum, and right lower lobe.     Patient also had moderate pleural effusion  had evolving sepsis, on admission with fever, and tachycardia  Patient dyspnea is likely multifactorial, secondary to her lung mass, pleural effusion, as well as possible postobstructive pneumonia  Continue evaluation under the guidance of the pulmonology team  Thoracentesis scheduled today  Continue antibiotics  Bronchoscopy in am    Pleural effusion on right  Assessment & Plan  CT scan on admission revealed moderate right pleural effusion  Likely malignant effusion  Patient also presented with a fever:effusion also be secondary to pneumonia  IR thoracentesis scheduled today    Right lower lobe lung mass  Assessment & Plan  follows with oncology for probable recurrent breast cancer, with diffuse bone mets, and right lung mass  Right lung mass positive on PET scan: Possible metastatic versus lung primary  CT scan on admission revealed, "stable size of right middle lobe mass extending to the lateral chest wall and mediastinum and crossing the major fissure into the right lower lobe"  Outpatient orders for IR lung biopsy: Discussed with IR, they recommend pulmonary consultation for bronchoscopy with biopsy rather than percutaneous biopsy  Thoracentesis performed by IR today  Bronch with biopsy tomorrowt    Sepsis [facility])  Assessment & Plan  Patient presented with evolving sepsis with tachycardia, and then developed a fever of 102  CT scan with right lung mass, in the setting of metastatic breast cancer, with possible lung metastasis versus second lung primary  Sepsis possibly secondary to postobstructive pneumonia  blood cultures x2: pending  Not producing any sputum  Strep pneumo/legionella : pending  UA: neg N/L  Pulmonary scheduled bronchoscopy  Continue broad-spectrum antibiotics:  procal neg x 2  Femoral central line placed in ER due to lack of IV access:  discontinued following day    Pain from bone metastases [facility])  Assessment & Plan  Patient was diagnosed with widely metastatic disease to her bones, secondary to primary recurrent breast cancer  CT scan had diffuse thoracolumbar destructive metastatic lesions, compression fractures at T10, and T7. Diffuse metastatic disease throughout the sacrum and iliac  PET CT scan 10/26 revealed multifocal hypermetabolic/metastatic disease involving the chest and fairly diffuse disease involving the skeletal system  MRI 10/9 revealed widespread thoracic, cervical, and lumbar spine metastasis. Wedge compression fracture at T10. T7 superior endplate pathologic fracture.   Possible minimal epidural tumor at T8  Palliative Care consultation appreciated  Per Dr. Shyanne Mccall:  if pt does not have focal point of back pain, no current indication for XRT  Pt with small epidural lesion:  no cord compression or neuro compromise:  cont to monitor  Pt notes pain controlled on current regimen, especially scheduled tylenol and lidoderm:  will send rx to pharmacy    Malignant neoplasm of overlapping sites of left female breast [facility])  Assessment & Plan  Patient follows with Dr. Shyanne Mccall from oncology  At most recent office visit 10/23/2023 she was diagnosed with metastatic cancer, presumed to be recurrent breast primary, with mets to the bones and lung  Biopsy from right iliac bone consistent with ER positive lobular carcinoma: Similar to her previous history diagnosed in 2013  started on ribociclib 600 mg daily 3 weeks on 1 week off along with Faslodex: Most recent infusion 10/30  Continued on arimidex  Outpatient oncology plan was to pursue biopsy of lung mass to rule out second primary  Current imaging this admission reveals diffuse bone mets, right lung mass, and increasing size of right lower esophageal wall: Likely metastatic involvement of breast primary: Also undergoing work-up for possible second primary lung malignancy  Pt also met with palliative care today. Due to transportation issues she has not been able to get to office visit  pt concerned about possible side effects from ribociclib and requests to place on hold:  D/w Dr. Giuseppe Coleman- ok to hold temporarily                   Family: Patient, and her  Issac Pagan at the bedside. Reviewed test results, and treatment plan. Reviewed discussion with Dr. Giuseppe Coleman, and the scheduled bronchoscopy tomorrow. Answered all questions. VTE Pharmacologic Prophylaxis: Heparin  VTE Mechanical Prophylaxis: sequential compression device    Discussed with patient's nurse and   [place] with palliative care: Dr. Jace Muller, make referral for outpatient home palliative care    Certification Statement: The patient will continue to require additional inpatient hospital stay due to need for further acute intervention for bronchoscopy    Status: inpatient     ===================================================================    Subjective:  Patient is examined and interviewed by me in Georgian at the bedside. Patient notes her pain has improved. She notes is predominantly in the lower back. She notes the scheduled Tylenol, as well as the Lidoderm patch have provided her pain relief. She notes since the left femoral central line was removed she is ambulating without any difficulty. She denies any leg pain. No numbness or decree sensation.   No weakness. Patient denies any pain anywhere else. Denies any chest pain. Notes her shortness of breath has improved. Denies any cough. Denies any abdominal pain, nausea, vomiting, diarrhea or constipation. Physical Exam:   Temp:  [98 °F (36.7 °C)-100 °F (37.8 °C)] 98.6 °F (37 °C)  HR:  [101-124] 114  Resp:  [14-22] 18  BP: (107-144)/(60-92) 129/82    Gen:  Pleasant, non-tachypnic, non-dyspnic. Conversant.  at the bedside  Heart: regular rate and rhythm, S1S2 present, no murmur, rub or gallop  Lungs: No wheezing, crackles, or rhonchi. No accessory muscle use or respiratory distress. Abd: soft, non-tender, non-distended. NABS, no guarding, rebound or peritoneal signs. Extremities: no clubbing, cyanosis or edema. 2+pedal pulses bilaterally. Full range of motion  Neuro: awake, alert and oriented. Cranial nerves 2-12 intact. Strength and sensation grossly intact. Skin: warm and dry: no petechiae, purpura and rash. LABS:   Results from last 7 days   Lab Units 11/08/23  0531 11/07/23 0444 11/07/23 0054 11/06/23 1944   WBC Thousand/uL 9.30 9.75  --  9.93   HEMOGLOBIN g/dL 8.5* 9.1*  --  9.6*   HEMATOCRIT % 27.4* 29.7*  --  30.9*   PLATELETS Thousands/uL 434* 484* 472* 343     Results from last 7 days   Lab Units 11/08/23  0531 11/07/23 0444 11/06/23 1944   POTASSIUM mmol/L 4.0 3.9 4.2   CHLORIDE mmol/L 103 99 98   CO2 mmol/L 25 24 23   BUN mg/dL 7 6 10   CREATININE mg/dL 0.64 0.58* 0.66   CALCIUM mg/dL 8.2* 8.4 8.9       Hospital Data:    11/6 CTA chest PE study  No evidence of pulmonary embolism  Stable size of right middle lobe mass extending to the lateral chest wall and mediastinum and crossing the major fissure into the right lower lobe. Extensive lytic bone metastasis again seen.   Moderate right pleural effusion  Increased size of right lower esophageal wall    Cardiology  11/8: Pending Legionella/strep pneumoniae antigen  11/8: Pending thoracentesis fluid  11/7: Blood culture: Pending x2  11/7: Pending culture and cath tip        ---------------------------------------------------------------------------------------------------------------  This note has been constructed using a voice recognition system.

## 2023-11-08 NOTE — UTILIZATION REVIEW
Initial Clinical Review    Admission: Date/Time/Statement:   Admission Orders (From admission, onward)       Ordered        11/06/23 2318  INPATIENT ADMISSION  Once                          Orders Placed This Encounter   Procedures    INPATIENT ADMISSION     Standing Status:   Standing     Number of Occurrences:   1     Order Specific Question:   Level of Care     Answer:   Med Surg [16]     Order Specific Question:   Estimated length of stay     Answer:   More than 2 Midnights     Order Specific Question:   Certification     Answer:   I certify that inpatient services are medically necessary for this patient for a duration of greater than two midnights. See H&P and MD Progress Notes for additional information about the patient's course of treatment. ED Arrival Information       Expected   -    Arrival   11/6/2023 18:04    Acuity   Emergent              Means of arrival   Walk-In    Escorted by   Family Member    Service   Hospitalist    Admission type   Emergency              Arrival complaint   sob             Chief Complaint   Patient presents with    Shortness of Breath     SOB and back pain x2 days. Started taking a new medication for breast cancer, and ever since has been having SOB and neck pain. Denies fevers. Advised by oncology to come to ED for further evaluation. Initial Presentation: 52 y.o. female presents to the ED from home at direction of Oncology office with c/o worsening dyspnea. SOB, mild chest discomfort, sore throat, chills, no fever since taking new chemo pill. Had PE study pre-ED was negative for PE but + for new R pleural effusion. Pt wanting to leave AMA but agrees to stay. PMH: recurrence of breast CA with bone mets, RUL mass. In the ED she is tachycardic and femoral access required until PICC can be inserted. Labs - low NA, abnormal UA. Imaging - no PE, stable size RML mass, extensive lytic bone mets, mod R pleural effusion, increased size R lower esophageal wall. Treated with IV fluids, IV Dilaudid, sq Heparin. On exam tachycardia, decreased RLL breath sounds, anxiety. She is admitted to INPATIENT status with Dyspnea - likely multifactorial - antibiotics. R pleural effusion - probable thoracentesis, r/o PNA. RLL mass - pulmonar consult, IR consult for poss bronchoscopy. Sepsis - blood, sputum cultures, will d/c femoral line when PICC placed. Pain from bone mets - poss minimal epidural tumor at T8,  palliative care consult, analgesia. Malignant neoplasm of overlapping sites of left female breast - on chemo, OP oncology plan to get lung biopsy to r/o 2nd primary site. 11/7 IR Consult -  recent bone marrow biopsy confirming diagnosis was admitted with tachycardia. Imaging - small to moderate pleural effusion. There is also a large lung mass extending into the hilum which was positive on a recent outpatient PET/CT scan which was also reviewed. At this time would proceed with the thoracentesis as this potential will improve her clinical symptoms. The lung biopsy should be delayed as this is not an acute issue. 11/7  Pulmonary Consult - acute pulm insufficiency, pulm toilet. Moderate R pleural effusion - poss malignancy, IR thoracentesis - waiting pleural fluid studies. R lung mass - could have OP EBUS. Palliative care following. Date: 11/8   Day 2:   RLL lung mass - bronch 11/9, NPO p MN. IR thoracentesis by IR - completed with 220 mL clear yellow pleural fluid removed. No new events, still with pain, on room air. Remains tachycardic, mildly fatigued, diminished breath sounds R base. No new events. Was febrile yesterday, remains tachycardic.       ED Triage Vitals   Temperature Pulse Respirations Blood Pressure SpO2   11/06/23 1812 11/06/23 1812 11/06/23 1812 11/06/23 1812 11/06/23 1812   98.2 °F (36.8 °C) (!) 122 18 139/69 100 %      Temp Source Heart Rate Source Patient Position - Orthostatic VS BP Location FiO2 (%)   11/07/23 0355 11/06/23 2044 11/06/23 2044 11/06/23 2044 --   Oral Monitor;Right Sitting Right arm       Pain Score       11/06/23 2236       10 - Worst Possible Pain          Wt Readings from Last 1 Encounters:   10/23/23 63.5 kg (140 lb)     Additional Vital Signs:   11/08/23 08:15:02 98 °F (36.7 °C) 109 Abnormal  20 144/84 104 99 % -- --   11/07/23 23:20:24 -- 101 20 107/60 76 98 % -- --   11/07/23 19:21:39 98.4 °F (36.9 °C) 117 Abnormal  22 134/85 101 97 % -- --   11/07/23 15:15:32 100 °F (37.8 °C) 124 Abnormal  20 139/92 108 98 % -- --   11/07/23 11:42:50 99.9 °F (37.7 °C) -- 21 114/75 88 -- -- --   11/07/23 10:25:25 102.4 °F (39.1 °C) Abnormal  131 Abnormal  -- -- -- 97 % -- --   11/07/23 0900 -- -- -- -- -- -- None (Room air) --   11/07/23 07:52:40 98.6 °F (37 °C) 114 Abnormal  16 122/79 93 98 % -- --   11/07/23 04:48:34 98.9 °F (37.2 °C) 111 Abnormal  18 110/75 87 98 % -- --   11/07/23 04:47:26 98.9 °F (37.2 °C) 111 Abnormal  -- 110/75 87 99 % -- --   11/07/23 02:50:42 100.6 °F (38.1 °C) Abnormal  125 Abnormal  -- -- -- 98 % -- --   11/07/23 02:29:10 101.2 °F (38.4 °C) Abnormal  124 Abnormal  -- -- -- 97 % -- --   11/07/23 0215 -- -- -- -- -- -- None (Room air) --   11/07/23 01:50:34 100.5 °F (38.1 °C) 129 Abnormal  16 135/79 98 98 % -- --   11/07/23 0100 -- 118 Abnormal  13 127/72 94 100 % None (Room air) Lying   11/07/23 0030 -- 126 Abnormal  26 Abnormal  137/72 97 100 % -- Lying   11/07/23 0000 -- 126 Abnormal  14 139/76 99 100 % None (Room air) Lying   11/06/23 2330 -- 120 Abnormal  17 140/85 107 99 % None (Room air) Lying   11/06/23 2300 -- 119 Abnormal  15 136/83 104 100 % None (Room air) Lying   11/06/23 2215 -- 120 Abnormal  20 122/70 89 99 % None (Room air) Lying   11/06/23 2044 -- 116 Abnormal  -- 130/73 96 100 % None (Room air) Sitting       Pertinent Labs/Diagnostic Test Results:     11/7 ECG - Sinus tachycardia  Possible Left atrial enlargement  Borderline ECG    CTA ED chest PE study   Final Result by Maddy Thomas MD (11/06 2259)      No evidence of pulmonary embolism      Stable size of right middle lobe mass extending to the lateral chest wall and mediastinum and crossing the major fissure into the right lower lobe. Extensive lytic bone metastasis again seen. Moderate right pleural effusion      Increased size of right lower esophageal wall      Workstation performed: [id number]         IR IN-Patient Thoracentesis      220 mL clear yellow pleural fluid removed. successful thoracentesis.            Results from last 7 days   Lab Units 11/08/23 0531 11/07/23 0444 11/07/23 0054 11/06/23 1944   WBC Thousand/uL 9.30 9.75  --  9.93   HEMOGLOBIN g/dL 8.5* 9.1*  --  9.6*   HEMATOCRIT % 27.4* 29.7*  --  30.9*   PLATELETS Thousands/uL 434* 484* 472* 343   NEUTROS ABS Thousands/µL 6.99 7.22  --  6.73         Results from last 7 days   Lab Units 11/08/23 0531 11/07/23 0444 11/06/23 1944   SODIUM mmol/L 134* 132* 130*   POTASSIUM mmol/L 4.0 3.9 4.2   CHLORIDE mmol/L 103 99 98   CO2 mmol/L 25 24 23   ANION GAP mmol/L 6 9 9   BUN mg/dL 7 6 10   CREATININE mg/dL 0.64 0.58* 0.66   EGFR ml/min/1.73sq m 100 104 99   CALCIUM mg/dL 8.2* 8.4 8.9     Results from last 7 days   Lab Units 11/07/23 0444 11/06/23 1944   AST U/L  --  54*   ALT U/L  --  32   ALK PHOS U/L  --  126*   TOTAL PROTEIN g/dL 7.3 7.8   ALBUMIN g/dL  --  3.6   TOTAL BILIRUBIN mg/dL  --  0.47         Results from last 7 days   Lab Units 11/08/23 0531 11/07/23 0444 11/06/23 1944   GLUCOSE RANDOM mg/dL 135 122 154*     Results from last 7 days   Lab Units 11/07/23 0054 11/06/23 2204 11/06/23 1944   HS TNI 0HR ng/L  --   --  4   HS TNI 2HR ng/L  --  4  --    HSTNI D2 ng/L  --  0  --    HS TNI 4HR ng/L 5  --   --    HSTNI D4 ng/L 1  --   --          Results from last 7 days   Lab Units 11/06/23  2204   PROTIME seconds 14.8*   INR  1.14   PTT seconds 35         Results from last 7 days   Lab Units 11/07/23  0444 11/07/23  0054   PROCALCITONIN ng/ml 0.11 0.10 Results from last 7 days   Lab Units 11/06/23  1944   BNP pg/mL 33     Results from last 7 days   Lab Units 11/07/23  1148   CLARITY UA  Clear   COLOR UA  Yellow   SPEC GRAV UA  1.021   PH UA  6.5   GLUCOSE UA mg/dl Negative   KETONES UA mg/dl 10 (1+)*   BLOOD UA  Trace*   PROTEIN UA mg/dl Trace*   NITRITE UA  Negative   BILIRUBIN UA  Negative   UROBILINOGEN UA (BE) mg/dl <2.0   LEUKOCYTES UA  Negative   WBC UA /hpf 2-4*   RBC UA /hpf 2-4*   BACTERIA UA /hpf None Seen   EPITHELIAL CELLS WET PREP /hpf Occasional   MUCUS THREADS  Occasional*     Results from last 7 days   Lab Units 11/07/23  1137 11/07/23  0911   BLOOD CULTURE  Received in Microbiology Lab. Culture in Progress. Received in Microbiology Lab. Culture in Progress.      ED Treatment:   Medication Administration from 11/06/2023 1804 to 11/07/2023 0129         Date/Time Order Dose Route Action     11/06/2023 2201 EST sodium chloride 0.9 % bolus 500 mL 500 mL Intravenous New Bag     11/06/2023 2208 EST lidocaine (PF) (XYLOCAINE-MPF) 1 % injection **ADS Override Pull** 50 mg  Given by Other     11/06/2023 2236 EST HYDROmorphone (DILAUDID) injection 0.5 mg 0.5 mg Intravenous Given     11/06/2023 2227 EST iohexol (OMNIPAQUE) 350 MG/ML injection (MULTI-DOSE) 85 mL 85 mL Intravenous Given     11/07/2023 0055 EST heparin (porcine) subcutaneous injection 5,000 Units 5,000 Units Subcutaneous Given          Past Medical History:   Diagnosis Date    Breast cancer ([address]) 07/2013    left-chemotherapy    Fibroma     History of chemotherapy     History of external beam radiation therapy      Present on Admission:   Malignant neoplasm of overlapping sites of left female breast ([address])   Right lower lobe lung mass   Sepsis ([address])   Pain from bone metastases (HCC)      Admitting Diagnosis: Shortness of breath [R06.02]  Dyspnea [R06.00]  Pleural effusion [J90]  Tachycardia [R00.0]  Low serum sodium [R79.89]  Mass of right lung [R91.8]  Age/Sex: 52 y.o. female  Admission Orders:  Scheduled Medications:  acetaminophen, 975 mg, Oral, Q8H [address]  anastrozole, 1 mg, Oral, Daily  cefepime, 2,000 mg, Intravenous, Q12H  heparin (porcine), 5,000 Units, Subcutaneous, Q8H ALEXANDRE  lidocaine, 2 patch, Topical, Daily  Ribociclib Succ (600 MG Dose), 600 mg, Oral, Daily      Continuous IV Infusions:  sodium chloride, 100 mL/hr, Intravenous, Continuous      PRN Meds:  ibuprofen, 600 mg, Oral, Q6H PRN - x 2 11/7  morphine injection, 2 mg, Intravenous, Q4H PRN  ondansetron, 4 mg, Intravenous, Q6H PRN  oxyCODONE, 5 mg, Oral, Q4H PRN -x 2 11/7  temazepam, 7.5 mg, Oral, HS PRN    Urine antigens, thoracentesis and pleural fluid cultures,   VS q 4 hr   OOB  INPATIENT CONSULT TO IR  IP CONSULT TO PULMONOLOGY  IP CONSULT TO PICC TEAM  IP CONSULT TO PALLIATIVE CARE    Network Utilization Review Department  ATTENTION: Please call with any questions or concerns to [phone] and carefully listen to the prompts so that you are directed to the right person. All voicemails are confidential.   For Discharge needs, contact Care Management DC Support Team at [phone] opt. 2  Send all requests for admission clinical reviews, approved or denied determinations and any other requests to dedicated fax number below belonging to the campus where the patient is receiving treatment.  List of dedicated fax numbers for the Facilities:  [place] DENIALS (Administrative/Medical Necessity) [phone]   DISCHARGE SUPPORT TEAM (NETWORK) [id number] [address] Collins (Maternity/NICU/Pediatrics) [phone]   [address] [place] [address] Drive [address] [facility] [address] [facility] [phone]   [address] [facility] [address] [facility] [address] [place] [address] - Lydia [phone]   [address] [address] [facility] [phone]

## 2023-11-08 NOTE — ASSESSMENT & PLAN NOTE
Patient is a 47-year-old female who presented to the ER for evaluation of dyspnea. Patient was recently diagnosed with recurrence of breast cancer, with "diffuse bone mets, as well as right lung mass either metastatic versus second primary. CT scan on admission was negative for PE. Stable size of right lung mass in the right middle lobe, extending to the lateral chest wall, mediastinum, and right lower lobe.     Patient also had moderate pleural effusion  had evolving sepsis, on admission with fever, and tachycardia  Patient dyspnea is likely multifactorial, secondary to her lung mass, pleural effusion, as well as possible postobstructive pneumonia  Continue evaluation under the guidance of the pulmonology team  Thoracentesis scheduled today  Continue antibiotics  Bronchoscopy in am

## 2023-11-08 NOTE — PLAN OF CARE
Problem: Potential for Falls  Goal: Patient will remain free of falls  Description: INTERVENTIONS:  - Educate patient/family on patient safety including physical limitations  - Instruct patient to call for assistance with activity   - Consult OT/PT to assist with strengthening/mobility   - Keep Call bell within reach  - Keep bed low and locked with side rails adjusted as appropriate  - Keep care items and personal belongings within reach  - Initiate and maintain comfort rounds  - Make Fall Risk Sign visible to staff  - Offer Toileting every 2 Hours, in advance of need  - Initiate/Maintain alarm  - Obtain necessary fall risk management equipment  - Apply yellow socks and bracelet for high fall risk patients  - Consider moving patient to room near nurses station  Outcome: Progressing     Problem: PAIN - ADULT  Goal: Verbalizes/displays adequate comfort level or baseline comfort level  Description: Interventions:  - Encourage patient to monitor pain and request assistance  - Assess pain using appropriate pain scale  - Administer analgesics based on type and severity of pain and evaluate response  - Implement non-pharmacological measures as appropriate and evaluate response  - Consider cultural and social influences on pain and pain management  - Notify physician/advanced practitioner if interventions unsuccessful or patient reports new pain  Outcome: Progressing     Problem: INFECTION - ADULT  Goal: Absence or prevention of progression during hospitalization  Description: INTERVENTIONS:  - Assess and monitor for signs and symptoms of infection  - Monitor lab/diagnostic results  - Monitor all insertion sites, i.e. indwelling lines, tubes, and drains  - Monitor endotracheal if appropriate and nasal secretions for changes in amount and color  - [place] appropriate cooling/warming therapies per order  - Administer medications as ordered  - Instruct and encourage patient and family to use good hand hygiene technique  - Identify and instruct in appropriate isolation precautions for identified infection/condition  Outcome: Progressing     Problem: METABOLIC, FLUID AND ELECTROLYTES - ADULT  Goal: Electrolytes maintained within normal limits  Description: INTERVENTIONS:  - Monitor labs and assess patient for signs and symptoms of electrolyte imbalances  - Administer electrolyte replacement as ordered  - Monitor response to electrolyte replacements, including repeat lab results as appropriate  - Instruct patient on fluid and nutrition as appropriate  Outcome: Progressing

## 2023-11-08 NOTE — BRIEF OP NOTE (RAD/CATH)
INTERVENTIONAL RADIOLOGY PROCEDURE NOTE    Date: 11/8/2023    Procedure: Right thoracentesis  Procedure Summary       Date:  Room / Location:     Anesthesia Start:  Anesthesia Stop:     Procedure:  Diagnosis:     Scheduled Providers:  Responsible Provider:     Anesthesia Type: Not recorded ASA Status: Not recorded            Preoperative diagnosis:   1. Dyspnea    2. Tachycardia    3. Low serum sodium    4. Pleural effusion    5. Mass of right lung    6. Right lower lobe lung mass    7. Pain from bone metastases (HCC)         Postoperative diagnosis: Same. Surgeon: Viki Kowalski MD     Assistant: None. No qualified resident was available. Blood loss: None    Specimens: 220 mL clear yellow pleural fluid removed. Sample sent to lab. Findings: Successful right thoracentesis. Complications: None immediate.     Anesthesia: local

## 2023-11-08 NOTE — PROGRESS NOTES
Progress Note - Pulmonary   Gabriel Alpine 52 y.o. female MRN: [id number]  Unit/Bed#: [address] [address] -01 Encounter: [id number]    Assessment/Plan:  Right lower lobe lung mass  Tentatively scheduled for bronchoscopy at 3 PM tomorrow  N.p.o. after midnight  Pleural effusion  Awaiting IR thoracentesis. Fairly small but sampling would be beneficial to assess for cell type  Metastatic breast cancer  Followed by Dr. Breanna Lunsford      ----------------------------------------------------------------------------------------------------------------------    HPI/Interval History:   No new events. Still has some pain. Remains on room air. Vitals:   Blood pressure 144/84, pulse (!) 109, temperature 98 °F (36.7 °C), resp. rate 20, SpO2 99 %. ,There is no height or weight on file to calculate BMI. SpO2: 99 %  SpO2 Activity: At Rest  O2 Device: None (Room air)    Physical Exam:   Gen:  Comfortable on room air. Mildly fatigued appearing no conversational dyspnea  HEENT:  Conjugate gaze. sclerae anicteric. Oropharynx moist  Neck: Trachea is midline. No JVD. No adenopathy  Chest: Symmetric chest wall excursion diminished at the right base  Cardiac: Tachycardic, regular.  no murmur  Abdomen:  benign  Extremities: No edema  Neuro:  Normal speech and mentation      Labs:    CBC:  Results from last 7 days   Lab Units 11/08/23  0531   WBC Thousand/uL 9.30   HEMOGLOBIN g/dL 8.5*   HEMATOCRIT % 27.4*   PLATELETS Thousands/uL 434*         BMP:   Lab Results   Component Value Date    SODIUM 134 (L) 11/08/2023    K 4.0 11/08/2023    CO2 25 11/08/2023     11/08/2023    BUN 7 11/08/2023    CREATININE 0.64 11/08/2023    GLUCOSE 81 06/22/2018    CALCIUM 8.2 (L) 11/08/2023           Imaging studies:  No new studies      Gayatri Donahue MD

## 2023-11-08 NOTE — ASSESSMENT & PLAN NOTE
Patient follows with Dr. Paige France from oncology  At most recent office visit 10/23/2023 she was diagnosed with metastatic cancer, presumed to be recurrent breast primary, with mets to the bones and lung  Biopsy from right iliac bone consistent with ER positive lobular carcinoma: Similar to her previous history diagnosed in 2013  started on ribociclib 600 mg daily 3 weeks on 1 week off along with Faslodex: Most recent infusion 10/30  Continued on arimidex  Outpatient oncology plan was to pursue biopsy of lung mass to rule out second primary  Current imaging this admission reveals diffuse bone mets, right lung mass, and increasing size of right lower esophageal wall: Likely metastatic involvement of breast primary: Also undergoing work-up for possible second primary lung malignancy  Pt also met with palliative care today.   Due to transportation issues she has not been able to get to office visit  pt concerned about possible side effects from ribociclib and requests to place on hold:  D/w Dr. Paige France- ok to hold temporarily

## 2023-11-08 NOTE — ASSESSMENT & PLAN NOTE
follows with oncology for probable recurrent breast cancer, with diffuse bone mets, and right lung mass  Right lung mass positive on PET scan: Possible metastatic versus lung primary  CT scan on admission revealed, "stable size of right middle lobe mass extending to the lateral chest wall and mediastinum and crossing the major fissure into the right lower lobe"  Outpatient orders for IR lung biopsy: Discussed with IR, they recommend pulmonary consultation for bronchoscopy with biopsy rather than percutaneous biopsy  Thoracentesis performed by IR today  Bronch with biopsy tomorrowt Detail Level: Detailed

## 2023-11-09 ENCOUNTER — APPOINTMENT (INPATIENT)
Dept: GASTROENTEROLOGY | Facility: HOSPITAL | Age: 53
DRG: 720 | End: 2023-11-09
Attending: INTERNAL MEDICINE
Payer: COMMERCIAL

## 2023-11-09 LAB
ALBUMIN SERPL BCP-MCNC: 3.2 G/DL (ref 3.5–5)
ALP SERPL-CCNC: 120 U/L (ref 34–104)
ALT SERPL W P-5'-P-CCNC: 26 U/L (ref 7–52)
ANION GAP SERPL CALCULATED.3IONS-SCNC: 8 MMOL/L
AST SERPL W P-5'-P-CCNC: 25 U/L (ref 13–39)
BASOPHILS # BLD AUTO: 0.03 THOUSANDS/ÂΜL (ref 0–0.1)
BASOPHILS NFR BLD AUTO: 0 % (ref 0–1)
BILIRUB SERPL-MCNC: 0.43 MG/DL (ref 0.2–1)
BUN SERPL-MCNC: 8 MG/DL (ref 5–25)
CALCIUM ALBUM COR SERPL-MCNC: 8.9 MG/DL (ref 8.3–10.1)
CALCIUM SERPL-MCNC: 8.3 MG/DL (ref 8.4–10.2)
CHLORIDE SERPL-SCNC: 103 MMOL/L (ref 96–108)
CO2 SERPL-SCNC: 24 MMOL/L (ref 21–32)
CREAT SERPL-MCNC: 0.65 MG/DL (ref 0.6–1.3)
EOSINOPHIL # BLD AUTO: 0 THOUSAND/ÂΜL (ref 0–0.61)
EOSINOPHIL NFR BLD AUTO: 0 % (ref 0–6)
ERYTHROCYTE [DISTWIDTH] IN BLOOD BY AUTOMATED COUNT: 17.2 % (ref 11.6–15.1)
GFR SERPL CREATININE-BSD FRML MDRD: 100 ML/MIN/1.73SQ M
GLUCOSE SERPL-MCNC: 86 MG/DL (ref 65–140)
HCT VFR BLD AUTO: 27.6 % (ref 34.8–46.1)
HGB BLD-MCNC: 8.5 G/DL (ref 11.5–15.4)
IMM GRANULOCYTES # BLD AUTO: 0.07 THOUSAND/UL (ref 0–0.2)
IMM GRANULOCYTES NFR BLD AUTO: 1 % (ref 0–2)
LYMPHOCYTES # BLD AUTO: 2.86 THOUSANDS/ÂΜL (ref 0.6–4.47)
LYMPHOCYTES NFR BLD AUTO: 34 % (ref 14–44)
MCH RBC QN AUTO: 25.6 PG (ref 26.8–34.3)
MCHC RBC AUTO-ENTMCNC: 30.8 G/DL (ref 31.4–37.4)
MCV RBC AUTO: 83 FL (ref 82–98)
MONOCYTES # BLD AUTO: 0.53 THOUSAND/ÂΜL (ref 0.17–1.22)
MONOCYTES NFR BLD AUTO: 6 % (ref 4–12)
NEUTROPHILS # BLD AUTO: 4.95 THOUSANDS/ÂΜL (ref 1.85–7.62)
NEUTS SEG NFR BLD AUTO: 59 % (ref 43–75)
NRBC BLD AUTO-RTO: 0 /100 WBCS
PLATELET # BLD AUTO: 450 THOUSANDS/UL (ref 149–390)
PMV BLD AUTO: 8.6 FL (ref 8.9–12.7)
POTASSIUM SERPL-SCNC: 3.9 MMOL/L (ref 3.5–5.3)
PROCALCITONIN SERPL-MCNC: 0.37 NG/ML
PROT SERPL-MCNC: 7 G/DL (ref 6.4–8.4)
RBC # BLD AUTO: 3.32 MILLION/UL (ref 3.81–5.12)
SODIUM SERPL-SCNC: 135 MMOL/L (ref 135–147)
WBC # BLD AUTO: 8.44 THOUSAND/UL (ref 4.31–10.16)

## 2023-11-09 PROCEDURE — 85025 COMPLETE CBC W/AUTO DIFF WBC: CPT | Performed by: INTERNAL MEDICINE

## 2023-11-09 PROCEDURE — 80053 COMPREHEN METABOLIC PANEL: CPT | Performed by: INTERNAL MEDICINE

## 2023-11-09 PROCEDURE — 97162 PT EVAL MOD COMPLEX 30 MIN: CPT

## 2023-11-09 PROCEDURE — 97166 OT EVAL MOD COMPLEX 45 MIN: CPT

## 2023-11-09 PROCEDURE — 99232 SBSQ HOSP IP/OBS MODERATE 35: CPT | Performed by: INTERNAL MEDICINE

## 2023-11-09 PROCEDURE — 84145 PROCALCITONIN (PCT): CPT | Performed by: INTERNAL MEDICINE

## 2023-11-09 PROCEDURE — 88112 CYTOPATH CELL ENHANCE TECH: CPT | Performed by: STUDENT IN AN ORGANIZED HEALTH CARE EDUCATION/TRAINING PROGRAM

## 2023-11-09 PROCEDURE — 31623 DX BRONCHOSCOPE/BRUSH: CPT | Performed by: INTERNAL MEDICINE

## 2023-11-09 RX ORDER — SODIUM CHLORIDE 9 MG/ML
125 INJECTION, SOLUTION INTRAVENOUS CONTINUOUS
Status: DISCONTINUED | OUTPATIENT
Start: 2023-11-09 | End: 2023-11-10 | Stop reason: HOSPADM

## 2023-11-09 RX ORDER — LIDOCAINE HYDROCHLORIDE 10 MG/ML
INJECTION, SOLUTION EPIDURAL; INFILTRATION; INTRACAUDAL; PERINEURAL AS NEEDED
Status: DISCONTINUED | OUTPATIENT
Start: 2023-11-09 | End: 2023-11-09

## 2023-11-09 RX ORDER — METOPROLOL TARTRATE 5 MG/5ML
INJECTION INTRAVENOUS AS NEEDED
Status: DISCONTINUED | OUTPATIENT
Start: 2023-11-09 | End: 2023-11-09

## 2023-11-09 RX ORDER — PROPOFOL 10 MG/ML
INJECTION, EMULSION INTRAVENOUS AS NEEDED
Status: DISCONTINUED | OUTPATIENT
Start: 2023-11-09 | End: 2023-11-09

## 2023-11-09 RX ORDER — MIDAZOLAM HYDROCHLORIDE 2 MG/2ML
INJECTION, SOLUTION INTRAMUSCULAR; INTRAVENOUS AS NEEDED
Status: DISCONTINUED | OUTPATIENT
Start: 2023-11-09 | End: 2023-11-09

## 2023-11-09 RX ADMIN — METOPROLOL TARTRATE 1 MG: 1 INJECTION, SOLUTION INTRAVENOUS at 15:47

## 2023-11-09 RX ADMIN — ACETAMINOPHEN 325MG 975 MG: 325 TABLET ORAL at 21:42

## 2023-11-09 RX ADMIN — CEFEPIME HYDROCHLORIDE 2000 MG: 2 INJECTION, SOLUTION INTRAVENOUS at 23:16

## 2023-11-09 RX ADMIN — MIDAZOLAM 2 MG: 1 INJECTION INTRAMUSCULAR; INTRAVENOUS at 15:36

## 2023-11-09 RX ADMIN — PROPOFOL 150 MG: 10 INJECTION, EMULSION INTRAVENOUS at 15:43

## 2023-11-09 RX ADMIN — PROPOFOL 100 MG: 10 INJECTION, EMULSION INTRAVENOUS at 15:49

## 2023-11-09 RX ADMIN — SODIUM CHLORIDE 125 ML/HR: 0.9 INJECTION, SOLUTION INTRAVENOUS at 17:18

## 2023-11-09 RX ADMIN — SODIUM CHLORIDE 125 ML/HR: 0.9 INJECTION, SOLUTION INTRAVENOUS at 23:18

## 2023-11-09 RX ADMIN — OXYCODONE HYDROCHLORIDE 5 MG: 5 TABLET ORAL at 10:52

## 2023-11-09 RX ADMIN — LIDOCAINE PATCH 5% 2 PATCH: 700 PATCH TOPICAL at 09:32

## 2023-11-09 RX ADMIN — OXYCODONE HYDROCHLORIDE 5 MG: 5 TABLET ORAL at 17:16

## 2023-11-09 RX ADMIN — LIDOCAINE HYDROCHLORIDE 100 MG: 10 INJECTION, SOLUTION EPIDURAL; INFILTRATION; INTRACAUDAL; PERINEURAL at 15:43

## 2023-11-09 RX ADMIN — ACETAMINOPHEN 325MG 975 MG: 325 TABLET ORAL at 05:04

## 2023-11-09 RX ADMIN — CEFEPIME HYDROCHLORIDE 2000 MG: 2 INJECTION, SOLUTION INTRAVENOUS at 10:53

## 2023-11-09 RX ADMIN — METOPROLOL TARTRATE 1 MG: 1 INJECTION, SOLUTION INTRAVENOUS at 15:44

## 2023-11-09 RX ADMIN — OXYCODONE HYDROCHLORIDE 5 MG: 5 TABLET ORAL at 21:42

## 2023-11-09 RX ADMIN — HEPARIN SODIUM 5000 UNITS: 5000 INJECTION INTRAVENOUS; SUBCUTANEOUS at 23:16

## 2023-11-09 RX ADMIN — HEPARIN SODIUM 5000 UNITS: 5000 INJECTION INTRAVENOUS; SUBCUTANEOUS at 05:04

## 2023-11-09 NOTE — ASSESSMENT & PLAN NOTE
follows with oncology for probable recurrent breast cancer, with diffuse bone mets, and right lung mass  Right lung mass positive on PET scan: Possible metastatic versus lung primary  CT scan on admission revealed, "stable size of right middle lobe mass extending to the lateral chest wall and mediastinum and crossing the major fissure into the right lower lobe"  Outpatient orders for IR lung biopsy: Discussed with IR, they recommend pulmonary consultation for bronchoscopy with biopsy rather than percutaneous biopsy  Thoracentesis performed by IR yesterday   Culture with no growth  Pathology pending   Bronchoscopy with biopsy for today

## 2023-11-09 NOTE — PLAN OF CARE
Problem: Potential for Falls  Goal: Patient will remain free of falls  Description: INTERVENTIONS:  - Educate patient/family on patient safety including physical limitations  - Instruct patient to call for assistance with activity   - Consult OT/PT to assist with strengthening/mobility   - Keep Call bell within reach  - Keep bed low and locked with side rails adjusted as appropriate  - Keep care items and personal belongings within reach  - Initiate and maintain comfort rounds  - Make Fall Risk Sign visible to staff  - Offer Toileting every 2 Hours, in advance of need  - Initiate/Maintain alarm  - Obtain necessary fall risk management equipment  - Apply yellow socks and bracelet for high fall risk patients  - Consider moving patient to room near nurses station  Outcome: Progressing     Problem: PAIN - ADULT  Goal: Verbalizes/displays adequate comfort level or baseline comfort level  Description: Interventions:  - Encourage patient to monitor pain and request assistance  - Assess pain using appropriate pain scale  - Administer analgesics based on type and severity of pain and evaluate response  - Implement non-pharmacological measures as appropriate and evaluate response  - Consider cultural and social influences on pain and pain management  - Notify physician/advanced practitioner if interventions unsuccessful or patient reports new pain  Outcome: Progressing     Problem: INFECTION - ADULT  Goal: Absence or prevention of progression during hospitalization  Description: INTERVENTIONS:  - Assess and monitor for signs and symptoms of infection  - Monitor lab/diagnostic results  - Monitor all insertion sites, i.e. indwelling lines, tubes, and drains  - Monitor endotracheal if appropriate and nasal secretions for changes in amount and color  - [place] appropriate cooling/warming therapies per order  - Administer medications as ordered  - Instruct and encourage patient and family to use good hand hygiene technique  - Identify and instruct in appropriate isolation precautions for identified infection/condition  Outcome: Progressing     Problem: CARDIOVASCULAR - ADULT  Goal: Maintains optimal cardiac output and hemodynamic stability  Description: INTERVENTIONS:  - Monitor I/O, vital signs and rhythm  - Monitor for S/S and trends of decreased cardiac output  - Administer and titrate ordered vasoactive medications to optimize hemodynamic stability  - Assess quality of pulses, skin color and temperature  - Assess for signs of decreased coronary artery perfusion  - Instruct patient to report change in severity of symptoms  Outcome: Progressing     Problem: METABOLIC, FLUID AND ELECTROLYTES - ADULT  Goal: Electrolytes maintained within normal limits  Description: INTERVENTIONS:  - Monitor labs and assess patient for signs and symptoms of electrolyte imbalances  - Administer electrolyte replacement as ordered  - Monitor response to electrolyte replacements, including repeat lab results as appropriate  - Instruct patient on fluid and nutrition as appropriate  Outcome: Progressing

## 2023-11-09 NOTE — PROGRESS NOTES
Progress Note - Pulmonary   Adria Love 52 y.o. female MRN: [id number]  Unit/Bed#: [address] [address] -01 Encounter: [id number]    Assessment/Plan:      1. Acute pulmonary insufficiency likely multifaceted as listed below         -Patient remains on room air-94%, does not wear home O2        -Continue saturations greater than 88%        -Pulmonary toileting: Deep breathing cough, OOB as tolerated, I Q 1 hr    2. Moderate right-sided exudative histiocyte predominant pleural effusion         -Concern for possible malignancy        -11/8/2023-thoracentesis- 220 mL        -Pathology pending    3. Right lung mass       -PET avid       -Scheduled for bronchoscopy today at 3 PM       -May need stenting/radiation if tumor invades bronchus    4. Recurrent breast cancer with bone metastasis         -Initially diagnosed 2013        -Follows with Dr. Neri Hall        -initiated on: ribociclib 600 mg daily 3 weeks on 1 week off along with Faslodex         -Palliative care following          Chief Complaint:    "I am ok"    Subjective:    Nevaeh Godinez was comfortably sitting in her bed. She reports that she is a little bit hungry given that she is n.p.o. No significant overnight events reported. Currently denying any fevers, chills, mops this, headaches, night sweats, pleuritic chest pain, or palpations. Objective:    Vitals: Blood pressure 142/80, pulse 105, temperature 98.1 °F (36.7 °C), resp. rate 16, SpO2 99 %. ,There is no height or weight on file to calculate BMI. Intake/Output Summary (Last 24 hours) at 11/9/2023 1052  Last data filed at 11/8/2023 2250  Gross per 24 hour   Intake 950 ml   Output 220 ml   Net 730 ml       Invasive Devices       Peripheral Intravenous Line  Duration             Long-Dwell Peripheral IV (Midline) 28/00/76 Right Basilic 1 day                    Physical Exam:     Physical Exam  Constitutional:       General: She is not in acute distress. Appearance: Normal appearance. She is normal weight.  She is not ill-appearing. HENT:      Head: Normocephalic and atraumatic. Nose: Nose normal. No congestion or rhinorrhea. Mouth/Throat:      Mouth: Mucous membranes are dry. Pharynx: Oropharynx is clear. No oropharyngeal exudate or posterior oropharyngeal erythema. Cardiovascular:      Rate and Rhythm: Normal rate and regular rhythm. Pulses: Normal pulses. Heart sounds: Normal heart sounds. No murmur heard. No friction rub. No gallop. Pulmonary:      Effort: Pulmonary effort is normal. No respiratory distress. Breath sounds: No stridor. No wheezing, rhonchi or rales. Comments: Improved aeration  Chest:      Chest wall: No tenderness. Abdominal:      General: Abdomen is flat. Bowel sounds are normal. There is no distension. Palpations: Abdomen is soft. There is no mass. Musculoskeletal:         General: No swelling or tenderness. Normal range of motion. Cervical back: Normal range of motion. No rigidity or tenderness. Skin:     General: Skin is warm and dry. Coloration: Skin is not jaundiced or pale. Neurological:      General: No focal deficit present. Mental Status: She is alert and oriented to person, place, and time. Mental status is at baseline. Psychiatric:         Mood and Affect: Mood normal.         Behavior: Behavior normal.         Labs: I have personally reviewed pertinent lab results. , BNP: No results found for: "BNP", CBC:   Lab Results   Component Value Date    WBC 8.44 11/09/2023    HGB 8.5 (L) 11/09/2023    HCT 27.6 (L) 11/09/2023    MCV 83 11/09/2023     (H) 11/09/2023    RBC 3.32 (L) 11/09/2023    MCH 25.6 (L) 11/09/2023    MCHC 30.8 (L) 11/09/2023    RDW 17.2 (H) 11/09/2023    MPV 8.6 (L) 11/09/2023    NRBC 0 11/09/2023   , CMP:   Lab Results   Component Value Date    SODIUM 135 11/09/2023    K 3.9 11/09/2023     11/09/2023    CO2 24 11/09/2023    BUN 8 11/09/2023    CREATININE 0.65 11/09/2023    CALCIUM 8.3 (L) 11/09/2023    AST 25 11/09/2023    ALT 26 11/09/2023    ALKPHOS 120 (H) 11/09/2023    EGFR 100 11/09/2023       Imaging and other studies: I have personally reviewed pertinent films in PACS    CTA chest right middle lobe lung mass right pleural effusion

## 2023-11-09 NOTE — PROGRESS NOTES
[address] [facility]  Progress Note  Name: Malia Moser  MRN: [id number]  Unit/Bed#: [facility] 2 [address] [facility] [address] I Date of Admission: 11/6/2023   Date of Service: 11/9/2023 I Hospital Day: 3    Assessment/Plan   * Dyspnea  Assessment & Plan  Patient is a 78-year-old female who presented to the ER for evaluation of dyspnea. Patient was recently diagnosed with recurrence of breast cancer, with "diffuse bone mets, as well as right lung mass either metastatic versus second primary. CT scan on admission was negative for PE. Stable size of right lung mass in the right middle lobe, extending to the lateral chest wall, mediastinum, and right lower lobe.     Patient also had moderate pleural effusion  had evolving sepsis, on admission with fever, and tachycardia  Patient dyspnea is likely multifactorial, secondary to her lung mass, pleural effusion, as well as possible postobstructive pneumonia  Continue evaluation under the guidance of the pulmonology team  S/p thoracentesis- pathology is pending   Continue antibiotics- day 3/5  Bronchoscopy scheduled for today due to lung mass    Malignant neoplasm of overlapping sites of left female breast [facility])  Assessment & Plan  Patient follows with Dr. Paige France from oncology  At most recent office visit 10/23/2023 she was diagnosed with metastatic cancer, presumed to be recurrent breast primary, with mets to the bones and lung  Biopsy from right iliac bone consistent with ER positive lobular carcinoma: Similar to her previous history diagnosed in 2013  started on ribociclib 600 mg daily 3 weeks on 1 week off along with Faslodex: Most recent infusion 10/30  Continued on arimidex  Outpatient oncology plan was to pursue biopsy of lung mass to rule out second primary  Current imaging this admission reveals diffuse bone mets, right lung mass, and increasing size of right lower esophageal wall: Likely metastatic involvement of breast primary: Also undergoing work-up for possible second primary lung malignancy  Pt also met with palliative care today. Due to transportation issues she has not been able to get to office visit  pt concerned about possible side effects from ribociclib and requests to place on hold:  D/w Dr. Shyanne Mccall- ok to hold temporarily      Pleural effusion on right  Assessment & Plan  CT scan on admission revealed moderate right pleural effusion  Likely malignant effusion  Patient also presented with a fever:effusion also be secondary to pneumonia  S/p thoracentesis with ir     Pain from bone metastases [facility])  Assessment & Plan  Patient was diagnosed with widely metastatic disease to her bones, secondary to primary recurrent breast cancer  CT scan had diffuse thoracolumbar destructive metastatic lesions, compression fractures at T10, and T7. Diffuse metastatic disease throughout the sacrum and iliac  PET CT scan 10/26 revealed multifocal hypermetabolic/metastatic disease involving the chest and fairly diffuse disease involving the skeletal system  MRI 10/9 revealed widespread thoracic, cervical, and lumbar spine metastasis. Wedge compression fracture at T10. T7 superior endplate pathologic fracture.   Possible minimal epidural tumor at T8  Palliative Care consultation appreciated  Per Dr. Shyanne Mccall:  if pt does not have focal point of back pain, no current indication for XRT  Pt with small epidural lesion:  no cord compression or neuro compromise:  cont to monitor  Pt notes pain controlled on current regimen, especially scheduled tylenol and lidoderm:  will send rx to pharmacy    Right lower lobe lung mass  Assessment & Plan  follows with oncology for probable recurrent breast cancer, with diffuse bone mets, and right lung mass  Right lung mass positive on PET scan: Possible metastatic versus lung primary  CT scan on admission revealed, "stable size of right middle lobe mass extending to the lateral chest wall and mediastinum and crossing the major fissure into the right lower lobe"  Outpatient orders for IR lung biopsy: Discussed with IR, they recommend pulmonary consultation for bronchoscopy with biopsy rather than percutaneous biopsy  Thoracentesis performed by IR yesterday   Culture with no growth  Pathology pending   Bronchoscopy with biopsy for today     Sepsis [facility])  Assessment & Plan  Patient presented with evolving sepsis with tachycardia, and then developed a fever of 102  CT scan with right lung mass, in the setting of metastatic breast cancer, with possible lung metastasis versus second lung primary  Sepsis possibly secondary to postobstructive pneumonia  blood cultures x2: negative for 24 hours   Not producing any sputum  Strep pneumo/legionella :negative   UA: neg N/L  Pulmonary scheduled bronchoscopy  Continue broad-spectrum antibiotics:  procal neg x 2- 3/5 days   Femoral central line placed in ER due to lack of IV access:  discontinued following day               VTE Pharmacologic Prophylaxis: VTE Score: 3 heparin     Patient Centered Rounds:  discussed with her nurse     Education and Discussions with Family / Patient: Updated  (son and daughter) at bedside. Total Time Spent on Date of Encounter in care of patient: 40 mins. This time was spent on one or more of the following: performing physical exam; counseling and coordination of care; obtaining or reviewing history; documenting in the medical record; reviewing/ordering tests, medications or procedures; communicating with other healthcare professionals and discussing with patient's family/caregivers. Current Length of Stay: 3 day(s)  Current Patient Status: Inpatient     Discharge Plan: Anticipate discharge in 24-48 hrs to home. Code Status: Level 1 - Full Code    Subjective:   Pt seen and examined. She is still having back pain at times. No other problems were noted.      Objective:     Vitals:   Temp (24hrs), Av.5 °F (36.9 °C), Min:98.1 °F (36.7 °C), Max:98.7 °F (37.1 °C)    Temp:  [98.1 °F (36.7 °C)-98.7 °F (37.1 °C)] 98.1 °F (36.7 °C)  HR:  [105-114] 105  Resp:  [16-18] 16  BP: (129-142)/(80-83) 142/80  SpO2:  [97 %-99 %] 99 %  There is no height or weight on file to calculate BMI. Input and Output Summary (last 24 hours): Intake/Output Summary (Last 24 hours) at 11/9/2023 1529  Last data filed at 11/8/2023 2250  Gross per 24 hour   Intake 950 ml   Output --   Net 950 ml       Physical Exam:   Physical Exam  Constitutional:       Appearance: Normal appearance. HENT:      Head: Normocephalic and atraumatic. Eyes:      Extraocular Movements: Extraocular movements intact. Pupils: Pupils are equal, round, and reactive to light. Cardiovascular:      Rate and Rhythm: Normal rate and regular rhythm. Heart sounds: No murmur heard. No friction rub. No gallop. Pulmonary:      Effort: Pulmonary effort is normal. No respiratory distress. Breath sounds: Normal breath sounds. No wheezing, rhonchi or rales. Abdominal:      General: Bowel sounds are normal. There is no distension. Palpations: Abdomen is soft. Tenderness: There is no abdominal tenderness. There is no guarding or rebound. Neurological:      Mental Status: She is alert and oriented to person, place, and time.          Additional Data:     Labs:  Results from last 7 days   Lab Units 11/09/23  0515   WBC Thousand/uL 8.44   HEMOGLOBIN g/dL 8.5*   HEMATOCRIT % 27.6*   PLATELETS Thousands/uL 450*   NEUTROS PCT % 59   LYMPHS PCT % 34   MONOS PCT % 6   EOS PCT % 0     Results from last 7 days   Lab Units 11/09/23  0515   SODIUM mmol/L 135   POTASSIUM mmol/L 3.9   CHLORIDE mmol/L 103   CO2 mmol/L 24   BUN mg/dL 8   CREATININE mg/dL 0.65   ANION GAP mmol/L 8   CALCIUM mg/dL 8.3*   ALBUMIN g/dL 3.2*   TOTAL BILIRUBIN mg/dL 0.43   ALK PHOS U/L 120*   ALT U/L 26   AST U/L 25   GLUCOSE RANDOM mg/dL 86     Results from last 7 days   Lab Units 11/06/23  2204   INR  1.14             Results from last 7 days   Lab Units 11/09/23  0515 11/07/23  0444 11/07/23  0054   PROCALCITONIN ng/ml 0.37* 0.11 0.10       Lines/Drains:  Invasive Devices       Peripheral Intravenous Line  Duration             Long-Dwell Peripheral IV (Midline) 82/72/03 Right Basilic 2 days                    Recent Cultures (last 7 days):   Results from last 7 days   Lab Units 11/08/23  1232 11/08/23  1124 11/07/23  1137 11/07/23  0911   BLOOD CULTURE   --   --  No Growth at 24 hrs. No Growth at 24 hrs.    GRAM STAIN RESULT   --  1+ Polys  No bacteria seen  --   --    BODY FLUID CULTURE, STERILE   --  No growth  --   --    LEGIONELLA URINARY ANTIGEN  Negative  --   --   --        Last 24 Hours Medication List:   Current Facility-Administered Medications   Medication Dose Route Frequency Provider Last Rate    acetaminophen  975 mg Oral [facility] Isrrael Saba MD      anastrozole  1 mg Oral Daily Janell Benito PA-C      cefepime  2,000 mg Intravenous Q12H Isrrael Saba MD 2,000 mg (11/09/23 1053)    heparin (porcine)  5,000 Units Subcutaneous Q8H [address] Janell Benito PA-C      lidocaine  2 patch Topical Daily Isrrael Saba MD      morphine injection  2 mg Intravenous Q4H PRN Janell Benito PA-C      ondansetron  4 mg Intravenous Q6H PRN Janell Benito PA-C      oxyCODONE  5 mg Oral Q4H PRN Isrrael Saba MD      sodium chloride  125 mL/hr Intravenous Continuous Buddy Benson MD      sodium chloride  125 mL/hr Intravenous Continuous Jhonatan Etienne MD      temazepam  7.5 mg Oral HS PRN Reshma Escalera PA-C          Today, Patient Was Seen By: Carlos Cool DO

## 2023-11-09 NOTE — OCCUPATIONAL THERAPY NOTE
Occupational Therapy Evaluation     Patient Name: Isela Holbrook  RPTAN'K Date: 11/9/2023  Problem List  Principal Problem:    Dyspnea  Active Problems:    Malignant neoplasm of overlapping sites of left female breast ([address])    Sepsis ([address])    Right lower lobe lung mass    Pain from bone metastases (HCC)    Pleural effusion on right    Past Medical History  Past Medical History:   Diagnosis Date    Breast cancer ([address]) 07/2013    left-chemotherapy    Fibroma     History of chemotherapy     History of external beam radiation therapy      Past Surgical History  Past Surgical History:   Procedure Laterality Date    APPENDECTOMY      BREAST SURGERY  2014    Left breast 2/2 breast CA    HYSTERECTOMY  2015    Due to fibroma    IR BIOPSY BONE  10/9/2023    IR THORACENTESIS  11/8/2023    MASTECTOMY Left 2013    OOPHORECTOMY             11/09/23 0833   OT Last Visit   OT Visit Date 11/09/23   Note Type   Note type Evaluation   Pain Assessment   Pain Assessment Tool 0-10   Pain Score 8   Pain Location/Orientation Location: Back   Patient's Stated Pain Goal No pain   Hospital Pain Intervention(s) Repositioned; Ambulation/increased activity; Emotional support; Rest   Multiple Pain Sites No   Restrictions/Precautions   Weight Bearing Precautions Per Order No   Other Precautions Multiple lines; Fall Risk;Pain;Limb alert   Home Living   Type of [address] ADLs on one level; Able to live on main level with bedroom/bathroom;Stairs to enter with rails  ([address])   Bathroom Shower/Tub Tub/shower unit   Bathroom Toilet Standard   Home Equipment Other (Comment); [facility])   Additional Comments Pt lives with spouse in a house with 3 [address] and 1st floor setup. Prior Function   Level of [place] Independent with ADLs; Independent with functional mobility; Independent with IADLS   Lives With Spouse   Receives Help From Family   IADLs Independent with driving; Independent with medication management; Independent with meal prep   Falls in the last 6 months 0   Vocational Retired   Comments At baseline, pt was I w/ ADLs, IADLs, and functional transfers/mobility w/o use of AD. (+) , however does not have car. Denies falls PTA. Lifestyle   Autonomy At baseline, pt was I w/ ADLs, IADLs, and functional transfers/mobility w/o use of AD. (+) , however does not have car. Denies falls PTA. Reciprocal Relationships Spouse   Service to Others Retired   ADL   Where Assessed Chair   Eating Assistance 7  Independent   Grooming Assistance 7  [address] 6  [address]    [address] 6  Modified independent   [address] [facility] [place] [facility]  5  Supervision/Setup   Functional Assistance 5  Supervision/Setup   Bed Mobility   Supine to Sit Unable to assess   Sit to Supine Unable to assess   Additional Comments Pt seated OOB in chair at end of session. Call bell and phone within reach. All needs met and pt reports no further questions for OT at this time. Transfers   Sit to Stand 6  Modified independent   Additional items Bedrails; Increased time required   Stand to Sit 6  Modified independent   Additional items Armrests; Increased time required   Functional Mobility   Functional Mobility 5  Supervision   Additional Comments Assist x1   Additional items   ([place])   Balance   Static Sitting Fair +   Dynamic Sitting Fair +   Static Standing Fair   Dynamic Standing Fair -   Ambulatory Fair -   Activity Tolerance   Activity Tolerance Patient tolerated treatment well   Medical Staff Made Aware TG Enriquez   Nurse Made Aware yes   RUE Assessment   RUE Assessment WFL   LUE Assessment   LUE Assessment WFL   Hand Function   Gross Motor Coordination Functional   Fine Motor Coordination Functional   Sensation   Light Touch No apparent deficits   Proprioception   Proprioception No apparent deficits   Vision - Complex Assessment   Acuity Able to read clock/calendar on wall without difficulty; Able to read employee name badge without difficulty   Psychosocial   Psychosocial (WDL) WDL   Perception   Inattention/Neglect Appears intact   Cognition   Overall Cognitive Status WFL   Arousal/Participation Alert; Cooperative   Attention Within functional limits   Memory Within functional limits   Following Commands Follows all commands and directions without difficulty   Assessment   Prognosis Fair   Assessment Pt is a 52 y.o. female seen for OT evaluation [place] [facility] [place]/ Dyspnea, Pleural effusion on R, R lower lobe lung mass, and Sepsis. Comorbidities affecting pt’s functional performance include a significant PMH of right lung mass, breast ca w/recently diagnosed bony metastases. Pt with active OT orders and activity orders for Up and OOB as tolerated. Pt lives with spouse in a house with 3 [address] and 1st floor setup. At baseline, pt was I w/ ADLs, IADLs, and functional transfers/mobility w/o use of AD. (+) , however does not have car. Denies falls PTA. Upon evaluation, pt currently functioning at a Supervision-Mod I level for ADLs, Mod I for transfers, and Supervision for functional mobility 2* the following deficits impacting occupational performance: decreased balance, decreased activity tolerance, and increased pain. Pt with the following personal factors of: MAR[place] LUZ home environment, difficulty performing IADLs, fall risk , and increased reliance on DME . Despite above mentioned deficits and personal factors, pt is functioning near baseline level of performance. Limited ADL deficits. Based on the aforementioned OT evaluation, functional performance deficits, and assessments, pt has been identified as a Moderate complexity evaluation. No further acute OT needs identified at this time.  Recommend continued mobilization with hospital staff and restorative program while in the hospital to increase pt’s endurance and strength upon D/C. From OT standpoint, recommend D/C home with family support when medically cleared. D/C pt from OT caseload at this time. Plan   OT Frequency Eval only  (D/C OT)   Discharge Recommendation   Rehab Resource Intensity Level, OT No post-acute rehabilitation needs   Additional Comments  The patient's raw score on the AM-PAC Daily Activity Inpatient Short Form is 21. A raw score of greater than or equal to 19 suggests the patient may benefit from discharge to home. Please refer to the recommendation of the Occupational Therapist for safe discharge planning.    AM-PAC Daily Activity Inpatient   Lower Body Dressing 3   Bathing 3   Toileting 3   Upper Body Dressing 4   Grooming 4   Eating 4   Daily Activity Raw Score 21   Daily Activity Standardized Score (Calc for Raw Score >=11) 44.27   AM-PAC Applied Cognition Inpatient   Following a Speech/Presentation 4   Understanding Ordinary Conversation 4   Taking Medications 4   Remembering Where Things Are Placed or Put Away 4   Remembering List of 4-5 Errands 4   Taking Care of Complicated Tasks 4   Applied Cognition Raw Score 24   Applied Cognition Standardized Score 62.21        Bo Bowman, OTR/L

## 2023-11-09 NOTE — ASSESSMENT & PLAN NOTE
Patient follows with Dr. Gibson Davalos from oncology  At most recent office visit 10/23/2023 she was diagnosed with metastatic cancer, presumed to be recurrent breast primary, with mets to the bones and lung  Biopsy from right iliac bone consistent with ER positive lobular carcinoma: Similar to her previous history diagnosed in 2013  started on ribociclib 600 mg daily 3 weeks on 1 week off along with Faslodex: Most recent infusion 10/30  Continued on arimidex  Outpatient oncology plan was to pursue biopsy of lung mass to rule out second primary  Current imaging this admission reveals diffuse bone mets, right lung mass, and increasing size of right lower esophageal wall: Likely metastatic involvement of breast primary: Also undergoing work-up for possible second primary lung malignancy  Pt also met with palliative care today.   Due to transportation issues she has not been able to get to office visit  pt concerned about possible side effects from ribociclib and requests to place on hold:  D/w Dr. Gibson Davalos- ok to hold temporarily

## 2023-11-09 NOTE — PLAN OF CARE
Problem: Potential for Falls  Goal: Patient will remain free of falls  Description: INTERVENTIONS:  - Educate patient/family on patient safety including physical limitations  - Instruct patient to call for assistance with activity   - Consult OT/PT to assist with strengthening/mobility   - Keep Call bell within reach  - Keep bed low and locked with side rails adjusted as appropriate  - Keep care items and personal belongings within reach  - Initiate and maintain comfort rounds  - Make Fall Risk Sign visible to staff  - Offer Toileting every  Hours, in advance of need  - Initiate/Maintain alarm  - Obtain necessary fall risk management equipment:   - Apply yellow socks and bracelet for high fall risk patients  - Consider moving patient to room near nurses station  Outcome: Progressing     Problem: MOBILITY - ADULT  Goal: Maintain or return to baseline ADL function  Description: INTERVENTIONS:  -  Assess patient's ability to carry out ADLs; assess patient's baseline for ADL function and identify physical deficits which impact ability to perform ADLs (bathing, care of mouth/teeth, toileting, grooming, dressing, etc.)  - Assess/evaluate cause of self-care deficits   - Assess range of motion  - Assess patient's mobility; develop plan if impaired  - Assess patient's need for assistive devices and provide as appropriate  - Encourage maximum independence but intervene and supervise when necessary  - Involve family in performance of ADLs  - Assess for home care needs following discharge   - Consider OT consult to assist with ADL evaluation and planning for discharge  - Provide patient education as appropriate  Outcome: Progressing  Goal: Maintains/Returns to pre admission functional level  Description: INTERVENTIONS:  - Perform BMAT or MOVE assessment daily.   - Set and communicate daily mobility goal to care team and patient/family/caregiver.    - Collaborate with rehabilitation services on mobility goals if consulted  - Perform Range of Motion  times a day. - Reposition patient every  hours.   - Dangle patient  times a day  - Stand patient  times a day  - Ambulate patient  times a day  - Out of bed to chair  times a day   - Out of bed for meals  times a day  - Out of bed for toileting  - Record patient progress and toleration of activity level   Outcome: Progressing     Problem: PAIN - ADULT  Goal: Verbalizes/displays adequate comfort level or baseline comfort level  Description: Interventions:  - Encourage patient to monitor pain and request assistance  - Assess pain using appropriate pain scale  - Administer analgesics based on type and severity of pain and evaluate response  - Implement non-pharmacological measures as appropriate and evaluate response  - Consider cultural and social influences on pain and pain management  - Notify physician/advanced practitioner if interventions unsuccessful or patient reports new pain  Outcome: Progressing     Problem: INFECTION - ADULT  Goal: Absence or prevention of progression during hospitalization  Description: INTERVENTIONS:  - Assess and monitor for signs and symptoms of infection  - Monitor lab/diagnostic results  - Monitor all insertion sites, i.e. indwelling lines, tubes, and drains  - Monitor endotracheal if appropriate and nasal secretions for changes in amount and color  - [place] appropriate cooling/warming therapies per order  - Administer medications as ordered  - Instruct and encourage patient and family to use good hand hygiene technique  - Identify and instruct in appropriate isolation precautions for identified infection/condition  Outcome: Progressing     Problem: SAFETY ADULT  Goal: Patient will remain free of falls  Description: INTERVENTIONS:  - Educate patient/family on patient safety including physical limitations  - Instruct patient to call for assistance with activity   - Consult OT/PT to assist with strengthening/mobility   - Keep Call bell within reach  - Keep bed low and locked with side rails adjusted as appropriate  - Keep care items and personal belongings within reach  - Initiate and maintain comfort rounds  - Make Fall Risk Sign visible to staff  - Offer Toileting every  Hours, in advance of need  - Initiate/Maintain alarm  - Obtain necessary fall risk management equipment:   - Apply yellow socks and bracelet for high fall risk patients  - Consider moving patient to room near nurses station  Outcome: Progressing  Goal: Maintain or return to baseline ADL function  Description: INTERVENTIONS:  -  Assess patient's ability to carry out ADLs; assess patient's baseline for ADL function and identify physical deficits which impact ability to perform ADLs (bathing, care of mouth/teeth, toileting, grooming, dressing, etc.)  - Assess/evaluate cause of self-care deficits   - Assess range of motion  - Assess patient's mobility; develop plan if impaired  - Assess patient's need for assistive devices and provide as appropriate  - Encourage maximum independence but intervene and supervise when necessary  - Involve family in performance of ADLs  - Assess for home care needs following discharge   - Consider OT consult to assist with ADL evaluation and planning for discharge  - Provide patient education as appropriate  Outcome: Progressing  Goal: Maintains/Returns to pre admission functional level  Description: INTERVENTIONS:  - Perform BMAT or MOVE assessment daily.   - Set and communicate daily mobility goal to care team and patient/family/caregiver. - Collaborate with rehabilitation services on mobility goals if consulted  - Perform Range of Motion  times a day. - Reposition patient every  hours.   - Dangle patient  times a day  - Stand patient  times a day  - Ambulate patient  times a day  - Out of bed to chair  times a day   - Out of bed for meals  times a day  - Out of bed for toileting  - Record patient progress and toleration of activity level   Outcome: Progressing Problem: DISCHARGE PLANNING  Goal: Discharge to home or other facility with appropriate resources  Description: INTERVENTIONS:  - Identify barriers to discharge w/patient and caregiver  - Arrange for needed discharge resources and transportation as appropriate  - Identify discharge learning needs (meds, wound care, etc.)  - Arrange for interpretive services to assist at discharge as needed  - Refer to Case Management Department for coordinating discharge planning if the patient needs post-hospital services based on physician/advanced practitioner order or complex needs related to functional status, cognitive ability, or social support system  Outcome: Progressing     Problem: Knowledge Deficit  Goal: Patient/family/caregiver demonstrates understanding of disease process, treatment plan, medications, and discharge instructions  Description: Complete learning assessment and assess knowledge base.   Interventions:  - Provide teaching at level of understanding  - Provide teaching via preferred learning methods  Outcome: Progressing     Problem: CARDIOVASCULAR - ADULT  Goal: Maintains optimal cardiac output and hemodynamic stability  Description: INTERVENTIONS:  - Monitor I/O, vital signs and rhythm  - Monitor for S/S and trends of decreased cardiac output  - Administer and titrate ordered vasoactive medications to optimize hemodynamic stability  - Assess quality of pulses, skin color and temperature  - Assess for signs of decreased coronary artery perfusion  - Instruct patient to report change in severity of symptoms  Outcome: Progressing  Goal: Absence of cardiac dysrhythmias or at baseline rhythm  Description: INTERVENTIONS:  - Continuous cardiac monitoring, vital signs, obtain 12 lead EKG if ordered  - Administer antiarrhythmic and heart rate control medications as ordered  - Monitor electrolytes and administer replacement therapy as ordered  Outcome: Progressing     Problem: RESPIRATORY - ADULT  Goal: Achieves optimal ventilation and oxygenation  Description: INTERVENTIONS:  - Assess for changes in respiratory status  - Assess for changes in mentation and behavior  - Position to facilitate oxygenation and minimize respiratory effort  - Oxygen administered by appropriate delivery if ordered  - Initiate smoking cessation education as indicated  - Encourage broncho-pulmonary hygiene including cough, deep breathe, Incentive Spirometry  - Assess the need for suctioning and aspirate as needed  - Assess and instruct to report SOB or any respiratory difficulty  - Respiratory Therapy support as indicated  Outcome: Progressing     Problem: METABOLIC, FLUID AND ELECTROLYTES - ADULT  Goal: Electrolytes maintained within normal limits  Description: INTERVENTIONS:  - Monitor labs and assess patient for signs and symptoms of electrolyte imbalances  - Administer electrolyte replacement as ordered  - Monitor response to electrolyte replacements, including repeat lab results as appropriate  - Instruct patient on fluid and nutrition as appropriate  Outcome: Progressing     Problem: HEMATOLOGIC - ADULT  Goal: Maintains hematologic stability  Description: INTERVENTIONS  - Assess for signs and symptoms of bleeding or hemorrhage  - Monitor labs  - Administer supportive blood products/factors as ordered and appropriate  Outcome: Progressing

## 2023-11-09 NOTE — ANESTHESIA PREPROCEDURE EVALUATION
Procedure:  BRONCHOSCOPY    Relevant Problems   GYN   (+) Carcinoma of left breast, estrogen receptor positive    (+) Malignant neoplasm of overlapping sites of left female breast (HCC)   (+) Recurrent malignant neoplasm of breast (HCC)      HEMATOLOGY   (+) Normocytic anemia      MUSCULOSKELETAL   (+) Acute back pain      PULMONARY   (+) Dyspnea   (+) Pleural effusion on right        Physical Exam    Airway    Mallampati score: III  TM Distance: >3 FB  Neck ROM: full     Dental   No notable dental hx     Cardiovascular  Rhythm: regular, Rate: normal, Cardiovascular exam normal    Pulmonary  Pulmonary exam normal Breath sounds clear to auscultation    Other Findings      Anesthesia Plan  ASA Score- 3     Anesthesia Type- general with ASA Monitors. Additional Monitors:     Airway Plan: ETT and LMA. Plan Factors-    Chart reviewed. Imaging results reviewed. Existing labs reviewed. Patient summary reviewed. Induction- intravenous. Postoperative Plan-     Informed Consent- Anesthetic plan and risks discussed with patient.

## 2023-11-09 NOTE — PROGRESS NOTES
Progress Note - Palliative & Supportive Care  Adria Love  52 y.o.  female  [address] [address] [id number] [place], [place] [address]   MRN: [id number]  Encounter: [id number]     ASSESSMENT:    Patient Active Problem List   Diagnosis    Carcinoma of left breast, estrogen receptor positive     Use of anastrozole (Arimidex)    Limb swelling    Malignant neoplasm of overlapping sites of left female breast ([address])    Sepsis ([address])    Uterine leiomyoma    Elevated blood pressure reading in office without diagnosis of hypertension    Personal history of malignant neoplasm of breast    Overweight (BMI 25.0-29. 9)    Urinary frequency    Viral infection    Bladder wall thickening    Acute back pain    Recurrent malignant neoplasm of breast (HCC)    Right lower lobe lung mass    Pain from bone metastases (HCC)    Normocytic anemia    Pleural effusion on right    Dyspnea     Active problems addressed:  Recurrent breast cancer  Right lower lobe lung mass, suspected metastatic breast cancer versus primary  R pleural effusion  Bone metastases  Dyspnea  Sepsis  Cancer related pain   Drug induced constipation    Consult is for symptom control      PLAN:    1. Symptom management:  Tylenol 975mg PO q8H ATC  Oxycodone IR 5mg PO q4H prn severe pain  IV morphine 2mg q4H prn BT pain  Lido patch to back  May benefit to steroids for bone pain, but has sepsis/infection so shouldn't be on steroids right now. Can consider as an OP  Do not recommend NSAIDs for long term/continued use given toxicities to the GI, kidney, heart from prolonged use. Will cancel. Encourage oxycodone instead. I offered Home based palliative (having trouble with transportation) and they are interested in this. Referral already placed to [facility]. SLIM to send oxy on dispo    Code status: Level 1 - Full Code   Decisional apparatus:  Patient does have capacity to make medical decisions on my exam today.  If such capacity is lost, patient's substitute decision maker would default to  by [place] Act 169.   Advance Directive / Living Will / POLST:  none on file    We appreciate the opportunity to participate in this patient's care. We will continue to follow. Please do not hesitate to contact our on-call provider through our clinic answering service at [phone] should you have acute symptom control concerns. INTERVAL HISTORY:  Chart reviewed. No acute events overnight. MAR reviewed. Spoke to RN, no issues with oxy, pain is controlled. Children (2 sons, 1 daughter) at bedside. Used only 2 tabs of oxycodone overnight. Looks comfortable this afternoon. Hungry, NPO for bronch today. Oxy effective. Moved BM today. No other issues. Review of Systems   Constitutional:  Positive for activity change. HENT:  Negative for trouble swallowing. Respiratory:  Negative for shortness of breath. Cardiovascular:  Negative for chest pain. Gastrointestinal:  Negative for constipation. Musculoskeletal:  Positive for back pain. All other systems reviewed and are negative. MEDICATIONS / ALLERGIES:  all current active meds have been reviewed    Allergies   Allergen Reactions    Aspirin Rash     Other reaction(s): Palpitations       OBJECTIVE:  /83   Pulse 99   Temp 98.1 °F (36.7 °C)   Resp 16   SpO2 100%   Physical Exam:    Physical Exam  Constitutional:       General: She is not in acute distress. Appearance: She is ill-appearing. She is not toxic-appearing or diaphoretic. Comments: Thin, comfortable, pleasant   HENT:      Head: Normocephalic and atraumatic. Eyes:      General: No scleral icterus. Right eye: No discharge. Left eye: No discharge. Pulmonary:      Effort: Pulmonary effort is normal. No respiratory distress. Comments: On RA  Abdominal:      General: Abdomen is flat. There is no distension. Musculoskeletal:         General: No swelling. Skin:     Coloration: Skin is not jaundiced or pale. Neurological:      General: No focal deficit present. Mental Status: She is alert and oriented to person, place, and time. Psychiatric:         Mood and Affect: Mood normal.         Behavior: Behavior normal.         Thought Content: Thought content normal.         Judgment: Judgment normal.         Lab Results:   I have personally reviewed pertinent labs. Imaging Studies: I have personally reviewed pertinent reports. EKG, Pathology, and Other Studies: I have personally reviewed pertinent reports. Counseling / Coordination of Care  Total floor / unit time spent today 15 minutes. Greater than 50% of total time was spent with the patient and / or family counseling and / or coordination of care. A description of the counseling / coordination of care: provided medical updates, discussed palliative care, determined competency, determined goals of care, determined POA, determined social/family support, discussed plans of care, discussed symptom management, provided psychosocial support.     Jaswinder Rooney MD  [facility] [address] [place] and Supportive Care  [phone]

## 2023-11-09 NOTE — ANESTHESIA POSTPROCEDURE EVALUATION
Post-Op Assessment Note    CV Status:  Stable  Pain Score: 1    Pain management: adequate     Mental Status:  Alert and awake   Hydration Status:  Euvolemic   PONV Controlled:  Controlled   Airway Patency:  Patent      Post Op Vitals Reviewed: Yes      Staff: Anesthesiologist         No notable events documented.     /62 (11/09/23 1603)    Temp      Pulse 104 (11/09/23 1603)   Resp 22 (11/09/23 1603)    SpO2 95 % (11/09/23 1603)

## 2023-11-09 NOTE — ASSESSMENT & PLAN NOTE
CT scan on admission revealed moderate right pleural effusion  Likely malignant effusion  Patient also presented with a fever:effusion also be secondary to pneumonia  S/p thoracentesis with ir

## 2023-11-09 NOTE — ASSESSMENT & PLAN NOTE
Patient is a 53-year-old female who presented to the ER for evaluation of dyspnea. Patient was recently diagnosed with recurrence of breast cancer, with "diffuse bone mets, as well as right lung mass either metastatic versus second primary. CT scan on admission was negative for PE. Stable size of right lung mass in the right middle lobe, extending to the lateral chest wall, mediastinum, and right lower lobe.     Patient also had moderate pleural effusion  had evolving sepsis, on admission with fever, and tachycardia  Patient dyspnea is likely multifactorial, secondary to her lung mass, pleural effusion, as well as possible postobstructive pneumonia  Continue evaluation under the guidance of the pulmonology team  S/p thoracentesis- pathology is pending   Continue antibiotics- day 3/5  Bronchoscopy scheduled for today due to lung mass

## 2023-11-09 NOTE — PHYSICAL THERAPY NOTE
PHYSICAL THERAPY EVALUATION          Patient Name: Amos Book Date: 11/9/2023  PT EVALUATION    52 y.o.    [id number]    Shortness of breath [R06.02]  Dyspnea [R06.00]  Pleural effusion [J90]  Tachycardia [R00.0]  Low serum sodium [R79.89]  Mass of right lung [R91.8]    Past Medical History:   Diagnosis Date    Breast cancer ([address]) 07/2013    left-chemotherapy    Fibroma     History of chemotherapy     History of external beam radiation therapy      Past Surgical History:   Procedure Laterality Date    APPENDECTOMY      BREAST SURGERY  2014    Left breast 2/2 breast CA    HYSTERECTOMY  2015    Due to fibroma    IR BIOPSY BONE  10/9/2023    IR THORACENTESIS  11/8/2023    MASTECTOMY Left 2013    OOPHORECTOMY          11/09/23 0832   PT Last Visit   PT Visit Date 11/09/23   Note Type   Note type Evaluation   Pain Assessment   Pain Assessment Tool 0-10   Pain Score 8   Pain Location/Orientation Location: Back   Hospital Pain Intervention(s) Repositioned; Ambulation/increased activity; Emotional support   Restrictions/Precautions   Other Precautions Multiple lines;Pain; Fall Risk;Limb alert   Home Living   Type of [address] [place] [address] Dr One level;Stairs to enter with rails   Bathroom Shower/Tub Tub/shower unit   [address] [place] Other (Comment)  (hurrycane)   Additional Comments 3 MARIA LUZ   Prior Function   Level of [place] Independent with ADLs; Independent with functional mobility; Independent with IADLS   Lives With Spouse   Receives Help From Family  (local DIL)   IADLs Independent with driving; Independent with meal prep; Independent with medication management  (does not have a car. DIL can assist)   Falls in the last 6 months 0   Comments indep without an AD at baseline   General   Additional Pertinent History pt admitted 11/6/23 for dyspnea. had thoracentesis, pending bronchoscopy. up and oob orders. PMHx significant for metastatic CA, CA related pain.  primarily Ukrainian speaking   Cognition   Overall Cognitive Status WFL   Arousal/Participation Cooperative   Memory Within functional limits   Following Commands Follows all commands and directions without difficulty   RLE Assessment   RLE Assessment WFL   LLE Assessment   LLE Assessment WFL   Transfers   Sit to Stand 6  Modified independent   Additional items Increased time required; Other;Bedrails  (AD)   Stand to Sit 6  Modified independent   Additional items Armrests; Increased time required   Ambulation/Elevation   Gait pattern Excessively slow   Gait Assistance 5  Supervision   Additional items Assist x 1   Assistive Device Other (Comment)  Jakub Rosales)   Distance >200'   Balance   Static Standing Fair   Dynamic Standing Fair -   Ambulatory Fair -   Endurance Deficit   Endurance Deficit No   Activity Tolerance   Activity Tolerance Patient tolerated treatment well   Medical Staff [facility] OT   Nurse Made Aware yes   Assessment   Prognosis Good   Problem List Pain; Impaired balance   Assessment Sarah Blakely is a 52 y.o. female admitted to [address] [place] [address] on 11/6/2023 for Dyspnea. PT was consulted and pt was seen on 11/9/2023 for mobility assessment and d/c planning. Pt presents w readmission, medium fall risk, LUE limb alert, multiple lines, chronic back pain. At baseline is indep for ADLs and ambulation without an AD. Reports recent use of cane. Pt is currently functioning at a modified independent assistance level for transfers, S for ambulation w Hurrycane. Pt demonstrated no significant deficits of strength, balance w use of DME, or endurance ambulating limited community distances. No adverse sx reported during session. Pt will benefit from continued mobilization via nsg/ restorative. The patient's AM-PAC Basic Mobility Inpatient Short Form Raw Score is 22. A Raw score of greater than 16 suggests the patient may benefit from discharge to home.  Please also refer to the recommendation of the Physical Therapist for safe discharge planning. Barriers to Discharge None   Plan   PT Frequency Other (Comment)  (dc PT: maintain on restorative)   Discharge Recommendation   Rehab Resource Intensity Level, PT No post-acute rehabilitation needs   AM-PAC Basic Mobility Inpatient   Turning in Flat Bed Without Bedrails 4   Lying on Back to Sitting on Edge of Flat Bed Without Bedrails 4   Moving Bed to Chair 4   Standing Up From Chair Using Arms 4   Walk in Room 3   Climb 3-5 Stairs With Railing 3   Basic Mobility Inpatient Raw Score 22   Basic Mobility Standardized Score 47.4   Highest Level Of Mobility   JH-HLM Goal 7: Walk 25 feet or more   JH-HLM Achieved 7: Walk 25 feet or more   End of Consult   Patient Position at End of Consult Bedside chair; All needs within reach     History: co - morbidities including age, current experience including fall risk, multiple lines  Exam: impairments in systems including multiple body structures involved; neuromuscular (balance, gait, transfers), am-pac  Clinical: stable/unpredictable; recent functional change, ongoing medical management (bronch)  Complexity: moderate      Clovia Berman, PT

## 2023-11-09 NOTE — ASSESSMENT & PLAN NOTE
Patient was diagnosed with widely metastatic disease to her bones, secondary to primary recurrent breast cancer  CT scan had diffuse thoracolumbar destructive metastatic lesions, compression fractures at T10, and T7. Diffuse metastatic disease throughout the sacrum and iliac  PET CT scan 10/26 revealed multifocal hypermetabolic/metastatic disease involving the chest and fairly diffuse disease involving the skeletal system  MRI 10/9 revealed widespread thoracic, cervical, and lumbar spine metastasis. Wedge compression fracture at T10. T7 superior endplate pathologic fracture.   Possible minimal epidural tumor at T8  Palliative Care consultation appreciated  Per Dr. Shelly Harkins:  if pt does not have focal point of back pain, no current indication for XRT  Pt with small epidural lesion:  no cord compression or neuro compromise:  cont to monitor  Pt notes pain controlled on current regimen, especially scheduled tylenol and lidoderm:  will send rx to pharmacy

## 2023-11-09 NOTE — ASSESSMENT & PLAN NOTE
Patient presented with evolving sepsis with tachycardia, and then developed a fever of 102  CT scan with right lung mass, in the setting of metastatic breast cancer, with possible lung metastasis versus second lung primary  Sepsis possibly secondary to postobstructive pneumonia  blood cultures x2: negative for 24 hours   Not producing any sputum  Strep pneumo/legionella :negative   UA: neg N/L  Pulmonary scheduled bronchoscopy  Continue broad-spectrum antibiotics:  procal neg x 2- 3/5 days   Femoral central line placed in ER due to lack of IV access:  discontinued following day

## 2023-11-10 LAB — BACTERIA CATH TIP CULT: NO GROWTH

## 2023-11-10 PROCEDURE — NC001 PR NO CHARGE: Performed by: INTERNAL MEDICINE

## 2023-11-10 PROCEDURE — 99239 HOSP IP/OBS DSCHRG MGMT >30: CPT | Performed by: INTERNAL MEDICINE

## 2023-11-10 RX ORDER — OXYCODONE HYDROCHLORIDE 5 MG/1
5 TABLET ORAL EVERY 4 HOURS PRN
Qty: 20 TABLET | Refills: 0 | Status: SHIPPED | OUTPATIENT
Start: 2023-11-10 | End: 2023-11-20

## 2023-11-10 RX ORDER — LEVOFLOXACIN 750 MG/1
750 TABLET, FILM COATED ORAL EVERY 24 HOURS
Qty: 1 TABLET | Refills: 0 | Status: SHIPPED | OUTPATIENT
Start: 2023-11-10 | End: 2023-11-11

## 2023-11-10 RX ADMIN — OXYCODONE HYDROCHLORIDE 5 MG: 5 TABLET ORAL at 04:00

## 2023-11-10 RX ADMIN — LIDOCAINE PATCH 5% 2 PATCH: 700 PATCH TOPICAL at 09:54

## 2023-11-10 RX ADMIN — ACETAMINOPHEN 325MG 975 MG: 325 TABLET ORAL at 06:05

## 2023-11-10 RX ADMIN — SODIUM CHLORIDE 125 ML/HR: 0.9 INJECTION, SOLUTION INTRAVENOUS at 08:21

## 2023-11-10 RX ADMIN — CEFEPIME HYDROCHLORIDE 2000 MG: 2 INJECTION, SOLUTION INTRAVENOUS at 10:43

## 2023-11-10 RX ADMIN — ANASTROZOLE 1 MG: 1 TABLET, COATED ORAL at 10:42

## 2023-11-10 RX ADMIN — HEPARIN SODIUM 5000 UNITS: 5000 INJECTION INTRAVENOUS; SUBCUTANEOUS at 06:05

## 2023-11-10 NOTE — DISCHARGE SUMMARY
Discharge Summary - [facility] Internal Medicine    Patient Information: Carlos Liang 52 y.o. female MRN: [id number]  Unit/Bed#: [place] [id number] [facility] [place] 203-01 Encounter: [id number]    Discharging Physician / Practitioner: Re Cardenas DO  PCP: Hanh Contreras MD  Admission Date: 11/6/2023  Discharge Date: 11/10/23    Disposition:     Home     Reason for Admission: dyspnea     Discharge Diagnoses:     Principal Problem:    Dyspnea  Active Problems:    Malignant neoplasm of overlapping sites of left female breast ([address])    Sepsis ([address])    Right lower lobe lung mass    Pain from bone metastases (HCC)    Pleural effusion on right  Resolved Problems:    * No resolved hospital problems. *      Consultations During Hospital Stay:  Pulmonary     Procedures Performed:     Thoracentesis- yielded 200ml of yellow fluid, cytology is pending  Bronchoscopy- cytology is pending. Significant Findings / Test Results:     CTA ED chest PE study  Result Date: 11/6/2023  Impression: No evidence of pulmonary embolism Stable size of right middle lobe mass extending to the lateral chest wall and mediastinum and crossing the major fissure into the right lower lobe. Extensive lytic bone metastasis again seen. Moderate right pleural effusion Increased size of right lower esophageal wall     Incidental Findings:   none    Test Results Pending at Discharge (will require follow up): Cytology from both thoracentesis and bronchoscopy pending      Outpatient Tests Requested:  None    Hospital Course:     Carlos Liang is a 52 y.o. female patient who originally presented to the hospital on 11/6/2023 due to dyspnea in the setting of recurrent breast cancer, right lung mass, and pleural effusion. She was evaluated by pulmonary and is s/p thoracentesis in which cytology is pending. She was treated with 5 day of antibiotics in case of a post obstructive pna. Her culture was negative.  She is also s/p bronchoscopy for lung mass and cytology is also pending for that. She will follow up with pulmonary vs Dr. Shelly Harkins for results. She was discharged to home    Discharge Day Visit / Exam:     * Please refer to separate progress note for these details *    Discharge instructions/Information to patient and family:   See after visit summary for information provided to patient and family. Provisions for Follow-Up Care:  See after visit summary for information related to follow-up care and any pertinent home health orders. Discharge Statement:  I spent 40 minutes discharging the patient. This time was spent on the day of discharge. I had direct contact with the patient on the day of discharge. Greater than 50% of the total time was spent examining patient, answering all patient questions, arranging and discussing plan of care with patient as well as directly providing post-discharge instructions. Additional time then spent on discharge activities. Discharge Medications:  See after visit summary for reconciled discharge medications provided to patient and family.

## 2023-11-10 NOTE — ASSESSMENT & PLAN NOTE
Patient is a 47-year-old female who presented to the ER for evaluation of dyspnea. Patient was recently diagnosed with recurrence of breast cancer, with "diffuse bone mets, as well as right lung mass either metastatic versus second primary. CT scan on admission was negative for PE. Stable size of right lung mass in the right middle lobe, extending to the lateral chest wall, mediastinum, and right lower lobe. Patient also had moderate pleural effusion  had evolving sepsis, on admission with fever, and tachycardia  Patient dyspnea is likely multifactorial, secondary to her lung mass, pleural effusion, as well as possible postobstructive pneumonia  Continue evaluation under the guidance of the pulmonology team  S/p thoracentesis- pathology is pending   Continue antibiotics- day 4/5  S/p bronchoscopy yesterday.  Cytology is pending

## 2023-11-10 NOTE — PLAN OF CARE
Problem: Potential for Falls  Goal: Patient will remain free of falls  Description: INTERVENTIONS:  - Educate patient/family on patient safety including physical limitations  - Instruct patient to call for assistance with activity   - Consult OT/PT to assist with strengthening/mobility   - Keep Call bell within reach  - Keep bed low and locked with side rails adjusted as appropriate  - Keep care items and personal belongings within reach  - Initiate and maintain comfort rounds  - Make Fall Risk Sign visible to staff  - Offer Toileting every  Hours, in advance of need  - Initiate/Maintain alarm  - Obtain necessary fall risk management equipment:   - Apply yellow socks and bracelet for high fall risk patients  - Consider moving patient to room near nurses station  Outcome: Progressing     Problem: MOBILITY - ADULT  Goal: Maintain or return to baseline ADL function  Description: INTERVENTIONS:  -  Assess patient's ability to carry out ADLs; assess patient's baseline for ADL function and identify physical deficits which impact ability to perform ADLs (bathing, care of mouth/teeth, toileting, grooming, dressing, etc.)  - Assess/evaluate cause of self-care deficits   - Assess range of motion  - Assess patient's mobility; develop plan if impaired  - Assess patient's need for assistive devices and provide as appropriate  - Encourage maximum independence but intervene and supervise when necessary  - Involve family in performance of ADLs  - Assess for home care needs following discharge   - Consider OT consult to assist with ADL evaluation and planning for discharge  - Provide patient education as appropriate  Outcome: Progressing  Goal: Maintains/Returns to pre admission functional level  Description: INTERVENTIONS:  - Perform BMAT or MOVE assessment daily.   - Set and communicate daily mobility goal to care team and patient/family/caregiver.    - Collaborate with rehabilitation services on mobility goals if consulted  - Perform Range of Motion  times a day. - Reposition patient every  hours.   - Dangle patient  times a day  - Stand patient  times a day  - Ambulate patient  times a day  - Out of bed to chair  times a day   - Out of bed for meals  times a day  - Out of bed for toileting  - Record patient progress and toleration of activity level   Outcome: Progressing     Problem: PAIN - ADULT  Goal: Verbalizes/displays adequate comfort level or baseline comfort level  Description: Interventions:  - Encourage patient to monitor pain and request assistance  - Assess pain using appropriate pain scale  - Administer analgesics based on type and severity of pain and evaluate response  - Implement non-pharmacological measures as appropriate and evaluate response  - Consider cultural and social influences on pain and pain management  - Notify physician/advanced practitioner if interventions unsuccessful or patient reports new pain  Outcome: Progressing     Problem: INFECTION - ADULT  Goal: Absence or prevention of progression during hospitalization  Description: INTERVENTIONS:  - Assess and monitor for signs and symptoms of infection  - Monitor lab/diagnostic results  - Monitor all insertion sites, i.e. indwelling lines, tubes, and drains  - Monitor endotracheal if appropriate and nasal secretions for changes in amount and color  - [place] appropriate cooling/warming therapies per order  - Administer medications as ordered  - Instruct and encourage patient and family to use good hand hygiene technique  - Identify and instruct in appropriate isolation precautions for identified infection/condition  Outcome: Progressing     Problem: SAFETY ADULT  Goal: Patient will remain free of falls  Description: INTERVENTIONS:  - Educate patient/family on patient safety including physical limitations  - Instruct patient to call for assistance with activity   - Consult OT/PT to assist with strengthening/mobility   - Keep Call bell within reach  - Keep bed low and locked with side rails adjusted as appropriate  - Keep care items and personal belongings within reach  - Initiate and maintain comfort rounds  - Make Fall Risk Sign visible to staff  - Offer Toileting every  Hours, in advance of need  - Initiate/Maintain alarm  - Obtain necessary fall risk management equipment:   - Apply yellow socks and bracelet for high fall risk patients  - Consider moving patient to room near nurses station  Outcome: Progressing  Goal: Maintain or return to baseline ADL function  Description: INTERVENTIONS:  -  Assess patient's ability to carry out ADLs; assess patient's baseline for ADL function and identify physical deficits which impact ability to perform ADLs (bathing, care of mouth/teeth, toileting, grooming, dressing, etc.)  - Assess/evaluate cause of self-care deficits   - Assess range of motion  - Assess patient's mobility; develop plan if impaired  - Assess patient's need for assistive devices and provide as appropriate  - Encourage maximum independence but intervene and supervise when necessary  - Involve family in performance of ADLs  - Assess for home care needs following discharge   - Consider OT consult to assist with ADL evaluation and planning for discharge  - Provide patient education as appropriate  Outcome: Progressing  Goal: Maintains/Returns to pre admission functional level  Description: INTERVENTIONS:  - Perform BMAT or MOVE assessment daily.   - Set and communicate daily mobility goal to care team and patient/family/caregiver. - Collaborate with rehabilitation services on mobility goals if consulted  - Perform Range of Motion  times a day. - Reposition patient every  hours.   - Dangle patient  times a day  - Stand patient  times a day  - Ambulate patient  times a day  - Out of bed to chair  times a day   - Out of bed for meals  times a day  - Out of bed for toileting  - Record patient progress and toleration of activity level   Outcome: Progressing Problem: DISCHARGE PLANNING  Goal: Discharge to home or other facility with appropriate resources  Description: INTERVENTIONS:  - Identify barriers to discharge w/patient and caregiver  - Arrange for needed discharge resources and transportation as appropriate  - Identify discharge learning needs (meds, wound care, etc.)  - Arrange for interpretive services to assist at discharge as needed  - Refer to Case Management Department for coordinating discharge planning if the patient needs post-hospital services based on physician/advanced practitioner order or complex needs related to functional status, cognitive ability, or social support system  Outcome: Progressing     Problem: Knowledge Deficit  Goal: Patient/family/caregiver demonstrates understanding of disease process, treatment plan, medications, and discharge instructions  Description: Complete learning assessment and assess knowledge base.   Interventions:  - Provide teaching at level of understanding  - Provide teaching via preferred learning methods  Outcome: Progressing     Problem: CARDIOVASCULAR - ADULT  Goal: Maintains optimal cardiac output and hemodynamic stability  Description: INTERVENTIONS:  - Monitor I/O, vital signs and rhythm  - Monitor for S/S and trends of decreased cardiac output  - Administer and titrate ordered vasoactive medications to optimize hemodynamic stability  - Assess quality of pulses, skin color and temperature  - Assess for signs of decreased coronary artery perfusion  - Instruct patient to report change in severity of symptoms  Outcome: Progressing  Goal: Absence of cardiac dysrhythmias or at baseline rhythm  Description: INTERVENTIONS:  - Continuous cardiac monitoring, vital signs, obtain 12 lead EKG if ordered  - Administer antiarrhythmic and heart rate control medications as ordered  - Monitor electrolytes and administer replacement therapy as ordered  Outcome: Progressing     Problem: RESPIRATORY - ADULT  Goal: Achieves optimal ventilation and oxygenation  Description: INTERVENTIONS:  - Assess for changes in respiratory status  - Assess for changes in mentation and behavior  - Position to facilitate oxygenation and minimize respiratory effort  - Oxygen administered by appropriate delivery if ordered  - Initiate smoking cessation education as indicated  - Encourage broncho-pulmonary hygiene including cough, deep breathe, Incentive Spirometry  - Assess the need for suctioning and aspirate as needed  - Assess and instruct to report SOB or any respiratory difficulty  - Respiratory Therapy support as indicated  Outcome: Progressing     Problem: METABOLIC, FLUID AND ELECTROLYTES - ADULT  Goal: Electrolytes maintained within normal limits  Description: INTERVENTIONS:  - Monitor labs and assess patient for signs and symptoms of electrolyte imbalances  - Administer electrolyte replacement as ordered  - Monitor response to electrolyte replacements, including repeat lab results as appropriate  - Instruct patient on fluid and nutrition as appropriate  Outcome: Progressing     Problem: HEMATOLOGIC - ADULT  Goal: Maintains hematologic stability  Description: INTERVENTIONS  - Assess for signs and symptoms of bleeding or hemorrhage  - Monitor labs  - Administer supportive blood products/factors as ordered and appropriate  Outcome: Progressing

## 2023-11-10 NOTE — NURSING NOTE
Patient was d/c'd to home in no distress. No c/o pain, able to make needs known. Instructions were reviewed with the patient. Patient was transferred to the front entrance via wheelchair in care of spouse. Patient was transferred to the front entrance via wheelchair and transferred into the car with all safety precautions taken. All belongings left with the patient.

## 2023-11-10 NOTE — ASSESSMENT & PLAN NOTE
Patient was diagnosed with widely metastatic disease to her bones, secondary to primary recurrent breast cancer  CT scan had diffuse thoracolumbar destructive metastatic lesions, compression fractures at T10, and T7. Diffuse metastatic disease throughout the sacrum and iliac  PET CT scan 10/26 revealed multifocal hypermetabolic/metastatic disease involving the chest and fairly diffuse disease involving the skeletal system  MRI 10/9 revealed widespread thoracic, cervical, and lumbar spine metastasis. Wedge compression fracture at T10. T7 superior endplate pathologic fracture.   Possible minimal epidural tumor at T8  Palliative Care consultation appreciated  Per Dr. Hoang Hernandez:  if pt does not have focal point of back pain, no current indication for XRT  Pt with small epidural lesion:  no cord compression or neuro compromise:  cont to monitor  Pt notes pain controlled on current regimen, especially scheduled tylenol and lidoderm:  will send rx to pharmacy

## 2023-11-10 NOTE — PLAN OF CARE
Problem: Potential for Falls  Goal: Patient will remain free of falls  Description: INTERVENTIONS:  - Educate patient/family on patient safety including physical limitations  - Instruct patient to call for assistance with activity   - Consult OT/PT to assist with strengthening/mobility   - Keep Call bell within reach  - Keep bed low and locked with side rails adjusted as appropriate  - Keep care items and personal belongings within reach  - Initiate and maintain comfort rounds  - Make Fall Risk Sign visible to staff  - Apply yellow socks and bracelet for high fall risk patients  - Consider moving patient to room near nurses station  11/10/2023 1316 by Rochell Kawasaki, RN  Outcome: Adequate for Discharge  11/10/2023 1005 by Rochell Kawasaki, RN  Outcome: Progressing     Problem: MOBILITY - ADULT  Goal: Maintain or return to baseline ADL function  Description: INTERVENTIONS:  -  Assess patient's ability to carry out ADLs; assess patient's baseline for ADL function and identify physical deficits which impact ability to perform ADLs (bathing, care of mouth/teeth, toileting, grooming, dressing, etc.)  - Assess/evaluate cause of self-care deficits   - Assess range of motion  - Assess patient's mobility; develop plan if impaired  - Assess patient's need for assistive devices and provide as appropriate  - Encourage maximum independence but intervene and supervise when necessary  - Involve family in performance of ADLs  - Assess for home care needs following discharge   - Consider OT consult to assist with ADL evaluation and planning for discharge  - Provide patient education as appropriate  11/10/2023 1316 by Rochell Kawasaki, RN  Outcome: Adequate for Discharge  11/10/2023 1005 by Rochell Kawasaki, RN  Outcome: Progressing  Goal: Maintains/Returns to pre admission functional level  Description: INTERVENTIONS:  - Perform BMAT or MOVE assessment daily.   - Set and communicate daily mobility goal to care team and patient/family/caregiver.    - Collaborate with rehabilitation services on mobility goals if consulted  - Out of bed for toileting  - Record patient progress and toleration of activity level   11/10/2023 1316 by Juan C Becker RN  Outcome: Adequate for Discharge  11/10/2023 1005 by Juan C Becker RN  Outcome: Progressing     Problem: PAIN - ADULT  Goal: Verbalizes/displays adequate comfort level or baseline comfort level  Description: Interventions:  - Encourage patient to monitor pain and request assistance  - Assess pain using appropriate pain scale  - Administer analgesics based on type and severity of pain and evaluate response  - Implement non-pharmacological measures as appropriate and evaluate response  - Consider cultural and social influences on pain and pain management  - Notify physician/advanced practitioner if interventions unsuccessful or patient reports new pain  11/10/2023 1316 by Juan C Becker RN  Outcome: Adequate for Discharge  11/10/2023 1005 by Juan C Becker RN  Outcome: Progressing     Problem: INFECTION - ADULT  Goal: Absence or prevention of progression during hospitalization  Description: INTERVENTIONS:  - Assess and monitor for signs and symptoms of infection  - Monitor lab/diagnostic results  - Monitor all insertion sites, i.e. indwelling lines, tubes, and drains  - Monitor endotracheal if appropriate and nasal secretions for changes in amount and color  - [place] appropriate cooling/warming therapies per order  - Administer medications as ordered  - Instruct and encourage patient and family to use good hand hygiene technique  - Identify and instruct in appropriate isolation precautions for identified infection/condition  11/10/2023 1316 by Juan C Becker RN  Outcome: Adequate for Discharge  11/10/2023 1005 by Juan C Becker RN  Outcome: Progressing     Problem: SAFETY ADULT  Goal: Patient will remain free of falls  Description: INTERVENTIONS:  - Educate patient/family on patient safety including physical limitations  - Instruct patient to call for assistance with activity   - Consult OT/PT to assist with strengthening/mobility   - Keep Call bell within reach  - Keep bed low and locked with side rails adjusted as appropriate  - Keep care items and personal belongings within reach  - Initiate and maintain comfort rounds  - Make Fall Risk Sign visible to staff    - Apply yellow socks and bracelet for high fall risk patients  - Consider moving patient to room near nurses station  11/10/2023 1316 by Phoebe Mann RN  Outcome: Adequate for Discharge  11/10/2023 1005 by Phoebe Mann RN  Outcome: Progressing  Goal: Maintain or return to baseline ADL function  Description: INTERVENTIONS:  -  Assess patient's ability to carry out ADLs; assess patient's baseline for ADL function and identify physical deficits which impact ability to perform ADLs (bathing, care of mouth/teeth, toileting, grooming, dressing, etc.)  - Assess/evaluate cause of self-care deficits   - Assess range of motion  - Assess patient's mobility; develop plan if impaired  - Assess patient's need for assistive devices and provide as appropriate  - Encourage maximum independence but intervene and supervise when necessary  - Involve family in performance of ADLs  - Assess for home care needs following discharge   - Consider OT consult to assist with ADL evaluation and planning for discharge  - Provide patient education as appropriate  11/10/2023 1316 by Phoebe Mann RN  Outcome: Adequate for Discharge  11/10/2023 1005 by Phoebe Mann RN  Outcome: Progressing  Goal: Maintains/Returns to pre admission functional level  Description: INTERVENTIONS:  - Perform BMAT or MOVE assessment daily.   - Set and communicate daily mobility goal to care team and patient/family/caregiver.    - Collaborate with rehabilitation services on mobility goals if consulted  - Out of bed for toileting  - Record patient progress and toleration of activity level   11/10/2023 1316 by Elma Peña RN  Outcome: Adequate for Discharge  11/10/2023 1005 by Elma Peña RN  Outcome: Progressing     Problem: DISCHARGE PLANNING  Goal: Discharge to home or other facility with appropriate resources  Description: INTERVENTIONS:  - Identify barriers to discharge w/patient and caregiver  - Arrange for needed discharge resources and transportation as appropriate  - Identify discharge learning needs (meds, wound care, etc.)  - Arrange for interpretive services to assist at discharge as needed  - Refer to Case Management Department for coordinating discharge planning if the patient needs post-hospital services based on physician/advanced practitioner order or complex needs related to functional status, cognitive ability, or social support system  11/10/2023 1316 by Elma Peña RN  Outcome: Adequate for Discharge  11/10/2023 1005 by Elma Peña RN  Outcome: Progressing     Problem: Knowledge Deficit  Goal: Patient/family/caregiver demonstrates understanding of disease process, treatment plan, medications, and discharge instructions  Description: Complete learning assessment and assess knowledge base.   Interventions:  - Provide teaching at level of understanding  - Provide teaching via preferred learning methods  11/10/2023 1316 by Elma Peña RN  Outcome: Adequate for Discharge  11/10/2023 1005 by Elma Peña RN  Outcome: Progressing     Problem: CARDIOVASCULAR - ADULT  Goal: Maintains optimal cardiac output and hemodynamic stability  Description: INTERVENTIONS:  - Monitor I/O, vital signs and rhythm  - Monitor for S/S and trends of decreased cardiac output  - Administer and titrate ordered vasoactive medications to optimize hemodynamic stability  - Assess quality of pulses, skin color and temperature  - Assess for signs of decreased coronary artery perfusion  - Instruct patient to report change in severity of symptoms  11/10/2023 1316 by Randolph Russell RN  Outcome: Adequate for Discharge  11/10/2023 1005 by Randolph Russell RN  Outcome: Progressing  Goal: Absence of cardiac dysrhythmias or at baseline rhythm  Description: INTERVENTIONS:  - Continuous cardiac monitoring, vital signs, obtain 12 lead EKG if ordered  - Administer antiarrhythmic and heart rate control medications as ordered  - Monitor electrolytes and administer replacement therapy as ordered  11/10/2023 1316 by Randolph Russell RN  Outcome: Adequate for Discharge  11/10/2023 1005 by Randolph Russell RN  Outcome: Progressing     Problem: RESPIRATORY - ADULT  Goal: Achieves optimal ventilation and oxygenation  Description: INTERVENTIONS:  - Assess for changes in respiratory status  - Assess for changes in mentation and behavior  - Position to facilitate oxygenation and minimize respiratory effort  - Oxygen administered by appropriate delivery if ordered  - Initiate smoking cessation education as indicated  - Encourage broncho-pulmonary hygiene including cough, deep breathe, Incentive Spirometry  - Assess the need for suctioning and aspirate as needed  - Assess and instruct to report SOB or any respiratory difficulty  - Respiratory Therapy support as indicated  11/10/2023 1316 by Randolph Russell RN  Outcome: Adequate for Discharge  11/10/2023 1005 by Randolph Russell RN  Outcome: Progressing     Problem: METABOLIC, FLUID AND ELECTROLYTES - ADULT  Goal: Electrolytes maintained within normal limits  Description: INTERVENTIONS:  - Monitor labs and assess patient for signs and symptoms of electrolyte imbalances  - Administer electrolyte replacement as ordered  - Monitor response to electrolyte replacements, including repeat lab results as appropriate  - Instruct patient on fluid and nutrition as appropriate  11/10/2023 1316 by Randolph Russell RN  Outcome: Adequate for Discharge  11/10/2023 1005 by Randolph Russell RN  Outcome: Progressing     Problem: HEMATOLOGIC - ADULT  Goal: Maintains hematologic stability  Description: INTERVENTIONS  - Assess for signs and symptoms of bleeding or hemorrhage  - Monitor labs  - Administer supportive blood products/factors as ordered and appropriate  11/10/2023 1316 by Raquel Mcgee RN  Outcome: Adequate for Discharge  11/10/2023 1005 by Raquel Mcgee RN  Outcome: Progressing

## 2023-11-10 NOTE — PROGRESS NOTES
[address] [facility]  Progress Note  Name: Jeff Pineda  MRN: [id number]  Unit/Bed#: [facility] 2 [address] [facility] [place] 203-01 I Date of Admission: 11/6/2023   Date of Service: 11/10/2023 I Hospital Day: 4    Assessment/Plan   * Dyspnea  Assessment & Plan  Patient is a 40-year-old female who presented to the ER for evaluation of dyspnea. Patient was recently diagnosed with recurrence of breast cancer, with "diffuse bone mets, as well as right lung mass either metastatic versus second primary. CT scan on admission was negative for PE. Stable size of right lung mass in the right middle lobe, extending to the lateral chest wall, mediastinum, and right lower lobe. Patient also had moderate pleural effusion  had evolving sepsis, on admission with fever, and tachycardia  Patient dyspnea is likely multifactorial, secondary to her lung mass, pleural effusion, as well as possible postobstructive pneumonia  Continue evaluation under the guidance of the pulmonology team  S/p thoracentesis- pathology is pending   Continue antibiotics- day 4/5  S/p bronchoscopy yesterday.  Cytology is pending     Malignant neoplasm of overlapping sites of left female breast [facility])  Assessment & Plan  Patient follows with Dr. Bismark Brizuela from oncology  At most recent office visit 10/23/2023 she was diagnosed with metastatic cancer, presumed to be recurrent breast primary, with mets to the bones and lung  Biopsy from right iliac bone consistent with ER positive lobular carcinoma: Similar to her previous history diagnosed in 2013  started on ribociclib 600 mg daily 3 weeks on 1 week off along with Faslodex: Most recent infusion 10/30  Continued on arimidex  Outpatient oncology plan was to pursue biopsy of lung mass to rule out second primary  Current imaging this admission reveals diffuse bone mets, right lung mass, and increasing size of right lower esophageal wall: Likely metastatic involvement of breast primary: Also undergoing work-up for possible second primary lung malignancy  Pt also met with palliative care today. Due to transportation issues she has not been able to get to office visit  pt concerned about possible side effects from ribociclib and requests to place on hold:  D/w Dr. Dominga Wade- ok to hold temporarily      Pleural effusion on right  Assessment & Plan  CT scan on admission revealed moderate right pleural effusion  Likely malignant effusion  Patient also presented with a fever:effusion also be secondary to pneumonia  S/p thoracentesis with ir     Pain from bone metastases [facility])  Assessment & Plan  Patient was diagnosed with widely metastatic disease to her bones, secondary to primary recurrent breast cancer  CT scan had diffuse thoracolumbar destructive metastatic lesions, compression fractures at T10, and T7. Diffuse metastatic disease throughout the sacrum and iliac  PET CT scan 10/26 revealed multifocal hypermetabolic/metastatic disease involving the chest and fairly diffuse disease involving the skeletal system  MRI 10/9 revealed widespread thoracic, cervical, and lumbar spine metastasis. Wedge compression fracture at T10. T7 superior endplate pathologic fracture.   Possible minimal epidural tumor at T8  Palliative Care consultation appreciated  Per Dr. Dominga Wade:  if pt does not have focal point of back pain, no current indication for XRT  Pt with small epidural lesion:  no cord compression or neuro compromise:  cont to monitor  Pt notes pain controlled on current regimen, especially scheduled tylenol and lidoderm:  will send rx to pharmacy    Right lower lobe lung mass  Assessment & Plan  follows with oncology for probable recurrent breast cancer, with diffuse bone mets, and right lung mass  Right lung mass positive on PET scan: Possible metastatic versus lung primary  CT scan on admission revealed, "stable size of right middle lobe mass extending to the lateral chest wall and mediastinum and crossing the major fissure into the right lower lobe"  Outpatient orders for IR lung biopsy: Discussed with IR, they recommend pulmonary consultation for bronchoscopy with biopsy rather than percutaneous biopsy  Thoracentesis performed by IR yesterday   Culture with no growth  Pathology pending   S/p bronchoscopy yesterday- cytology is pending     Sepsis [facility])  Assessment & Plan  Patient presented with evolving sepsis with tachycardia, and then developed a fever of 102  CT scan with right lung mass, in the setting of metastatic breast cancer, with possible lung metastasis versus second lung primary  Sepsis possibly secondary to postobstructive pneumonia  blood cultures x2: negative for 48 hours   Not producing any sputum  Strep pneumo/legionella :negative   UA: neg N/L  Pulmonary scheduled bronchoscopy  Continue broad-spectrum antibiotics:  procal neg x 2- 4/5 days   Femoral central line placed in ER due to lack of IV access:  discontinued following day               VTE Pharmacologic Prophylaxis: VTE Score: 3 heparin     Education and Discussions with Family / Patient: Updated  () at bedside. Total Time Spent on Date of Encounter in care of patient: 40 mins. This time was spent on one or more of the following: performing physical exam; counseling and coordination of care; obtaining or reviewing history; documenting in the medical record; reviewing/ordering tests, medications or procedures; communicating with other healthcare professionals and discussing with patient's family/caregivers. Current Length of Stay: 4 day(s)  Current Patient Status: Inpatient     Discharge Plan: likely discharge today. Awaiting pulmonary input     Code Status: Level 1 - Full Code    Subjective:   Pt seen and examined. Used cryacrom [id number]. Pt reports she had slight blood sputum yesterday after procedure. She states it has resolved now. She reports she is breathing well.  No f/c no cp no n/v/d no abd pain     Objective:     Vitals:   Temp (24hrs), Av.9 °F (37.2 °C), Min:98.5 °F (36.9 °C), Max:99.2 °F (37.3 °C)    Temp:  [98.5 °F (36.9 °C)-99.2 °F (37.3 °C)] 98.5 °F (36.9 °C)  HR:  [] 113  Resp:  [16-22] 18  BP: (113-169)/(62-92) 134/85  SpO2:  [94 %-100 %] 98 %  There is no height or weight on file to calculate BMI. Input and Output Summary (last 24 hours): Intake/Output Summary (Last 24 hours) at 11/10/2023 1019  Last data filed at 11/9/2023 2318  Gross per 24 hour   Intake 1400 ml   Output --   Net 1400 ml       Physical Exam:   Physical Exam  Constitutional:       Appearance: Normal appearance. HENT:      Head: Normocephalic and atraumatic. Eyes:      Extraocular Movements: Extraocular movements intact. Pupils: Pupils are equal, round, and reactive to light. Cardiovascular:      Rate and Rhythm: Normal rate and regular rhythm. Heart sounds: No murmur heard. No friction rub. No gallop. Pulmonary:      Effort: Pulmonary effort is normal. No respiratory distress. Breath sounds: Normal breath sounds. No wheezing, rhonchi or rales. Abdominal:      General: Bowel sounds are normal. There is no distension. Palpations: Abdomen is soft. Tenderness: There is no abdominal tenderness. There is no guarding or rebound. Musculoskeletal:      Right lower leg: No edema. Left lower leg: No edema. Neurological:      Mental Status: She is alert and oriented to person, place, and time.           Additional Data:     Labs:  Results from last 7 days   Lab Units 11/09/23  0515   WBC Thousand/uL 8.44   HEMOGLOBIN g/dL 8.5*   HEMATOCRIT % 27.6*   PLATELETS Thousands/uL 450*   NEUTROS PCT % 59   LYMPHS PCT % 34   MONOS PCT % 6   EOS PCT % 0     Results from last 7 days   Lab Units 11/09/23  0515   SODIUM mmol/L 135   POTASSIUM mmol/L 3.9   CHLORIDE mmol/L 103   CO2 mmol/L 24   BUN mg/dL 8   CREATININE mg/dL 0.65   ANION GAP mmol/L 8   CALCIUM mg/dL 8.3*   ALBUMIN g/dL 3.2*   TOTAL BILIRUBIN mg/dL 0.43   ALK PHOS U/L 120*   ALT U/L 26   AST U/L 25   GLUCOSE RANDOM mg/dL 86     Results from last 7 days   Lab Units 11/06/23  2204   INR  1.14             Results from last 7 days   Lab Units 11/09/23  0515 11/07/23  0444 11/07/23  0054   PROCALCITONIN ng/ml 0.37* 0.11 0.10       Lines/Drains:  Invasive Devices       Peripheral Intravenous Line  Duration             Long-Dwell Peripheral IV (Midline) 91/67/79 Right Basilic 2 days              Airway  Duration             Supraglottic Airway LMA 4 <1 day                    Recent Cultures (last 7 days):   Results from last 7 days   Lab Units 11/08/23  1232 11/08/23  1124 11/07/23  1137 11/07/23  0911   BLOOD CULTURE   --   --  No Growth at 48 hrs. No Growth at 48 hrs.    GRAM STAIN RESULT   --  1+ Polys  No bacteria seen  --   --    BODY FLUID CULTURE, STERILE   --  No growth  --   --    LEGIONELLA URINARY ANTIGEN  Negative  --   --   --        Last 24 Hours Medication List:   Current Facility-Administered Medications   Medication Dose Route Frequency Provider Last Rate    acetaminophen  975 mg Oral [facility] Alejandra Maloney MD      anastrozole  1 mg Oral Daily Janell Benito PA-C      cefepime  2,000 mg Intravenous Q12H Alejandra Maloney MD 2,000 mg (11/09/23 2316)    heparin (porcine)  5,000 Units Subcutaneous Q8H [facility] Janell Benito PA-C      lidocaine  2 patch Topical Daily Alejandra Maloney MD      morphine injection  2 mg Intravenous Q4H PRN Janell Benito PA-C      ondansetron  4 mg Intravenous Q6H PRN Janell Benito PA-C      oxyCODONE  5 mg Oral Q4H PRN Alejandra Maloney MD      sodium chloride  125 mL/hr Intravenous Continuous Dahlia Garland  mL/hr (11/09/23 2318)    sodium chloride  125 mL/hr Intravenous Continuous Dahlia Garland  mL/hr (11/10/23 1612)    temazepam  7.5 mg Oral HS PRN Carly Cook PA-C          Today, Patient Was Seen By: Andrea Turner DO

## 2023-11-10 NOTE — ASSESSMENT & PLAN NOTE
follows with oncology for probable recurrent breast cancer, with diffuse bone mets, and right lung mass  Right lung mass positive on PET scan: Possible metastatic versus lung primary  CT scan on admission revealed, "stable size of right middle lobe mass extending to the lateral chest wall and mediastinum and crossing the major fissure into the right lower lobe"  Outpatient orders for IR lung biopsy: Discussed with IR, they recommend pulmonary consultation for bronchoscopy with biopsy rather than percutaneous biopsy  Thoracentesis performed by IR yesterday   Culture with no growth  Pathology pending   S/p bronchoscopy yesterday- cytology is pending

## 2023-11-10 NOTE — ASSESSMENT & PLAN NOTE
Patient presented with evolving sepsis with tachycardia, and then developed a fever of 102  CT scan with right lung mass, in the setting of metastatic breast cancer, with possible lung metastasis versus second lung primary  Sepsis possibly secondary to postobstructive pneumonia  blood cultures x2: negative for 48 hours   Not producing any sputum  Strep pneumo/legionella :negative   UA: neg N/L  Pulmonary scheduled bronchoscopy  Continue broad-spectrum antibiotics:  procal neg x 2- 4/5 days   Femoral central line placed in ER due to lack of IV access:  discontinued following day

## 2023-11-10 NOTE — PLAN OF CARE
Problem: Potential for Falls  Goal: Patient will remain free of falls  Description: INTERVENTIONS:  - Educate patient/family on patient safety including physical limitations  - Instruct patient to call for assistance with activity   - Consult OT/PT to assist with strengthening/mobility   - Keep Call bell within reach  - Keep bed low and locked with side rails adjusted as appropriate  - Keep care items and personal belongings within reach  - Initiate and maintain comfort rounds  - Make Fall Risk Sign visible to staff  - Apply yellow socks and bracelet for high fall risk patients  - Consider moving patient to room near nurses station  Outcome: Progressing     Problem: MOBILITY - ADULT  Goal: Maintain or return to baseline ADL function  Description: INTERVENTIONS:  -  Assess patient's ability to carry out ADLs; assess patient's baseline for ADL function and identify physical deficits which impact ability to perform ADLs (bathing, care of mouth/teeth, toileting, grooming, dressing, etc.)  - Assess/evaluate cause of self-care deficits   - Assess range of motion  - Assess patient's mobility; develop plan if impaired  - Assess patient's need for assistive devices and provide as appropriate  - Encourage maximum independence but intervene and supervise when necessary  - Involve family in performance of ADLs  - Assess for home care needs following discharge   - Consider OT consult to assist with ADL evaluation and planning for discharge  - Provide patient education as appropriate  Outcome: Progressing  Goal: Maintains/Returns to pre admission functional level  Description: INTERVENTIONS:  - Perform BMAT or MOVE assessment daily.   - Set and communicate daily mobility goal to care team and patient/family/caregiver.    - Collaborate with rehabilitation services on mobility goals if consulted  - Out of bed for toileting  - Record patient progress and toleration of activity level   Outcome: Progressing     Problem: PAIN - ADULT  Goal: Verbalizes/displays adequate comfort level or baseline comfort level  Description: Interventions:  - Encourage patient to monitor pain and request assistance  - Assess pain using appropriate pain scale  - Administer analgesics based on type and severity of pain and evaluate response  - Implement non-pharmacological measures as appropriate and evaluate response  - Consider cultural and social influences on pain and pain management  - Notify physician/advanced practitioner if interventions unsuccessful or patient reports new pain  Outcome: Progressing     Problem: INFECTION - ADULT  Goal: Absence or prevention of progression during hospitalization  Description: INTERVENTIONS:  - Assess and monitor for signs and symptoms of infection  - Monitor lab/diagnostic results  - Monitor all insertion sites, i.e. indwelling lines, tubes, and drains  - Monitor endotracheal if appropriate and nasal secretions for changes in amount and color  - [place] appropriate cooling/warming therapies per order  - Administer medications as ordered  - Instruct and encourage patient and family to use good hand hygiene technique  - Identify and instruct in appropriate isolation precautions for identified infection/condition  Outcome: Progressing     Problem: SAFETY ADULT  Goal: Patient will remain free of falls  Description: INTERVENTIONS:  - Educate patient/family on patient safety including physical limitations  - Instruct patient to call for assistance with activity   - Consult OT/PT to assist with strengthening/mobility   - Keep Call bell within reach  - Keep bed low and locked with side rails adjusted as appropriate  - Keep care items and personal belongings within reach  - Initiate and maintain comfort rounds  - Make Fall Risk Sign visible to staff  - Apply yellow socks and bracelet for high fall risk patients  - Consider moving patient to room near nurses station  Outcome: Progressing  Goal: Maintain or return to baseline ADL function  Description: INTERVENTIONS:  -  Assess patient's ability to carry out ADLs; assess patient's baseline for ADL function and identify physical deficits which impact ability to perform ADLs (bathing, care of mouth/teeth, toileting, grooming, dressing, etc.)  - Assess/evaluate cause of self-care deficits   - Assess range of motion  - Assess patient's mobility; develop plan if impaired  - Assess patient's need for assistive devices and provide as appropriate  - Encourage maximum independence but intervene and supervise when necessary  - Involve family in performance of ADLs  - Assess for home care needs following discharge   - Consider OT consult to assist with ADL evaluation and planning for discharge  - Provide patient education as appropriate  Outcome: Progressing  Goal: Maintains/Returns to pre admission functional level  Description: INTERVENTIONS:  - Perform BMAT or MOVE assessment daily.   - Set and communicate daily mobility goal to care team and patient/family/caregiver.    - Collaborate with rehabilitation services on mobility goals if consulted  - Out of bed for toileting  - Record patient progress and toleration of activity level   Outcome: Progressing     Problem: DISCHARGE PLANNING  Goal: Discharge to home or other facility with appropriate resources  Description: INTERVENTIONS:  - Identify barriers to discharge w/patient and caregiver  - Arrange for needed discharge resources and transportation as appropriate  - Identify discharge learning needs (meds, wound care, etc.)  - Arrange for interpretive services to assist at discharge as needed  - Refer to Case Management Department for coordinating discharge planning if the patient needs post-hospital services based on physician/advanced practitioner order or complex needs related to functional status, cognitive ability, or social support system  Outcome: Progressing     Problem: Knowledge Deficit  Goal: Patient/family/caregiver demonstrates understanding of disease process, treatment plan, medications, and discharge instructions  Description: Complete learning assessment and assess knowledge base.   Interventions:  - Provide teaching at level of understanding  - Provide teaching via preferred learning methods  Outcome: Progressing     Problem: CARDIOVASCULAR - ADULT  Goal: Maintains optimal cardiac output and hemodynamic stability  Description: INTERVENTIONS:  - Monitor I/O, vital signs and rhythm  - Monitor for S/S and trends of decreased cardiac output  - Administer and titrate ordered vasoactive medications to optimize hemodynamic stability  - Assess quality of pulses, skin color and temperature  - Assess for signs of decreased coronary artery perfusion  - Instruct patient to report change in severity of symptoms  Outcome: Progressing  Goal: Absence of cardiac dysrhythmias or at baseline rhythm  Description: INTERVENTIONS:  - Continuous cardiac monitoring, vital signs, obtain 12 lead EKG if ordered  - Administer antiarrhythmic and heart rate control medications as ordered  - Monitor electrolytes and administer replacement therapy as ordered  Outcome: Progressing     Problem: RESPIRATORY - ADULT  Goal: Achieves optimal ventilation and oxygenation  Description: INTERVENTIONS:  - Assess for changes in respiratory status  - Assess for changes in mentation and behavior  - Position to facilitate oxygenation and minimize respiratory effort  - Oxygen administered by appropriate delivery if ordered  - Initiate smoking cessation education as indicated  - Encourage broncho-pulmonary hygiene including cough, deep breathe, Incentive Spirometry  - Assess the need for suctioning and aspirate as needed  - Assess and instruct to report SOB or any respiratory difficulty  - Respiratory Therapy support as indicated  Outcome: Progressing     Problem: METABOLIC, FLUID AND ELECTROLYTES - ADULT  Goal: Electrolytes maintained within normal limits  Description: INTERVENTIONS:  - Monitor labs and assess patient for signs and symptoms of electrolyte imbalances  - Administer electrolyte replacement as ordered  - Monitor response to electrolyte replacements, including repeat lab results as appropriate  - Instruct patient on fluid and nutrition as appropriate  Outcome: Progressing     Problem: HEMATOLOGIC - ADULT  Goal: Maintains hematologic stability  Description: INTERVENTIONS  - Assess for signs and symptoms of bleeding or hemorrhage  - Monitor labs  - Administer supportive blood products/factors as ordered and appropriate  Outcome: Progressing

## 2023-11-11 LAB
BACTERIA SPEC BFLD CULT: NO GROWTH
GRAM STN SPEC: NORMAL
GRAM STN SPEC: NORMAL

## 2023-11-12 VITALS
DIASTOLIC BLOOD PRESSURE: 64 MMHG | OXYGEN SATURATION: 94 % | HEART RATE: 61 BPM | RESPIRATION RATE: 16 BRPM | SYSTOLIC BLOOD PRESSURE: 125 MMHG | TEMPERATURE: 96.2 F

## 2023-11-12 LAB
BACTERIA BLD CULT: NORMAL
BACTERIA BLD CULT: NORMAL

## 2023-11-13 ENCOUNTER — HOSPITAL ENCOUNTER (OUTPATIENT)
Dept: INFUSION CENTER | Facility: HOSPITAL | Age: 53
Discharge: HOME | End: 2023-11-13
Attending: INTERNAL MEDICINE
Payer: COMMERCIAL

## 2023-11-13 DIAGNOSIS — Z17.0 MALIGNANT NEOPLASM OF OVERLAPPING SITES OF LEFT BREAST IN FEMALE, ESTROGEN RECEPTOR POSITIVE: ICD-10-CM

## 2023-11-13 DIAGNOSIS — C50.812 MALIGNANT NEOPLASM OF OVERLAPPING SITES OF LEFT BREAST IN FEMALE, ESTROGEN RECEPTOR POSITIVE: ICD-10-CM

## 2023-11-13 DIAGNOSIS — C50.912 CARCINOMA OF LEFT BREAST IN FEMALE, ESTROGEN RECEPTOR POSITIVE, UNSPECIFIED SITE OF BREAST: Primary | ICD-10-CM

## 2023-11-13 DIAGNOSIS — Z17.0 CARCINOMA OF LEFT BREAST IN FEMALE, ESTROGEN RECEPTOR POSITIVE, UNSPECIFIED SITE OF BREAST: Primary | ICD-10-CM

## 2023-11-13 PROCEDURE — 88341 IMHCHEM/IMCYTCHM EA ADD ANTB: CPT | Performed by: STUDENT IN AN ORGANIZED HEALTH CARE EDUCATION/TRAINING PROGRAM

## 2023-11-13 PROCEDURE — 96402 CHEMO HORMON ANTINEOPL SQ/IM: CPT

## 2023-11-13 PROCEDURE — 88112 CYTOPATH CELL ENHANCE TECH: CPT | Performed by: STUDENT IN AN ORGANIZED HEALTH CARE EDUCATION/TRAINING PROGRAM

## 2023-11-13 PROCEDURE — 88305 TISSUE EXAM BY PATHOLOGIST: CPT | Performed by: STUDENT IN AN ORGANIZED HEALTH CARE EDUCATION/TRAINING PROGRAM

## 2023-11-13 PROCEDURE — 88342 IMHCHEM/IMCYTCHM 1ST ANTB: CPT | Performed by: STUDENT IN AN ORGANIZED HEALTH CARE EDUCATION/TRAINING PROGRAM

## 2023-11-13 RX ORDER — LAMOTRIGINE 25 MG/1
500 TABLET ORAL ONCE
Status: CANCELLED | OUTPATIENT
Start: 2023-11-27

## 2023-11-13 RX ORDER — LAMOTRIGINE 25 MG/1
500 TABLET ORAL ONCE
Status: COMPLETED | OUTPATIENT
Start: 2023-11-13 | End: 2023-11-13

## 2023-11-13 RX ADMIN — FULVESTRANT 500 MG: 50 INJECTION INTRAMUSCULAR at 09:52

## 2023-11-13 NOTE — PROGRESS NOTES
Pt tolerated faslodex today without complications, pt questioning about her swollen feet today, on assessment, pt did have some edema bilaterally in feet and lower legs, nonpitting, no redness or tenderness. Advised patient to reach out to her PCP. Confirmed appt back for zometa and faslodex, aware to do the labs for zometa. AVS provided.

## 2023-11-14 ENCOUNTER — HOSPITAL ENCOUNTER (OUTPATIENT)
Dept: CT IMAGING | Facility: HOSPITAL | Age: 53
Discharge: HOME | End: 2023-11-14
Attending: RADIOLOGY

## 2023-11-14 DIAGNOSIS — J90 PLEURAL EFFUSION: Primary | ICD-10-CM

## 2023-11-14 LAB
HISTIOCYTES NFR FLD: 35 %
LYMPHOCYTES NFR BLD AUTO: 5 %
MALIGNANT CELLS NFR FLD MANUAL: 45 %
MONO+MESO NFR FLD MANUAL: 10 %
NEUTS SEG NFR BLD AUTO: 5 %
PATHOLOGY REVIEW: YES
TOTAL CELLS COUNTED SPEC: 100

## 2023-11-14 NOTE — UTILIZATION REVIEW
NOTIFICATION OF ADMISSION DISCHARGE   This is a Notification of Discharge from [facility] [address] [facility]. Please be advised that this patient has been discharge from our facility. Below you will find the admission and discharge date and time including the patient’s disposition. UTILIZATION REVIEW CONTACT:  Beverley Simmonds  Utilization   Network Utilization Review Department  Phone: [phone] x carefully listen to the prompts. All voicemails are confidential.  Email: [email or web address]     ADMISSION INFORMATION  PRESENTATION DATE: 11/6/2023  6:15 PM  OBERVATION ADMISSION DATE:   INPATIENT ADMISSION DATE: 11/6/23 11:18 PM   DISCHARGE DATE: 11/10/2023  1:20 PM   DISPOSITION:Home/Self Care    Network Utilization Review Department  ATTENTION: Please call with any questions or concerns to [phone] and carefully listen to the prompts so that you are directed to the right person. All voicemails are confidential.   For Discharge needs, contact Care Management DC Support Team at [phone] opt. 2  Send all requests for admission clinical reviews, approved or denied determinations and any other requests to dedicated fax number below belonging to the campus where the patient is receiving treatment.  List of dedicated fax numbers for the Facilities:  [place] DENIALS (Administrative/Medical Necessity) [phone]   DISCHARGE SUPPORT TEAM (Network) [phone] [facility] ([facility]NICU/Pediatric[facility]) [phone]   [address] [facility] [address] [phone] [address] [facility] [phone]   [address] [place] [address]  [address]  [phone]

## 2023-11-15 DIAGNOSIS — Z17.0 CARCINOMA OF LEFT BREAST IN FEMALE, ESTROGEN RECEPTOR POSITIVE, UNSPECIFIED SITE OF BREAST: Primary | ICD-10-CM

## 2023-11-15 DIAGNOSIS — C50.912 CARCINOMA OF LEFT BREAST IN FEMALE, ESTROGEN RECEPTOR POSITIVE, UNSPECIFIED SITE OF BREAST: Primary | ICD-10-CM

## 2023-11-16 ENCOUNTER — TELEPHONE (OUTPATIENT)
Dept: HEMATOLOGY ONCOLOGY | Facility: CLINIC | Age: 53
End: 2023-11-16

## 2023-11-16 NOTE — TELEPHONE ENCOUNTER
Called pt using [email or web address] . Discussed Dr. Hermann Jacome recommendation to resume [place]. Pt reluctantly agreed. Reviewed she should take 3 pills once a day for 3 weeks on followed by 1 week off. She agrees to a follow up call early next week.

## 2023-11-20 ENCOUNTER — TELEPHONE (OUTPATIENT)
Dept: HEMATOLOGY ONCOLOGY | Facility: CLINIC | Age: 53
End: 2023-11-20

## 2023-11-20 LAB
CARIS ANDROGEN RECEPTOR: POSITIVE
CARIS ER: POSITIVE
CARIS HER2 CISH: NORMAL
CARIS HER2/NEU: NORMAL
CARIS PD-L1 (22C3): NEGATIVE
CARIS PR: NEGATIVE
CARIS PTEN: POSITIVE
CARIS TRKA/B/C: NEGATIVE

## 2023-11-20 NOTE — TELEPHONE ENCOUNTER
Spoke with pt using [employer] . Pt reports fatigue, pain throughout her body and a little nausea at times. Declined offer for Zofran. States she is taking Oxycodone/Tylenol for pain.

## 2023-11-20 NOTE — TELEPHONE ENCOUNTER
Called pt using [employer]  - call went to voicemail. Requested pt call back to discuss how she's feeling since starting Jluia Back.

## 2023-11-21 ENCOUNTER — TELEPHONE (OUTPATIENT)
Dept: HEMATOLOGY ONCOLOGY | Facility: CLINIC | Age: 53
End: 2023-11-21

## 2023-11-21 NOTE — TELEPHONE ENCOUNTER
Appointment Change  Cancel, Reschedule, Change to Virtual      Who are you speaking with? Patient   If it is not the patient, is the caller listed on the communication consent form? N/A   Which provider is the appointment scheduled with? Dr. Cain Edu   When was the original appointment scheduled? Please list date and time 12/08/23 2:40PM   At which location is the appointment scheduled to take place? [place]   Was the appointment rescheduled? Was the appointment changed from an in person visit to a virtual visit? If so, please list the details of the change. 02/02/24 11:40AM   What is the reason for the appointment change?  Provider

## 2023-11-24 ENCOUNTER — APPOINTMENT (OUTPATIENT)
Dept: LAB | Facility: HOSPITAL | Age: 53
End: 2023-11-24
Payer: COMMERCIAL

## 2023-11-24 ENCOUNTER — HOSPITAL ENCOUNTER (OUTPATIENT)
Dept: RADIOLOGY | Facility: HOSPITAL | Age: 53
Discharge: HOME | End: 2023-11-24
Payer: COMMERCIAL

## 2023-11-24 DIAGNOSIS — Z17.0 CARCINOMA OF LEFT BREAST IN FEMALE, ESTROGEN RECEPTOR POSITIVE, UNSPECIFIED SITE OF BREAST: ICD-10-CM

## 2023-11-24 DIAGNOSIS — C50.919 RECURRENT MALIGNANT NEOPLASM OF BREAST, UNSPECIFIED LATERALITY (HCC): ICD-10-CM

## 2023-11-24 DIAGNOSIS — Z85.3 PERSONAL HISTORY OF MALIGNANT NEOPLASM OF BREAST: ICD-10-CM

## 2023-11-24 DIAGNOSIS — C79.51 PAIN FROM BONE METASTASES (HCC): ICD-10-CM

## 2023-11-24 DIAGNOSIS — C50.912 CARCINOMA OF LEFT BREAST IN FEMALE, ESTROGEN RECEPTOR POSITIVE, UNSPECIFIED SITE OF BREAST: ICD-10-CM

## 2023-11-24 DIAGNOSIS — G89.3 PAIN FROM BONE METASTASES (HCC): ICD-10-CM

## 2023-11-24 DIAGNOSIS — D64.9 NORMOCYTIC ANEMIA: ICD-10-CM

## 2023-11-24 DIAGNOSIS — J90 PLEURAL EFFUSION: ICD-10-CM

## 2023-11-24 LAB
25(OH)D3 SERPL-MCNC: 24 NG/ML (ref 30–100)
ALBUMIN SERPL BCP-MCNC: 4.1 G/DL (ref 3.5–5)
ALP SERPL-CCNC: 180 U/L (ref 34–104)
ALT SERPL W P-5'-P-CCNC: 14 U/L (ref 7–52)
ANION GAP SERPL CALCULATED.3IONS-SCNC: 10 MMOL/L
AST SERPL W P-5'-P-CCNC: 27 U/L (ref 13–39)
BASOPHILS # BLD AUTO: 0.03 THOUSANDS/ÂΜL (ref 0–0.1)
BASOPHILS NFR BLD AUTO: 1 % (ref 0–1)
BILIRUB SERPL-MCNC: 0.32 MG/DL (ref 0.2–1)
BUN SERPL-MCNC: 14 MG/DL (ref 5–25)
CALCIUM SERPL-MCNC: 9.6 MG/DL (ref 8.4–10.2)
CHLORIDE SERPL-SCNC: 99 MMOL/L (ref 96–108)
CO2 SERPL-SCNC: 27 MMOL/L (ref 21–32)
CREAT SERPL-MCNC: 1.21 MG/DL (ref 0.6–1.3)
EOSINOPHIL # BLD AUTO: 0 THOUSAND/ÂΜL (ref 0–0.61)
EOSINOPHIL NFR BLD AUTO: 0 % (ref 0–6)
ERYTHROCYTE [DISTWIDTH] IN BLOOD BY AUTOMATED COUNT: 20.7 % (ref 11.6–15.1)
FERRITIN SERPL-MCNC: 484 NG/ML (ref 11–307)
FOLATE SERPL-MCNC: 11.4 NG/ML
GFR SERPL CREATININE-BSD FRML MDRD: 50 ML/MIN/1.73SQ M
GLUCOSE SERPL-MCNC: 96 MG/DL (ref 65–140)
HCT VFR BLD AUTO: 33.4 % (ref 34.8–46.1)
HGB BLD-MCNC: 9.9 G/DL (ref 11.5–15.4)
HGB RETIC QN AUTO: 29.1 PG (ref 30–38.3)
IMM GRANULOCYTES # BLD AUTO: 0.01 THOUSAND/UL (ref 0–0.2)
IMM GRANULOCYTES NFR BLD AUTO: 0 % (ref 0–2)
IMM RETICS NFR: 21.2 % (ref 0–14)
IRON SATN MFR SERPL: 19 % (ref 15–50)
IRON SERPL-MCNC: 59 UG/DL (ref 50–212)
LDH SERPL-CCNC: 222 U/L (ref 140–271)
LYMPHOCYTES # BLD AUTO: 1.58 THOUSANDS/ÂΜL (ref 0.6–4.47)
LYMPHOCYTES NFR BLD AUTO: 42 % (ref 14–44)
MCH RBC QN AUTO: 25.5 PG (ref 26.8–34.3)
MCHC RBC AUTO-ENTMCNC: 29.6 G/DL (ref 31.4–37.4)
MCV RBC AUTO: 86 FL (ref 82–98)
MONOCYTES # BLD AUTO: 0.34 THOUSAND/ÂΜL (ref 0.17–1.22)
MONOCYTES NFR BLD AUTO: 9 % (ref 4–12)
NEUTROPHILS # BLD AUTO: 1.82 THOUSANDS/ÂΜL (ref 1.85–7.62)
NEUTS SEG NFR BLD AUTO: 48 % (ref 43–75)
NRBC BLD AUTO-RTO: 0 /100 WBCS
PLATELET # BLD AUTO: 736 THOUSANDS/UL (ref 149–390)
PMV BLD AUTO: 8.4 FL (ref 8.9–12.7)
POTASSIUM SERPL-SCNC: 4.2 MMOL/L (ref 3.5–5.3)
PROT SERPL-MCNC: 8.1 G/DL (ref 6.4–8.4)
RBC # BLD AUTO: 3.88 MILLION/UL (ref 3.81–5.12)
RETICS # AUTO: ABNORMAL 10*3/UL (ref 14097–95744)
RETICS # CALC: 2.79 % (ref 0.37–1.87)
SODIUM SERPL-SCNC: 136 MMOL/L (ref 135–147)
TIBC SERPL-MCNC: 314 UG/DL (ref 250–450)
UIBC SERPL-MCNC: 255 UG/DL (ref 155–355)
VIT B12 SERPL-MCNC: 403 PG/ML (ref 180–914)
WBC # BLD AUTO: 3.78 THOUSAND/UL (ref 4.31–10.16)

## 2023-11-24 PROCEDURE — 82306 VITAMIN D 25 HYDROXY: CPT

## 2023-11-24 PROCEDURE — 71046 X-RAY EXAM CHEST 2 VIEWS: CPT

## 2023-11-24 PROCEDURE — 80053 COMPREHEN METABOLIC PANEL: CPT

## 2023-11-24 PROCEDURE — 83540 ASSAY OF IRON: CPT

## 2023-11-24 PROCEDURE — 85046 RETICYTE/HGB CONCENTRATE: CPT

## 2023-11-24 PROCEDURE — 82728 ASSAY OF FERRITIN: CPT

## 2023-11-24 PROCEDURE — 82746 ASSAY OF FOLIC ACID SERUM: CPT

## 2023-11-24 PROCEDURE — 82607 VITAMIN B-12: CPT

## 2023-11-24 PROCEDURE — 85025 COMPLETE CBC W/AUTO DIFF WBC: CPT

## 2023-11-24 PROCEDURE — 83615 LACTATE (LD) (LDH) ENZYME: CPT

## 2023-11-24 PROCEDURE — 83550 IRON BINDING TEST: CPT

## 2023-11-24 NOTE — UTILIZATION REVIEW
NOTIFICATION OF ADMISSION DISCHARGE   This is a Notification of Discharge from [address] [facility]. Please be advised that this patient has been discharge from our facility. Below you will find the admission and discharge date and time including the patient’s disposition. UTILIZATION REVIEW CONTACT:  Favio Greene  Utilization   Network Utilization Review Department  Phone: [phone] x carefully listen to the prompts. All voicemails are confidential.  Email: [email or web address]     ADMISSION INFORMATION  PRESENTATION DATE: 11/6/2023  6:15 PM  OBERVATION ADMISSION DATE:   INPATIENT ADMISSION DATE: 11/6/23 11:18 PM   DISCHARGE DATE: 11/10/2023  1:20 PM   DISPOSITION:Home/Self Care    Network Utilization Review Department  ATTENTION: Please call with any questions or concerns to [phone] and carefully listen to the prompts so that you are directed to the right person. All voicemails are confidential.   For Discharge needs, contact Care Management DC Support Team at [phone] opt. 2  Send all requests for admission clinical reviews, approved or denied determinations and any other requests to dedicated fax number below belonging to the campus where the patient is receiving treatment.  List of dedicated fax numbers for the Facilities:  [place] DENIALS (Administrative/Medical Necessity) [phone]   DISCHARGE SUPPORT TEAM (Network) [phone] 230[address] [facility] (Maternity/NICU/Pediatrics) [phone]   [address] [facility] [address] [phone] [address] [facility] [phone]   [address] [place] [address]  [address]  [phone]

## 2023-11-26 LAB
CANCER AG15-3 SERPL-ACNC: 37.4 U/ML (ref 0–25)
CANCER AG27-29 SERPL-ACNC: 42 U/ML (ref 0–38.6)

## 2023-11-27 ENCOUNTER — HOSPITAL ENCOUNTER (OUTPATIENT)
Dept: INFUSION CENTER | Facility: HOSPITAL | Age: 53
Discharge: HOME | End: 2023-11-27
Attending: INTERNAL MEDICINE
Payer: COMMERCIAL

## 2023-11-27 ENCOUNTER — TELEPHONE (OUTPATIENT)
Dept: HEMATOLOGY ONCOLOGY | Facility: CLINIC | Age: 53
End: 2023-11-27

## 2023-11-27 VITALS
HEART RATE: 118 BPM | BODY MASS INDEX: 22.38 KG/M2 | RESPIRATION RATE: 18 BRPM | DIASTOLIC BLOOD PRESSURE: 90 MMHG | TEMPERATURE: 97.9 F | SYSTOLIC BLOOD PRESSURE: 149 MMHG | WEIGHT: 134.48 LBS

## 2023-11-27 DIAGNOSIS — Z85.3 PERSONAL HISTORY OF MALIGNANT NEOPLASM OF BREAST: ICD-10-CM

## 2023-11-27 DIAGNOSIS — E55.9 VITAMIN D DEFICIENCY: Primary | ICD-10-CM

## 2023-11-27 DIAGNOSIS — C79.51 PAIN FROM BONE METASTASES (HCC): Primary | ICD-10-CM

## 2023-11-27 DIAGNOSIS — C50.912 CARCINOMA OF LEFT BREAST IN FEMALE, ESTROGEN RECEPTOR POSITIVE, UNSPECIFIED SITE OF BREAST: ICD-10-CM

## 2023-11-27 DIAGNOSIS — G89.3 PAIN FROM BONE METASTASES (HCC): Primary | ICD-10-CM

## 2023-11-27 DIAGNOSIS — C50.812 MALIGNANT NEOPLASM OF OVERLAPPING SITES OF LEFT BREAST IN FEMALE, ESTROGEN RECEPTOR POSITIVE: ICD-10-CM

## 2023-11-27 DIAGNOSIS — Z17.0 MALIGNANT NEOPLASM OF OVERLAPPING SITES OF LEFT BREAST IN FEMALE, ESTROGEN RECEPTOR POSITIVE: Primary | ICD-10-CM

## 2023-11-27 DIAGNOSIS — Z17.0 CARCINOMA OF LEFT BREAST IN FEMALE, ESTROGEN RECEPTOR POSITIVE, UNSPECIFIED SITE OF BREAST: ICD-10-CM

## 2023-11-27 DIAGNOSIS — C50.812 MALIGNANT NEOPLASM OF OVERLAPPING SITES OF LEFT BREAST IN FEMALE, ESTROGEN RECEPTOR POSITIVE: Primary | ICD-10-CM

## 2023-11-27 DIAGNOSIS — Z17.0 MALIGNANT NEOPLASM OF OVERLAPPING SITES OF LEFT BREAST IN FEMALE, ESTROGEN RECEPTOR POSITIVE: ICD-10-CM

## 2023-11-27 PROCEDURE — 96365 THER/PROPH/DIAG IV INF INIT: CPT

## 2023-11-27 PROCEDURE — 96402 CHEMO HORMON ANTINEOPL SQ/IM: CPT

## 2023-11-27 RX ORDER — LAMOTRIGINE 25 MG/1
500 TABLET ORAL ONCE
Status: CANCELLED | OUTPATIENT
Start: 2023-12-25

## 2023-11-27 RX ORDER — SODIUM CHLORIDE 9 MG/ML
20 INJECTION, SOLUTION INTRAVENOUS ONCE
OUTPATIENT
Start: 2023-12-22

## 2023-11-27 RX ORDER — SODIUM CHLORIDE 9 MG/ML
20 INJECTION, SOLUTION INTRAVENOUS ONCE
Status: COMPLETED | OUTPATIENT
Start: 2023-11-27 | End: 2023-11-27

## 2023-11-27 RX ORDER — LAMOTRIGINE 25 MG/1
500 TABLET ORAL ONCE
OUTPATIENT
Start: 2023-12-25

## 2023-11-27 RX ORDER — OXYCODONE HYDROCHLORIDE 5 MG/1
5 TABLET ORAL EVERY 6 HOURS PRN
Qty: 20 TABLET | Refills: 0 | Status: SHIPPED | OUTPATIENT
Start: 2023-11-27 | End: 2023-12-08 | Stop reason: SDUPTHER

## 2023-11-27 RX ORDER — ERGOCALCIFEROL 1.25 MG/1
50000 CAPSULE ORAL WEEKLY
Qty: 8 CAPSULE | Refills: 0 | Status: SHIPPED | OUTPATIENT
Start: 2023-11-27

## 2023-11-27 RX ORDER — LAMOTRIGINE 25 MG/1
500 TABLET ORAL ONCE
Status: COMPLETED | OUTPATIENT
Start: 2023-11-27 | End: 2023-11-27

## 2023-11-27 RX ORDER — ZOLEDRONIC ACID 0.04 MG/ML
4 INJECTION, SOLUTION INTRAVENOUS ONCE
OUTPATIENT
Start: 2023-12-22 | End: 2023-12-22

## 2023-11-27 RX ADMIN — FULVESTRANT 500 MG: 50 INJECTION, SOLUTION INTRAMUSCULAR at 10:07

## 2023-11-27 RX ADMIN — SODIUM CHLORIDE 20 ML/HR: 0.9 INJECTION, SOLUTION INTRAVENOUS at 10:16

## 2023-11-27 RX ADMIN — ZOLEDRONIC ACID 4 MG: 4 INJECTION, SOLUTION, CONCENTRATE INTRAVENOUS at 10:17

## 2023-11-27 NOTE — PROGRESS NOTES
Pt tolerated fulvestrant injections and zometa infusion without complications.   Next appt scheduled

## 2023-11-27 NOTE — TELEPHONE ENCOUNTER
Phoned patient using  [id number]. Instructed patient to take vitamin D 50,000 units weekly X 8 weeks. Patient also requesting refill of oxycodone 5mg. Patient aware and agreeable with vitamin d recommendations.

## 2023-11-28 ENCOUNTER — TELEPHONE (OUTPATIENT)
Dept: HEMATOLOGY ONCOLOGY | Facility: CLINIC | Age: 53
End: 2023-11-28

## 2023-11-28 NOTE — PROGRESS NOTES
Pulmonary Follow-Up Note   Carrie Hodges 52 y.o. female MRN: [id number]  11/29/2023      Assessment/Plan:    Malignant neoplasm of female breast  Following with Oncology   Currently being treated with Zometa and Faslodex. Pleural effusion - post thoracentesis 11/8/23; 220 mL of fluid was drained. IPC was discussed today - she is asymptomatic, exam is normal, and has not developed a new accumulation of pleural fluid however this option was reviewed as a potential future treatment. She wishes to wait and see if she requires multiple thoracentesis in the future  We did review warning signs of pleural effusion today; she verbalized understanding  Lung consolidation - she was referred by PCP for CT needle biopsy with IR; not scheduled - bronchoscopy performed 11/7/23   Cytology is positive for carcinoma, likely metastasis from primary breast cancer  Vaccines: we did review the importance of vaccination particularly as her treatments leave her potentially immune-compromised. Eligible for Flu and [id number]  Patient in the past had symptoms develop after flu shot and declined  Prevnar 20 given today    Diagnoses and all orders for this visit:    Need for pneumococcal 20-valent conjugate vaccination  -     Pneumococcal Conjugate Vaccine 20-valent (Pcv20)    Carcinoma of left breast in female, estrogen receptor positive, unspecified site of breast     Pleural effusion on right    Right lower lobe lung mass        Return in about 3 months (around 2/29/2024). History of Present Illness   Reason for Visit: Follow up  Chief Complaint: pleural effusion  HPI: Carrie Hodegs is a 52 y.o. female who presents to the office today for follow up. She denies any pulmonary symptoms including wheezing, cough, tight chest or shortness of breath. No sleep disturbance related to pulmonary symptoms. She returns today after recent pleural effusion, which was drained by IR 11/8/23.     She has started treatment and is following with Oncology for breast cancer. Review of Systems   Constitutional:  Positive for fatigue. Negative for chills and fever. Respiratory:  Negative for cough, chest tightness, shortness of breath and wheezing. Cardiovascular:  Negative for chest pain, palpitations and leg swelling. Gastrointestinal:  Negative for abdominal pain. Musculoskeletal:  Positive for back pain. All other systems reviewed and are negative.       Historical Information   Past Medical History:   Diagnosis Date    Breast cancer ([address]) 07/2013    left-chemotherapy    Fibroma     History of chemotherapy     History of external beam radiation therapy      Past Surgical History:   Procedure Laterality Date    APPENDECTOMY      BREAST SURGERY  2014    Left breast 2/2 breast CA    HYSTERECTOMY  2015    Due to fibroma    IR BIOPSY BONE  10/9/2023    IR THORACENTESIS  11/8/2023    MASTECTOMY Left 2013    OOPHORECTOMY       Family History   Problem Relation Age of Onset    Lung cancer Mother     No Known Problems Father     No Known Problems Sister     No Known Problems Daughter     No Known Problems Daughter     Cancer Maternal Grandmother     No Known Problems Maternal Grandfather     No Known Problems Paternal Grandmother     No Known Problems Paternal Grandfather     Uterine cancer Maternal Aunt     No Known Problems Son     No Known Problems Paternal Aunt      Social History   Social History     Substance and Sexual Activity   Alcohol Use Not Currently     Social History     Substance and Sexual Activity   Drug Use No     Social History     Tobacco Use   Smoking Status Never    Passive exposure: Never   Smokeless Tobacco Never     E-Cigarette/Vaping    E-Cigarette Use Never User      E-Cigarette/Vaping Substances    Nicotine No     THC No     CBD No     Flavoring No     Other No     Unknown No        Meds/Allergies     Current Outpatient Medications:     acetaminophen (TYLENOL) 325 mg tablet, Take 3 tablets (975 mg total) by mouth every 8 (eight) hours, Disp: 90 tablet, Rfl: 0    calcium citrate-vitamin D (Calcitrate Plus D) 315 mg-5 mcg tablet, Take 1 tablet by mouth 2 (two) times a day, Disp: 180 tablet, Rfl: 0    docusate sodium (COLACE) 100 mg capsule, Take 1 capsule (100 mg total) by mouth every 12 (twelve) hours, Disp: 60 capsule, Rfl: 0    ergocalciferol (VITAMIN D2) 50,000 units, Take 1 capsule (50,000 Units total) by mouth once a week, Disp: 8 capsule, Rfl: 0    lidocaine (LIDODERM) 5 %, Apply 2 patches topically over 12 hours daily Remove & Discard patch within 12 hours or as directed by MD Do not start before November 9, 2023., Disp: 60 patch, Rfl: 0    Multiple Vitamin (MULTIVITAMIN ADULT PO), Take 1 tablet by mouth in the morning, Disp: , Rfl:     oxyCODONE (Roxicodone) 5 immediate release tablet, Take 1 tablet (5 mg total) by mouth every 6 (six) hours as needed for moderate pain Max Daily Amount: 20 mg, Disp: 20 tablet, Rfl: 0    Ribociclib Succ, 600 MG Dose, 200 MG TBPK, Take 600 mg by mouth in the morning For 3 weeks and then 1 week off, Disp: 21 each, Rfl: 6    anastrozole (ARIMIDEX) 1 mg tablet, Take 1 tablet (1 mg total) by mouth daily (Patient not taking: Reported on 11/29/2023), Disp: 90 tablet, Rfl: 3    ibuprofen (MOTRIN) 600 mg tablet, Take 1 tablet (600 mg total) by mouth every 6 (six) hours as needed for moderate pain Take with food (Patient not taking: Reported on 11/29/2023), Disp: 60 tablet, Rfl: 0  No current facility-administered medications for this visit. Facility-Administered Medications Ordered in Other Visits:     alteplase (CATHFLO) injection 2 mg, 2 mg, Intracatheter, Q1MIN PRN, Abimael Landis MD  Allergies   Allergen Reactions    Aspirin Rash     Other reaction(s): Palpitations       Vitals: Blood pressure 138/78, pulse (!) 122, temperature 98.2 °F (36.8 °C), temperature source Tympanic, height 5' 5" (1.651 m), weight 60.8 kg (134 lb), SpO2 99 %. Body mass index is 22.3 kg/m².  Oxygen Therapy  SpO2: 99 %  Oxygen Therapy: None (Room air)    Physical Exam  Constitutional:       Appearance: Normal appearance. She is normal weight. HENT:      Head: Normocephalic. Nose: Nose normal.      Mouth/Throat:      Mouth: Mucous membranes are moist.      Pharynx: Oropharynx is clear. Eyes:      Conjunctiva/sclera: Conjunctivae normal.      Pupils: Pupils are equal, round, and reactive to light. Cardiovascular:      Rate and Rhythm: Normal rate and regular rhythm. Pulses: Normal pulses. Heart sounds: Normal heart sounds. Pulmonary:      Effort: Pulmonary effort is normal.      Breath sounds: Normal breath sounds. Abdominal:      General: Abdomen is flat. Bowel sounds are normal.      Palpations: Abdomen is soft. Musculoskeletal:         General: Normal range of motion. Skin:     Capillary Refill: Capillary refill takes less than 2 seconds. Neurological:      General: No focal deficit present. Mental Status: She is alert and oriented to person, place, and time. Mental status is at baseline. Psychiatric:         Mood and Affect: Mood normal.         Behavior: Behavior normal.         Thought Content: Thought content normal.         Labs:   Lab Results   Component Value Date    WBC 3.78 (L) 11/24/2023    HGB 9.9 (L) 11/24/2023    HCT 33.4 (L) 11/24/2023    MCV 86 11/24/2023     (H) 11/24/2023    EOSPCT 0 11/24/2023    EOSABS 0.00 11/24/2023    MONOPCT 9 11/24/2023     Lab Results   Component Value Date    GLUCOSE 81 06/22/2018    CALCIUM 9.6 11/24/2023     06/22/2018    K 4.2 11/24/2023    CO2 27 11/24/2023    CL 99 11/24/2023    BUN 14 11/24/2023    CREATININE 1.21 11/24/2023     No results found for: "IGE", "RAST"  Lab Results   Component Value Date    ALT 14 11/24/2023    AST 27 11/24/2023    ALKPHOS 180 (H) 11/24/2023    BILITOT 0.2 06/22/2018         Imaging and other studies: I have personally reviewed pertinent reports.    and I have personally reviewed pertinent films in PACS  NM PET CT skull base to mid thigh 10/26/23   IMPRESSION:     Multifocal hypermetabolic malignancy/metastatic disease involving the chest and fairly diffusely involving the skeletal system. While findings are most likely related to patient's known history of breast cancer, primary right lung malignancy cannot be excluded. Please see above for details and additional findings. CXR 11/24/23  Moderate residual right-sided pneumothorax status post thoracentesis. Probable underlying infiltrate or atelectasis partially obscured by effusion         Relda Donning, [address]        Portions of the record may have been created with voice recognition software. Occasional wrong word or "sound a like" substitutions may have occurred due to the inherent limitations of voice recognition software. Read the chart carefully and recognize, using context, where substitutions have occurred or contact the dictating provider.

## 2023-11-28 NOTE — TELEPHONE ENCOUNTER
Received voicemail from pt requesting a call back. Spoke with pt using [employer] . Pt asked if okay to take new script for Vitamin D with food. She also asked if okay to continue calcium with D supplement. Advised she should take both with food. Emphasized prescription is once a week, and calcium with D is twice a day. Pt also asked if okay to continue her Centrum multivitamin - advised this is fine as well.

## 2023-11-29 ENCOUNTER — OFFICE VISIT (OUTPATIENT)
Dept: PULMONOLOGY | Facility: CLINIC | Age: 53
End: 2023-11-29
Payer: COMMERCIAL

## 2023-11-29 VITALS
WEIGHT: 134 LBS | TEMPERATURE: 98.2 F | DIASTOLIC BLOOD PRESSURE: 78 MMHG | HEIGHT: 65 IN | HEART RATE: 122 BPM | SYSTOLIC BLOOD PRESSURE: 138 MMHG | OXYGEN SATURATION: 99 % | BODY MASS INDEX: 22.33 KG/M2

## 2023-11-29 DIAGNOSIS — Z23 NEED FOR PNEUMOCOCCAL 20-VALENT CONJUGATE VACCINATION: Primary | ICD-10-CM

## 2023-11-29 DIAGNOSIS — C50.912 CARCINOMA OF LEFT BREAST IN FEMALE, ESTROGEN RECEPTOR POSITIVE, UNSPECIFIED SITE OF BREAST: ICD-10-CM

## 2023-11-29 DIAGNOSIS — J90 PLEURAL EFFUSION ON RIGHT: ICD-10-CM

## 2023-11-29 DIAGNOSIS — Z17.0 CARCINOMA OF LEFT BREAST IN FEMALE, ESTROGEN RECEPTOR POSITIVE, UNSPECIFIED SITE OF BREAST: ICD-10-CM

## 2023-11-29 DIAGNOSIS — R91.8 RIGHT LOWER LOBE LUNG MASS: ICD-10-CM

## 2023-11-29 PROCEDURE — 90677 PCV20 VACCINE IM: CPT

## 2023-11-29 PROCEDURE — 90471 IMMUNIZATION ADMIN: CPT

## 2023-11-29 PROCEDURE — 99214 OFFICE O/P EST MOD 30 MIN: CPT | Performed by: NURSE PRACTITIONER

## 2023-12-06 LAB
CARIS ANDROGEN RECEPTOR: POSITIVE
CARIS ER: NEGATIVE
CARIS GENOMIC LOH - EXOME: NORMAL
CARIS HER2/NEU: NORMAL
CARIS MSI - EXOME: NORMAL
CARIS PD-L1 (22C3): NORMAL
CARIS PR: NEGATIVE
CARIS PTEN: NEGATIVE
CARIS TMB - EXOME: NORMAL

## 2023-12-07 DIAGNOSIS — G89.3 PAIN FROM BONE METASTASES (HCC): ICD-10-CM

## 2023-12-07 DIAGNOSIS — C79.51 PAIN FROM BONE METASTASES (HCC): ICD-10-CM

## 2023-12-07 RX ORDER — OXYCODONE HYDROCHLORIDE 5 MG/1
5 TABLET ORAL EVERY 6 HOURS PRN
Qty: 20 TABLET | Refills: 0 | Status: CANCELLED | OUTPATIENT
Start: 2023-12-07

## 2023-12-08 ENCOUNTER — TELEPHONE (OUTPATIENT)
Dept: HEMATOLOGY ONCOLOGY | Facility: CLINIC | Age: 53
End: 2023-12-08

## 2023-12-08 DIAGNOSIS — C79.51 PAIN FROM BONE METASTASES (HCC): ICD-10-CM

## 2023-12-08 DIAGNOSIS — G89.3 PAIN FROM BONE METASTASES (HCC): ICD-10-CM

## 2023-12-08 RX ORDER — ACETAMINOPHEN 325 MG/1
975 TABLET ORAL EVERY 8 HOURS SCHEDULED
Qty: 90 TABLET | Refills: 0 | Status: SHIPPED | OUTPATIENT
Start: 2023-12-08

## 2023-12-08 RX ORDER — OXYCODONE HYDROCHLORIDE 5 MG/1
5 TABLET ORAL EVERY 6 HOURS PRN
Qty: 20 TABLET | Refills: 0 | Status: SHIPPED | OUTPATIENT
Start: 2023-12-08

## 2023-12-08 NOTE — TELEPHONE ENCOUNTER
Received voicemail from pt late in day 12/7. Called her back this morning using [employer] . Pt is requesting an Oxycodone refill. It appears Palliative Care referral is still pending.

## 2023-12-12 ENCOUNTER — APPOINTMENT (OUTPATIENT)
Dept: LAB | Facility: HOSPITAL | Age: 53
End: 2023-12-12
Payer: COMMERCIAL

## 2023-12-12 DIAGNOSIS — C79.51 PAIN FROM BONE METASTASES (HCC): ICD-10-CM

## 2023-12-12 DIAGNOSIS — Z17.0 CARCINOMA OF LEFT BREAST IN FEMALE, ESTROGEN RECEPTOR POSITIVE, UNSPECIFIED SITE OF BREAST: ICD-10-CM

## 2023-12-12 DIAGNOSIS — G89.3 PAIN FROM BONE METASTASES (HCC): ICD-10-CM

## 2023-12-12 DIAGNOSIS — C50.912 CARCINOMA OF LEFT BREAST IN FEMALE, ESTROGEN RECEPTOR POSITIVE, UNSPECIFIED SITE OF BREAST: ICD-10-CM

## 2023-12-12 DIAGNOSIS — Z85.3 PERSONAL HISTORY OF MALIGNANT NEOPLASM OF BREAST: ICD-10-CM

## 2023-12-12 LAB
ALBUMIN SERPL BCP-MCNC: 4.3 G/DL (ref 3.5–5)
ALP SERPL-CCNC: 160 U/L (ref 34–104)
ALT SERPL W P-5'-P-CCNC: 14 U/L (ref 7–52)
ANION GAP SERPL CALCULATED.3IONS-SCNC: 9 MMOL/L
AST SERPL W P-5'-P-CCNC: 43 U/L (ref 13–39)
BILIRUB SERPL-MCNC: 0.3 MG/DL (ref 0.2–1)
BUN SERPL-MCNC: 11 MG/DL (ref 5–25)
CALCIUM SERPL-MCNC: 9.6 MG/DL (ref 8.4–10.2)
CHLORIDE SERPL-SCNC: 100 MMOL/L (ref 96–108)
CO2 SERPL-SCNC: 26 MMOL/L (ref 21–32)
CREAT SERPL-MCNC: 0.9 MG/DL (ref 0.6–1.3)
GFR SERPL CREATININE-BSD FRML MDRD: 72 ML/MIN/1.73SQ M
GLUCOSE SERPL-MCNC: 115 MG/DL (ref 65–140)
POTASSIUM SERPL-SCNC: 3.8 MMOL/L (ref 3.5–5.3)
PROT SERPL-MCNC: 8.2 G/DL (ref 6.4–8.4)
SODIUM SERPL-SCNC: 135 MMOL/L (ref 135–147)

## 2023-12-12 PROCEDURE — 36415 COLL VENOUS BLD VENIPUNCTURE: CPT

## 2023-12-12 PROCEDURE — 80053 COMPREHEN METABOLIC PANEL: CPT

## 2023-12-14 ENCOUNTER — TELEPHONE (OUTPATIENT)
Dept: HEMATOLOGY ONCOLOGY | Facility: CLINIC | Age: 53
End: 2023-12-14

## 2023-12-14 NOTE — TELEPHONE ENCOUNTER
Received voicemail from pt requesting a call back. Spoke with pt using [email or web address] . Pt reports feeling "tired and weak". She feels much better on her [place] Catena off week. Has "a little bit" of sob but not nearly as bad as previous. Advised this is likely a side effect of Kisqali and I will let the doctor know. Pt does not feel she needs to be seen in office before 2/2 follow up. Pt asked about a refill for the Vitamin D pills - pharmacy gave her four initially. I asked her to call for the refill since she needs to take a total of eight (once a week dose).

## 2023-12-17 DIAGNOSIS — G89.3 PAIN FROM BONE METASTASES (HCC): ICD-10-CM

## 2023-12-17 DIAGNOSIS — C79.51 PAIN FROM BONE METASTASES (HCC): ICD-10-CM

## 2023-12-18 RX ORDER — OXYCODONE HYDROCHLORIDE 5 MG/1
5 TABLET ORAL EVERY 6 HOURS PRN
Qty: 20 TABLET | Refills: 0 | Status: SHIPPED | OUTPATIENT
Start: 2023-12-18

## 2023-12-21 ENCOUNTER — TELEPHONE (OUTPATIENT)
Dept: HEMATOLOGY ONCOLOGY | Facility: CLINIC | Age: 53
End: 2023-12-21

## 2023-12-21 ENCOUNTER — PATIENT OUTREACH (OUTPATIENT)
Dept: CASE MANAGEMENT | Facility: HOSPITAL | Age: 53
End: 2023-12-21

## 2023-12-21 ENCOUNTER — APPOINTMENT (OUTPATIENT)
Dept: LAB | Facility: HOSPITAL | Age: 53
End: 2023-12-21
Payer: COMMERCIAL

## 2023-12-21 DIAGNOSIS — C50.912 CARCINOMA OF LEFT BREAST IN FEMALE, ESTROGEN RECEPTOR POSITIVE, UNSPECIFIED SITE OF BREAST: Primary | ICD-10-CM

## 2023-12-21 DIAGNOSIS — R91.8 RIGHT LOWER LOBE LUNG MASS: ICD-10-CM

## 2023-12-21 DIAGNOSIS — Z17.0 CARCINOMA OF LEFT BREAST IN FEMALE, ESTROGEN RECEPTOR POSITIVE, UNSPECIFIED SITE OF BREAST: Primary | ICD-10-CM

## 2023-12-21 LAB
ALBUMIN SERPL BCP-MCNC: 4 G/DL (ref 3.5–5)
ALP SERPL-CCNC: 149 U/L (ref 34–104)
ALT SERPL W P-5'-P-CCNC: 14 U/L (ref 7–52)
ANION GAP SERPL CALCULATED.3IONS-SCNC: 9 MMOL/L
AST SERPL W P-5'-P-CCNC: 32 U/L (ref 13–39)
BASOPHILS # BLD AUTO: 0.05 THOUSANDS/ÂΜL (ref 0–0.1)
BASOPHILS NFR BLD AUTO: 1 % (ref 0–1)
BILIRUB SERPL-MCNC: 0.21 MG/DL (ref 0.2–1)
BUN SERPL-MCNC: 12 MG/DL (ref 5–25)
CALCIUM SERPL-MCNC: 9.3 MG/DL (ref 8.4–10.2)
CHLORIDE SERPL-SCNC: 101 MMOL/L (ref 96–108)
CO2 SERPL-SCNC: 25 MMOL/L (ref 21–32)
CREAT SERPL-MCNC: 0.81 MG/DL (ref 0.6–1.3)
EOSINOPHIL # BLD AUTO: 0.11 THOUSAND/ÂΜL (ref 0–0.61)
EOSINOPHIL NFR BLD AUTO: 2 % (ref 0–6)
ERYTHROCYTE [DISTWIDTH] IN BLOOD BY AUTOMATED COUNT: 22.6 % (ref 11.6–15.1)
GFR SERPL CREATININE-BSD FRML MDRD: 82 ML/MIN/1.73SQ M
GLUCOSE SERPL-MCNC: 108 MG/DL (ref 65–140)
HCT VFR BLD AUTO: 30.5 % (ref 34.8–46.1)
HGB BLD-MCNC: 9.2 G/DL (ref 11.5–15.4)
IMM GRANULOCYTES # BLD AUTO: 0.06 THOUSAND/UL (ref 0–0.2)
IMM GRANULOCYTES NFR BLD AUTO: 1 % (ref 0–2)
LYMPHOCYTES # BLD AUTO: 2.03 THOUSANDS/ÂΜL (ref 0.6–4.47)
LYMPHOCYTES NFR BLD AUTO: 35 % (ref 14–44)
MCH RBC QN AUTO: 26.1 PG (ref 26.8–34.3)
MCHC RBC AUTO-ENTMCNC: 30.2 G/DL (ref 31.4–37.4)
MCV RBC AUTO: 87 FL (ref 82–98)
MONOCYTES # BLD AUTO: 0.52 THOUSAND/ÂΜL (ref 0.17–1.22)
MONOCYTES NFR BLD AUTO: 9 % (ref 4–12)
NEUTROPHILS # BLD AUTO: 3.02 THOUSANDS/ÂΜL (ref 1.85–7.62)
NEUTS SEG NFR BLD AUTO: 52 % (ref 43–75)
NRBC BLD AUTO-RTO: 0 /100 WBCS
PLATELET # BLD AUTO: 609 THOUSANDS/UL (ref 149–390)
PMV BLD AUTO: 8.6 FL (ref 8.9–12.7)
POTASSIUM SERPL-SCNC: 4.3 MMOL/L (ref 3.5–5.3)
PROT SERPL-MCNC: 7.7 G/DL (ref 6.4–8.4)
RBC # BLD AUTO: 3.52 MILLION/UL (ref 3.81–5.12)
SODIUM SERPL-SCNC: 135 MMOL/L (ref 135–147)
WBC # BLD AUTO: 5.79 THOUSAND/UL (ref 4.31–10.16)

## 2023-12-21 PROCEDURE — 36415 COLL VENOUS BLD VENIPUNCTURE: CPT

## 2023-12-21 PROCEDURE — 80053 COMPREHEN METABOLIC PANEL: CPT

## 2023-12-21 PROCEDURE — 85025 COMPLETE CBC W/AUTO DIFF WBC: CPT

## 2023-12-21 NOTE — TELEPHONE ENCOUNTER
Received voicemail from pt requesting a call back. Spoke with pt using [employer] .    Pt is requesting a letter so that her sister can visit from [place] for one week. Sister's name is Melva Martin.     Patient is also requesting to speak with a  because she states she is unable to work due to treatment. She did not go into more detail, but I let her know I would ask someone to call her.     Pt again complaining of SOB and fatigue. SOB is not as bad as when she needed the thoracentesis. Instructed pt to go to ED if SOB worsens or if she is not able to climb a flight of stairs without stopping. She asked to review the possible side effects of Kisqali which I did.

## 2023-12-21 NOTE — PROGRESS NOTES
OSW received referral for patient Patient requesting to speak with social work. SH/AL pt. Known to Rosemary in the past. Thank you!     Patient's assessment and DT already complete. LSW will outreach for support.

## 2023-12-26 ENCOUNTER — HOSPITAL ENCOUNTER (OUTPATIENT)
Dept: INFUSION CENTER | Facility: HOSPITAL | Age: 53
Discharge: HOME | End: 2023-12-26
Attending: INTERNAL MEDICINE
Payer: COMMERCIAL

## 2023-12-26 VITALS
HEART RATE: 119 BPM | SYSTOLIC BLOOD PRESSURE: 160 MMHG | OXYGEN SATURATION: 100 % | TEMPERATURE: 97.6 F | DIASTOLIC BLOOD PRESSURE: 81 MMHG | RESPIRATION RATE: 18 BRPM

## 2023-12-26 DIAGNOSIS — G89.3 PAIN FROM BONE METASTASES (HCC): ICD-10-CM

## 2023-12-26 DIAGNOSIS — C79.51 PAIN FROM BONE METASTASES (HCC): ICD-10-CM

## 2023-12-26 DIAGNOSIS — Z17.0 CARCINOMA OF LEFT BREAST IN FEMALE, ESTROGEN RECEPTOR POSITIVE, UNSPECIFIED SITE OF BREAST: Primary | ICD-10-CM

## 2023-12-26 DIAGNOSIS — C50.812 MALIGNANT NEOPLASM OF OVERLAPPING SITES OF LEFT BREAST IN FEMALE, ESTROGEN RECEPTOR POSITIVE: ICD-10-CM

## 2023-12-26 DIAGNOSIS — Z85.3 PERSONAL HISTORY OF MALIGNANT NEOPLASM OF BREAST: ICD-10-CM

## 2023-12-26 DIAGNOSIS — Z17.0 MALIGNANT NEOPLASM OF OVERLAPPING SITES OF LEFT BREAST IN FEMALE, ESTROGEN RECEPTOR POSITIVE: ICD-10-CM

## 2023-12-26 DIAGNOSIS — C50.912 CARCINOMA OF LEFT BREAST IN FEMALE, ESTROGEN RECEPTOR POSITIVE, UNSPECIFIED SITE OF BREAST: Primary | ICD-10-CM

## 2023-12-26 PROCEDURE — 96402 CHEMO HORMON ANTINEOPL SQ/IM: CPT

## 2023-12-26 PROCEDURE — 96365 THER/PROPH/DIAG IV INF INIT: CPT

## 2023-12-26 RX ORDER — ZOLEDRONIC ACID 0.04 MG/ML
4 INJECTION, SOLUTION INTRAVENOUS ONCE
OUTPATIENT
Start: 2024-01-09 | End: 2024-01-09

## 2023-12-26 RX ORDER — LAMOTRIGINE 25 MG/1
500 TABLET ORAL ONCE
OUTPATIENT
Start: 2024-01-22

## 2023-12-26 RX ORDER — LAMOTRIGINE 25 MG/1
500 TABLET ORAL ONCE
Status: COMPLETED | OUTPATIENT
Start: 2023-12-26 | End: 2023-12-26

## 2023-12-26 RX ORDER — SODIUM CHLORIDE 9 MG/ML
20 INJECTION, SOLUTION INTRAVENOUS ONCE
Status: COMPLETED | OUTPATIENT
Start: 2023-12-26 | End: 2023-12-26

## 2023-12-26 RX ORDER — SODIUM CHLORIDE 9 MG/ML
20 INJECTION, SOLUTION INTRAVENOUS ONCE
OUTPATIENT
Start: 2024-01-09

## 2023-12-26 RX ADMIN — ZOLEDRONIC ACID 4 MG: 4 INJECTION, SOLUTION, CONCENTRATE INTRAVENOUS at 10:22

## 2023-12-26 RX ADMIN — FULVESTRANT 500 MG: 50 INJECTION, SOLUTION INTRAMUSCULAR at 11:03

## 2023-12-26 RX ADMIN — SODIUM CHLORIDE 20 ML/HR: 9 INJECTION, SOLUTION INTRAVENOUS at 10:19

## 2023-12-26 NOTE — PROGRESS NOTES
Pt tolerated treatment today with no adverse reactions. AVS provided. Next apt confirmed. Left unit ambulatory with a steady gait.

## 2023-12-29 DIAGNOSIS — C79.51 PAIN FROM BONE METASTASES (HCC): ICD-10-CM

## 2023-12-29 DIAGNOSIS — G89.3 PAIN FROM BONE METASTASES (HCC): ICD-10-CM

## 2024-01-01 ENCOUNTER — HOSPITAL ENCOUNTER (OUTPATIENT)
Dept: INFUSION CENTER | Facility: HOSPITAL | Age: 54
End: 2024-01-01
Attending: INTERNAL MEDICINE

## 2024-01-01 ENCOUNTER — APPOINTMENT (INPATIENT)
Dept: RADIOLOGY | Facility: HOSPITAL | Age: 54
DRG: 130 | End: 2024-01-01
Payer: COMMERCIAL

## 2024-01-01 ENCOUNTER — APPOINTMENT (INPATIENT)
Dept: NON INVASIVE DIAGNOSTICS | Facility: HOSPITAL | Age: 54
DRG: 130 | End: 2024-01-01
Payer: COMMERCIAL

## 2024-01-01 ENCOUNTER — TELEPHONE (OUTPATIENT)
Dept: FAMILY MEDICINE CLINIC | Facility: CLINIC | Age: 54
End: 2024-01-01

## 2024-01-01 ENCOUNTER — HOSPITAL ENCOUNTER (INPATIENT)
Facility: HOSPITAL | Age: 54
LOS: 6 days | DRG: 130 | End: 2024-04-12
Attending: EMERGENCY MEDICINE | Admitting: INTERNAL MEDICINE
Payer: COMMERCIAL

## 2024-01-01 ENCOUNTER — APPOINTMENT (EMERGENCY)
Dept: CT IMAGING | Facility: HOSPITAL | Age: 54
DRG: 130 | End: 2024-01-01
Payer: COMMERCIAL

## 2024-01-01 ENCOUNTER — APPOINTMENT (EMERGENCY)
Dept: RADIOLOGY | Facility: HOSPITAL | Age: 54
DRG: 130 | End: 2024-01-01
Payer: COMMERCIAL

## 2024-01-01 VITALS
SYSTOLIC BLOOD PRESSURE: 92 MMHG | HEIGHT: 65 IN | WEIGHT: 140.21 LBS | TEMPERATURE: 98.4 F | HEART RATE: 106 BPM | DIASTOLIC BLOOD PRESSURE: 63 MMHG | BODY MASS INDEX: 23.36 KG/M2 | OXYGEN SATURATION: 94 %

## 2024-01-01 DIAGNOSIS — I48.92 ATRIAL FLUTTER WITH RAPID VENTRICULAR RESPONSE (HCC): ICD-10-CM

## 2024-01-01 DIAGNOSIS — J90 LARGE PLEURAL EFFUSION: ICD-10-CM

## 2024-01-01 DIAGNOSIS — C50.912 CARCINOMA OF LEFT BREAST IN FEMALE, ESTROGEN RECEPTOR POSITIVE, UNSPECIFIED SITE OF BREAST (HCC): ICD-10-CM

## 2024-01-01 DIAGNOSIS — I46.9 CARDIAC ARREST (HCC): ICD-10-CM

## 2024-01-01 DIAGNOSIS — Z17.0 CARCINOMA OF LEFT BREAST IN FEMALE, ESTROGEN RECEPTOR POSITIVE, UNSPECIFIED SITE OF BREAST (HCC): ICD-10-CM

## 2024-01-01 DIAGNOSIS — J18.9 PNEUMONIA: ICD-10-CM

## 2024-01-01 DIAGNOSIS — J96.21 ACUTE ON CHRONIC RESPIRATORY FAILURE WITH HYPOXIA (HCC): ICD-10-CM

## 2024-01-01 DIAGNOSIS — C50.911 RECURRENT MALIGNANT NEOPLASM OF RIGHT BREAST (HCC): ICD-10-CM

## 2024-01-01 DIAGNOSIS — R06.02 SHORTNESS OF BREATH: Primary | ICD-10-CM

## 2024-01-01 DIAGNOSIS — E43 SEVERE PROTEIN-CALORIE MALNUTRITION (HCC): ICD-10-CM

## 2024-01-01 DIAGNOSIS — Z17.0 MALIGNANT NEOPLASM OF OVERLAPPING SITES OF LEFT BREAST IN FEMALE, ESTROGEN RECEPTOR POSITIVE (HCC): ICD-10-CM

## 2024-01-01 DIAGNOSIS — C50.812 MALIGNANT NEOPLASM OF OVERLAPPING SITES OF LEFT BREAST IN FEMALE, ESTROGEN RECEPTOR POSITIVE (HCC): ICD-10-CM

## 2024-01-01 DIAGNOSIS — R57.9 SHOCK (HCC): ICD-10-CM

## 2024-01-01 DIAGNOSIS — G89.3 CANCER ASSOCIATED PAIN: ICD-10-CM

## 2024-01-01 LAB
ABO GROUP BLD BPU: NORMAL
ABO GROUP BLD: NORMAL
ALBUMIN SERPL BCP-MCNC: 2.6 G/DL (ref 3.5–5)
ALBUMIN SERPL BCP-MCNC: 3.2 G/DL (ref 3.5–5)
ALP SERPL-CCNC: 107 U/L (ref 34–104)
ALP SERPL-CCNC: 187 U/L (ref 34–104)
ALT SERPL W P-5'-P-CCNC: 22 U/L (ref 7–52)
ALT SERPL W P-5'-P-CCNC: 26 U/L (ref 7–52)
ANION GAP SERPL CALCULATED.3IONS-SCNC: 10 MMOL/L (ref 4–13)
ANION GAP SERPL CALCULATED.3IONS-SCNC: 12 MMOL/L (ref 4–13)
ANION GAP SERPL CALCULATED.3IONS-SCNC: 12 MMOL/L (ref 4–13)
ANION GAP SERPL CALCULATED.3IONS-SCNC: 13 MMOL/L (ref 4–13)
ANION GAP SERPL CALCULATED.3IONS-SCNC: 16 MMOL/L (ref 4–13)
ANION GAP SERPL CALCULATED.3IONS-SCNC: 4 MMOL/L (ref 4–13)
ANION GAP SERPL CALCULATED.3IONS-SCNC: 5 MMOL/L (ref 4–13)
ANION GAP SERPL CALCULATED.3IONS-SCNC: 9 MMOL/L (ref 4–13)
ANISOCYTOSIS BLD QL SMEAR: PRESENT
ANISOCYTOSIS BLD QL SMEAR: PRESENT
AORTIC ROOT: 2.7 CM
APICAL FOUR CHAMBER EJECTION FRACTION: 67 %
APTT PPP: 20 SECONDS (ref 23–37)
ARTERIAL PATENCY WRIST A: NO
ARTERIAL PATENCY WRIST A: YES
AST SERPL W P-5'-P-CCNC: 178 U/L (ref 13–39)
AST SERPL W P-5'-P-CCNC: 94 U/L (ref 13–39)
ATRIAL RATE: 107 BPM
ATRIAL RATE: 173 BPM
ATRIAL RATE: 340 BPM
BACTERIA SPT RESP CULT: ABNORMAL
BACTERIA SPT RESP CULT: ABNORMAL
BASE EX.OXY STD BLDV CALC-SCNC: 72.7 % (ref 60–80)
BASE EX.OXY STD BLDV CALC-SCNC: 77.4 % (ref 60–80)
BASE EX.OXY STD BLDV CALC-SCNC: 90.6 % (ref 60–80)
BASE EX.OXY STD BLDV CALC-SCNC: 92.1 % (ref 60–80)
BASE EX.OXY STD BLDV CALC-SCNC: 94 % (ref 60–80)
BASE EX.OXY STD BLDV CALC-SCNC: 94 % (ref 60–80)
BASE EX.OXY STD BLDV CALC-SCNC: 94.4 % (ref 60–80)
BASE EXCESS BLDA CALC-SCNC: 4.3 MMOL/L
BASE EXCESS BLDA CALC-SCNC: 5.5 MMOL/L
BASE EXCESS BLDA CALC-SCNC: 5.9 MMOL/L
BASE EXCESS BLDA CALC-SCNC: 6.2 MMOL/L
BASE EXCESS BLDA CALC-SCNC: 6.9 MMOL/L
BASE EXCESS BLDV CALC-SCNC: 1 MMOL/L
BASE EXCESS BLDV CALC-SCNC: 1.6 MMOL/L
BASE EXCESS BLDV CALC-SCNC: 10 MMOL/L
BASE EXCESS BLDV CALC-SCNC: 3.5 MMOL/L
BASE EXCESS BLDV CALC-SCNC: 4.9 MMOL/L
BASE EXCESS BLDV CALC-SCNC: 7.4 MMOL/L
BASE EXCESS BLDV CALC-SCNC: 8 MMOL/L
BASOPHILS # BLD AUTO: 0.01 THOUSANDS/ÂΜL (ref 0–0.1)
BASOPHILS # BLD AUTO: 0.03 THOUSANDS/ÂΜL (ref 0–0.1)
BASOPHILS # BLD AUTO: 0.06 THOUSANDS/ÂΜL (ref 0–0.1)
BASOPHILS # BLD MANUAL: 0 THOUSAND/UL (ref 0–0.1)
BASOPHILS # BLD MANUAL: 0 THOUSAND/UL (ref 0–0.1)
BASOPHILS NFR BLD AUTO: 0 % (ref 0–1)
BASOPHILS NFR BLD AUTO: 1 % (ref 0–1)
BASOPHILS NFR BLD AUTO: 1 % (ref 0–1)
BASOPHILS NFR MAR MANUAL: 0 % (ref 0–1)
BASOPHILS NFR MAR MANUAL: 0 % (ref 0–1)
BILIRUB SERPL-MCNC: 0.45 MG/DL (ref 0.2–1)
BILIRUB SERPL-MCNC: 0.48 MG/DL (ref 0.2–1)
BLD GP AB SCN SERPL QL: NEGATIVE
BNP SERPL-MCNC: 223 PG/ML (ref 0–100)
BPU ID: NORMAL
BSA FOR ECHO PROCEDURE: 1.61 M2
BSA FOR ECHO PROCEDURE: 1.69 M2
BUN SERPL-MCNC: 19 MG/DL (ref 5–25)
BUN SERPL-MCNC: 21 MG/DL (ref 5–25)
BUN SERPL-MCNC: 22 MG/DL (ref 5–25)
BUN SERPL-MCNC: 24 MG/DL (ref 5–25)
BUN SERPL-MCNC: 26 MG/DL (ref 5–25)
BUN SERPL-MCNC: 26 MG/DL (ref 5–25)
CA-I BLD-SCNC: 1.02 MMOL/L (ref 1.12–1.32)
CALCIUM ALBUM COR SERPL-MCNC: 9.5 MG/DL (ref 8.3–10.1)
CALCIUM ALBUM COR SERPL-MCNC: 9.6 MG/DL (ref 8.3–10.1)
CALCIUM SERPL-MCNC: 7.7 MG/DL (ref 8.4–10.2)
CALCIUM SERPL-MCNC: 8 MG/DL (ref 8.4–10.2)
CALCIUM SERPL-MCNC: 8.3 MG/DL (ref 8.4–10.2)
CALCIUM SERPL-MCNC: 8.4 MG/DL (ref 8.4–10.2)
CALCIUM SERPL-MCNC: 8.5 MG/DL (ref 8.4–10.2)
CALCIUM SERPL-MCNC: 8.6 MG/DL (ref 8.4–10.2)
CALCIUM SERPL-MCNC: 8.8 MG/DL (ref 8.4–10.2)
CALCIUM SERPL-MCNC: 8.9 MG/DL (ref 8.4–10.2)
CHLORIDE SERPL-SCNC: 101 MMOL/L (ref 96–108)
CHLORIDE SERPL-SCNC: 102 MMOL/L (ref 96–108)
CHLORIDE SERPL-SCNC: 102 MMOL/L (ref 96–108)
CHLORIDE SERPL-SCNC: 103 MMOL/L (ref 96–108)
CHLORIDE SERPL-SCNC: 105 MMOL/L (ref 96–108)
CHLORIDE SERPL-SCNC: 96 MMOL/L (ref 96–108)
CO2 SERPL-SCNC: 30 MMOL/L (ref 21–32)
CO2 SERPL-SCNC: 32 MMOL/L (ref 21–32)
CO2 SERPL-SCNC: 34 MMOL/L (ref 21–32)
CO2 SERPL-SCNC: 34 MMOL/L (ref 21–32)
CO2 SERPL-SCNC: 36 MMOL/L (ref 21–32)
CO2 SERPL-SCNC: 36 MMOL/L (ref 21–32)
CO2 SERPL-SCNC: 38 MMOL/L (ref 21–32)
CO2 SERPL-SCNC: 38 MMOL/L (ref 21–32)
CREAT SERPL-MCNC: 0.43 MG/DL (ref 0.6–1.3)
CREAT SERPL-MCNC: 0.44 MG/DL (ref 0.6–1.3)
CREAT SERPL-MCNC: 0.47 MG/DL (ref 0.6–1.3)
CREAT SERPL-MCNC: 0.5 MG/DL (ref 0.6–1.3)
CREAT SERPL-MCNC: 0.52 MG/DL (ref 0.6–1.3)
CREAT SERPL-MCNC: 0.53 MG/DL (ref 0.6–1.3)
CREAT SERPL-MCNC: 0.54 MG/DL (ref 0.6–1.3)
CREAT SERPL-MCNC: 0.61 MG/DL (ref 0.6–1.3)
CROSSMATCH: NORMAL
DACRYOCYTES BLD QL SMEAR: PRESENT
DIGOXIN SERPL-MCNC: 1.9 NG/ML (ref 0.8–2)
DIGOXIN SERPL-MCNC: 2.1 NG/ML (ref 0.8–2)
E WAVE DECELERATION TIME: 127 MS
E/A RATIO: 0.91
EOSINOPHIL # BLD AUTO: 0 THOUSAND/ÂΜL (ref 0–0.61)
EOSINOPHIL # BLD AUTO: 0.02 THOUSAND/ÂΜL (ref 0–0.61)
EOSINOPHIL # BLD AUTO: 0.05 THOUSAND/ÂΜL (ref 0–0.61)
EOSINOPHIL # BLD MANUAL: 0 THOUSAND/UL (ref 0–0.4)
EOSINOPHIL # BLD MANUAL: 0 THOUSAND/UL (ref 0–0.4)
EOSINOPHIL NFR BLD AUTO: 0 % (ref 0–6)
EOSINOPHIL NFR BLD AUTO: 0 % (ref 0–6)
EOSINOPHIL NFR BLD AUTO: 1 % (ref 0–6)
EOSINOPHIL NFR BLD MANUAL: 0 % (ref 0–6)
EOSINOPHIL NFR BLD MANUAL: 0 % (ref 0–6)
ERYTHROCYTE [DISTWIDTH] IN BLOOD BY AUTOMATED COUNT: 20.5 % (ref 11.6–15.1)
ERYTHROCYTE [DISTWIDTH] IN BLOOD BY AUTOMATED COUNT: 20.7 % (ref 11.6–15.1)
ERYTHROCYTE [DISTWIDTH] IN BLOOD BY AUTOMATED COUNT: 20.8 % (ref 11.6–15.1)
ERYTHROCYTE [DISTWIDTH] IN BLOOD BY AUTOMATED COUNT: 21 % (ref 11.6–15.1)
ERYTHROCYTE [DISTWIDTH] IN BLOOD BY AUTOMATED COUNT: 21.2 % (ref 11.6–15.1)
ERYTHROCYTE [DISTWIDTH] IN BLOOD BY AUTOMATED COUNT: 21.5 % (ref 11.6–15.1)
ERYTHROCYTE [DISTWIDTH] IN BLOOD BY AUTOMATED COUNT: 21.5 % (ref 11.6–15.1)
ERYTHROCYTE [DISTWIDTH] IN BLOOD BY AUTOMATED COUNT: 22.3 % (ref 11.6–15.1)
FERRITIN SERPL-MCNC: 5993 NG/ML (ref 11–307)
FOLATE SERPL-MCNC: 2 NG/ML
FRACTIONAL SHORTENING: 45 (ref 28–44)
GFR SERPL CREATININE-BSD FRML MDRD: 102 ML/MIN/1.73SQ M
GFR SERPL CREATININE-BSD FRML MDRD: 106 ML/MIN/1.73SQ M
GFR SERPL CREATININE-BSD FRML MDRD: 107 ML/MIN/1.73SQ M
GFR SERPL CREATININE-BSD FRML MDRD: 107 ML/MIN/1.73SQ M
GFR SERPL CREATININE-BSD FRML MDRD: 109 ML/MIN/1.73SQ M
GFR SERPL CREATININE-BSD FRML MDRD: 111 ML/MIN/1.73SQ M
GFR SERPL CREATININE-BSD FRML MDRD: 114 ML/MIN/1.73SQ M
GFR SERPL CREATININE-BSD FRML MDRD: 114 ML/MIN/1.73SQ M
GLUCOSE SERPL-MCNC: 108 MG/DL (ref 65–140)
GLUCOSE SERPL-MCNC: 117 MG/DL (ref 65–140)
GLUCOSE SERPL-MCNC: 117 MG/DL (ref 65–140)
GLUCOSE SERPL-MCNC: 124 MG/DL (ref 65–140)
GLUCOSE SERPL-MCNC: 141 MG/DL (ref 65–140)
GLUCOSE SERPL-MCNC: 160 MG/DL (ref 65–140)
GLUCOSE SERPL-MCNC: 162 MG/DL (ref 65–140)
GLUCOSE SERPL-MCNC: 167 MG/DL (ref 65–140)
GLUCOSE SERPL-MCNC: 169 MG/DL (ref 65–140)
GLUCOSE SERPL-MCNC: 175 MG/DL (ref 65–140)
GLUCOSE SERPL-MCNC: 183 MG/DL (ref 65–140)
GLUCOSE SERPL-MCNC: 189 MG/DL (ref 65–140)
GLUCOSE SERPL-MCNC: 194 MG/DL (ref 65–140)
GRAM STN SPEC: ABNORMAL
HCO3 BLDA-SCNC: 29.1 MMOL/L (ref 22–28)
HCO3 BLDA-SCNC: 29.4 MMOL/L (ref 22–28)
HCO3 BLDA-SCNC: 29.6 MMOL/L (ref 22–28)
HCO3 BLDA-SCNC: 31.4 MMOL/L (ref 22–28)
HCO3 BLDA-SCNC: 32.2 MMOL/L (ref 22–28)
HCO3 BLDV-SCNC: 27.4 MMOL/L (ref 24–30)
HCO3 BLDV-SCNC: 29.7 MMOL/L (ref 24–30)
HCO3 BLDV-SCNC: 31.9 MMOL/L (ref 24–30)
HCO3 BLDV-SCNC: 32.9 MMOL/L (ref 24–30)
HCO3 BLDV-SCNC: 33.7 MMOL/L (ref 24–30)
HCO3 BLDV-SCNC: 35.3 MMOL/L (ref 24–30)
HCO3 BLDV-SCNC: 36.7 MMOL/L (ref 24–30)
HCT VFR BLD AUTO: 25.1 % (ref 34.8–46.1)
HCT VFR BLD AUTO: 26.2 % (ref 34.8–46.1)
HCT VFR BLD AUTO: 26.2 % (ref 34.8–46.1)
HCT VFR BLD AUTO: 26.5 % (ref 34.8–46.1)
HCT VFR BLD AUTO: 27.8 % (ref 34.8–46.1)
HCT VFR BLD AUTO: 28 % (ref 34.8–46.1)
HCT VFR BLD AUTO: 28.2 % (ref 34.8–46.1)
HCT VFR BLD AUTO: 32.3 % (ref 34.8–46.1)
HCT VFR BLD AUTO: 36.1 % (ref 34.8–46.1)
HGB BLD-MCNC: 10 G/DL (ref 11.5–15.4)
HGB BLD-MCNC: 7 G/DL (ref 11.5–15.4)
HGB BLD-MCNC: 7.5 G/DL (ref 11.5–15.4)
HGB BLD-MCNC: 7.6 G/DL (ref 11.5–15.4)
HGB BLD-MCNC: 7.6 G/DL (ref 11.5–15.4)
HGB BLD-MCNC: 8.1 G/DL (ref 11.5–15.4)
HGB BLD-MCNC: 8.3 G/DL (ref 11.5–15.4)
HGB BLD-MCNC: 8.4 G/DL (ref 11.5–15.4)
HGB BLD-MCNC: 9.3 G/DL (ref 11.5–15.4)
HOROWITZ INDEX BLDA+IHG-RTO: 100 MM[HG]
HOROWITZ INDEX BLDA+IHG-RTO: 50 MM[HG]
HOROWITZ INDEX BLDA+IHG-RTO: 60 MM[HG]
HOROWITZ INDEX BLDA+IHG-RTO: 80 MM[HG]
HYPERCHROMIA BLD QL SMEAR: PRESENT
I-TIME: 1
I-TIME: 1
IMM GRANULOCYTES # BLD AUTO: 0.1 THOUSAND/UL (ref 0–0.2)
IMM GRANULOCYTES # BLD AUTO: 0.22 THOUSAND/UL (ref 0–0.2)
IMM GRANULOCYTES # BLD AUTO: 0.45 THOUSAND/UL (ref 0–0.2)
IMM GRANULOCYTES NFR BLD AUTO: 2 % (ref 0–2)
IMM GRANULOCYTES NFR BLD AUTO: 5 % (ref 0–2)
IMM GRANULOCYTES NFR BLD AUTO: 6 % (ref 0–2)
INR PPP: 1.17 (ref 0.84–1.19)
INR PPP: 1.3 (ref 0.84–1.19)
INTERVENTRICULAR SEPTUM IN DIASTOLE (PARASTERNAL SHORT AXIS VIEW): 1 CM
INTERVENTRICULAR SEPTUM: 1 CM (ref 0.6–1.1)
IRON SATN MFR SERPL: 53 % (ref 15–50)
IRON SERPL-MCNC: 65 UG/DL (ref 50–212)
LAAS-AP2: 13.1 CM2
LAAS-AP4: 14.4 CM2
LACTATE SERPL-SCNC: 2.1 MMOL/L (ref 0.5–2)
LACTATE SERPL-SCNC: 2.4 MMOL/L (ref 0.5–2)
LEFT ATRIUM SIZE: 3.4 CM
LEFT ATRIUM VOLUME (MOD BIPLANE): 32 ML
LEFT ATRIUM VOLUME INDEX (MOD BIPLANE): 19.9 ML/M2
LEFT INTERNAL DIMENSION IN SYSTOLE: 2.2 CM (ref 2.1–4)
LEFT VENTRICULAR INTERNAL DIMENSION IN DIASTOLE: 4 CM (ref 3.5–6)
LEFT VENTRICULAR POSTERIOR WALL IN END DIASTOLE: 1 CM
LEFT VENTRICULAR STROKE VOLUME: 53 ML
LG PLATELETS BLD QL SMEAR: PRESENT
LG PLATELETS BLD QL SMEAR: PRESENT
LVSV (TEICH): 53 ML
LYMPHOCYTES # BLD AUTO: 0.61 THOUSANDS/ÂΜL (ref 0.6–4.47)
LYMPHOCYTES # BLD AUTO: 0.77 THOUSANDS/ÂΜL (ref 0.6–4.47)
LYMPHOCYTES # BLD AUTO: 0.79 THOUSAND/UL (ref 0.6–4.47)
LYMPHOCYTES # BLD AUTO: 0.82 THOUSAND/UL (ref 0.6–4.47)
LYMPHOCYTES # BLD AUTO: 0.95 THOUSAND/UL (ref 0.6–4.47)
LYMPHOCYTES # BLD AUTO: 1.12 THOUSANDS/ÂΜL (ref 0.6–4.47)
LYMPHOCYTES # BLD AUTO: 11 % (ref 14–44)
LYMPHOCYTES # BLD AUTO: 13 %
LYMPHOCYTES # BLD AUTO: 15 % (ref 14–44)
LYMPHOCYTES NFR BLD AUTO: 12 % (ref 14–44)
LYMPHOCYTES NFR BLD AUTO: 14 % (ref 14–44)
LYMPHOCYTES NFR BLD AUTO: 14 % (ref 14–44)
MACROCYTES BLD QL AUTO: PRESENT
MAGNESIUM SERPL-MCNC: 1.9 MG/DL (ref 1.9–2.7)
MAGNESIUM SERPL-MCNC: 2 MG/DL (ref 1.9–2.7)
MAGNESIUM SERPL-MCNC: 2.1 MG/DL (ref 1.9–2.7)
MCH RBC QN AUTO: 27.3 PG (ref 26.8–34.3)
MCH RBC QN AUTO: 27.6 PG (ref 26.8–34.3)
MCH RBC QN AUTO: 27.6 PG (ref 26.8–34.3)
MCH RBC QN AUTO: 27.7 PG (ref 26.8–34.3)
MCH RBC QN AUTO: 27.7 PG (ref 26.8–34.3)
MCH RBC QN AUTO: 27.8 PG (ref 26.8–34.3)
MCH RBC QN AUTO: 28 PG (ref 26.8–34.3)
MCH RBC QN AUTO: 28 PG (ref 26.8–34.3)
MCHC RBC AUTO-ENTMCNC: 27.7 G/DL (ref 31.4–37.4)
MCHC RBC AUTO-ENTMCNC: 27.9 G/DL (ref 31.4–37.4)
MCHC RBC AUTO-ENTMCNC: 28.3 G/DL (ref 31.4–37.4)
MCHC RBC AUTO-ENTMCNC: 28.8 G/DL (ref 31.4–37.4)
MCHC RBC AUTO-ENTMCNC: 29 G/DL (ref 31.4–37.4)
MCHC RBC AUTO-ENTMCNC: 29 G/DL (ref 31.4–37.4)
MCHC RBC AUTO-ENTMCNC: 29.6 G/DL (ref 31.4–37.4)
MCHC RBC AUTO-ENTMCNC: 29.8 G/DL (ref 31.4–37.4)
MCV RBC AUTO: 100 FL (ref 82–98)
MCV RBC AUTO: 93 FL (ref 82–98)
MCV RBC AUTO: 94 FL (ref 82–98)
MCV RBC AUTO: 96 FL (ref 82–98)
MCV RBC AUTO: 99 FL (ref 82–98)
MCV RBC AUTO: 99 FL (ref 82–98)
METAMYELOCYTES # BLD MANUAL: 0.19 THOUSAND/UL (ref 0–0.1)
METAMYELOCYTES NFR BLD MANUAL: 1 % (ref 0–1)
METAMYELOCYTES NFR BLD MANUAL: 3 % (ref 0–1)
MONOCYTES # BLD AUTO: 0.26 THOUSAND/ÂΜL (ref 0.17–1.22)
MONOCYTES # BLD AUTO: 0.32 THOUSAND/UL (ref 0–1.22)
MONOCYTES # BLD AUTO: 0.5 THOUSAND/UL (ref 0–1.22)
MONOCYTES # BLD AUTO: 0.53 THOUSAND/ÂΜL (ref 0.17–1.22)
MONOCYTES # BLD AUTO: 0.57 THOUSAND/UL (ref 0–1.22)
MONOCYTES # BLD AUTO: 0.89 THOUSAND/ÂΜL (ref 0.17–1.22)
MONOCYTES NFR BLD AUTO: 14 % (ref 4–12)
MONOCYTES NFR BLD AUTO: 6 % (ref 4–12)
MONOCYTES NFR BLD AUTO: 7 % (ref 4–12)
MONOCYTES NFR BLD AUTO: 8 % (ref 4–12)
MONOCYTES NFR BLD: 5 % (ref 4–12)
MONOCYTES NFR BLD: 8 % (ref 4–12)
MV E'TISSUE VEL-LAT: 14 CM/S
MV E'TISSUE VEL-SEP: 13 CM/S
MV PEAK A VEL: 0.55 M/S
MV PEAK E VEL: 50 CM/S
MV STENOSIS PRESSURE HALF TIME: 37 MS
MV VALVE AREA P 1/2 METHOD: 5.95
MYELOCYTES # BLD MANUAL: 0.06 THOUSAND/UL (ref 0–0.1)
MYELOCYTES NFR BLD MANUAL: 1 % (ref 0–1)
MYELOCYTES NFR BLD MANUAL: 1 % (ref 0–1)
NEUTROPHILS # BLD AUTO: 3.31 THOUSANDS/ÂΜL (ref 1.85–7.62)
NEUTROPHILS # BLD AUTO: 4.82 THOUSANDS/ÂΜL (ref 1.85–7.62)
NEUTROPHILS # BLD AUTO: 5.97 THOUSANDS/ÂΜL (ref 1.85–7.62)
NEUTROPHILS # BLD MANUAL: 4.91 THOUSAND/UL (ref 1.85–7.62)
NEUTROPHILS # BLD MANUAL: 5.82 THOUSAND/UL (ref 1.85–7.62)
NEUTS BAND NFR BLD MANUAL: 23 % (ref 0–8)
NEUTS BAND NFR BLD MANUAL: 23 % (ref 0–8)
NEUTS SEG # BLD: 4.73 THOUSAND/UL (ref 1.81–6.82)
NEUTS SEG NFR BLD AUTO: 52 %
NEUTS SEG NFR BLD AUTO: 55 % (ref 43–75)
NEUTS SEG NFR BLD AUTO: 71 % (ref 43–75)
NEUTS SEG NFR BLD AUTO: 72 % (ref 43–75)
NEUTS SEG NFR BLD AUTO: 74 % (ref 43–75)
NEUTS SEG NFR BLD AUTO: 81 % (ref 43–75)
NRBC BLD AUTO-RTO: 14 /100 WBC (ref 0–2)
NRBC BLD AUTO-RTO: 14 /100 WBCS
NRBC BLD AUTO-RTO: 2 /100 WBCS
NRBC BLD AUTO-RTO: 23 /100 WBCS
NRBC BLD AUTO-RTO: 5 /100 WBC (ref 0–2)
NRBC BLD AUTO-RTO: 6 /100 WBCS
O2 CT BLDA-SCNC: 11.1 ML/DL (ref 16–23)
O2 CT BLDA-SCNC: 12 ML/DL (ref 16–23)
O2 CT BLDA-SCNC: 12.3 ML/DL (ref 16–23)
O2 CT BLDA-SCNC: 13.2 ML/DL (ref 16–23)
O2 CT BLDA-SCNC: 13.7 ML/DL (ref 16–23)
O2 CT BLDV-SCNC: 10.1 ML/DL
O2 CT BLDV-SCNC: 10.3 ML/DL
O2 CT BLDV-SCNC: 10.7 ML/DL
O2 CT BLDV-SCNC: 11.2 ML/DL
O2 CT BLDV-SCNC: 11.2 ML/DL
O2 CT BLDV-SCNC: 11.4 ML/DL
O2 CT BLDV-SCNC: 11.5 ML/DL
OXYHGB MFR BLDA: 94.3 % (ref 94–97)
OXYHGB MFR BLDA: 94.9 % (ref 94–97)
OXYHGB MFR BLDA: 95.3 % (ref 94–97)
OXYHGB MFR BLDA: 96.7 % (ref 94–97)
OXYHGB MFR BLDA: 97.5 % (ref 94–97)
PCO2 BLDA: 35.6 MM HG (ref 36–44)
PCO2 BLDA: 39.3 MM HG (ref 36–44)
PCO2 BLDA: 40.3 MM HG (ref 36–44)
PCO2 BLDA: 44.8 MM HG (ref 36–44)
PCO2 BLDA: 69.4 MM HG (ref 36–44)
PCO2 BLDV: 138.8 MM HG (ref 42–50)
PCO2 BLDV: 153.9 MM HG (ref 42–50)
PCO2 BLDV: 32.1 MM HG (ref 42–50)
PCO2 BLDV: 45.1 MM HG (ref 42–50)
PCO2 BLDV: 48.2 MM HG (ref 42–50)
PCO2 BLDV: 49.7 MM HG (ref 42–50)
PCO2 BLDV: 59.7 MM HG (ref 42–50)
PEEP RESPIRATORY: 6 CM[H2O]
PH BLDA: 7.29 [PH] (ref 7.35–7.45)
PH BLDA: 7.46 [PH] (ref 7.35–7.45)
PH BLDA: 7.48 [PH] (ref 7.35–7.45)
PH BLDA: 7.5 [PH] (ref 7.35–7.45)
PH BLDA: 7.53 [PH] (ref 7.35–7.45)
PH BLDV: 6.98 [PH] (ref 7.3–7.4)
PH BLDV: 7.04 [PH] (ref 7.3–7.4)
PH BLDV: 7.36 [PH] (ref 7.3–7.4)
PH BLDV: 7.37 [PH] (ref 7.3–7.4)
PH BLDV: 7.44 [PH] (ref 7.3–7.4)
PH BLDV: 7.45 [PH] (ref 7.3–7.4)
PH BLDV: 7.62 [PH] (ref 7.3–7.4)
PHOSPHATE SERPL-MCNC: 2.3 MG/DL (ref 2.7–4.5)
PHOSPHATE SERPL-MCNC: 2.3 MG/DL (ref 2.7–4.5)
PHOSPHATE SERPL-MCNC: 2.4 MG/DL (ref 2.7–4.5)
PHOSPHATE SERPL-MCNC: 2.7 MG/DL (ref 2.7–4.5)
PLATELET # BLD AUTO: 158 THOUSANDS/UL (ref 149–390)
PLATELET # BLD AUTO: 179 THOUSANDS/UL (ref 149–390)
PLATELET # BLD AUTO: 193 THOUSANDS/UL (ref 149–390)
PLATELET # BLD AUTO: 203 THOUSANDS/UL (ref 149–390)
PLATELET # BLD AUTO: 206 THOUSANDS/UL (ref 149–390)
PLATELET # BLD AUTO: 234 THOUSANDS/UL (ref 149–390)
PLATELET # BLD AUTO: 261 THOUSANDS/UL (ref 149–390)
PLATELET # BLD AUTO: 272 THOUSANDS/UL (ref 149–390)
PLATELET BLD QL SMEAR: ADEQUATE
PMV BLD AUTO: 10 FL (ref 8.9–12.7)
PMV BLD AUTO: 10.1 FL (ref 8.9–12.7)
PMV BLD AUTO: 10.2 FL (ref 8.9–12.7)
PMV BLD AUTO: 8.8 FL (ref 8.9–12.7)
PMV BLD AUTO: 8.9 FL (ref 8.9–12.7)
PMV BLD AUTO: 9 FL (ref 8.9–12.7)
PMV BLD AUTO: 9 FL (ref 8.9–12.7)
PMV BLD AUTO: 9.3 FL (ref 8.9–12.7)
PO2 BLDA: 104.8 MM HG (ref 75–129)
PO2 BLDA: 111.1 MM HG (ref 75–129)
PO2 BLDA: 78.3 MM HG (ref 75–129)
PO2 BLDA: 80.4 MM HG (ref 75–129)
PO2 BLDA: 88.8 MM HG (ref 75–129)
PO2 BLDV: 100.2 MM HG (ref 35–45)
PO2 BLDV: 186.7 MM HG (ref 35–45)
PO2 BLDV: 46.1 MM HG (ref 35–45)
PO2 BLDV: 48.1 MM HG (ref 35–45)
PO2 BLDV: 63.4 MM HG (ref 35–45)
PO2 BLDV: 71.8 MM HG (ref 35–45)
PO2 BLDV: 84 MM HG (ref 35–45)
POLYCHROMASIA BLD QL SMEAR: PRESENT
POTASSIUM SERPL-SCNC: 3.4 MMOL/L (ref 3.5–5.3)
POTASSIUM SERPL-SCNC: 3.7 MMOL/L (ref 3.5–5.3)
POTASSIUM SERPL-SCNC: 3.9 MMOL/L (ref 3.5–5.3)
POTASSIUM SERPL-SCNC: 4.2 MMOL/L (ref 3.5–5.3)
POTASSIUM SERPL-SCNC: 4.3 MMOL/L (ref 3.5–5.3)
POTASSIUM SERPL-SCNC: 4.4 MMOL/L (ref 3.5–5.3)
POTASSIUM SERPL-SCNC: 4.6 MMOL/L (ref 3.5–5.3)
POTASSIUM SERPL-SCNC: 4.6 MMOL/L (ref 3.5–5.3)
PR INTERVAL: 0 MS
PR INTERVAL: 0 MS
PRESSURE CONTROL: 25
PROCALCITONIN SERPL-MCNC: 2.23 NG/ML
PROT SERPL-MCNC: 5.4 G/DL (ref 6.4–8.4)
PROT SERPL-MCNC: 6.9 G/DL (ref 6.4–8.4)
PROTHROMBIN TIME: 15.1 SECONDS (ref 11.6–14.5)
PROTHROMBIN TIME: 16.4 SECONDS (ref 11.6–14.5)
PS CM H2O: 25
PS VENT FIO2: 50
PS VENT PEEP: 6
QRS AXIS: 86 DEGREES
QRS AXIS: 96 DEGREES
QRS AXIS: 99 DEGREES
QRSD INTERVAL: 67 MS
QRSD INTERVAL: 76 MS
QRSD INTERVAL: 79 MS
QT INTERVAL: 250 MS
QT INTERVAL: 274 MS
QT INTERVAL: 300 MS
QTC INTERVAL: 401 MS
QTC INTERVAL: 425 MS
QTC INTERVAL: 459 MS
RA PRESSURE ESTIMATED: 15 MMHG
RBC # BLD AUTO: 2.52 MILLION/UL (ref 3.81–5.12)
RBC # BLD AUTO: 2.68 MILLION/UL (ref 3.81–5.12)
RBC # BLD AUTO: 2.74 MILLION/UL (ref 3.81–5.12)
RBC # BLD AUTO: 2.74 MILLION/UL (ref 3.81–5.12)
RBC # BLD AUTO: 3 MILLION/UL (ref 3.81–5.12)
RBC # BLD AUTO: 3.01 MILLION/UL (ref 3.81–5.12)
RBC # BLD AUTO: 3.37 MILLION/UL (ref 3.81–5.12)
RBC # BLD AUTO: 3.66 MILLION/UL (ref 3.81–5.12)
RBC MORPH BLD: PRESENT
RETICS # AUTO: ABNORMAL 10*3/UL (ref 14097–95744)
RETICS # CALC: 3.33 % (ref 0.37–1.87)
RH BLD: POSITIVE
RIGHT ATRIAL 2D VOLUME: 31 ML
RIGHT ATRIUM AREA SYSTOLE A4C: 12.2 CM2
RIGHT VENTRICLE ID DIMENSION: 3.5 CM
RV PSP: 83 MMHG
SCHISTOCYTES BLD QL SMEAR: PRESENT
SIMV VENT INSPIRED AIR FIO2: 100
SIMV VENT PEEP: 14
SIMV VENT TIDAL VOLUME: 180
SIMV VENT: ABNORMAL
SL CV LEFT ATRIUM LENGTH A2C: 4.9 CM
SL CV LV EF: 75
SL CV PED ECHO LEFT VENTRICLE DIASTOLIC VOLUME (MOD BIPLANE) 2D: 69 ML
SL CV PED ECHO LEFT VENTRICLE SYSTOLIC VOLUME (MOD BIPLANE) 2D: 17 ML
SODIUM SERPL-SCNC: 144 MMOL/L (ref 135–147)
SODIUM SERPL-SCNC: 145 MMOL/L (ref 135–147)
SODIUM SERPL-SCNC: 147 MMOL/L (ref 135–147)
SODIUM SERPL-SCNC: 148 MMOL/L (ref 135–147)
SODIUM SERPL-SCNC: 149 MMOL/L (ref 135–147)
SPECIMEN EXPIRATION DATE: NORMAL
SPECIMEN SOURCE: ABNORMAL
T WAVE AXIS: -49 DEGREES
T WAVE AXIS: -63 DEGREES
T WAVE AXIS: -65 DEGREES
TARGETS BLD QL SMEAR: PRESENT
TARGETS BLD QL SMEAR: PRESENT
TIBC SERPL-MCNC: 123 UG/DL (ref 250–450)
TOTAL CELLS COUNTED SPEC: 100
TR MAX PG: 42 MMHG
TR MAX PG: 68 MMHG
TR PEAK VELOCITY: 3.2 M/S
TR PEAK VELOCITY: 4.1 M/S
TRICUSPID ANNULAR PLANE SYSTOLIC EXCURSION: 1.8 CM
TRICUSPID VALVE PEAK REGURGITATION VELOCITY: 3.22 M/S
TRICUSPID VALVE PEAK REGURGITATION VELOCITY: 4.13 M/S
UIBC SERPL-MCNC: 58 UG/DL (ref 155–355)
UNIT DISPENSE STATUS: NORMAL
UNIT PRODUCT CODE: NORMAL
UNIT PRODUCT VOLUME: 350 ML
UNIT RH: NORMAL
VANCOMYCIN SERPL-MCNC: 23.6 UG/ML (ref 10–20)
VENT - PS: ABNORMAL
VENT AC: 16
VENT AC: 16
VENT AC: 18
VENT AC: 26
VENT SIMV: 20
VENT- AC: AC
VENTRICULAR RATE: 107 BPM
VENTRICULAR RATE: 169 BPM
VENTRICULAR RATE: 173 BPM
VIT B12 SERPL-MCNC: 787 PG/ML (ref 180–914)
WBC # BLD AUTO: 4.45 THOUSAND/UL (ref 4.31–10.16)
WBC # BLD AUTO: 6.3 THOUSAND/UL (ref 4.31–10.16)
WBC # BLD AUTO: 6.51 THOUSAND/UL (ref 4.31–10.16)
WBC # BLD AUTO: 6.59 THOUSAND/UL (ref 4.31–10.16)
WBC # BLD AUTO: 6.87 THOUSAND/UL (ref 4.31–10.16)
WBC # BLD AUTO: 7.18 THOUSAND/UL (ref 4.31–10.16)
WBC # BLD AUTO: 8.18 THOUSAND/UL (ref 4.31–10.16)
WBC # BLD AUTO: 8.55 THOUSAND/UL (ref 4.31–10.16)

## 2024-01-01 PROCEDURE — 84100 ASSAY OF PHOSPHORUS: CPT

## 2024-01-01 PROCEDURE — 99418 PROLNG IP/OBS E/M EA 15 MIN: CPT | Performed by: SURGERY

## 2024-01-01 PROCEDURE — 85045 AUTOMATED RETICULOCYTE COUNT: CPT

## 2024-01-01 PROCEDURE — 80048 BASIC METABOLIC PNL TOTAL CA: CPT

## 2024-01-01 PROCEDURE — 99223 1ST HOSP IP/OBS HIGH 75: CPT | Performed by: INTERNAL MEDICINE

## 2024-01-01 PROCEDURE — 36415 COLL VENOUS BLD VENIPUNCTURE: CPT

## 2024-01-01 PROCEDURE — 82805 BLOOD GASES W/O2 SATURATION: CPT | Performed by: NURSE PRACTITIONER

## 2024-01-01 PROCEDURE — 87147 CULTURE TYPE IMMUNOLOGIC: CPT

## 2024-01-01 PROCEDURE — 82948 REAGENT STRIP/BLOOD GLUCOSE: CPT

## 2024-01-01 PROCEDURE — 83735 ASSAY OF MAGNESIUM: CPT

## 2024-01-01 PROCEDURE — 99233 SBSQ HOSP IP/OBS HIGH 50: CPT | Performed by: INTERNAL MEDICINE

## 2024-01-01 PROCEDURE — 80048 BASIC METABOLIC PNL TOTAL CA: CPT | Performed by: NURSE PRACTITIONER

## 2024-01-01 PROCEDURE — 82805 BLOOD GASES W/O2 SATURATION: CPT

## 2024-01-01 PROCEDURE — P9040 RBC LEUKOREDUCED IRRADIATED: HCPCS

## 2024-01-01 PROCEDURE — 85027 COMPLETE CBC AUTOMATED: CPT

## 2024-01-01 PROCEDURE — 93005 ELECTROCARDIOGRAM TRACING: CPT

## 2024-01-01 PROCEDURE — 99291 CRITICAL CARE FIRST HOUR: CPT | Performed by: INTERNAL MEDICINE

## 2024-01-01 PROCEDURE — 94760 N-INVAS EAR/PLS OXIMETRY 1: CPT

## 2024-01-01 PROCEDURE — 5A1955Z RESPIRATORY VENTILATION, GREATER THAN 96 CONSECUTIVE HOURS: ICD-10-PCS | Performed by: ANESTHESIOLOGY

## 2024-01-01 PROCEDURE — 94150 VITAL CAPACITY TEST: CPT

## 2024-01-01 PROCEDURE — 71045 X-RAY EXAM CHEST 1 VIEW: CPT

## 2024-01-01 PROCEDURE — 87186 SC STD MICRODIL/AGAR DIL: CPT

## 2024-01-01 PROCEDURE — 93306 TTE W/DOPPLER COMPLETE: CPT

## 2024-01-01 PROCEDURE — 82728 ASSAY OF FERRITIN: CPT

## 2024-01-01 PROCEDURE — 86850 RBC ANTIBODY SCREEN: CPT

## 2024-01-01 PROCEDURE — 82805 BLOOD GASES W/O2 SATURATION: CPT | Performed by: PHYSICIAN ASSISTANT

## 2024-01-01 PROCEDURE — 83540 ASSAY OF IRON: CPT

## 2024-01-01 PROCEDURE — 99238 HOSP IP/OBS DSCHRG MGMT 30/<: CPT

## 2024-01-01 PROCEDURE — 85007 BL SMEAR W/DIFF WBC COUNT: CPT | Performed by: NURSE PRACTITIONER

## 2024-01-01 PROCEDURE — 80162 ASSAY OF DIGOXIN TOTAL: CPT

## 2024-01-01 PROCEDURE — 83605 ASSAY OF LACTIC ACID: CPT

## 2024-01-01 PROCEDURE — 71275 CT ANGIOGRAPHY CHEST: CPT

## 2024-01-01 PROCEDURE — 99255 IP/OBS CONSLTJ NEW/EST HI 80: CPT | Performed by: SURGERY

## 2024-01-01 PROCEDURE — 86901 BLOOD TYPING SEROLOGIC RH(D): CPT

## 2024-01-01 PROCEDURE — 82805 BLOOD GASES W/O2 SATURATION: CPT | Performed by: INTERNAL MEDICINE

## 2024-01-01 PROCEDURE — 80053 COMPREHEN METABOLIC PANEL: CPT

## 2024-01-01 PROCEDURE — 86923 COMPATIBILITY TEST ELECTRIC: CPT

## 2024-01-01 PROCEDURE — 87070 CULTURE OTHR SPECIMN AEROBIC: CPT

## 2024-01-01 PROCEDURE — 0BH17EZ INSERTION OF ENDOTRACHEAL AIRWAY INTO TRACHEA, VIA NATURAL OR ARTIFICIAL OPENING: ICD-10-PCS | Performed by: ANESTHESIOLOGY

## 2024-01-01 PROCEDURE — 82746 ASSAY OF FOLIC ACID SERUM: CPT

## 2024-01-01 PROCEDURE — 94003 VENT MGMT INPAT SUBQ DAY: CPT

## 2024-01-01 PROCEDURE — 87205 SMEAR GRAM STAIN: CPT

## 2024-01-01 PROCEDURE — 85610 PROTHROMBIN TIME: CPT

## 2024-01-01 PROCEDURE — 96366 THER/PROPH/DIAG IV INF ADDON: CPT

## 2024-01-01 PROCEDURE — 96375 TX/PRO/DX INJ NEW DRUG ADDON: CPT

## 2024-01-01 PROCEDURE — 85018 HEMOGLOBIN: CPT | Performed by: PHYSICIAN ASSISTANT

## 2024-01-01 PROCEDURE — 36600 WITHDRAWAL OF ARTERIAL BLOOD: CPT

## 2024-01-01 PROCEDURE — 87040 BLOOD CULTURE FOR BACTERIA: CPT

## 2024-01-01 PROCEDURE — 87077 CULTURE AEROBIC IDENTIFY: CPT

## 2024-01-01 PROCEDURE — 93306 TTE W/DOPPLER COMPLETE: CPT | Performed by: INTERNAL MEDICINE

## 2024-01-01 PROCEDURE — 83550 IRON BINDING TEST: CPT

## 2024-01-01 PROCEDURE — 84145 PROCALCITONIN (PCT): CPT

## 2024-01-01 PROCEDURE — 99285 EMERGENCY DEPT VISIT HI MDM: CPT

## 2024-01-01 PROCEDURE — 99497 ADVNCD CARE PLAN 30 MIN: CPT | Performed by: INTERNAL MEDICINE

## 2024-01-01 PROCEDURE — 83735 ASSAY OF MAGNESIUM: CPT | Performed by: PHYSICIAN ASSISTANT

## 2024-01-01 PROCEDURE — 94002 VENT MGMT INPAT INIT DAY: CPT

## 2024-01-01 PROCEDURE — 85027 COMPLETE CBC AUTOMATED: CPT | Performed by: NURSE PRACTITIONER

## 2024-01-01 PROCEDURE — 85007 BL SMEAR W/DIFF WBC COUNT: CPT

## 2024-01-01 PROCEDURE — 82607 VITAMIN B-12: CPT

## 2024-01-01 PROCEDURE — 84100 ASSAY OF PHOSPHORUS: CPT | Performed by: PHYSICIAN ASSISTANT

## 2024-01-01 PROCEDURE — 85014 HEMATOCRIT: CPT | Performed by: PHYSICIAN ASSISTANT

## 2024-01-01 PROCEDURE — 87081 CULTURE SCREEN ONLY: CPT

## 2024-01-01 PROCEDURE — 93010 ELECTROCARDIOGRAM REPORT: CPT | Performed by: INTERNAL MEDICINE

## 2024-01-01 PROCEDURE — 87154 CUL TYP ID BLD PTHGN 6+ TRGT: CPT

## 2024-01-01 PROCEDURE — 80048 BASIC METABOLIC PNL TOTAL CA: CPT | Performed by: PHYSICIAN ASSISTANT

## 2024-01-01 PROCEDURE — 83880 ASSAY OF NATRIURETIC PEPTIDE: CPT

## 2024-01-01 PROCEDURE — 96365 THER/PROPH/DIAG IV INF INIT: CPT

## 2024-01-01 PROCEDURE — 93010 ELECTROCARDIOGRAM REPORT: CPT | Performed by: STUDENT IN AN ORGANIZED HEALTH CARE EDUCATION/TRAINING PROGRAM

## 2024-01-01 PROCEDURE — 82330 ASSAY OF CALCIUM: CPT | Performed by: PHYSICIAN ASSISTANT

## 2024-01-01 PROCEDURE — 85730 THROMBOPLASTIN TIME PARTIAL: CPT

## 2024-01-01 PROCEDURE — 85610 PROTHROMBIN TIME: CPT | Performed by: NURSE PRACTITIONER

## 2024-01-01 PROCEDURE — 30233N1 TRANSFUSION OF NONAUTOLOGOUS RED BLOOD CELLS INTO PERIPHERAL VEIN, PERCUTANEOUS APPROACH: ICD-10-PCS | Performed by: INTERNAL MEDICINE

## 2024-01-01 PROCEDURE — 80202 ASSAY OF VANCOMYCIN: CPT

## 2024-01-01 PROCEDURE — 31500 INSERT EMERGENCY AIRWAY: CPT

## 2024-01-01 PROCEDURE — 86900 BLOOD TYPING SEROLOGIC ABO: CPT

## 2024-01-01 PROCEDURE — 85025 COMPLETE CBC W/AUTO DIFF WBC: CPT

## 2024-01-01 RX ORDER — OXYCODONE HCL 5 MG/5 ML
10 SOLUTION, ORAL ORAL EVERY 6 HOURS
Status: DISCONTINUED | OUTPATIENT
Start: 2024-01-01 | End: 2024-01-01

## 2024-01-01 RX ORDER — FUROSEMIDE 10 MG/ML
20 INJECTION INTRAMUSCULAR; INTRAVENOUS
Status: DISCONTINUED | OUTPATIENT
Start: 2024-01-01 | End: 2024-01-01

## 2024-01-01 RX ORDER — MAGNESIUM SULFATE HEPTAHYDRATE 40 MG/ML
2 INJECTION, SOLUTION INTRAVENOUS ONCE
Qty: 50 ML | Refills: 0 | Status: COMPLETED | OUTPATIENT
Start: 2024-01-01 | End: 2024-01-01

## 2024-01-01 RX ORDER — POLYETHYLENE GLYCOL 3350 17 G/17G
17 POWDER, FOR SOLUTION ORAL DAILY
Status: DISCONTINUED | OUTPATIENT
Start: 2024-01-01 | End: 2024-04-13 | Stop reason: HOSPADM

## 2024-01-01 RX ORDER — CEFEPIME HYDROCHLORIDE 2 G/50ML
2000 INJECTION, SOLUTION INTRAVENOUS EVERY 12 HOURS
Status: DISCONTINUED | OUTPATIENT
Start: 2024-01-01 | End: 2024-01-01

## 2024-01-01 RX ORDER — DIGOXIN 0.25 MG/ML
250 INJECTION INTRAMUSCULAR; INTRAVENOUS ONCE
Status: DISCONTINUED | OUTPATIENT
Start: 2024-01-01 | End: 2024-01-01

## 2024-01-01 RX ORDER — FENTANYL CITRATE 50 UG/ML
12.5 INJECTION, SOLUTION INTRAMUSCULAR; INTRAVENOUS ONCE
Status: COMPLETED | OUTPATIENT
Start: 2024-01-01 | End: 2024-01-01

## 2024-01-01 RX ORDER — OXYCODONE HCL 5 MG/5 ML
15 SOLUTION, ORAL ORAL EVERY 6 HOURS
Status: DISCONTINUED | OUTPATIENT
Start: 2024-01-01 | End: 2024-01-01

## 2024-01-01 RX ORDER — VECURONIUM BROMIDE 1 MG/ML
5 INJECTION, POWDER, LYOPHILIZED, FOR SOLUTION INTRAVENOUS ONCE
Status: DISCONTINUED | OUTPATIENT
Start: 2024-01-01 | End: 2024-01-01

## 2024-01-01 RX ORDER — FENTANYL CITRATE 50 UG/ML
25 INJECTION, SOLUTION INTRAMUSCULAR; INTRAVENOUS ONCE
Status: COMPLETED | OUTPATIENT
Start: 2024-01-01 | End: 2024-01-01

## 2024-01-01 RX ORDER — POTASSIUM CHLORIDE 14.9 MG/ML
20 INJECTION INTRAVENOUS ONCE
Qty: 100 ML | Refills: 0 | Status: COMPLETED | OUTPATIENT
Start: 2024-01-01 | End: 2024-01-01

## 2024-01-01 RX ORDER — CHLORHEXIDINE GLUCONATE ORAL RINSE 1.2 MG/ML
15 SOLUTION DENTAL EVERY 12 HOURS SCHEDULED
Status: DISCONTINUED | OUTPATIENT
Start: 2024-01-01 | End: 2024-04-13 | Stop reason: HOSPADM

## 2024-01-01 RX ORDER — FENTANYL CITRATE 50 UG/ML
100 INJECTION, SOLUTION INTRAMUSCULAR; INTRAVENOUS EVERY 2 HOUR PRN
Status: DISCONTINUED | OUTPATIENT
Start: 2024-01-01 | End: 2024-01-01

## 2024-01-01 RX ORDER — PROPOFOL 10 MG/ML
INJECTION, EMULSION INTRAVENOUS
Status: COMPLETED
Start: 2024-01-01 | End: 2024-01-01

## 2024-01-01 RX ORDER — ALBUMIN, HUMAN INJ 5% 5 %
12.5 SOLUTION INTRAVENOUS ONCE
Status: COMPLETED | OUTPATIENT
Start: 2024-01-01 | End: 2024-01-01

## 2024-01-01 RX ORDER — FENTANYL CITRATE 50 UG/ML
50 INJECTION, SOLUTION INTRAMUSCULAR; INTRAVENOUS ONCE
Status: COMPLETED | OUTPATIENT
Start: 2024-01-01 | End: 2024-01-01

## 2024-01-01 RX ORDER — CALCIUM GLUCONATE 20 MG/ML
2 INJECTION, SOLUTION INTRAVENOUS ONCE
Status: COMPLETED | OUTPATIENT
Start: 2024-01-01 | End: 2024-01-01

## 2024-01-01 RX ORDER — HYDROMORPHONE HCL/PF 1 MG/ML
0.5 SYRINGE (ML) INJECTION EVERY 4 HOURS PRN
Status: DISCONTINUED | OUTPATIENT
Start: 2024-01-01 | End: 2024-01-01

## 2024-01-01 RX ORDER — DILTIAZEM HYDROCHLORIDE 5 MG/ML
10 INJECTION INTRAVENOUS ONCE
Status: COMPLETED | OUTPATIENT
Start: 2024-01-01 | End: 2024-01-01

## 2024-01-01 RX ORDER — FUROSEMIDE 10 MG/ML
40 INJECTION INTRAMUSCULAR; INTRAVENOUS
Status: COMPLETED | OUTPATIENT
Start: 2024-01-01 | End: 2024-01-01

## 2024-01-01 RX ORDER — FUROSEMIDE 10 MG/ML
40 INJECTION INTRAMUSCULAR; INTRAVENOUS ONCE
Status: DISCONTINUED | OUTPATIENT
Start: 2024-01-01 | End: 2024-01-01

## 2024-01-01 RX ORDER — FENTANYL CITRATE-0.9 % NACL/PF 10 MCG/ML
12.5 PLASTIC BAG, INJECTION (ML) INTRAVENOUS CONTINUOUS
Status: DISCONTINUED | OUTPATIENT
Start: 2024-01-01 | End: 2024-04-13 | Stop reason: HOSPADM

## 2024-01-01 RX ORDER — FENTANYL CITRATE 50 UG/ML
12.5 INJECTION, SOLUTION INTRAMUSCULAR; INTRAVENOUS EVERY 2 HOUR PRN
Status: DISCONTINUED | OUTPATIENT
Start: 2024-01-01 | End: 2024-04-13 | Stop reason: HOSPADM

## 2024-01-01 RX ORDER — SODIUM CHLORIDE 9 MG/ML
30 INJECTION, SOLUTION INTRAVENOUS CONTINUOUS
Status: DISCONTINUED | OUTPATIENT
Start: 2024-01-01 | End: 2024-01-01

## 2024-01-01 RX ORDER — VECURONIUM BROMIDE 1 MG/ML
5 INJECTION, POWDER, LYOPHILIZED, FOR SOLUTION INTRAVENOUS ONCE AS NEEDED
Status: COMPLETED | OUTPATIENT
Start: 2024-01-01 | End: 2024-01-01

## 2024-01-01 RX ORDER — MAGNESIUM SULFATE HEPTAHYDRATE 40 MG/ML
2 INJECTION, SOLUTION INTRAVENOUS ONCE
Status: COMPLETED | OUTPATIENT
Start: 2024-01-01 | End: 2024-01-01

## 2024-01-01 RX ORDER — DIGOXIN 0.25 MG/ML
250 INJECTION INTRAMUSCULAR; INTRAVENOUS ONCE
Status: COMPLETED | OUTPATIENT
Start: 2024-01-01 | End: 2024-01-01

## 2024-01-01 RX ORDER — FUROSEMIDE 10 MG/ML
40 INJECTION INTRAMUSCULAR; INTRAVENOUS
Status: DISCONTINUED | OUTPATIENT
Start: 2024-01-01 | End: 2024-04-13 | Stop reason: HOSPADM

## 2024-01-01 RX ORDER — WATER 10 ML/10ML
INJECTION INTRAMUSCULAR; INTRAVENOUS; SUBCUTANEOUS
Status: COMPLETED
Start: 2024-01-01 | End: 2024-01-01

## 2024-01-01 RX ORDER — FENTANYL CITRATE-0.9 % NACL/PF 10 MCG/ML
12.5 PLASTIC BAG, INJECTION (ML) INTRAVENOUS CONTINUOUS
Status: DISCONTINUED | OUTPATIENT
Start: 2024-01-01 | End: 2024-01-01

## 2024-01-01 RX ORDER — FENTANYL CITRATE 50 UG/ML
12.5 INJECTION, SOLUTION INTRAMUSCULAR; INTRAVENOUS
Status: DISCONTINUED | OUTPATIENT
Start: 2024-01-01 | End: 2024-01-01

## 2024-01-01 RX ORDER — HYDROMORPHONE HYDROCHLORIDE 2 MG/ML
2 INJECTION, SOLUTION INTRAMUSCULAR; INTRAVENOUS; SUBCUTANEOUS EVERY 4 HOURS PRN
Status: DISCONTINUED | OUTPATIENT
Start: 2024-01-01 | End: 2024-01-01

## 2024-01-01 RX ORDER — FENTANYL CITRATE 50 UG/ML
18 INJECTION, SOLUTION INTRAMUSCULAR; INTRAVENOUS
Status: DISCONTINUED | OUTPATIENT
Start: 2024-01-01 | End: 2024-01-01

## 2024-01-01 RX ORDER — MIDODRINE HYDROCHLORIDE 5 MG/1
10 TABLET ORAL
Status: DISCONTINUED | OUTPATIENT
Start: 2024-01-01 | End: 2024-01-01

## 2024-01-01 RX ORDER — FENTANYL CITRATE 50 UG/ML
INJECTION, SOLUTION INTRAMUSCULAR; INTRAVENOUS
Status: COMPLETED
Start: 2024-01-01 | End: 2024-01-01

## 2024-01-01 RX ORDER — HEPARIN SODIUM 5000 [USP'U]/ML
5000 INJECTION, SOLUTION INTRAVENOUS; SUBCUTANEOUS EVERY 8 HOURS SCHEDULED
Status: DISCONTINUED | OUTPATIENT
Start: 2024-01-01 | End: 2024-01-01

## 2024-01-01 RX ORDER — ALBUMIN (HUMAN) 12.5 G/50ML
12.5 SOLUTION INTRAVENOUS
Status: DISCONTINUED | OUTPATIENT
Start: 2024-01-01 | End: 2024-04-13 | Stop reason: HOSPADM

## 2024-01-01 RX ORDER — POTASSIUM CHLORIDE 14.9 MG/ML
20 INJECTION INTRAVENOUS
Qty: 200 ML | Refills: 0 | Status: COMPLETED | OUTPATIENT
Start: 2024-01-01 | End: 2024-01-01

## 2024-01-01 RX ORDER — SODIUM CHLORIDE, SODIUM GLUCONATE, SODIUM ACETATE, POTASSIUM CHLORIDE, MAGNESIUM CHLORIDE, SODIUM PHOSPHATE, DIBASIC, AND POTASSIUM PHOSPHATE .53; .5; .37; .037; .03; .012; .00082 G/100ML; G/100ML; G/100ML; G/100ML; G/100ML; G/100ML; G/100ML
100 INJECTION, SOLUTION INTRAVENOUS CONTINUOUS
Status: DISCONTINUED | OUTPATIENT
Start: 2024-01-01 | End: 2024-01-01

## 2024-01-01 RX ORDER — IPRATROPIUM BROMIDE AND ALBUTEROL SULFATE 2.5; .5 MG/3ML; MG/3ML
3 SOLUTION RESPIRATORY (INHALATION)
Status: DISCONTINUED | OUTPATIENT
Start: 2024-01-01 | End: 2024-04-13 | Stop reason: HOSPADM

## 2024-01-01 RX ORDER — LIDOCAINE 50 MG/G
2 PATCH TOPICAL DAILY
Status: DISCONTINUED | OUTPATIENT
Start: 2024-01-01 | End: 2024-04-13 | Stop reason: HOSPADM

## 2024-01-01 RX ORDER — FUROSEMIDE 10 MG/ML
40 INJECTION INTRAMUSCULAR; INTRAVENOUS ONCE
Status: COMPLETED | OUTPATIENT
Start: 2024-01-01 | End: 2024-01-01

## 2024-01-01 RX ORDER — OXYCODONE HYDROCHLORIDE 5 MG/1
5 TABLET ORAL EVERY 6 HOURS PRN
Qty: 20 TABLET | Refills: 0 | Status: SHIPPED | OUTPATIENT
Start: 2024-01-01

## 2024-01-01 RX ORDER — POTASSIUM CHLORIDE 20MEQ/15ML
40 LIQUID (ML) ORAL ONCE
Status: COMPLETED | OUTPATIENT
Start: 2024-01-01 | End: 2024-01-01

## 2024-01-01 RX ORDER — CEFAZOLIN SODIUM 2 G/50ML
2000 SOLUTION INTRAVENOUS EVERY 8 HOURS
Status: DISCONTINUED | OUTPATIENT
Start: 2024-01-01 | End: 2024-04-13 | Stop reason: HOSPADM

## 2024-01-01 RX ORDER — FUROSEMIDE 10 MG/ML
40 INJECTION INTRAMUSCULAR; INTRAVENOUS
Status: DISCONTINUED | OUTPATIENT
Start: 2024-01-01 | End: 2024-01-01

## 2024-01-01 RX ORDER — DIGOXIN 0.25 MG/ML
250 INJECTION INTRAMUSCULAR; INTRAVENOUS EVERY 6 HOURS
Qty: 4 ML | Refills: 0 | Status: COMPLETED | OUTPATIENT
Start: 2024-01-01 | End: 2024-01-01

## 2024-01-01 RX ORDER — SENNOSIDES 8.8 MG/5ML
8.8 LIQUID ORAL
Status: DISCONTINUED | OUTPATIENT
Start: 2024-01-01 | End: 2024-04-13 | Stop reason: HOSPADM

## 2024-01-01 RX ORDER — METOPROLOL SUCCINATE 50 MG/1
50 TABLET, EXTENDED RELEASE ORAL DAILY
Status: DISCONTINUED | OUTPATIENT
Start: 2024-01-01 | End: 2024-01-01

## 2024-01-01 RX ORDER — SODIUM BICARBONATE 84 MG/ML
INJECTION, SOLUTION INTRAVENOUS CODE/TRAUMA/SEDATION MEDICATION
Status: COMPLETED | OUTPATIENT
Start: 2024-01-01 | End: 2024-01-01

## 2024-01-01 RX ORDER — MIDODRINE HYDROCHLORIDE 5 MG/1
10 TABLET ORAL EVERY 8 HOURS
Status: DISCONTINUED | OUTPATIENT
Start: 2024-01-01 | End: 2024-04-13 | Stop reason: HOSPADM

## 2024-01-01 RX ORDER — LORAZEPAM 2 MG/ML
1 INJECTION INTRAMUSCULAR EVERY 4 HOURS PRN
Status: DISCONTINUED | OUTPATIENT
Start: 2024-01-01 | End: 2024-01-01

## 2024-01-01 RX ORDER — MAGNESIUM SULFATE HEPTAHYDRATE 40 MG/ML
2 INJECTION, SOLUTION INTRAVENOUS ONCE
Status: DISCONTINUED | OUTPATIENT
Start: 2024-01-01 | End: 2024-04-13 | Stop reason: HOSPADM

## 2024-01-01 RX ORDER — HEPARIN SODIUM 5000 [USP'U]/ML
5000 INJECTION, SOLUTION INTRAVENOUS; SUBCUTANEOUS EVERY 8 HOURS SCHEDULED
Status: DISCONTINUED | OUTPATIENT
Start: 2024-01-01 | End: 2024-04-13 | Stop reason: HOSPADM

## 2024-01-01 RX ORDER — DIGOXIN 0.25 MG/ML
125 INJECTION INTRAMUSCULAR; INTRAVENOUS EVERY 8 HOURS PRN
Status: DISCONTINUED | OUTPATIENT
Start: 2024-01-01 | End: 2024-01-01

## 2024-01-01 RX ORDER — DIGOXIN 0.25 MG/ML
500 INJECTION INTRAMUSCULAR; INTRAVENOUS ONCE
Status: DISCONTINUED | OUTPATIENT
Start: 2024-01-01 | End: 2024-01-01

## 2024-01-01 RX ORDER — CALCIUM CHLORIDE 100 MG/ML
SYRINGE (ML) INTRAVENOUS CODE/TRAUMA/SEDATION MEDICATION
Status: COMPLETED | OUTPATIENT
Start: 2024-01-01 | End: 2024-01-01

## 2024-01-01 RX ORDER — ACETAMINOPHEN 325 MG/1
650 TABLET ORAL EVERY 6 HOURS PRN
Status: DISCONTINUED | OUTPATIENT
Start: 2024-01-01 | End: 2024-04-13 | Stop reason: HOSPADM

## 2024-01-01 RX ORDER — EPINEPHRINE 0.1 MG/ML
INJECTION INTRAVENOUS CODE/TRAUMA/SEDATION MEDICATION
Status: COMPLETED | OUTPATIENT
Start: 2024-01-01 | End: 2024-01-01

## 2024-01-01 RX ORDER — PROPOFOL 10 MG/ML
5-50 INJECTION, EMULSION INTRAVENOUS
Status: DISCONTINUED | OUTPATIENT
Start: 2024-01-01 | End: 2024-04-13 | Stop reason: HOSPADM

## 2024-01-01 RX ADMIN — FENTANYL CITRATE 50 MCG: 50 INJECTION INTRAMUSCULAR; INTRAVENOUS at 12:05

## 2024-01-01 RX ADMIN — CHLORHEXIDINE GLUCONATE 15 ML: 1.2 RINSE ORAL at 08:20

## 2024-01-01 RX ADMIN — DIGOXIN 250 MCG: 250 INJECTION, SOLUTION INTRAMUSCULAR; INTRAVENOUS; PARENTERAL at 17:31

## 2024-01-01 RX ADMIN — PROPOFOL 20 MCG/KG/MIN: 10 INJECTION, EMULSION INTRAVENOUS at 11:38

## 2024-01-01 RX ADMIN — WATER 10 ML: 1 INJECTION INTRAMUSCULAR; INTRAVENOUS; SUBCUTANEOUS at 11:58

## 2024-01-01 RX ADMIN — FENTANYL CITRATE 25 MCG: 50 INJECTION INTRAMUSCULAR; INTRAVENOUS at 10:26

## 2024-01-01 RX ADMIN — LIDOCAINE 5% 2 PATCH: 700 PATCH TOPICAL at 09:46

## 2024-01-01 RX ADMIN — VANCOMYCIN HYDROCHLORIDE 1250 MG: 5 INJECTION, POWDER, LYOPHILIZED, FOR SOLUTION INTRAVENOUS at 08:05

## 2024-01-01 RX ADMIN — MIDODRINE HYDROCHLORIDE 10 MG: 5 TABLET ORAL at 15:25

## 2024-01-01 RX ADMIN — CHLORHEXIDINE GLUCONATE 15 ML: 1.2 RINSE ORAL at 20:45

## 2024-01-01 RX ADMIN — DIGOXIN 250 MCG: 250 INJECTION, SOLUTION INTRAMUSCULAR; INTRAVENOUS; PARENTERAL at 00:21

## 2024-01-01 RX ADMIN — NOREPINEPHRINE BITARTRATE 5 MCG/MIN: 1 INJECTION, SOLUTION, CONCENTRATE INTRAVENOUS at 10:39

## 2024-01-01 RX ADMIN — SENNA 8.8 MG: 417.12 SYRUP ORAL at 22:03

## 2024-01-01 RX ADMIN — EPINEPHRINE 1 MG: 0.1 INJECTION INTRAVENOUS at 09:50

## 2024-01-01 RX ADMIN — OXYCODONE HYDROCHLORIDE 10 MG: 5 SOLUTION ORAL at 22:22

## 2024-01-01 RX ADMIN — FENTANYL CITRATE 50 MCG: 50 INJECTION INTRAMUSCULAR; INTRAVENOUS at 06:27

## 2024-01-01 RX ADMIN — FENTANYL CITRATE 12.5 MCG: 50 INJECTION INTRAMUSCULAR; INTRAVENOUS at 08:33

## 2024-01-01 RX ADMIN — CEFAZOLIN SODIUM 2000 MG: 2 SOLUTION INTRAVENOUS at 03:43

## 2024-01-01 RX ADMIN — POTASSIUM CHLORIDE 20 MEQ: 14.9 INJECTION, SOLUTION INTRAVENOUS at 10:29

## 2024-01-01 RX ADMIN — LIDOCAINE 5% 2 PATCH: 700 PATCH TOPICAL at 08:19

## 2024-01-01 RX ADMIN — HEPARIN SODIUM 5000 UNITS: 5000 INJECTION INTRAVENOUS; SUBCUTANEOUS at 06:29

## 2024-01-01 RX ADMIN — CALCIUM GLUCONATE 2 G: 20 INJECTION, SOLUTION INTRAVENOUS at 06:12

## 2024-01-01 RX ADMIN — FENTANYL CITRATE 12.5 MCG: 50 INJECTION INTRAMUSCULAR; INTRAVENOUS at 20:12

## 2024-01-01 RX ADMIN — HEPARIN SODIUM 5000 UNITS: 5000 INJECTION INTRAVENOUS; SUBCUTANEOUS at 08:52

## 2024-01-01 RX ADMIN — MIDODRINE HYDROCHLORIDE 10 MG: 5 TABLET ORAL at 11:47

## 2024-01-01 RX ADMIN — FUROSEMIDE 40 MG: 10 INJECTION, SOLUTION INTRAMUSCULAR; INTRAVENOUS at 09:01

## 2024-01-01 RX ADMIN — OXYCODONE HYDROCHLORIDE 15 MG: 5 SOLUTION ORAL at 02:17

## 2024-01-01 RX ADMIN — NOREPINEPHRINE BITARTRATE 9 MCG/MIN: 1 INJECTION, SOLUTION, CONCENTRATE INTRAVENOUS at 09:20

## 2024-01-01 RX ADMIN — VECURONIUM BROMIDE 5 MG: 10 INJECTION, POWDER, LYOPHILIZED, FOR SOLUTION INTRAVENOUS at 11:58

## 2024-01-01 RX ADMIN — MAGNESIUM SULFATE HEPTAHYDRATE 2 G: 40 INJECTION, SOLUTION INTRAVENOUS at 04:58

## 2024-01-01 RX ADMIN — CHLORHEXIDINE GLUCONATE 15 ML: 1.2 RINSE ORAL at 22:03

## 2024-01-01 RX ADMIN — Medication 2 TABLET: at 11:26

## 2024-01-01 RX ADMIN — Medication 12.5 MCG/HR: at 21:17

## 2024-01-01 RX ADMIN — ACETAMINOPHEN 325MG 650 MG: 325 TABLET ORAL at 19:44

## 2024-01-01 RX ADMIN — FENTANYL CITRATE 12.5 MCG: 50 INJECTION INTRAMUSCULAR; INTRAVENOUS at 00:02

## 2024-01-01 RX ADMIN — POLYETHYLENE GLYCOL 3350 17 G: 17 POWDER, FOR SOLUTION ORAL at 09:02

## 2024-01-01 RX ADMIN — POLYETHYLENE GLYCOL 3350 17 G: 17 POWDER, FOR SOLUTION ORAL at 09:46

## 2024-01-01 RX ADMIN — POTASSIUM CHLORIDE 20 MEQ: 14.9 INJECTION, SOLUTION INTRAVENOUS at 19:39

## 2024-01-01 RX ADMIN — SODIUM CHLORIDE 2000 ML: 0.9 INJECTION, SOLUTION INTRAVENOUS at 04:54

## 2024-01-01 RX ADMIN — FENTANYL CITRATE 100 MCG: 50 INJECTION INTRAMUSCULAR; INTRAVENOUS at 13:53

## 2024-01-01 RX ADMIN — CHLORHEXIDINE GLUCONATE 15 ML: 1.2 RINSE ORAL at 09:00

## 2024-01-01 RX ADMIN — FUROSEMIDE 40 MG: 10 INJECTION, SOLUTION INTRAVENOUS at 08:33

## 2024-01-01 RX ADMIN — SENNA 8.8 MG: 417.12 SYRUP ORAL at 22:22

## 2024-01-01 RX ADMIN — NOREPINEPHRINE BITARTRATE 5 MCG/MIN: 1 INJECTION, SOLUTION, CONCENTRATE INTRAVENOUS at 23:01

## 2024-01-01 RX ADMIN — FUROSEMIDE 40 MG: 10 INJECTION, SOLUTION INTRAMUSCULAR; INTRAVENOUS at 16:18

## 2024-01-01 RX ADMIN — FENTANYL CITRATE 50 MCG: 50 INJECTION INTRAMUSCULAR; INTRAVENOUS at 10:30

## 2024-01-01 RX ADMIN — HEPARIN SODIUM 5000 UNITS: 5000 INJECTION INTRAVENOUS; SUBCUTANEOUS at 05:00

## 2024-01-01 RX ADMIN — CEFEPIME HYDROCHLORIDE 2000 MG: 2 INJECTION, SOLUTION INTRAVENOUS at 06:19

## 2024-01-01 RX ADMIN — ALBUMIN (HUMAN) 12.5 G: 12.5 INJECTION, SOLUTION INTRAVENOUS at 03:27

## 2024-01-01 RX ADMIN — POLYETHYLENE GLYCOL 3350 17 G: 17 POWDER, FOR SOLUTION ORAL at 08:20

## 2024-01-01 RX ADMIN — CEFEPIME HYDROCHLORIDE 2000 MG: 2 INJECTION, SOLUTION INTRAVENOUS at 17:46

## 2024-01-01 RX ADMIN — FENTANYL CITRATE 12.5 MCG: 50 INJECTION INTRAMUSCULAR; INTRAVENOUS at 17:03

## 2024-01-01 RX ADMIN — FUROSEMIDE 40 MG: 10 INJECTION, SOLUTION INTRAVENOUS at 15:25

## 2024-01-01 RX ADMIN — FENTANYL CITRATE 12.5 MCG: 50 INJECTION INTRAMUSCULAR; INTRAVENOUS at 10:25

## 2024-01-01 RX ADMIN — FENTANYL CITRATE 100 MCG: 50 INJECTION INTRAMUSCULAR; INTRAVENOUS at 20:58

## 2024-01-01 RX ADMIN — NOREPINEPHRINE BITARTRATE 9 MCG/MIN: 1 INJECTION, SOLUTION, CONCENTRATE INTRAVENOUS at 21:59

## 2024-01-01 RX ADMIN — Medication 2 TABLET: at 07:30

## 2024-01-01 RX ADMIN — HEPARIN SODIUM 5000 UNITS: 5000 INJECTION INTRAVENOUS; SUBCUTANEOUS at 14:39

## 2024-01-01 RX ADMIN — FUROSEMIDE 40 MG: 10 INJECTION, SOLUTION INTRAVENOUS at 08:04

## 2024-01-01 RX ADMIN — ALBUMIN (HUMAN) 12.5 G: 0.25 INJECTION, SOLUTION INTRAVENOUS at 07:27

## 2024-01-01 RX ADMIN — LIDOCAINE 5% 2 PATCH: 700 PATCH TOPICAL at 08:04

## 2024-01-01 RX ADMIN — CEFAZOLIN SODIUM 2000 MG: 2 SOLUTION INTRAVENOUS at 11:47

## 2024-01-01 RX ADMIN — HEPARIN SODIUM 5000 UNITS: 5000 INJECTION INTRAVENOUS; SUBCUTANEOUS at 13:00

## 2024-01-01 RX ADMIN — HEPARIN SODIUM 5000 UNITS: 5000 INJECTION INTRAVENOUS; SUBCUTANEOUS at 14:42

## 2024-01-01 RX ADMIN — NOREPINEPHRINE BITARTRATE 8 MCG/MIN: 1 INJECTION, SOLUTION, CONCENTRATE INTRAVENOUS at 01:27

## 2024-01-01 RX ADMIN — POTASSIUM PHOSPHATE, MONOBASIC POTASSIUM PHOSPHATE, DIBASIC 21 MMOL: 224; 236 INJECTION, SOLUTION, CONCENTRATE INTRAVENOUS at 21:44

## 2024-01-01 RX ADMIN — CHLORHEXIDINE GLUCONATE 15 ML: 1.2 RINSE ORAL at 12:43

## 2024-01-01 RX ADMIN — OXYCODONE HYDROCHLORIDE 10 MG: 5 SOLUTION ORAL at 17:34

## 2024-01-01 RX ADMIN — LORAZEPAM 1 MG: 2 INJECTION INTRAMUSCULAR; INTRAVENOUS at 09:38

## 2024-01-01 RX ADMIN — FENTANYL CITRATE 25 MCG: 50 INJECTION, SOLUTION INTRAMUSCULAR; INTRAVENOUS at 15:32

## 2024-01-01 RX ADMIN — NOREPINEPHRINE BITARTRATE 8 MCG/MIN: 1 INJECTION, SOLUTION, CONCENTRATE INTRAVENOUS at 23:12

## 2024-01-01 RX ADMIN — HEPARIN SODIUM 5000 UNITS: 5000 INJECTION INTRAVENOUS; SUBCUTANEOUS at 14:12

## 2024-01-01 RX ADMIN — NOREPINEPHRINE BITARTRATE 8 MCG/MIN: 1 INJECTION, SOLUTION, CONCENTRATE INTRAVENOUS at 11:05

## 2024-01-01 RX ADMIN — HEPARIN SODIUM 5000 UNITS: 5000 INJECTION INTRAVENOUS; SUBCUTANEOUS at 22:03

## 2024-01-01 RX ADMIN — CHLORHEXIDINE GLUCONATE 15 ML: 1.2 RINSE ORAL at 08:33

## 2024-01-01 RX ADMIN — OXYCODONE HYDROCHLORIDE 10 MG: 5 SOLUTION ORAL at 04:54

## 2024-01-01 RX ADMIN — SODIUM BICARBONATE 100 MEQ: 84 INJECTION INTRAVENOUS at 09:52

## 2024-01-01 RX ADMIN — HEPARIN SODIUM 5000 UNITS: 5000 INJECTION INTRAVENOUS; SUBCUTANEOUS at 22:36

## 2024-01-01 RX ADMIN — LIDOCAINE 5% 2 PATCH: 700 PATCH TOPICAL at 08:50

## 2024-01-01 RX ADMIN — CEFEPIME HYDROCHLORIDE 2000 MG: 2 INJECTION, SOLUTION INTRAVENOUS at 18:54

## 2024-01-01 RX ADMIN — FENTANYL CITRATE 50 MCG: 50 INJECTION INTRAMUSCULAR; INTRAVENOUS at 20:50

## 2024-01-01 RX ADMIN — CHLORHEXIDINE GLUCONATE 15 ML: 1.2 RINSE ORAL at 08:05

## 2024-01-01 RX ADMIN — CHLORHEXIDINE GLUCONATE 15 ML: 1.2 RINSE ORAL at 20:07

## 2024-01-01 RX ADMIN — LORAZEPAM 1 MG: 2 INJECTION INTRAMUSCULAR; INTRAVENOUS at 21:59

## 2024-01-01 RX ADMIN — OXYCODONE HYDROCHLORIDE 15 MG: 5 SOLUTION ORAL at 17:06

## 2024-01-01 RX ADMIN — OXYCODONE HYDROCHLORIDE 15 MG: 5 SOLUTION ORAL at 11:54

## 2024-01-01 RX ADMIN — VANCOMYCIN HYDROCHLORIDE 1250 MG: 5 INJECTION, POWDER, LYOPHILIZED, FOR SOLUTION INTRAVENOUS at 19:33

## 2024-01-01 RX ADMIN — OXYCODONE HYDROCHLORIDE 10 MG: 10 TABLET ORAL at 08:52

## 2024-01-01 RX ADMIN — OXYCODONE HYDROCHLORIDE 15 MG: 5 SOLUTION ORAL at 11:47

## 2024-01-01 RX ADMIN — FENTANYL CITRATE 100 MCG: 50 INJECTION INTRAMUSCULAR; INTRAVENOUS at 16:49

## 2024-01-01 RX ADMIN — CEFAZOLIN SODIUM 2000 MG: 2 SOLUTION INTRAVENOUS at 20:07

## 2024-01-01 RX ADMIN — VANCOMYCIN HYDROCHLORIDE 1500 MG: 1 INJECTION, POWDER, LYOPHILIZED, FOR SOLUTION INTRAVENOUS at 19:44

## 2024-01-01 RX ADMIN — FENTANYL CITRATE 12.5 MCG: 50 INJECTION INTRAMUSCULAR; INTRAVENOUS at 07:54

## 2024-01-01 RX ADMIN — HEPARIN SODIUM 5000 UNITS: 5000 INJECTION INTRAVENOUS; SUBCUTANEOUS at 05:46

## 2024-01-01 RX ADMIN — MIDODRINE HYDROCHLORIDE 10 MG: 5 TABLET ORAL at 20:07

## 2024-01-01 RX ADMIN — MIDODRINE HYDROCHLORIDE 10 MG: 5 TABLET ORAL at 06:31

## 2024-01-01 RX ADMIN — HEPARIN SODIUM 5000 UNITS: 5000 INJECTION INTRAVENOUS; SUBCUTANEOUS at 16:18

## 2024-01-01 RX ADMIN — NOREPINEPHRINE BITARTRATE 20 MCG/MIN: 1 INJECTION, SOLUTION, CONCENTRATE INTRAVENOUS at 19:57

## 2024-01-01 RX ADMIN — Medication 2 TABLET: at 17:00

## 2024-01-01 RX ADMIN — HEPARIN SODIUM 5000 UNITS: 5000 INJECTION INTRAVENOUS; SUBCUTANEOUS at 21:46

## 2024-01-01 RX ADMIN — FENTANYL CITRATE 12.5 MCG: 50 INJECTION INTRAMUSCULAR; INTRAVENOUS at 08:45

## 2024-01-01 RX ADMIN — CEFEPIME HYDROCHLORIDE 2000 MG: 2 INJECTION, SOLUTION INTRAVENOUS at 05:55

## 2024-01-01 RX ADMIN — POTASSIUM CHLORIDE 40 MEQ: 1.5 SOLUTION ORAL at 09:06

## 2024-01-01 RX ADMIN — MAGNESIUM SULFATE HEPTAHYDRATE 2 G: 40 INJECTION, SOLUTION INTRAVENOUS at 19:40

## 2024-01-01 RX ADMIN — MIDODRINE HYDROCHLORIDE 10 MG: 5 TABLET ORAL at 03:45

## 2024-01-01 RX ADMIN — AMIODARONE HYDROCHLORIDE 1 MG/MIN: 50 INJECTION, SOLUTION INTRAVENOUS at 09:35

## 2024-01-01 RX ADMIN — NOREPINEPHRINE BITARTRATE 12 MCG/MIN: 1 INJECTION, SOLUTION, CONCENTRATE INTRAVENOUS at 15:04

## 2024-01-01 RX ADMIN — FENTANYL CITRATE 100 MCG: 50 INJECTION INTRAMUSCULAR; INTRAVENOUS at 18:52

## 2024-01-01 RX ADMIN — DIGOXIN 250 MCG: 250 INJECTION, SOLUTION INTRAMUSCULAR; INTRAVENOUS; PARENTERAL at 22:02

## 2024-01-01 RX ADMIN — FUROSEMIDE 20 MG: 10 INJECTION, SOLUTION INTRAVENOUS at 08:50

## 2024-01-01 RX ADMIN — NOREPINEPHRINE BITARTRATE 9 MCG/MIN: 1 INJECTION, SOLUTION, CONCENTRATE INTRAVENOUS at 16:28

## 2024-01-01 RX ADMIN — SODIUM CHLORIDE, SODIUM GLUCONATE, SODIUM ACETATE, POTASSIUM CHLORIDE, MAGNESIUM CHLORIDE, SODIUM PHOSPHATE, DIBASIC, AND POTASSIUM PHOSPHATE 100 ML/HR: .53; .5; .37; .037; .03; .012; .00082 INJECTION, SOLUTION INTRAVENOUS at 22:55

## 2024-01-01 RX ADMIN — ALBUMIN (HUMAN) 12.5 G: 0.25 INJECTION, SOLUTION INTRAVENOUS at 15:25

## 2024-01-01 RX ADMIN — LIDOCAINE 5% 2 PATCH: 700 PATCH TOPICAL at 08:33

## 2024-01-01 RX ADMIN — ALBUMIN (HUMAN) 12.5 G: 12.5 INJECTION, SOLUTION INTRAVENOUS at 00:13

## 2024-01-01 RX ADMIN — AMIODARONE HYDROCHLORIDE 150 MG: 50 INJECTION, SOLUTION INTRAVENOUS at 09:00

## 2024-01-01 RX ADMIN — LIDOCAINE 5% 2 PATCH: 700 PATCH TOPICAL at 08:52

## 2024-01-01 RX ADMIN — ACETAMINOPHEN 325MG 650 MG: 325 TABLET ORAL at 03:51

## 2024-01-01 RX ADMIN — POLYETHYLENE GLYCOL 3350 17 G: 17 POWDER, FOR SOLUTION ORAL at 08:34

## 2024-01-01 RX ADMIN — PROPOFOL 20 MCG/KG/MIN: 10 INJECTION, EMULSION INTRAVENOUS at 20:02

## 2024-01-01 RX ADMIN — ALBUMIN (HUMAN) 12.5 G: 0.25 INJECTION, SOLUTION INTRAVENOUS at 17:00

## 2024-01-01 RX ADMIN — DILTIAZEM HYDROCHLORIDE 10 MG: 5 INJECTION INTRAVENOUS at 10:07

## 2024-01-01 RX ADMIN — Medication 50 MCG/HR: at 02:11

## 2024-01-01 RX ADMIN — SENNA 8.8 MG: 417.12 SYRUP ORAL at 21:06

## 2024-01-01 RX ADMIN — HEPARIN SODIUM 5000 UNITS: 5000 INJECTION INTRAVENOUS; SUBCUTANEOUS at 14:03

## 2024-01-01 RX ADMIN — NOREPINEPHRINE BITARTRATE 8 MCG/MIN: 1 INJECTION, SOLUTION, CONCENTRATE INTRAVENOUS at 14:01

## 2024-01-01 RX ADMIN — MIDODRINE HYDROCHLORIDE 10 MG: 5 TABLET ORAL at 11:54

## 2024-01-01 RX ADMIN — FENTANYL CITRATE 12.5 MCG: 50 INJECTION INTRAMUSCULAR; INTRAVENOUS at 01:45

## 2024-01-01 RX ADMIN — FENTANYL CITRATE 12.5 MCG: 50 INJECTION INTRAMUSCULAR; INTRAVENOUS at 22:06

## 2024-01-01 RX ADMIN — ACETAMINOPHEN 325MG 650 MG: 325 TABLET ORAL at 21:51

## 2024-01-01 RX ADMIN — HEPARIN SODIUM 5000 UNITS: 5000 INJECTION INTRAVENOUS; SUBCUTANEOUS at 21:06

## 2024-01-01 RX ADMIN — DIGOXIN 250 MCG: 250 INJECTION, SOLUTION INTRAMUSCULAR; INTRAVENOUS; PARENTERAL at 14:03

## 2024-01-01 RX ADMIN — Medication 12.5 MCG/HR: at 10:40

## 2024-01-01 RX ADMIN — POLYETHYLENE GLYCOL 3350 17 G: 17 POWDER, FOR SOLUTION ORAL at 08:05

## 2024-01-01 RX ADMIN — CEFAZOLIN SODIUM 2000 MG: 2 SOLUTION INTRAVENOUS at 11:26

## 2024-01-01 RX ADMIN — SENNA 8.8 MG: 417.12 SYRUP ORAL at 22:04

## 2024-01-01 RX ADMIN — HEPARIN SODIUM 5000 UNITS: 5000 INJECTION INTRAVENOUS; SUBCUTANEOUS at 22:22

## 2024-01-01 RX ADMIN — NOREPINEPHRINE BITARTRATE 8 MCG/MIN: 1 INJECTION, SOLUTION, CONCENTRATE INTRAVENOUS at 05:36

## 2024-01-01 RX ADMIN — POTASSIUM CHLORIDE 20 MEQ: 14.9 INJECTION, SOLUTION INTRAVENOUS at 11:55

## 2024-01-01 RX ADMIN — MIDODRINE HYDROCHLORIDE 10 MG: 5 TABLET ORAL at 11:26

## 2024-01-01 RX ADMIN — CHLORHEXIDINE GLUCONATE 15 ML: 1.2 RINSE ORAL at 08:49

## 2024-01-01 RX ADMIN — SODIUM CHLORIDE, SODIUM GLUCONATE, SODIUM ACETATE, POTASSIUM CHLORIDE, MAGNESIUM CHLORIDE, SODIUM PHOSPHATE, DIBASIC, AND POTASSIUM PHOSPHATE 100 ML/HR: .53; .5; .37; .037; .03; .012; .00082 INJECTION, SOLUTION INTRAVENOUS at 14:03

## 2024-01-01 RX ADMIN — CALCIUM CHLORIDE 1 G: 100 INJECTION INTRAVENOUS; INTRAVENTRICULAR at 09:52

## 2024-01-01 RX ADMIN — NOREPINEPHRINE BITARTRATE 7 MCG/MIN: 1 INJECTION, SOLUTION, CONCENTRATE INTRAVENOUS at 22:13

## 2024-01-01 RX ADMIN — METOPROLOL SUCCINATE 50 MG: 50 TABLET, EXTENDED RELEASE ORAL at 08:52

## 2024-01-01 RX ADMIN — POTASSIUM PHOSPHATE, MONOBASIC POTASSIUM PHOSPHATE, DIBASIC 21 MMOL: 224; 236 INJECTION, SOLUTION, CONCENTRATE INTRAVENOUS at 17:06

## 2024-01-01 RX ADMIN — HEPARIN SODIUM 5000 UNITS: 5000 INJECTION INTRAVENOUS; SUBCUTANEOUS at 14:31

## 2024-01-01 RX ADMIN — FUROSEMIDE 40 MG: 10 INJECTION, SOLUTION INTRAVENOUS at 17:00

## 2024-01-01 RX ADMIN — HEPARIN SODIUM 5000 UNITS: 5000 INJECTION INTRAVENOUS; SUBCUTANEOUS at 05:26

## 2024-01-01 RX ADMIN — ALBUMIN (HUMAN) 12.5 G: 0.25 INJECTION, SOLUTION INTRAVENOUS at 08:29

## 2024-01-01 RX ADMIN — HEPARIN SODIUM 5000 UNITS: 5000 INJECTION INTRAVENOUS; SUBCUTANEOUS at 05:52

## 2024-01-01 RX ADMIN — Medication 25 MCG/HR: at 22:14

## 2024-01-01 RX ADMIN — FENTANYL CITRATE 25 MCG: 50 INJECTION INTRAMUSCULAR; INTRAVENOUS at 15:32

## 2024-01-01 RX ADMIN — LIDOCAINE 5% 2 PATCH: 700 PATCH TOPICAL at 09:02

## 2024-01-01 RX ADMIN — CHLORHEXIDINE GLUCONATE 15 ML: 1.2 RINSE ORAL at 22:36

## 2024-01-01 RX ADMIN — IOHEXOL 61 ML: 350 INJECTION, SOLUTION INTRAVENOUS at 06:59

## 2024-01-01 RX ADMIN — CHLORHEXIDINE GLUCONATE 15 ML: 1.2 RINSE ORAL at 09:46

## 2024-01-01 RX ADMIN — FUROSEMIDE 40 MG: 10 INJECTION, SOLUTION INTRAMUSCULAR; INTRAVENOUS at 15:32

## 2024-01-01 RX ADMIN — HEPARIN SODIUM 5000 UNITS: 5000 INJECTION INTRAVENOUS; SUBCUTANEOUS at 05:36

## 2024-01-01 RX ADMIN — NOREPINEPHRINE BITARTRATE 5 MCG/MIN: 1 INJECTION, SOLUTION, CONCENTRATE INTRAVENOUS at 05:38

## 2024-01-01 RX ADMIN — FENTANYL CITRATE 12.5 MCG: 50 INJECTION INTRAMUSCULAR; INTRAVENOUS at 03:20

## 2024-01-01 RX ADMIN — NOREPINEPHRINE BITARTRATE 6 MCG/MIN: 1 INJECTION, SOLUTION, CONCENTRATE INTRAVENOUS at 20:45

## 2024-01-01 RX ADMIN — FENTANYL CITRATE 12.5 MCG: 50 INJECTION INTRAMUSCULAR; INTRAVENOUS at 18:39

## 2024-01-01 NOTE — TELEPHONE ENCOUNTER
Received voicemail from pt 1/1/24 requesting oxycodone refill. Looks like script was already pended for signature. Palliative Care appt scheduled for 1/12.

## 2024-01-05 ENCOUNTER — TELEPHONE (OUTPATIENT)
Dept: HEMATOLOGY ONCOLOGY | Facility: CLINIC | Age: 54
End: 2024-01-05

## 2024-01-05 NOTE — TELEPHONE ENCOUNTER
Received voice message from patient.      Returned call to patient using [employer]  #[phone].      Reviewed patient requesting letter requesting sister to be able to come from [facility] in February to assist during her treatment in February.      Patient also requesting assistance with social security paper work.     Patient aware will send to [employer] for assistance.

## 2024-01-06 ENCOUNTER — APPOINTMENT (OUTPATIENT)
Dept: LAB | Facility: HOSPITAL | Age: 54
End: 2024-01-06
Payer: COMMERCIAL

## 2024-01-06 DIAGNOSIS — C50.912 CARCINOMA OF LEFT BREAST IN FEMALE, ESTROGEN RECEPTOR POSITIVE, UNSPECIFIED SITE OF BREAST: ICD-10-CM

## 2024-01-06 DIAGNOSIS — Z17.0 CARCINOMA OF LEFT BREAST IN FEMALE, ESTROGEN RECEPTOR POSITIVE, UNSPECIFIED SITE OF BREAST: ICD-10-CM

## 2024-01-06 LAB
ALBUMIN SERPL BCP-MCNC: 3.9 G/DL (ref 3.5–5)
ALP SERPL-CCNC: 132 U/L (ref 34–104)
ALT SERPL W P-5'-P-CCNC: 20 U/L (ref 7–52)
ANION GAP SERPL CALCULATED.3IONS-SCNC: 12 MMOL/L
AST SERPL W P-5'-P-CCNC: 57 U/L (ref 13–39)
BASOPHILS # BLD AUTO: 0.05 THOUSANDS/ÂΜL (ref 0–0.1)
BASOPHILS NFR BLD AUTO: 1 % (ref 0–1)
BILIRUB SERPL-MCNC: 0.36 MG/DL (ref 0.2–1)
BUN SERPL-MCNC: 7 MG/DL (ref 5–25)
CALCIUM SERPL-MCNC: 9.5 MG/DL (ref 8.4–10.2)
CHLORIDE SERPL-SCNC: 96 MMOL/L (ref 96–108)
CO2 SERPL-SCNC: 25 MMOL/L (ref 21–32)
CREAT SERPL-MCNC: 0.67 MG/DL (ref 0.6–1.3)
EOSINOPHIL # BLD AUTO: 0.01 THOUSAND/ÂΜL (ref 0–0.61)
EOSINOPHIL NFR BLD AUTO: 0 % (ref 0–6)
ERYTHROCYTE [DISTWIDTH] IN BLOOD BY AUTOMATED COUNT: 20.3 % (ref 11.6–15.1)
GFR SERPL CREATININE-BSD FRML MDRD: 99 ML/MIN/1.73SQ M
GLUCOSE P FAST SERPL-MCNC: 86 MG/DL (ref 65–99)
HCT VFR BLD AUTO: 33.1 % (ref 34.8–46.1)
HGB BLD-MCNC: 9.8 G/DL (ref 11.5–15.4)
IMM GRANULOCYTES # BLD AUTO: 0.09 THOUSAND/UL (ref 0–0.2)
IMM GRANULOCYTES NFR BLD AUTO: 1 % (ref 0–2)
LYMPHOCYTES # BLD AUTO: 2.29 THOUSANDS/ÂΜL (ref 0.6–4.47)
LYMPHOCYTES NFR BLD AUTO: 35 % (ref 14–44)
MCH RBC QN AUTO: 26.1 PG (ref 26.8–34.3)
MCHC RBC AUTO-ENTMCNC: 29.6 G/DL (ref 31.4–37.4)
MCV RBC AUTO: 88 FL (ref 82–98)
MONOCYTES # BLD AUTO: 0.96 THOUSAND/ÂΜL (ref 0.17–1.22)
MONOCYTES NFR BLD AUTO: 15 % (ref 4–12)
NEUTROPHILS # BLD AUTO: 3.15 THOUSANDS/ÂΜL (ref 1.85–7.62)
NEUTS SEG NFR BLD AUTO: 48 % (ref 43–75)
NRBC BLD AUTO-RTO: 0 /100 WBCS
PLATELET # BLD AUTO: 485 THOUSANDS/UL (ref 149–390)
PMV BLD AUTO: 8.8 FL (ref 8.9–12.7)
POTASSIUM SERPL-SCNC: 4 MMOL/L (ref 3.5–5.3)
PROT SERPL-MCNC: 8.3 G/DL (ref 6.4–8.4)
RBC # BLD AUTO: 3.76 MILLION/UL (ref 3.81–5.12)
SODIUM SERPL-SCNC: 133 MMOL/L (ref 135–147)
WBC # BLD AUTO: 6.55 THOUSAND/UL (ref 4.31–10.16)

## 2024-01-06 PROCEDURE — 80053 COMPREHEN METABOLIC PANEL: CPT

## 2024-01-06 PROCEDURE — 85025 COMPLETE CBC W/AUTO DIFF WBC: CPT

## 2024-01-06 PROCEDURE — 36415 COLL VENOUS BLD VENIPUNCTURE: CPT

## 2024-01-09 DIAGNOSIS — G89.3 PAIN FROM BONE METASTASES (HCC): ICD-10-CM

## 2024-01-09 DIAGNOSIS — C79.51 PAIN FROM BONE METASTASES (HCC): ICD-10-CM

## 2024-01-09 RX ORDER — OXYCODONE HYDROCHLORIDE 5 MG/1
5 TABLET ORAL EVERY 6 HOURS PRN
Qty: 20 TABLET | Refills: 0 | Status: SHIPPED | OUTPATIENT
Start: 2024-01-09 | End: 2024-01-16 | Stop reason: SDUPTHER

## 2024-01-11 ENCOUNTER — PATIENT OUTREACH (OUTPATIENT)
Dept: CASE MANAGEMENT | Facility: HOSPITAL | Age: 54
End: 2024-01-11

## 2024-01-12 ENCOUNTER — OFFICE VISIT (OUTPATIENT)
Dept: PALLIATIVE MEDICINE | Facility: CLINIC | Age: 54
End: 2024-01-12
Payer: COMMERCIAL

## 2024-01-12 ENCOUNTER — TELEPHONE (OUTPATIENT)
Dept: PALLIATIVE MEDICINE | Facility: CLINIC | Age: 54
End: 2024-01-12

## 2024-01-12 ENCOUNTER — CLINICAL SUPPORT (OUTPATIENT)
Dept: PALLIATIVE MEDICINE | Facility: CLINIC | Age: 54
End: 2024-01-12
Payer: COMMERCIAL

## 2024-01-12 VITALS
OXYGEN SATURATION: 97 % | TEMPERATURE: 96.8 F | SYSTOLIC BLOOD PRESSURE: 150 MMHG | WEIGHT: 127.6 LBS | DIASTOLIC BLOOD PRESSURE: 84 MMHG | HEART RATE: 130 BPM | BODY MASS INDEX: 21.23 KG/M2

## 2024-01-12 DIAGNOSIS — G89.3 PAIN FROM BONE METASTASES (HCC): ICD-10-CM

## 2024-01-12 DIAGNOSIS — G89.3 CANCER-RELATED PAIN: Primary | ICD-10-CM

## 2024-01-12 DIAGNOSIS — Z51.5 PALLIATIVE CARE ENCOUNTER: ICD-10-CM

## 2024-01-12 DIAGNOSIS — C50.812 MALIGNANT NEOPLASM OF OVERLAPPING SITES OF LEFT BREAST IN FEMALE, ESTROGEN RECEPTOR POSITIVE: ICD-10-CM

## 2024-01-12 DIAGNOSIS — C79.51 PAIN FROM BONE METASTASES (HCC): ICD-10-CM

## 2024-01-12 DIAGNOSIS — C50.919: ICD-10-CM

## 2024-01-12 DIAGNOSIS — G89.29 CHRONIC BILATERAL LOW BACK PAIN WITHOUT SCIATICA: ICD-10-CM

## 2024-01-12 DIAGNOSIS — M54.50 CHRONIC BILATERAL LOW BACK PAIN WITHOUT SCIATICA: ICD-10-CM

## 2024-01-12 DIAGNOSIS — Z17.0 MALIGNANT NEOPLASM OF OVERLAPPING SITES OF LEFT BREAST IN FEMALE, ESTROGEN RECEPTOR POSITIVE: ICD-10-CM

## 2024-01-12 PROBLEM — M54.9 ACUTE BACK PAIN: Status: RESOLVED | Noted: 2023-10-06 | Resolved: 2024-01-12

## 2024-01-12 PROCEDURE — NC001 PR NO CHARGE: Performed by: COUNSELOR

## 2024-01-12 PROCEDURE — 99214 OFFICE O/P EST MOD 30 MIN: CPT | Performed by: INTERNAL MEDICINE

## 2024-01-12 NOTE — ASSESSMENT & PLAN NOTE
Continue percocet 5-325mg PO q4H prn moderate pain. Averaging 4 a day. No need for refills now.   She'd rather be on percocet than oxycodone because percocet is covered by insurance  She has no issues with [facility]  Refer to [facility]

## 2024-01-12 NOTE — PROGRESS NOTES
Palliative and Supportive Care   Shannan Vee 53 y.o. female [id number]    Assessment/Plan:  1. Cancer-related pain    2. Pain from bone metastases (HCC)    3. Chronic bilateral low back pain without sciatica    4. Malignant neoplasm of overlapping sites of left breast in female, estrogen receptor positive     5. Recurrent malignant neoplasm of breast (HCC)    6. Palliative care encounter      1. Cancer-related pain  Assessment & Plan:  Continue percocet 5-325mg PO q4H prn moderate pain. Averaging 4 a day. No need for refills now.   She'd rather be on percocet than oxycodone because percocet is covered by insurance  She has no issues with [facility]  Refer to [facility]      2. Pain from bone metastases (HCC)    3. Chronic bilateral low back pain without sciatica    4. Malignant neoplasm of overlapping sites of left breast in female, estrogen receptor positive   -     Ambulatory referral to Palliative Care    5. Recurrent malignant neoplasm of breast (HCC)    6. Palliative care encounter      Follow up   Will refer to [facility], they reached out to her before but she declined. She is agreeable now  No follow up with      Controlled Substance Review    [place]MP or [place]  reviewed: No red flags were identified; safe to proceed with prescription..    Filled  Written  ID  Drug  QTY  Days  Prescriber  RX #  Dispenser  Refill  Daily Dose*  Pymt Type      01/02/2024 01/01/2024 1 Oxycodone Hcl (Ir) 5 Mg Tablet 20.00 5 Ta Hab [id number] [facility] ([id number]) 0 30.00 MME Comm [place]   12/19/2023 12/18/2023 1 Oxycodone Hcl (Ir) 5 Mg Tablet 20.00 5 Ta Hab [id number] [facility] ([id number]) 0 30.00 [place]   12/17/2023 11/17/2023 2 Oxycodone-Acetaminophen 5-325 120.00 21 Yo Spear [id number] [facility] ([id number]) 0 45.00 [place]ME [place]   12/08/2023 12/08/2023 2 Oxycodone Hcl (Ir) 5 Mg Tablet 20.00 5 Ta Hab [id number] [facility] (3842) 0 30.00 [place]   11/27/2023 11/27/2023 2 Oxycodone Hcl (Ir) 5 Mg Tablet 20.00 5 Ta Hab [id number] [facility] ([id number]) 0  30.00 [place]ME [place]   11/10/2023 11/10/2023 1 Oxycodone Hcl (Ir) 5 Mg Tablet 20.00 4 Ma Amb [id number] [facility] ([id number]) 0 37.50 MME Comm Ins [place]     Requested Prescriptions      No prescriptions requested or ordered in this encounter     There are no discontinued medications.    Representatives have queried the patient's controlled substance dispensing history in the Prescription Drug Monitoring Program in compliance with regulations before I have prescribed any controlled substances.  The prescription history is consistent with prescribed therapy and our practice policies.      35 minutes were spent face to face with Shannan Vee and her daughter with greater than 50% of the time spent in counseling or coordination of care including discussions of etiology of diagnosis, pathogenesis of diagnosis, diagnostic results, impression, and recommendations, risks and benefits of treatment, instructions for disease self management, treatment instructions, follow up requirements, risk factors and risk reduction of disease, patient and family counseling/involvement in care, and compliance with treatment regimen .  All of the patient's questions were answered during this discussion.    No follow-ups on file.    Subjective:   Chief Complaint  Follow up visit for:  symptom management, pain, neoplasm related, assessment of goals of care, disease process education and discussion of prognosis, advance care planning  HPI     Shannan Vee is a 53 y.o. female with breast cancer (diagnosed in 2013) s/p chemo, mastectomy, RT. Unfortunately she appeared to have recurrence in 10/2023 when a large right lower lobe lung mass with regional lymphadenopathy and metastatic bone disease were found in CT imaging. She was first seen in the hospital in 11/2023 when she was admitted for pleural effusion. She went for an IR lung biopsy that confirmed lung mets. She is currently on oral chemo. She is referred back to palliative medicine for supportive cares.      Met with patient and her daughter. She remembered me from the hospital. She complains of back in the mid-lower back from bone mets. She is prescribed percocet 5-325mg and also oxycodone 5mg that she both took. She feels that the percocet worked better for her and her insurance approved that one. She averages 4 a day. We will continue same. She has no issues with [facility].     She also complains about poor appetite. I recommended MMJ but she is not agreeable to this.     I offered home based palliative through [facility] before while she was in the hospital to which she was agreeable. They did reach out to her but she felt at that time that she does not need them. She is agreeable to that now, to assist her better at home.      She also inquired about the waiver and extra help at home. [facility] spoke to her today about it, pls refer to her separate documentation for more details.    The following portions of the medical history were reviewed: past medical history, problem list, medication list, and social history.    Current Outpatient Medications:     calcium citrate-vitamin D (Calcitrate Plus D) 315 mg-5 mcg tablet, Take 1 tablet by mouth 2 (two) times a day, Disp: 180 tablet, Rfl: 0    ergocalciferol (VITAMIN D2) 50,000 units, Take 1 capsule (50,000 Units total) by mouth once a week, Disp: 8 capsule, Rfl: 0    lidocaine (LIDODERM) 5 %, Apply 2 patches topically over 12 hours daily Remove & Discard patch within 12 hours or as directed by MD Do not start before November 9, 2023., Disp: 60 patch, Rfl: 0    Multiple Vitamin (MULTIVITAMIN ADULT PO), Take 1 tablet by mouth in the morning, Disp: , Rfl:     Ribociclib Succ, 600 MG Dose, 200 MG TBPK, Take 600 mg by mouth in the morning For 3 weeks and then 1 week off, Disp: 21 each, Rfl: 6    acetaminophen (TYLENOL) 325 mg tablet, Take 3 tablets (975 mg total) by mouth every 8 (eight) hours (Patient not taking: Reported on 1/12/2024), Disp: 90 tablet, Rfl: 0    anastrozole  (ARIMIDEX) 1 mg tablet, Take 1 tablet (1 mg total) by mouth daily (Patient not taking: Reported on 11/29/2023), Disp: 90 tablet, Rfl: 3    docusate sodium (COLACE) 100 mg capsule, Take 1 capsule (100 mg total) by mouth every 12 (twelve) hours (Patient not taking: Reported on 1/12/2024), Disp: 60 capsule, Rfl: 0    ibuprofen (MOTRIN) 600 mg tablet, Take 1 tablet (600 mg total) by mouth every 6 (six) hours as needed for moderate pain Take with food (Patient not taking: Reported on 11/29/2023), Disp: 60 tablet, Rfl: 0    oxyCODONE (Roxicodone) 5 immediate release tablet, Take 1 tablet (5 mg total) by mouth every 6 (six) hours as needed for moderate pain Max Daily Amount: 20 mg (Patient not taking: Reported on 1/12/2024), Disp: 20 tablet, Rfl: 0  Review of Systems   Constitutional:  Positive for activity change and appetite change.   HENT:  Negative for trouble swallowing.    Respiratory:  Negative for shortness of breath.    Cardiovascular:  Negative for chest pain.   Gastrointestinal:  Negative for abdominal pain.   Musculoskeletal:  Positive for back pain.   Neurological:  Negative for weakness.   Psychiatric/Behavioral:  Negative for sleep disturbance. The patient is not nervous/anxious.    All other systems reviewed and are negative.      All other systems negative    Objective:  Vital Signs  /84 (BP Location: Right arm, Patient Position: Sitting, Cuff Size: Standard)   Pulse (!) 130   Temp (!) 96.8 °F (36 °C) (Temporal)   Wt 57.9 kg (127 lb 9.6 oz)   SpO2 97%   BMI 21.23 kg/m²    Physical Exam    Constitutional: Appears well-developed and well-nourished. Appears thin, chronically ill looking but not toxic appearing. In no acute physical or emotional distress.   Head: Normocephalic and atraumatic.   Eyes: EOM are normal. No ocular discharge. No scleral icterus.   Neck: No visible adenopathy or masses  Respiratory: Effort normal. No stridor. No respiratory distress.   Gastrointestinal: No abdominal  distension.   Musculoskeletal: No edema.   Neurological: Alert, oriented and appropriately conversant.   Skin: No diaphoresis, no rashes seen on exposed areas of skin.   Psychiatric: Displays a normal mood and affect. Behavior, judgement and thought content appear normal.     Janie Briones MD  Palliative Medicine & Supportive Care  Internal Medicine  Available via [employer]  Office: [phone]  Fax: [phone]

## 2024-01-12 NOTE — PATIENT INSTRUCTIONS
PRESCRIPTION REFILL REMINDER:  All medication refills should be requested prior to Noon on Friday. Any refill requests after noon on Friday would be addressed the following Monday.  Please protect yourself from COVID.  = Wash your hands.  Soap and water, or hand  with at least 60% alcohol, are both effective at killing the virus.   = Wear a mask.  This will help protect others from any virus particles you might spread.  Your mouth and nose BOTH need to be covered.  = Keep the distance.  Keep 6 feet of distance from other people, even if they seem healthy.  Keeping distance protects you from the other person's virus spread.    = Get a vaccine, and boost it.  Three vaccines are approved for use in the [place] for all adults.  These vaccines do provide protection against all known variants, and the new 'bi-valent' booster is predicted to be more protective against Omicron variants.  + Pfizer is FDA approved for all persons age 5 and older.  + Moderna may be given to all person age 18 and older.  - We do NOT recommend Zaheer & Zaheer vaccine for our Palliative Care patients.  - [facility] is no longer offering regular COVID vaccine clinics.  You may ask your primary doctor for help and advice.   = you may also visit the CDC's complete list of approved sites for new vaccines: Vaccines.gov - Vaccine Location Search Results .  (You may also visit Vaccines.gov and search for 'Bi-valent booster' in your zip code.)  = [facility] [facility] [place]), [facility] [facility], [employer], [facility], and many [facility] locations ALL have shots.  + The CDC recommends booster shots on ALL vaccines for ALL adults.    = Test to treat.  If you have symptoms, get tested, and get treatment!  New drugs like Paxlovid can prevent you from ever having to go to the hospital , and may be covered completely by your insurance or special government programs.   - https://aspr.hhs.gov/TestToTreat/       Informacion en espanol sobre vacunas, de nos companeros de [facility] --  https://www.lvhn.org/treatments/vacunas-de-covid-19    Check out [place] [employer] for State data that are updated daily:    Https://www.[employer] Company[employer].org/story/2020/03/16/dlo-rgi-vvulhtyg-the-spread-of-the-coronavirus-in-the-u-s     Global Epidemics.Org, from [facility], will give you [place] information on virus cases and vaccination rates:    Https://globalepidemics.org/    Frequently Asked Questions about COVID, answered by [employer]    https://www.[employer].com/interactive/2020/health/coronavirus-questions-answers/

## 2024-01-16 DIAGNOSIS — C79.51 PAIN FROM BONE METASTASES (HCC): ICD-10-CM

## 2024-01-16 DIAGNOSIS — G89.3 PAIN FROM BONE METASTASES (HCC): ICD-10-CM

## 2024-01-17 ENCOUNTER — TELEPHONE (OUTPATIENT)
Dept: HEMATOLOGY ONCOLOGY | Facility: CLINIC | Age: 54
End: 2024-01-17

## 2024-01-17 ENCOUNTER — DOCUMENTATION (OUTPATIENT)
Dept: OTHER | Facility: HOSPITAL | Age: 54
End: 2024-01-17

## 2024-01-17 ENCOUNTER — TELEPHONE (OUTPATIENT)
Dept: OTHER | Facility: OTHER | Age: 54
End: 2024-01-17

## 2024-01-17 ENCOUNTER — TELEPHONE (OUTPATIENT)
Dept: OTHER | Facility: HOSPITAL | Age: 54
End: 2024-01-17

## 2024-01-17 DIAGNOSIS — C79.51 PAIN FROM BONE METASTASES (HCC): Primary | ICD-10-CM

## 2024-01-17 DIAGNOSIS — G89.3 PAIN FROM BONE METASTASES (HCC): Primary | ICD-10-CM

## 2024-01-17 RX ORDER — OXYCODONE HYDROCHLORIDE 5 MG/1
5 TABLET ORAL EVERY 6 HOURS PRN
Qty: 20 TABLET | Refills: 0 | Status: SHIPPED | OUTPATIENT
Start: 2024-01-17 | End: 2024-01-18 | Stop reason: SDUPTHER

## 2024-01-17 NOTE — TELEPHONE ENCOUNTER
Received a call from the pt's dtr stating she was at the pharmacy to  pt's oxycodone  Rx and pharmacy would not fill because name on Rx is Shannan engel and name on her ID is Shannan SUMMERS toro which is correct. I explained to dtr that this would happen tomorrow as  left for the day and I have to have registration staff correct her name in the system.

## 2024-01-18 DIAGNOSIS — G89.3 PAIN FROM BONE METASTASES (HCC): ICD-10-CM

## 2024-01-18 DIAGNOSIS — C79.51 PAIN FROM BONE METASTASES (HCC): ICD-10-CM

## 2024-01-18 RX ORDER — OXYCODONE HYDROCHLORIDE 5 MG/1
5 TABLET ORAL EVERY 6 HOURS PRN
Qty: 20 TABLET | Refills: 0 | Status: ON HOLD | OUTPATIENT
Start: 2024-01-18

## 2024-01-18 NOTE — TELEPHONE ENCOUNTER
Was notified by call center that pharmacist from pt's [facility] called asking for clarification for oxycodone sent in by Dr. Villavicencio earlier today.     Chart reviewed. Based on the documentation I'm seeing in chart, it looks like Dr. Villavicencio's team is already aware of the situation. Patient's name is listed in Epic as Shannan Vee, but it should be Shannan Vee. With the mismatch in pt's name, pharmacist is not able to fill the medication. It appears as if pt's daughter was already made aware that team will be talking to registration team to fix the name on epic so that medication can be correctly transmitted. I informed the pharmacist at [facility] regarding above. Meanwhile, I called patient and the daughter to see how the patient is doing in the meantime, but neither phone calls were not answered.     Forwarding this to pt's primary oncology team so that they are also aware of the above.

## 2024-01-18 NOTE — TELEPHONE ENCOUNTER
Pharmacy called because oxycodone script was sent with the wrong name. The name we have on file is Shannan engel and name on her ID is Shannan engel which is correct.Paged on call via TC.

## 2024-01-19 ENCOUNTER — TELEPHONE (OUTPATIENT)
Dept: HEMATOLOGY ONCOLOGY | Facility: CLINIC | Age: 54
End: 2024-01-19

## 2024-01-19 ENCOUNTER — TELEPHONE (OUTPATIENT)
Dept: PALLIATIVE MEDICINE | Facility: CLINIC | Age: 54
End: 2024-01-19

## 2024-01-19 NOTE — TELEPHONE ENCOUNTER
Received vm: my name is Doctor Maty. I have a question about one of my patients who is under Doctor Habibs care. If you could give me a call back at [phone]. Thanks.      Returned call to Dr. Rutledge.  Reviewed medications and appts with Dr. Rutledge.  Reviewed patient referred to [facility].  Encouraged patient to utilize [facility] [employer] for any [facility] related transportation concerns.  Will follow up with patient at office visit on 2/2/24

## 2024-01-19 NOTE — TELEPHONE ENCOUNTER
Called Dr. Arguelles back, no . Left VM    Janie Briones MD  Palliative Medicine & Supportive Care  Internal Medicine  Available via [employer] Text  Office: [phone]  Fax: [phone]

## 2024-01-19 NOTE — TELEPHONE ENCOUNTER
Blane called in from [facility] regarding pt. Blane stated pt is currently admitted. Would like a call back he has something he has to relay to Dr. Briones. Please advice.

## 2024-01-22 ENCOUNTER — HOSPITAL ENCOUNTER (OUTPATIENT)
Dept: INFUSION CENTER | Facility: HOSPITAL | Age: 54
End: 2024-01-22
Attending: INTERNAL MEDICINE

## 2024-01-22 DIAGNOSIS — E55.9 VITAMIN D DEFICIENCY: ICD-10-CM

## 2024-01-22 RX ORDER — ERGOCALCIFEROL 1.25 MG/1
50000 CAPSULE ORAL WEEKLY
Qty: 8 CAPSULE | Refills: 0 | Status: SHIPPED | OUTPATIENT
Start: 2024-01-22

## 2024-01-23 ENCOUNTER — HOSPITAL ENCOUNTER (INPATIENT)
Facility: HOSPITAL | Age: 54
LOS: 6 days | Discharge: HOME HEALTH | DRG: 136 | End: 2024-01-30
Attending: EMERGENCY MEDICINE | Admitting: HOSPITALIST
Payer: COMMERCIAL

## 2024-01-23 ENCOUNTER — APPOINTMENT (EMERGENCY)
Dept: CT IMAGING | Facility: HOSPITAL | Age: 54
DRG: 136 | End: 2024-01-23
Payer: COMMERCIAL

## 2024-01-23 DIAGNOSIS — G89.3 PAIN FROM BONE METASTASES (HCC): ICD-10-CM

## 2024-01-23 DIAGNOSIS — G89.3 CANCER-RELATED PAIN: ICD-10-CM

## 2024-01-23 DIAGNOSIS — E87.1 HYPONATREMIA: ICD-10-CM

## 2024-01-23 DIAGNOSIS — C50.812 MALIGNANT NEOPLASM OF OVERLAPPING SITES OF LEFT BREAST IN FEMALE, ESTROGEN RECEPTOR POSITIVE: ICD-10-CM

## 2024-01-23 DIAGNOSIS — R00.0 SINUS TACHYCARDIA: ICD-10-CM

## 2024-01-23 DIAGNOSIS — Z17.0 MALIGNANT NEOPLASM OF OVERLAPPING SITES OF LEFT BREAST IN FEMALE, ESTROGEN RECEPTOR POSITIVE: ICD-10-CM

## 2024-01-23 DIAGNOSIS — J18.9 PNEUMONIA: ICD-10-CM

## 2024-01-23 DIAGNOSIS — J91.0 MALIGNANT PLEURAL EFFUSION: Primary | ICD-10-CM

## 2024-01-23 DIAGNOSIS — J90 LARGE PLEURAL EFFUSION: ICD-10-CM

## 2024-01-23 DIAGNOSIS — C79.51 PAIN FROM BONE METASTASES (HCC): ICD-10-CM

## 2024-01-23 LAB
2HR DELTA HS TROPONIN: 1 NG/L
4HR DELTA HS TROPONIN: 5 NG/L
ALBUMIN SERPL BCP-MCNC: 3.2 G/DL (ref 3.5–5)
ALP SERPL-CCNC: 149 U/L (ref 34–104)
ALT SERPL W P-5'-P-CCNC: 19 U/L (ref 7–52)
ANION GAP SERPL CALCULATED.3IONS-SCNC: 15 MMOL/L
ANISOCYTOSIS BLD QL SMEAR: PRESENT
AST SERPL W P-5'-P-CCNC: 46 U/L (ref 13–39)
ATRIAL RATE: 149 BPM
BASOPHILS # BLD MANUAL: 0 THOUSAND/UL (ref 0–0.1)
BASOPHILS NFR MAR MANUAL: 0 % (ref 0–1)
BILIRUB SERPL-MCNC: 0.46 MG/DL (ref 0.2–1)
BNP SERPL-MCNC: 80 PG/ML (ref 0–100)
BUN SERPL-MCNC: 7 MG/DL (ref 5–25)
CALCIUM ALBUM COR SERPL-MCNC: 9.4 MG/DL (ref 8.3–10.1)
CALCIUM SERPL-MCNC: 8.8 MG/DL (ref 8.4–10.2)
CARDIAC TROPONIN I PNL SERPL HS: 12 NG/L
CARDIAC TROPONIN I PNL SERPL HS: 13 NG/L
CARDIAC TROPONIN I PNL SERPL HS: 17 NG/L
CHLORIDE SERPL-SCNC: 93 MMOL/L (ref 96–108)
CO2 SERPL-SCNC: 24 MMOL/L (ref 21–32)
CREAT SERPL-MCNC: 0.46 MG/DL (ref 0.6–1.3)
EOSINOPHIL # BLD MANUAL: 0 THOUSAND/UL (ref 0–0.4)
EOSINOPHIL NFR BLD MANUAL: 0 % (ref 0–6)
ERYTHROCYTE [DISTWIDTH] IN BLOOD BY AUTOMATED COUNT: 20.7 % (ref 11.6–15.1)
FLUAV RNA RESP QL NAA+PROBE: NEGATIVE
FLUBV RNA RESP QL NAA+PROBE: NEGATIVE
GFR SERPL CREATININE-BSD FRML MDRD: 112 ML/MIN/1.73SQ M
GLUCOSE SERPL-MCNC: 111 MG/DL (ref 65–140)
HCT VFR BLD AUTO: 30.1 % (ref 34.8–46.1)
HGB BLD-MCNC: 9.2 G/DL (ref 11.5–15.4)
LACTATE SERPL-SCNC: 1.1 MMOL/L (ref 0.5–2)
LG PLATELETS BLD QL SMEAR: PRESENT
LYMPHOCYTES # BLD AUTO: 14 % (ref 14–44)
LYMPHOCYTES # BLD AUTO: 2.83 THOUSAND/UL (ref 0.6–4.47)
MACROCYTES BLD QL AUTO: PRESENT
MCH RBC QN AUTO: 26.3 PG (ref 26.8–34.3)
MCHC RBC AUTO-ENTMCNC: 30.6 G/DL (ref 31.4–37.4)
MCV RBC AUTO: 86 FL (ref 82–98)
MICROCYTES BLD QL AUTO: PRESENT
MONOCYTES # BLD AUTO: 1.54 THOUSAND/UL (ref 0–1.22)
MONOCYTES NFR BLD: 12 % (ref 4–12)
NEUTROPHILS # BLD MANUAL: 8.49 THOUSAND/UL (ref 1.85–7.62)
NEUTS BAND NFR BLD MANUAL: 3 % (ref 0–8)
NEUTS SEG NFR BLD AUTO: 63 % (ref 43–75)
OVALOCYTES BLD QL SMEAR: PRESENT
P AXIS: 82 DEGREES
PLATELET # BLD AUTO: 612 THOUSANDS/UL (ref 149–390)
PLATELET BLD QL SMEAR: ABNORMAL
PMV BLD AUTO: 8.3 FL (ref 8.9–12.7)
POIKILOCYTOSIS BLD QL SMEAR: PRESENT
POLYCHROMASIA BLD QL SMEAR: PRESENT
POTASSIUM SERPL-SCNC: 3.6 MMOL/L (ref 3.5–5.3)
PR INTERVAL: 118 MS
PROT SERPL-MCNC: 7.5 G/DL (ref 6.4–8.4)
QRS AXIS: 107 DEGREES
QRSD INTERVAL: 66 MS
QT INTERVAL: 276 MS
QTC INTERVAL: 434 MS
RBC # BLD AUTO: 3.5 MILLION/UL (ref 3.81–5.12)
RBC MORPH BLD: PRESENT
RSV RNA RESP QL NAA+PROBE: NEGATIVE
SARS-COV-2 RNA RESP QL NAA+PROBE: NEGATIVE
SODIUM SERPL-SCNC: 132 MMOL/L (ref 135–147)
T WAVE AXIS: 59 DEGREES
VARIANT LYMPHS # BLD AUTO: 8 %
VENTRICULAR RATE: 149 BPM
WBC # BLD AUTO: 12.87 THOUSAND/UL (ref 4.31–10.16)

## 2024-01-23 PROCEDURE — 87040 BLOOD CULTURE FOR BACTERIA: CPT | Performed by: EMERGENCY MEDICINE

## 2024-01-23 PROCEDURE — 85027 COMPLETE CBC AUTOMATED: CPT | Performed by: EMERGENCY MEDICINE

## 2024-01-23 PROCEDURE — 99223 1ST HOSP IP/OBS HIGH 75: CPT | Performed by: HOSPITALIST

## 2024-01-23 PROCEDURE — 85007 BL SMEAR W/DIFF WBC COUNT: CPT | Performed by: EMERGENCY MEDICINE

## 2024-01-23 PROCEDURE — 71275 CT ANGIOGRAPHY CHEST: CPT

## 2024-01-23 PROCEDURE — 99285 EMERGENCY DEPT VISIT HI MDM: CPT | Performed by: EMERGENCY MEDICINE

## 2024-01-23 PROCEDURE — 83880 ASSAY OF NATRIURETIC PEPTIDE: CPT | Performed by: EMERGENCY MEDICINE

## 2024-01-23 PROCEDURE — 96374 THER/PROPH/DIAG INJ IV PUSH: CPT

## 2024-01-23 PROCEDURE — 99285 EMERGENCY DEPT VISIT HI MDM: CPT

## 2024-01-23 PROCEDURE — 80053 COMPREHEN METABOLIC PANEL: CPT | Performed by: EMERGENCY MEDICINE

## 2024-01-23 PROCEDURE — 0241U HB NFCT DS VIR RESP RNA 4 TRGT: CPT | Performed by: EMERGENCY MEDICINE

## 2024-01-23 PROCEDURE — 83605 ASSAY OF LACTIC ACID: CPT | Performed by: EMERGENCY MEDICINE

## 2024-01-23 PROCEDURE — 84484 ASSAY OF TROPONIN QUANT: CPT | Performed by: EMERGENCY MEDICINE

## 2024-01-23 PROCEDURE — 96361 HYDRATE IV INFUSION ADD-ON: CPT

## 2024-01-23 PROCEDURE — 36415 COLL VENOUS BLD VENIPUNCTURE: CPT | Performed by: EMERGENCY MEDICINE

## 2024-01-23 PROCEDURE — 93005 ELECTROCARDIOGRAM TRACING: CPT

## 2024-01-23 RX ORDER — OXYCODONE HYDROCHLORIDE 10 MG/1
10 TABLET ORAL EVERY 4 HOURS PRN
Status: DISCONTINUED | OUTPATIENT
Start: 2024-01-23 | End: 2024-01-30 | Stop reason: HOSPADM

## 2024-01-23 RX ORDER — ACETAMINOPHEN 325 MG/1
650 TABLET ORAL EVERY 6 HOURS PRN
Status: DISCONTINUED | OUTPATIENT
Start: 2024-01-23 | End: 2024-01-30 | Stop reason: HOSPADM

## 2024-01-23 RX ORDER — OXYCODONE HYDROCHLORIDE 5 MG/1
5 TABLET ORAL EVERY 4 HOURS PRN
Status: DISCONTINUED | OUTPATIENT
Start: 2024-01-23 | End: 2024-01-30 | Stop reason: HOSPADM

## 2024-01-23 RX ORDER — LIDOCAINE 50 MG/G
2 PATCH TOPICAL DAILY
Status: DISCONTINUED | OUTPATIENT
Start: 2024-01-24 | End: 2024-01-30 | Stop reason: HOSPADM

## 2024-01-23 RX ORDER — CEFEPIME HYDROCHLORIDE 2 G/50ML
2000 INJECTION, SOLUTION INTRAVENOUS ONCE
Status: COMPLETED | OUTPATIENT
Start: 2024-01-23 | End: 2024-01-23

## 2024-01-23 RX ORDER — CEFTRIAXONE 1 G/50ML
1000 INJECTION, SOLUTION INTRAVENOUS EVERY 24 HOURS
Status: DISCONTINUED | OUTPATIENT
Start: 2024-01-23 | End: 2024-01-30 | Stop reason: HOSPADM

## 2024-01-23 RX ORDER — HYDROMORPHONE HCL/PF 1 MG/ML
1 SYRINGE (ML) INJECTION ONCE
Status: COMPLETED | OUTPATIENT
Start: 2024-01-23 | End: 2024-01-23

## 2024-01-23 RX ADMIN — OXYCODONE HYDROCHLORIDE 10 MG: 10 TABLET ORAL at 23:26

## 2024-01-23 RX ADMIN — HYDROMORPHONE HYDROCHLORIDE 1 MG: 1 INJECTION, SOLUTION INTRAMUSCULAR; INTRAVENOUS; SUBCUTANEOUS at 15:51

## 2024-01-23 RX ADMIN — CEFTRIAXONE 1000 MG: 1 INJECTION, SOLUTION INTRAVENOUS at 23:27

## 2024-01-23 RX ADMIN — SODIUM CHLORIDE 1000 ML: 0.9 INJECTION, SOLUTION INTRAVENOUS at 17:21

## 2024-01-23 RX ADMIN — CEFEPIME HYDROCHLORIDE 2000 MG: 2 INJECTION, SOLUTION INTRAVENOUS at 17:53

## 2024-01-23 RX ADMIN — OXYCODONE HYDROCHLORIDE 10 MG: 10 TABLET ORAL at 19:17

## 2024-01-23 RX ADMIN — IOHEXOL 85 ML: 350 INJECTION, SOLUTION INTRAVENOUS at 16:47

## 2024-01-23 RX ADMIN — MORPHINE SULFATE 2 MG: 2 INJECTION, SOLUTION INTRAMUSCULAR; INTRAVENOUS at 21:28

## 2024-01-23 NOTE — ASSESSMENT & PLAN NOTE
Patient has recurrent, likely malignant pleural effusion on the right    She said she last had this drained at [facility] last week    She also had it drained here at [facility] in November 2023    She may need a Pleurx catheter if this is needing to be frequently drained    Will consult IR    Keep her n.p.o. after midnight in case they want to do a procedure tomorrow either thoracentesis, chest tube or Pleurx catheter

## 2024-01-23 NOTE — ED PROVIDER NOTES
History  Chief Complaint   Patient presents with    Shortness of Breath     Reports SOB and back pain x2 weeks.      Patient is a 53-year-old female.  She has a history of breast cancer with metastasis to bone.  She does have metastasis to the right lower lung with pleural effusion.  She did recently have thoracocentesis.  She is on oral chemotherapy.  She takes oxycodone for pain.  Presents to the emergency room today complaining of right lateral lower chest wall pain.  She is having difficulty breathing.  She reports a fever yesterday.  No cough.  No hemoptysis.  No calf pain or unilateral leg swelling.  No trauma.  Patient was first diagnosed with breast cancer in 2013.  She had a recurrence in October 2023.        Prior to Admission Medications   Prescriptions Last Dose Informant Patient Reported? Taking?   Multiple Vitamin (MULTIVITAMIN ADULT PO)  Self Yes No   Sig: Take 1 tablet by mouth in the morning   Ribociclib Succ, 600 MG Dose, 200 MG TBPK   No No   Sig: Take 600 mg by mouth in the morning For 3 weeks and then 1 week off   acetaminophen (TYLENOL) 325 mg tablet   No No   Sig: Take 3 tablets (975 mg total) by mouth every 8 (eight) hours   Patient not taking: Reported on 1/12/2024   anastrozole (ARIMIDEX) 1 mg tablet  Self No No   Sig: Take 1 tablet (1 mg total) by mouth daily   Patient not taking: Reported on 11/29/2023   calcium citrate-vitamin D (Calcitrate Plus D) 315 mg-5 mcg tablet   No No   Sig: Take 1 tablet by mouth 2 (two) times a day   docusate sodium (COLACE) 100 mg capsule   No No   Sig: Take 1 capsule (100 mg total) by mouth every 12 (twelve) hours   Patient not taking: Reported on 1/12/2024   ergocalciferol (VITAMIN D2) 50,000 units   No No   Sig: TAKE 1 CAPSULE BY MOUTH 1 TIME A WEEK   ibuprofen (MOTRIN) 600 mg tablet   No No   Sig: Take 1 tablet (600 mg total) by mouth every 6 (six) hours as needed for moderate pain Take with food   Patient not taking: Reported on 11/29/2023   lidocaine  (LIDODERM) 5 %   No No   Sig: Apply 2 patches topically over 12 hours daily Remove & Discard patch within 12 hours or as directed by MD Do not start before November 9, 2023.   oxyCODONE (Roxicodone) 5 immediate release tablet   No No   Sig: Take 1 tablet (5 mg total) by mouth every 6 (six) hours as needed for moderate pain Max Daily Amount: 20 mg      Facility-Administered Medications: None       Past Medical History:   Diagnosis Date    Breast cancer (HCC) 07/2013    left-chemotherapy    Fibroma     History of chemotherapy     History of external beam radiation therapy        Past Surgical History:   Procedure Laterality Date    APPENDECTOMY      BREAST SURGERY  2014    Left breast 2/2 breast CA    HYSTERECTOMY  2015    Due to fibroma    IR BIOPSY BONE  10/9/2023    IR THORACENTESIS  11/8/2023    MASTECTOMY Left 2013    OOPHORECTOMY         Family History   Problem Relation Age of Onset    Lung cancer Mother     No Known Problems Father     No Known Problems Sister     No Known Problems Daughter     No Known Problems Daughter     Cancer Maternal Grandmother     No Known Problems Maternal Grandfather     No Known Problems Paternal Grandmother     No Known Problems Paternal Grandfather     Uterine cancer Maternal Aunt     No Known Problems Son     No Known Problems Paternal Aunt      I have reviewed and agree with the history as documented.    E-Cigarette/Vaping    E-Cigarette Use Never User      E-Cigarette/Vaping Substances    Nicotine No     THC No     CBD No     Flavoring No     Other No     Unknown No      Social History     Tobacco Use    Smoking status: Never     Passive exposure: Never    Smokeless tobacco: Never   Vaping Use    Vaping status: Never Used   Substance Use Topics    Alcohol use: Not Currently    Drug use: No       Review of Systems   Constitutional:  Positive for fever. Negative for chills.   HENT:  Negative for rhinorrhea and sore throat.    Eyes:  Negative for pain, redness and visual  disturbance.   Respiratory:  Positive for shortness of breath. Negative for cough.    Cardiovascular:  Positive for chest pain. Negative for leg swelling.   Gastrointestinal:  Negative for abdominal pain, diarrhea and vomiting.   Endocrine: Negative for polydipsia and polyuria.   Genitourinary:  Negative for dysuria, frequency, hematuria, vaginal bleeding and vaginal discharge.   Musculoskeletal:  Positive for back pain. Negative for neck pain.   Skin:  Negative for rash and wound.   Allergic/Immunologic: Negative for immunocompromised state.   Neurological:  Negative for weakness, numbness and headaches.   Hematological:  Does not bruise/bleed easily.   Psychiatric/Behavioral:  Negative for hallucinations and suicidal ideas.    All other systems reviewed and are negative.      Physical Exam  Physical Exam  Vitals reviewed.   Constitutional:       General: She is not in acute distress.  HENT:      Head: Normocephalic and atraumatic.      Nose: Nose normal.      Mouth/Throat:      Mouth: Mucous membranes are moist.   Eyes:      General:         Right eye: No discharge.         Left eye: No discharge.      Conjunctiva/sclera: Conjunctivae normal.   Cardiovascular:      Rate and Rhythm: Regular rhythm. Tachycardia present.      Pulses: Normal pulses.      Heart sounds: Normal heart sounds. No murmur heard.     No friction rub. No gallop.   Pulmonary:      Effort: Pulmonary effort is normal. No respiratory distress.      Breath sounds: Normal breath sounds. No stridor. No decreased breath sounds, wheezing, rhonchi or rales.   Abdominal:      General: Bowel sounds are normal. There is no distension.      Palpations: Abdomen is soft.      Tenderness: There is no abdominal tenderness. There is no right CVA tenderness, left CVA tenderness, guarding or rebound.   Musculoskeletal:         General: No swelling, tenderness, deformity or signs of injury. Normal range of motion.      Cervical back: Normal range of motion and neck  supple. No rigidity.      Right lower leg: No edema.      Left lower leg: No edema.      Comments: No calf tenderness or unilateral leg swelling.   Skin:     General: Skin is warm and dry.      Coloration: Skin is not jaundiced.      Findings: No rash.   Neurological:      General: No focal deficit present.      Mental Status: She is alert and oriented to person, place, and time.      Sensory: No sensory deficit.      Motor: Motor function is intact.   Psychiatric:         Mood and Affect: Mood normal.         Behavior: Behavior normal.         Vital Signs  ED Triage Vitals   Temperature Pulse Respirations Blood Pressure SpO2   01/23/24 1448 01/23/24 1448 01/23/24 1448 01/23/24 1448 01/23/24 1448   97.8 °F (36.6 °C) (!) 154 20 148/80 95 %      Temp Source Heart Rate Source Patient Position - Orthostatic VS BP Location FiO2 (%)   01/23/24 1745 01/23/24 1745 01/23/24 1745 01/23/24 1745 --   Oral Monitor Lying Right arm       Pain Score       01/23/24 1551       10 - Worst Possible Pain           Vitals:    01/23/24 1448 01/23/24 1745   BP: 148/80 145/75   Pulse: (!) 154 (!) 141   Patient Position - Orthostatic VS:  Lying         Visual Acuity      ED Medications  Medications   sodium chloride 0.9 % bolus 1,000 mL (1,000 mL Intravenous New Bag 1/23/24 1721)   cefepime (MAXIPIME) IVPB (premix in dextrose) 2,000 mg 50 mL (has no administration in time range)   HYDROmorphone (DILAUDID) injection 1 mg (1 mg Intravenous Given 1/23/24 1551)   iohexol (OMNIPAQUE) 350 MG/ML injection (SINGLE-DOSE) 85 mL (85 mL Intravenous Given 1/23/24 1647)       Diagnostic Studies  Results Reviewed       Procedure Component Value Units Date/Time    Lactic acid, plasma (w/reflex if result > 2.0) [[id number]] Collected: 01/23/24 1719    Lab Status: In process Specimen: Blood from Hand, Right Updated: 01/23/24 1738    FLU/RSV/COVID - if FLU/RSV clinically relevant [[id number]] Collected: 01/23/24 1718    Lab Status: In process Specimen: Nares  from Nose Updated: 01/23/24 1732    Blood culture #1 [[id number]] Collected: 01/23/24 1719    Lab Status: In process Specimen: Blood from Hand, Right Updated: 01/23/24 1731    Blood culture #2 [[id number]] Collected: 01/23/24 1718    Lab Status: In process Specimen: Blood from Arm, Right Updated: 01/23/24 1731    HS Troponin 0hr (reflex protocol) [[id number]]  (Normal) Collected: 01/23/24 1554    Lab Status: Final result Specimen: Blood from Arm, Right Updated: 01/23/24 1631     hs TnI 0hr 12 ng/L     HS Troponin I 2hr [[id number]]     Lab Status: No result Specimen: Blood     HS Troponin I 4hr [[id number]]     Lab Status: No result Specimen: Blood     B-Type Natriuretic Peptide(BNP) [[id number]]  (Normal) Collected: 01/23/24 1554    Lab Status: Final result Specimen: Blood from Arm, Right Updated: 01/23/24 1628     BNP 80 pg/mL     Comprehensive metabolic panel [[id number]]  (Abnormal) Collected: 01/23/24 1554    Lab Status: Final result Specimen: Blood from Arm, Right Updated: 01/23/24 1627     Sodium 132 mmol/L      Potassium 3.6 mmol/L      Chloride 93 mmol/L      CO2 24 mmol/L      ANION GAP 15 mmol/L      BUN 7 mg/dL      Creatinine 0.46 mg/dL      Glucose 111 mg/dL      Calcium 8.8 mg/dL      Corrected Calcium 9.4 mg/dL      AST 46 U/L      ALT 19 U/L      Alkaline Phosphatase 149 U/L      Total Protein 7.5 g/dL      Albumin 3.2 g/dL      Total Bilirubin 0.46 mg/dL      eGFR 112 ml/min/1.73sq m     Narrative:      [employer] guidelines for Chronic Kidney Disease (CKD):     Stage 1 with normal or high GFR (GFR > 90 mL/min/1.73 square meters)    Stage 2 Mild CKD (GFR = 60-89 mL/min/1.73 square meters)    Stage 3A Moderate CKD (GFR = 45-59 mL/min/1.73 square meters)    Stage 3B Moderate CKD (GFR = 30-44 mL/min/1.73 square meters)    Stage 4 Severe CKD (GFR = 15-29 mL/min/1.73 square meters)    Stage 5 End Stage CKD (GFR <15 mL/min/1.73 square meters)  Note: GFR calculation is accurate only with  a steady state creatinine    CBC and differential [[id number]]  (Abnormal) Collected: 01/23/24 1554    Lab Status: Final result Specimen: Blood from Arm, Right Updated: 01/23/24 1603     WBC 12.87 Thousand/uL      RBC 3.50 Million/uL      Hemoglobin 9.2 g/dL      Hematocrit 30.1 %      MCV 86 fL      MCH 26.3 pg      MCHC 30.6 g/dL      RDW 20.7 %      MPV 8.3 fL      Platelets 612 Thousands/uL     Manual Differential(PHLEBS Do Not Order) [[id number]] Collected: 01/23/24 1554    Lab Status: In process Specimen: Blood from Arm, Right Updated: 01/23/24 1603                   CTA ED chest PE study   Final Result by Reina López MD (01/23 1710)      No definite acute pulmonary emboli but respiratory motion limits detection.      Marked progression of right pleural metastases with huge right pleural effusion causing near complete compressive atelectasis of the right lung and mediastinal shift to the left. Recommend chest tube placement to relieve mediastinal shift.      Mild peripheral groundglass opacity in the left upper lobe, infectious or inflammatory.      Redemonstration of extensive bone metastases.      The study was marked in EPIC for immediate notification.            Workstation performed: [id number]                    Procedures  ECG 12 Lead Documentation Only    Date/Time: 1/23/2024 3:40 PM    Performed by: Edson Page MD  Authorized by: Edson Page MD    ECG reviewed by me, the ED Provider: yes    Patient location:  ED  Comments:      Sinus tachycardia.  Right axis deviation.  LVH.  Abnormal EKG.           ED Course                                             Medical Decision Making  Imaging showed a large pleural effusion, likely malignant.  There was increase in metastasis.  There was shift.  Patient would likely benefit from chest tube that would allow repeat drainage.  Patient had a thoracocentesis last Wednesday, and the fluid had reaccumulated.  Currently she is tachycardic.  She is  not hypotensive or hypoxic.  Consulted with interventional radiology.  They reviewed the chart.  They feel patient should be okay until tomorrow.  At this time, I do not feel patient needs an emergency chest tube in the emergency room.  If there is any deterioration, an emergency thoracocentesis can be done to temporize measures.  There was a question of pneumonia.  Patient did give a history of fever.  Empiric antibiotics were ordered.  There was no pulmonary embolism.  No pneumothorax.  Consulted with hospitalist for admission.    Amount and/or Complexity of Data Reviewed  Labs: ordered. Decision-making details documented in ED Course.  Radiology: ordered and independent interpretation performed. Decision-making details documented in ED Course.  ECG/medicine tests: ordered and independent interpretation performed. Decision-making details documented in ED Course.    Risk  Prescription drug management.  Decision regarding hospitalization.             Disposition  Final diagnoses:   Malignant pleural effusion   Pneumonia     Time reflects when diagnosis was documented in both MDM as applicable and the Disposition within this note       Time User Action Codes Description Comment    1/23/2024  5:51 PM Edson Page [J91.0] Malignant pleural effusion     1/23/2024  5:51 PM Edson Page [J18.9] Pneumonia           ED Disposition       ED Disposition   Admit    Condition   Stable    Date/Time   Tue Jan 23, 2024  5:51 PM    Comment                  Follow-up Information    None         Patient's Medications   Discharge Prescriptions    No medications on file       No discharge procedures on file.    PDMP Review         Value Time User    PDMP Reviewed  Yes 1/12/2024 12:54 PM Janie Briones MD            ED Provider  Electronically Signed by             Edson Page MD  01/23/24 [id number]

## 2024-01-23 NOTE — ASSESSMENT & PLAN NOTE
Patient has recurrent breast cancer with metastasis to bone and malignant pleural effusion    Follows with oncology at [facility] and palliative care    She just received 4 radiation treatments at [facility] which were palliative

## 2024-01-24 ENCOUNTER — APPOINTMENT (OUTPATIENT)
Dept: RADIOLOGY | Facility: HOSPITAL | Age: 54
DRG: 136 | End: 2024-01-24
Payer: COMMERCIAL

## 2024-01-24 PROBLEM — Z71.89 GOALS OF CARE, COUNSELING/DISCUSSION: Status: ACTIVE | Noted: 2024-01-12

## 2024-01-24 PROBLEM — R00.0 SINUS TACHYCARDIA: Status: ACTIVE | Noted: 2024-01-24

## 2024-01-24 PROBLEM — R06.89 ACUTE RESPIRATORY INSUFFICIENCY: Status: ACTIVE | Noted: 2024-01-24

## 2024-01-24 LAB
ANION GAP SERPL CALCULATED.3IONS-SCNC: 9 MMOL/L
ATRIAL RATE: 141 BPM
ATRIAL RATE: 146 BPM
BASOPHILS # BLD AUTO: 0.02 THOUSANDS/ÂΜL (ref 0–0.1)
BASOPHILS NFR BLD AUTO: 0 % (ref 0–1)
BUN SERPL-MCNC: 7 MG/DL (ref 5–25)
CALCIUM SERPL-MCNC: 8.4 MG/DL (ref 8.4–10.2)
CHLORIDE SERPL-SCNC: 95 MMOL/L (ref 96–108)
CO2 SERPL-SCNC: 28 MMOL/L (ref 21–32)
CREAT SERPL-MCNC: 0.47 MG/DL (ref 0.6–1.3)
EOSINOPHIL # BLD AUTO: 0 THOUSAND/ÂΜL (ref 0–0.61)
EOSINOPHIL NFR BLD AUTO: 0 % (ref 0–6)
ERYTHROCYTE [DISTWIDTH] IN BLOOD BY AUTOMATED COUNT: 20.5 % (ref 11.6–15.1)
GFR SERPL CREATININE-BSD FRML MDRD: 111 ML/MIN/1.73SQ M
GLUCOSE P FAST SERPL-MCNC: 114 MG/DL (ref 65–99)
GLUCOSE SERPL-MCNC: 114 MG/DL (ref 65–140)
HCT VFR BLD AUTO: 25.7 % (ref 34.8–46.1)
HGB BLD-MCNC: 7.8 G/DL (ref 11.5–15.4)
IMM GRANULOCYTES # BLD AUTO: 0.34 THOUSAND/UL (ref 0–0.2)
IMM GRANULOCYTES NFR BLD AUTO: 3 % (ref 0–2)
LYMPHOCYTES # BLD AUTO: 1.64 THOUSANDS/ÂΜL (ref 0.6–4.47)
LYMPHOCYTES NFR BLD AUTO: 15 % (ref 14–44)
MAGNESIUM SERPL-MCNC: 1.9 MG/DL (ref 1.9–2.7)
MCH RBC QN AUTO: 26.3 PG (ref 26.8–34.3)
MCHC RBC AUTO-ENTMCNC: 30.4 G/DL (ref 31.4–37.4)
MCV RBC AUTO: 87 FL (ref 82–98)
MONOCYTES # BLD AUTO: 1.62 THOUSAND/ÂΜL (ref 0.17–1.22)
MONOCYTES NFR BLD AUTO: 15 % (ref 4–12)
NEUTROPHILS # BLD AUTO: 7.34 THOUSANDS/ÂΜL (ref 1.85–7.62)
NEUTS SEG NFR BLD AUTO: 67 % (ref 43–75)
NRBC BLD AUTO-RTO: 1 /100 WBCS
P AXIS: 50 DEGREES
P AXIS: 87 DEGREES
PLATELET # BLD AUTO: 557 THOUSANDS/UL (ref 149–390)
PMV BLD AUTO: 8.7 FL (ref 8.9–12.7)
POTASSIUM SERPL-SCNC: 3.8 MMOL/L (ref 3.5–5.3)
PR INTERVAL: 120 MS
PR INTERVAL: 120 MS
QRS AXIS: 108 DEGREES
QRS AXIS: 30 DEGREES
QRSD INTERVAL: 66 MS
QRSD INTERVAL: 66 MS
QT INTERVAL: 256 MS
QT INTERVAL: 280 MS
QTC INTERVAL: 398 MS
QTC INTERVAL: 428 MS
RBC # BLD AUTO: 2.97 MILLION/UL (ref 3.81–5.12)
SODIUM SERPL-SCNC: 132 MMOL/L (ref 135–147)
T WAVE AXIS: 34 DEGREES
T WAVE AXIS: 64 DEGREES
VENTRICULAR RATE: 141 BPM
VENTRICULAR RATE: 146 BPM
WBC # BLD AUTO: 10.96 THOUSAND/UL (ref 4.31–10.16)

## 2024-01-24 PROCEDURE — 0B9N30Z DRAINAGE OF RIGHT PLEURA WITH DRAINAGE DEVICE, PERCUTANEOUS APPROACH: ICD-10-PCS | Performed by: RADIOLOGY

## 2024-01-24 PROCEDURE — 99497 ADVNCD CARE PLAN 30 MIN: CPT | Performed by: INTERNAL MEDICINE

## 2024-01-24 PROCEDURE — 75989 ABSCESS DRAINAGE UNDER X-RAY: CPT

## 2024-01-24 PROCEDURE — 85027 COMPLETE CBC AUTOMATED: CPT | Performed by: HOSPITALIST

## 2024-01-24 PROCEDURE — 99255 IP/OBS CONSLTJ NEW/EST HI 80: CPT | Performed by: INTERNAL MEDICINE

## 2024-01-24 PROCEDURE — 76937 US GUIDE VASCULAR ACCESS: CPT

## 2024-01-24 PROCEDURE — NC001 PR NO CHARGE: Performed by: NURSE PRACTITIONER

## 2024-01-24 PROCEDURE — 80048 BASIC METABOLIC PNL TOTAL CA: CPT | Performed by: HOSPITALIST

## 2024-01-24 PROCEDURE — 75989 ABSCESS DRAINAGE UNDER X-RAY: CPT | Performed by: RADIOLOGY

## 2024-01-24 PROCEDURE — 32550 INSERT PLEURAL CATH: CPT

## 2024-01-24 PROCEDURE — 83735 ASSAY OF MAGNESIUM: CPT | Performed by: HOSPITALIST

## 2024-01-24 PROCEDURE — 99232 SBSQ HOSP IP/OBS MODERATE 35: CPT | Performed by: INTERNAL MEDICINE

## 2024-01-24 PROCEDURE — 93005 ELECTROCARDIOGRAM TRACING: CPT

## 2024-01-24 PROCEDURE — NC001 PR NO CHARGE: Performed by: INTERNAL MEDICINE

## 2024-01-24 PROCEDURE — 99153 MOD SED SAME PHYS/QHP EA: CPT

## 2024-01-24 PROCEDURE — 99152 MOD SED SAME PHYS/QHP 5/>YRS: CPT

## 2024-01-24 PROCEDURE — 99152 MOD SED SAME PHYS/QHP 5/>YRS: CPT | Performed by: RADIOLOGY

## 2024-01-24 PROCEDURE — C1729 CATH, DRAINAGE: HCPCS

## 2024-01-24 PROCEDURE — 32550 INSERT PLEURAL CATH: CPT | Performed by: RADIOLOGY

## 2024-01-24 RX ORDER — DILTIAZEM HYDROCHLORIDE 5 MG/ML
10 INJECTION INTRAVENOUS ONCE
Qty: 5 ML | Refills: 0 | Status: COMPLETED | OUTPATIENT
Start: 2024-01-24 | End: 2024-01-24

## 2024-01-24 RX ORDER — FENTANYL CITRATE 50 UG/ML
INJECTION, SOLUTION INTRAMUSCULAR; INTRAVENOUS AS NEEDED
Status: COMPLETED | OUTPATIENT
Start: 2024-01-24 | End: 2024-01-24

## 2024-01-24 RX ORDER — MIDAZOLAM HYDROCHLORIDE 2 MG/2ML
INJECTION, SOLUTION INTRAMUSCULAR; INTRAVENOUS AS NEEDED
Status: COMPLETED | OUTPATIENT
Start: 2024-01-24 | End: 2024-01-24

## 2024-01-24 RX ADMIN — OXYCODONE HYDROCHLORIDE 10 MG: 10 TABLET ORAL at 21:07

## 2024-01-24 RX ADMIN — MORPHINE SULFATE 2 MG: 2 INJECTION, SOLUTION INTRAMUSCULAR; INTRAVENOUS at 02:56

## 2024-01-24 RX ADMIN — MORPHINE SULFATE 2 MG: 2 INJECTION, SOLUTION INTRAMUSCULAR; INTRAVENOUS at 16:02

## 2024-01-24 RX ADMIN — FENTANYL CITRATE 25 MCG: 50 INJECTION INTRAMUSCULAR; INTRAVENOUS at 14:27

## 2024-01-24 RX ADMIN — Medication 20 ML: at 14:29

## 2024-01-24 RX ADMIN — MORPHINE SULFATE 2 MG: 2 INJECTION, SOLUTION INTRAMUSCULAR; INTRAVENOUS at 10:26

## 2024-01-24 RX ADMIN — DILTIAZEM HYDROCHLORIDE 10 MG: 5 INJECTION INTRAVENOUS at 16:40

## 2024-01-24 RX ADMIN — CEFTRIAXONE 1000 MG: 1 INJECTION, SOLUTION INTRAVENOUS at 22:40

## 2024-01-24 RX ADMIN — OXYCODONE HYDROCHLORIDE 10 MG: 10 TABLET ORAL at 03:30

## 2024-01-24 RX ADMIN — ACETAMINOPHEN 325MG 650 MG: 325 TABLET ORAL at 05:18

## 2024-01-24 RX ADMIN — OXYCODONE HYDROCHLORIDE 10 MG: 10 TABLET ORAL at 07:25

## 2024-01-24 RX ADMIN — MIDAZOLAM 0.5 MG: 1 INJECTION INTRAMUSCULAR; INTRAVENOUS at 14:27

## 2024-01-24 RX ADMIN — OXYCODONE HYDROCHLORIDE 10 MG: 10 TABLET ORAL at 16:39

## 2024-01-24 RX ADMIN — IOHEXOL 4 ML: 350 INJECTION, SOLUTION INTRAVENOUS at 14:58

## 2024-01-24 RX ADMIN — LIDOCAINE 5% 2 PATCH: 700 PATCH TOPICAL at 08:28

## 2024-01-24 RX ADMIN — OXYCODONE HYDROCHLORIDE 10 MG: 10 TABLET ORAL at 12:15

## 2024-01-24 NOTE — CONSULTS
Elan Hearn MD  Physician  Oncology-Medical     Consults     Signed     Date of Service: 1/24/2024 11:38 AM     Signed       Expand All Collapse All    Consultation - Medical Oncology   Shannan Vee 53 y.o. female MRN: [id number]  Unit/Bed#: E2 -01 Encounter: [id number]  Referring physician: [facility] internal medicine-[place]  Date of service: 1/24/2024  Reason for Consult: Breast cancer with metastasis  HPI: Shannan Vee is a 53 y.o. year old female.  Patient's daughter and sister are in the room.  Patient's sister is bilingual and she helped with interpretation.  In 2013 patient was diagnosed to have hormone receptor positive and HER2 negative grade 2 locally advanced left breast cancer.  Negative BRCA mutation.  Patient had neoadjuvant dose dense chemotherapy followed by left mastectomy and she had significant residual disease and 2 positive lymph nodes.  She had radiation therapy and was started on tamoxifen and that was later changed to Arimidex in 2015 when she had KELLY and BSO for fibroid.  In October 2023 she was found to have extensive metastatic disease to bones and also there was a large lung mass and mediastinal lymph nodes.  She was thought to have a second primary in the lung but biopsy favored metastatic disease from breast cancer.  Treatment was changed to kisqali plus Faslodex.  Yesterday patient had her first radiation treatment to her spine at [facility].  She is here because of increased shortness of breath, right pleural effusion and spine pain.  Also tachycardia.  Patient has poor appetite and has lost weight.  She is less and less active now.  She has generalized weakness and tiredness.     ROS:  01/24/24 Reviewed 12 systems: See symptoms in HPI  Presently no other neurological, cardiac, pulmonary, GI and  symptoms other than listed in HPI.  Other symptoms are in HPI.  No  fever, chills, bleeding,skin rash,  night sweats, arthritic symptoms, numbness, claudication.No frequent  infections.  Not unusually sensitive to heat or cold. No swelling of the ankles. No swollen glands.  Patient is anxious.            Historical Information   Medical History        Past Medical History:   Diagnosis Date    Breast cancer (HCC) 07/2013     left-chemotherapy    Fibroma      History of chemotherapy      History of external beam radiation therapy           Surgical History         Past Surgical History:   Procedure Laterality Date    APPENDECTOMY        BREAST SURGERY   2014     Left breast 2/2 breast CA    HYSTERECTOMY   2015     Due to fibroma    IR BIOPSY BONE   10/9/2023    IR THORACENTESIS   11/8/2023    MASTECTOMY Left 2013    OOPHORECTOMY                   Social History []Expand by Default  Social History          Substance and Sexual Activity   Alcohol Use Not Currently      Social History          Substance and Sexual Activity   Drug Use No      Social History           Tobacco Use   Smoking Status Never    Passive exposure: Never   Smokeless Tobacco Never      Family History:   Family History         Family History   Problem Relation Age of Onset    Lung cancer Mother      No Known Problems Father      No Known Problems Sister      No Known Problems Daughter      No Known Problems Daughter      Cancer Maternal Grandmother      No Known Problems Maternal Grandfather      No Known Problems Paternal Grandmother      No Known Problems Paternal Grandfather      Uterine cancer Maternal Aunt      No Known Problems Son      No Known Problems Paternal Aunt                 Current Facility-Administered Medications:     acetaminophen (TYLENOL) tablet 650 mg, 650 mg, Oral, Q6H PRN, Billy S Prechtel, DO, 650 mg at 01/24/24 0518    cefTRIAXone (ROCEPHIN) IVPB (premix in dextrose) 1,000 mg 50 mL, 1,000 mg, Intravenous, Q24H, Billy S Prechtel, DO, Last Rate: 100 mL/hr at 01/23/24 2327, 1,000 mg at 01/23/24 2327    lidocaine (LIDODERM) 5 % patch 2 patch, 2 patch, Topical, Daily, Billy S Prechtel, DO, 2  patch at 01/24/24 0828    morphine injection 2 mg, 2 mg, Intravenous, Q4H PRN, Billy S Prechtel, DO, 2 mg at 01/24/24 1026    oxyCODONE (ROXICODONE) immediate release tablet 10 mg, 10 mg, Oral, Q4H PRN, Billy S Prechtel, DO, 10 mg at 01/24/24 0725    oxyCODONE (ROXICODONE) IR tablet 5 mg, 5 mg, Oral, Q4H PRN, Billy S Prechtel, DO    Ribociclib Succ (600 MG Dose) TBPK 600 mg, 600 mg, Oral, Daily, Billy S Prechtel, DO           Allergies   Allergen Reactions    Aspirin Rash       Other reaction(s): Palpitations         Physical Exam:  Vitals          Vitals:     01/23/24 2023 01/23/24 2216 01/24/24 0305 01/24/24 0729   BP: 157/86 155/93 152/84 152/99   BP Location: Right arm Right arm Right arm Right arm   Pulse: (!) 137 (!) 148 (!) 147 (!) 135   Resp: (!) 30 (!) 33 18 22   Temp: 98.9 °F (37.2 °C) 99.8 °F (37.7 °C) (!) 96.5 °F (35.8 °C) 97.5 °F (36.4 °C)   TempSrc: Temporal Temporal Temporal Temporal   SpO2: 98% 98% 98% 100%         Alert, oriented, not in distress, vitals are above, no icterus, no oral thrush, no palpable neck mass, decreased breath sounds and dullness right lower lung, tachycardia, regular heart rate, abdomen  soft and non tender, no palpable abdominal mass, no ascites, no edema of ankles, no calf tenderness, no focal neurological deficit but has generalized weakness, no skin rash, no palpable lymphadenopathy in the neck and axillary areas,  no clubbing.   Patient is anxious.  Performance status 3.  Cachexia.        Lab Results: I have reviewed all pertinent labs.  LABS:           Results for orders placed or performed during the hospital encounter of 01/23/24   FLU/RSV/COVID - if FLU/RSV clinically relevant     Specimen: Nose; Nares   Result Value Ref Range     SARS-CoV-2 Negative Negative     INFLUENZA A PCR Negative Negative     INFLUENZA B PCR Negative Negative     RSV PCR Negative Negative   Blood culture #1     Specimen: Hand, Right; Blood   Result Value Ref Range     Blood Culture  "Received in Microbiology Lab. Culture in Progress.     Blood culture #2     Specimen: Arm, Right; Blood   Result Value Ref Range     Blood Culture Received in Microbiology Lab. Culture in Progress.     CBC and differential   Result Value Ref Range     WBC 12.87 (H) 4.31 - 10.16 Thousand/uL     RBC 3.50 (L) 3.81 - 5.12 Million/uL     Hemoglobin 9.2 (L) 11.5 - 15.4 g/dL     Hematocrit 30.1 (L) 34.8 - 46.1 %     MCV 86 82 - 98 fL     MCH 26.3 (L) 26.8 - 34.3 pg     MCHC 30.6 (L) 31.4 - 37.4 g/dL     RDW 20.7 (H) 11.6 - 15.1 %     MPV 8.3 (L) 8.9 - 12.7 fL     Platelets 612 (H) 149 - 390 Thousands/uL   Comprehensive metabolic panel   Result Value Ref Range     Sodium 132 (L) 135 - 147 mmol/L     Potassium 3.6 3.5 - 5.3 mmol/L     Chloride 93 (L) 96 - 108 mmol/L     CO2 24 21 - 32 mmol/L     ANION GAP 15 mmol/L     BUN 7 5 - 25 mg/dL     Creatinine 0.46 (L) 0.60 - 1.30 mg/dL     Glucose 111 65 - 140 mg/dL     Calcium 8.8 8.4 - 10.2 mg/dL     Corrected Calcium 9.4 8.3 - 10.1 mg/dL     AST 46 (H) 13 - 39 U/L     ALT 19 7 - 52 U/L     Alkaline Phosphatase 149 (H) 34 - 104 U/L     Total Protein 7.5 6.4 - 8.4 g/dL     Albumin 3.2 (L) 3.5 - 5.0 g/dL     Total Bilirubin 0.46 0.20 - 1.00 mg/dL     eGFR 112 ml/min/1.73sq m   Lactic acid, plasma (w/reflex if result > 2.0)   Result Value Ref Range     LACTIC ACID 1.1 0.5 - 2.0 mmol/L   HS Troponin 0hr (reflex protocol)   Result Value Ref Range     hs TnI 0hr 12 \"Refer to ACS Flowchart\"- see link ng/L   B-Type Natriuretic Peptide(BNP)   Result Value Ref Range     BNP 80 0 - 100 pg/mL   HS Troponin I 2hr   Result Value Ref Range     hs TnI 2hr 13 \"Refer to ACS Flowchart\"- see link ng/L     Delta 2hr hsTnI 1 <20 ng/L   HS Troponin I 4hr   Result Value Ref Range     hs TnI 4hr 17 \"Refer to ACS Flowchart\"- see link ng/L     Delta 4hr hsTnI 5 <20 ng/L   CBC and differential   Result Value Ref Range     WBC 10.96 (H) 4.31 - 10.16 Thousand/uL     RBC 2.97 (L) 3.81 - 5.12 Million/uL     " Hemoglobin 7.8 (L) 11.5 - 15.4 g/dL     Hematocrit 25.7 (L) 34.8 - 46.1 %     MCV 87 82 - 98 fL     MCH 26.3 (L) 26.8 - 34.3 pg     MCHC 30.4 (L) 31.4 - 37.4 g/dL     RDW 20.5 (H) 11.6 - 15.1 %     MPV 8.7 (L) 8.9 - 12.7 fL     Platelets 557 (H) 149 - 390 Thousands/uL     nRBC 1 /100 WBCs     Neutrophils Relative 67 43 - 75 %     Immat GRANS % 3 (H) 0 - 2 %     Lymphocytes Relative 15 14 - 44 %     Monocytes Relative 15 (H) 4 - 12 %     Eosinophils Relative 0 0 - 6 %     Basophils Relative 0 0 - 1 %     Neutrophils Absolute 7.34 1.85 - 7.62 Thousands/µL     Immature Grans Absolute 0.34 (H) 0.00 - 0.20 Thousand/uL     Lymphocytes Absolute 1.64 0.60 - 4.47 Thousands/µL     Monocytes Absolute 1.62 (H) 0.17 - 1.22 Thousand/µL     Eosinophils Absolute 0.00 0.00 - 0.61 Thousand/µL     Basophils Absolute 0.02 0.00 - 0.10 Thousands/µL   Basic metabolic panel   Result Value Ref Range     Sodium 132 (L) 135 - 147 mmol/L     Potassium 3.8 3.5 - 5.3 mmol/L     Chloride 95 (L) 96 - 108 mmol/L     CO2 28 21 - 32 mmol/L     ANION GAP 9 mmol/L     BUN 7 5 - 25 mg/dL     Creatinine 0.47 (L) 0.60 - 1.30 mg/dL     Glucose 114 65 - 140 mg/dL     Glucose, Fasting 114 (H) 65 - 99 mg/dL     Calcium 8.4 8.4 - 10.2 mg/dL     eGFR 111 ml/min/1.73sq m   Magnesium   Result Value Ref Range     Magnesium 1.9 1.9 - 2.7 mg/dL   ECG 12 lead   Result Value Ref Range     Ventricular Rate 149 BPM     Atrial Rate 149 BPM     PA Interval 118 ms     QRSD Interval 66 ms     QT Interval 276 ms     QTC Interval 434 ms     P Axis 82 degrees     QRS Axis 107 degrees     T Wave Axis 59 degrees   ECG 12 lead   Result Value Ref Range     Ventricular Rate 141 BPM     Atrial Rate 141 BPM     PA Interval 120 ms     QRSD Interval 66 ms     QT Interval 280 ms     QTC Interval 428 ms     P Axis 87 degrees     QRS Axis 108 degrees     T Wave Axis 64 degrees   Manual Differential(PHLEBS Do Not Order)   Result Value Ref Range     Segmented % 63 43 - 75 %     Bands % 3 0  - 8 %     Lymphocytes % 14 14 - 44 %     Monocytes % 12 4 - 12 %     Eosinophils, % 0 0 - 6 %     Basophils % 0 0 - 1 %     Atypical Lymphocytes % 8 (H) <=0 %     Absolute Neutrophils 8.49 (H) 1.85 - 7.62 Thousand/uL     Lymphocytes Absolute 2.83 0.60 - 4.47 Thousand/uL     Monocytes Absolute 1.54 (H) 0.00 - 1.22 Thousand/uL     Eosinophils Absolute 0.00 0.00 - 0.40 Thousand/uL     Basophils Absolute 0.00 0.00 - 0.10 Thousand/uL     Total Counted         RBC Morphology Present       Platelet Estimate Increased (A) Adequate     Large Platelet Present       Anisocytosis Present       Macrocytes Present       Microcytes Present       Ovalocytes Present       Poikilocytes Present       Polychromasia Present             Imaging Studies: I have personally reviewed pertinent reports.    CTA ED chest PE study  Status: Final result      PACS Images      Show images for CTA ED chest PE study  Study Result     Narrative & Impression   CTA - CHEST WITH IV CONTRAST - PULMONARY ANGIOGRAM     INDICATION:   r cp, hx ca. Per my review of the medical record, breast cancer with biopsy-proven right lung metastases.     COMPARISON: CXR 11/24/2023, chest CT  11/06/2023, PET/CT 10/26/2023.     TECHNIQUE: CT angiogram timed for optimal opacification of the pulmonary arteries.  Axial, sagittal, and coronal 2D reformats created from source data.  Coronal 3D MIP postprocessing on the acquisition scanner.     Radiation dose length product (DLP):  858 mGy-cm .  Radiation dose exposure minimized using iterative reconstruction and automated exposure control.     IV Contrast:  85 mL of iohexol (OMNIPAQUE)     FINDINGS:     PULMONARY ARTERIES: No definite acute pulmonary emboli but respiratory motion limits detection.     LUNGS: Compromised by motion. Mild juxtapleural groundglass opacity in the left upper lobe. Near complete compressive atelectasis of the right lung by the large right pleural effusion.     AIRWAYS: No significant filling  defects.     PLEURA: Large right pleural effusion causing near complete complete compressive atelectasis of the right lung and mediastinal shift to the left. Extensive progression of right lung metastases.     HEART/GREAT VESSELS:  Normal for age.     MEDIASTINUM AND AZAR:  Unremarkable.     CHEST WALL AND LOWER NECK: Left mastectomy.     UPPER ABDOMEN: Left renal cyst.     OSSEOUS STRUCTURES: Redemonstration of extensive metastases throughout the skeleton.     IMPRESSION:     No definite acute pulmonary emboli but respiratory motion limits detection.     Marked progression of right pleural metastases with huge right pleural effusion causing near complete compressive atelectasis of the right lung and mediastinal shift to the left. Recommend chest tube placement to relieve mediastinal shift.     Mild peripheral groundglass opacity in the left upper lobe, infectious or inflammatory.     Redemonstration of extensive bone metastases.     The study was marked in EPIC for immediate notification.           Workstation performed: [id number]         Imaging     CTA ED chest PE study (Order: [id number]) -     Pathology, and Other Studies: I have personally reviewed pertinent reports.          Assessment and Plan:  See diagnoses, orders instructions below  #1 stage IV metastatic breast cancer.  2.  Metastatic disease to bones   #3 metastatic disease to right pleura  #4.  Malignant right pleural effusion.  Patient had thoracentesis previously and fluid was positive for malignant cells  5.  Cachexia   #6.  Tachycardia  I discussed with Dr. Villavicencio, patient's primary oncologist because she has to [facility] reports 1 from the bone and that was ER positive and 1 from pleural fluid and that is triple negative.  He would like to see the patient postdischarge in the office to make decision about systemic therapy.  Also patient will go to [facility] to complete her radiation post discharge.  I think patient will be getting Pleurx for recurrent right  pleural effusion.  She has disease progression.   Patient will continue to follow with  primary physician and other consultants.  Patient and family voiced understanding and agree.  They understand she is stage IV disease and there is no cure and goal is prolongation of survival and to treat her symptoms.        Disclaimer: This document was prepared using a dictation device.  If a word or phrase is confusing, or does not make sense, this is likely due to recognition error which was not discovered during the providers review. If you believe an error has occurred, please Contact me through [facility] Line service for alberta?cation.     Counseling / Coordination of Care  ..  Provided counseling and support

## 2024-01-24 NOTE — PLAN OF CARE
Problem: Prexisting or High Potential for Compromised Skin Integrity  Goal: Skin integrity is maintained or improved  Description: INTERVENTIONS:  - Identify patients at risk for skin breakdown  - Assess and monitor skin integrity  - Assess and monitor nutrition and hydration status  - Monitor labs   - Assess for incontinence   - Turn and reposition patient  - Assist with mobility/ambulation  - Relieve pressure over bony prominences  - Avoid friction and shearing  - Provide appropriate hygiene as needed including keeping skin clean and dry  - Evaluate need for skin moisturizer/barrier cream  - Collaborate with interdisciplinary team   - Patient/family teaching  - Consider wound care consult   Outcome: Progressing     Problem: PAIN - ADULT  Goal: Verbalizes/displays adequate comfort level or baseline comfort level  Description: Interventions:  - Encourage patient to monitor pain and request assistance  - Assess pain using appropriate pain scale  - Administer analgesics based on type and severity of pain and evaluate response  - Implement non-pharmacological measures as appropriate and evaluate response  - Consider cultural and social influences on pain and pain management  - Notify physician/advanced practitioner if interventions unsuccessful or patient reports new pain  Outcome: Progressing     Problem: INFECTION - ADULT  Goal: Absence or prevention of progression during hospitalization  Description: INTERVENTIONS:  - Assess and monitor for signs and symptoms of infection  - Monitor lab/diagnostic results  - Monitor all insertion sites, i.e. indwelling lines, tubes, and drains  - Monitor endotracheal if appropriate and nasal secretions for changes in amount and color  - [place] appropriate cooling/warming therapies per order  - Administer medications as ordered  - Instruct and encourage patient and family to use good hand hygiene technique  - Identify and instruct in appropriate isolation precautions for  identified infection/condition  Outcome: Progressing     Problem: SAFETY ADULT  Goal: Patient will remain free of falls  Description: INTERVENTIONS:  - Educate patient/family on patient safety including physical limitations  - Instruct patient to call for assistance with activity   - Consult OT/PT to assist with strengthening/mobility   - Keep Call bell within reach  - Keep bed low and locked with side rails adjusted as appropriate  - Keep care items and personal belongings within reach  - Initiate and maintain comfort rounds  - Make Fall Risk Sign visible to staff  - Offer Toileting every 2 Hours, in advance of need  - Initiate/Maintain bed alarm  - Obtain necessary fall risk management equipment:   - Apply yellow socks and bracelet for high fall risk patients  - Consider moving patient to room near nurses station  Outcome: Progressing  Goal: Maintain or return to baseline ADL function  Description: INTERVENTIONS:  -  Assess patient's ability to carry out ADLs; assess patient's baseline for ADL function and identify physical deficits which impact ability to perform ADLs (bathing, care of mouth/teeth, toileting, grooming, dressing, etc.)  - Assess/evaluate cause of self-care deficits   - Assess range of motion  - Assess patient's mobility; develop plan if impaired  - Assess patient's need for assistive devices and provide as appropriate  - Encourage maximum independence but intervene and supervise when necessary  - Involve family in performance of ADLs  - Assess for home care needs following discharge   - Consider OT consult to assist with ADL evaluation and planning for discharge  - Provide patient education as appropriate  Outcome: Progressing  Goal: Maintains/Returns to pre admission functional level  Description: INTERVENTIONS:  - Perform AM-PAC 6 Click Basic Mobility/ Daily Activity assessment daily.  - Set and communicate daily mobility goal to care team and patient/family/caregiver.   - Collaborate with  rehabilitation services on mobility goals if consulted  - Perform Range of Motion 3 times a day.  - Reposition patient every 2 hours.  - Dangle patient 3 times a day  - Stand patient 3 times a day  - Ambulate patient 3 times a day  - Out of bed to chair 3 times a day   - Out of bed for meals 3 times a day  - Out of bed for toileting  - Record patient progress and toleration of activity level   Outcome: Progressing

## 2024-01-24 NOTE — CONSULTS
Consultation - Medical Oncology   Shannan Vee 53 y.o. female MRN: [id number]  Unit/Bed#: E2 -01 Encounter: [id number]  [facility]  Date of service: 1/24/2024  Reason for Consult: Breast cancer with metastasis  HPI: Shannan Vee is a 53 y.o. year old female.  Patient's daughter and sister are in the room.  Patient's sister is bilingual and she helped with interpretation.  In 2013 patient was diagnosed to have hormone receptor positive and HER2 negative grade 2 locally advanced left breast cancer.  Negative BRCA mutation.  Patient had neoadjuvant dose dense chemotherapy followed by left mastectomy and she had significant residual disease and 2 positive lymph nodes.  She had radiation therapy and was started on tamoxifen and that was later changed to Arimidex in 2015 when she had KELLY and BSO for fibroid.  In October 2023 she was found to have extensive metastatic disease to bones and also there was a large lung mass and mediastinal lymph nodes.  She was thought to have a second primary in the lung but biopsy favored metastatic disease from breast cancer.  Treatment was changed to kisqali plus Faslodex.  Yesterday patient had her first radiation treatment to her spine at [facility].  She is here because of increased shortness of breath, right pleural effusion and spine pain.  Also tachycardia.  Patient has poor appetite and has lost weight.  She is less and less active now.  She has generalized weakness and tiredness.    ROS:  01/24/24 Reviewed 12 systems: See symptoms in HPI  Presently no other neurological, cardiac, pulmonary, GI and  symptoms other than listed in HPI.  Other symptoms are in HPI.  No  fever, chills, bleeding,skin rash,  night sweats, arthritic symptoms, numbness, claudication.No frequent infections.  Not unusually sensitive to heat or cold. No swelling of the ankles. No swollen glands.  Patient is anxious.       Historical Information   Past Medical  History:   Diagnosis Date    Breast cancer (HCC) 07/2013    left-chemotherapy    Fibroma     History of chemotherapy     History of external beam radiation therapy      Past Surgical History:   Procedure Laterality Date    APPENDECTOMY      BREAST SURGERY  2014    Left breast 2/2 breast CA    HYSTERECTOMY  2015    Due to fibroma    IR BIOPSY BONE  10/9/2023    IR THORACENTESIS  11/8/2023    MASTECTOMY Left 2013    OOPHORECTOMY       Social History   Social History     Substance and Sexual Activity   Alcohol Use Not Currently     Social History     Substance and Sexual Activity   Drug Use No     Social History     Tobacco Use   Smoking Status Never    Passive exposure: Never   Smokeless Tobacco Never     Family History:   Family History   Problem Relation Age of Onset    Lung cancer Mother     No Known Problems Father     No Known Problems Sister     No Known Problems Daughter     No Known Problems Daughter     Cancer Maternal Grandmother     No Known Problems Maternal Grandfather     No Known Problems Paternal Grandmother     No Known Problems Paternal Grandfather     Uterine cancer Maternal Aunt     No Known Problems Son     No Known Problems Paternal Aunt          Current Facility-Administered Medications:     acetaminophen (TYLENOL) tablet 650 mg, 650 mg, Oral, Q6H PRN, Billy S Prechtel, DO, 650 mg at 01/24/24 0518    cefTRIAXone (ROCEPHIN) IVPB (premix in dextrose) 1,000 mg 50 mL, 1,000 mg, Intravenous, Q24H, Billy S Prechtel, DO, Last Rate: 100 mL/hr at 01/23/24 2327, 1,000 mg at 01/23/24 2327    lidocaine (LIDODERM) 5 % patch 2 patch, 2 patch, Topical, Daily, Billy S Prechtel, DO, 2 patch at 01/24/24 0828    morphine injection 2 mg, 2 mg, Intravenous, Q4H PRN, Billy S Prechtel, DO, 2 mg at 01/24/24 1026    oxyCODONE (ROXICODONE) immediate release tablet 10 mg, 10 mg, Oral, Q4H PRN, Billy S Prechtel, DO, 10 mg at 01/24/24 0725    oxyCODONE (ROXICODONE) IR tablet 5 mg, 5 mg, Oral, Q4H PRN, Billy S  Prechtel, DO    Ribociclib Succ (600 MG Dose) TBPK 600 mg, 600 mg, Oral, Daily, Billy WARD Prechtel, DO    Allergies   Allergen Reactions    Aspirin Rash     Other reaction(s): Palpitations       Physical Exam:  Vitals:    01/23/24 2023 01/23/24 2216 01/24/24 0305 01/24/24 0729   BP: 157/86 155/93 152/84 152/99   BP Location: Right arm Right arm Right arm Right arm   Pulse: (!) 137 (!) 148 (!) 147 (!) 135   Resp: (!) 30 (!) 33 18 22   Temp: 98.9 °F (37.2 °C) 99.8 °F (37.7 °C) (!) 96.5 °F (35.8 °C) 97.5 °F (36.4 °C)   TempSrc: Temporal Temporal Temporal Temporal   SpO2: 98% 98% 98% 100%     Alert, oriented, not in distress, vitals are above, no icterus, no oral thrush, no palpable neck mass, decreased breath sounds and dullness right lower lung, tachycardia, regular heart rate, abdomen  soft and non tender, no palpable abdominal mass, no ascites, no edema of ankles, no calf tenderness, no focal neurological deficit but has generalized weakness, no skin rash, no palpable lymphadenopathy in the neck and axillary areas,  no clubbing.   Patient is anxious.  Performance status 3.  Cachexia.      Lab Results: I have reviewed all pertinent labs.  LABS:    Results for orders placed or performed during the hospital encounter of 01/23/24   FLU/RSV/COVID - if FLU/RSV clinically relevant    Specimen: Nose; Nares   Result Value Ref Range    SARS-CoV-2 Negative Negative    INFLUENZA A PCR Negative Negative    INFLUENZA B PCR Negative Negative    RSV PCR Negative Negative   Blood culture #1    Specimen: Hand, Right; Blood   Result Value Ref Range    Blood Culture Received in Microbiology Lab. Culture in Progress.    Blood culture #2    Specimen: Arm, Right; Blood   Result Value Ref Range    Blood Culture Received in Microbiology Lab. Culture in Progress.    CBC and differential   Result Value Ref Range    WBC 12.87 (H) 4.31 - 10.16 Thousand/uL    RBC 3.50 (L) 3.81 - 5.12 Million/uL    Hemoglobin 9.2 (L) 11.5 - 15.4 g/dL     "Hematocrit 30.1 (L) 34.8 - 46.1 %    MCV 86 82 - 98 fL    MCH 26.3 (L) 26.8 - 34.3 pg    MCHC 30.6 (L) 31.4 - 37.4 g/dL    RDW 20.7 (H) 11.6 - 15.1 %    MPV 8.3 (L) 8.9 - 12.7 fL    Platelets 612 (H) 149 - 390 Thousands/uL   Comprehensive metabolic panel   Result Value Ref Range    Sodium 132 (L) 135 - 147 mmol/L    Potassium 3.6 3.5 - 5.3 mmol/L    Chloride 93 (L) 96 - 108 mmol/L    CO2 24 21 - 32 mmol/L    ANION GAP 15 mmol/L    BUN 7 5 - 25 mg/dL    Creatinine 0.46 (L) 0.60 - 1.30 mg/dL    Glucose 111 65 - 140 mg/dL    Calcium 8.8 8.4 - 10.2 mg/dL    Corrected Calcium 9.4 8.3 - 10.1 mg/dL    AST 46 (H) 13 - 39 U/L    ALT 19 7 - 52 U/L    Alkaline Phosphatase 149 (H) 34 - 104 U/L    Total Protein 7.5 6.4 - 8.4 g/dL    Albumin 3.2 (L) 3.5 - 5.0 g/dL    Total Bilirubin 0.46 0.20 - 1.00 mg/dL    eGFR 112 ml/min/1.73sq m   Lactic acid, plasma (w/reflex if result > 2.0)   Result Value Ref Range    LACTIC ACID 1.1 0.5 - 2.0 mmol/L   HS Troponin 0hr (reflex protocol)   Result Value Ref Range    hs TnI 0hr 12 \"Refer to ACS Flowchart\"- see link ng/L   B-Type Natriuretic Peptide(BNP)   Result Value Ref Range    BNP 80 0 - 100 pg/mL   HS Troponin I 2hr   Result Value Ref Range    hs TnI 2hr 13 \"Refer to ACS Flowchart\"- see link ng/L    Delta 2hr hsTnI 1 <20 ng/L   HS Troponin I 4hr   Result Value Ref Range    hs TnI 4hr 17 \"Refer to ACS Flowchart\"- see link ng/L    Delta 4hr hsTnI 5 <20 ng/L   CBC and differential   Result Value Ref Range    WBC 10.96 (H) 4.31 - 10.16 Thousand/uL    RBC 2.97 (L) 3.81 - 5.12 Million/uL    Hemoglobin 7.8 (L) 11.5 - 15.4 g/dL    Hematocrit 25.7 (L) 34.8 - 46.1 %    MCV 87 82 - 98 fL    MCH 26.3 (L) 26.8 - 34.3 pg    MCHC 30.4 (L) 31.4 - 37.4 g/dL    RDW 20.5 (H) 11.6 - 15.1 %    MPV 8.7 (L) 8.9 - 12.7 fL    Platelets 557 (H) 149 - 390 Thousands/uL    nRBC 1 /100 WBCs    Neutrophils Relative 67 43 - 75 %    Immat GRANS % 3 (H) 0 - 2 %    Lymphocytes Relative 15 14 - 44 %    Monocytes Relative 15 " (H) 4 - 12 %    Eosinophils Relative 0 0 - 6 %    Basophils Relative 0 0 - 1 %    Neutrophils Absolute 7.34 1.85 - 7.62 Thousands/µL    Immature Grans Absolute 0.34 (H) 0.00 - 0.20 Thousand/uL    Lymphocytes Absolute 1.64 0.60 - 4.47 Thousands/µL    Monocytes Absolute 1.62 (H) 0.17 - 1.22 Thousand/µL    Eosinophils Absolute 0.00 0.00 - 0.61 Thousand/µL    Basophils Absolute 0.02 0.00 - 0.10 Thousands/µL   Basic metabolic panel   Result Value Ref Range    Sodium 132 (L) 135 - 147 mmol/L    Potassium 3.8 3.5 - 5.3 mmol/L    Chloride 95 (L) 96 - 108 mmol/L    CO2 28 21 - 32 mmol/L    ANION GAP 9 mmol/L    BUN 7 5 - 25 mg/dL    Creatinine 0.47 (L) 0.60 - 1.30 mg/dL    Glucose 114 65 - 140 mg/dL    Glucose, Fasting 114 (H) 65 - 99 mg/dL    Calcium 8.4 8.4 - 10.2 mg/dL    eGFR 111 ml/min/1.73sq m   Magnesium   Result Value Ref Range    Magnesium 1.9 1.9 - 2.7 mg/dL   ECG 12 lead   Result Value Ref Range    Ventricular Rate 149 BPM    Atrial Rate 149 BPM    CO Interval 118 ms    QRSD Interval 66 ms    QT Interval 276 ms    QTC Interval 434 ms    P Axis 82 degrees    QRS Axis 107 degrees    T Wave Axis 59 degrees   ECG 12 lead   Result Value Ref Range    Ventricular Rate 141 BPM    Atrial Rate 141 BPM    CO Interval 120 ms    QRSD Interval 66 ms    QT Interval 280 ms    QTC Interval 428 ms    P Axis 87 degrees    QRS Axis 108 degrees    T Wave Axis 64 degrees   Manual Differential(PHLEBS Do Not Order)   Result Value Ref Range    Segmented % 63 43 - 75 %    Bands % 3 0 - 8 %    Lymphocytes % 14 14 - 44 %    Monocytes % 12 4 - 12 %    Eosinophils, % 0 0 - 6 %    Basophils % 0 0 - 1 %    Atypical Lymphocytes % 8 (H) <=0 %    Absolute Neutrophils 8.49 (H) 1.85 - 7.62 Thousand/uL    Lymphocytes Absolute 2.83 0.60 - 4.47 Thousand/uL    Monocytes Absolute 1.54 (H) 0.00 - 1.22 Thousand/uL    Eosinophils Absolute 0.00 0.00 - 0.40 Thousand/uL    Basophils Absolute 0.00 0.00 - 0.10 Thousand/uL    Total Counted      RBC Morphology  Present     Platelet Estimate Increased (A) Adequate    Large Platelet Present     Anisocytosis Present     Macrocytes Present     Microcytes Present     Ovalocytes Present     Poikilocytes Present     Polychromasia Present          Imaging Studies: I have personally reviewed pertinent reports.    CTA ED chest PE study  Status: Final result     PACS Images     Show images for CTA ED chest PE study  Study Result    Narrative & Impression   CTA - CHEST WITH IV CONTRAST - PULMONARY ANGIOGRAM     INDICATION:   r cp, hx ca. Per my review of the medical record, breast cancer with biopsy-proven right lung metastases.     COMPARISON: CXR 11/24/2023, chest CT  11/06/2023, PET/CT 10/26/2023.     TECHNIQUE: CT angiogram timed for optimal opacification of the pulmonary arteries.  Axial, sagittal, and coronal 2D reformats created from source data.  Coronal 3D MIP postprocessing on the acquisition scanner.     Radiation dose length product (DLP):  858 mGy-cm .  Radiation dose exposure minimized using iterative reconstruction and automated exposure control.     IV Contrast:  85 mL of iohexol (OMNIPAQUE)     FINDINGS:     PULMONARY ARTERIES: No definite acute pulmonary emboli but respiratory motion limits detection.     LUNGS: Compromised by motion. Mild juxtapleural groundglass opacity in the left upper lobe. Near complete compressive atelectasis of the right lung by the large right pleural effusion.     AIRWAYS: No significant filling defects.     PLEURA: Large right pleural effusion causing near complete complete compressive atelectasis of the right lung and mediastinal shift to the left. Extensive progression of right lung metastases.     HEART/GREAT VESSELS:  Normal for age.     MEDIASTINUM AND AZAR:  Unremarkable.     CHEST WALL AND LOWER NECK: Left mastectomy.     UPPER ABDOMEN: Left renal cyst.     OSSEOUS STRUCTURES: Redemonstration of extensive metastases throughout the skeleton.     IMPRESSION:     No definite acute  pulmonary emboli but respiratory motion limits detection.     Marked progression of right pleural metastases with huge right pleural effusion causing near complete compressive atelectasis of the right lung and mediastinal shift to the left. Recommend chest tube placement to relieve mediastinal shift.     Mild peripheral groundglass opacity in the left upper lobe, infectious or inflammatory.     Redemonstration of extensive bone metastases.     The study was marked in EPIC for immediate notification.           Workstation performed: [id number]        Imaging    CTA ED chest PE study (Order: [id number]) -    Pathology, and Other Studies: I have personally reviewed pertinent reports.        Assessment and Plan:  See diagnoses, orders instructions below  #1 stage IV metastatic breast cancer.  2.  Metastatic disease to bones   #3 metastatic disease to right pleura  #4.  Malignant right pleural effusion.  Patient had thoracentesis previously and fluid was positive for malignant cells  5.  Cachexia   #6.  Tachycardia  I discussed with Dr. Villavicencio, patient's primary oncologist because she has to [facility] reports 1 from the bone and that was ER positive and 1 from pleural fluid and that is triple negative.  He would like to see the patient postdischarge in the office to make decision about systemic therapy.  Also patient will go to [facility] to complete her radiation post discharge.  I think patient will be getting Pleurx for recurrent right pleural effusion.  She has disease progression.   Patient will continue to follow with  primary physician and other consultants.  Patient and family voiced understanding and agree.  They understand she is stage IV disease and there is no cure and goal is prolongation of survival and to treat her symptoms.      Disclaimer: This document was prepared using a dictation device.  If a word or phrase is confusing, or does not make sense, this is likely due to recognition error which was not discovered  during the providers review. If you believe an error has occurred, please Contact me through [employer] [facility] Line service for [place]berta?cation.    Counseling / Coordination of Care  ..  Provided counseling and support

## 2024-01-24 NOTE — PLAN OF CARE
Problem: Prexisting or High Potential for Compromised Skin Integrity  Goal: Skin integrity is maintained or improved  Description: INTERVENTIONS:  - Identify patients at risk for skin breakdown  - Assess and monitor skin integrity  - Assess and monitor nutrition and hydration status  - Monitor labs   - Assess for incontinence   - Turn and reposition patient  - Assist with mobility/ambulation  - Relieve pressure over bony prominences  - Avoid friction and shearing  - Provide appropriate hygiene as needed including keeping skin clean and dry  - Evaluate need for skin moisturizer/barrier cream  - Collaborate with interdisciplinary team   - Patient/family teaching  - Consider wound care consult   Outcome: Progressing     Problem: PAIN - ADULT  Goal: Verbalizes/displays adequate comfort level or baseline comfort level  Description: Interventions:  - Encourage patient to monitor pain and request assistance  - Assess pain using appropriate pain scale  - Administer analgesics based on type and severity of pain and evaluate response  - Implement non-pharmacological measures as appropriate and evaluate response  - Consider cultural and social influences on pain and pain management  - Notify physician/advanced practitioner if interventions unsuccessful or patient reports new pain  Outcome: Progressing     Problem: INFECTION - ADULT  Goal: Absence or prevention of progression during hospitalization  Description: INTERVENTIONS:  - Assess and monitor for signs and symptoms of infection  - Monitor lab/diagnostic results  - Monitor all insertion sites, i.e. indwelling lines, tubes, and drains  - Monitor endotracheal if appropriate and nasal secretions for changes in amount and color  - [place] appropriate cooling/warming therapies per order  - Administer medications as ordered  - Instruct and encourage patient and family to use good hand hygiene technique  - Identify and instruct in appropriate isolation precautions for  identified infection/condition  Outcome: Progressing     Problem: SAFETY ADULT  Goal: Patient will remain free of falls  Description: INTERVENTIONS:  - Educate patient/family on patient safety including physical limitations  - Instruct patient to call for assistance with activity   - Consult OT/PT to assist with strengthening/mobility   - Keep Call bell within reach  - Keep bed low and locked with side rails adjusted as appropriate  - Keep care items and personal belongings within reach  - Initiate and maintain comfort rounds  - Make Fall Risk Sign visible to staff  - Offer Toileting every 2 Hours, in advance of need  - Initiate/Maintain bed alarm  - Apply yellow socks and bracelet for high fall risk patients  - Consider moving patient to room near nurses station  Outcome: Progressing  Goal: Maintain or return to baseline ADL function  Description: INTERVENTIONS:  -  Assess patient's ability to carry out ADLs; assess patient's baseline for ADL function and identify physical deficits which impact ability to perform ADLs (bathing, care of mouth/teeth, toileting, grooming, dressing, etc.)  - Assess/evaluate cause of self-care deficits   - Assess range of motion  - Assess patient's mobility; develop plan if impaired  - Assess patient's need for assistive devices and provide as appropriate  - Encourage maximum independence but intervene and supervise when necessary  - Involve family in performance of ADLs  - Assess for home care needs following discharge   - Consider OT consult to assist with ADL evaluation and planning for discharge  - Provide patient education as appropriate  Outcome: Progressing  Goal: Maintains/Returns to pre admission functional level  Description: INTERVENTIONS:  - Perform AM-PAC 6 Click Basic Mobility/ Daily Activity assessment daily.  - Set and communicate daily mobility goal to care team and patient/family/caregiver.   - Collaborate with rehabilitation services on mobility goals if consulted  -  Perform Range of Motion 3 times a day.  - Reposition patient every 2 hours.  - Dangle patient 3 times a day  - Stand patient 3 times a day  - Ambulate patient 3 times a day  - Out of bed to chair 3 times a day   - Out of bed for meals 3 times a day  - Out of bed for toileting  - Record patient progress and toleration of activity level   Outcome: Progressing

## 2024-01-24 NOTE — H&P
[facility]  H&P  Name: Shannan Vee 53 y.o. female I MRN: [id number]  Unit/Bed#: ED-08 I Date of Admission: 1/23/2024   Date of Service: 1/23/2024  Hospital Day: 0      Assessment/Plan   * Recurrent pleural effusion  Assessment & Plan  Patient has recurrent, likely malignant pleural effusion on the right    She said she last had this drained at [facility] last week    She also had it drained here at [facility] in November 2023    She may need a Pleurx catheter if this is needing to be frequently drained    Will consult IR    Keep her n.p.o. after midnight in case they want to do a procedure tomorrow either thoracentesis, chest tube or Pleurx catheter    Malignant neoplasm of overlapping sites of left female breast (HCC)  Assessment & Plan  Patient has recurrent breast cancer with metastasis to bone and malignant pleural effusion    Follows with oncology at [facility] and palliative care    She just received 4 radiation treatments at [facility] which were palliative           Chief Complaint:   Right-sided chest pain and shortness of breath      History of Present Illness:    Shannan Vee is a 53 y.o. female who presents with right-sided chest pain and shortness of breath.  Patient has a history of malignant recurrent pleural effusions from malignant breast cancer.  She actually just had the right effusion drained at [facility] last week.  Also had it drained here at [facility] in November 2023.  Now complaining of increased pain on the right as well as shortness of breath.  Found to have a recurrent pleural effusion on the right..      Review of Systems:    Review of Systems   Constitutional:  Negative for chills and fever.   HENT:  Negative for ear pain and sore throat.    Eyes:  Negative for pain and visual disturbance.   Respiratory:  Positive for cough and shortness of breath.    Cardiovascular:  Negative for chest pain and  palpitations.   Gastrointestinal:  Negative for abdominal pain and vomiting.   Genitourinary:  Negative for dysuria and hematuria.   Musculoskeletal:  Negative for arthralgias and back pain.   Skin:  Negative for color change and rash.   Neurological:  Negative for seizures and syncope.   All other systems reviewed and are negative.        Past Medical and Surgical History:     Past Medical History:   Diagnosis Date    Breast cancer (HCC) 07/2013    left-chemotherapy    Fibroma     History of chemotherapy     History of external beam radiation therapy        Past Surgical History:   Procedure Laterality Date    APPENDECTOMY      BREAST SURGERY  2014    Left breast 2/2 breast CA    HYSTERECTOMY  2015    Due to fibroma    IR BIOPSY BONE  10/9/2023    IR THORACENTESIS  11/8/2023    MASTECTOMY Left 2013    OOPHORECTOMY           Home Medications:    Prior to Admission medications    Medication Sig Start Date End Date Taking? Authorizing Provider   acetaminophen (TYLENOL) 325 mg tablet Take 3 tablets (975 mg total) by mouth every 8 (eight) hours  Patient not taking: Reported on 1/12/2024 12/8/23   Todd Bañuelos MD   anastrozole (ARIMIDEX) 1 mg tablet Take 1 tablet (1 mg total) by mouth daily  Patient not taking: Reported on 11/29/2023 1/23/23   Mckayla Andrews MD   calcium citrate-vitamin D (Calcitrate Plus D) 315 mg-5 mcg tablet Take 1 tablet by mouth 2 (two) times a day 10/23/23 1/21/24  Maxwell Villavicencio MD   docusate sodium (COLACE) 100 mg capsule Take 1 capsule (100 mg total) by mouth every 12 (twelve) hours  Patient not taking: Reported on 1/12/2024 10/28/23   Ava Grissom DO   ergocalciferol (VITAMIN D2) 50,000 units TAKE 1 CAPSULE BY MOUTH 1 TIME A WEEK 1/22/24   Maxwell Villavicencio MD   ibuprofen (MOTRIN) 600 mg tablet Take 1 tablet (600 mg total) by mouth every 6 (six) hours as needed for moderate pain Take with food  Patient not taking: Reported on 11/29/2023 11/8/23   Ainsley Call MD   lidocaine (LIDODERM) 5  % Apply 2 patches topically over 12 hours daily Remove & Discard patch within 12 hours or as directed by MD Do not start before November 9, 2023. 11/9/23   Ainsley Call MD   Multiple Vitamin (MULTIVITAMIN ADULT PO) Take 1 tablet by mouth in the morning    Historical Provider, MD   oxyCODONE (Roxicodone) 5 immediate release tablet Take 1 tablet (5 mg total) by mouth every 6 (six) hours as needed for moderate pain Max Daily Amount: 20 mg 1/18/24   Maxwell Villavicencio MD   Ribociclib Succ, 600 MG Dose, 200 MG TBPK Take 600 mg by mouth in the morning For 3 weeks and then 1 week off 10/27/23   Maxwell Villavicencio MD     I have reviewed home medications with patient personally.      Allergies:   Allergies   Allergen Reactions    Aspirin Rash     Other reaction(s): Palpitations         Social History:    Substance Use History:   Social History     Substance and Sexual Activity   Alcohol Use Not Currently     Social History     Tobacco Use   Smoking Status Never    Passive exposure: Never   Smokeless Tobacco Never     Social History     Substance and Sexual Activity   Drug Use No         Family History:    non-contributory      Physical Exam:     Vitals:   Blood Pressure: 145/75 (01/23/24 1745)  Pulse: (!) 141 (01/23/24 1745)  Temperature: 99.6 °F (37.6 °C) (01/23/24 1745)  Temp Source: Oral (01/23/24 1745)  Respirations: (!) 33 (01/23/24 1745)  SpO2: 99 % (01/23/24 1745)    Physical Exam  Vitals and nursing note reviewed.   HENT:      Head: Normocephalic and atraumatic.   Eyes:      Pupils: Pupils are equal, round, and reactive to light.   Cardiovascular:      Rate and Rhythm: Normal rate and regular rhythm.      Heart sounds: No murmur heard.     No friction rub. No gallop.   Pulmonary:      Breath sounds: No wheezing or rales.      Comments: Decreased breath sounds on the right side 1/2 way up  Abdominal:      General: Bowel sounds are normal.      Palpations: Abdomen is soft.      Tenderness: There is no abdominal tenderness.    Musculoskeletal:      Right lower leg: No edema.      Left lower leg: No edema.         .    Additional Data:     Lab Results: I have personally reviewed pertinent reports.      Results from last 7 days   Lab Units 01/23/24  1554   WBC Thousand/uL 12.87*   HEMOGLOBIN g/dL 9.2*   HEMATOCRIT % 30.1*   PLATELETS Thousands/uL 612*     Results from last 7 days   Lab Units 01/23/24  1554   POTASSIUM mmol/L 3.6   CHLORIDE mmol/L 93*   CO2 mmol/L 24   BUN mg/dL 7   CREATININE mg/dL 0.46*   CALCIUM mg/dL 8.8   ALK PHOS U/L 149*   ALT U/L 19   AST U/L 46*                     Imaging: I have personally reviewed pertinent reports.      CTA ED chest PE study   Final Result by Reina López MD (01/23 1710)      No definite acute pulmonary emboli but respiratory motion limits detection.      Marked progression of right pleural metastases with huge right pleural effusion causing near complete compressive atelectasis of the right lung and mediastinal shift to the left. Recommend chest tube placement to relieve mediastinal shift.      Mild peripheral groundglass opacity in the left upper lobe, infectious or inflammatory.      Redemonstration of extensive bone metastases.      The study was marked in EPIC for immediate notification.            Workstation performed: [id number]         IR thoracentesis    (Results Pending)           VTE Prophylaxis:  ambulating         Anticipated Length of Stay:  Patient will be admitted on an Observation basis with an anticipated length of stay of  less than 2 midnights.   Justification for Hospital Stay: Patient needs thoracentesis.  If she needs longer stay.  Can switch to inpatient.          ** Please Note: This note has been constructed using a voice recognition system. **

## 2024-01-24 NOTE — ASSESSMENT & PLAN NOTE
Stage IV cancer with  metastatic disease to the bone.  Oncology evaluation and recommendations reviewed.  Follow-up with Dr. Pennington  and radiation oncology for ongoing treatments after discharge

## 2024-01-24 NOTE — CONSULTS
"e-Consult (IPC)  - Interventional Radiology  Shannan Vee 53 y.o. female MRN: [id number]  Unit/Bed#: E2 -01 Encounter: [id number]          Interventional Radiology has been consulted to evaluate Shannan Vee    We were consulted by Internal Medicine concerning this patient with recurrent malignant pleural effusions.    Inpatient Consult to IR  Consult performed by: JANNETH Hernandez  Consult ordered by: Billy Smith DO        01/24/24    Assessment/Recommendation:   52 yo female with h/o breast cancer s/p Chemo and XRT and h/o of malignant recurrent pleural effussions who presented to ED on 1/23/24 with right sided chest pain and shortness of breath.     CT Chest  with no PE but demonstrating \"Marked progression of right pleural metastases with huge right pleural effusion causing near complete compressive atelectasis of the right lung and mediastinal shift to the left. Recommend chest tube placement to relieve mediastinal shift. Mild peripheral groundglass opacity in the left upper lobe, infectious or inflammatory. Redemonstration of extensive bone metastases\".    IR consulted for recurrent malignant pleural effusion.    - Reviewed case and imaging with Dr Mcclelland  - Plan for IR pleurex catheter today, scheduling to be determined by staff availability  - continue NPO  - appreciate Internal Medicine recommendations  - Please reach out to IR with questions or concerns      11-20 minutes, >50% of the total time devoted to medical consultative verbal/EMR discussion between providers. Written report will be generated in the EMR.     Thank you for allowing Interventional Radiology to participate in the care of Shannan Vee. Please don't hesitate to call or TigerText us with any questions.     JANNETH Hernandez            "

## 2024-01-24 NOTE — ASSESSMENT & PLAN NOTE
Large right pleural effusion likely malignant in the setting of metastatic breast cancer  Status post thoracentesis today and drainage catheter placement.  Follow-up on fluid results  Will need to set up with [facility] regarding catheter and drainage management

## 2024-01-24 NOTE — UTILIZATION REVIEW
Initial Clinical Review    Admission: Date/Time/Statement:   Admission Orders (From admission, onward)       Ordered        01/23/24 1752  Place in Observation  Once                          01/24/24 1635  Inpatient Admission  Once        Transfer Service: Hospitalist   Question Answer Comment   Level of Care Med Surg    Bed Type [place]    Estimated length of stay More than 2 Midnights    Certification I certify that inpatient services are medically necessary for this patient for a duration of greater than two midnights. See H&P and MD Progress Notes for additional information about the patient's course of treatment.        01/24/24 1634   OBSERVATION   1/23 @  1753 CHANGED TO IP ADMISSIOn  1/24 @  1635  The patient will continue to require additional inpatient hospital stay due to large pleural effusion     ED Arrival Information       Expected   -    Arrival   1/23/2024 14:44    Acuity   Emergent              Means of arrival   Wheelchair    Escorted by   Family Member    Service   Hospitalist    Admission type   Emergency              Arrival complaint   chest pain             Chief Complaint   Patient presents with    Shortness of Breath     Reports SOB and back pain x2 weeks.        Initial Presentation: 53 y.o. female presents to ED from home with right sided chest pain and  SOB.  PMH  is  recurrent  malignant pleural effusions from breast cancer.  Had  right effusion drained the week prior to this at  [facility], also drained  11/23 at  [facility].   Found with recurrent right pleural effusion.  Admit  Observation  with  Recurrent pleural effusion  and plan is  IR consult, may need Pleurx catheter ,  JOHAN,  and  possible  IR procedure.    1/24  IR procedure  Findings: Successful right tunneled pleural drainage catheter placement.   1900 cc  red pleural fluid removed  Tachycardic post procedure and given 1 dose cardizem.       1/24  IP ADMISSION  Remains on   O2  3L  NC.  Monitor labs.  Hemoglobin drop to  7.8.       1/25  Palliative care consult     Continue pain control/symptom management as needed.  Appears ill/fatigued.      Remains on  O2  3L  NC. Continue   JOHAN>          ED Triage Vitals   Temperature Pulse Respirations Blood Pressure SpO2   01/23/24 1448 01/23/24 1448 01/23/24 1448 01/23/24 1448 01/23/24 1448   97.8 °F (36.6 °C) (!) 154 20 148/80 95 %      Temp Source Heart Rate Source Patient Position - Orthostatic VS BP Location FiO2 (%)   01/23/24 1745 01/23/24 1745 01/23/24 1745 01/23/24 1745 --   Oral Monitor Lying Right arm       Pain Score       01/23/24 1551       10 - Worst Possible Pain          Wt Readings from Last 1 Encounters:   01/12/24 57.9 kg (127 lb 9.6 oz)     Additional Vital Signs:   97.5 °F (36.4 °C) 135 Abnormal  22 152/99 111 100 % 32 3 L/min Nasal cannula Lying    01/24/24 0305 96.5 °F (35.8 °C) Abnormal  147 Abnormal  18 152/84 111 98 % 32 3 L/min Nasal cannula Lying   01/23/24 2216 99.8 °F (37.7 °C) 148 Abnormal  33 Abnormal  155/93 106 98 % 32 3 L/min None (Room air) Lying   01/23/24 2023 98.9 °F (37.2 °C) 137 Abnormal  30 Abnormal  157/86 97 98 % 32 3 L/min Nasal cannula Lying   01/23/24 1745 99.6 °F (37.6 °C) 141 Abnormal  33 Abnormal  145/75 -- 99 % 32 3 L/min Nasal cannula Lying   01/23/24 1448 97.8 °F (36.6 °C) 154 Abnormal  20 148/80 -- 95 % -- -- -- --     Pertinent Labs/Diagnostic Test Results:   CTA ED chest PE study   Final Result by Reina López MD (01/23 1710)      No definite acute pulmonary emboli but respiratory motion limits detection.      Marked progression of right pleural metastases with huge right pleural effusion causing near complete compressive atelectasis of the right lung and mediastinal shift to the left. Recommend chest tube placement to relieve mediastinal shift.      Mild peripheral groundglass opacity in the left upper lobe, infectious or inflammatory.      Redemonstration of extensive bone metastases.      The study was marked in EPIC for immediate  notification.            Workstation performed: [id number]         IR thoracentesis    (Results Pending)   IR pleural effusion long-term catheter placement    (Results Pending)     Results from last 7 days   Lab Units 01/23/24  1718   SARS-COV-2  Negative     Results from last 7 days   Lab Units 01/24/24  0455 01/23/24  1554   WBC Thousand/uL 10.96* 12.87*   HEMOGLOBIN g/dL 7.8* 9.2*   HEMATOCRIT % 25.7* 30.1*   PLATELETS Thousands/uL 557* 612*   NEUTROS ABS Thousands/µL 7.34  --    BANDS PCT %  --  3         Results from last 7 days   Lab Units 01/24/24  0455 01/23/24  1554   SODIUM mmol/L 132* 132*   POTASSIUM mmol/L 3.8 3.6   CHLORIDE mmol/L 95* 93*   CO2 mmol/L 28 24   ANION GAP mmol/L 9 15   BUN mg/dL 7 7   CREATININE mg/dL 0.47* 0.46*   EGFR ml/min/1.73sq m 111 112   CALCIUM mg/dL 8.4 8.8   MAGNESIUM mg/dL 1.9  --      Results from last 7 days   Lab Units 01/23/24  1554   AST U/L 46*   ALT U/L 19   ALK PHOS U/L 149*   TOTAL PROTEIN g/dL 7.5   ALBUMIN g/dL 3.2*   TOTAL BILIRUBIN mg/dL 0.46         Results from last 7 days   Lab Units 01/24/24  0455 01/23/24  1554   GLUCOSE RANDOM mg/dL 114 111               Results from last 7 days   Lab Units 01/23/24  2053 01/23/24  1753 01/23/24  1554   HS TNI 0HR ng/L  --   --  12   HS TNI 2HR ng/L  --  13  --    HSTNI D2 ng/L  --  1  --    HS TNI 4HR ng/L 17  --   --    HSTNI D4 ng/L 5  --   --                      Results from last 7 days   Lab Units 01/23/24  1719   LACTIC ACID mmol/L 1.1             Results from last 7 days   Lab Units 01/23/24  1554   BNP pg/mL 80           Results from last 7 days   Lab Units 01/23/24  1718   INFLUENZA A PCR  Negative   INFLUENZA B PCR  Negative   RSV PCR  Negative               Results from last 7 days   Lab Units 01/23/24  1719 01/23/24  1718   BLOOD CULTURE  Received in Microbiology Lab. Culture in Progress. Received in Microbiology Lab. Culture in Progress.                   ED Treatment:   Medication Administration from  01/23/2024 1444 to 01/23/2024 2021         Date/Time Order Dose Route Action Comments     01/23/2024 1551 EST HYDROmorphone (DILAUDID) injection 1 mg 1 mg Intravenous Given --     01/23/2024 1647 EST iohexol (OMNIPAQUE) 350 MG/ML injection (SINGLE-DOSE) 85 mL 85 mL Intravenous Given --     01/23/2024 1721 EST sodium chloride 0.9 % bolus 1,000 mL 1,000 mL Intravenous New Bag --     01/23/2024 1849 EST cefepime (MAXIPIME) IVPB (premix in dextrose) 2,000 mg 50 mL 0 mg Intravenous Stopped --     01/23/2024 1753 EST cefepime (MAXIPIME) IVPB (premix in dextrose) 2,000 mg 50 mL 2,000 mg Intravenous New Bag --     01/23/2024 1917 EST oxyCODONE (ROXICODONE) immediate release tablet 10 mg 10 mg Oral Given --            Present on Admission:   Recurrent pleural effusion   Malignant neoplasm of overlapping sites of left female breast (HCC)      Admitting Diagnosis: Chest pain [R07.9]  Pneumonia [J18.9]  Malignant pleural effusion [J91.0]  Age/Sex: 53 y.o. female  Admission Orders:  Scheduled Medications:  cefTRIAXone, 1,000 mg, Intravenous, Q24H  lidocaine, 2 patch, Topical, Daily  Ribociclib Succ (600 MG Dose), 600 mg, Oral, Daily      Continuous IV Infusions:     PRN Meds:  acetaminophen, 650 mg, Oral, Q6H PRN  morphine injection, 2 mg, Intravenous, Q4H PRN  ( X 3  1/24  and  X 2  1/25)    oxyCODONE, 10 mg, Oral, Q4H PRN  oxyCODONE, 5 mg, Oral, Q4H PRN        INPATIENT CONSULT TO IR  IP CONSULT TO ONCOLOGY    Network Utilization Review Department  ATTENTION: Please call with any questions or concerns to [phone] and carefully listen to the prompts so that you are directed to the right person. All voicemails are confidential.   For Discharge needs, contact Care Management DC Support Team at [phone] opt. [phone]  Send all requests for admission clinical reviews, approved or denied determinations and any other requests to dedicated fax number below belonging to the campus where the patient is receiving treatment. List of  dedicated fax numbers for the Facilities:  FACILITY NAME UR FAX NUMBER   ADMISSION DENIALS (Administrative/Medical Necessity) [phone]   DISCHARGE SUPPORT TEAM (NETWORK) [phone]   PARENT CHILD HEALTH (Maternity/NICU/Pediatrics) [phone]   [facility] [phone]   [facility] [phone]   [facility], [facility] [phone]   [facility] [phone]   [facility] [phone]   [facility] [phone]   [facility] [phone]   [facility] [phone]   [facility] [phone]   [facility] [phone]   [facility] [phone]

## 2024-01-24 NOTE — ASSESSMENT & PLAN NOTE
Secondary to large right pleural effusion   CT negative for PE  Still tachycardic shortly after her procedure.  Will give 1 dose of diltiazem just to keep her below 140s  Further improvement now that she has her fluid drained and lung is reexpanding

## 2024-01-24 NOTE — PROGRESS NOTES
[facility]  Progress Note  Name: Shannan Vee I  MRN: [id number]  Unit/Bed#: E2 -01 I Date of Admission: 1/23/2024   Date of Service: 1/24/2024  Hospital Day: 0    Assessment/Plan   * Large pleural effusion  Assessment & Plan  Large right pleural effusion likely malignant in the setting of metastatic breast cancer  Status post thoracentesis today and drainage catheter placement.  Follow-up on fluid results  Will need to set up with [facility] regarding catheter and drainage management    Malignant neoplasm of overlapping sites of left female breast (HCC)  Assessment & Plan  Stage IV cancer with  metastatic disease to the bone.  Oncology evaluation and recommendations reviewed.  Follow-up with Dr. Pennington  and radiation oncology for ongoing treatments after discharge    Acute respiratory insufficiency  Assessment & Plan  Secondary to large right pleural effusion  Wean down/off oxygen as tolerated    Sinus tachycardia  Assessment & Plan  Secondary to large right pleural effusion   CT negative for PE  Still tachycardic shortly after her procedure.  Will give 1 dose of diltiazem just to keep her below 140s  Further improvement now that she has her fluid drained and lung is reexpanding    Goals of care, counseling/discussion  Assessment & Plan  Goals of care discussion was done with the patient, sister and daughter at bedside.  See ACP note           VTE Pharmacologic Prophylaxis: VTE Score: 5 will start Lovenox now that she is status post thoracentesis   Patient Centered Rounds: I performed bedside rounds with nursing staff today.   Discussions with Specialists or Other Care Team Provider:  chart reviewed.    Education and Discussions with Family / Patient: Updated  (daughter and sister) at bedside.      Current Length of Stay: 0 day(s)  Current Patient Status: Inpatient   Certification Statement: The patient will continue to require additional inpatient hospital stay due to  large pleural effusion  Discharge Plan: Anticipate discharge in 48 hrs to home with home services.    Code Status: Level 1 - Full Code    Subjective:   Seen and examined earlier during rounds prior to her thoracentesis  I showed her and her family members her CAT scan findings and explained her condition and plan  At that time she was still on oxygen at 3 L.  She had no shortness of breath while at rest    Objective:     Vitals:   Temp (24hrs), Av.3 °F (36.8 °C), Min:96.5 °F (35.8 °C), Max:99.8 °F (37.7 °C)    Temp:  [96.5 °F (35.8 °C)-99.8 °F (37.7 °C)] 98.7 °F (37.1 °C)  HR:  [134-148] 147  Resp:  [12-33] 28  BP: (122-158)/(74-99) 122/74  SpO2:  [98 %-100 %] 100 %  There is no height or weight on file to calculate BMI.     Input and Output Summary (last 24 hours):     Intake/Output Summary (Last 24 hours) at 2024 1651  Last data filed at 2024 1445  Gross per 24 hour   Intake 50 ml   Output 1900 ml   Net -1850 ml       Physical Exam:   Physical Exam  Vitals reviewed.   Constitutional:       Appearance: She is not ill-appearing.   HENT:      Head: Normocephalic and atraumatic.      Nose: No congestion or rhinorrhea.   Eyes:      General: No scleral icterus.  Cardiovascular:      Rate and Rhythm: Regular rhythm. Tachycardia present.   Pulmonary:      Comments: Decreased breath sounds right  Abdominal:      Palpations: Abdomen is soft.      Tenderness: There is no abdominal tenderness. There is no guarding.   Musculoskeletal:      Cervical back: Neck supple.      Right lower leg: No edema.      Left lower leg: No edema.   Skin:     General: Skin is warm and dry.   Neurological:      Mental Status: She is oriented to person, place, and time.   Psychiatric:         Mood and Affect: Mood normal.         Behavior: Behavior normal.          Additional Data:     Labs:  Results from last 7 days   Lab Units 24  0455 24  1554   WBC Thousand/uL 10.96* 12.87*   HEMOGLOBIN g/dL 7.8* 9.2*   HEMATOCRIT %  25.7* 30.1*   PLATELETS Thousands/uL 557* 612*   BANDS PCT %  --  3   NEUTROS PCT % 67  --    LYMPHS PCT % 15  --    LYMPHO PCT %  --  14   MONOS PCT % 15*  --    MONO PCT %  --  12   EOS PCT % 0 0     Results from last 7 days   Lab Units 01/24/24  0455 01/23/24  1554   SODIUM mmol/L 132* 132*   POTASSIUM mmol/L 3.8 3.6   CHLORIDE mmol/L 95* 93*   CO2 mmol/L 28 24   BUN mg/dL 7 7   CREATININE mg/dL 0.47* 0.46*   ANION GAP mmol/L 9 15   CALCIUM mg/dL 8.4 8.8   ALBUMIN g/dL  --  3.2*   TOTAL BILIRUBIN mg/dL  --  0.46   ALK PHOS U/L  --  149*   ALT U/L  --  19   AST U/L  --  46*   GLUCOSE RANDOM mg/dL 114 111                 Results from last 7 days   Lab Units 01/23/24  1719   LACTIC ACID mmol/L 1.1       Lines/Drains:  Invasive Devices       Peripheral Intravenous Line  Duration             Peripheral IV 01/23/24 Dorsal (posterior);Right Forearm 1 day    Peripheral IV 01/23/24 Dorsal (posterior);Right Hand <1 day              Drain  Duration             Pleural Effusion Long-Term Catheter 16 Fr. <1 day              Airway  Duration             Supraglottic Airway LMA 4 76 days                      Telemetry:  Telemetry Orders (From admission, onward)               24 Hour Telemetry Monitoring  Continuous x 24 Hours (Telem)        Question:  Reason for 24 Hour Telemetry  Answer:  Arrhythmias requiring acute medical intervention / PPM or ICD malfunction                              Imaging: Personally reviewed the following imaging: chest CT scan I reviewed the CAT scan images to the patient and her family members at bedside.  I explained the findings to them in simple and understandable terms.    Recent Cultures (last 7 days):   Results from last 7 days   Lab Units 01/23/24  1719 01/23/24  1718   BLOOD CULTURE  Received in Microbiology Lab. Culture in Progress. Received in Microbiology Lab. Culture in Progress.       Last 24 Hours Medication List:   Current Facility-Administered Medications   Medication Dose Route  Frequency Provider Last Rate    acetaminophen  650 mg Oral Q6H PRN Billy S Prechtel, DO      cefTRIAXone  1,000 mg Intravenous Q24H Billy S Prechtel, DO 1,000 mg (01/23/24 2447)    lidocaine  2 patch Topical Daily Billy S Prechtel, DO      morphine injection  2 mg Intravenous Q4H PRN Billy S Prechtel, DO      oxyCODONE  10 mg Oral Q4H PRN Billy S Prechtel, DO      oxyCODONE  5 mg Oral Q4H PRN Billy S Prechtel, DO      [START ON 1/25/2024] Ribociclib Succ (600 MG Dose)  600 mg Oral Daily Billy S Prechtel, DO          Today, Patient Was Seen By: Gibson George MD    **Please Note: This note may have been constructed using a voice recognition system.**

## 2024-01-24 NOTE — BRIEF OP NOTE (RAD/CATH)
INTERVENTIONAL RADIOLOGY PROCEDURE NOTE    Date: 1/24/2024    Procedure: Right tunneled pleural drainage catheter placement  Procedure Summary       Date:  Room / Location:     Anesthesia Start:  Anesthesia Stop:     Procedure:  Diagnosis:     Scheduled Providers:  Responsible Provider:     Anesthesia Type: Not recorded ASA Status: Not recorded            Preoperative diagnosis:   1. Malignant pleural effusion    2. Pneumonia         Postoperative diagnosis: Same.    Surgeon: Neftaly Mcclelland MD     Assistant: None. No qualified resident was available.    Blood loss: 2 mL    Specimens: None     Findings: Successful right tunneled pleural drainage catheter placement.    Complications: None immediate.    Anesthesia: conscious sedation and local

## 2024-01-24 NOTE — DISCHARGE INSTRUCTIONS
How to Care for Your Chest or Abdominal Catheter                                    Post ASEPT Catheter Placement                 WHAT YOU NEED TO KNOW:                 A chest catheter helps remove fluid from your chest . This may decrease symptoms such as shortness of breath, pain,. One end of the catheter sits inside your  chest. The other end sits outside of your body. Your healthcare provider will show you or your caregiver how to drain fluid through the catheter. Your supplies will be shipped to your home.                                                     DISCHARGE INSTRUCTIONS:   How often you should drain fluid:   After the initial insertion drain every other day x 3 days then for comfort of SOB) Resume your normal diet.Small sips of flat soda will help with nausea. Resume taking your medications. You may have some initial  discomfort at the insertion site. You can take your normal pain medication.     [place], [place] and Carl  Patients Contact Interventional Radiology at [phone]    DUSTIN PATIENTS: Contact Interventional Radiology at [phone])       RANDEE PATIENTS: Contact Interventional Radiology at [phone]) if any of the following occur:                              Contact your healthcare provider if:   Your symptoms, such as pain or shortness of breath, do not get better after you drain fluid.   You have redness, pain, or pus where the catheter is located.   You have a fever.   Persistent nausea or vomiting.  You cannot drain fluid.   You see a change in the color of fluid that you drain.   You see fluid leaking from around your catheter.   You drain foul-smelling fluid or bloody fluid from your catheter.   You see a hole or tear in your catheter and need to use the emergency clip.   You have questions or concerns about your condition or care.    How often you should drain fluid:  Your healthcare provider will tell you how often  to drain fluid. He may tell you to follow a schedule, such as draining once a day or once every other day. He may instead tell you to drain fluid when you have symptoms such as SOB pain.   Before you drain fluid from your catheter:   Wash your hands.  Remove all rings. Use soap and water or an alcohol-based hand rub. This will help prevent an infection.     Gather your supplies.  Get a drainage kit and place it on a firm, clean surface. Drainage kits contain either a 500 mL or 1000 mL bottle and a procedure kit. Your healthcare provider will tell you which bottle to use.     Gently remove the old bandage.  Do not pull on the drain when you remove the bandage. Throw the bandage away.     Wash your hands a second time.  Use soap and water or an alcohol-based hand rub.     Open the drainage kit and remove the procedure kit.  Remove the bandage and place it on your clean surface. Leave the bottle with drainage tubing in the package. Remove the procedure kit and place it on your clean surface with the folded side facing up. Carefully unfold the corners of the procedure kit. Do not touch anything inside of the procedure kit. Everything inside of the procedure kit is sterile.     Prepare the drainage tubing and bottle.  Uncoil the drainage tubing that is connected to the bottle. Do not touch the end of the drainage tubing. Do not remove the cover from the end of the drainage tubing. Lay the drainage tubing on the procedure kit.     Put on gloves.  Both gloves fit either hand.  each glove up by the folded part and put them on. Do not touch any part of the outside of the gloves with your bare hand. Do not let them touch anything that is not sterile.     Open the smaller packages inside of the procedure kit.  Open the package that contains the new catheter cap. Let the cap gently fall onto the procedure kit. Tear open the alcohol pads, but do not remove them from their pouches.     Squeeze or roll the clamp closed on  the drainage tubing.  If the clamp rolls, roll it towards the josh    Clean the end of the catheter.  Use 1 alcohol pad from the procedure kit. Wipe around the end of the catheter in circles. Do not put anything into the end of the catheter to clean it. This could damage the catheter.  How to drain fluid from your catheter:   Connect the end of your catheter to the drainage tubing.  Remove the cover from the end of the drainage tubing. Hold the end of your catheter in one hand and the drainage tubing in your other hand. Insert the drainage tubing into your catheter until you hear or feel a click.     Remove the lock clip on the bottle.  Twist and pull gently to remove     Open the clamp on the drainage tubing.  This will start the flow of fluid.     Drain as much fluid as directed by your doctor.  To make the fluid drain slower, roll the clamp toward the bottle or gently squeeze the clamp. To make the fluid drain faster, slide the clamp away from the bottle or slowly release the clamp. It is normal to feel pain or cough when fluid is drained. To decrease pain or coughing, slow down the flow of fluid. You can also close the clamp and take a break. If your symptoms do not get better when you stop draining, do not continue to drain fluid.     Change the bottle when it is full.  If you need to use a second bottle, close the clamp on the drainage tubing. This will stop fluid from draining. Hold the end of your catheter. Pull out the end of the drainage tubing from your catheter. Do not let the end of your catheter touch anything. Remove the cap on the end of the new drainage tubing. Insert the drainage tubing into your catheter. Remove the support clip on the bottle. Push down on the bottle plunger. Open the clamp on the drainage tubing. Continue to remove as much fluid as directed.     Close the clamp on the drainage tubing to stop fluid from draining.  Check that the clamp is completely closed.     Pull out the end of  the drainage tubing from your catheter.  Do not let the end of your catheter touch anything.     Clean the end of your catheter with a new alcohol pad.  This will help prevent infection.     After you drain fluid from your catheter:   Clean the skin around your catheter with a new alcohol pad.  Start closest to where the catheter is inserted in your skin. Move the alcohol pad in circles away from your catheter.     Place the foam pad around your catheter.  The foam pad should have a small cut in it. This cut lets you fit the foam pad around your catheter.     Loop the end of the catheter.  Place the looped catheter on top of the foam pad. Hold it on top of the foam pad. Place the gauze from your procedure kit over the catheter. Make sure the catheter is completely covered by the gauze.     Remove your gloves.  This will make it easier to handle the clear sticky bandage.     Place the sticky bandage over the gauze.  There are 3 layers you need to remove from the bandage. Remove the larger white layer from the bandage. Place the bandage over your gauze so the gauze is in the center of the bandage. Hold one corner of the bandage and remove the larger clear layer of the bandage. Remove the last white layer of the bandage. Press gently on all corners of the bandage to help it stick to your skin.     Write down the amount of fluid you drained.  There are measurements on the side of the bottle. Also write down the color of the fluid, the date, and the time. Bring this record with you to your follow-up visits.     Other important information:   If your catheter comes out, cover the opening in your skin with sterile gauze and a bandage. Use the sterile gauze and bandage from your drainage kit.   Do not take a bath or swim in hot tubs or pools. This can cause an infection.   You can shower or take a sponge bath. Make sure your bandage covers your catheter before you bathe. The edges of the bandage should make contact with  your skin on all sides. This will prevent water from getting into your drain. If your bandage gets wet, remove the bandage. Clean your skin around the catheter and replace the bandage as directed.  Follow up with your healthcare provider as directed:  Write down your questions so you remember to ask them during your visits.   © 2017 [employer]. Information is for End User's use only and may not be sold, redistributed or otherwise used for commercial purposes. All illustrations and images included in CareNotes® are the copyrighted property of [employer] or [employer].  The above information is an  only. It is not intended as medical advice for individual conditions or treatments. Talk to your doctor, nurse or pharmacist before following any medical regimen to see if it is safe and effective for you.        Procedural Sedation   WHAT YOU NEED TO KNOW:   Procedural sedation is medicine used during procedures to help you feel relaxed and calm. You will remember little to none of the procedure. After sedation you may feel tired, weak, or unsteady on your feet. You may also have trouble concentrating or short-term memory loss. These symptoms should go away in 24 hours or less.   DISCHARGE INSTRUCTIONS:     Call 911 or have someone else call for any of the following:   You have sudden trouble breathing.     You cannot be woken.      Contact Interventional Radiology at [phone]   (CHAN PATIENTS: Contact Interventional Radiology at [phone]) (RANDEE PATIENTS: Contact Interventional Radiology at [phone]) if any of the following occur:     You have a severe headache or dizziness.     Your heart is beating faster than usual.    You have a fever or chills.     Your skin is itchy, swollen, or you have a rash.     You have nausea or are vomiting for more than 8 hours after the procedure.      You have questions or concerns about your condition or care.  Self-care:    Have someone stay with you for 24 hours. This person can drive you to errands and help you do things around the house. This person can also watch for problems.      Rest and do quiet activities for 24 hours. Do not exercise, ride a bike, or play sports. Stand up slowly to prevent dizziness and falls. Take short walks around the house with another person. Slowly return to your usual activities the next day.      Do not drive or use dangerous machines or tools for 24 hours. You may injure yourself or others. Examples include a lawnmower, saw, or drill. Do not return to work for 24 hours if you use dangerous machines or tools for work.      Do not make important decisions for 24 hours. For example, do not sign important papers or invest money.      Drink liquids as directed. Liquids help flush the sedation medicine out of your body. Ask how much liquid to drink each day and which liquids are best for you.      Eat small, frequent meals to prevent nausea and vomiting. Start with clear liquids such as juice or broth. If you do not vomit after clear liquids, you can eat your usual foods.      Do not drink alcohol or take medicines that make you drowsy. This includes medicines that help you sleep and anxiety medicines. Ask your healthcare provider if it is safe for you to take pain medicine.  Follow up with your healthcare provider as directed: Write down your questions so you remember to ask them during your visits.

## 2024-01-24 NOTE — ACP (ADVANCE CARE PLANNING)
"Advanced Care Planning Progress Note    Serious Illness Conversation    1. What is your understanding now of where you are with your illness?  Prognostic Understanding: appropriate understanding of prognosis     2. How much information about what is likely to be ahead with your illness would you like to have?  Information: patient wants to be fully informed     3. What did you (clinician) communicate to the patient?  Prognostic Communication: Time - I wish we were not in this situation, but I am worried that time may be as short as months       4. If your health situation worsens, what are your most important goals?  Goals: be at home, be physically comfortable, be emotionally at peace     5. What are the biggest fears and worries about the future and your health?  Not being with family     6. What abilities are so critical to your life that you cannot imagine living without them?  She cannot imagine not being with family     7. What gives you strength as you think about the future with your illness?  \"Family\"     8. If you become sicker, how much are you willing to go through for the possibility of gaining more time?  Be in the hospital: Yes Have a feeding tube: Yes   Be in the ICU: Yes Live in a nursing home: No   Be on a ventilator: Yes Be uncomfortable: No   Be on dialysis: Yes Undergo aggressive test and/or procedures: Yes   9. How much does your proxy and family know about your priorities and wishes?  Discussion Discussion: extensive discussion with family about goals and wishes        How does this plan sound to you? I will do everything I can to help you through this.     I have spent 20 minutes speaking with my patient on advanced care planning today or during this visit     Advanced directives         Gibson George MD       "

## 2024-01-25 ENCOUNTER — APPOINTMENT (INPATIENT)
Dept: RADIOLOGY | Facility: HOSPITAL | Age: 54
DRG: 136 | End: 2024-01-25
Payer: COMMERCIAL

## 2024-01-25 ENCOUNTER — HOME HEALTH ADMISSION (OUTPATIENT)
Dept: HOME HEALTH SERVICES | Facility: HOME HEALTHCARE | Age: 54
End: 2024-01-25
Payer: COMMERCIAL

## 2024-01-25 PROBLEM — E43 SEVERE PROTEIN-CALORIE MALNUTRITION (HCC): Status: ACTIVE | Noted: 2024-01-25

## 2024-01-25 PROCEDURE — 99232 SBSQ HOSP IP/OBS MODERATE 35: CPT | Performed by: INTERNAL MEDICINE

## 2024-01-25 PROCEDURE — 71045 X-RAY EXAM CHEST 1 VIEW: CPT

## 2024-01-25 PROCEDURE — 99223 1ST HOSP IP/OBS HIGH 75: CPT | Performed by: INTERNAL MEDICINE

## 2024-01-25 RX ORDER — POLYETHYLENE GLYCOL 3350 17 G/17G
17 POWDER, FOR SOLUTION ORAL DAILY PRN
Status: DISCONTINUED | OUTPATIENT
Start: 2024-01-25 | End: 2024-01-30 | Stop reason: HOSPADM

## 2024-01-25 RX ORDER — HYDROMORPHONE HCL/PF 1 MG/ML
0.5 SYRINGE (ML) INJECTION
Status: DISCONTINUED | OUTPATIENT
Start: 2024-01-25 | End: 2024-01-30 | Stop reason: HOSPADM

## 2024-01-25 RX ORDER — AMOXICILLIN 250 MG
1 CAPSULE ORAL
Status: DISCONTINUED | OUTPATIENT
Start: 2024-01-25 | End: 2024-01-30 | Stop reason: HOSPADM

## 2024-01-25 RX ORDER — ENOXAPARIN SODIUM 100 MG/ML
40 INJECTION SUBCUTANEOUS
Status: DISCONTINUED | OUTPATIENT
Start: 2024-01-26 | End: 2024-01-30 | Stop reason: HOSPADM

## 2024-01-25 RX ADMIN — LIDOCAINE 5% 2 PATCH: 700 PATCH TOPICAL at 08:24

## 2024-01-25 RX ADMIN — OXYCODONE HYDROCHLORIDE 10 MG: 10 TABLET ORAL at 11:18

## 2024-01-25 RX ADMIN — CEFTRIAXONE 1000 MG: 1 INJECTION, SOLUTION INTRAVENOUS at 22:12

## 2024-01-25 RX ADMIN — MORPHINE SULFATE 2 MG: 2 INJECTION, SOLUTION INTRAMUSCULAR; INTRAVENOUS at 08:24

## 2024-01-25 RX ADMIN — OXYCODONE HYDROCHLORIDE 10 MG: 10 TABLET ORAL at 05:24

## 2024-01-25 RX ADMIN — HYDROMORPHONE HYDROCHLORIDE 0.5 MG: 1 INJECTION, SOLUTION INTRAMUSCULAR; INTRAVENOUS; SUBCUTANEOUS at 23:57

## 2024-01-25 RX ADMIN — HYDROMORPHONE HYDROCHLORIDE 0.5 MG: 1 INJECTION, SOLUTION INTRAMUSCULAR; INTRAVENOUS; SUBCUTANEOUS at 18:52

## 2024-01-25 RX ADMIN — OXYCODONE HYDROCHLORIDE 10 MG: 10 TABLET ORAL at 01:29

## 2024-01-25 RX ADMIN — MORPHINE SULFATE 2 MG: 2 INJECTION, SOLUTION INTRAMUSCULAR; INTRAVENOUS at 00:21

## 2024-01-25 RX ADMIN — SENNOSIDES AND DOCUSATE SODIUM 1 TABLET: 8.6; 5 TABLET ORAL at 22:12

## 2024-01-25 RX ADMIN — OXYCODONE HYDROCHLORIDE 10 MG: 10 TABLET ORAL at 22:12

## 2024-01-25 RX ADMIN — OXYCODONE HYDROCHLORIDE 10 MG: 10 TABLET ORAL at 15:26

## 2024-01-25 NOTE — ASSESSMENT & PLAN NOTE
Large right pleural effusion due malignancy  Status post thoracentesis on 1/24/24 and drainage catheter placement.  Follow-up on fluid results  Will need to set up with [facility] regarding catheter and drainage management

## 2024-01-25 NOTE — UTILIZATION REVIEW
Notification of Unplanned, Urgent, or   Emergency Inpatient Admission   AUTHORIZATION REQUEST   Admitting [place] [address]  Tax ID: [id number]  NPI: [id number]  Place of Service: Acute Care Hospital  Admission Level of Care: Inpatient  Place of Service Code: 21     Attending Physician Information  Attending Name and NPI#: Gibson George Md [[id number]]  Phone: [phone]     Admission Information  Inpatient Admission Date/Time: 1/24/24  4:34 PM  Discharge Date/Time: No discharge date for patient encounter.  Admitting Diagnosis Code/Description:  Chest pain [R07.9]  Pneumonia [J18.9]  Malignant pleural effusion [J91.0]     Utilization Review Contact  Jovana Berg, Utilization   Phone: [phone]  Fax: [phone]  Email: [email or web address]  Contact for approvals/pending authorizations, clinical reviews, and discharge.     Physician Advisory Services Contact  Medical Necessity Denial & Ohzq-no-Kbob Discussion  Phone: [phone]  Fax: [phone]  Email: [email or web address]     DISCHARGE SUPPORT TEAM:  For Patients Discharge Needs & Updates  Phone: [phone] opt. 2 Fax: [phone]  Email: [email or web address]

## 2024-01-25 NOTE — CASE MANAGEMENT
Case Management Assessment & Discharge Planning Note    Patient name Shannan Vee  Location [facility] 2 /E2 -* MRN [id number]  : 1970 Date 2024       Current Admission Date: 2024  Current Admission Diagnosis:Large pleural effusion   Patient Active Problem List    Diagnosis Date Noted    Sinus tachycardia 2024    Acute respiratory insufficiency 2024    Chronic bilateral low back pain without sciatica 2024    Cancer-related pain 2024    Goals of care, counseling/discussion 2024    Large pleural effusion 2023    Dyspnea 2023    Recurrent malignant neoplasm of breast (HCC) 10/23/2023    Right lower lobe lung mass 10/23/2023    Pain from bone metastases (HCC) 10/23/2023    Normocytic anemia 10/23/2023    Bladder wall thickening 10/06/2023    Viral infection 2023    Urinary frequency 2023    Overweight (BMI 25.0-29.9) 2021    Personal history of malignant neoplasm of breast 2019    Elevated blood pressure reading in office without diagnosis of hypertension 2018    Use of anastrozole (Arimidex) 2018    Carcinoma of left breast, estrogen receptor positive  2017    Malignant neoplasm of overlapping sites of left female breast (HCC) 2015    Uterine leiomyoma 10/15/2014    Limb swelling 2013      LOS (days): 1  Geometric Mean LOS (GMLOS) (days):   Days to GMLOS:     OBJECTIVE:    Risk of Unplanned Readmission Score: 15.66         Current admission status: Inpatient       Preferred Pharmacy:   [id number]  [place] [address]  [address] [address]  Phone: [phone] Fax: [phone]    [facility] [place]  SARAH Enriquez - [address]  [address]  [place] [address]  Phone: [phone] Fax: [phone]    Primary Care Provider: Todd Bañuelos MD    Primary Insurance: [employer]  Secondary Insurance:      ASSESSMENT:  Active Health Care Proxies    There are no active Health Care Proxies on file.       Readmission Root Cause  30 Day Readmission: No    Patient Information  Admitted from:: Home  Mental Status: Alert  During Assessment patient was accompanied by: Son (and LAUREN by telephone)  Assessment information provided by:: Patient, Son (LAUREN)  Primary Caregiver: Family  Caregiver's Name:: Fabian (Son-in-Law) [phone]  Caregiver's Relationship to Patient:: Family Member  Caregiver's Telephone Number:: Fabian (Son-in-Law) [phone]  Support Systems: Family members, Spouse/significant other, Daughter, Children  County of Residence: [place]  What city do you live in?: [place]  Home entry access options. Select all that apply.: Stairs  Number of steps to enter home.: 1  Living Arrangements: Lives w/ Spouse/significant other    Activities of Daily Living Prior to Admission  Functional Status: Independent  Completes ADLs independently?: Yes  Ambulates independently?: Yes  Does patient use assisted devices?: No  Does patient currently own DME?: No  Does patient have a history of Outpatient Therapy (PT/OT)?: No  Does the patient have a history of Short-Term Rehab?: No  Does patient have a history of HHC?: No  Does patient currently have HHC?: No    Patient Information Continued  Income Source: Unemployed  Does patient have prescription coverage?: Yes  Does patient receive dialysis treatments?: No  Does patient have a history of substance abuse?: No  Does patient have a history of Mental Health Diagnosis?: No    Means of Transportation  Means of Transport to Appts:: Family transport    Housing Stability: Low Risk  (1/25/2024)    Housing Stability Vital Sign     Unable to Pay for Housing in the Last Year: No     Number of Places Lived in the Last Year: 1     Unstable Housing in the Last Year: No   Food Insecurity: No Food Insecurity (1/25/2024)    Hunger Vital Sign     Worried About Running Out of Food in the Last  Year: Never true     Ran Out of Food in the Last Year: Never true   Recent Concern: Food Insecurity - Food Insecurity Present (1/17/2024)    Received from [facility]    Hunger Vital Sign     Worried About Running Out of Food in the Last Year: Sometimes true     Ran Out of Food in the Last Year: Patient refused   Transportation Needs: No Transportation Needs (1/25/2024)    PRAPARE - Transportation     Lack of Transportation (Medical): No     Lack of Transportation (Non-Medical): No   Recent Concern: Transportation Needs - Unmet Transportation Needs (1/17/2024)    Received from [facility]    PRAPARE - Transportation     Lack of Transportation (Medical): Yes     Lack of Transportation (Non-Medical): Patient refused   Utilities: Not At Risk (1/25/2024)    [facility] Utilities     Threatened with loss of utilities: No       DISCHARGE DETAILS:    Discharge planning discussed with:: Patient, Son at bedside and Son-in-Law Fabian by telephone  Freedom of Choice: Yes  Comments - Freedom of Choice: Pt prefers home and is agreeable to [facility]  CM contacted family/caregiver?: Yes (Fabian (Son-in-Law) [phone])  Were Treatment Team discharge recommendations reviewed with patient/caregiver?: Yes  Did patient/caregiver verbalize understanding of patient care needs?: Yes  Were patient/caregiver advised of the risks associated with not following Treatment Team discharge recommendations?: Yes    Contacts  Patient Contacts: Fabian HernandezSon-in-Law) [phone]  Relationship to Patient:: Family  Contact Method: Phone  Phone Number: Fabian HernandezSon-in-Law) [phone]  Reason/Outcome: Continuity of Care, Emergency Contact, Discharge Planning    Requested Home Health Care         Is the patient interested in HHC at discharge?: Yes  Home Health Discipline requested:: Nursing  Home Health Agency Name:: [facility]  Home Health Follow-Up Provider:: PCP  Home Health Services Needed:: Evaluate Functional Status and Safety,  Other (comment) (Tenkoff / pleurex cath management)  Homebound Criteria Met:: Requires the Assistance of Another Person for Safe Ambulation or to Leave the Home, Immunosuppressed  Supporting Clincal Findings:: Limited Endurance, Fatigues Easliy in Short Distances    Discharge Destination Plan:: Home with Home Health Care  Transport at Discharge : Automobile  Accompanied by: Family member    Additional Comments: CM met w/ Pt to discuss recommendation for home nurses as Pt will be discharging with a Tenkoff/Pleurex catheter. CM also spoke w/ Fabian (Son-in-Law) [phone] by telephone as he was able to translate on Pts behalf.  CM was informed that Pt will be discharging to her Dtr's and LAUREN's house for 2 weeks so they are able to monitor her function amd assneil valente managing her care. [facility] set up to provide SN, they are requesting 2 kits while supply order is processing. Order Form for supplies is in Pts folder. CM to send to supplier and provide copy to [facility] for additional orders. Discharge date not yet determined. CM continues to follow.

## 2024-01-25 NOTE — PLAN OF CARE
Problem: Prexisting or High Potential for Compromised Skin Integrity  Goal: Skin integrity is maintained or improved  Description: INTERVENTIONS:  - Identify patients at risk for skin breakdown  - Assess and monitor skin integrity  - Assess and monitor nutrition and hydration status  - Monitor labs   - Assess for incontinence   - Turn and reposition patient  - Assist with mobility/ambulation  - Relieve pressure over bony prominences  - Avoid friction and shearing  - Provide appropriate hygiene as needed including keeping skin clean and dry  - Evaluate need for skin moisturizer/barrier cream  - Collaborate with interdisciplinary team   - Patient/family teaching  - Consider wound care consult   Outcome: Progressing     Problem: PAIN - ADULT  Goal: Verbalizes/displays adequate comfort level or baseline comfort level  Description: Interventions:  - Encourage patient to monitor pain and request assistance  - Assess pain using appropriate pain scale  - Administer analgesics based on type and severity of pain and evaluate response  - Implement non-pharmacological measures as appropriate and evaluate response  - Consider cultural and social influences on pain and pain management  - Notify physician/advanced practitioner if interventions unsuccessful or patient reports new pain  Outcome: Progressing     Problem: INFECTION - ADULT  Goal: Absence or prevention of progression during hospitalization  Description: INTERVENTIONS:  - Assess and monitor for signs and symptoms of infection  - Monitor lab/diagnostic results  - Monitor all insertion sites, i.e. indwelling lines, tubes, and drains  - Monitor endotracheal if appropriate and nasal secretions for changes in amount and color  - [place] appropriate cooling/warming therapies per order  - Administer medications as ordered  - Instruct and encourage patient and family to use good hand hygiene technique  - Identify and instruct in appropriate isolation precautions for  identified infection/condition  Outcome: Progressing     Problem: SAFETY ADULT  Goal: Patient will remain free of falls  Description: INTERVENTIONS:  - Educate patient/family on patient safety including physical limitations  - Instruct patient to call for assistance with activity   - Consult OT/PT to assist with strengthening/mobility   - Keep Call bell within reach  - Keep bed low and locked with side rails adjusted as appropriate  - Keep care items and personal belongings within reach  - Initiate and maintain comfort rounds  - Make Fall Risk Sign visible to staff  - Offer Toileting every 2 Hours, in advance of need  - Initiate/Maintain Bed alarm  - Apply yellow socks and bracelet for high fall risk patients  - Consider moving patient to room near nurses station  Outcome: Progressing  Goal: Maintain or return to baseline ADL function  Description: INTERVENTIONS:  -  Assess patient's ability to carry out ADLs; assess patient's baseline for ADL function and identify physical deficits which impact ability to perform ADLs (bathing, care of mouth/teeth, toileting, grooming, dressing, etc.)  - Assess/evaluate cause of self-care deficits   - Assess range of motion  - Assess patient's mobility; develop plan if impaired  - Assess patient's need for assistive devices and provide as appropriate  - Encourage maximum independence but intervene and supervise when necessary  - Involve family in performance of ADLs  - Assess for home care needs following discharge   - Consider OT consult to assist with ADL evaluation and planning for discharge  - Provide patient education as appropriate  Outcome: Progressing  Goal: Maintains/Returns to pre admission functional level  Description: INTERVENTIONS:  - Perform AM-PAC 6 Click Basic Mobility/ Daily Activity assessment daily.  - Set and communicate daily mobility goal to care team and patient/family/caregiver.   - Collaborate with rehabilitation services on mobility goals if consulted  -  Perform Range of Motion 3 times a day.  - Reposition patient every 2 hours.  - Dangle patient 3 times a day  - Stand patient 3 times a day  - Ambulate patient 3 times a day  - Out of bed to chair 3 times a day   - Out of bed for meals 3 times a day  - Out of bed for toileting  - Record patient progress and toleration of activity level   Outcome: Progressing

## 2024-01-25 NOTE — PROGRESS NOTES
[facility]  Progress Note  Name: Shannan Vee I  MRN: [id number]  Unit/Bed#: E2 -01 I Date of Admission: 1/23/2024   Date of Service: 1/25/2024 I Hospital Day: 1    Assessment/Plan   * Large pleural effusion  Assessment & Plan  Large right pleural effusion due malignancy  Status post thoracentesis on 1/24/24 and drainage catheter placement.  Follow-up on fluid results  Will need to set up with [facility] regarding catheter and drainage management    Malignant neoplasm of overlapping sites of left female breast (HCC)  Assessment & Plan  Stage IV cancer with  metastatic disease to the bone.  Oncology evaluation and recommendations reviewed.  Follow-up with Dr. Pennington  and radiation oncology for ongoing treatments after discharge  Consulted palliative care for symptom/pain control  MED regimen kept the same    Acute respiratory insufficiency  Assessment & Plan  Secondary to large right pleural effusion  Wean down/off oxygen as tolerated    Sinus tachycardia  Assessment & Plan  Secondary to large right pleural effusion , cancer, and pain  CT negative for PE  Check CXR for residual effusion          VTE Pharmacologic Prophylaxis: VTE Score: 5 lovenox    Patient Centered Rounds: I performed bedside rounds with nursing staff today.   Discussions with Specialists or Other Care Team Provider: case management    Current Length of Stay: 1 day(s)  Current Patient Status: Inpatient   Certification Statement: The patient will continue to require additional inpatient hospital stay due to effusion  Discharge Plan: Anticipate discharge in 24-48 hrs to home with home services.    Code Status: Level 1 - Full Code    Subjective:   Seen and examined  At the time of my examination she said her breathing is better  She also just received oxycodone which helped with her pain  We talked about repeating a chest x-ray to see if we need to remove more fluid and she agreed.    Objective:     Vitals:   Temp (24hrs),  Av.2 °F (36.8 °C), Min:96.7 °F (35.9 °C), Max:100.6 °F (38.1 °C)    Temp:  [96.7 °F (35.9 °C)-100.6 °F (38.1 °C)] 96.7 °F (35.9 °C)  HR:  [] 139  Resp:  [20-27] 20  BP: (114-136)/(63-80) 130/79  SpO2:  [93 %-100 %] 100 %  There is no height or weight on file to calculate BMI.     Input and Output Summary (last 24 hours):     Intake/Output Summary (Last 24 hours) at 2024 1713  Last data filed at 2024 1901  Gross per 24 hour   Intake 250 ml   Output --   Net 250 ml       Physical Exam:   Physical Exam  Vitals reviewed.   Constitutional:       General: She is not in acute distress.     Appearance: She is not ill-appearing, toxic-appearing or diaphoretic.   HENT:      Head: Atraumatic.      Nose: No congestion or rhinorrhea.   Cardiovascular:      Rate and Rhythm: Regular rhythm. Tachycardia present.   Pulmonary:      Comments: Decreased breath sounds right base  Abdominal:      Palpations: Abdomen is soft.      Tenderness: There is no abdominal tenderness.   Musculoskeletal:      Right lower leg: No edema.      Left lower leg: No edema.   Skin:     General: Skin is warm and dry.   Neurological:      Mental Status: She is oriented to person, place, and time.   Psychiatric:         Behavior: Behavior normal.          Additional Data:     Labs:  Results from last 7 days   Lab Units 24  0455 24  1554   WBC Thousand/uL 10.96* 12.87*   HEMOGLOBIN g/dL 7.8* 9.2*   HEMATOCRIT % 25.7* 30.1*   PLATELETS Thousands/uL 557* 612*   BANDS PCT %  --  3   NEUTROS PCT % 67  --    LYMPHS PCT % 15  --    LYMPHO PCT %  --  14   MONOS PCT % 15*  --    MONO PCT %  --  12   EOS PCT % 0 0     Results from last 7 days   Lab Units 24  0455 24  1554   SODIUM mmol/L 132* 132*   POTASSIUM mmol/L 3.8 3.6   CHLORIDE mmol/L 95* 93*   CO2 mmol/L 28 24   BUN mg/dL 7 7   CREATININE mg/dL 0.47* 0.46*   ANION GAP mmol/L 9 15   CALCIUM mg/dL 8.4 8.8   ALBUMIN g/dL  --  3.2*   TOTAL BILIRUBIN mg/dL  --  0.46   ALK  PHOS U/L  --  149*   ALT U/L  --  19   AST U/L  --  46*   GLUCOSE RANDOM mg/dL 114 111                 Results from last 7 days   Lab Units 01/23/24  1719   LACTIC ACID mmol/L 1.1       Lines/Drains:  Invasive Devices       Peripheral Intravenous Line  Duration             Peripheral IV 01/23/24 Dorsal (posterior);Right Forearm 2 days              Drain  Duration             Pleural Effusion Long-Term Catheter 16 Fr. 1 day              Airway  Duration             Supraglottic Airway LMA 4 77 days                      Telemetry:  Telemetry Orders (From admission, onward)               24 Hour Telemetry Monitoring  Continuous x 24 Hours (Telem)        Question:  Reason for 24 Hour Telemetry  Answer:  Arrhythmias requiring acute medical intervention / PPM or ICD malfunction                     Telemetry Reviewed: Sinus Tachycardia               Imaging: Reviewed radiology reports from this admission including: chest CT scan and procedure reports    Recent Cultures (last 7 days):   Results from last 7 days   Lab Units 01/23/24  1719 01/23/24  1718   BLOOD CULTURE  No Growth at 24 hrs. No Growth at 24 hrs.       Last 24 Hours Medication List:   Current Facility-Administered Medications   Medication Dose Route Frequency Provider Last Rate    acetaminophen  650 mg Oral Q6H PRN Billy S Prechtel, DO      cefTRIAXone  1,000 mg Intravenous Q24H Billy S Prechtel, DO Stopped (01/25/24 0013)    HYDROmorphone  0.5 mg Intravenous Q3H PRN Gibson George MD      lidocaine  2 patch Topical Daily Billy S Prechtel, DO      oxyCODONE  10 mg Oral Q4H PRN Iblly S Prechtel, DO      oxyCODONE  5 mg Oral Q4H PRN Billy S Prechtel, DO      polyethylene glycol  17 g Oral Daily PRN Janie Briones MD      Ribociclib Succ (600 MG Dose)  600 mg Oral Daily Billy S Prechtel, DO      senna-docusate sodium  1 tablet Oral HS Janie Briones MD          Today, Patient Was Seen By: Gibson George MD    **Please  Note: This note may have been constructed using a voice recognition system.**

## 2024-01-25 NOTE — MALNUTRITION/BMI
This medical record reflects one or more clinical indicators suggestive of malnutrition and/or morbid obesity.    Malnutrition Findings:   Adult Malnutrition type: Acute illness  Adult Degree of Malnutrition: Other severe protein calorie malnutrition  Malnutrition Characteristics: Fat loss, Muscle loss, Inadequate energy                  360 Statement: Severe calorie protein malnutrition in context of acute illness r/t inadequate PO intake, increased needs, catabolic illness ae evidance by energy intake less than 50% compared to estimated needs>5 days, moderate body fat (triceps, ribcage area) and muscle mass depletions (protruding clavicles, temporal wasting); Treated with liberalized diet, oral supplements    BMI Findings:           There is no height or weight on file to calculate BMI.     See Nutrition note dated 1/25/24 for additional details.  Completed nutrition assessment is viewable in the nutrition documentation.

## 2024-01-25 NOTE — PLAN OF CARE
Problem: Prexisting or High Potential for Compromised Skin Integrity  Goal: Skin integrity is maintained or improved  Description: INTERVENTIONS:  - Identify patients at risk for skin breakdown  - Assess and monitor skin integrity  - Assess and monitor nutrition and hydration status  - Monitor labs   - Assess for incontinence   - Turn and reposition patient  - Assist with mobility/ambulation  - Relieve pressure over bony prominences  - Avoid friction and shearing  - Provide appropriate hygiene as needed including keeping skin clean and dry  - Evaluate need for skin moisturizer/barrier cream  - Collaborate with interdisciplinary team   - Patient/family teaching  - Consider wound care consult   Outcome: Progressing     Problem: PAIN - ADULT  Goal: Verbalizes/displays adequate comfort level or baseline comfort level  Description: Interventions:  - Encourage patient to monitor pain and request assistance  - Assess pain using appropriate pain scale  - Administer analgesics based on type and severity of pain and evaluate response  - Implement non-pharmacological measures as appropriate and evaluate response  - Consider cultural and social influences on pain and pain management  - Notify physician/advanced practitioner if interventions unsuccessful or patient reports new pain  Outcome: Progressing     Problem: INFECTION - ADULT  Goal: Absence or prevention of progression during hospitalization  Description: INTERVENTIONS:  - Assess and monitor for signs and symptoms of infection  - Monitor lab/diagnostic results  - Monitor all insertion sites, i.e. indwelling lines, tubes, and drains  - Monitor endotracheal if appropriate and nasal secretions for changes in amount and color  - [place] appropriate cooling/warming therapies per order  - Administer medications as ordered  - Instruct and encourage patient and family to use good hand hygiene technique  - Identify and instruct in appropriate isolation precautions for  identified infection/condition  Outcome: Progressing     Problem: SAFETY ADULT  Goal: Patient will remain free of falls  Description: INTERVENTIONS:  - Educate patient/family on patient safety including physical limitations  - Instruct patient to call for assistance with activity   - Consult OT/PT to assist with strengthening/mobility   - Keep Call bell within reach  - Keep bed low and locked with side rails adjusted as appropriate  - Keep care items and personal belongings within reach  - Initiate and maintain comfort rounds  - Make Fall Risk Sign visible to staff  - Offer Toileting every 2 Hours, in advance of need  - Initiate/Maintain bed alarm  - Obtain necessary fall risk management equipment:   - Apply yellow socks and bracelet for high fall risk patients  - Consider moving patient to room near nurses station  Outcome: Progressing  Goal: Maintain or return to baseline ADL function  Description: INTERVENTIONS:  -  Assess patient's ability to carry out ADLs; assess patient's baseline for ADL function and identify physical deficits which impact ability to perform ADLs (bathing, care of mouth/teeth, toileting, grooming, dressing, etc.)  - Assess/evaluate cause of self-care deficits   - Assess range of motion  - Assess patient's mobility; develop plan if impaired  - Assess patient's need for assistive devices and provide as appropriate  - Encourage maximum independence but intervene and supervise when necessary  - Involve family in performance of ADLs  - Assess for home care needs following discharge   - Consider OT consult to assist with ADL evaluation and planning for discharge  - Provide patient education as appropriate  Outcome: Progressing  Goal: Maintains/Returns to pre admission functional level  Description: INTERVENTIONS:  - Perform AM-PAC 6 Click Basic Mobility/ Daily Activity assessment daily.  - Set and communicate daily mobility goal to care team and patient/family/caregiver.   - Collaborate with  rehabilitation services on mobility goals if consulted  - Perform Range of Motion 3 times a day.  - Reposition patient every 2 hours.  - Dangle patient 3 times a day  - Stand patient 3 times a day  - Ambulate patient 3 times a day  - Out of bed to chair 3 times a day   - Out of bed for meals 3 times a day  - Out of bed for toileting  - Record patient progress and toleration of activity level   Outcome: Progressing

## 2024-01-25 NOTE — CONSULTS
Consultation - Palliative & Supportive Care   Shannan Vee  53 y.o.  female  East 2 /E2 -*   MRN: [id number]  Encounter: [id number]    ASSESSMENT:    Patient Active Problem List   Diagnosis    Carcinoma of left breast, estrogen receptor positive     Use of anastrozole (Arimidex)    Limb swelling    Malignant neoplasm of overlapping sites of left female breast (HCC)    Uterine leiomyoma    Elevated blood pressure reading in office without diagnosis of hypertension    Personal history of malignant neoplasm of breast    Overweight (BMI 25.0-29.9)    Urinary frequency    Viral infection    Bladder wall thickening    Recurrent malignant neoplasm of breast (HCC)    Right lower lobe lung mass    Pain from bone metastases (HCC)    Normocytic anemia    Large pleural effusion    Dyspnea    Chronic bilateral low back pain without sciatica    Cancer-related pain    Goals of care, counseling/discussion    Sinus tachycardia    Acute respiratory insufficiency       Active problems addressed:  Stage IV breast cancer  Extensive bone metastases  Lung metastasis  Shortness of breath  R pleural effusion  Palliative care encounter  Cancer related pain    PLAN:    1. Goals:   Treatment focused, level 1    Code status: Level 1 - Full Code   Decisional apparatus:  Patient does have capacity to make medical decisions on my exam today. If such capacity is lost, patient's substitute decision maker would default to  by [place] 169.   Advance Directive / Living Will / POLST:  none on file    2. Symptom management:  Was on Morphine IR 15 mg q6H ATC and Oxycodone IR 7.5 mg q 4 hr for breakthrough pain at last [facility] visit/note and also per PDMP as noted below. Unclear why she is on 2 IR opioids, I cannot see documentation for this  Stop MSIR, she reports that this is not working and causing her palpitations. Concentrate on one IR at this time.   She was taking 5mg oxycodone at home. She is receiving 10mg here. She reports 10mg is  working better.  Continue current orders of OxyIR 5-10 mg PO q4H prn moderate/severe pain and IV dilaudid 0.5mg q3H prn BT pain  Add senokot-S daily and miralax prn to prevent OIC  D/w SLIM. No need for scripts for oxy on d/c    Controlled Substance Review    [place] or [place]  reviewed: No red flags were identified; safe to proceed with prescription..    Filled  Written  ID  Drug  QTY  Days  Prescriber  RX #  Dispenser  Refill  Daily Dose*  Pymt Type      01/21/2024 01/21/2024 3 Morphine Sulfate Ir 15 Mg Tab 56.00 14 [place] Mar [id number] [facility] ([id number]) 0 60.00 MME Private Pay [place]   01/21/2024 01/21/2024 3 Oxycodone Hcl (Ir) 5 Mg Tablet 126.00 14 [place] Mar [id number] [facility] ([id number]) 0 67.50 MME Private [place]   01/18/2024 01/18/2024 3 Oxycodone Hcl (Ir) 5 Mg Tablet 20.00 5 Ta [facility] [id number] [facility] ([id number]) 0 30.00 MME [place]   01/02/2024 01/01/2024 1 Oxycodone Hcl (Ir) 5 Mg Tablet 20.00 5 Ta Hab [id number] [facility] ([id number]) 0 30.00 [place]   12/19/2023 12/18/2023 1 Oxycodone Hcl (Ir) 5 Mg Tablet 20.00 5 Ta Hab [id number] [facility] ([id number]) 0 30.00 [place]   12/17/2023 11/17/2023 2 Oxycodone-Acetaminophen 5-325 120.00 21 Yo Spear [id number] [facility] ([id number]) 0 45.00 [place]   12/08/2023 12/08/2023 2 Oxycodone Hcl (Ir) 5 Mg Tablet 20.00 5 Ta [facility] [id number] [facility] ([id number]) 0 30.00 [place]ME [place]       I have reviewed the patient's controlled substance dispensing history in the Prescription Drug Monitoring Program in compliance with the [facility] regulations before prescribing any controlled substances.    We appreciate the opportunity to participate in this patient's care. We will continue to follow. Please do not hesitate to contact our on-call provider through our clinic answering service at [phone] should you have acute symptom control concerns.    IDENTIFICATION:  Inpatient consult to Palliative Care  Consult performed by: Janie Briones MD  Consult ordered by: Gibson George MD        Reason for Consult / Principal Problem: cancer  pain assistance    HISTORY OF PRESENT ILLNESS:    Shannan Vee is a 53 y.o. female with metastatic breast cancer with progression in 10/2023 showing extensive bone mets, mediastinal LN, and large pulmonary mass. She is currently undergoing RT to the spine at [facility] and she is receiving Kisqali+faslodex with  oncology. She is admitted for SOB from R pleural effusion. She underwent IR R tunneled pleural drainage catheter placement on 1/24. Palliative medicine for cancer pain management.    Of note, while at [facility], was discharged on MSIR 15mg PO q6H ATC and oxycodone 7.5mg PO 4H prn.     Talked to patient and her daughter at bedside. Qoopl  Jay Gutierrez ID# [id number]. Patient complains of pain in the R rib cage in the area of the tube. We spent time discussing what she takes at home. She acknowledges that she used to take MSIR but stopped because it was not working and causing her palpitation. She takes only OxyIR 5mg at home (1 tab = 5mg) and would average 4-5 tabs in a day. This was working. She is receiving 10mg oxyIR here which she said is working better. I spent time discussing that she should take 10mg oxyIR at home q4H prn. She still has a bottle full of oxyIR 5mg that she received earlier this month. She is instructed to take 2 tabs to make 10mg and to call office if she runs out and I will send 10mg per tab. We will also continue same regimen here with no changes. This was repeated to her several times with her telling me these back and voicing understanding and agreement.     She last moved her BM yesterday.    Interview and exam limited by: none    Review of Systems   Musculoskeletal:         R lower rib cage pain   All other systems reviewed and are negative.      Past Medical History:   Diagnosis Date    Breast cancer (HCC) 07/2013    left-chemotherapy    Fibroma     History of chemotherapy     History of external beam radiation therapy      Past Surgical History:   Procedure  Laterality Date    APPENDECTOMY      BREAST SURGERY  2014    Left breast 2/2 breast CA    HYSTERECTOMY  2015    Due to fibroma    IR BIOPSY BONE  10/9/2023    IR PLEURAL EFFUSION LONG-TERM CATHETER PLACEMENT  1/24/2024    IR THORACENTESIS  11/8/2023    MASTECTOMY Left 2013    OOPHORECTOMY       Social History     Socioeconomic History    Marital status: /Civil Union     Spouse name: Not on file    Number of children: 3    Years of education: Not on file    Highest education level: 12th grade   Occupational History     Comment: unemployed   Tobacco Use    Smoking status: Never     Passive exposure: Never    Smokeless tobacco: Never   Vaping Use    Vaping status: Never Used   Substance and Sexual Activity    Alcohol use: Not Currently    Drug use: No    Sexual activity: Yes     Partners: Male     Birth control/protection: Surgical   Other Topics Concern    Not on file   Social History Narrative    Not on file     Social Determinants of Health     Financial Resource Strain: Medium Risk (1/17/2024)    Received from [facility]    Overall Financial Resource Strain (CARDIA)     Difficulty of Paying Living Expenses: Somewhat hard   Food Insecurity: Food Insecurity Present (1/17/2024)    Received from [facility]    Hunger Vital Sign     Worried About Running Out of Food in the Last Year: Sometimes true     Ran Out of Food in the Last Year: Patient refused   Transportation Needs: Unmet Transportation Needs (1/17/2024)    Received from [facility]    PRAPARE - Transportation     Lack of Transportation (Medical): Yes     Lack of Transportation (Non-Medical): Patient refused   Physical Activity: Inactive (11/14/2023)    Received from [facility]    Exercise Vital Sign     Days of Exercise per Week: 0 days     Minutes of Exercise per Session: 0 min   Stress: No Stress Concern Present (11/14/2023)    Received from [facility]    [place]  [place] [employer] - Occupational Stress Questionnaire     Feeling of Stress : Only a little   Social Connections: Socially Integrated (11/14/2023)    Received from [facility]    Social Connection and Isolation Panel [NHANES]     Frequency of Communication with Friends and Family: More than three times a week     Frequency of Social Gatherings with Friends and Family: More than three times a week     Attends Ep[employer] Services: More than 4 times per year     Active Member of Clubs or Organizations: Yes     Attends Club or Organization Meetings: More than 4 times per year     Marital Status:    Intimate Partner Violence: Not At Risk (1/17/2024)    Received from [facility]    Humiliation, Afraid, Rape, and Kick questionnaire     Fear of Current or Ex-Partner: No     Emotionally Abused: No     Physically Abused: No     Sexually Abused: No   Housing Stability: Unknown (1/17/2024)    Received from [facility]    Housing Stability Vital Sign     Unable to Pay for Housing in the Last Year: Patient refused     Number of Places Lived in the Last Year: 1     Unstable Housing in the Last Year: No     Family History   Problem Relation Age of Onset    Lung cancer Mother     No Known Problems Father     No Known Problems Sister     No Known Problems Daughter     No Known Problems Daughter     Cancer Maternal Grandmother     No Known Problems Maternal Grandfather     No Known Problems Paternal Grandmother     No Known Problems Paternal Grandfather     Uterine cancer Maternal Aunt     No Known Problems Son     No Known Problems Paternal Aunt        MEDICATIONS / ALLERGIES:  all current active meds have been reviewed    Allergies   Allergen Reactions    Aspirin Rash     Other reaction(s): Palpitations       OBJECTIVE:  /69 (BP Location: Right arm)   Pulse (!) 135   Temp (!) 96.7 °F (35.9 °C) (Temporal)   Resp 20   SpO2 100%   Physical  Exam:  Constitutional: Appears well-developed and well-nourished. Thin, chronically ill looking. Appears tired/fatigued. Comfortable and pleasant. In no acute physical or emotional distress.   Head: Normocephalic and atraumatic.   Eyes: EOM are normal. No ocular discharge. No scleral icterus.   Neck: No visible adenopathy or masses.  Respiratory: Effort normal. No stridor. No respiratory distress. On 1L NC. R sided catheter, covered in gauze   Gastrointestinal: No abdominal distension.   Musculoskeletal: No edema.   Neurological: Alert, oriented and appropriately conversant.   Skin: Dry, no diaphoresis.   Psychiatric: Displays a normal mood and affect. Behavior, judgment and thought content appear normal.     Lab Results: I have personally reviewed pertinent labs.  Imaging Studies: I have personally reviewed pertinent reports.    EKG, Pathology, and Other Studies: I have personally reviewed pertinent reports.      Counseling / Coordination of Care:  Counseling / Coordination of Care  Total floor / unit time spent today 45 minutes. Greater than 50% of total time was spent with the patient and / or family counseling and / or coordination of care. A description of the counseling / coordination of care: provided medical updates, discussed palliative care,determined competency, determined goals of care, determined POA, determined social/family support, discussed plans of care, discussed symptom management, provided psychosocial support.     Janie Briones MD  [facility] Palliative and Supportive Care  [phone]

## 2024-01-25 NOTE — ASSESSMENT & PLAN NOTE
Secondary to large right pleural effusion , cancer, and pain  CT negative for PE  Check CXR for residual effusion

## 2024-01-25 NOTE — ASSESSMENT & PLAN NOTE
Stage IV cancer with  metastatic disease to the bone.  Oncology evaluation and recommendations reviewed.  Follow-up with Dr. Pennington  and radiation oncology for ongoing treatments after discharge  Consulted palliative care for symptom/pain control  MED regimen kept the same

## 2024-01-26 PROCEDURE — 99232 SBSQ HOSP IP/OBS MODERATE 35: CPT | Performed by: INTERNAL MEDICINE

## 2024-01-26 PROCEDURE — 99223 1ST HOSP IP/OBS HIGH 75: CPT | Performed by: STUDENT IN AN ORGANIZED HEALTH CARE EDUCATION/TRAINING PROGRAM

## 2024-01-26 RX ORDER — METOPROLOL SUCCINATE 25 MG/1
25 TABLET, EXTENDED RELEASE ORAL DAILY
Status: DISCONTINUED | OUTPATIENT
Start: 2024-01-26 | End: 2024-01-28

## 2024-01-26 RX ORDER — METOPROLOL SUCCINATE 25 MG/1
25 TABLET, EXTENDED RELEASE ORAL
Status: DISCONTINUED | OUTPATIENT
Start: 2024-01-26 | End: 2024-01-26

## 2024-01-26 RX ORDER — OXYCODONE HYDROCHLORIDE 10 MG/1
10 TABLET ORAL EVERY 4 HOURS PRN
Start: 2024-01-26 | End: 2024-01-30

## 2024-01-26 RX ORDER — OXYCODONE HYDROCHLORIDE 10 MG/1
10 TABLET ORAL EVERY 6 HOURS SCHEDULED
Status: DISCONTINUED | OUTPATIENT
Start: 2024-01-26 | End: 2024-01-30 | Stop reason: HOSPADM

## 2024-01-26 RX ADMIN — LIDOCAINE 5% 2 PATCH: 700 PATCH TOPICAL at 08:41

## 2024-01-26 RX ADMIN — OXYCODONE HYDROCHLORIDE 10 MG: 10 TABLET ORAL at 06:53

## 2024-01-26 RX ADMIN — HYDROMORPHONE HYDROCHLORIDE 0.5 MG: 1 INJECTION, SOLUTION INTRAMUSCULAR; INTRAVENOUS; SUBCUTANEOUS at 08:58

## 2024-01-26 RX ADMIN — SENNOSIDES AND DOCUSATE SODIUM 1 TABLET: 8.6; 5 TABLET ORAL at 22:03

## 2024-01-26 RX ADMIN — OXYCODONE HYDROCHLORIDE 10 MG: 10 TABLET ORAL at 14:06

## 2024-01-26 RX ADMIN — OXYCODONE HYDROCHLORIDE 10 MG: 10 TABLET ORAL at 03:15

## 2024-01-26 RX ADMIN — HYDROMORPHONE HYDROCHLORIDE 0.5 MG: 1 INJECTION, SOLUTION INTRAMUSCULAR; INTRAVENOUS; SUBCUTANEOUS at 22:03

## 2024-01-26 RX ADMIN — METOPROLOL SUCCINATE 25 MG: 25 TABLET, EXTENDED RELEASE ORAL at 14:07

## 2024-01-26 RX ADMIN — POLYETHYLENE GLYCOL 3350 17 G: 17 POWDER, FOR SOLUTION ORAL at 22:02

## 2024-01-26 RX ADMIN — ENOXAPARIN SODIUM 40 MG: 40 INJECTION SUBCUTANEOUS at 08:42

## 2024-01-26 RX ADMIN — OXYCODONE HYDROCHLORIDE 10 MG: 10 TABLET ORAL at 18:01

## 2024-01-26 RX ADMIN — CEFTRIAXONE 1000 MG: 1 INJECTION, SOLUTION INTRAVENOUS at 22:03

## 2024-01-26 RX ADMIN — OXYCODONE HYDROCHLORIDE 10 MG: 10 TABLET ORAL at 12:15

## 2024-01-26 NOTE — ASSESSMENT & PLAN NOTE
Multifactorial secondary to malignant pleural effusion, metastatic disease, atelectasis, pain   Cardiology consulted due to persistent heart rates 130s to 140s.  Echocardiogram pending  Metoprolol added  Rate slowly improving due to the combination of the metoprolol and additional drainage today

## 2024-01-26 NOTE — PLAN OF CARE
Problem: Prexisting or High Potential for Compromised Skin Integrity  Goal: Skin integrity is maintained or improved  Description: INTERVENTIONS:  - Identify patients at risk for skin breakdown  - Assess and monitor skin integrity  - Assess and monitor nutrition and hydration status  - Monitor labs   - Assess for incontinence   - Turn and reposition patient  - Assist with mobility/ambulation  - Relieve pressure over bony prominences  - Avoid friction and shearing  - Provide appropriate hygiene as needed including keeping skin clean and dry  - Evaluate need for skin moisturizer/barrier cream  - Collaborate with interdisciplinary team   - Patient/family teaching  - Consider wound care consult   Outcome: Progressing     Problem: PAIN - ADULT  Goal: Verbalizes/displays adequate comfort level or baseline comfort level  Description: Interventions:  - Encourage patient to monitor pain and request assistance  - Assess pain using appropriate pain scale  - Administer analgesics based on type and severity of pain and evaluate response  - Implement non-pharmacological measures as appropriate and evaluate response  - Consider cultural and social influences on pain and pain management  - Notify physician/advanced practitioner if interventions unsuccessful or patient reports new pain  Outcome: Progressing     Problem: INFECTION - ADULT  Goal: Absence or prevention of progression during hospitalization  Description: INTERVENTIONS:  - Assess and monitor for signs and symptoms of infection  - Monitor lab/diagnostic results  - Monitor all insertion sites, i.e. indwelling lines, tubes, and drains  - Monitor endotracheal if appropriate and nasal secretions for changes in amount and color  - [place] appropriate cooling/warming therapies per order  - Administer medications as ordered  - Instruct and encourage patient and family to use good hand hygiene technique  - Identify and instruct in appropriate isolation precautions for  identified infection/condition  Outcome: Progressing     Problem: SAFETY ADULT  Goal: Patient will remain free of falls  Description: INTERVENTIONS:  - Educate patient/family on patient safety including physical limitations  - Instruct patient to call for assistance with activity   - Consult OT/PT to assist with strengthening/mobility   - Keep Call bell within reach  - Keep bed low and locked with side rails adjusted as appropriate  - Keep care items and personal belongings within reach  - Initiate and maintain comfort rounds  - Make Fall Risk Sign visible to staff  - Offer Toileting every 2 Hours, in advance of need  - Initiate/Maintain Bed alarm  - Apply yellow socks and bracelet for high fall risk patients  - Consider moving patient to room near nurses station  Outcome: Progressing  Goal: Maintain or return to baseline ADL function  Description: INTERVENTIONS:  -  Assess patient's ability to carry out ADLs; assess patient's baseline for ADL function and identify physical deficits which impact ability to perform ADLs (bathing, care of mouth/teeth, toileting, grooming, dressing, etc.)  - Assess/evaluate cause of self-care deficits   - Assess range of motion  - Assess patient's mobility; develop plan if impaired  - Assess patient's need for assistive devices and provide as appropriate  - Encourage maximum independence but intervene and supervise when necessary  - Involve family in performance of ADLs  - Assess for home care needs following discharge   - Consider OT consult to assist with ADL evaluation and planning for discharge  - Provide patient education as appropriate  Outcome: Progressing  Goal: Maintains/Returns to pre admission functional level  Description: INTERVENTIONS:  - Perform AM-PAC 6 Click Basic Mobility/ Daily Activity assessment daily.  - Set and communicate daily mobility goal to care team and patient/family/caregiver.   - Collaborate with rehabilitation services on mobility goals if consulted  -  Perform Range of Motion 3 times a day.  - Reposition patient every 2 hours.  - Dangle patient 3 times a day  - Stand patient 3 times a day  - Ambulate patient 3 times a day  - Out of bed to chair 3 times a day   - Out of bed for meals 3 times a day  - Out of bed for toileting  - Record patient progress and toleration of activity level   Outcome: Progressing     Problem: Nutrition/Hydration-ADULT  Goal: Nutrient/Hydration intake appropriate for improving, restoring or maintaining nutritional needs  Description: Monitor and assess patient's nutrition/hydration status for malnutrition. Collaborate with interdisciplinary team and initiate plan and interventions as ordered.  Monitor patient's weight and dietary intake as ordered or per policy. Utilize nutrition screening tool and intervene as necessary. Determine patient's food preferences and provide high-protein, high-caloric foods as appropriate.     INTERVENTIONS:  - Monitor oral intake, urinary output, labs, and treatment plans  - Assess nutrition and hydration status and recommend course of action  - Evaluate amount of meals eaten  - Assist patient with eating if necessary   - Allow adequate time for meals  - Recommend/ encourage appropriate diets, oral nutritional supplements, and vitamin/mineral supplements  - Order, calculate, and assess calorie counts as needed  - Recommend, monitor, and adjust tube feedings and TPN/PPN based on assessed needs  - Assess need for intravenous fluids  - Provide specific nutrition/hydration education as appropriate  - Include patient/family/caregiver in decisions related to nutrition  Outcome: Progressing

## 2024-01-26 NOTE — ASSESSMENT & PLAN NOTE
Stage IV cancer with  metastatic disease to the bone.  Oncology evaluation and recommendations reviewed.  Follow-up with Dr. Pennington  and radiation oncology for ongoing treatments after discharge  Outpatient palliative care follow-up for symptom management

## 2024-01-26 NOTE — ASSESSMENT & PLAN NOTE
Secondary to large right pleural effusion  Improving oxygen requirement.  Wean down/off oxygen as tolerated

## 2024-01-26 NOTE — ASSESSMENT & PLAN NOTE
Large right pleural effusion due malignancy  Status post thoracentesis on 1/24/24 and drainage catheter placement.  Repeat chest x-ray on 1/25/2024 still showed moderate right pleural effusion  Discussed with IR regarding ongoing drainage.    Pleurx catheter was drained 450 mL again today.  Continue drain again tomorrow  Will need to set up with [facility] regarding catheter and drainage management

## 2024-01-26 NOTE — NURSING NOTE
450ml drained from asept catheter. Patient had no complaints of shortness of breath or pain wwhen drained, pt stated pain improved once fluid was removed.

## 2024-01-26 NOTE — CASE MANAGEMENT
Case Management Discharge Planning Note    Patient name Shannan Vee  Location East 2 /E2 -* MRN [id number]  : 1970 Date 2024       Current Admission Date: 2024  Current Admission Diagnosis:Large pleural effusion   Patient Active Problem List    Diagnosis Date Noted    Severe protein-calorie malnutrition (HCC) 2024    Sinus tachycardia 2024    Acute respiratory insufficiency 2024    Chronic bilateral low back pain without sciatica 2024    Cancer-related pain 2024    Goals of care, counseling/discussion 2024    Large pleural effusion 2023    Dyspnea 2023    Recurrent malignant neoplasm of breast (HCC) 10/23/2023    Right lower lobe lung mass 10/23/2023    Pain from bone metastases (HCC) 10/23/2023    Normocytic anemia 10/23/2023    Bladder wall thickening 10/06/2023    Viral infection 2023    Urinary frequency 2023    Overweight (BMI 25.0-29.9) 2021    Personal history of malignant neoplasm of breast 2019    Elevated blood pressure reading in office without diagnosis of hypertension 2018    Use of anastrozole (Arimidex) 2018    Carcinoma of left breast, estrogen receptor positive  2017    Malignant neoplasm of overlapping sites of left female breast (HCC) 2015    Uterine leiomyoma 10/15/2014    Limb swelling 2013      LOS (days): 2  Geometric Mean LOS (GMLOS) (days):   Days to GMLOS:     OBJECTIVE:  Risk of Unplanned Readmission Score: 17.52         Current admission status: Inpatient   Preferred Pharmacy:   [id number]  [place] [address]  [address] [address]  Phone: [phone] Fax: [phone]    [facility]  [place]ri[place] - [address]  [address]  [place] [address]  Phone: [phone] Fax: [phone]    Primary Care Provider: Todd Bañuelos MD    Primary Insurance: [employer]  TREVON  Secondary Insurance:     DISCHARGE DETAILS:    Additional Comments: CM faxed completed ASEPT Drainage Kit order form to [facility].  CM also forwarded completed form to [facility] via [employer].  Originals placed back in pt's folder.

## 2024-01-26 NOTE — CONSULTS
Consult - Cardiology   Shannan Vee 53 y.o. female MRN: [id number]  Unit/Bed#: E2 -01 Encounter: [id number]        Reason For Consult: Assistance with heart rate control for sinus tachycardia                 Assessment:  malignant neoplasm of breast with recent recurrence w/  metastases to right thoracic pleura spine, metastatic bone pain   --Initial Dx 2015--> neoadjuvant chemo and left mastectomy (2 positive residual lymph nodes), radiation and tamoxifen later changed to Arimidex in 2015 (at time of KELLY/BSO for fibroid)  -- October 2023: Extensive metastatic disease to bone w/ large lung mass (Bx favored metastatic disease over second primary) and mediastinal lymphadenopathy-->         - Chemotherapy   *KISQALI (Ribociclib) & Faslodex (Prior treatment with Arimidex -looks to have been stopped January 2024)         - palliative spinal radiation - initiated 1/22/2024 (1 of 5)  Hx malignant pleural effusion with recurrence POA  right thoracentesis 1/18/2024 ([facility] ) 1.5 L cytology positive for malignant cells)  Recurrent large right effusion POA--> s/p Pleurx catheter 1/24 with persistent moderate effusion per CXR 1/25  Sinus tachycardia (reason for consultation)  Echo 10/8/2023: LVEF 64%, NL RV size and systolic function.  Normal atrial size bilaterally.  Mild aortic sclerosis.  Trace MR.  Trace-mild TR    Discussion / Plan:  # Patient with recurrent cancer with metastases presented to the hospital with chest wall pain and worsening dyspnea as discussed in the HPI with notation of sinus tachycardia since arrival.  She has had prior evidence of same without evidence of obvious dysrhythmia, is on no negative chronotropic agents, and is not clearly symptomatic from her tachycardia per se.  While her tachycardia may be a bit exaggerated and is not unexpected and is likely physiologic in the setting of her intercurrent illness-including anemia.  And while the patient does have pleural effusion this is cancer  related without much else in the way of pulmonary or peripheral edema to endorse heart failure.           Check limited echocardiogram to ensure stability of LVEF  Will initiate the patient on beta-blocker following her response with consideration to titrate dose  -perhaps given twice daily based on her clinical response, though some element of persistent tachycardia would not be unexpected and may not need to be aggressively treated  Should the patient have further regression in her hemoglobin would suggest consideration of PRBCs which may help mitigate cardiac stress and tachycardia         History Of Present Illness:  Shannan Vee is a 53-year-old with medical problems highlighted in the assessment above and as follows    Recent admission to [facility] January 20-22, 2024.    Patient with known metastatic cancer on palliative therapy and analgesics for chronic pain mistook Narcan for allergy nasal spray and later presented to the hospital with complaints of intractable back pain.  During hospitalization she was found to have right-sided pleural effusion for which she underwent therapeutic thoracentesis on January 18.  She was seen by radiation oncology and offered a course of palliative radiation therapy with plan to continue her oncology follow-up and chemotherapy via her [facility] oncologist.    During her hospitalization she was noted to have sinus tachycardia.  She underwent a CT scan which was negative for pulmonary embolism and otherwise observed with continued sinus tachycardia at the time of discharge on no AV blocking medications.     January 23 she came to the [facility] emergency department complaining of right-sided chest pain with difficulty breathing.  CT without evidence of PE noting progression of right pleural metastases with acute right pleural effusion causing near complete compressive atelectasis of the right lung with mediastinal shift.  Right-sided pleural drainage  catheter (Pleurx) placed 1/24 with chest x-ray the following day reporting decreased but persistent effusion reportedly moderate in size with improved aeration of the right upper lobe without pneumothorax.  Seen by heme-onc plan for patient to return to continue current radiation and oral chemotherapy's with planned post hospital follow-up with her primary oncologist to discuss other therapy -systemic chemo.  Because the patient has had persistent sinus tachycardia since admission, and after reduction in her pleural effusion, our consultation is requested to assist in heart rate management      Past Medical History:        Past Medical History:   Diagnosis Date    Breast cancer (HCC) 07/2013    left-chemotherapy    Fibroma     History of chemotherapy     History of external beam radiation therapy       Past Surgical History:   Procedure Laterality Date    APPENDECTOMY      BREAST SURGERY  2014    Left breast 2/2 breast CA    HYSTERECTOMY  2015    Due to fibroma    IR BIOPSY BONE  10/9/2023    IR PLEURAL EFFUSION LONG-TERM CATHETER PLACEMENT  1/24/2024    IR THORACENTESIS  11/8/2023    MASTECTOMY Left 2013    OOPHORECTOMY          Allergy:        Allergies   Allergen Reactions    Aspirin Rash     Other reaction(s): Palpitations       Medications:       Prior to Admission medications    Medication Sig Start Date End Date Taking? Authorizing Provider   acetaminophen (TYLENOL) 325 mg tablet Take 3 tablets (975 mg total) by mouth every 8 (eight) hours  Patient not taking: Reported on 1/12/2024 12/8/23   Todd Bañuelos MD   anastrozole (ARIMIDEX) 1 mg tablet Take 1 tablet (1 mg total) by mouth daily  Patient not taking: Reported on 11/29/2023 1/23/23   Mckayla Andrews MD   calcium citrate-vitamin D (Calcitrate Plus D) 315 mg-5 mcg tablet Take 1 tablet by mouth 2 (two) times a day 10/23/23 1/21/24  Maxwell Villavicencio MD   docusate sodium (COLACE) 100 mg capsule Take 1 capsule (100 mg total) by mouth every 12 (twelve)  hours  Patient not taking: Reported on 1/12/2024 10/28/23   Ava Grissom DO   ergocalciferol (VITAMIN D2) 50,000 units TAKE 1 CAPSULE BY MOUTH 1 TIME A WEEK 1/22/24   Maxwell Villavicencio MD   ibuprofen (MOTRIN) 600 mg tablet Take 1 tablet (600 mg total) by mouth every 6 (six) hours as needed for moderate pain Take with food  Patient not taking: Reported on 11/29/2023 11/8/23   Ainsley Call MD   lidocaine (LIDODERM) 5 % Apply 2 patches topically over 12 hours daily Remove & Discard patch within 12 hours or as directed by MD Do not start before November 9, 2023. 11/9/23   Ainsley Call MD   Multiple Vitamin (MULTIVITAMIN ADULT PO) Take 1 tablet by mouth in the morning    Historical Provider, MD   oxyCODONE (Roxicodone) 5 immediate release tablet Take 1 tablet (5 mg total) by mouth every 6 (six) hours as needed for moderate pain Max Daily Amount: 20 mg 1/18/24   Maxwell Villavicencio MD   Ribociclib Succ, 600 MG Dose, 200 MG TBPK Take 600 mg by mouth in the morning For 3 weeks and then 1 week off 10/27/23   Maxwell Villavicencio MD       Family History:     Family History   Problem Relation Age of Onset    Lung cancer Mother     No Known Problems Father     No Known Problems Sister     No Known Problems Daughter     No Known Problems Daughter     Cancer Maternal Grandmother     No Known Problems Maternal Grandfather     No Known Problems Paternal Grandmother     No Known Problems Paternal Grandfather     Uterine cancer Maternal Aunt     No Known Problems Son     No Known Problems Paternal Aunt         Social History:       Social History     Socioeconomic History    Marital status: /Civil Union     Spouse name: None    Number of children: 3    Years of education: None    Highest education level: 12th grade   Occupational History     Comment: unemployed   Tobacco Use    Smoking status: Never     Passive exposure: Never    Smokeless tobacco: Never   Vaping Use    Vaping status: Never Used   Substance and Sexual Activity     Alcohol use: Not Currently    Drug use: No    Sexual activity: Yes     Partners: Male     Birth control/protection: Surgical   Other Topics Concern    None   Social History Narrative    None     Social Determinants of Health     Financial Resource Strain: Medium Risk (1/17/2024)    Received from [facility]    Overall Financial Resource Strain (CARDIA)     Difficulty of Paying Living Expenses: Somewhat hard   Food Insecurity: No Food Insecurity (1/25/2024)    Hunger Vital Sign     Worried About Running Out of Food in the Last Year: Never true     Ran Out of Food in the Last Year: Never true   Recent Concern: Food Insecurity - Food Insecurity Present (1/17/2024)    Received from [facility]    Hunger Vital Sign     Worried About Running Out of Food in the Last Year: Sometimes true     Ran Out of Food in the Last Year: Patient refused   Transportation Needs: No Transportation Needs (1/25/2024)    PRAPARE - Transportation     Lack of Transportation (Medical): No     Lack of Transportation (Non-Medical): No   Recent Concern: Transportation Needs - Unmet Transportation Needs (1/17/2024)    Received from [facility]    PRAPARE - Transportation     Lack of Transportation (Medical): Yes     Lack of Transportation (Non-Medical): Patient refused   Physical Activity: Inactive (11/14/2023)    Received from [facility]    Exercise Vital Sign     Days of Exercise per Week: 0 days     Minutes of Exercise per Session: 0 min   Stress: No Stress Concern Present (11/14/2023)    Received from [facility]    [employer] [employer] - Occupational Stress Questionnaire     Feeling of Stress : Only a little   Social Connections: Socially Integrated (11/14/2023)    Received from [facility]    Social Connection and Isolation Panel [NHANES]     Frequency of Communication with Friends and Family: More than three times a week      Frequency of Social Gatherings with Friends and Family: More than three times a week     Attends Druze Services: More than 4 times per year     Active Member of Clubs or Organizations: Yes     Attends Club or Organization Meetings: More than 4 times per year     Marital Status:    Intimate Partner Violence: Not At Risk (1/17/2024)    Received from [facility]    Humiliation, Afraid, Rape, and Kick questionnaire     Fear of Current or Ex-Partner: No     Emotionally Abused: No     Physically Abused: No     Sexually Abused: No   Housing Stability: Low Risk  (1/25/2024)    Housing Stability Vital Sign     Unable to Pay for Housing in the Last Year: No     Number of Places Lived in the Last Year: 1     Unstable Housing in the Last Year: No       ROS:  Symptoms per HPI  Posterolateral and lateral right-sided chest wall pain  Dyspnea prior to arrival-now improved but still ongoing in the aftermath of reduction in pleural effusion  The remainder of the review of systems is negative    Exam:  General:  Alert, normally conversant, comfortable appearing.  Pleasantly wearing nasal cannula oxygen.  Head: Normocephalic, atraumatic.  Eyes:  EOMI. Pupils - equal, round, reactive to accomodation.  No icterus.  Normal Conjunctiva.   Oropharynx: Moist without lesion  Neck:  No gross bruit, JVD, thyromegaly, or lymphadenopathy  Heart:  Regular with increased rate.  No rub nor pathologic murmur  Lungs: Right hemithorax with some coarse upper airway breath sounds and dolling in the lower hemithorax but without rales and clear breath sounds on the left   abdomen:  Soft and nontender with normal bowel sounds. No organomegaly or mass  Lower Limbs: Trivial edema limited to the malleolar area  Pulses:  RLE - DP:  1+                LLE - DP:  1+  Musculoskeletal: Independent movement of limbs observed, Formal ROM and strength eval not performed  Neurologic:    Oriented to: person, place, situation.     Cranial  Nerves: grossly intact - vision, smell, taste, and hearing were not tested.     Motor function: grossly normal, symmetric   Sensation: Was not tested      Vitals:    01/25/24 2300 01/26/24 0320 01/26/24 0724 01/26/24 1155   BP: 138/71 124/80 130/76 126/68   BP Location: Right arm Right arm Right arm Right arm   Pulse: (!) 148 (!) 144 (!) 140 (!) 140   Resp: 18 18 14 16   Temp: 98.4 °F (36.9 °C) 99.6 °F (37.6 °C) 98.7 °F (37.1 °C) 98.7 °F (37.1 °C)   TempSrc: Temporal Temporal Temporal Temporal   SpO2: 98% 94% 97% 99%           DATA:      Telemetry:   Narrow complex tachycardia with ventricular rates 120-150; likely all sinus rhythm    -----------------------------------------------------------------------------------------------------------------------------------------------  Weights:    Wt Readings from Last 20 Encounters:   01/12/24 57.9 kg (127 lb 9.6 oz)   11/29/23 60.8 kg (134 lb)   11/27/23 61 kg (134 lb 7.7 oz)   10/23/23 63.5 kg (140 lb)   10/17/23 67.6 kg (149 lb)   10/13/23 64.4 kg (142 lb)   10/08/23 64.9 kg (143 lb)   10/10/23 64.9 kg (143 lb 1.3 oz)   10/08/23 64.9 kg (143 lb)   10/06/23 65.3 kg (144 lb)   09/20/23 68 kg (150 lb)   08/14/23 70.8 kg (156 lb)   06/19/23 70.8 kg (156 lb)   02/15/23 70.3 kg (155 lb)   01/23/23 70.3 kg (155 lb)   01/11/23 69.9 kg (154 lb)   11/03/22 70.3 kg (155 lb)   08/10/22 73.5 kg (162 lb)   06/17/22 75.3 kg (166 lb)   02/17/22 75.3 kg (166 lb)   , There is no height or weight on file to calculate BMI.         Lab Studies:               Results from last 7 days   Lab Units 01/24/24  0455 01/23/24  1554   WBC Thousand/uL 10.96* 12.87*   HEMOGLOBIN g/dL 7.8* 9.2*   HEMATOCRIT % 25.7* 30.1*   PLATELETS Thousands/uL 557* 612*   ,   Results from last 7 days   Lab Units 01/24/24  0455 01/23/24  1554   POTASSIUM mmol/L 3.8 3.6   CHLORIDE mmol/L 95* 93*   CO2 mmol/L 28 24   BUN mg/dL 7 7   CREATININE mg/dL 0.47* 0.46*   CALCIUM mg/dL 8.4 8.8   ALK PHOS U/L  --  149*   ALT U/L   --  19   AST U/L  --  46*

## 2024-01-26 NOTE — RESTORATIVE TECHNICIAN NOTE
Restorative Technician Note      Patient Name: Shannan Vee     Restorative Tech Visit Date: 01/26/24  Note Type: Mobility  Patient Position Upon Consult: Seated edge of bed  Activity Performed: Ambulated  Assistive Device: Cane  Patient Position at End of Consult: Seated edge of bed; All needs within reach

## 2024-01-26 NOTE — PROGRESS NOTES
[facility]  Progress Note  Name: Shannan Vee I  MRN: [id number]  Unit/Bed#: E2 -01 I Date of Admission: 1/23/2024   Date of Service: 1/26/2024 I Hospital Day: 2    Assessment/Plan   * Large pleural effusion  Assessment & Plan  Large right pleural effusion due malignancy  Status post thoracentesis on 1/24/24 and drainage catheter placement.  Repeat chest x-ray on 1/25/2024 still showed moderate right pleural effusion  Discussed with IR regarding ongoing drainage.    Pleurx catheter was drained 450 mL again today.  Continue drain again tomorrow  Will need to set up with [facility] regarding catheter and drainage management    Malignant neoplasm of overlapping sites of left female breast (HCC)  Assessment & Plan  Stage IV cancer with  metastatic disease to the bone.  Oncology evaluation and recommendations reviewed.  Follow-up with Dr. Pennington  and radiation oncology for ongoing treatments after discharge  Outpatient palliative care follow-up for symptom management      Severe protein-calorie malnutrition (HCC)  Assessment & Plan  Malnutrition Findings:   Adult Malnutrition type: Acute illness  Adult Degree of Malnutrition: Other severe protein calorie malnutrition  Malnutrition Characteristics: Fat loss, Muscle loss, Inadequate energy  360 Statement: Severe calorie protein malnutrition in context of acute illness r/t inadequate PO intake, increased needs, catabolic illness ae evidance by energy intake less than 50% compared to estimated needs>5 days, moderate body fat (triceps, ribcage area) and muscle mass depletions (protruding clavicles, temporal wasting); Treated with liberalized diet, oral supplements    BMI Findings:  There is no height or weight on file to calculate BMI.       Acute respiratory insufficiency  Assessment & Plan  Secondary to large right pleural effusion  Improving oxygen requirement.  Wean down/off oxygen as tolerated    Sinus tachycardia  Assessment &  Plan  Multifactorial secondary to malignant pleural effusion, metastatic disease, atelectasis, pain   Cardiology consulted due to persistent heart rates 130s to 140s.  Echocardiogram pending  Metoprolol added  Rate slowly improving due to the combination of the metoprolol and additional drainage today           VTE Pharmacologic Prophylaxis: VTE Score: 5 High Risk (Score >/= 5) - Pharmacological DVT Prophylaxis Ordered: enoxaparin (Lovenox). Sequential Compression Devices Ordered.    Patient Centered Rounds: I performed bedside rounds with nursing staff today.   Discussions with Specialists or Other Care Team Provider: ISADORA Thurston and case management   Education and Discussions with Family / Patient: Family at bedside    Current Length of Stay: 2 day(s)  Current Patient Status: Inpatient   Certification Statement: The patient will continue to require additional inpatient hospital stay due to malignant pleural effusion  Discharge Plan: Anticipate discharge in 24-48 hrs to home with home services.    Code Status: Level 1 - Full Code    Subjective:   Improving shortness of breath   Improving pain  No fever or chills      Objective:     Vitals:   Temp (24hrs), Av.7 °F (37.1 °C), Min:97.6 °F (36.4 °C), Max:99.6 °F (37.6 °C)    Temp:  [97.6 °F (36.4 °C)-99.6 °F (37.6 °C)] 97.6 °F (36.4 °C)  HR:  [122-148] 122  Resp:  [14-18] 16  BP: (109-149)/(68-89) 109/71  SpO2:  [94 %-99 %] 94 %  There is no height or weight on file to calculate BMI.     Input and Output Summary (last 24 hours):     Intake/Output Summary (Last 24 hours) at 2024 1750  Last data filed at 2024 1301  Gross per 24 hour   Intake --   Output 450 ml   Net -450 ml       Physical Exam:   Physical Exam  Vitals reviewed.   Constitutional:       Appearance: She is not ill-appearing.   HENT:      Head: Normocephalic and atraumatic.      Nose: No congestion or rhinorrhea.   Eyes:      General: No scleral icterus.  Cardiovascular:      Rate and Rhythm:  Regular rhythm. Tachycardia present.   Pulmonary:      Comments: Decreased breath sounds right base  Abdominal:      Palpations: Abdomen is soft.      Tenderness: There is no abdominal tenderness. There is no guarding.   Musculoskeletal:      Cervical back: Neck supple.      Right lower leg: No edema.      Left lower leg: No edema.   Skin:     General: Skin is warm and dry.   Neurological:      Mental Status: She is oriented to person, place, and time.   Psychiatric:         Behavior: Behavior normal.     Additional Data:     Labs:  Results from last 7 days   Lab Units 01/24/24  0455 01/23/24  1554   WBC Thousand/uL 10.96* 12.87*   HEMOGLOBIN g/dL 7.8* 9.2*   HEMATOCRIT % 25.7* 30.1*   PLATELETS Thousands/uL 557* 612*   BANDS PCT %  --  3   NEUTROS PCT % 67  --    LYMPHS PCT % 15  --    LYMPHO PCT %  --  14   MONOS PCT % 15*  --    MONO PCT %  --  12   EOS PCT % 0 0     Results from last 7 days   Lab Units 01/24/24  0455 01/23/24  1554   SODIUM mmol/L 132* 132*   POTASSIUM mmol/L 3.8 3.6   CHLORIDE mmol/L 95* 93*   CO2 mmol/L 28 24   BUN mg/dL 7 7   CREATININE mg/dL 0.47* 0.46*   ANION GAP mmol/L 9 15   CALCIUM mg/dL 8.4 8.8   ALBUMIN g/dL  --  3.2*   TOTAL BILIRUBIN mg/dL  --  0.46   ALK PHOS U/L  --  149*   ALT U/L  --  19   AST U/L  --  46*   GLUCOSE RANDOM mg/dL 114 111                 Results from last 7 days   Lab Units 01/23/24  1719   LACTIC ACID mmol/L 1.1       Lines/Drains:  Invasive Devices       Peripheral Intravenous Line  Duration             Peripheral IV 01/23/24 Dorsal (posterior);Right Forearm 3 days              Drain  Duration             Pleural Effusion Long-Term Catheter 16 Fr. 2 days              Airway  Duration             Supraglottic Airway LMA 4 78 days                      Telemetry:  Telemetry Orders (From admission, onward)               24 Hour Telemetry Monitoring  Continuous x 24 Hours (Telem)        Question:  Reason for 24 Hour Telemetry  Answer:  Arrhythmias requiring acute  medical intervention / PPM or ICD malfunction                     Telemetry Reviewed: Sinus Tachycardia               Imaging: Reviewed radiology reports from this admission including: chest xray, chest CT scan, and procedure reports    Recent Cultures (last 7 days):   Results from last 7 days   Lab Units 01/23/24  1719 01/23/24  1718   BLOOD CULTURE  No Growth at 48 hrs. No Growth at 48 hrs.       Last 24 Hours Medication List:   Current Facility-Administered Medications   Medication Dose Route Frequency Provider Last Rate    acetaminophen  650 mg Oral Q6H PRN Billy S Prechtel, DO      cefTRIAXone  1,000 mg Intravenous Q24H Billy S Prechtel, DO 1,000 mg (01/25/24 2212)    enoxaparin  40 mg Subcutaneous Q24H [facility] Gibson George MD      HYDROmorphone  0.5 mg Intravenous Q3H PRN Janie Briones MD      lidocaine  2 patch Topical Daily Billy S Prechtel, DO      metoprolol succinate  25 mg Oral Daily Donell Brewer PA-C      oxyCODONE  10 mg Oral Q4H PRN Janie Briones MD      oxyCODONE  10 mg Oral Q6H ALEXANDRE Janie Briones MD      oxyCODONE  5 mg Oral Q4H PRN Janie Briones MD      polyethylene glycol  17 g Oral Daily PRN Janie Briones MD      Ribociclib Succ (600 MG Dose)  600 mg Oral Daily Billy S Prechtel, DO      senna-docusate sodium  1 tablet Oral HS Janie Briones MD          Today, Patient Was Seen By: Gibson George MD    **Please Note: This note may have been constructed using a voice recognition system.**

## 2024-01-26 NOTE — PLAN OF CARE
Problem: PAIN - ADULT  Goal: Verbalizes/displays adequate comfort level or baseline comfort level  Description: Interventions:  - Encourage patient to monitor pain and request assistance  - Assess pain using appropriate pain scale  - Administer analgesics based on type and severity of pain and evaluate response  - Implement non-pharmacological measures as appropriate and evaluate response  - Consider cultural and social influences on pain and pain management  - Notify physician/advanced practitioner if interventions unsuccessful or patient reports new pain  Outcome: Progressing     Problem: SAFETY ADULT  Goal: Patient will remain free of falls  Description: INTERVENTIONS:  - Educate patient/family on patient safety including physical limitations  - Instruct patient to call for assistance with activity   - Consult OT/PT to assist with strengthening/mobility   - Keep Call bell within reach  - Keep bed low and locked with side rails adjusted as appropriate  - Keep care items and personal belongings within reach  - Initiate and maintain comfort rounds  - Make Fall Risk Sign visible to staff  - Offer Toileting every 2 Hours, in advance of need  - Initiate/Maintain bed alarm  - Obtain necessary fall risk management equipment: yellow socks  - Apply yellow socks and bracelet for high fall risk patients  - Consider moving patient to room near nurses station  Outcome: Progressing     Problem: Nutrition/Hydration-ADULT  Goal: Nutrient/Hydration intake appropriate for improving, restoring or maintaining nutritional needs  Description: Monitor and assess patient's nutrition/hydration status for malnutrition. Collaborate with interdisciplinary team and initiate plan and interventions as ordered.  Monitor patient's weight and dietary intake as ordered or per policy. Utilize nutrition screening tool and intervene as necessary. Determine patient's food preferences and provide high-protein, high-caloric foods as appropriate.      INTERVENTIONS:  - Monitor oral intake, urinary output, labs, and treatment plans  - Assess nutrition and hydration status and recommend course of action  - Evaluate amount of meals eaten  - Assist patient with eating if necessary   - Allow adequate time for meals  - Recommend/ encourage appropriate diets, oral nutritional supplements, and vitamin/mineral supplements  - Order, calculate, and assess calorie counts as needed  - Recommend, monitor, and adjust tube feedings and TPN/PPN based on assessed needs  - Assess need for intravenous fluids  - Provide specific nutrition/hydration education as appropriate  - Include patient/family/caregiver in decisions related to nutrition  Outcome: Progressing

## 2024-01-26 NOTE — ASSESSMENT & PLAN NOTE
Malnutrition Findings:   Adult Malnutrition type: Acute illness  Adult Degree of Malnutrition: Other severe protein calorie malnutrition  Malnutrition Characteristics: Fat loss, Muscle loss, Inadequate energy  360 Statement: Severe calorie protein malnutrition in context of acute illness r/t inadequate PO intake, increased needs, catabolic illness ae evidance by energy intake less than 50% compared to estimated needs>5 days, moderate body fat (triceps, ribcage area) and muscle mass depletions (protruding clavicles, temporal wasting); Treated with liberalized diet, oral supplements    BMI Findings:  There is no height or weight on file to calculate BMI.

## 2024-01-26 NOTE — TREATMENT PLAN
RN requests some changes with pain meds as she isnt sleeping at night due to pain. MAR reviewed, not maxing out on prn IV meds, averaging 6 of oxycodone.    I will schedule oxyIR 10mg every 6 hours ATC with hold parameters. Will hold off on tylenol, LFTs slightly up. I will keep the rest of the prn as is with no changes.     At home, she will continue oxyIR 10mg PO q4H prn. Again no need for scripts on dispo.     Janie Briones MD  Palliative Medicine & Supportive Care  Internal Medicine  Available via [employer]  Office: [phone]  Fax: [phone]

## 2024-01-27 PROBLEM — D64.89 OTHER SPECIFIED ANEMIAS: Status: ACTIVE | Noted: 2023-10-23

## 2024-01-27 LAB
ANISOCYTOSIS BLD QL SMEAR: PRESENT
BASOPHILS # BLD MANUAL: 0 THOUSAND/UL (ref 0–0.1)
BASOPHILS NFR MAR MANUAL: 0 % (ref 0–1)
EOSINOPHIL # BLD MANUAL: 0 THOUSAND/UL (ref 0–0.4)
EOSINOPHIL NFR BLD MANUAL: 0 % (ref 0–6)
ERYTHROCYTE [DISTWIDTH] IN BLOOD BY AUTOMATED COUNT: 20.4 % (ref 11.6–15.1)
HCT VFR BLD AUTO: 25.5 % (ref 34.8–46.1)
HGB BLD-MCNC: 7.6 G/DL (ref 11.5–15.4)
LYMPHOCYTES # BLD AUTO: 1.04 THOUSAND/UL (ref 0.6–4.47)
LYMPHOCYTES # BLD AUTO: 14 % (ref 14–44)
MCH RBC QN AUTO: 26.1 PG (ref 26.8–34.3)
MCHC RBC AUTO-ENTMCNC: 29.8 G/DL (ref 31.4–37.4)
MCV RBC AUTO: 88 FL (ref 82–98)
MONOCYTES # BLD AUTO: 0.59 THOUSAND/UL (ref 0–1.22)
MONOCYTES NFR BLD: 8 % (ref 4–12)
MYELOCYTES NFR BLD MANUAL: 2 % (ref 0–1)
NEUTROPHILS # BLD MANUAL: 5.64 THOUSAND/UL (ref 1.85–7.62)
NEUTS BAND NFR BLD MANUAL: 3 % (ref 0–8)
NEUTS SEG NFR BLD AUTO: 73 % (ref 43–75)
PLATELET # BLD AUTO: 467 THOUSANDS/UL (ref 149–390)
PLATELET BLD QL SMEAR: ABNORMAL
PMV BLD AUTO: 8.5 FL (ref 8.9–12.7)
RBC # BLD AUTO: 2.91 MILLION/UL (ref 3.81–5.12)
WBC # BLD AUTO: 7.42 THOUSAND/UL (ref 4.31–10.16)

## 2024-01-27 PROCEDURE — 85027 COMPLETE CBC AUTOMATED: CPT | Performed by: INTERNAL MEDICINE

## 2024-01-27 PROCEDURE — 99232 SBSQ HOSP IP/OBS MODERATE 35: CPT | Performed by: INTERNAL MEDICINE

## 2024-01-27 PROCEDURE — 85007 BL SMEAR W/DIFF WBC COUNT: CPT | Performed by: INTERNAL MEDICINE

## 2024-01-27 RX ADMIN — METOPROLOL SUCCINATE 25 MG: 25 TABLET, EXTENDED RELEASE ORAL at 08:58

## 2024-01-27 RX ADMIN — CEFTRIAXONE 1000 MG: 1 INJECTION, SOLUTION INTRAVENOUS at 22:10

## 2024-01-27 RX ADMIN — OXYCODONE HYDROCHLORIDE 10 MG: 10 TABLET ORAL at 23:00

## 2024-01-27 RX ADMIN — OXYCODONE HYDROCHLORIDE 10 MG: 10 TABLET ORAL at 17:15

## 2024-01-27 RX ADMIN — HYDROMORPHONE HYDROCHLORIDE 0.5 MG: 1 INJECTION, SOLUTION INTRAMUSCULAR; INTRAVENOUS; SUBCUTANEOUS at 11:01

## 2024-01-27 RX ADMIN — OXYCODONE HYDROCHLORIDE 10 MG: 10 TABLET ORAL at 06:33

## 2024-01-27 RX ADMIN — OXYCODONE HYDROCHLORIDE 10 MG: 10 TABLET ORAL at 08:58

## 2024-01-27 RX ADMIN — OXYCODONE HYDROCHLORIDE 10 MG: 10 TABLET ORAL at 00:22

## 2024-01-27 RX ADMIN — SENNOSIDES AND DOCUSATE SODIUM 1 TABLET: 8.6; 5 TABLET ORAL at 22:10

## 2024-01-27 RX ADMIN — HYDROMORPHONE HYDROCHLORIDE 0.5 MG: 1 INJECTION, SOLUTION INTRAMUSCULAR; INTRAVENOUS; SUBCUTANEOUS at 19:48

## 2024-01-27 RX ADMIN — LIDOCAINE 5% 2 PATCH: 700 PATCH TOPICAL at 08:58

## 2024-01-27 RX ADMIN — OXYCODONE HYDROCHLORIDE 10 MG: 10 TABLET ORAL at 04:05

## 2024-01-27 RX ADMIN — OXYCODONE HYDROCHLORIDE 10 MG: 10 TABLET ORAL at 13:10

## 2024-01-27 RX ADMIN — ENOXAPARIN SODIUM 40 MG: 40 INJECTION SUBCUTANEOUS at 08:58

## 2024-01-27 NOTE — ASSESSMENT & PLAN NOTE
Multifactorial due to blood loss, cancer, antineoplastic agent  Noted to have bloody pleural fluid today.  Check CBC and transfuse as needed for hemoglobin less than 7

## 2024-01-27 NOTE — ASSESSMENT & PLAN NOTE
Secondary to large right pleural effusion  Improving oxygen requirement.  Patient was still on low-dose oxygen today less than 0.5 L/min  Oxygen removed.  Monitor and keep off as tolerated

## 2024-01-27 NOTE — ASSESSMENT & PLAN NOTE
Large right pleural effusion due malignancy  Status post thoracentesis (-1900ml) on 1/24/24 and drainage catheter placement   Catheter was drained 450 mL on 1/2624 and again today  Will need to set up with [facility] regarding catheter and drainage management

## 2024-01-27 NOTE — PLAN OF CARE
Problem: Prexisting or High Potential for Compromised Skin Integrity  Goal: Skin integrity is maintained or improved  Description: INTERVENTIONS:  - Identify patients at risk for skin breakdown  - Assess and monitor skin integrity  - Assess and monitor nutrition and hydration status  - Monitor labs   - Assess for incontinence   - Turn and reposition patient  - Assist with mobility/ambulation  - Relieve pressure over bony prominences  - Avoid friction and shearing  - Provide appropriate hygiene as needed including keeping skin clean and dry  - Evaluate need for skin moisturizer/barrier cream  - Collaborate with interdisciplinary team   - Patient/family teaching  - Consider wound care consult   Outcome: Progressing     Problem: PAIN - ADULT  Goal: Verbalizes/displays adequate comfort level or baseline comfort level  Description: Interventions:  - Encourage patient to monitor pain and request assistance  - Assess pain using appropriate pain scale  - Administer analgesics based on type and severity of pain and evaluate response  - Implement non-pharmacological measures as appropriate and evaluate response  - Consider cultural and social influences on pain and pain management  - Notify physician/advanced practitioner if interventions unsuccessful or patient reports new pain  Outcome: Progressing     Problem: INFECTION - ADULT  Goal: Absence or prevention of progression during hospitalization  Description: INTERVENTIONS:  - Assess and monitor for signs and symptoms of infection  - Monitor lab/diagnostic results  - Monitor all insertion sites, i.e. indwelling lines, tubes, and drains  - Monitor endotracheal if appropriate and nasal secretions for changes in amount and color  - [place] appropriate cooling/warming therapies per order  - Administer medications as ordered  - Instruct and encourage patient and family to use good hand hygiene technique  - Identify and instruct in appropriate isolation precautions for  identified infection/condition  Outcome: Progressing     Problem: SAFETY ADULT  Goal: Patient will remain free of falls  Description: INTERVENTIONS:  - Educate patient/family on patient safety including physical limitations  - Instruct patient to call for assistance with activity   - Consult OT/PT to assist with strengthening/mobility   - Keep Call bell within reach  - Keep bed low and locked with side rails adjusted as appropriate  - Keep care items and personal belongings within reach  - Initiate and maintain comfort rounds  - Make Fall Risk Sign visible to staff  - Offer Toileting every 2 Hours, in advance of need  - Initiate/Maintain bed alarm  - Obtain necessary fall risk management equipment:   - Apply yellow socks and bracelet for high fall risk patients  - Consider moving patient to room near nurses station  Outcome: Progressing  Goal: Maintain or return to baseline ADL function  Description: INTERVENTIONS:  -  Assess patient's ability to carry out ADLs; assess patient's baseline for ADL function and identify physical deficits which impact ability to perform ADLs (bathing, care of mouth/teeth, toileting, grooming, dressing, etc.)  - Assess/evaluate cause of self-care deficits   - Assess range of motion  - Assess patient's mobility; develop plan if impaired  - Assess patient's need for assistive devices and provide as appropriate  - Encourage maximum independence but intervene and supervise when necessary  - Involve family in performance of ADLs  - Assess for home care needs following discharge   - Consider OT consult to assist with ADL evaluation and planning for discharge  - Provide patient education as appropriate  Outcome: Progressing  Goal: Maintains/Returns to pre admission functional level  Description: INTERVENTIONS:  - Perform AM-PAC 6 Click Basic Mobility/ Daily Activity assessment daily.  - Set and communicate daily mobility goal to care team and patient/family/caregiver.   - Collaborate with  rehabilitation services on mobility goals if consulted  - Perform Range of Motion 3 times a day.  - Reposition patient every 2 hours.  - Dangle patient 3 times a day  - Stand patient 3 times a day  - Ambulate patient 3 times a day  - Out of bed to chair 3 times a day   - Out of bed for meals 3 times a day  - Out of bed for toileting  - Record patient progress and toleration of activity level   Outcome: Progressing     Problem: Nutrition/Hydration-ADULT  Goal: Nutrient/Hydration intake appropriate for improving, restoring or maintaining nutritional needs  Description: Monitor and assess patient's nutrition/hydration status for malnutrition. Collaborate with interdisciplinary team and initiate plan and interventions as ordered.  Monitor patient's weight and dietary intake as ordered or per policy. Utilize nutrition screening tool and intervene as necessary. Determine patient's food preferences and provide high-protein, high-caloric foods as appropriate.     INTERVENTIONS:  - Monitor oral intake, urinary output, labs, and treatment plans  - Assess nutrition and hydration status and recommend course of action  - Evaluate amount of meals eaten  - Assist patient with eating if necessary   - Allow adequate time for meals  - Recommend/ encourage appropriate diets, oral nutritional supplements, and vitamin/mineral supplements  - Order, calculate, and assess calorie counts as needed  - Recommend, monitor, and adjust tube feedings and TPN/PPN based on assessed needs  - Assess need for intravenous fluids  - Provide specific nutrition/hydration education as appropriate  - Include patient/family/caregiver in decisions related to nutrition  Outcome: Progressing

## 2024-01-27 NOTE — ASSESSMENT & PLAN NOTE
Multifactorial secondary to malignant pleural effusion, metastatic disease, atelectasis, pain   Cardiology consulted due to persistent heart rates 130s to 140s.  Rate slowly improving due to the combination of the metoprolol and additional drainage   Echocardiogram pending

## 2024-01-27 NOTE — PROGRESS NOTES
[facility]  Progress Note  Name: Shannan Vee I  MRN: [id number]  Unit/Bed#: E2 -01 I Date of Admission: 1/23/2024   Date of Service: 1/27/2024 I Hospital Day: 3    Assessment/Plan   * Large pleural effusion  Assessment & Plan  Large right pleural effusion due malignancy  Status post thoracentesis (-1900ml) on 1/24/24 and drainage catheter placement   Catheter was drained 450 mL on 1/2624 and again today  Will need to set up with [facility] regarding catheter and drainage management    Malignant neoplasm of overlapping sites of left female breast (HCC)  Assessment & Plan  Stage IV cancer with  metastatic disease to the bone.  Oncology evaluation and recommendations reviewed.  Follow-up with Dr. Pennington  and radiation oncology for ongoing treatments after discharge  Outpatient palliative care follow-up for symptom management      Acute respiratory insufficiency  Assessment & Plan  Secondary to large right pleural effusion  Improving oxygen requirement.  Patient was still on low-dose oxygen today less than 0.5 L/min  Oxygen removed.  Monitor and keep off as tolerated    Sinus tachycardia  Assessment & Plan  Multifactorial secondary to malignant pleural effusion, metastatic disease, atelectasis, pain   Cardiology consulted due to persistent heart rates 130s to 140s.  Rate slowly improving due to the combination of the metoprolol and additional drainage   Echocardiogram pending    Other specified anemias  Assessment & Plan  Multifactorial due to blood loss, cancer, antineoplastic agent  Noted to have bloody pleural fluid today.  Check CBC and transfuse as needed for hemoglobin less than 7             VTE Pharmacologic Prophylaxis: VTE Score: 5 lovenox    Patient Centered Rounds: I performed bedside rounds with nursing staff today.   Discussions with Specialists or Other Care Team Provider: Dr. Dominguez    Education and Discussions with Family / Patient: Updated  (significant other)  at bedside.      Current Length of Stay: 3 day(s)  Current Patient Status: Inpatient   Certification Statement: The patient will continue to require additional inpatient hospital stay due to malignant effusion and anemia  Discharge Plan: Anticipate discharge in 24-48 hrs to home with home services.    Code Status: Level 1 - Full Code    Subjective:   Seen and examined during rounds.  Improved shortness of breath and chest pain.  She was still using oxygen at less than 0.5 L/min via nasal cannula.    Objective:     Vitals:   Temp (24hrs), Av.3 °F (36.8 °C), Min:97.6 °F (36.4 °C), Max:99 °F (37.2 °C)    Temp:  [97.6 °F (36.4 °C)-99 °F (37.2 °C)] 98.4 °F (36.9 °C)  HR:  [120-140] 124  Resp:  [16-20] 20  BP: (109-134)/(61-75) 116/68  SpO2:  [94 %-99 %] 97 %  There is no height or weight on file to calculate BMI.     Input and Output Summary (last 24 hours):     Intake/Output Summary (Last 24 hours) at 2024 1021  Last data filed at 2024 0940  Gross per 24 hour   Intake 250 ml   Output 1050 ml   Net -800 ml       Physical Exam:   Physical Exam  Constitutional:       Appearance: She is not ill-appearing.   HENT:      Head: Normocephalic and atraumatic.      Nose: No congestion or rhinorrhea.   Eyes:      General: No scleral icterus.  Cardiovascular:      Rate and Rhythm: Regular rhythm. Tachycardia present.   Pulmonary:      Breath sounds: No wheezing or rhonchi.   Abdominal:      Palpations: Abdomen is soft.      Tenderness: There is no abdominal tenderness.   Musculoskeletal:      Cervical back: Neck supple.      Right lower leg: No edema.      Left lower leg: No edema.   Skin:     General: Skin is warm and dry.   Neurological:      Mental Status: She is alert and oriented to person, place, and time.   Psychiatric:         Behavior: Behavior normal.          Additional Data:     Labs:  Results from last 7 days   Lab Units 24  1002 24  0455 24  1554   WBC Thousand/uL 7.42 10.96* 12.87*    HEMOGLOBIN g/dL 7.6* 7.8* 9.2*   HEMATOCRIT % 25.5* 25.7* 30.1*   PLATELETS Thousands/uL 467* 557* 612*   BANDS PCT %  --   --  3   NEUTROS PCT %  --  67  --    LYMPHS PCT %  --  15  --    LYMPHO PCT %  --   --  14   MONOS PCT %  --  15*  --    MONO PCT %  --   --  12   EOS PCT %  --  0 0     Results from last 7 days   Lab Units 01/24/24  0455 01/23/24  1554   SODIUM mmol/L 132* 132*   POTASSIUM mmol/L 3.8 3.6   CHLORIDE mmol/L 95* 93*   CO2 mmol/L 28 24   BUN mg/dL 7 7   CREATININE mg/dL 0.47* 0.46*   ANION GAP mmol/L 9 15   CALCIUM mg/dL 8.4 8.8   ALBUMIN g/dL  --  3.2*   TOTAL BILIRUBIN mg/dL  --  0.46   ALK PHOS U/L  --  149*   ALT U/L  --  19   AST U/L  --  46*   GLUCOSE RANDOM mg/dL 114 111                 Results from last 7 days   Lab Units 01/23/24  1719   LACTIC ACID mmol/L 1.1       Lines/Drains:  Invasive Devices       Peripheral Intravenous Line  Duration             Peripheral IV 01/23/24 Dorsal (posterior);Right Forearm 3 days              Drain  Duration             Pleural Effusion Long-Term Catheter 16 Fr. 2 days              Airway  Duration             Supraglottic Airway LMA 4 78 days                      Telemetry:  Telemetry Orders (From admission, onward)               24 Hour Telemetry Monitoring  Continuous x 24 Hours (Telem)        Question:  Reason for 24 Hour Telemetry  Answer:  Arrhythmias requiring acute medical intervention / PPM or ICD malfunction                     Telemetry Reviewed: Sinus Tachycardia           Imaging: Reviewed radiology reports from this admission including: chest xray, chest CT scan, and procedure reports    Recent Cultures (last 7 days):   Results from last 7 days   Lab Units 01/23/24  1719 01/23/24  1718   BLOOD CULTURE  No Growth at 72 hrs. No Growth at 72 hrs.       Last 24 Hours Medication List:   Current Facility-Administered Medications   Medication Dose Route Frequency Provider Last Rate    acetaminophen  650 mg Oral Q6H PRN Billy Smith DO       cefTRIAXone  1,000 mg Intravenous Q24H Billy S Prechtel, DO 1,000 mg (01/26/24 2203)    enoxaparin  40 mg Subcutaneous Q24H [facility] Gibson George MD      HYDROmorphone  0.5 mg Intravenous Q3H PRN Janie Briones MD      lidocaine  2 patch Topical Daily Billy S Prechtel, DO      metoprolol succinate  25 mg Oral Daily Donell Brewer PA-C      oxyCODONE  10 mg Oral Q4H PRN Janie Briones MD      oxyCODONE  10 mg Oral Q6H [facility] Janie Briones MD      oxyCODONE  5 mg Oral Q4H PRN Janie rBiones MD      polyethylene glycol  17 g Oral Daily PRN Janie Briones MD      Ribociclib Succ (600 MG Dose)  600 mg Oral Daily Billy S Prechtel, DO      senna-docusate sodium  1 tablet Oral HS Janie Briones MD          Today, Patient Was Seen By: Gibson George MD    **Please Note: This note may have been constructed using a voice recognition system.**

## 2024-01-27 NOTE — PROGRESS NOTES
Cardiology         Progress Note - Cardiology   Shannan Vee 53 y.o. female MRN: [id number]  Unit/Bed#: E2 -01 Encounter: [id number]          Assessment/Recommendations/Discussion:   Large pleural effusion  -Known malignant right pleural effusion status post thoracentesis on 1/18/2024 at [facility] with 1.5 L of fluid removal, cytology positive for malignant cells at that time  - Presented with recurrent large right pleural effusion, status post Pleurx catheter placement by IR 1/24/2025    Malignant neoplasm of overlapping sites of left female breast (HCC)  - History of left-sided breast cancer in 2015, status post chemo, left mastectomy radiation and tamoxifen later changed to Arimidex   -Found to have recurrent metastatic breast cancer in October 2023 with mets to the bone, right lung and mediastinum  - Restarted on chemotherapy  - Following with [facility]'s oncology  - Undergoing palliative spinal radiation at [facility]    Sinus tachycardia  -EKGs on admission shows sinus tachycardia with rates in the 140s  - Review of prior EKGs shows sinus tachycardia dating back to February 2014  - Noted to be tachycardic with similar rate during hospitalization earlier this month at [facility]  - CTA 1/17/2024 at [facility] showed no evidence of PE  - TTE 10/8/2023 showed EF 64%, trace to mild TR    Goals of care, counseling/discussion    Acute respiratory insufficiency  - Initially requiring 3 L nasal cannula, now on 0.5 L nasal cannula  - Likely due to large right pleural effusion on admission  - Currently improved status post Pleurx catheter placement    Severe protein-calorie malnutrition (HCC)      Sinus tachycardia multifactorial, persistent.  Continue metoprolol, can titrate up as BP allows  Echo pending, will review when available  TELE trend shows improvement in HR trends with lower trends overnight  Manage noncardiac issues per SLIM, which are contributing to HR increase            Subjective: Pt seen/examined, no  complaints                Physical Exam:  GEN:  NAD  HEENT:  MMM, NCAT, pink conjunctiva, EOMI, nonicteric sclera  CV:  NO JVD/HJR, RR, NO M/R/G, +S1/S2, NO PARASTERNAL HEAVE/THRILL, NO LE EDEMA, NO HEPATIC SYSTOLIC PULSATION, WARM EXTREMITIES  RESP:  CTAB/L  ABD:  SOFT, NT, NO GROSS ORGANOMEGALY        Vitals:   /68 (BP Location: Right arm)   Pulse (!) 124   Temp 98.4 °F (36.9 °C) (Temporal)   Resp 20   SpO2 97%   There were no vitals filed for this visit.    Intake/Output Summary (Last 24 hours) at 1/27/2024 1007  Last data filed at 1/27/2024 0940  Gross per 24 hour   Intake 250 ml   Output 1050 ml   Net -800 ml       TELEMETRY: ST, SR  Lab Results:  Results from last 7 days   Lab Units 01/24/24  0455   WBC Thousand/uL 10.96*   HEMOGLOBIN g/dL 7.8*   HEMATOCRIT % 25.7*   PLATELETS Thousands/uL 557*     Results from last 7 days   Lab Units 01/24/24  0455 01/23/24  1554   POTASSIUM mmol/L 3.8 3.6   CHLORIDE mmol/L 95* 93*   CO2 mmol/L 28 24   BUN mg/dL 7 7   CREATININE mg/dL 0.47* 0.46*   CALCIUM mg/dL 8.4 8.8   ALK PHOS U/L  --  149*   ALT U/L  --  19   AST U/L  --  46*     Results from last 7 days   Lab Units 01/24/24  0455   POTASSIUM mmol/L 3.8   CHLORIDE mmol/L 95*   CO2 mmol/L 28   BUN mg/dL 7   CREATININE mg/dL 0.47*   CALCIUM mg/dL 8.4           Medications:    Current Facility-Administered Medications:     acetaminophen (TYLENOL) tablet 650 mg, 650 mg, Oral, Q6H PRN, Billy Smith, DO, 650 mg at 01/24/24 0518    cefTRIAXone (ROCEPHIN) IVPB (premix in dextrose) 1,000 mg 50 mL, 1,000 mg, Intravenous, Q24H, Billy Menezesel, DO, Last Rate: 100 mL/hr at 01/26/24 2203, 1,000 mg at 01/26/24 2203    enoxaparin (LOVENOX) subcutaneous injection 40 mg, 40 mg, Subcutaneous, Q24H ALEXANRDE, Gibson George MD, 40 mg at 01/27/24 0858    HYDROmorphone (DILAUDID) injection 0.5 mg, 0.5 mg, Intravenous, Q3H PRN, Janie Briones MD, 0.5 mg at 01/26/24 2203    lidocaine (LIDODERM) 5 % patch 2 patch,  2 patch, Topical, Daily, Billy Smith DO, 2 patch at 01/27/24 0858    metoprolol succinate (TOPROL-XL) 24 hr tablet 25 mg, 25 mg, Oral, Daily, Donell Brewer PA-C, 25 mg at 01/27/24 0858    oxyCODONE (ROXICODONE) immediate release tablet 10 mg, 10 mg, Oral, Q4H PRN, Janie Briones MD, 10 mg at 01/27/24 0858    oxyCODONE (ROXICODONE) immediate release tablet 10 mg, 10 mg, Oral, Q6H ALEXANDRE, Janie Briones MD, 10 mg at 01/27/24 0633    oxyCODONE (ROXICODONE) IR tablet 5 mg, 5 mg, Oral, Q4H PRN, Janie Briones MD    polyethylene glycol (MIRALAX) packet 17 g, 17 g, Oral, Daily PRN, Janie Briones MD, 17 g at 01/26/24 2202    Ribociclib Succ (600 MG Dose) TBPK 600 mg, 600 mg, Oral, Daily, Billy Smith DO    senna-docusate sodium (SENOKOT S) 8.6-50 mg per tablet 1 tablet, 1 tablet, Oral, HS, Janie Briones MD, 1 tablet at 01/26/24 2203    This note was completed in part utilizing [employer] Direct Software.  Grammatical errors, random word insertions, spelling mistakes, and incomplete sentences may be an occasional consequence of this system secondary to software limitations, ambient noise, and hardware issues.  If you have any questions or concerns about the content, text, or information contained within the body of this dictation, please contact the provider for clarification.

## 2024-01-27 NOTE — PLAN OF CARE
Problem: Prexisting or High Potential for Compromised Skin Integrity  Goal: Skin integrity is maintained or improved  Description: INTERVENTIONS:  - Identify patients at risk for skin breakdown  - Assess and monitor skin integrity  - Assess and monitor nutrition and hydration status  - Monitor labs   - Assess for incontinence   - Turn and reposition patient  - Assist with mobility/ambulation  - Relieve pressure over bony prominences  - Avoid friction and shearing  - Provide appropriate hygiene as needed including keeping skin clean and dry  - Evaluate need for skin moisturizer/barrier cream  - Collaborate with interdisciplinary team   - Patient/family teaching  - Consider wound care consult   Outcome: Progressing     Problem: PAIN - ADULT  Goal: Verbalizes/displays adequate comfort level or baseline comfort level  Description: Interventions:  - Encourage patient to monitor pain and request assistance  - Assess pain using appropriate pain scale  - Administer analgesics based on type and severity of pain and evaluate response  - Implement non-pharmacological measures as appropriate and evaluate response  - Consider cultural and social influences on pain and pain management  - Notify physician/advanced practitioner if interventions unsuccessful or patient reports new pain  Outcome: Progressing     Problem: INFECTION - ADULT  Goal: Absence or prevention of progression during hospitalization  Description: INTERVENTIONS:  - Assess and monitor for signs and symptoms of infection  - Monitor lab/diagnostic results  - Monitor all insertion sites, i.e. indwelling lines, tubes, and drains  - Monitor endotracheal if appropriate and nasal secretions for changes in amount and color  - [place] appropriate cooling/warming therapies per order  - Administer medications as ordered  - Instruct and encourage patient and family to use good hand hygiene technique  - Identify and instruct in appropriate isolation precautions for  identified infection/condition  Outcome: Progressing     Problem: SAFETY ADULT  Goal: Patient will remain free of falls  Description: INTERVENTIONS:  - Educate patient/family on patient safety including physical limitations  - Instruct patient to call for assistance with activity   - Consult OT/PT to assist with strengthening/mobility   - Keep Call bell within reach  - Keep bed low and locked with side rails adjusted as appropriate  - Keep care items and personal belongings within reach  - Initiate and maintain comfort rounds  - Make Fall Risk Sign visible to staff  - Offer Toileting every 2 Hours, in advance of need  - Initiate/Maintain bed alarm  - Obtain necessary fall risk management equipment: call bell, bed alarm  - Apply yellow socks and bracelet for high fall risk patients  - Consider moving patient to room near nurses station  Outcome: Progressing  Goal: Maintain or return to baseline ADL function  Description: INTERVENTIONS:  -  Assess patient's ability to carry out ADLs; assess patient's baseline for ADL function and identify physical deficits which impact ability to perform ADLs (bathing, care of mouth/teeth, toileting, grooming, dressing, etc.)  - Assess/evaluate cause of self-care deficits   - Assess range of motion  - Assess patient's mobility; develop plan if impaired  - Assess patient's need for assistive devices and provide as appropriate  - Encourage maximum independence but intervene and supervise when necessary  - Involve family in performance of ADLs  - Assess for home care needs following discharge   - Consider OT consult to assist with ADL evaluation and planning for discharge  - Provide patient education as appropriate  Outcome: Progressing  Goal: Maintains/Returns to pre admission functional level  Description: INTERVENTIONS:  - Perform AM-PAC 6 Click Basic Mobility/ Daily Activity assessment daily.  - Set and communicate daily mobility goal to care team and patient/family/caregiver.   -  Collaborate with rehabilitation services on mobility goals if consulted  - Perform Range of Motion 3 times a day.  - Reposition patient every 2 hours.  - Dangle patient 3 times a day  - Stand patient 3 times a day  - Ambulate patient 3 times a day  - Out of bed to chair 3 times a day   - Out of bed for meals 3 times a day  - Out of bed for toileting  - Record patient progress and toleration of activity level   Outcome: Progressing     Problem: Nutrition/Hydration-ADULT  Goal: Nutrient/Hydration intake appropriate for improving, restoring or maintaining nutritional needs  Description: Monitor and assess patient's nutrition/hydration status for malnutrition. Collaborate with interdisciplinary team and initiate plan and interventions as ordered.  Monitor patient's weight and dietary intake as ordered or per policy. Utilize nutrition screening tool and intervene as necessary. Determine patient's food preferences and provide high-protein, high-caloric foods as appropriate.     INTERVENTIONS:  - Monitor oral intake, urinary output, labs, and treatment plans  - Assess nutrition and hydration status and recommend course of action  - Evaluate amount of meals eaten  - Assist patient with eating if necessary   - Allow adequate time for meals  - Recommend/ encourage appropriate diets, oral nutritional supplements, and vitamin/mineral supplements  - Order, calculate, and assess calorie counts as needed  - Recommend, monitor, and adjust tube feedings and TPN/PPN based on assessed needs  - Assess need for intravenous fluids  - Provide specific nutrition/hydration education as appropriate  - Include patient/family/caregiver in decisions related to nutrition  Outcome: Progressing

## 2024-01-28 ENCOUNTER — APPOINTMENT (INPATIENT)
Dept: NON INVASIVE DIAGNOSTICS | Facility: HOSPITAL | Age: 54
DRG: 136 | End: 2024-01-28
Payer: COMMERCIAL

## 2024-01-28 PROBLEM — R06.89 ACUTE RESPIRATORY INSUFFICIENCY: Status: RESOLVED | Noted: 2024-01-24 | Resolved: 2024-01-28

## 2024-01-28 LAB
ABO GROUP BLD: NORMAL
AORTIC ROOT: 2.5 CM
APICAL FOUR CHAMBER EJECTION FRACTION: 47 %
BACTERIA BLD CULT: NORMAL
BACTERIA BLD CULT: NORMAL
BLD GP AB SCN SERPL QL: NEGATIVE
BSA FOR ECHO PROCEDURE: 1.63 M2
E WAVE DECELERATION TIME: 123 MS
E/A RATIO: 0.77
FRACTIONAL SHORTENING: 31 (ref 28–44)
INTERVENTRICULAR SEPTUM IN DIASTOLE (PARASTERNAL SHORT AXIS VIEW): 0.6 CM
INTERVENTRICULAR SEPTUM: 0.6 CM (ref 0.6–1.1)
LAAS-AP2: 9.9 CM2
LAAS-AP4: 13.5 CM2
LEFT ATRIUM AREA SYSTOLE SINGLE PLANE A4C: 13 CM2
LEFT ATRIUM SIZE: 3.3 CM
LEFT ATRIUM VOLUME (MOD BIPLANE): 28 ML
LEFT ATRIUM VOLUME INDEX (MOD BIPLANE): 17.2 ML/M2
LEFT INTERNAL DIMENSION IN SYSTOLE: 2.9 CM (ref 2.1–4)
LEFT VENTRICULAR INTERNAL DIMENSION IN DIASTOLE: 4.2 CM (ref 3.5–6)
LEFT VENTRICULAR POSTERIOR WALL IN END DIASTOLE: 0.6 CM
LEFT VENTRICULAR STROKE VOLUME: 48 ML
LVSV (TEICH): 48 ML
MV E'TISSUE VEL-SEP: 15 CM/S
MV PEAK A VEL: 0.97 M/S
MV PEAK E VEL: 75 CM/S
MV STENOSIS PRESSURE HALF TIME: 36 MS
MV VALVE AREA P 1/2 METHOD: 6.11
RH BLD: POSITIVE
RIGHT ATRIUM AREA SYSTOLE A4C: 9.5 CM2
RIGHT VENTRICLE ID DIMENSION: 3 CM
SL CV LEFT ATRIUM LENGTH A2C: 3.6 CM
SL CV LV EF: 55
SL CV PED ECHO LEFT VENTRICLE DIASTOLIC VOLUME (MOD BIPLANE) 2D: 80 ML
SL CV PED ECHO LEFT VENTRICLE SYSTOLIC VOLUME (MOD BIPLANE) 2D: 32 ML
SPECIMEN EXPIRATION DATE: NORMAL
TR MAX PG: 24 MMHG
TR PEAK VELOCITY: 2.4 M/S
TRICUSPID ANNULAR PLANE SYSTOLIC EXCURSION: 2.3 CM
TRICUSPID VALVE PEAK REGURGITATION VELOCITY: 2.43 M/S

## 2024-01-28 PROCEDURE — 86900 BLOOD TYPING SEROLOGIC ABO: CPT | Performed by: INTERNAL MEDICINE

## 2024-01-28 PROCEDURE — 99232 SBSQ HOSP IP/OBS MODERATE 35: CPT | Performed by: PHYSICIAN ASSISTANT

## 2024-01-28 PROCEDURE — 99233 SBSQ HOSP IP/OBS HIGH 50: CPT | Performed by: INTERNAL MEDICINE

## 2024-01-28 PROCEDURE — 93308 TTE F-UP OR LMTD: CPT

## 2024-01-28 PROCEDURE — 93308 TTE F-UP OR LMTD: CPT | Performed by: INTERNAL MEDICINE

## 2024-01-28 PROCEDURE — 86923 COMPATIBILITY TEST ELECTRIC: CPT

## 2024-01-28 PROCEDURE — 30233N1 TRANSFUSION OF NONAUTOLOGOUS RED BLOOD CELLS INTO PERIPHERAL VEIN, PERCUTANEOUS APPROACH: ICD-10-PCS | Performed by: RADIOLOGY

## 2024-01-28 PROCEDURE — 93321 DOPPLER ECHO F-UP/LMTD STD: CPT

## 2024-01-28 PROCEDURE — P9016 RBC LEUKOCYTES REDUCED: HCPCS

## 2024-01-28 PROCEDURE — 93325 DOPPLER ECHO COLOR FLOW MAPG: CPT | Performed by: INTERNAL MEDICINE

## 2024-01-28 PROCEDURE — 93321 DOPPLER ECHO F-UP/LMTD STD: CPT | Performed by: INTERNAL MEDICINE

## 2024-01-28 PROCEDURE — 86850 RBC ANTIBODY SCREEN: CPT | Performed by: INTERNAL MEDICINE

## 2024-01-28 PROCEDURE — 93325 DOPPLER ECHO COLOR FLOW MAPG: CPT

## 2024-01-28 PROCEDURE — 86901 BLOOD TYPING SEROLOGIC RH(D): CPT | Performed by: INTERNAL MEDICINE

## 2024-01-28 RX ORDER — METOPROLOL SUCCINATE 50 MG/1
50 TABLET, EXTENDED RELEASE ORAL DAILY
Status: DISCONTINUED | OUTPATIENT
Start: 2024-01-29 | End: 2024-01-28

## 2024-01-28 RX ORDER — METOPROLOL SUCCINATE 25 MG/1
25 TABLET, EXTENDED RELEASE ORAL DAILY
Qty: 30 TABLET | Refills: 0 | Status: SHIPPED | OUTPATIENT
Start: 2024-01-29 | End: 2024-01-30

## 2024-01-28 RX ORDER — METOPROLOL SUCCINATE 25 MG/1
25 TABLET, EXTENDED RELEASE ORAL DAILY
Status: DISCONTINUED | OUTPATIENT
Start: 2024-01-29 | End: 2024-01-29

## 2024-01-28 RX ADMIN — SENNOSIDES AND DOCUSATE SODIUM 1 TABLET: 8.6; 5 TABLET ORAL at 21:55

## 2024-01-28 RX ADMIN — OXYCODONE HYDROCHLORIDE 5 MG: 10 TABLET ORAL at 14:34

## 2024-01-28 RX ADMIN — OXYCODONE HYDROCHLORIDE 10 MG: 10 TABLET ORAL at 23:46

## 2024-01-28 RX ADMIN — OXYCODONE HYDROCHLORIDE 10 MG: 10 TABLET ORAL at 12:36

## 2024-01-28 RX ADMIN — OXYCODONE HYDROCHLORIDE 10 MG: 10 TABLET ORAL at 21:56

## 2024-01-28 RX ADMIN — OXYCODONE HYDROCHLORIDE 10 MG: 10 TABLET ORAL at 18:30

## 2024-01-28 RX ADMIN — CEFTRIAXONE 1000 MG: 1 INJECTION, SOLUTION INTRAVENOUS at 23:46

## 2024-01-28 RX ADMIN — OXYCODONE HYDROCHLORIDE 10 MG: 10 TABLET ORAL at 08:47

## 2024-01-28 RX ADMIN — OXYCODONE HYDROCHLORIDE 10 MG: 10 TABLET ORAL at 02:45

## 2024-01-28 RX ADMIN — OXYCODONE HYDROCHLORIDE 10 MG: 10 TABLET ORAL at 06:09

## 2024-01-28 RX ADMIN — ENOXAPARIN SODIUM 40 MG: 40 INJECTION SUBCUTANEOUS at 08:48

## 2024-01-28 RX ADMIN — LIDOCAINE 5% 2 PATCH: 700 PATCH TOPICAL at 08:47

## 2024-01-28 RX ADMIN — METOPROLOL SUCCINATE 25 MG: 25 TABLET, EXTENDED RELEASE ORAL at 08:48

## 2024-01-28 NOTE — PROGRESS NOTES
[facility]  Progress Note  Name: Shannan Vee I  MRN: [id number]  Unit/Bed#: E2 -01 I Date of Admission: 1/23/2024   Date of Service: 1/28/2024 I Hospital Day: 4    Assessment/Plan   * Large pleural effusion  Assessment & Plan  Large right pleural effusion due malignancy  Status post thoracentesis (-1900ml) on 1/24/24 and drainage catheter placement   Continue bedside drainage by nursing while in the hospital  Resume drainage at home with help of VNA on discharge  DC further antibiotics.  She has completed 5 days of empiric IV cephalosporin    Malignant neoplasm of overlapping sites of left female breast (HCC)  Assessment & Plan  Stage IV cancer with  metastatic disease to the bone.  Oncology evaluation and recommendations reviewed.  Follow-up with Dr. Pennington  and radiation oncology for ongoing treatments after discharge  Outpatient palliative care follow-up for symptom management      Severe protein-calorie malnutrition (HCC)  Assessment & Plan  Malnutrition Findings:   Adult Malnutrition type: Acute illness  Adult Degree of Malnutrition: Other severe protein calorie malnutrition  Malnutrition Characteristics: Fat loss, Muscle loss, Inadequate energy  360 Statement: Severe calorie protein malnutrition in context of acute illness r/t inadequate PO intake, increased needs, catabolic illness ae evidance by energy intake less than 50% compared to estimated needs>5 days, moderate body fat (triceps, ribcage area) and muscle mass depletions (protruding clavicles, temporal wasting); Treated with liberalized diet, oral supplements    BMI Findings:  Body mass index is 21.13 kg/m².       Sinus tachycardia  Assessment & Plan  Multifactorial secondary to malignant pleural effusion, metastatic disease, atelectasis, pain   Heart rate improved with pleural fluid drainage and addition of Toprol-XL 25 mg daily  Transfuse 1 unit PRBC  Continue Toprol-XL 25 mg daily  Continue  pleural fluid  drainage    Other specified anemias  Assessment & Plan  Multifactorial due to blood loss, cancer, antineoplastic agent  This is likely adding to her persistent sinus tachycardia.  Discussed blood consent with patient and family at bedside  Consent signed.  Will transfuse 1 unit PRBC for hemoglobin of 7.6 about symptomatic with persistent tachycardia      Acute respiratory insufficiency-resolved as of 2024  Assessment & Plan  Resolved           VTE Pharmacologic Prophylaxis: VTE Score: 5 High Risk (Score >/= 5) - Pharmacological DVT Prophylaxis Ordered: enoxaparin (Lovenox). Sequential Compression Devices Ordered.    Patient Centered Rounds: I performed bedside rounds with nursing staff today.   Discussions with Specialists or Other Care Team Provider: Case management and cardiology  Education and Discussions with Family / Patient:  Spoke with  and grandson at the bedside.     Current Length of Stay: 4 day(s)  Current Patient Status: Inpatient   Certification Statement: The patient will continue to require additional inpatient hospital stay due to persistent tachycardia  Discharge Plan: Anticipate discharge in 24-48 hrs to home with home services.    Code Status: Level 1 - Full Code    Subjective:   Off oxygen  Improved right sided chest pain  But still with tachycardia 120s to 130s on exam  No fever or chills  No cough    Objective:     Vitals:   Temp (24hrs), Av.8 °F (37.1 °C), Min:98.1 °F (36.7 °C), Max:99.4 °F (37.4 °C)    Temp:  [98.1 °F (36.7 °C)-99.4 °F (37.4 °C)] 99.4 °F (37.4 °C)  HR:  [110-132] 132  Resp:  [18-20] 18  BP: (118-133)/(68-81) 118/71  SpO2:  [96 %-99 %] 96 %  Body mass index is 21.13 kg/m².     Input and Output Summary (last 24 hours):     Intake/Output Summary (Last 24 hours) at 2024 1622  Last data filed at 2024 1430  Gross per 24 hour   Intake --   Output 425 ml   Net -425 ml       Physical Exam:   Physical Exam  Vitals reviewed.   Constitutional:       Appearance:  Normal appearance.   HENT:      Head: Normocephalic and atraumatic.      Nose: No congestion or rhinorrhea.   Eyes:      General: No scleral icterus.  Cardiovascular:      Rate and Rhythm: Regular rhythm. Tachycardia present.   Pulmonary:      Breath sounds: No wheezing or rhonchi.   Abdominal:      General: There is no distension.      Palpations: Abdomen is soft.      Tenderness: There is no abdominal tenderness.   Musculoskeletal:      Cervical back: Neck supple.      Right lower leg: No edema.      Left lower leg: No edema.   Skin:     General: Skin is warm and dry.   Neurological:      Mental Status: She is oriented to person, place, and time.   Psychiatric:         Mood and Affect: Mood normal.         Behavior: Behavior normal.     Additional Data:     Labs:  Results from last 7 days   Lab Units 01/27/24  1002 01/24/24  0455   WBC Thousand/uL 7.42 10.96*   HEMOGLOBIN g/dL 7.6* 7.8*   HEMATOCRIT % 25.5* 25.7*   PLATELETS Thousands/uL 467* 557*   BANDS PCT % 3  --    NEUTROS PCT %  --  67   LYMPHS PCT %  --  15   LYMPHO PCT % 14  --    MONOS PCT %  --  15*   MONO PCT % 8  --    EOS PCT % 0 0     Results from last 7 days   Lab Units 01/24/24  0455 01/23/24  1554   SODIUM mmol/L 132* 132*   POTASSIUM mmol/L 3.8 3.6   CHLORIDE mmol/L 95* 93*   CO2 mmol/L 28 24   BUN mg/dL 7 7   CREATININE mg/dL 0.47* 0.46*   ANION GAP mmol/L 9 15   CALCIUM mg/dL 8.4 8.8   ALBUMIN g/dL  --  3.2*   TOTAL BILIRUBIN mg/dL  --  0.46   ALK PHOS U/L  --  149*   ALT U/L  --  19   AST U/L  --  46*   GLUCOSE RANDOM mg/dL 114 111                 Results from last 7 days   Lab Units 01/23/24  1719   LACTIC ACID mmol/L 1.1       Lines/Drains:  Invasive Devices       Peripheral Intravenous Line  Duration             Peripheral IV 01/23/24 Dorsal (posterior);Right Forearm 5 days              Drain  Duration             Pleural Effusion Long-Term Catheter 16 Fr. 4 days              Airway  Duration             Supraglottic Airway LMA 4 80 days                           Imaging: Reviewed radiology reports from this admission including: chest xray, chest CT scan, and procedure reports    Recent Cultures (last 7 days):   Results from last 7 days   Lab Units 01/23/24  1719 01/23/24  1718   BLOOD CULTURE  No Growth After 4 Days. No Growth After 4 Days.       Last 24 Hours Medication List:   Current Facility-Administered Medications   Medication Dose Route Frequency Provider Last Rate    acetaminophen  650 mg Oral Q6H PRN Billy S Prechtel, DO      cefTRIAXone  1,000 mg Intravenous Q24H Billy S Prechtel, DO 1,000 mg (01/27/24 2210)    enoxaparin  40 mg Subcutaneous Q24H [facility] Gibson George MD      HYDROmorphone  0.5 mg Intravenous Q3H PRN Janie Briones MD      lidocaine  2 patch Topical Daily Billy S Prechtel, DO      [START ON 1/29/2024] metoprolol succinate  25 mg Oral Daily Fannie Carroll PA-C      oxyCODONE  10 mg Oral Q4H PRN Janie Briones MD      oxyCODONE  10 mg Oral Q6H ALEXANDRE Janie Briones MD      oxyCODONE  5 mg Oral Q4H PRN Janie Briones MD      polyethylene glycol  17 g Oral Daily PRN Janie Briones MD      Ribociclib Succ (600 MG Dose)  600 mg Oral Daily Billy S Prechtel, DO      senna-docusate sodium  1 tablet Oral HS Janie Briones MD          Today, Patient Was Seen By: Gibson George MD    **Please Note: This note may have been constructed using a voice recognition system.**

## 2024-01-28 NOTE — ASSESSMENT & PLAN NOTE
Large right pleural effusion due malignancy  Status post thoracentesis (-1900ml) on 1/24/24 and drainage catheter placement   Continue bedside drainage by nursing while in the hospital  Resume drainage at home with help of VNA on discharge  DC further antibiotics.  She has completed 5 days of empiric IV cephalosporin

## 2024-01-28 NOTE — ASSESSMENT & PLAN NOTE
Multifactorial secondary to malignant pleural effusion, metastatic disease, atelectasis, pain   Heart rate improved with pleural fluid drainage and addition of Toprol-XL 25 mg daily  Transfuse 1 unit PRBC  Continue Toprol-XL 25 mg daily  Continue  pleural fluid drainage

## 2024-01-28 NOTE — ASSESSMENT & PLAN NOTE
Multifactorial due to blood loss, cancer, antineoplastic agent  This is likely adding to her persistent sinus tachycardia.  Discussed blood consent with patient and family at bedside  Consent signed.  Will transfuse 1 unit PRBC for hemoglobin of 7.6 about symptomatic with persistent tachycardia

## 2024-01-28 NOTE — PLAN OF CARE
Problem: Prexisting or High Potential for Compromised Skin Integrity  Goal: Skin integrity is maintained or improved  Description: INTERVENTIONS:  - Identify patients at risk for skin breakdown  - Assess and monitor skin integrity  - Assess and monitor nutrition and hydration status  - Monitor labs   - Assess for incontinence   - Turn and reposition patient  - Assist with mobility/ambulation  - Relieve pressure over bony prominences  - Avoid friction and shearing  - Provide appropriate hygiene as needed including keeping skin clean and dry  - Evaluate need for skin moisturizer/barrier cream  - Collaborate with interdisciplinary team   - Patient/family teaching  - Consider wound care consult   Outcome: Progressing     Problem: PAIN - ADULT  Goal: Verbalizes/displays adequate comfort level or baseline comfort level  Description: Interventions:  - Encourage patient to monitor pain and request assistance  - Assess pain using appropriate pain scale  - Administer analgesics based on type and severity of pain and evaluate response  - Implement non-pharmacological measures as appropriate and evaluate response  - Consider cultural and social influences on pain and pain management  - Notify physician/advanced practitioner if interventions unsuccessful or patient reports new pain  Outcome: Progressing     Problem: INFECTION - ADULT  Goal: Absence or prevention of progression during hospitalization  Description: INTERVENTIONS:  - Assess and monitor for signs and symptoms of infection  - Monitor lab/diagnostic results  - Monitor all insertion sites, i.e. indwelling lines, tubes, and drains  - Monitor endotracheal if appropriate and nasal secretions for changes in amount and color  - [place] appropriate cooling/warming therapies per order  - Administer medications as ordered  - Instruct and encourage patient and family to use good hand hygiene technique  - Identify and instruct in appropriate isolation precautions for  identified infection/condition  Outcome: Progressing     Problem: SAFETY ADULT  Goal: Patient will remain free of falls  Description: INTERVENTIONS:  - Educate patient/family on patient safety including physical limitations  - Instruct patient to call for assistance with activity   - Consult OT/PT to assist with strengthening/mobility   - Keep Call bell within reach  - Keep bed low and locked with side rails adjusted as appropriate  - Keep care items and personal belongings within reach  - Initiate and maintain comfort rounds  - Make Fall Risk Sign visible to staff  - Offer Toileting every 2 Hours, in advance of need  - Initiate/Maintain bed alarm  - Obtain necessary fall risk management equipment  - Apply yellow socks and bracelet for high fall risk patients  - Consider moving patient to room near nurses station  Outcome: Progressing  Goal: Maintain or return to baseline ADL function  Description: INTERVENTIONS:  -  Assess patient's ability to carry out ADLs; assess patient's baseline for ADL function and identify physical deficits which impact ability to perform ADLs (bathing, care of mouth/teeth, toileting, grooming, dressing, etc.)  - Assess/evaluate cause of self-care deficits   - Assess range of motion  - Assess patient's mobility; develop plan if impaired  - Assess patient's need for assistive devices and provide as appropriate  - Encourage maximum independence but intervene and supervise when necessary  - Involve family in performance of ADLs  - Assess for home care needs following discharge   - Consider OT consult to assist with ADL evaluation and planning for discharge  - Provide patient education as appropriate  Outcome: Progressing  Goal: Maintains/Returns to pre admission functional level  Description: INTERVENTIONS:  - Perform AM-PAC 6 Click Basic Mobility/ Daily Activity assessment daily.  - Set and communicate daily mobility goal to care team and patient/family/caregiver.   - Collaborate with  rehabilitation services on mobility goals if consulted  - Perform Range of Motion 2 times a day.  - Reposition patient every 2 hours.  - Dangle patient 2 times a day  - Stand patient 2 times a day  - Ambulate patient 2 times a day  - Out of bed to chair 2 times a day   - Out of bed for meals 2 times a day  - Out of bed for toileting  - Record patient progress and toleration of activity level   Outcome: Progressing     Problem: Nutrition/Hydration-ADULT  Goal: Nutrient/Hydration intake appropriate for improving, restoring or maintaining nutritional needs  Description: Monitor and assess patient's nutrition/hydration status for malnutrition. Collaborate with interdisciplinary team and initiate plan and interventions as ordered.  Monitor patient's weight and dietary intake as ordered or per policy. Utilize nutrition screening tool and intervene as necessary. Determine patient's food preferences and provide high-protein, high-caloric foods as appropriate.     INTERVENTIONS:  - Monitor oral intake, urinary output, labs, and treatment plans  - Assess nutrition and hydration status and recommend course of action  - Evaluate amount of meals eaten  - Assist patient with eating if necessary   - Allow adequate time for meals  - Recommend/ encourage appropriate diets, oral nutritional supplements, and vitamin/mineral supplements  - Order, calculate, and assess calorie counts as needed  - Recommend, monitor, and adjust tube feedings and TPN/PPN based on assessed needs  - Assess need for intravenous fluids  - Provide specific nutrition/hydration education as appropriate  - Include patient/family/caregiver in decisions related to nutrition  Outcome: Progressing

## 2024-01-28 NOTE — ASSESSMENT & PLAN NOTE
Malnutrition Findings:   Adult Malnutrition type: Acute illness  Adult Degree of Malnutrition: Other severe protein calorie malnutrition  Malnutrition Characteristics: Fat loss, Muscle loss, Inadequate energy  360 Statement: Severe calorie protein malnutrition in context of acute illness r/t inadequate PO intake, increased needs, catabolic illness ae evidance by energy intake less than 50% compared to estimated needs>5 days, moderate body fat (triceps, ribcage area) and muscle mass depletions (protruding clavicles, temporal wasting); Treated with liberalized diet, oral supplements    BMI Findings:  Body mass index is 21.13 kg/m².

## 2024-01-28 NOTE — PLAN OF CARE
Problem: Prexisting or High Potential for Compromised Skin Integrity  Goal: Skin integrity is maintained or improved  Description: INTERVENTIONS:  - Identify patients at risk for skin breakdown  - Assess and monitor skin integrity  - Assess and monitor nutrition and hydration status  - Monitor labs   - Assess for incontinence   - Turn and reposition patient  - Assist with mobility/ambulation  - Relieve pressure over bony prominences  - Avoid friction and shearing  - Provide appropriate hygiene as needed including keeping skin clean and dry  - Evaluate need for skin moisturizer/barrier cream  - Collaborate with interdisciplinary team   - Patient/family teaching  - Consider wound care consult   Outcome: Adequate for Discharge     Problem: PAIN - ADULT  Goal: Verbalizes/displays adequate comfort level or baseline comfort level  Description: Interventions:  - Encourage patient to monitor pain and request assistance  - Assess pain using appropriate pain scale  - Administer analgesics based on type and severity of pain and evaluate response  - Implement non-pharmacological measures as appropriate and evaluate response  - Consider cultural and social influences on pain and pain management  - Notify physician/advanced practitioner if interventions unsuccessful or patient reports new pain  Outcome: Adequate for Discharge     Problem: INFECTION - ADULT  Goal: Absence or prevention of progression during hospitalization  Description: INTERVENTIONS:  - Assess and monitor for signs and symptoms of infection  - Monitor lab/diagnostic results  - Monitor all insertion sites, i.e. indwelling lines, tubes, and drains  - Monitor endotracheal if appropriate and nasal secretions for changes in amount and color  - [place] appropriate cooling/warming therapies per order  - Administer medications as ordered  - Instruct and encourage patient and family to use good hand hygiene technique  - Identify and instruct in appropriate isolation  precautions for identified infection/condition  Outcome: Adequate for Discharge     Problem: SAFETY ADULT  Goal: Patient will remain free of falls  Description: INTERVENTIONS:  - Educate patient/family on patient safety including physical limitations  - Instruct patient to call for assistance with activity   - Consult OT/PT to assist with strengthening/mobility   - Keep Call bell within reach  - Keep bed low and locked with side rails adjusted as appropriate  - Keep care items and personal belongings within reach  - Initiate and maintain comfort rounds  - Make Fall Risk Sign visible to staff  - Offer Toileting every 2 Hours, in advance of need  - Initiate/Maintain bed alarm  - Apply yellow socks and bracelet for high fall risk patients  - Consider moving patient to room near nurses station  Outcome: Adequate for Discharge  Goal: Maintain or return to baseline ADL function  Description: INTERVENTIONS:  -  Assess patient's ability to carry out ADLs; assess patient's baseline for ADL function and identify physical deficits which impact ability to perform ADLs (bathing, care of mouth/teeth, toileting, grooming, dressing, etc.)  - Assess/evaluate cause of self-care deficits   - Assess range of motion  - Assess patient's mobility; develop plan if impaired  - Assess patient's need for assistive devices and provide as appropriate  - Encourage maximum independence but intervene and supervise when necessary  - Involve family in performance of ADLs  - Assess for home care needs following discharge   - Consider OT consult to assist with ADL evaluation and planning for discharge  - Provide patient education as appropriate  Outcome: Adequate for Discharge  Goal: Maintains/Returns to pre admission functional level  Description: INTERVENTIONS:  - Perform AM-PAC 6 Click Basic Mobility/ Daily Activity assessment daily.  - Set and communicate daily mobility goal to care team and patient/family/caregiver.   - Collaborate with  rehabilitation services on mobility goals if consulted  - Perform Range of Motion 3 times a day.  - Reposition patient every 2 hours.  - Dangle patient 3 times a day  - Stand patient 3 times a day  - Ambulate patient 3 times a day  - Out of bed to chair 3 times a day   - Out of bed for meals 3 times a day  - Out of bed for toileting  - Record patient progress and toleration of activity level   Outcome: Adequate for Discharge     Problem: Nutrition/Hydration-ADULT  Goal: Nutrient/Hydration intake appropriate for improving, restoring or maintaining nutritional needs  Description: Monitor and assess patient's nutrition/hydration status for malnutrition. Collaborate with interdisciplinary team and initiate plan and interventions as ordered.  Monitor patient's weight and dietary intake as ordered or per policy. Utilize nutrition screening tool and intervene as necessary. Determine patient's food preferences and provide high-protein, high-caloric foods as appropriate.     INTERVENTIONS:  - Monitor oral intake, urinary output, labs, and treatment plans  - Assess nutrition and hydration status and recommend course of action  - Evaluate amount of meals eaten  - Assist patient with eating if necessary   - Allow adequate time for meals  - Recommend/ encourage appropriate diets, oral nutritional supplements, and vitamin/mineral supplements  - Order, calculate, and assess calorie counts as needed  - Recommend, monitor, and adjust tube feedings and TPN/PPN based on assessed needs  - Assess need for intravenous fluids  - Provide specific nutrition/hydration education as appropriate  - Include patient/family/caregiver in decisions related to nutrition  Outcome: Adequate for Discharge

## 2024-01-28 NOTE — PROGRESS NOTES
"Progress Note - Cardiology   Shannan Vee 53 y.o. female MRN: [id number]  Unit/Bed#: E2 -01 Encounter: [id number]          Assessment / Plan:  Large pleural effusion  Status post Pleurx catheter placement by IR 1/24/2025   Left breast cancer with metastasis to the bone, right lung and mediastinum  Defer to oncology.  Sinus tachycardia  Multifactorial etiology. Continue Toprol 25 mg QD on discharge. Thankfully there is no tachy-mediated HF on TTE.    Other problems: goals of care discussions, malnutrition, anemia, palliative care patient, cancer pain      CARDIOLOGY WILL SIGN OFF.    Subjective:  Patient reports she feels heart beating in her chest with some mild discomfort most especially when people are talking to her about her medical problems or when she feels anxiety or overwhelmed with her medical problems. Sometimes it is assc with SOB. Denies exertional symptoms of dyspnea or CP.     Vitals:  Vitals:    01/28/24 1026   Weight: 57.6 kg (127 lb)   ,  Vitals:    01/27/24 1530 01/27/24 2206 01/28/24 0826 01/28/24 1026   BP: 117/69 133/68 121/81 121/81   BP Location: Right arm Right arm Right arm    Pulse: (!) 121 (!) 126 (!) 132 (!) 110   Resp: 17 18 20    Temp: 98.9 °F (37.2 °C) 98.9 °F (37.2 °C) 98.1 °F (36.7 °C)    TempSrc: Temporal Temporal Temporal    SpO2: 97% 97% 99%    Weight:    57.6 kg (127 lb)   Height:    5' 5\" (1.651 m)       Exam:  Physical Exam  Vitals reviewed.   Constitutional:       General: She is not in acute distress.     Appearance: She is not ill-appearing.      Comments: emaciated   HENT:      Head: Normocephalic and atraumatic.      Nose: No congestion.      Mouth/Throat:      Mouth: Mucous membranes are moist.   Eyes:      Conjunctiva/sclera: Conjunctivae normal.   Neck:      Comments: No jvd  Cardiovascular:      Comments: Regular rhythm, HR during my encounter 118 BPM no murmur appreciated. S1 & S2.  Pulmonary:      Comments: No increased work of breathing. There is pleurex " catheter on the right side  No hypoxia , she is on RA    No cough  Musculoskeletal:         General: No swelling.   Skin:     General: Skin is warm and dry.   Neurological:      General: No focal deficit present.      Mental Status: She is alert.   Psychiatric:         Behavior: Behavior normal.                 Telemetry:       This patient is not on telemetry.    Medications:    Current Facility-Administered Medications:     acetaminophen (TYLENOL) tablet 650 mg, 650 mg, Oral, Q6H PRN, Billy Smith, , 650 mg at 01/24/24 0518    cefTRIAXone (ROCEPHIN) IVPB (premix in dextrose) 1,000 mg 50 mL, 1,000 mg, Intravenous, Q24H, Billy Smith, DO, Last Rate: 100 mL/hr at 01/27/24 2210, 1,000 mg at 01/27/24 2210    enoxaparin (LOVENOX) subcutaneous injection 40 mg, 40 mg, Subcutaneous, Q24H ALEXANDRE, Gibson George MD, 40 mg at 01/28/24 0848    HYDROmorphone (DILAUDID) injection 0.5 mg, 0.5 mg, Intravenous, Q3H PRN, Janie Briones MD, 0.5 mg at 01/27/24 1948    lidocaine (LIDODERM) 5 % patch 2 patch, 2 patch, Topical, Daily, Billy Smith DO, 2 patch at 01/28/24 0847    [START ON 1/29/2024] metoprolol succinate (TOPROL-XL) 24 hr tablet 25 mg, 25 mg, Oral, Daily, Fannie Carroll PA-C    oxyCODONE (ROXICODONE) immediate release tablet 10 mg, 10 mg, Oral, Q4H PRN, Janie Briones MD, 10 mg at 01/28/24 0847    oxyCODONE (ROXICODONE) immediate release tablet 10 mg, 10 mg, Oral, Q6H ALEXANDRE, Janie Briones MD, 10 mg at 01/28/24 0609    oxyCODONE (ROXICODONE) IR tablet 5 mg, 5 mg, Oral, Q4H PRN, Janie Briones MD    polyethylene glycol (MIRALAX) packet 17 g, 17 g, Oral, Daily PRN, Janie Briones MD, 17 g at 01/26/24 2202    Ribociclib Succ (600 MG Dose) TBPK 600 mg, 600 mg, Oral, Daily, Billy Smith DO    senna-docusate sodium (SENOKOT S) 8.6-50 mg per tablet 1 tablet, 1 tablet, Oral, HS, Janie Briones MD, 1 tablet at 01/27/24 2210    Labs/Data:        Results from last 7 days   Lab Units  01/27/24  1002 01/24/24  0455 01/23/24  1554   WBC Thousand/uL 7.42 10.96* 12.87*   HEMOGLOBIN g/dL 7.6* 7.8* 9.2*   HEMATOCRIT % 25.5* 25.7* 30.1*   PLATELETS Thousands/uL 467* 557* 612*     Results from last 7 days   Lab Units 01/24/24  0455 01/23/24  1554   POTASSIUM mmol/L 3.8 3.6   CHLORIDE mmol/L 95* 93*   CO2 mmol/L 28 24   BUN mg/dL 7 7   CREATININE mg/dL 0.47* 0.46*             Fannie Carroll PA-C

## 2024-01-29 ENCOUNTER — HOSPITAL ENCOUNTER (OUTPATIENT)
Dept: INFUSION CENTER | Facility: HOSPITAL | Age: 54
End: 2024-01-29
Attending: INTERNAL MEDICINE

## 2024-01-29 PROBLEM — E87.1 HYPONATREMIA: Status: ACTIVE | Noted: 2024-01-29

## 2024-01-29 LAB
ABO GROUP BLD BPU: NORMAL
ANION GAP SERPL CALCULATED.3IONS-SCNC: 7 MMOL/L
ANISOCYTOSIS BLD QL SMEAR: PRESENT
BASOPHILS # BLD MANUAL: 0 THOUSAND/UL (ref 0–0.1)
BASOPHILS NFR MAR MANUAL: 0 % (ref 0–1)
BPU ID: NORMAL
BUN SERPL-MCNC: 7 MG/DL (ref 5–25)
CALCIUM SERPL-MCNC: 8.1 MG/DL (ref 8.4–10.2)
CHLORIDE SERPL-SCNC: 97 MMOL/L (ref 96–108)
CO2 SERPL-SCNC: 27 MMOL/L (ref 21–32)
CREAT SERPL-MCNC: 0.42 MG/DL (ref 0.6–1.3)
CROSSMATCH: NORMAL
EOSINOPHIL # BLD MANUAL: 0.09 THOUSAND/UL (ref 0–0.4)
EOSINOPHIL NFR BLD MANUAL: 1 % (ref 0–6)
ERYTHROCYTE [DISTWIDTH] IN BLOOD BY AUTOMATED COUNT: 18.6 % (ref 11.6–15.1)
GFR SERPL CREATININE-BSD FRML MDRD: 115 ML/MIN/1.73SQ M
GLUCOSE SERPL-MCNC: 98 MG/DL (ref 65–140)
HCT VFR BLD AUTO: 29 % (ref 34.8–46.1)
HGB BLD-MCNC: 9.1 G/DL (ref 11.5–15.4)
LYMPHOCYTES # BLD AUTO: 1.86 THOUSAND/UL (ref 0.6–4.47)
LYMPHOCYTES # BLD AUTO: 21 % (ref 14–44)
MCH RBC QN AUTO: 27.5 PG (ref 26.8–34.3)
MCHC RBC AUTO-ENTMCNC: 31.4 G/DL (ref 31.4–37.4)
MCV RBC AUTO: 88 FL (ref 82–98)
METAMYELOCYTES NFR BLD MANUAL: 1 % (ref 0–1)
MONOCYTES # BLD AUTO: 1.33 THOUSAND/UL (ref 0–1.22)
MONOCYTES NFR BLD: 15 % (ref 4–12)
NEUTROPHILS # BLD MANUAL: 5.51 THOUSAND/UL (ref 1.85–7.62)
NEUTS BAND NFR BLD MANUAL: 4 % (ref 0–8)
NEUTS SEG NFR BLD AUTO: 58 % (ref 43–75)
NRBC BLD AUTO-RTO: 1 /100 WBC (ref 0–2)
OVALOCYTES BLD QL SMEAR: PRESENT
PLATELET # BLD AUTO: 447 THOUSANDS/UL (ref 149–390)
PLATELET BLD QL SMEAR: ABNORMAL
PMV BLD AUTO: 8.6 FL (ref 8.9–12.7)
POLYCHROMASIA BLD QL SMEAR: PRESENT
POTASSIUM SERPL-SCNC: 3.9 MMOL/L (ref 3.5–5.3)
RBC # BLD AUTO: 3.31 MILLION/UL (ref 3.81–5.12)
SODIUM SERPL-SCNC: 131 MMOL/L (ref 135–147)
UNIT DISPENSE STATUS: NORMAL
UNIT PRODUCT CODE: NORMAL
UNIT PRODUCT VOLUME: 350 ML
UNIT RH: NORMAL
WBC # BLD AUTO: 8.88 THOUSAND/UL (ref 4.31–10.16)

## 2024-01-29 PROCEDURE — 85027 COMPLETE CBC AUTOMATED: CPT | Performed by: INTERNAL MEDICINE

## 2024-01-29 PROCEDURE — 85007 BL SMEAR W/DIFF WBC COUNT: CPT | Performed by: INTERNAL MEDICINE

## 2024-01-29 PROCEDURE — 99232 SBSQ HOSP IP/OBS MODERATE 35: CPT | Performed by: INTERNAL MEDICINE

## 2024-01-29 PROCEDURE — 80048 BASIC METABOLIC PNL TOTAL CA: CPT | Performed by: INTERNAL MEDICINE

## 2024-01-29 RX ORDER — METOPROLOL SUCCINATE 25 MG/1
25 TABLET, EXTENDED RELEASE ORAL ONCE
Status: COMPLETED | OUTPATIENT
Start: 2024-01-29 | End: 2024-01-29

## 2024-01-29 RX ORDER — SODIUM CHLORIDE 1 G/1
1 TABLET ORAL
Status: DISCONTINUED | OUTPATIENT
Start: 2024-01-29 | End: 2024-01-30 | Stop reason: HOSPADM

## 2024-01-29 RX ORDER — METOPROLOL SUCCINATE 50 MG/1
50 TABLET, EXTENDED RELEASE ORAL DAILY
Status: DISCONTINUED | OUTPATIENT
Start: 2024-01-30 | End: 2024-01-30 | Stop reason: HOSPADM

## 2024-01-29 RX ADMIN — OXYCODONE HYDROCHLORIDE 10 MG: 10 TABLET ORAL at 22:10

## 2024-01-29 RX ADMIN — SODIUM CHLORIDE 1 G: 1 TABLET ORAL at 18:08

## 2024-01-29 RX ADMIN — OXYCODONE HYDROCHLORIDE 10 MG: 10 TABLET ORAL at 18:08

## 2024-01-29 RX ADMIN — OXYCODONE HYDROCHLORIDE 10 MG: 10 TABLET ORAL at 16:01

## 2024-01-29 RX ADMIN — OXYCODONE HYDROCHLORIDE 10 MG: 10 TABLET ORAL at 09:43

## 2024-01-29 RX ADMIN — METOPROLOL SUCCINATE 25 MG: 25 TABLET, EXTENDED RELEASE ORAL at 09:43

## 2024-01-29 RX ADMIN — OXYCODONE HYDROCHLORIDE 10 MG: 10 TABLET ORAL at 06:26

## 2024-01-29 RX ADMIN — SENNOSIDES AND DOCUSATE SODIUM 1 TABLET: 8.6; 5 TABLET ORAL at 22:12

## 2024-01-29 RX ADMIN — METOPROLOL SUCCINATE 25 MG: 25 TABLET, EXTENDED RELEASE ORAL at 12:45

## 2024-01-29 RX ADMIN — CEFTRIAXONE 1000 MG: 1 INJECTION, SOLUTION INTRAVENOUS at 22:12

## 2024-01-29 RX ADMIN — LIDOCAINE 5% 2 PATCH: 700 PATCH TOPICAL at 09:44

## 2024-01-29 RX ADMIN — OXYCODONE HYDROCHLORIDE 10 MG: 10 TABLET ORAL at 12:45

## 2024-01-29 RX ADMIN — ENOXAPARIN SODIUM 40 MG: 40 INJECTION SUBCUTANEOUS at 09:44

## 2024-01-29 RX ADMIN — OXYCODONE HYDROCHLORIDE 10 MG: 10 TABLET ORAL at 03:48

## 2024-01-29 NOTE — ASSESSMENT & PLAN NOTE
"Pt is a 52 yo female with was admitted with a recurrent large right pleural effusion, due malignancy    She presented with dyspnea, and hypoxia requiring 3 L nasal cannula  CT on admission \"Marked progression of right pleural metastases with huge right pleural effusion causing near complete compressive atelectasis of the right lung and mediastinal shift to the left \"  she was evaluated by IR team  Status post thoracentesis (-1900ml) on 1/24/24 and drainage catheter placement  by IR  She recently had thoracentesis on 1/18 at [facility] with 1.5 L of fluid removed: Cytology positive for malignant cells  Continue bedside drainage by nursing while in the hospital, and will resume drainage at home with help of VNA on discharge  DC further antibiotics.  She has completed 5 days of empiric IV cephalosporin as her initial CAT scan revealed \"mild peripheral groundglass opacity in the left upper lobe\".  No current evidence of infection: Afebrile with a normal white blood cell count and blood cultures negative x 2  "

## 2024-01-29 NOTE — ASSESSMENT & PLAN NOTE
Likely secondary to SIADH due to widely metastatic malignancy as well as pleural effusion  Sodium currently ranging 133-131  Will start salt tablets

## 2024-01-29 NOTE — ASSESSMENT & PLAN NOTE
Multifactorial due to blood loss, cancer, antineoplastic agent  Baseline Hemoglobin appears to range 7-9  Pt was transfused 1u PRBC on 1/28 for Hb 7.6 with improvement to 9.1  Cont outpt follow up

## 2024-01-29 NOTE — ASSESSMENT & PLAN NOTE
Pt was noted to have persistent sinus tachycardia: Multifactorial secondary to malignant pleural effusion, metastatic disease, atelectasis, pain   Heart rate improved with pleural fluid drainage and addition of Toprol-XL 25 mg daily, however was predominantly 120-130  She was then transfused 1 unit PRBC  Discussed case with cardiology: Patient has a history of persistent sinus tachycardia for several years  Patient had a 2D echocardiogram 1/28: Left ventricular ejection fraction 55%.  Normal wall motion.  Grade 1 diastolic dysfunction.  No significant left ventricular hypertrophy or evidence of tachycardia induced cardiomyopathy  Per cardiology: Patient has chronic, persistent sinus tachycardia, possibly secondary to syndrome of inappropriate tachycardia: They recommend continuing to titrate beta-blocker as tolerated.  No additional cardiac workup required

## 2024-01-29 NOTE — ASSESSMENT & PLAN NOTE
Patient follows with palliative care team due to cancer associated pain, from her metastatic breast cancer  Per palliative care: While as an inpatient she was scheduled oxycodone immediate release 10 mg every 6 hours around-the-clock  At discharge palliative care recommends continuing oxycodone immediate release 10 mg every 4 hours as needed for pain  Continue to follow with the palliative care team as an outpatient

## 2024-01-29 NOTE — PROGRESS NOTES
"[facility]  Progress Note  Name: Shannan Vee I  MRN: [id number]  Unit/Bed#: E2 -01 I Date of Admission: 1/23/2024   Date of Service: 1/29/2024 I Hospital Day: 5    Assessment/Plan   * Large pleural effusion  Assessment & Plan  Pt is a 52 yo female with was admitted with a recurrent large right pleural effusion, due malignancy    She presented with dyspnea, and hypoxia requiring 3 L nasal cannula  CT on admission \"Marked progression of right pleural metastases with huge right pleural effusion causing near complete compressive atelectasis of the right lung and mediastinal shift to the left \"  she was evaluated by IR team  Status post thoracentesis (-1900ml) on 1/24/24 and drainage catheter placement  by IR  She recently had thoracentesis on 1/18 at [facility] with 1.5 L of fluid removed: Cytology positive for malignant cells  Continue bedside drainage by nursing while in the hospital, and will resume drainage at home with help of VNA on discharge  DC further antibiotics.  She has completed 5 days of empiric IV cephalosporin as her initial CAT scan revealed \"mild peripheral groundglass opacity in the left upper lobe\".  No current evidence of infection: Afebrile with a normal white blood cell count and blood cultures negative x 2    Sinus tachycardia  Assessment & Plan  Pt was noted to have persistent sinus tachycardia: Multifactorial secondary to malignant pleural effusion, metastatic disease, atelectasis, pain   Heart rate improved with pleural fluid drainage and addition of Toprol-XL 25 mg daily, however was predominantly 120-130  She was then transfused 1 unit PRBC  Discussed case with cardiology: Patient has a history of persistent sinus tachycardia for several years  Patient had a 2D echocardiogram 1/28: Left ventricular ejection fraction 55%.  Normal wall motion.  Grade 1 diastolic dysfunction.  No significant left ventricular hypertrophy or evidence of tachycardia " induced cardiomyopathy  Per cardiology: Patient has chronic, persistent sinus tachycardia, possibly secondary to syndrome of inappropriate tachycardia: They recommend continuing to titrate beta-blocker as tolerated.  No additional cardiac workup required    Hyponatremia  Assessment & Plan  Likely secondary to SIADH due to widely metastatic malignancy as well as pleural effusion  Sodium currently ranging 133-131  Will start salt tablets    Severe protein-calorie malnutrition (HCC)  Assessment & Plan  Malnutrition Findings:   Adult Malnutrition type: Acute illness  Adult Degree of Malnutrition: Other severe protein calorie malnutrition  Malnutrition Characteristics: Fat loss, Muscle loss, Inadequate energy  360 Statement: Severe calorie protein malnutrition in context of acute illness r/t inadequate PO intake, increased needs, catabolic illness ae evidance by energy intake less than 50% compared to estimated needs>5 days, moderate body fat (triceps, ribcage area) and muscle mass depletions (protruding clavicles, temporal wasting); Treated with liberalized diet, oral supplements    BMI Findings:  Body mass index is 21.13 kg/m².       Cancer associated pain  Assessment & Plan  Patient follows with palliative care team due to cancer associated pain, from her metastatic breast cancer  Per palliative care: While as an inpatient she was scheduled oxycodone immediate release 10 mg every 6 hours around-the-clock  At discharge palliative care recommends continuing oxycodone immediate release 10 mg every 4 hours as needed for pain  Continue to follow with the palliative care team as an outpatient    Other specified anemias  Assessment & Plan  Multifactorial due to blood loss, cancer, antineoplastic agent  Baseline Hemoglobin appears to range 7-9  Pt was transfused 1u PRBC on 1/28 for Hb 7.6 with improvement to 9.1  Cont outpt follow up      Malignant neoplasm of overlapping sites of left female breast (HCC)  Assessment &  Plan  Patient has a history stage IV breast cancer with  metastatic disease to the bone.  She was evaluated by Dr. Hearn from Oncology:  who discussed the case with pt's primary Oncologist Dr. Villavicencio: pt is to follow up with Dr. Villavicencio after discharge to discuss systemic therapy.  Pt will also fu at [facility] to complete her radiation therapy.  Cont outpatient palliative care follow-up for symptom management      Acute respiratory insufficiency-resolved as of 1/28/2024  Assessment & Plan  Patient initially presented with acute respiratory insufficiency secondary to large pleural effusion  This improved status post thoracentesis and Pleurx catheter placement  Currently adequate oxygenation on room air             Family: updated sister in law at bedside.  Updated daughter Ajit in Sinhal[place] via phone.  She has learned how to drain the Pleurx catheter but would like more practice prior to patient going home      Dw pts nurse and CM      VTE Pharmacologic Prophylaxis: Enoxaparin (Lovenox)  VTE Mechanical Prophylaxis: sequential compression device      Certification Statement: The patient will continue to require additional inpatient hospital stay due to need for further acute intervention for pleurex training      Status: inpatient     ===================================================================    Subjective:  Patient notes the 10 mg of oxycodone is controlling her pain.  She states she does need a prescription for this at discharge as she only has 2 tablets at home.  She notes she passed her bowels yesterday but none today.  She is agreeable to a laxative.  She denies any shortness of breath.  She is tolerating p.o. with a good appetite.  She states that she would like more practice for her daughter to drain the tube and would like to be discharged tomorrow rather than today      Physical Exam:   Temp:  [97.8 °F (36.6 °C)-99 °F (37.2 °C)] 97.8 °F (36.6 °C)  HR:  [119-137] 122  Resp:  [18-22] 22  BP:  (115-137)/(66-83) 115/66    Gen:  Pleasant, non-tachypnic, non-dyspnic.  Conversant.  Heart: regular rate and rhythm, S1S2 present, no murmur, rub or gallop  Lungs: Decreased breath sounds at right base, however otherwise clear to ausculatation bilaterally.  No wheezing, crackles, or rhonchi. No accessory muscle use or respiratory distress.  Abd: soft, non-tender, non-distended. NABS, no guarding, rebound or peritoneal signs.  Extremities: no clubbing, cyanosis or edema.  2+pedal pulses bilaterally. Full range of motion  Neuro: awake, alert and oriented.  Fluent speech.  Interactive  Skin: warm and dry: no petechiae, purpura and rash.    LABS:   Results from last 7 days   Lab Units 01/29/24  0504 01/27/24  1002 01/24/24  0455   WBC Thousand/uL 8.88 7.42 10.96*   HEMOGLOBIN g/dL 9.1* 7.6* 7.8*   HEMATOCRIT % 29.0* 25.5* 25.7*   PLATELETS Thousands/uL 447* 467* 557*     Results from last 7 days   Lab Units 01/29/24  0504 01/24/24  0455 01/23/24  1554   POTASSIUM mmol/L 3.9 3.8 3.6   CHLORIDE mmol/L 97 95* 93*   CO2 mmol/L 27 28 24   BUN mg/dL 7 7 7   CREATININE mg/dL 0.42* 0.47* 0.46*   CALCIUM mg/dL 8.1* 8.4 8.8       Hospital Data:  1/25: Chest x-ray  Moderate right pleural effusion     1/23 CTA chest PE study  No definite acute pulmonary emboli but respiratory motion limits detection.     Marked progression of right pleural metastases with huge right pleural effusion causing near complete compressive atelectasis of the right lung and mediastinal shift to the left. Recommend chest tube placement to relieve mediastinal shift.     Mild peripheral groundglass opacity in the left upper lobe, infectious or inflammatory.     Redemonstration of extensive bone metastases.    Procedures  1/24 IR right lung Pleurx catheter placement  Right tunneled pleural drainage catheter placement using 16 Turkish catheter.     Microbiology  1/23: Blood culture: Negative x 2  1/23: Negative COVID, influenza,  RSV    ---------------------------------------------------------------------------------------------------------------  This note has been constructed using a voice recognition system.

## 2024-01-29 NOTE — PLAN OF CARE
Problem: Prexisting or High Potential for Compromised Skin Integrity  Goal: Skin integrity is maintained or improved  Description: INTERVENTIONS:  - Identify patients at risk for skin breakdown  - Assess and monitor skin integrity  - Assess and monitor nutrition and hydration status  - Monitor labs   - Assess for incontinence   - Turn and reposition patient  - Assist with mobility/ambulation  - Relieve pressure over bony prominences  - Avoid friction and shearing  - Provide appropriate hygiene as needed including keeping skin clean and dry  - Evaluate need for skin moisturizer/barrier cream  - Collaborate with interdisciplinary team   - Patient/family teaching  - Consider wound care consult   Outcome: Progressing     Problem: PAIN - ADULT  Goal: Verbalizes/displays adequate comfort level or baseline comfort level  Description: Interventions:  - Encourage patient to monitor pain and request assistance  - Assess pain using appropriate pain scale  - Administer analgesics based on type and severity of pain and evaluate response  - Implement non-pharmacological measures as appropriate and evaluate response  - Consider cultural and social influences on pain and pain management  - Notify physician/advanced practitioner if interventions unsuccessful or patient reports new pain  Outcome: Progressing     Problem: INFECTION - ADULT  Goal: Absence or prevention of progression during hospitalization  Description: INTERVENTIONS:  - Assess and monitor for signs and symptoms of infection  - Monitor lab/diagnostic results  - Monitor all insertion sites, i.e. indwelling lines, tubes, and drains  - Monitor endotracheal if appropriate and nasal secretions for changes in amount and color  - [place] appropriate cooling/warming therapies per order  - Administer medications as ordered  - Instruct and encourage patient and family to use good hand hygiene technique  - Identify and instruct in appropriate isolation precautions for  identified infection/condition  Outcome: Progressing     Problem: SAFETY ADULT  Goal: Patient will remain free of falls  Description: INTERVENTIONS:  - Educate patient/family on patient safety including physical limitations  - Instruct patient to call for assistance with activity   - Consult OT/PT to assist with strengthening/mobility   - Keep Call bell within reach  - Keep bed low and locked with side rails adjusted as appropriate  - Keep care items and personal belongings within reach  - Initiate and maintain comfort rounds  - Make Fall Risk Sign visible to staff  - Offer Toileting every 2 Hours, in advance of need  - Initiate/Maintain Bed alarm  - Apply yellow socks and bracelet for high fall risk patients  - Consider moving patient to room near nurses station  Outcome: Progressing  Goal: Maintain or return to baseline ADL function  Description: INTERVENTIONS:  -  Assess patient's ability to carry out ADLs; assess patient's baseline for ADL function and identify physical deficits which impact ability to perform ADLs (bathing, care of mouth/teeth, toileting, grooming, dressing, etc.)  - Assess/evaluate cause of self-care deficits   - Assess range of motion  - Assess patient's mobility; develop plan if impaired  - Assess patient's need for assistive devices and provide as appropriate  - Encourage maximum independence but intervene and supervise when necessary  - Involve family in performance of ADLs  - Assess for home care needs following discharge   - Consider OT consult to assist with ADL evaluation and planning for discharge  - Provide patient education as appropriate  Outcome: Progressing  Goal: Maintains/Returns to pre admission functional level  Description: INTERVENTIONS:  - Perform AM-PAC 6 Click Basic Mobility/ Daily Activity assessment daily.  - Set and communicate daily mobility goal to care team and patient/family/caregiver.   - Collaborate with rehabilitation services on mobility goals if consulted  -  Perform Range of Motion 3 times a day.  - Reposition patient every 2 hours.  - Dangle patient 3 times a day  - Stand patient 3 times a day  - Ambulate patient 3 times a day  - Out of bed to chair 3 times a day   - Out of bed for meals 3 times a day  - Out of bed for toileting  - Record patient progress and toleration of activity level   Outcome: Progressing     Problem: Nutrition/Hydration-ADULT  Goal: Nutrient/Hydration intake appropriate for improving, restoring or maintaining nutritional needs  Description: Monitor and assess patient's nutrition/hydration status for malnutrition. Collaborate with interdisciplinary team and initiate plan and interventions as ordered.  Monitor patient's weight and dietary intake as ordered or per policy. Utilize nutrition screening tool and intervene as necessary. Determine patient's food preferences and provide high-protein, high-caloric foods as appropriate.     INTERVENTIONS:  - Monitor oral intake, urinary output, labs, and treatment plans  - Assess nutrition and hydration status and recommend course of action  - Evaluate amount of meals eaten  - Assist patient with eating if necessary   - Allow adequate time for meals  - Recommend/ encourage appropriate diets, oral nutritional supplements, and vitamin/mineral supplements  - Order, calculate, and assess calorie counts as needed  - Recommend, monitor, and adjust tube feedings and TPN/PPN based on assessed needs  - Assess need for intravenous fluids  - Provide specific nutrition/hydration education as appropriate  - Include patient/family/caregiver in decisions related to nutrition  Outcome: Progressing

## 2024-01-29 NOTE — PLAN OF CARE
Problem: Prexisting or High Potential for Compromised Skin Integrity  Goal: Skin integrity is maintained or improved  Description: INTERVENTIONS:  - Identify patients at risk for skin breakdown  - Assess and monitor skin integrity  - Assess and monitor nutrition and hydration status  - Monitor labs   - Assess for incontinence   - Turn and reposition patient  - Assist with mobility/ambulation  - Relieve pressure over bony prominences  - Avoid friction and shearing  - Provide appropriate hygiene as needed including keeping skin clean and dry  - Evaluate need for skin moisturizer/barrier cream  - Collaborate with interdisciplinary team   - Patient/family teaching  - Consider wound care consult   Outcome: Progressing     Problem: PAIN - ADULT  Goal: Verbalizes/displays adequate comfort level or baseline comfort level  Description: Interventions:  - Encourage patient to monitor pain and request assistance  - Assess pain using appropriate pain scale  - Administer analgesics based on type and severity of pain and evaluate response  - Implement non-pharmacological measures as appropriate and evaluate response  - Consider cultural and social influences on pain and pain management  - Notify physician/advanced practitioner if interventions unsuccessful or patient reports new pain  Outcome: Progressing     Problem: INFECTION - ADULT  Goal: Absence or prevention of progression during hospitalization  Description: INTERVENTIONS:  - Assess and monitor for signs and symptoms of infection  - Monitor lab/diagnostic results  - Monitor all insertion sites, i.e. indwelling lines, tubes, and drains  - Monitor endotracheal if appropriate and nasal secretions for changes in amount and color  - [place] appropriate cooling/warming therapies per order  - Administer medications as ordered  - Instruct and encourage patient and family to use good hand hygiene technique  - Identify and instruct in appropriate isolation precautions for  identified infection/condition  Outcome: Progressing     Problem: SAFETY ADULT  Goal: Patient will remain free of falls  Description: INTERVENTIONS:  - Educate patient/family on patient safety including physical limitations  - Instruct patient to call for assistance with activity   - Consult OT/PT to assist with strengthening/mobility   - Keep Call bell within reach  - Keep bed low and locked with side rails adjusted as appropriate  - Keep care items and personal belongings within reach  - Initiate and maintain comfort rounds  - Make Fall Risk Sign visible to staff  - Offer Toileting every 2 Hours, in advance of need  - Initiate/Maintain bed alarm  - Obtain necessary fall risk management equipment  - Apply yellow socks and bracelet for high fall risk patients  - Consider moving patient to room near nurses station  Outcome: Progressing  Goal: Maintain or return to baseline ADL function  Description: INTERVENTIONS:  -  Assess patient's ability to carry out ADLs; assess patient's baseline for ADL function and identify physical deficits which impact ability to perform ADLs (bathing, care of mouth/teeth, toileting, grooming, dressing, etc.)  - Assess/evaluate cause of self-care deficits   - Assess range of motion  - Assess patient's mobility; develop plan if impaired  - Assess patient's need for assistive devices and provide as appropriate  - Encourage maximum independence but intervene and supervise when necessary  - Involve family in performance of ADLs  - Assess for home care needs following discharge   - Consider OT consult to assist with ADL evaluation and planning for discharge  - Provide patient education as appropriate  Outcome: Progressing  Goal: Maintains/Returns to pre admission functional level  Description: INTERVENTIONS:  - Perform AM-PAC 6 Click Basic Mobility/ Daily Activity assessment daily.  - Set and communicate daily mobility goal to care team and patient/family/caregiver.   - Collaborate with  rehabilitation services on mobility goals if consulted  - Perform Range of Motion 2 times a day.  - Reposition patient every 2 hours.  - Dangle patient 2 times a day  - Stand patient 2 times a day  - Ambulate patient 2 times a day  - Out of bed to chair 2 times a day   - Out of bed for meals 2 times a day  - Out of bed for toileting  - Record patient progress and toleration of activity level   Outcome: Progressing     Problem: Nutrition/Hydration-ADULT  Goal: Nutrient/Hydration intake appropriate for improving, restoring or maintaining nutritional needs  Description: Monitor and assess patient's nutrition/hydration status for malnutrition. Collaborate with interdisciplinary team and initiate plan and interventions as ordered.  Monitor patient's weight and dietary intake as ordered or per policy. Utilize nutrition screening tool and intervene as necessary. Determine patient's food preferences and provide high-protein, high-caloric foods as appropriate.     INTERVENTIONS:  - Monitor oral intake, urinary output, labs, and treatment plans  - Assess nutrition and hydration status and recommend course of action  - Evaluate amount of meals eaten  - Assist patient with eating if necessary   - Allow adequate time for meals  - Recommend/ encourage appropriate diets, oral nutritional supplements, and vitamin/mineral supplements  - Order, calculate, and assess calorie counts as needed  - Recommend, monitor, and adjust tube feedings and TPN/PPN based on assessed needs  - Assess need for intravenous fluids  - Provide specific nutrition/hydration education as appropriate  - Include patient/family/caregiver in decisions related to nutrition  Outcome: Progressing

## 2024-01-29 NOTE — ASSESSMENT & PLAN NOTE
Patient initially presented with acute respiratory insufficiency secondary to large pleural effusion  This improved status post thoracentesis and Pleurx catheter placement  Currently adequate oxygenation on room air

## 2024-01-29 NOTE — ASSESSMENT & PLAN NOTE
Patient has a history stage IV breast cancer with  metastatic disease to the bone.  She was evaluated by Dr. Hearn from Oncology:  who discussed the case with pt's primary Oncologist Dr. Villavicencio: pt is to follow up with Dr. Villavicencio after discharge to discuss systemic therapy.  Pt will also fu at [facility] to complete her radiation therapy.  Cont outpatient palliative care follow-up for symptom management

## 2024-01-30 ENCOUNTER — TELEPHONE (OUTPATIENT)
Dept: PALLIATIVE MEDICINE | Facility: CLINIC | Age: 54
End: 2024-01-30

## 2024-01-30 VITALS
OXYGEN SATURATION: 100 % | HEART RATE: 122 BPM | TEMPERATURE: 98.2 F | SYSTOLIC BLOOD PRESSURE: 114 MMHG | BODY MASS INDEX: 21.16 KG/M2 | DIASTOLIC BLOOD PRESSURE: 78 MMHG | WEIGHT: 127 LBS | HEIGHT: 65 IN | RESPIRATION RATE: 18 BRPM

## 2024-01-30 LAB
ANION GAP SERPL CALCULATED.3IONS-SCNC: 10 MMOL/L
BASOPHILS # BLD AUTO: 0.07 THOUSANDS/ÂΜL (ref 0–0.1)
BASOPHILS NFR BLD AUTO: 1 % (ref 0–1)
BUN SERPL-MCNC: 10 MG/DL (ref 5–25)
CALCIUM SERPL-MCNC: 8.5 MG/DL (ref 8.4–10.2)
CHLORIDE SERPL-SCNC: 98 MMOL/L (ref 96–108)
CO2 SERPL-SCNC: 25 MMOL/L (ref 21–32)
CREAT SERPL-MCNC: 0.45 MG/DL (ref 0.6–1.3)
EOSINOPHIL # BLD AUTO: 0 THOUSAND/ÂΜL (ref 0–0.61)
EOSINOPHIL NFR BLD AUTO: 0 % (ref 0–6)
ERYTHROCYTE [DISTWIDTH] IN BLOOD BY AUTOMATED COUNT: 18.8 % (ref 11.6–15.1)
GFR SERPL CREATININE-BSD FRML MDRD: 113 ML/MIN/1.73SQ M
GLUCOSE SERPL-MCNC: 77 MG/DL (ref 65–140)
HCT VFR BLD AUTO: 32.4 % (ref 34.8–46.1)
HGB BLD-MCNC: 10 G/DL (ref 11.5–15.4)
IMM GRANULOCYTES # BLD AUTO: >0.5 THOUSAND/UL (ref 0–0.2)
IMM GRANULOCYTES NFR BLD AUTO: 6 % (ref 0–2)
LYMPHOCYTES # BLD AUTO: 2.34 THOUSANDS/ÂΜL (ref 0.6–4.47)
LYMPHOCYTES NFR BLD AUTO: 22 % (ref 14–44)
MCH RBC QN AUTO: 27 PG (ref 26.8–34.3)
MCHC RBC AUTO-ENTMCNC: 30.9 G/DL (ref 31.4–37.4)
MCV RBC AUTO: 87 FL (ref 82–98)
MONOCYTES # BLD AUTO: 1.43 THOUSAND/ÂΜL (ref 0.17–1.22)
MONOCYTES NFR BLD AUTO: 13 % (ref 4–12)
NEUTROPHILS # BLD AUTO: 6.23 THOUSANDS/ÂΜL (ref 1.85–7.62)
NEUTS SEG NFR BLD AUTO: 58 % (ref 43–75)
NRBC BLD AUTO-RTO: 1 /100 WBCS
PLATELET # BLD AUTO: 457 THOUSANDS/UL (ref 149–390)
PMV BLD AUTO: 8.7 FL (ref 8.9–12.7)
POTASSIUM SERPL-SCNC: 4.2 MMOL/L (ref 3.5–5.3)
RBC # BLD AUTO: 3.71 MILLION/UL (ref 3.81–5.12)
SODIUM SERPL-SCNC: 133 MMOL/L (ref 135–147)
WBC # BLD AUTO: 10.71 THOUSAND/UL (ref 4.31–10.16)

## 2024-01-30 PROCEDURE — 99239 HOSP IP/OBS DSCHRG MGMT >30: CPT | Performed by: INTERNAL MEDICINE

## 2024-01-30 PROCEDURE — 85027 COMPLETE CBC AUTOMATED: CPT | Performed by: INTERNAL MEDICINE

## 2024-01-30 PROCEDURE — 80048 BASIC METABOLIC PNL TOTAL CA: CPT | Performed by: INTERNAL MEDICINE

## 2024-01-30 RX ORDER — ACETAMINOPHEN 325 MG/1
650 TABLET ORAL EVERY 4 HOURS PRN
Qty: 90 TABLET | Refills: 0 | Status: SHIPPED | OUTPATIENT
Start: 2024-01-30 | End: 2024-02-08

## 2024-01-30 RX ORDER — OXYCODONE HYDROCHLORIDE 5 MG/1
10 TABLET ORAL EVERY 6 HOURS PRN
Start: 2024-01-30 | End: 2024-01-30

## 2024-01-30 RX ORDER — METOPROLOL SUCCINATE 50 MG/1
50 TABLET, EXTENDED RELEASE ORAL DAILY
Qty: 30 TABLET | Refills: 0 | Status: SHIPPED | OUTPATIENT
Start: 2024-01-30

## 2024-01-30 RX ORDER — OXYCODONE HYDROCHLORIDE 10 MG/1
10 TABLET ORAL EVERY 4 HOURS PRN
Qty: 45 TABLET | Refills: 0 | Status: SHIPPED | OUTPATIENT
Start: 2024-01-30 | End: 2024-02-10 | Stop reason: SDUPTHER

## 2024-01-30 RX ORDER — SODIUM CHLORIDE 1 G/1
1 TABLET ORAL 2 TIMES DAILY
Qty: 60 TABLET | Refills: 0 | Status: SHIPPED | OUTPATIENT
Start: 2024-01-30

## 2024-01-30 RX ADMIN — ENOXAPARIN SODIUM 40 MG: 40 INJECTION SUBCUTANEOUS at 09:07

## 2024-01-30 RX ADMIN — HYDROMORPHONE HYDROCHLORIDE 0.5 MG: 1 INJECTION, SOLUTION INTRAMUSCULAR; INTRAVENOUS; SUBCUTANEOUS at 18:30

## 2024-01-30 RX ADMIN — OXYCODONE HYDROCHLORIDE 10 MG: 10 TABLET ORAL at 03:37

## 2024-01-30 RX ADMIN — HYDROMORPHONE HYDROCHLORIDE 0.5 MG: 1 INJECTION, SOLUTION INTRAMUSCULAR; INTRAVENOUS; SUBCUTANEOUS at 00:19

## 2024-01-30 RX ADMIN — OXYCODONE HYDROCHLORIDE 10 MG: 10 TABLET ORAL at 12:16

## 2024-01-30 RX ADMIN — OXYCODONE HYDROCHLORIDE 10 MG: 10 TABLET ORAL at 15:58

## 2024-01-30 RX ADMIN — ACETAMINOPHEN 325MG 650 MG: 325 TABLET ORAL at 06:27

## 2024-01-30 RX ADMIN — OXYCODONE HYDROCHLORIDE 10 MG: 10 TABLET ORAL at 07:29

## 2024-01-30 RX ADMIN — METOPROLOL SUCCINATE 50 MG: 50 TABLET, EXTENDED RELEASE ORAL at 09:07

## 2024-01-30 RX ADMIN — LIDOCAINE 5% 2 PATCH: 700 PATCH TOPICAL at 09:08

## 2024-01-30 RX ADMIN — OXYCODONE HYDROCHLORIDE 10 MG: 10 TABLET ORAL at 19:43

## 2024-01-30 NOTE — PLAN OF CARE
Problem: Prexisting or High Potential for Compromised Skin Integrity  Goal: Skin integrity is maintained or improved  Description: INTERVENTIONS:  - Identify patients at risk for skin breakdown  - Assess and monitor skin integrity  - Assess and monitor nutrition and hydration status  - Monitor labs   - Assess for incontinence   - Turn and reposition patient  - Assist with mobility/ambulation  - Relieve pressure over bony prominences  - Avoid friction and shearing  - Provide appropriate hygiene as needed including keeping skin clean and dry  - Evaluate need for skin moisturizer/barrier cream  - Collaborate with interdisciplinary team   - Patient/family teaching  - Consider wound care consult   Outcome: Progressing     Problem: PAIN - ADULT  Goal: Verbalizes/displays adequate comfort level or baseline comfort level  Description: Interventions:  - Encourage patient to monitor pain and request assistance  - Assess pain using appropriate pain scale  - Administer analgesics based on type and severity of pain and evaluate response  - Implement non-pharmacological measures as appropriate and evaluate response  - Consider cultural and social influences on pain and pain management  - Notify physician/advanced practitioner if interventions unsuccessful or patient reports new pain  Outcome: Progressing     Problem: INFECTION - ADULT  Goal: Absence or prevention of progression during hospitalization  Description: INTERVENTIONS:  - Assess and monitor for signs and symptoms of infection  - Monitor lab/diagnostic results  - Monitor all insertion sites, i.e. indwelling lines, tubes, and drains  - Monitor endotracheal if appropriate and nasal secretions for changes in amount and color  - [place] appropriate cooling/warming therapies per order  - Administer medications as ordered  - Instruct and encourage patient and family to use good hand hygiene technique  - Identify and instruct in appropriate isolation precautions for  identified infection/condition  Outcome: Progressing     Problem: SAFETY ADULT  Goal: Patient will remain free of falls  Description: INTERVENTIONS:  - Educate patient/family on patient safety including physical limitations  - Instruct patient to call for assistance with activity   - Consult OT/PT to assist with strengthening/mobility   - Keep Call bell within reach  - Keep bed low and locked with side rails adjusted as appropriate  - Keep care items and personal belongings within reach  - Initiate and maintain comfort rounds  - Make Fall Risk Sign visible to staff  - Offer Toileting every 2 Hours, in advance of need  - Initiate/Maintain bed alarm  - Obtain necessary fall risk management equipment: non slip socks, call bell   - Apply yellow socks and bracelet for high fall risk patients  - Consider moving patient to room near nurses station  Outcome: Progressing

## 2024-01-30 NOTE — QUICK NOTE
Progress Note - Palliative & Supportive Care       1/30/2024 8:40 AM -  Shannan Vee's chart and case were reviewed by Wanda Shrestha DO.  Mode of review included electronic chart check.    Per review, symptoms remain controlled on current regimen and no changes are made at this time.  Please continue the regimen in place, and review our last note for details.  For dispo plan, please review Case Management notes.     Patient for discharge home today. Enrolled in [facility] home palliative program.     Palliative will sign off at this time.       For urgent issues or any questions/concerns, please notify on-call provider via [employer].  You may also call our answering service 24/7 at [phone].    Wanda Shrestha DO  Palliative and Supportive Care  Clinic/Answering Service: [phone]  You can find me on [employer]!

## 2024-01-30 NOTE — ASSESSMENT & PLAN NOTE
"Pt is a 52 yo female with was admitted with a recurrent large right pleural effusion, due malignancy    She presented with dyspnea, and hypoxia requiring 3 L nasal cannula  CT on admission \"Marked progression of right pleural metastases with huge right pleural effusion causing near complete compressive atelectasis of the right lung and mediastinal shift to the left \"  she was evaluated by IR team  Status post thoracentesis (-1900ml) on 1/24/24 and drainage catheter placement  by IR  She recently had thoracentesis on 1/18 at [facility] with 1.5 L of fluid removed: Cytology positive for malignant cells  Pleurex catheter was managed by nursing staff while in the hospital, and pt's daughter was taught how to perform drainage for discharge home.  Pt will also have VNA nurses on discharge  Pt completed a 5 days course of empiric IV cephalosporin as her initial CAT scan revealed \"mild peripheral groundglass opacity in the left upper lobe\".  Will not require additional abx at discharge  No current evidence of infection: Afebrile with a normal white blood cell count and blood cultures negative x 2  "

## 2024-01-30 NOTE — ASSESSMENT & PLAN NOTE
Patient has a history stage IV breast cancer with  metastatic disease to the bone.  She was evaluated by Dr. Hearn from Oncology:  who coordinated with pt's primary Oncologist Dr. Villavicencio: pt is to follow up with Dr. Villavicencio after discharge to discuss systemic therapy.  Pt will also fu at [facility] to complete her radiation therapy.  Cont outpatient palliative care follow-up for symptom management

## 2024-01-30 NOTE — ASSESSMENT & PLAN NOTE
Patient follows with palliative care team due to cancer associated pain, from her metastatic breast cancer  Per palliative care: While as an inpatient she was scheduled oxycodone immediate release 10 mg every 6 hours around-the-clock  At discharge palliative care recommends continuing oxycodone immediate release 10 mg every 4 hours as needed for pain  Continue to follow with the palliative care team as an outpatient  Communicated with palliative care team and they sent refill for pt's oxy IR 10mg to her pharmacy for discharge

## 2024-01-30 NOTE — RESTORATIVE TECHNICIAN NOTE
Restorative Technician Note      Patient Name: Shannan Vee     Restorative Tech Visit Date: 01/30/24  Note Type: Mobility  Patient Position Upon Consult: Supine  Activity Performed: Ambulated; Range of motion  Assistive Device: Cane  Patient Position at End of Consult: Supine; All needs within reach

## 2024-01-30 NOTE — PLAN OF CARE
Problem: PAIN - ADULT  Goal: Verbalizes/displays adequate comfort level or baseline comfort level  Description: Interventions:  - Encourage patient to monitor pain and request assistance  - Assess pain using appropriate pain scale  - Administer analgesics based on type and severity of pain and evaluate response  - Implement non-pharmacological measures as appropriate and evaluate response  - Consider cultural and social influences on pain and pain management  - Notify physician/advanced practitioner if interventions unsuccessful or patient reports new pain  Outcome: Progressing     Problem: SAFETY ADULT  Goal: Patient will remain free of falls  Description: INTERVENTIONS:  - Educate patient/family on patient safety including physical limitations  - Instruct patient to call for assistance with activity   - Consult OT/PT to assist with strengthening/mobility   - Keep Call bell within reach  - Keep bed low and locked with side rails adjusted as appropriate  - Keep care items and personal belongings within reach  - Initiate and maintain comfort rounds  - Make Fall Risk Sign visible to staff  - Offer Toileting every 2 Hours, in advance of need  - Initiate/Maintain bed alarm  - Obtain necessary fall risk management equipment: cane  - Apply yellow socks and bracelet for high fall risk patients  - Consider moving patient to room near nurses station  Outcome: Progressing     Problem: RESPIRATORY - ADULT  Goal: Achieves optimal ventilation and oxygenation  Description: INTERVENTIONS:  - Assess for changes in respiratory status  - Assess for changes in mentation and behavior  - Position to facilitate oxygenation and minimize respiratory effort  - Oxygen administered by appropriate delivery if ordered  - Initiate smoking cessation education as indicated  - Encourage broncho-pulmonary hygiene including cough, deep breathe, Incentive Spirometry  - Assess the need for suctioning and aspirate as needed  - Assess and instruct to  report SOB or any respiratory difficulty  - Respiratory Therapy support as indicated  Outcome: Progressing

## 2024-01-30 NOTE — TELEPHONE ENCOUNTER
Per pharmacist, PDMP shows pt picked up Oxycodone 5 mg 126 tablets on 1/21/24  (MARICARMEN) for 14 days ( 9 tabs per day)    Also picked up Oxycodone 5 mg 20 tablets on 1/18/24 (Dr Villavicencio) for 5 days ( 4 tabs per day)     Pharmacist asking if they should fill current order Oxycodone 10 mg tab for 7 days (6 tabs per day)    Pt to be dc to home today. Re recent hospitalizations, pt should have enough tabs upon dc.   Pt to be followed by [facility].  Will notify of dc and ask nurse to reconcile medications when in the home and fill accordingly.

## 2024-01-30 NOTE — DISCHARGE SUMMARY
"[facility]  Discharge- Shannan Vee 1970, 53 y.o. female MRN: [id number]  Unit/Bed#: E2 -01 Encounter: [id number]  Primary Care Provider: Todd Bañuelos MD   Date and time admitted to hospital: 1/23/2024  2:48 PM          Admission Date: 1/23/2024       Discharge Date: 1/30/2024        Primary Diagnoses  Principal Problem:    Large pleural effusion  Active Problems:    Sinus tachycardia    Malignant neoplasm of overlapping sites of left female breast (HCC)    Other specified anemias    Cancer associated pain    Goals of care, counseling/discussion    Severe protein-calorie malnutrition (HCC)    Hyponatremia  Resolved Problems:    Acute respiratory insufficiency      Hospital course by problem:  * Large pleural effusion  Assessment & Plan  Pt is a 52 yo female with was admitted with a recurrent large right pleural effusion, due malignancy    She presented with dyspnea, and hypoxia requiring 3 L nasal cannula  CT on admission \"Marked progression of right pleural metastases with huge right pleural effusion causing near complete compressive atelectasis of the right lung and mediastinal shift to the left \"  she was evaluated by IR team  Status post thoracentesis (-1900ml) on 1/24/24 and pleurex drainage catheter placement  by IR  She recently had thoracentesis on 1/18 at [facility] with 1.5 L of fluid removed: Cytology positive for malignant cells  Pleurex catheter was managed by nursing staff while in the hospital, and pt's daughter was taught how to perform drainage for discharge home.  Pt will also have VNA nurses on discharge  Pt completed a 5 days course of empiric IV cephalosporin as her initial CAT scan revealed \"mild peripheral groundglass opacity in the left upper lobe\".  Will not require additional abx at discharge  No current evidence of infection: Afebrile with a normal white blood cell count and blood cultures negative x 2    Sinus tachycardia  Assessment & " Plan  Pt was noted to have persistent sinus tachycardia: Multifactorial secondary to malignant pleural effusion, metastatic disease, atelectasis, pain   Heart rate improved with pleural fluid drainage and addition of Toprol-XL 25 mg daily, however was predominantly 120-130  She was then transfused 1 unit PRBC  Discussed case with cardiology: Patient has a history of persistent sinus tachycardia for several years  Patient had a 2D echocardiogram 1/28: Left ventricular ejection fraction 55%.  Normal wall motion.  Grade 1 diastolic dysfunction.  No significant left ventricular hypertrophy or evidence of tachycardia induced cardiomyopathy  Per cardiology: Patient has chronic, persistent sinus tachycardia, possibly secondary to syndrome of inappropriate tachycardia: They recommend continuing to titrate beta-blocker as tolerated, and increasing dose to 50mg.  Patient was monitored on telemetry with improved heart rate control on this dose of metoprolol.  To be discharged home on the 50 mg of metoprolol daily  No additional cardiac workup required    Hyponatremia  Assessment & Plan  Likely secondary to SIADH due to widely metastatic malignancy as well as pleural effusion  Sodium currently ranging 133-131  Patient was started on salt tablets 1 g 3 times daily prior to discharge  Recommend repeat BMP in 1 week as an outpatient    Severe protein-calorie malnutrition (HCC)  Assessment & Plan  Malnutrition Findings:   Adult Malnutrition type: Acute illness  Adult Degree of Malnutrition: Other severe protein calorie malnutrition  Malnutrition Characteristics: Fat loss, Muscle loss, Inadequate energy  360 Statement: Severe calorie protein malnutrition in context of acute illness r/t inadequate PO intake, increased needs, catabolic illness ae evidance by energy intake less than 50% compared to estimated needs>5 days, moderate body fat (triceps, ribcage area) and muscle mass depletions (protruding clavicles, temporal wasting);  Treated with liberalized diet, oral supplements    BMI Findings:  Body mass index is 21.13 kg/m².       Cancer associated pain  Assessment & Plan  Patient follows with palliative care team due to cancer associated pain, from her metastatic breast cancer  Per palliative care: While as an inpatient she was scheduled oxycodone immediate release 10 mg every 6 hours around-the-clock  At discharge palliative care recommends continuing oxycodone immediate release 10 mg every 4 hours as needed for pain  Continue to follow with the palliative care team as an outpatient  Communicated with palliative care team and they sent refill for pt's oxy IR 10mg to her pharmacy for discharge    Other specified anemias  Assessment & Plan  Multifactorial due to blood loss, cancer, antineoplastic agent  Baseline Hemoglobin appears to range 7-9  Pt was transfused 1u PRBC on 1/28 for Hb 7.6 with improvement to 9.1-->10   Cont outpt follow up      Malignant neoplasm of overlapping sites of left female breast (HCC)  Assessment & Plan  Patient has a history stage IV breast cancer with  metastatic disease to the bone.  She was evaluated by Dr. Hearn from Oncology:  who coordinated with pt's primary Oncologist Dr. Villavicencio: pt is to follow up with Dr. Villavicencio after discharge to discuss systemic therapy.  Pt will also fu at [facility] to complete her radiation therapy.  Cont outpatient palliative care follow-up for symptom management      Acute respiratory insufficiency-resolved as of 1/28/2024  Assessment & Plan  Patient initially presented with acute respiratory insufficiency secondary to large pleural effusion, initially requiring 3 L nasal cannula  This improved status post thoracentesis and Pleurx catheter placement  Currently adequate oxygenation on room air  Would not require home O2 at discharge    Service:  [facility] Internal Medicine, [facility].    Consulting Providers   Cardiology: Dr. Bobo  Palliative care:   Alli  Oncology: Dr. Hearn  Interventional radiology      [facility] Studies:  1/25: Chest x-ray  Moderate right pleural effusion      1/23 CTA chest PE study  No definite acute pulmonary emboli but respiratory motion limits detection.  Marked progression of right pleural metastases with huge right pleural effusion causing near complete compressive atelectasis of the right lung and mediastinal shift to the left. Recommend chest tube placement to relieve mediastinal shift.  Mild peripheral groundglass opacity in the left upper lobe, infectious or inflammatory.  Redemonstration of extensive bone metastases.     Procedures  1/24 IR right lung Pleurx catheter placement  Right tunneled pleural drainage catheter placement using 16 Filipino catheter.      Microbiology  1/23: Blood culture: Negative x 2  1/23: Negative COVID, influenza, RSV    Results from last 7 days   Lab Units 01/30/24  0446 01/29/24  0504 01/27/24  1002   WBC Thousand/uL 10.71* 8.88 7.42   HEMOGLOBIN g/dL 10.0* 9.1* 7.6*   HEMATOCRIT % 32.4* 29.0* 25.5*   PLATELETS Thousands/uL 457* 447* 467*     Results from last 7 days   Lab Units 01/30/24  0446 01/29/24  0504 01/24/24  0455   POTASSIUM mmol/L 4.2 3.9 3.8   CHLORIDE mmol/L 98 97 95*   CO2 mmol/L 25 27 28   BUN mg/dL 10 7 7   CREATININE mg/dL 0.45* 0.42* 0.47*   CALCIUM mg/dL 8.5 8.1* 8.4       History and Physical Exam:  Please refer to the Admission H&P note    Discharge Condition: Improved  Discharge Disposition: Home/Self Care    Discharge Note and Physical Exam:   Patient is examined and interviewed by me in Mongolian at the bedside.  She notes her pain is better controlled on the oxycodone 10 mg.  She confirms that she needs a new prescription as she only has a few tablets of the 5 mg left.  She notes the pain is localized at the site of her Pleurx catheter, and is worse when the fluid is almost completely drained out.  She denies any abdominal pain, nausea, vomiting, diarrhea or constipation.   Moved her bowels.  She is tolerating p.o. and trying to eat more.  She is ambulating without any dizziness or lightheadedness.  She notes she feels better, and is agreeable for discharge home today.  Her daughter at the bedside notes she has been receiving training for the Pleurx catheter, and will practice again today before discharge    Vitals:    01/30/24 1203   BP: 114/78   Pulse: (!) 122   Resp: 18   Temp: 98.2 °F (36.8 °C)   SpO2: 100%     General:  Pleasant, non-tachypnic, non-dyspnic.  Conversant  Heart: Regular rate and rhythm, S1S2 present.  No murmur, rub or gallop.  Lungs: Decreased breath sounds right base.  Faint scattered rhonchi. no wheezing, or crackles.  Good air movement.  No accessory muscle use or respiratory distress.  Abdomen: soft, non-tender, non-distended, NABS.  No rebound or guarding.  No mass or peritoneal signs.  Extremities: no clubbing, cyanosis or edema.  2+ pedal pulses bilaterally.  Neurologic: Awake and alert.  Fluent speech.  Interactive  Skin: warm and dry. No petechiae, purpura or rash.      Discharge Medications   Please see Medical Reconciliation Discharge Form    Discharge Follow Up Appointments:   Todd Bañuelos MD: 1 week  Dr. Briones: 2 weeks  Dr. Villavicencio: 1 week    Discharge  Statement   Total Time Spent today including physical exam, discussion with patient and family, and discharge arrangements/care = 48 minutes.    Toni pts nurse and JA[facility] Muñoz palliative care, dr julian-  will send refill for Oxy  Family: updated pt's daughter and sister in law at bedside.  reviewed all test results, and treatment plan.  Answered all questions    This note has been constructed using a voice recognition system    The [place] reviewed prior to discharge:  pt is a palliative care pt, with cancer related pain, with new pleurex catheter, followed by Oncology and Palliative care.  No red flags

## 2024-01-30 NOTE — ASSESSMENT & PLAN NOTE
Patient initially presented with acute respiratory insufficiency secondary to large pleural effusion, initially requiring 3 L nasal cannula  This improved status post thoracentesis and Pleurx catheter placement  Currently adequate oxygenation on room air  Would not require home O2 at discharge

## 2024-01-30 NOTE — ASSESSMENT & PLAN NOTE
Likely secondary to SIADH due to widely metastatic malignancy as well as pleural effusion  Sodium currently ranging 133-131  Patient was started on salt tablets 1 g 3 times daily prior to discharge  Recommend repeat BMP in 1 week as an outpatient

## 2024-01-30 NOTE — ASSESSMENT & PLAN NOTE
Multifactorial due to blood loss, cancer, antineoplastic agent  Baseline Hemoglobin appears to range 7-9  Pt was transfused 1u PRBC on 1/28 for Hb 7.6 with improvement to 9.1-->10   Cont outpt follow up

## 2024-01-30 NOTE — ASSESSMENT & PLAN NOTE
Pt was noted to have persistent sinus tachycardia: Multifactorial secondary to malignant pleural effusion, metastatic disease, atelectasis, pain   Heart rate improved with pleural fluid drainage and addition of Toprol-XL 25 mg daily, however was predominantly 120-130  She was then transfused 1 unit PRBC  Discussed case with cardiology: Patient has a history of persistent sinus tachycardia for several years  Patient had a 2D echocardiogram 1/28: Left ventricular ejection fraction 55%.  Normal wall motion.  Grade 1 diastolic dysfunction.  No significant left ventricular hypertrophy or evidence of tachycardia induced cardiomyopathy  Per cardiology: Patient has chronic, persistent sinus tachycardia, possibly secondary to syndrome of inappropriate tachycardia: They recommend continuing to titrate beta-blocker as tolerated, and increasing dose to 50mg.  Patient was monitored on telemetry with improved heart rate control on this dose of metoprolol.  To be discharged home on the 50 mg of metoprolol daily  No additional cardiac workup required

## 2024-01-31 ENCOUNTER — HOME CARE VISIT (OUTPATIENT)
Dept: HOME HEALTH SERVICES | Facility: HOME HEALTHCARE | Age: 54
End: 2024-01-31
Payer: COMMERCIAL

## 2024-01-31 VITALS
SYSTOLIC BLOOD PRESSURE: 128 MMHG | TEMPERATURE: 98.6 F | HEART RATE: 103 BPM | RESPIRATION RATE: 20 BRPM | DIASTOLIC BLOOD PRESSURE: 78 MMHG | OXYGEN SATURATION: 99 %

## 2024-01-31 PROCEDURE — 400013 VN SOC

## 2024-01-31 PROCEDURE — G0299 HHS/HOSPICE OF RN EA 15 MIN: HCPCS

## 2024-01-31 NOTE — UTILIZATION REVIEW
NOTIFICATION OF ADMISSION DISCHARGE   This is a Notification of Discharge from [facility]. Please be advised that this patient has been discharge from our facility. Below you will find the admission and discharge date and time including the patient’s disposition.   UTILIZATION REVIEW CONTACT:  Jovana Berg  Utilization   Network Utilization Review Department  Phone: [phone] x carefully listen to the prompts. All voicemails are confidential.  Email: [email or web address]     ADMISSION INFORMATION  PRESENTATION DATE: 1/23/2024  2:48 PM  OBERVATION ADMISSION DATE:   INPATIENT ADMISSION DATE: 1/24/24  4:34 PM   DISCHARGE DATE: 1/30/2024  8:16 PM   DISPOSITION:Home with Home Health Care    Network Utilization Review Department  ATTENTION: Please call with any questions or concerns to [phone] and carefully listen to the prompts so that you are directed to the right person. All voicemails are confidential.   For Discharge needs, contact Care Management DC Support Team at [phone] opt. 2  Send all requests for admission clinical reviews, approved or denied determinations and any other requests to dedicated fax number below belonging to the campus where the patient is receiving treatment. List of dedicated fax numbers for the Facilities:  FACILITY NAME UR FAX NUMBER   ADMISSION DENIALS (Administrative/Medical Necessity) [phone]   DISCHARGE SUPPORT TEAM ([facility]) [phone]   PARENT CHILD HEALTH (Maternity/NICU/Pediatrics) [phone]   [facility] [phone]   [facility] [phone]   [facility] [phone]   [facility] [phone]   [facility] [phone]   [facility] [phone]   [facility] [phone]   [facility]  [phone]   [facility] [phone]   [facility] [phone]   [facility] [phone]   [facility] [phone]

## 2024-02-01 ENCOUNTER — TRANSITIONAL CARE MANAGEMENT (OUTPATIENT)
Dept: FAMILY MEDICINE CLINIC | Facility: CLINIC | Age: 54
End: 2024-02-01

## 2024-02-01 ENCOUNTER — PATIENT OUTREACH (OUTPATIENT)
Dept: CASE MANAGEMENT | Facility: HOSPITAL | Age: 54
End: 2024-02-01

## 2024-02-01 DIAGNOSIS — Z85.3 PERSONAL HISTORY OF MALIGNANT NEOPLASM OF BREAST: ICD-10-CM

## 2024-02-01 DIAGNOSIS — C79.51 PAIN FROM BONE METASTASES (HCC): ICD-10-CM

## 2024-02-01 DIAGNOSIS — Z17.0 MALIGNANT NEOPLASM OF OVERLAPPING SITES OF LEFT BREAST IN FEMALE, ESTROGEN RECEPTOR POSITIVE: Primary | ICD-10-CM

## 2024-02-01 DIAGNOSIS — C50.812 MALIGNANT NEOPLASM OF OVERLAPPING SITES OF LEFT BREAST IN FEMALE, ESTROGEN RECEPTOR POSITIVE: Primary | ICD-10-CM

## 2024-02-01 DIAGNOSIS — G89.3 PAIN FROM BONE METASTASES (HCC): ICD-10-CM

## 2024-02-01 DIAGNOSIS — Z17.0 CARCINOMA OF LEFT BREAST IN FEMALE, ESTROGEN RECEPTOR POSITIVE, UNSPECIFIED SITE OF BREAST: ICD-10-CM

## 2024-02-01 DIAGNOSIS — C50.912 CARCINOMA OF LEFT BREAST IN FEMALE, ESTROGEN RECEPTOR POSITIVE, UNSPECIFIED SITE OF BREAST: ICD-10-CM

## 2024-02-01 RX ORDER — SODIUM CHLORIDE 9 MG/ML
20 INJECTION, SOLUTION INTRAVENOUS ONCE
Status: CANCELLED | OUTPATIENT
Start: 2024-02-05

## 2024-02-01 RX ORDER — LAMOTRIGINE 25 MG/1
500 TABLET ORAL ONCE
Status: CANCELLED | OUTPATIENT
Start: 2024-02-05

## 2024-02-02 ENCOUNTER — HOME CARE VISIT (OUTPATIENT)
Dept: HOME HEALTH SERVICES | Facility: HOME HEALTHCARE | Age: 54
End: 2024-02-02
Payer: COMMERCIAL

## 2024-02-02 ENCOUNTER — TELEPHONE (OUTPATIENT)
Dept: HEMATOLOGY ONCOLOGY | Facility: CLINIC | Age: 54
End: 2024-02-02

## 2024-02-02 VITALS
RESPIRATION RATE: 20 BRPM | SYSTOLIC BLOOD PRESSURE: 124 MMHG | DIASTOLIC BLOOD PRESSURE: 70 MMHG | TEMPERATURE: 97 F | HEART RATE: 88 BPM

## 2024-02-02 PROCEDURE — G0299 HHS/HOSPICE OF RN EA 15 MIN: HCPCS

## 2024-02-02 NOTE — TELEPHONE ENCOUNTER
Appointment Change  Cancel, Reschedule, Change to Virtual      Who are you speaking with? Child   If it is not the patient, is the caller listed on the communication consent form? Yes   Which provider is the appointment scheduled with? Dr. Villavicencio   When was the original appointment scheduled?    Please list date and time 2/2/24 1140   At which location is the appointment scheduled to take place? [place]   Was the appointment rescheduled?     Was the appointment changed from an in person visit to a virtual visit?    If so, please list the details of the change. 3/22/24 120   What is the reason for the appointment change? Another appt       Was STAR transport scheduled? N/A   Does STAR transport need to be scheduled for the new visit (if applicable) N/A   Does the patient need an infusion appointment rescheduled? N/A   Does the patient have an upcoming infusion appointment scheduled? If so, when? Yes, 2/5/24   Is the patient undergoing chemotherapy? N/A   For appointments cancelled with less than 24 hours:  Was the no-show policy reviewed? Yes

## 2024-02-04 ENCOUNTER — HOME CARE VISIT (OUTPATIENT)
Dept: HOME HEALTH SERVICES | Facility: HOME HEALTHCARE | Age: 54
End: 2024-02-04
Payer: COMMERCIAL

## 2024-02-05 ENCOUNTER — HOSPITAL ENCOUNTER (OUTPATIENT)
Dept: INFUSION CENTER | Facility: HOSPITAL | Age: 54
Discharge: HOME | End: 2024-02-05
Attending: INTERNAL MEDICINE
Payer: COMMERCIAL

## 2024-02-05 VITALS
DIASTOLIC BLOOD PRESSURE: 82 MMHG | SYSTOLIC BLOOD PRESSURE: 131 MMHG | TEMPERATURE: 97.4 F | RESPIRATION RATE: 20 BRPM | HEART RATE: 132 BPM

## 2024-02-05 DIAGNOSIS — C50.912 CARCINOMA OF LEFT BREAST IN FEMALE, ESTROGEN RECEPTOR POSITIVE, UNSPECIFIED SITE OF BREAST: Primary | ICD-10-CM

## 2024-02-05 DIAGNOSIS — Z85.3 PERSONAL HISTORY OF MALIGNANT NEOPLASM OF BREAST: ICD-10-CM

## 2024-02-05 DIAGNOSIS — C50.812 MALIGNANT NEOPLASM OF OVERLAPPING SITES OF LEFT BREAST IN FEMALE, ESTROGEN RECEPTOR POSITIVE: ICD-10-CM

## 2024-02-05 DIAGNOSIS — Z17.0 CARCINOMA OF LEFT BREAST IN FEMALE, ESTROGEN RECEPTOR POSITIVE, UNSPECIFIED SITE OF BREAST: Primary | ICD-10-CM

## 2024-02-05 DIAGNOSIS — G89.3 PAIN FROM BONE METASTASES (HCC): ICD-10-CM

## 2024-02-05 DIAGNOSIS — Z17.0 MALIGNANT NEOPLASM OF OVERLAPPING SITES OF LEFT BREAST IN FEMALE, ESTROGEN RECEPTOR POSITIVE: ICD-10-CM

## 2024-02-05 DIAGNOSIS — C79.51 PAIN FROM BONE METASTASES (HCC): ICD-10-CM

## 2024-02-05 PROCEDURE — 96402 CHEMO HORMON ANTINEOPL SQ/IM: CPT

## 2024-02-05 PROCEDURE — 96365 THER/PROPH/DIAG IV INF INIT: CPT

## 2024-02-05 RX ORDER — ZOLEDRONIC ACID 0.04 MG/ML
4 INJECTION, SOLUTION INTRAVENOUS ONCE
OUTPATIENT
Start: 2024-03-04 | End: 2024-03-04

## 2024-02-05 RX ORDER — LAMOTRIGINE 25 MG/1
500 TABLET ORAL ONCE
Status: COMPLETED | OUTPATIENT
Start: 2024-02-05 | End: 2024-02-05

## 2024-02-05 RX ORDER — LAMOTRIGINE 25 MG/1
500 TABLET ORAL ONCE
OUTPATIENT
Start: 2024-03-04

## 2024-02-05 RX ORDER — SODIUM CHLORIDE 9 MG/ML
20 INJECTION, SOLUTION INTRAVENOUS ONCE
Status: COMPLETED | OUTPATIENT
Start: 2024-02-05 | End: 2024-02-05

## 2024-02-05 RX ORDER — SODIUM CHLORIDE 9 MG/ML
20 INJECTION, SOLUTION INTRAVENOUS ONCE
OUTPATIENT
Start: 2024-03-04

## 2024-02-05 RX ADMIN — FULVESTRANT 500 MG: 50 INJECTION, SOLUTION INTRAMUSCULAR at 14:01

## 2024-02-05 RX ADMIN — SODIUM CHLORIDE 20 ML/HR: 0.9 INJECTION, SOLUTION INTRAVENOUS at 13:14

## 2024-02-05 RX ADMIN — ZOLEDRONIC ACID 4 MG: 4 INJECTION, SOLUTION, CONCENTRATE INTRAVENOUS at 13:13

## 2024-02-05 NOTE — PROGRESS NOTES
Pt tolerated Zometa and Faslodex injections  today with no adverse reactions. Declined AVS. Next apt confirmed 2/26/24 @ 11:30. Left unit ambulatory with a steady gait.

## 2024-02-06 ENCOUNTER — HOME CARE VISIT (OUTPATIENT)
Dept: HOME HEALTH SERVICES | Facility: HOME HEALTHCARE | Age: 54
End: 2024-02-06
Payer: COMMERCIAL

## 2024-02-06 VITALS
TEMPERATURE: 95.5 F | SYSTOLIC BLOOD PRESSURE: 124 MMHG | HEART RATE: 132 BPM | OXYGEN SATURATION: 98 % | DIASTOLIC BLOOD PRESSURE: 70 MMHG

## 2024-02-06 PROCEDURE — G0300 HHS/HOSPICE OF LPN EA 15 MIN: HCPCS

## 2024-02-06 PROCEDURE — G0180 MD CERTIFICATION HHA PATIENT: HCPCS | Performed by: INTERNAL MEDICINE

## 2024-02-07 NOTE — PROGRESS NOTES
Assessment & Plan     1. Encounter for support and coordination of transition of care  Comments:  Hospitalization for pleural effusion   Patient recovering well, stable   Medications and labs reviewed   Follows with Oncology and Palliative care    2. Large pleural effusion  Assessment & Plan:  Recurrent in the setting of metastatic breast cancer   Thoracentesis during hospitalization, now drainage (Pleurex catheter) is done at home by nurse or trained family member  No signs of local infection        3. Pain from bone metastases (HCC)  -     lidocaine (LIDODERM) 5 %; Apply 2 patches topically over 12 hours daily Remove & Discard patch within 12 hours or as directed by MD  -     acetaminophen (TYLENOL) 500 mg tablet; Take 2 tablets (1,000 mg total) by mouth 3 (three) times a day as needed for mild pain         Subjective     Transitional Care Management Review:   Shannan Vee is a 53 y.o. female here for TCM follow up.     During the TCM phone call patient stated:  TCM Call       Date and time call was made  10/11/2023 11:17 AM    Hospital care reviewed  Records reviewed    Patient was hospitialized at  [facility]    Date of Admission  01/23/24    Date of discharge  01/30/24    Diagnosis  large pleural effusion    Disposition  Home    Were the patients medications reviewed and updated  Yes    Current Symptoms  None          TCM Call       Scheduled for follow up?  Yes    I have advised the patient to call PCP with any new or worsening symptoms  JHONATHAN BECKFORD    Living Arrangements  Family members    Counseling  Patient          Shannan is a 53 y.o female with PMH of metastatic breast cancer who presents today for a TCM visit due to recent hospitalization for recurrent pleural effusion.   During hospitalization pleural effusion was drained and she  was started on prophylactic IV antibiotics.   Patient was sent home with  Pleurex catheter which was initially managed by nurse at home and then family was educated  "on how to care for it.   Today patient reports to feel ok and is recovering well, trying to eat and drinking more water. States that she has no constipation or urinary problems.   States that pain has been well controlled with Tylenol, oxycodone and lidocaine patches. She reports having a lot of support from her family, especially her daughter who is her main caregiver.    Patient has no complaints at this time. Medications and labs were reviewed. Patient was advised to get lab work done to follow up on hyponatremia.        Regarding her stage IV breast cancer treatment, she follows with Dr. Villavicencio as outpatient and does radiotherapy at [facility]. She is receiving infusions of Zometa and Faslodex and next appointment is on 2/26/24.   Patient also follows with palliative care and is satisfied with their care.      Review of Systems   Constitutional:  Positive for fatigue. Negative for fever.   HENT:  Negative for congestion.    Respiratory:  Negative for shortness of breath.    Cardiovascular:  Negative for palpitations.   Gastrointestinal:  Negative for constipation, diarrhea, nausea and vomiting.   Genitourinary:  Negative for difficulty urinating, dysuria and hematuria.       Objective     /70 (BP Location: Left arm, Patient Position: Sitting, Cuff Size: Standard)   Pulse (!) 129   Temp 98 °F (36.7 °C) (Temporal)   Resp 16   Ht 5' 5\" (1.651 m)   Wt 53.5 kg (118 lb)   SpO2 97%   BMI 19.64 kg/m²      Physical Exam  Constitutional:       General: She is not in acute distress.     Appearance: She is not toxic-appearing.   HENT:      Head: Normocephalic and atraumatic.      Right Ear: External ear normal.      Left Ear: External ear normal.      Nose: Nose normal.   Eyes:      General: No scleral icterus.  Cardiovascular:      Rate and Rhythm: Tachycardia present.      Heart sounds: No murmur heard.     No gallop.      Comments: Patient has history of persistent sinus tachycardia. Asymptomatic during " examination  Pulmonary:      Effort: Pulmonary effort is normal. No respiratory distress.      Breath sounds: No wheezing or rhonchi.   Abdominal:      General: Abdomen is flat. There is no distension.      Palpations: Abdomen is soft.      Comments: Dressing covering drain noted. Appeared clean and dry   Musculoskeletal:      Right lower leg: No edema.      Left lower leg: No edema.   Skin:     General: Skin is warm and dry.   Neurological:      Mental Status: She is alert and oriented to person, place, and time.   Psychiatric:         Mood and Affect: Mood normal.         Behavior: Behavior normal.       Medications have been reviewed by provider in current encounter    Todd Bañuelos MD

## 2024-02-08 ENCOUNTER — OFFICE VISIT (OUTPATIENT)
Dept: FAMILY MEDICINE CLINIC | Facility: CLINIC | Age: 54
End: 2024-02-08

## 2024-02-08 VITALS
TEMPERATURE: 98 F | HEART RATE: 129 BPM | HEIGHT: 65 IN | DIASTOLIC BLOOD PRESSURE: 70 MMHG | RESPIRATION RATE: 16 BRPM | OXYGEN SATURATION: 97 % | SYSTOLIC BLOOD PRESSURE: 130 MMHG | WEIGHT: 118 LBS | BODY MASS INDEX: 19.66 KG/M2

## 2024-02-08 DIAGNOSIS — G89.3 PAIN FROM BONE METASTASES (HCC): ICD-10-CM

## 2024-02-08 DIAGNOSIS — C79.51 PAIN FROM BONE METASTASES (HCC): ICD-10-CM

## 2024-02-08 DIAGNOSIS — J90 LARGE PLEURAL EFFUSION: ICD-10-CM

## 2024-02-08 DIAGNOSIS — Z76.89 ENCOUNTER FOR SUPPORT AND COORDINATION OF TRANSITION OF CARE: Primary | ICD-10-CM

## 2024-02-08 PROCEDURE — 99495 TRANSJ CARE MGMT MOD F2F 14D: CPT | Performed by: FAMILY MEDICINE

## 2024-02-08 RX ORDER — LIDOCAINE 50 MG/G
2 PATCH TOPICAL DAILY
Qty: 60 PATCH | Refills: 0 | Status: SHIPPED | OUTPATIENT
Start: 2024-02-08

## 2024-02-08 RX ORDER — ACETAMINOPHEN 500 MG
1000 TABLET ORAL 3 TIMES DAILY PRN
Qty: 180 TABLET | Refills: 0 | Status: SHIPPED | OUTPATIENT
Start: 2024-02-08 | End: 2024-05-08

## 2024-02-08 NOTE — ASSESSMENT & PLAN NOTE
Recurrent in the setting of metastatic breast cancer   Thoracentesis during hospitalization, now drainage (Pleurex catheter) is done at home by nurse or trained family member  No signs of local infection

## 2024-02-09 ENCOUNTER — HOME CARE VISIT (OUTPATIENT)
Dept: HOME HEALTH SERVICES | Facility: HOME HEALTHCARE | Age: 54
End: 2024-02-09
Payer: COMMERCIAL

## 2024-02-09 DIAGNOSIS — C79.51 PAIN FROM BONE METASTASES (HCC): Primary | ICD-10-CM

## 2024-02-09 DIAGNOSIS — G89.3 PAIN FROM BONE METASTASES (HCC): Primary | ICD-10-CM

## 2024-02-09 DIAGNOSIS — G89.3 CANCER-RELATED PAIN: ICD-10-CM

## 2024-02-09 DIAGNOSIS — R91.8 MASS OF RIGHT LUNG: ICD-10-CM

## 2024-02-09 PROCEDURE — G0299 HHS/HOSPICE OF RN EA 15 MIN: HCPCS

## 2024-02-10 ENCOUNTER — NURSE TRIAGE (OUTPATIENT)
Dept: OTHER | Facility: OTHER | Age: 54
End: 2024-02-10

## 2024-02-10 RX ORDER — OXYCODONE HYDROCHLORIDE 10 MG/1
10 TABLET ORAL EVERY 4 HOURS PRN
Qty: 45 TABLET | Refills: 0 | OUTPATIENT
Start: 2024-02-10 | End: 2024-02-20

## 2024-02-10 RX ORDER — OXYCODONE HYDROCHLORIDE 10 MG/1
10 TABLET ORAL EVERY 4 HOURS PRN
Qty: 90 TABLET | Refills: 0 | Status: SHIPPED | OUTPATIENT
Start: 2024-02-10

## 2024-02-10 RX ORDER — NALOXONE HYDROCHLORIDE 4 MG/.1ML
SPRAY NASAL
Qty: 1 EACH | Refills: 0 | Status: SHIPPED | OUTPATIENT
Start: 2024-02-10 | End: 2025-02-09

## 2024-02-10 NOTE — TELEPHONE ENCOUNTER
Patient didn't realize she was out of her Oxycodone.   Reason for Disposition  • Affirmative: Did you page the on call provider?    Protocols used: HEYDI NO TRIAGE REQUIRED

## 2024-02-10 NOTE — TELEPHONE ENCOUNTER
Spoke with patient's primary palliative care provider.  Correction, patient follows with  Palliative Care.    OxyIR 10mg q4 hours PRN for cancer related pain    PDMP reviewed.  Last script filled 1/21/2024.  Due for refill based on records.    I spoke with patient's daughter who was able to speak in English.  She states she has 5 tablets left of the Oxycodone 10mg.  She states Shannan follows Dr. Carson for PSC.    I told her I will send in the refill for #90 tablets.    She confirms [facility] in [place], [place] ([facility]).    Gabe Copeland, DO  Palliative Care

## 2024-02-10 NOTE — TELEPHONE ENCOUNTER
Spoke with patient's primary palliative care provider.  Correction, patient follows with  Palliative Care.    OxyIR 10mg q4 hours PRN for cancer related pain    PDMP reviewed.  Last script filled 1/21/2024.  Due for refill based on records.    I spoke with patient's daughter who was able to speak in English.  She states she has 5 tablets left of the Oxycodone 10mg.    I told her I will send in the refill for #90 tablets.    She confirms [facility] in [place], [place] ([facility]).    Gabe Copeland, DO  Palliative Care

## 2024-02-10 NOTE — TELEPHONE ENCOUNTER
Medication Refill Request     Name oxycodone  Dose/Frequency  Take 1 tablet (10 mg total) by mouth every 4 (four) hours as needed for severe pain for up to 10 days Max Daily Amount: 60 mg  Quantity 45 tablets  Verified pharmacy   [x]  Verified ordering Provider   [x]  Does patient have enough for the next 3 days? Yes [] No [x]

## 2024-02-10 NOTE — TELEPHONE ENCOUNTER
"Regarding: Running out of pain meds  ----- Message from Viviana Rodriguez sent at 2/10/2024  5:06 PM EST -----  \" My mom will run out of her pain medication before Monday. We need to know what to do about her pain until Monday\"    # Daughter needs  as well    "

## 2024-02-11 VITALS
SYSTOLIC BLOOD PRESSURE: 122 MMHG | TEMPERATURE: 98.7 F | HEART RATE: 120 BPM | DIASTOLIC BLOOD PRESSURE: 70 MMHG | OXYGEN SATURATION: 94 %

## 2024-02-12 RX ORDER — METHOCARBAMOL 500 MG/1
TABLET, FILM COATED ORAL
COMMUNITY
Start: 2024-01-19 | End: 2024-02-14

## 2024-02-12 RX ORDER — POLYETHYLENE GLYCOL 3350 17 G/17G
POWDER, FOR SOLUTION ORAL
COMMUNITY
Start: 2024-01-19

## 2024-02-12 RX ORDER — MORPHINE SULFATE 15 MG/1
TABLET ORAL
COMMUNITY
Start: 2024-01-21 | End: 2024-02-14

## 2024-02-12 RX ORDER — STANDARDIZED SENNA CONCENTRATE 8.6 MG/1
17.2 TABLET ORAL
COMMUNITY
Start: 2024-01-19 | End: 2025-01-18

## 2024-02-12 RX ORDER — GUAIFENESIN 600 MG/1
600 TABLET, EXTENDED RELEASE ORAL 2 TIMES DAILY
COMMUNITY
Start: 2024-01-21 | End: 2024-02-20

## 2024-02-14 ENCOUNTER — OFFICE VISIT (OUTPATIENT)
Dept: PALLIATIVE MEDICINE | Facility: CLINIC | Age: 54
End: 2024-02-14
Payer: COMMERCIAL

## 2024-02-14 VITALS
RESPIRATION RATE: 13 BRPM | BODY MASS INDEX: 20.57 KG/M2 | DIASTOLIC BLOOD PRESSURE: 78 MMHG | WEIGHT: 123.6 LBS | TEMPERATURE: 98.3 F | HEART RATE: 136 BPM | SYSTOLIC BLOOD PRESSURE: 140 MMHG | OXYGEN SATURATION: 94 %

## 2024-02-14 DIAGNOSIS — C50.812 MALIGNANT NEOPLASM OF OVERLAPPING SITES OF LEFT BREAST IN FEMALE, ESTROGEN RECEPTOR POSITIVE: ICD-10-CM

## 2024-02-14 DIAGNOSIS — G89.3 PAIN FROM BONE METASTASES (HCC): ICD-10-CM

## 2024-02-14 DIAGNOSIS — K59.03 DRUG INDUCED CONSTIPATION: ICD-10-CM

## 2024-02-14 DIAGNOSIS — G89.3 CANCER ASSOCIATED PAIN: Primary | ICD-10-CM

## 2024-02-14 DIAGNOSIS — C50.919: ICD-10-CM

## 2024-02-14 DIAGNOSIS — C79.51 PAIN FROM BONE METASTASES (HCC): ICD-10-CM

## 2024-02-14 DIAGNOSIS — Z17.0 MALIGNANT NEOPLASM OF OVERLAPPING SITES OF LEFT BREAST IN FEMALE, ESTROGEN RECEPTOR POSITIVE: ICD-10-CM

## 2024-02-14 DIAGNOSIS — Z51.5 PALLIATIVE CARE ENCOUNTER: ICD-10-CM

## 2024-02-14 PROCEDURE — 99214 OFFICE O/P EST MOD 30 MIN: CPT | Performed by: INTERNAL MEDICINE

## 2024-02-14 NOTE — ASSESSMENT & PLAN NOTE
Continue OxyIR 10mg PO q4H prn   I have asked her to call for refills 5 days before her last dose, ideally Monday to Wednesday and avoid weekends in case we need staff assistance to work on prior authorization and other logistic issues.   Plan to wean down when appropriate. She just finished RT  Stop robaxin, not really helpful and causes excessive drowsiness and dizziness

## 2024-02-14 NOTE — PROGRESS NOTES
Palliative and Supportive Care   Shannan Vee 53 y.o. female [id number]    Assessment/Plan:  1. Cancer associated pain    2. Pain from bone metastases (HCC)    3. Drug induced constipation    4. Recurrent malignant neoplasm of breast (HCC)    5. Malignant neoplasm of overlapping sites of left breast in female, estrogen receptor positive     6. Palliative care encounter      1. Cancer associated pain  Assessment & Plan:  Continue OxyIR 10mg PO q4H prn   I have asked her to call for refills 5 days before her last dose, ideally Monday to Wednesday and avoid weekends in case we need staff assistance to work on prior authorization and other logistic issues.   Plan to wean down when appropriate. She just finished RT  Stop robaxin, not really helpful and causes excessive drowsiness and dizziness      2. Pain from bone metastases (HCC)    3. Drug induced constipation  Assessment & Plan:  Senokot 1-2 tabs daily to prevent OIC      4. Recurrent malignant neoplasm of breast (HCC)    5. Malignant neoplasm of overlapping sites of left breast in female, estrogen receptor positive     6. Palliative care encounter      Follow up   RTO after 3/22/2024, check in on pain.    Cont[place] Sub[place]    [place]  reviewed: No red flags were identified; safe to proceed with prescription..    Filled  Written  ID  Drug  QTY  Days  Prescriber  RX #  Dispenser  Refill  Daily Dose*  Pymt Type      01/02/2024 01/01/2024 1 Oxycodone Hcl (Ir) 5 Mg Tablet 20.00 5 Ta Hab [id number] [facility] ([id number]) 0 30.00 [place]   12/19/2023 12/18/2023 1 Oxycodone Hcl (Ir) 5 Mg Tablet 20.00 5 Ta Hab [id number] [facility] ([id number]) 0 30.[place]   12/17/2023 11/17/2023 2 Oxycodone-Acetaminophen 5-325 120.00 21 Yo Spear [id number] [facility] ([id number]) 0 45.00 [place]   12/08/2023 12/08/2023 2 Oxycodone Hcl (Ir) 5 Mg Tablet 20.00 5 Ta Hab [id number] [facility] ([id number]) [place] 30.[place]   11/27/2023 11/27/2023 2 Oxycodone Hcl (Ir) 5 Mg Tablet 20.00 5 Ta Hab [id number]  [facility] ([id number]) [place] 30.[place]   11/10/2023 11/10/2023 1 Oxycodone Hcl (Ir) 5 Mg Tablet 20.00 4 Ma Amb [id number] [facility] ([id number]) 0 37.50 [place]     Requested Prescriptions      No prescriptions requested or ordered in this encounter     Medications Discontinued During This Encounter   Medication Reason    morphine (MSIR) 15 mg tablet     methocarbamol (ROBAXIN) 500 mg tablet        Representatives have queried the patient's controlled substance dispensing history in the Prescription Drug Monitoring Program in compliance with regulations before I have prescribed any controlled substances.  The prescription history is consistent with prescribed therapy and our practice policies.      35 minutes were spent face to face with Shannan Vee and her daughter with greater than 50% of the time spent in counseling or coordination of care including discussions of etiology of diagnosis, pathogenesis of diagnosis, diagnostic results, impression, and recommendations, risks and benefits of treatment, instructions for disease self management, treatment instructions, follow up requirements, risk factors and risk reduction of disease, patient and family counseling/involvement in care, and compliance with treatment regimen .  All of the patient's questions were answered during this discussion.    No follow-ups on file.    Subjective:   Chief Complaint  Follow up visit for:  symptom management, pain, neoplasm related, assessment of goals of care, disease process education and discussion of prognosis, advance care planning  HPI     Shannan Vee is a 53 y.o. female with breast cancer (diagnosed in 2013) s/p chemo, mastectomy, RT. Unfortunately she appeared to have recurrence in 10/2023 when a large right lower lobe lung mass with regional lymphadenopathy and metastatic bone disease were found in CT imaging. She was first seen in the hospital in 11/2023 when she was admitted for pleural effusion. She went for an IR lung biopsy that  confirmed lung mets. She is currently on oral chemo. She was hospitalized again in late 1/2024 for malignant pleural effusion and received IR tunneled pleural catheter. She is now s/p RT to the C2, T8-L1 (from 1/19 to 2/2/2024) at [facility]. She is currently on PO kisqali plus Faslodex.     Saw patient today with her daughter who provided English translation. She is doing well since hospitalization in late January. She has finished RT about 2 weeks ago. She feels her pain (in the R upper back area) has stabilized since, now requiring oxyIR 10mg q4-5H prn, so averaging 5-6 doses a day. She is happy with this regimen and is on board with no changes at this time. We also talked about future weaning when able, pending response to direct cancer cares. She does get constipated at times, I recommend senokot 1-2 tabs daily. They think they have this at home. She has her pleurex x. No other complaints.     The following portions of the medical history were reviewed: past medical history, problem list, medication list, and social history.    Current Outpatient Medications:     acetaminophen (TYLENOL) 500 mg tablet, Take 2 tablets (1,000 mg total) by mouth 3 (three) times a day as needed for mild pain, Disp: 180 tablet, Rfl: 0    ergocalciferol (VITAMIN D2) 50,000 units, TAKE 1 CAPSULE BY MOUTH 1 TIME A WEEK, Disp: 8 capsule, Rfl: 0    guaiFENesin (MUCINEX) 600 mg 12 hr tablet, Take 600 mg by mouth 2 (two) times a day, Disp: , Rfl:     lidocaine (LIDODERM) 5 %, Apply 2 patches topically over 12 hours daily Remove & Discard patch within 12 hours or as directed by MD, Disp: 60 patch, Rfl: 0    metoprolol succinate (TOPROL-XL) 50 mg 24 hr tablet, Take 1 tablet (50 mg total) by mouth daily, Disp: 30 tablet, Rfl: 0    Multiple Vitamin (MULTIVITAMIN ADULT PO), Take 1 tablet by mouth in the morning, Disp: , Rfl:     naloxone (NARCAN) 4 mg/0.1 mL nasal spray, Administer 1 spray into a nostril. If no response after 2-3 minutes, give  another dose in the other nostril using a new spray., Disp: 1 each, Rfl: 0    oxyCODONE (ROXICODONE) 10 MG TABS, Take 1 tablet (10 mg total) by mouth every 4 (four) hours as needed for severe pain or moderate pain Max Daily Amount: 60 mg, Disp: 90 tablet, Rfl: 0    polyethylene glycol (GLYCOLAX) 17 GM/SCOOP powder, , Disp: , Rfl:     Ribociclib Succ, 600 MG Dose, 200 MG TBPK, Take 600 mg by mouth in the morning For 3 weeks and then 1 week off, Disp: 21 each, Rfl: 6    Senokot 8.6 MG tablet, Take 17.2 mg by mouth, Disp: , Rfl:     sodium chloride 1 g tablet, Take 1 tablet (1 g total) by mouth 2 (two) times a day, Disp: 60 tablet, Rfl: 0    calcium citrate-vitamin D (Calcitrate Plus D) 315 mg-5 mcg tablet, Take 1 tablet by mouth 2 (two) times a day, Disp: 180 tablet, Rfl: 0  Review of Systems   Constitutional:  Negative for activity change and appetite change.   HENT:  Negative for trouble swallowing.    Respiratory:  Negative for shortness of breath.    Cardiovascular:  Negative for chest pain.   Gastrointestinal:  Negative for abdominal pain.   Musculoskeletal:  Positive for back pain.        R upper back pain   Neurological:  Negative for weakness.   Psychiatric/Behavioral:  Negative for sleep disturbance. The patient is not nervous/anxious.    All other systems reviewed and are negative.      All other systems negative    Objective:  Vital Signs  /78 (BP Location: Right arm, Patient Position: Sitting, Cuff Size: Standard)   Pulse (!) 136   Temp 98.3 °F (36.8 °C) (Temporal)   Resp 13   Wt 56.1 kg (123 lb 9.6 oz)   SpO2 94%   BMI 20.57 kg/m²    Physical Exam    Constitutional: Appears well-developed and well-nourished. Appears thinner, chronically ill looking but not toxic appearing. In no acute physical or emotional distress.   Head: Normocephalic and atraumatic.   Eyes: EOM are normal. No ocular discharge. No scleral icterus.   Neck: No visible adenopathy or masses  Respiratory: Effort normal. No  stridor. No respiratory distress. On RA. (+) pleurex cath covered in gauze, R lower rib cage area  Gastrointestinal: No abdominal distension.   Musculoskeletal: No edema.   Neurological: Alert, oriented and appropriately conversant.   Skin: No diaphoresis, no rashes seen on exposed areas of skin.   Psychiatric: Displays a normal mood and affect. Behavior, judgement and thought content appear normal.     Janie Briones MD  Palliative Medicine & Supportive Care  Internal Medicine  Available via [employer]  Office: [phone]  Fax: [phone]

## 2024-02-15 ENCOUNTER — HOME CARE VISIT (OUTPATIENT)
Dept: HOME HEALTH SERVICES | Facility: HOME HEALTHCARE | Age: 54
End: 2024-02-15
Payer: COMMERCIAL

## 2024-02-15 VITALS
HEART RATE: 110 BPM | TEMPERATURE: 98.9 F | SYSTOLIC BLOOD PRESSURE: 118 MMHG | OXYGEN SATURATION: 98 % | DIASTOLIC BLOOD PRESSURE: 70 MMHG

## 2024-02-15 PROCEDURE — G0299 HHS/HOSPICE OF RN EA 15 MIN: HCPCS

## 2024-02-16 ENCOUNTER — HOME CARE VISIT (OUTPATIENT)
Dept: HOME HEALTH SERVICES | Facility: HOME HEALTHCARE | Age: 54
End: 2024-02-16
Payer: COMMERCIAL

## 2024-02-16 ENCOUNTER — TELEPHONE (OUTPATIENT)
Dept: FAMILY MEDICINE CLINIC | Facility: CLINIC | Age: 54
End: 2024-02-16

## 2024-02-16 NOTE — TELEPHONE ENCOUNTER
Pt left a voice mail in the nurse line stating that a document was faxed over to our from her  to be singed and dated by PCP.        Please advise, thanks

## 2024-02-19 ENCOUNTER — APPOINTMENT (OUTPATIENT)
Dept: LAB | Facility: HOSPITAL | Age: 54
End: 2024-02-19
Payer: COMMERCIAL

## 2024-02-21 ENCOUNTER — TELEPHONE (OUTPATIENT)
Dept: HOME HEALTH SERVICES | Facility: HOME HEALTHCARE | Age: 54
End: 2024-02-21

## 2024-02-21 DIAGNOSIS — G89.3 CANCER-RELATED PAIN: ICD-10-CM

## 2024-02-21 NOTE — TELEPHONE ENCOUNTER
Last appointment: 2/14/2024    Next scheduled appointment: 3/29/2024    Pharmacy: [facility]       PDMP review:

## 2024-02-22 RX ORDER — OXYCODONE HYDROCHLORIDE 10 MG/1
10 TABLET ORAL EVERY 4 HOURS PRN
Qty: 120 TABLET | Refills: 0 | Status: SHIPPED | OUTPATIENT
Start: 2024-02-22 | End: 2024-02-25 | Stop reason: SDUPTHER

## 2024-02-22 NOTE — TELEPHONE ENCOUNTER
Cont[place]    [place]  reviewed: No red flags were identified; safe to proceed with prescription..    Filled  Written  ID  Drug  QTY  Days  Prescriber  RX #  Dispenser  Refill  Daily Dose*  Pymt Type     02/10/2024 02/10/2024 1 Oxycodone Hcl (Ir) 10 Mg Tab 90.00 23 Da [facility] [id number] [facility] ([id number]) 0 58.70 [place]  01/21/2024 01/21/2024 3 Morphine Sulfate Ir 15 Mg Tab 56.00 14 [place] [id number] [facility] ([id number]) 0 60.00 [place]  01/21/2024 01/21/2024 3 Oxycodone Hcl (Ir) 5 Mg Tablet 126.00 14 [place] [id number] [facility] ([id number]) 0 67.50 [place]  01/18/2024 01/18/2024 3 Oxycodone Hcl (Ir) 5 Mg Tablet 20.00 5 Ta Hab [id number] [facility] ([id number]) 0 30.00 [place]  01/02/2024 01/01/2024 1 Oxycodone Hcl (Ir) 5 Mg Tablet 20.00 5 Ta Hab [id number] [facility] ([id number]) 0 30.00 MME Comm Ins SARAH Briones MD  Palliative Medicine & Supportive Care  Internal Medicine  Available via [employer] Text  Office: [phone]  Fax: [phone]

## 2024-02-23 ENCOUNTER — TELEPHONE (OUTPATIENT)
Dept: PALLIATIVE MEDICINE | Facility: CLINIC | Age: 54
End: 2024-02-23

## 2024-02-23 ENCOUNTER — HOME CARE VISIT (OUTPATIENT)
Dept: HOME HEALTH SERVICES | Facility: HOME HEALTHCARE | Age: 54
End: 2024-02-23
Payer: COMMERCIAL

## 2024-02-23 VITALS
OXYGEN SATURATION: 98 % | TEMPERATURE: 98.9 F | SYSTOLIC BLOOD PRESSURE: 122 MMHG | RESPIRATION RATE: 18 BRPM | DIASTOLIC BLOOD PRESSURE: 68 MMHG | HEART RATE: 112 BPM

## 2024-02-23 DIAGNOSIS — R00.0 SINUS TACHYCARDIA: ICD-10-CM

## 2024-02-23 PROCEDURE — G0299 HHS/HOSPICE OF RN EA 15 MIN: HCPCS

## 2024-02-23 RX ORDER — METOPROLOL SUCCINATE 50 MG/1
50 TABLET, EXTENDED RELEASE ORAL DAILY
Qty: 30 TABLET | Refills: 0 | Status: CANCELLED | OUTPATIENT
Start: 2024-02-23

## 2024-02-23 NOTE — TELEPHONE ENCOUNTER
Visiting nurse with [facility] states pt needs refill on metoprolol succinate 50 mg.pt stated to the nurse the dizziness occurs and lasts 6-8 jours after taking this medication. Pt has tachy cardia, nurse wants to know if appt should be made. Please advise

## 2024-02-23 NOTE — TELEPHONE ENCOUNTER
Daughter called to see if medication was ready for . I called pharmacy to check on it and they need a prior auth on oxycodone 10mg.

## 2024-02-25 ENCOUNTER — DOCUMENTATION (OUTPATIENT)
Dept: PALLIATIVE MEDICINE | Facility: HOSPITAL | Age: 54
End: 2024-02-25

## 2024-02-25 ENCOUNTER — TELEPHONE (OUTPATIENT)
Dept: OTHER | Facility: OTHER | Age: 54
End: 2024-02-25

## 2024-02-25 DIAGNOSIS — G89.3 CANCER-RELATED PAIN: ICD-10-CM

## 2024-02-25 PROBLEM — Z71.89 GOALS OF CARE, COUNSELING/DISCUSSION: Status: RESOLVED | Noted: 2024-01-12 | Resolved: 2024-02-25

## 2024-02-25 PROBLEM — E87.1 HYPONATREMIA: Status: RESOLVED | Noted: 2024-01-29 | Resolved: 2024-02-25

## 2024-02-25 PROBLEM — B34.9 VIRAL INFECTION: Status: RESOLVED | Noted: 2023-09-20 | Resolved: 2024-02-25

## 2024-02-25 PROBLEM — R06.00 DYSPNEA: Status: RESOLVED | Noted: 2023-11-07 | Resolved: 2024-02-25

## 2024-02-25 RX ORDER — OXYCODONE HYDROCHLORIDE 10 MG/1
10 TABLET ORAL EVERY 4 HOURS PRN
Qty: 120 TABLET | Refills: 0 | Status: SHIPPED | OUTPATIENT
Start: 2024-02-25

## 2024-02-25 NOTE — ASSESSMENT & PLAN NOTE
Persistent, likely to be due to pain and anemia  HR:  125 bpm  Currently on Toprol -XL 50 mg qd  Echo 1/2024: EF of 55% with no evidence of cardiomyopathy or valvar pathology  POCT ECG today: Sinus tachycardia    Plan:  Continue current regimen  ED precautions give  Cardiology referral

## 2024-02-25 NOTE — TELEPHONE ENCOUNTER
Spoke with patient overnight who explained that the pharmacy stated she would not be able to refill her oxycodone until 2/29.  On chart review, patient should be appropriate for refill at this time and the medication was sent on 2/22 by Dr. Carson.  Offered to call the pharmacy, patient explained that they had not been able to get through to anyone and agreed with plan for me to call in the morning.  Spent about 30 minutes on hold with pharmacy this morning until our call was disconnected.  Called patient to let her know but got voicemail, left message on machine.

## 2024-02-25 NOTE — PROGRESS NOTES
Name: Shannan Vee      : 1970      MRN: [id number]  Encounter Provider: Todd Bañuelos MD  Encounter Date: 2024   Encounter department: [facility]    Assessment & Plan     1. Sinus tachycardia  Assessment & Plan:  Persistent, likely to be due to pain  HR:  129 bpm  Currently on Toprol -XL 50 mg qd  Echo 2024: EF of 55% with no evidence of cardiomyopathy or valvar pathology  POCT ECG today: Sinus tachycardia    Plan:  Continue current regimen  ED precautions give  Cardiology referral     Orders:  -     POCT ECG  -     Ambulatory Referral to Cardiology; Future           Subjective      Shannan is a 53 y.o female with PMH of metastatic breast cancer, sinus tachycardia and  cancer associated pain who presents today for follow up of persistent tachycardia.  Patient denies palpitation, chest pain or SOB.       Review of Systems   Cardiovascular:  Negative for chest pain and palpitations.       Current Outpatient Medications on File Prior to Visit   Medication Sig    acetaminophen (TYLENOL) 500 mg tablet Take 2 tablets (1,000 mg total) by mouth 3 (three) times a day as needed for mild pain    calcium citrate-vitamin D (Calcitrate Plus D) 315 mg-5 mcg tablet Take 1 tablet by mouth 2 (two) times a day    ergocalciferol (VITAMIN D2) 50,000 units TAKE 1 CAPSULE BY MOUTH 1 TIME A WEEK    lidocaine (LIDODERM) 5 % Apply 2 patches topically over 12 hours daily Remove & Discard patch within 12 hours or as directed by MD    metoprolol succinate (TOPROL-XL) 50 mg 24 hr tablet Take 1 tablet (50 mg total) by mouth daily    Multiple Vitamin (MULTIVITAMIN ADULT PO) Take 1 tablet by mouth in the morning    naloxone (NARCAN) 4 mg/0.1 mL nasal spray Administer 1 spray into a nostril. If no response after 2-3 minutes, give another dose in the other nostril using a new spray.    oxyCODONE (ROXICODONE) 10 MG TABS Take 1 tablet (10 mg total) by mouth every 4 (four) hours as needed for severe  "pain or moderate pain Max Daily Amount: 60 mg    polyethylene glycol (GLYCOLAX) 17 GM/SCOOP powder     Ribociclib Succ, 600 MG Dose, 200 MG TBPK Take 600 mg by mouth in the morning For 3 weeks and then 1 week off    Senokot 8.6 MG tablet Take 17.2 mg by mouth    sodium chloride 1 g tablet Take 1 tablet (1 g total) by mouth 2 (two) times a day       Objective     /90 (BP Location: Right arm, Patient Position: Sitting, Cuff Size: Standard)   Pulse (!) 129   Temp 97.8 °F (36.6 °C) (Temporal)   Ht 5' 5\" (1.651 m)   Wt 54.9 kg (121 lb)   SpO2 97%   BMI 20.14 kg/m²     Physical Exam  Constitutional:       General: She is not in acute distress.     Appearance: Normal appearance. She is not toxic-appearing.   HENT:      Head: Normocephalic and atraumatic.      Nose: Nose normal. No congestion or rhinorrhea.   Eyes:      General: No scleral icterus.     Conjunctiva/sclera: Conjunctivae normal.   Cardiovascular:      Rate and Rhythm: Regular rhythm. Tachycardia present.      Pulses: Normal pulses.      Heart sounds: Normal heart sounds. No murmur heard.     No gallop.   Pulmonary:      Effort: Pulmonary effort is normal. No respiratory distress.      Breath sounds: Normal breath sounds. No wheezing or rales.   Musculoskeletal:      Cervical back: Normal range of motion and neck supple.   Skin:     General: Skin is warm and dry.      Capillary Refill: Capillary refill takes less than 2 seconds.      Coloration: Skin is not jaundiced or pale.   Neurological:      General: No focal deficit present.      Mental Status: She is alert and oriented to person, place, and time.   Psychiatric:         Mood and Affect: Mood normal.         Behavior: Behavior normal.       Todd Bañuelos MD    "

## 2024-02-25 NOTE — TELEPHONE ENCOUNTER
Spoke with patient and son in law, we will try sending the medication to a different pharmacy. They are to call back if they are unable to get the medication from [facility].

## 2024-02-25 NOTE — TELEPHONE ENCOUNTER
[facility] called stating patient is going to be out of her Oxycodone for tomorrow. I spoke with [facility] and was advised she can't  medication until 2/29. On call notified via TC.

## 2024-02-25 NOTE — TELEPHONE ENCOUNTER
Patient states she went to  her Oxycodone prescription from Pharmacy and they will not dispense the medication until 2/29/24 and she is out of the medication.

## 2024-02-26 ENCOUNTER — HOSPITAL ENCOUNTER (OUTPATIENT)
Dept: INFUSION CENTER | Facility: HOSPITAL | Age: 54
Discharge: HOME | End: 2024-02-26
Attending: INTERNAL MEDICINE

## 2024-02-27 ENCOUNTER — TELEPHONE (OUTPATIENT)
Dept: FAMILY MEDICINE CLINIC | Facility: CLINIC | Age: 54
End: 2024-02-27

## 2024-02-28 ENCOUNTER — OFFICE VISIT (OUTPATIENT)
Dept: FAMILY MEDICINE CLINIC | Facility: CLINIC | Age: 54
End: 2024-02-28

## 2024-02-28 VITALS
SYSTOLIC BLOOD PRESSURE: 128 MMHG | OXYGEN SATURATION: 97 % | BODY MASS INDEX: 20.16 KG/M2 | WEIGHT: 121 LBS | HEART RATE: 129 BPM | HEIGHT: 65 IN | TEMPERATURE: 97.8 F | DIASTOLIC BLOOD PRESSURE: 90 MMHG

## 2024-02-28 DIAGNOSIS — R00.0 SINUS TACHYCARDIA: Primary | ICD-10-CM

## 2024-02-28 LAB — ECG INTERP DURING EX: NORMAL MS

## 2024-02-28 PROCEDURE — 93000 ELECTROCARDIOGRAM COMPLETE: CPT | Performed by: FAMILY MEDICINE

## 2024-02-28 PROCEDURE — 3080F DIAST BP >= 90 MM HG: CPT | Performed by: FAMILY MEDICINE

## 2024-02-28 PROCEDURE — 99213 OFFICE O/P EST LOW 20 MIN: CPT | Performed by: FAMILY MEDICINE

## 2024-02-28 PROCEDURE — 3074F SYST BP LT 130 MM HG: CPT | Performed by: FAMILY MEDICINE

## 2024-02-28 RX ORDER — METOPROLOL SUCCINATE 50 MG/1
50 TABLET, EXTENDED RELEASE ORAL DAILY
Qty: 30 TABLET | Refills: 0 | Status: SHIPPED | OUTPATIENT
Start: 2024-02-28

## 2024-02-29 ENCOUNTER — HOME CARE VISIT (OUTPATIENT)
Dept: HOME HEALTH SERVICES | Facility: HOME HEALTHCARE | Age: 54
End: 2024-02-29
Payer: COMMERCIAL

## 2024-02-29 ENCOUNTER — TELEPHONE (OUTPATIENT)
Dept: PALLIATIVE MEDICINE | Facility: CLINIC | Age: 54
End: 2024-02-29

## 2024-02-29 VITALS
HEART RATE: 100 BPM | OXYGEN SATURATION: 100 % | DIASTOLIC BLOOD PRESSURE: 70 MMHG | TEMPERATURE: 98.9 F | SYSTOLIC BLOOD PRESSURE: 136 MMHG

## 2024-02-29 PROCEDURE — G0299 HHS/HOSPICE OF RN EA 15 MIN: HCPCS

## 2024-02-29 NOTE — TELEPHONE ENCOUNTER
Call placed to [facility] at  [phone]    Spoke with Alejandra HARE   Additional information needed.  Auth updated.  Requested urgent.  Reference # [id number]    Await response

## 2024-02-29 NOTE — TELEPHONE ENCOUNTER
Daughter called in stated her mother was able to  her medications but she did have to pay out of pocket. But she would like for the next script to writing as six tablets a day every four hours as needed.

## 2024-03-04 ENCOUNTER — HOSPITAL ENCOUNTER (OUTPATIENT)
Dept: INFUSION CENTER | Facility: HOSPITAL | Age: 54
Discharge: HOME | End: 2024-03-04
Attending: INTERNAL MEDICINE
Payer: COMMERCIAL

## 2024-03-04 VITALS
DIASTOLIC BLOOD PRESSURE: 77 MMHG | SYSTOLIC BLOOD PRESSURE: 128 MMHG | OXYGEN SATURATION: 100 % | HEART RATE: 130 BPM | RESPIRATION RATE: 18 BRPM | TEMPERATURE: 98.1 F

## 2024-03-04 DIAGNOSIS — C50.912 CARCINOMA OF LEFT BREAST IN FEMALE, ESTROGEN RECEPTOR POSITIVE, UNSPECIFIED SITE OF BREAST: Primary | ICD-10-CM

## 2024-03-04 DIAGNOSIS — C50.812 MALIGNANT NEOPLASM OF OVERLAPPING SITES OF LEFT BREAST IN FEMALE, ESTROGEN RECEPTOR POSITIVE: ICD-10-CM

## 2024-03-04 DIAGNOSIS — C79.51 PAIN FROM BONE METASTASES (HCC): ICD-10-CM

## 2024-03-04 DIAGNOSIS — G89.3 PAIN FROM BONE METASTASES (HCC): ICD-10-CM

## 2024-03-04 DIAGNOSIS — Z17.0 CARCINOMA OF LEFT BREAST IN FEMALE, ESTROGEN RECEPTOR POSITIVE, UNSPECIFIED SITE OF BREAST: Primary | ICD-10-CM

## 2024-03-04 DIAGNOSIS — Z85.3 PERSONAL HISTORY OF MALIGNANT NEOPLASM OF BREAST: ICD-10-CM

## 2024-03-04 DIAGNOSIS — Z17.0 MALIGNANT NEOPLASM OF OVERLAPPING SITES OF LEFT BREAST IN FEMALE, ESTROGEN RECEPTOR POSITIVE: ICD-10-CM

## 2024-03-04 PROCEDURE — 96365 THER/PROPH/DIAG IV INF INIT: CPT

## 2024-03-04 PROCEDURE — 96402 CHEMO HORMON ANTINEOPL SQ/IM: CPT

## 2024-03-04 RX ORDER — LAMOTRIGINE 25 MG/1
500 TABLET ORAL ONCE
Status: COMPLETED | OUTPATIENT
Start: 2024-03-04 | End: 2024-03-04

## 2024-03-04 RX ORDER — LAMOTRIGINE 25 MG/1
500 TABLET ORAL ONCE
OUTPATIENT
Start: 2024-04-01

## 2024-03-04 RX ORDER — SODIUM CHLORIDE 9 MG/ML
20 INJECTION, SOLUTION INTRAVENOUS ONCE
Status: COMPLETED | OUTPATIENT
Start: 2024-03-04 | End: 2024-03-04

## 2024-03-04 RX ORDER — SODIUM CHLORIDE 9 MG/ML
20 INJECTION, SOLUTION INTRAVENOUS ONCE
OUTPATIENT
Start: 2024-04-01

## 2024-03-04 RX ORDER — ZOLEDRONIC ACID 0.04 MG/ML
4 INJECTION, SOLUTION INTRAVENOUS ONCE
OUTPATIENT
Start: 2024-04-01 | End: 2024-04-01

## 2024-03-04 RX ADMIN — SODIUM CHLORIDE 20 ML/HR: 9 INJECTION, SOLUTION INTRAVENOUS at 10:55

## 2024-03-04 RX ADMIN — ZOLEDRONIC ACID 4 MG: 4 INJECTION, SOLUTION, CONCENTRATE INTRAVENOUS at 10:59

## 2024-03-04 RX ADMIN — FULVESTRANT 500 MG: 50 INJECTION INTRAMUSCULAR at 11:42

## 2024-03-04 NOTE — PROGRESS NOTES
Pt tolerated treatment today with no adverse reactions. Declined AVS, Next apt confirmed 4/1/24 @ 1100. Left unit ambulatory with a steady gait.

## 2024-03-11 ENCOUNTER — APPOINTMENT (EMERGENCY)
Dept: CT IMAGING | Facility: HOSPITAL | Age: 54
DRG: 136 | End: 2024-03-11
Payer: COMMERCIAL

## 2024-03-11 ENCOUNTER — APPOINTMENT (EMERGENCY)
Dept: RADIOLOGY | Facility: HOSPITAL | Age: 54
DRG: 136 | End: 2024-03-11
Payer: COMMERCIAL

## 2024-03-11 ENCOUNTER — APPOINTMENT (EMERGENCY)
Dept: CT IMAGING | Facility: HOSPITAL | Age: 54
DRG: 136 | End: 2024-03-11
Attending: INTERNAL MEDICINE
Payer: COMMERCIAL

## 2024-03-11 ENCOUNTER — HOSPITAL ENCOUNTER (EMERGENCY)
Facility: HOSPITAL | Age: 54
Discharge: HOME | DRG: 136 | End: 2024-03-11
Attending: EMERGENCY MEDICINE
Payer: COMMERCIAL

## 2024-03-11 VITALS
TEMPERATURE: 98.1 F | HEART RATE: 138 BPM | OXYGEN SATURATION: 100 % | SYSTOLIC BLOOD PRESSURE: 114 MMHG | DIASTOLIC BLOOD PRESSURE: 66 MMHG | RESPIRATION RATE: 22 BRPM

## 2024-03-11 DIAGNOSIS — R42 VERTIGO: ICD-10-CM

## 2024-03-11 DIAGNOSIS — R00.0 SINUS TACHYCARDIA: ICD-10-CM

## 2024-03-11 DIAGNOSIS — R91.8 RIGHT LOWER LOBE LUNG MASS: Primary | ICD-10-CM

## 2024-03-11 DIAGNOSIS — J90 LARGE PLEURAL EFFUSION: ICD-10-CM

## 2024-03-11 LAB
2HR DELTA HS TROPONIN: -1 NG/L
ALBUMIN SERPL BCP-MCNC: 3.4 G/DL (ref 3.5–5)
ALP SERPL-CCNC: 123 U/L (ref 34–104)
ALT SERPL W P-5'-P-CCNC: 11 U/L (ref 7–52)
ANION GAP SERPL CALCULATED.3IONS-SCNC: 11 MMOL/L
ANION GAP SERPL CALCULATED.3IONS-SCNC: 13 MMOL/L
APTT PPP: 34 SECONDS (ref 23–37)
AST SERPL W P-5'-P-CCNC: 83 U/L (ref 13–39)
ATRIAL RATE: 127 BPM
ATRIAL RATE: 131 BPM
BASOPHILS # BLD AUTO: 0.03 THOUSANDS/ÂΜL (ref 0–0.1)
BASOPHILS NFR BLD AUTO: 0 % (ref 0–1)
BILIRUB SERPL-MCNC: 0.73 MG/DL (ref 0.2–1)
BNP SERPL-MCNC: 85 PG/ML (ref 0–100)
BUN SERPL-MCNC: 10 MG/DL (ref 5–25)
BUN SERPL-MCNC: 11 MG/DL (ref 5–25)
CALCIUM ALBUM COR SERPL-MCNC: 9.2 MG/DL (ref 8.3–10.1)
CALCIUM SERPL-MCNC: 7.8 MG/DL (ref 8.4–10.2)
CALCIUM SERPL-MCNC: 8.7 MG/DL (ref 8.4–10.2)
CARDIAC TROPONIN I PNL SERPL HS: 5 NG/L
CARDIAC TROPONIN I PNL SERPL HS: 6 NG/L
CHLORIDE SERPL-SCNC: 92 MMOL/L (ref 96–108)
CHLORIDE SERPL-SCNC: 98 MMOL/L (ref 96–108)
CO2 SERPL-SCNC: 21 MMOL/L (ref 21–32)
CO2 SERPL-SCNC: 22 MMOL/L (ref 21–32)
CREAT SERPL-MCNC: 0.44 MG/DL (ref 0.6–1.3)
CREAT SERPL-MCNC: 0.49 MG/DL (ref 0.6–1.3)
EOSINOPHIL # BLD AUTO: 0 THOUSAND/ÂΜL (ref 0–0.61)
EOSINOPHIL NFR BLD AUTO: 0 % (ref 0–6)
ERYTHROCYTE [DISTWIDTH] IN BLOOD BY AUTOMATED COUNT: 21.3 % (ref 11.6–15.1)
GFR SERPL CREATININE-BSD FRML MDRD: 110 ML/MIN/1.73SQ M
GFR SERPL CREATININE-BSD FRML MDRD: 114 ML/MIN/1.73SQ M
GLUCOSE SERPL-MCNC: 104 MG/DL (ref 65–140)
GLUCOSE SERPL-MCNC: 113 MG/DL (ref 65–140)
HCT VFR BLD AUTO: 25.2 % (ref 34.8–46.1)
HGB BLD-MCNC: 7.4 G/DL (ref 11.5–15.4)
IMM GRANULOCYTES # BLD AUTO: 0.16 THOUSAND/UL (ref 0–0.2)
IMM GRANULOCYTES NFR BLD AUTO: 2 % (ref 0–2)
LYMPHOCYTES # BLD AUTO: 1.46 THOUSANDS/ÂΜL (ref 0.6–4.47)
LYMPHOCYTES NFR BLD AUTO: 17 % (ref 14–44)
MCH RBC QN AUTO: 26.7 PG (ref 26.8–34.3)
MCHC RBC AUTO-ENTMCNC: 29.4 G/DL (ref 31.4–37.4)
MCV RBC AUTO: 91 FL (ref 82–98)
MONOCYTES # BLD AUTO: 1.47 THOUSAND/ÂΜL (ref 0.17–1.22)
MONOCYTES NFR BLD AUTO: 17 % (ref 4–12)
NEUTROPHILS # BLD AUTO: 5.55 THOUSANDS/ÂΜL (ref 1.85–7.62)
NEUTS SEG NFR BLD AUTO: 64 % (ref 43–75)
NRBC BLD AUTO-RTO: 1 /100 WBCS
P AXIS: 69 DEGREES
P AXIS: 71 DEGREES
PLATELET # BLD AUTO: 401 THOUSANDS/UL (ref 149–390)
PMV BLD AUTO: 8.9 FL (ref 8.9–12.7)
POTASSIUM SERPL-SCNC: 3.9 MMOL/L (ref 3.5–5.3)
POTASSIUM SERPL-SCNC: 5.9 MMOL/L (ref 3.5–5.3)
PR INTERVAL: 128 MS
PR INTERVAL: 130 MS
PROCALCITONIN SERPL-MCNC: 0.98 NG/ML
PROT SERPL-MCNC: 7.9 G/DL (ref 6.4–8.4)
QRS AXIS: 100 DEGREES
QRS AXIS: 95 DEGREES
QRSD INTERVAL: 68 MS
QRSD INTERVAL: 68 MS
QT INTERVAL: 278 MS
QT INTERVAL: 284 MS
QTC INTERVAL: 410 MS
QTC INTERVAL: 412 MS
RBC # BLD AUTO: 2.77 MILLION/UL (ref 3.81–5.12)
SODIUM SERPL-SCNC: 126 MMOL/L (ref 135–147)
SODIUM SERPL-SCNC: 131 MMOL/L (ref 135–147)
T WAVE AXIS: 17 DEGREES
T WAVE AXIS: 39 DEGREES
VENTRICULAR RATE: 127 BPM
VENTRICULAR RATE: 131 BPM
WBC # BLD AUTO: 8.67 THOUSAND/UL (ref 4.31–10.16)

## 2024-03-11 PROCEDURE — 85025 COMPLETE CBC W/AUTO DIFF WBC: CPT | Performed by: EMERGENCY MEDICINE

## 2024-03-11 PROCEDURE — 96361 HYDRATE IV INFUSION ADD-ON: CPT

## 2024-03-11 PROCEDURE — 80053 COMPREHEN METABOLIC PANEL: CPT | Performed by: EMERGENCY MEDICINE

## 2024-03-11 PROCEDURE — 83880 ASSAY OF NATRIURETIC PEPTIDE: CPT | Performed by: EMERGENCY MEDICINE

## 2024-03-11 PROCEDURE — 93005 ELECTROCARDIOGRAM TRACING: CPT

## 2024-03-11 PROCEDURE — 99285 EMERGENCY DEPT VISIT HI MDM: CPT

## 2024-03-11 PROCEDURE — 71045 X-RAY EXAM CHEST 1 VIEW: CPT

## 2024-03-11 PROCEDURE — 80048 BASIC METABOLIC PNL TOTAL CA: CPT

## 2024-03-11 PROCEDURE — 36415 COLL VENOUS BLD VENIPUNCTURE: CPT

## 2024-03-11 PROCEDURE — 84484 ASSAY OF TROPONIN QUANT: CPT | Performed by: EMERGENCY MEDICINE

## 2024-03-11 PROCEDURE — NC001 PR NO CHARGE: Performed by: EMERGENCY MEDICINE

## 2024-03-11 PROCEDURE — 85730 THROMBOPLASTIN TIME PARTIAL: CPT | Performed by: EMERGENCY MEDICINE

## 2024-03-11 PROCEDURE — 71250 CT THORAX DX C-: CPT

## 2024-03-11 PROCEDURE — 93010 ELECTROCARDIOGRAM REPORT: CPT | Performed by: INTERNAL MEDICINE

## 2024-03-11 PROCEDURE — 71275 CT ANGIOGRAPHY CHEST: CPT

## 2024-03-11 PROCEDURE — 99245 OFF/OP CONSLTJ NEW/EST HI 55: CPT | Performed by: INTERNAL MEDICINE

## 2024-03-11 PROCEDURE — 84145 PROCALCITONIN (PCT): CPT | Performed by: NURSE PRACTITIONER

## 2024-03-11 PROCEDURE — 96360 HYDRATION IV INFUSION INIT: CPT

## 2024-03-11 PROCEDURE — 93010 ELECTROCARDIOGRAM REPORT: CPT

## 2024-03-11 PROCEDURE — 99285 EMERGENCY DEPT VISIT HI MDM: CPT | Performed by: EMERGENCY MEDICINE

## 2024-03-11 RX ORDER — METOPROLOL SUCCINATE 50 MG/1
50 TABLET, EXTENDED RELEASE ORAL ONCE
Status: DISCONTINUED | OUTPATIENT
Start: 2024-03-11 | End: 2024-03-11 | Stop reason: HOSPADM

## 2024-03-11 RX ORDER — OXYCODONE HYDROCHLORIDE 10 MG/1
10 TABLET ORAL ONCE
Status: COMPLETED | OUTPATIENT
Start: 2024-03-11 | End: 2024-03-11

## 2024-03-11 RX ORDER — LEVALBUTEROL INHALATION SOLUTION 1.25 MG/3ML
1.25 SOLUTION RESPIRATORY (INHALATION)
Status: DISCONTINUED | OUTPATIENT
Start: 2024-03-11 | End: 2024-03-11 | Stop reason: HOSPADM

## 2024-03-11 RX ADMIN — SODIUM CHLORIDE 1000 ML: 0.9 INJECTION, SOLUTION INTRAVENOUS at 12:49

## 2024-03-11 RX ADMIN — IOHEXOL 85 ML: 350 INJECTION, SOLUTION INTRAVENOUS at 18:37

## 2024-03-11 RX ADMIN — OXYCODONE HYDROCHLORIDE 10 MG: 10 TABLET ORAL at 19:00

## 2024-03-11 NOTE — DISCHARGE INSTRUCTIONS
Please follow up with your family doctor in 2-3 days for re-evaluation and further management of your symptoms.

## 2024-03-11 NOTE — ED NOTES
Patient requesting to take home metoprolol for HR control and home oxycodone 10mg for pain. Provider aware and suggested to release signed and held orders for patient.      Araceli Wahl RN  03/11/24 [id number]

## 2024-03-11 NOTE — ED PROVIDER NOTES
History  Chief Complaint   Patient presents with    Dizziness     Dizziness for a few days. Pt being treated for lung cancer on the R side. Pt prescribed Oxy for pain. Pt has some SOB, denies CP.     53-year-old woman with relevant PMH breast cancer with metastases to bone presents with dizziness over the last few days. She has been taking her prescribed 10 mg oxycodone every 4 hours for her pain . She is not having worse pain than usual and has not required extra doses. Yesterday morning, she took her oxycodone early by accident and was feeling more dizzy. This dizziness was a worsening of the same dizziness she has had over the last few days. She describes the dizziness as vertigo on exertion but none currently at rest. She reports draining her right pleural catheter every five days or so and has noted decreased output during the last drainage. She is not reporting increased fatigue. Her appetite has been normal. She does endorse a history of persistent sinus tachycardia of unknown origin that she was started on metoprolol for.     [employer] [id number] used.        Prior to Admission Medications   Prescriptions Last Dose Informant Patient Reported? Taking?   Multiple Vitamin (MULTIVITAMIN ADULT PO)  Self Yes No   Sig: Take 1 tablet by mouth in the morning   Ribociclib Succ, 600 MG Dose, 200 MG TBPK   No No   Sig: Take 600 mg by mouth in the morning For 3 weeks and then 1 week off   Senokot 8.6 MG tablet   Yes No   Sig: Take 17.2 mg by mouth   acetaminophen (TYLENOL) 500 mg tablet   No No   Sig: Take 2 tablets (1,000 mg total) by mouth 3 (three) times a day as needed for mild pain   calcium citrate-vitamin D (Calcitrate Plus D) 315 mg-5 mcg tablet   No No   Sig: Take 1 tablet by mouth 2 (two) times a day   ergocalciferol (VITAMIN D2) 50,000 units   No No   Sig: TAKE 1 CAPSULE BY MOUTH 1 TIME A WEEK   lidocaine (LIDODERM) 5 %   No No   Sig: Apply 2 patches topically over 12 hours daily Remove & Discard  patch within 12 hours or as directed by MD   metoprolol succinate (TOPROL-XL) 50 mg 24 hr tablet   No No   Sig: Take 1 tablet (50 mg total) by mouth daily   naloxone (NARCAN) 4 mg/0.1 mL nasal spray   No No   Sig: Administer 1 spray into a nostril. If no response after 2-3 minutes, give another dose in the other nostril using a new spray.   oxyCODONE (ROXICODONE) 10 MG TABS   No No   Sig: Take 1 tablet (10 mg total) by mouth every 4 (four) hours as needed for severe pain or moderate pain Max Daily Amount: 60 mg   polyethylene glycol (GLYCOLAX) 17 GM/SCOOP powder   Yes No   sodium chloride 1 g tablet   No No   Sig: Take 1 tablet (1 g total) by mouth 2 (two) times a day      Facility-Administered Medications: None       Past Medical History:   Diagnosis Date    Breast cancer (HCC) 07/2013    left-chemotherapy    Fibroma     Goals of care, counseling/discussion     History of chemotherapy     History of external beam radiation therapy        Past Surgical History:   Procedure Laterality Date    APPENDECTOMY      BREAST SURGERY  2014    Left breast 2/2 breast CA    HYSTERECTOMY  2015    Due to fibroma    IR BIOPSY BONE  10/9/2023    IR PLEURAL EFFUSION LONG-TERM CATHETER PLACEMENT  1/24/2024    IR THORACENTESIS  11/8/2023    MASTECTOMY Left 2013    OOPHORECTOMY         Family History   Problem Relation Age of Onset    Lung cancer Mother     No Known Problems Father     No Known Problems Sister     No Known Problems Daughter     No Known Problems Daughter     Cancer Maternal Grandmother     No Known Problems Maternal Grandfather     No Known Problems Paternal Grandmother     No Known Problems Paternal Grandfather     Uterine cancer Maternal Aunt     No Known Problems Son     No Known Problems Paternal Aunt      I have reviewed and agree with the history as documented.    E-Cigarette/Vaping    E-Cigarette Use Never User      E-Cigarette/Vaping Substances    Nicotine No     THC No     CBD No     Flavoring No     Other No      Unknown No      Social History     Tobacco Use    Smoking status: Never     Passive exposure: Never    Smokeless tobacco: Never   Vaping Use    Vaping status: Never Used   Substance Use Topics    Alcohol use: Not Currently    Drug use: No        Review of Systems    Physical Exam  ED Triage Vitals   Temperature Pulse Respirations Blood Pressure SpO2   03/11/24 1143 03/11/24 1131 03/11/24 1131 03/11/24 1131 03/11/24 1131   98.1 °F (36.7 °C) (!) 133 18 127/65 98 %      Temp Source Heart Rate Source Patient Position - Orthostatic VS BP Location FiO2 (%)   03/11/24 1131 03/11/24 1131 03/11/24 1131 03/11/24 1131 --   Oral Monitor Sitting Right arm       Pain Score       03/11/24 1131       7             Orthostatic Vital Signs  Vitals:    03/11/24 1415 03/11/24 1445 03/11/24 1643 03/11/24 1849   BP: 134/75 128/72 118/75 114/66   Pulse: (!) 128 (!) 130 (!) 134 (!) 138   Patient Position - Orthostatic VS: Lying Lying Lying Sitting       Physical Exam  Vitals and nursing note reviewed.   Constitutional:       General: She is not in acute distress.     Appearance: She is not ill-appearing.      Comments: Chronically cachetic   HENT:      Head: Normocephalic and atraumatic.      Right Ear: External ear normal.      Left Ear: External ear normal.      Nose: Nose normal. No congestion.   Cardiovascular:      Rate and Rhythm: Regular rhythm. Tachycardia present.   Pulmonary:      Effort: Pulmonary effort is normal. No respiratory distress.      Breath sounds: Examination of the right-lower field reveals decreased breath sounds. Decreased breath sounds present.      Comments: Right pleural catheter in place without evidence of infection.  Abdominal:      Palpations: Abdomen is soft.      Tenderness: There is no abdominal tenderness. There is no guarding or rebound.   Musculoskeletal:         General: Normal range of motion.      Cervical back: Normal range of motion and neck supple.      Right lower leg: Edema present.       Left lower leg: Edema present.      Comments: 3+ pitting edema of bilateral legs from feet to knees   Skin:     General: Skin is warm and dry.   Neurological:      Mental Status: She is alert and oriented to person, place, and time. Mental status is at baseline.   Psychiatric:         Mood and Affect: Mood normal.         Behavior: Behavior normal.         ED Medications  Medications   sodium chloride 0.9 % bolus 1,000 mL (0 mL Intravenous Stopped 3/11/24 1444)   oxyCODONE (ROXICODONE) immediate release tablet 10 mg (10 mg Oral Given 3/11/24 1900)   iohexol (OMNIPAQUE) 350 MG/ML injection (MULTI-DOSE) 85 mL (85 mL Intravenous Given 3/11/24 1837)       Diagnostic Studies  Results Reviewed       Procedure Component Value Units Date/Time    Basic metabolic panel [[id number]]  (Abnormal) Collected: 03/11/24 1622    Lab Status: Final result Specimen: Blood from Arm, Right Updated: 03/11/24 1707     Sodium 131 mmol/L      Potassium 3.9 mmol/L      Chloride 98 mmol/L      CO2 22 mmol/L      ANION GAP 11 mmol/L      BUN 10 mg/dL      Creatinine 0.44 mg/dL      Glucose 104 mg/dL      Calcium 7.8 mg/dL      eGFR 114 ml/min/1.73sq m     Narrative:      National Kidney Disease Foundation guidelines for Chronic Kidney Disease (CKD):     Stage 1 with normal or high GFR (GFR > 90 mL/min/1.73 square meters)    Stage 2 Mild CKD (GFR = 60-89 mL/min/1.73 square meters)    Stage 3A Moderate CKD (GFR = 45-59 mL/min/1.73 square meters)    Stage 3B Moderate CKD (GFR = 30-44 mL/min/1.73 square meters)    Stage 4 Severe CKD (GFR = 15-29 mL/min/1.73 square meters)    Stage 5 End Stage CKD (GFR <15 mL/min/1.73 square meters)  Note: GFR calculation is accurate only with a steady state creatinine    Procalcitonin [[id number]]  (Abnormal) Collected: 03/11/24 1208    Lab Status: Final result Specimen: Blood from Hand, Right Updated: 03/11/24 1536     Procalcitonin 0.98 ng/ml     HS Troponin I 2hr [[id number]]  (Normal) Collected: 03/11/24 1424     Lab Status: Final result Specimen: Blood from Hand, Right Updated: 03/11/24 1450     hs TnI 2hr 5 ng/L      Delta 2hr hsTnI -1 ng/L     B-Type Natriuretic Peptide(BNP) [[id number]]  (Normal) Collected: 03/11/24 1208    Lab Status: Final result Specimen: Blood from Hand, Right Updated: 03/11/24 1241     BNP 85 pg/mL     HS Troponin 0hr (reflex protocol) [[id number]]  (Normal) Collected: 03/11/24 1208    Lab Status: Final result Specimen: Blood from Hand, Right Updated: 03/11/24 1240     hs TnI 0hr 6 ng/L     Comprehensive metabolic panel [[id number]]  (Abnormal) Collected: 03/11/24 1208    Lab Status: Final result Specimen: Blood from Hand, Right Updated: 03/11/24 1236     Sodium 126 mmol/L      Potassium 5.9 mmol/L      Chloride 92 mmol/L      CO2 21 mmol/L      ANION GAP 13 mmol/L      BUN 11 mg/dL      Creatinine 0.49 mg/dL      Glucose 113 mg/dL      Calcium 8.7 mg/dL      Corrected Calcium 9.2 mg/dL      AST 83 U/L      ALT 11 U/L      Alkaline Phosphatase 123 U/L      Total Protein 7.9 g/dL      Albumin 3.4 g/dL      Total Bilirubin 0.73 mg/dL      eGFR 110 ml/min/1.73sq m     Narrative:      National Kidney Disease Foundation guidelines for Chronic Kidney Disease (CKD):     Stage 1 with normal or high GFR (GFR > 90 mL/min/1.73 square meters)    Stage 2 Mild CKD (GFR = 60-89 mL/min/1.73 square meters)    Stage 3A Moderate CKD (GFR = 45-59 mL/min/1.73 square meters)    Stage 3B Moderate CKD (GFR = 30-44 mL/min/1.73 square meters)    Stage 4 Severe CKD (GFR = 15-29 mL/min/1.73 square meters)    Stage 5 End Stage CKD (GFR <15 mL/min/1.73 square meters)  Note: GFR calculation is accurate only with a steady state creatinine    APTT [[id number]]  (Normal) Collected: 03/11/24 1208    Lab Status: Final result Specimen: Blood from Arm, Right Updated: 03/11/24 1230     PTT 34 seconds     CBC and differential [[id number]]  (Abnormal) Collected: 03/11/24 1208    Lab Status: Final result Specimen: Blood from Hand, Right Updated:  03/11/24 1219     WBC 8.67 Thousand/uL      RBC 2.77 Million/uL      Hemoglobin 7.4 g/dL      Hematocrit 25.2 %      MCV 91 fL      MCH 26.7 pg      MCHC 29.4 g/dL      RDW 21.3 %      MPV 8.9 fL      Platelets 401 Thousands/uL      nRBC 1 /100 WBCs      Neutrophils Relative 64 %      Immat GRANS % 2 %      Lymphocytes Relative 17 %      Monocytes Relative 17 %      Eosinophils Relative 0 %      Basophils Relative 0 %      Neutrophils Absolute 5.55 Thousands/µL      Immature Grans Absolute 0.16 Thousand/uL      Lymphocytes Absolute 1.46 Thousands/µL      Monocytes Absolute 1.47 Thousand/µL      Eosinophils Absolute 0.00 Thousand/µL      Basophils Absolute 0.03 Thousands/µL                    CTA chest pe study   Final Result by Brando Andrea MD (03/11 1903)      No pulmonary embolism.      Stable right pleural effusion with extensive pleural carcinomatosis and compression of the majority of the right lung.      Stable left upper lobe juxtapleural groundglass infiltrates.      Stable diffuse osseous metastatic disease with thoracic compression fractures.                  Workstation performed: [id number]         CT chest wo contrast   Final Result by Rakesh Weber MD (03/11 1442)      1.  Status post pleural drain placement with marked interval improvement of right pleural effusion. Residual loculated fluid is identified throughout the right hemithorax.      2.  Interval progression of pleural-based metastasis throughout the right lung though evaluation is limited without contrast and comparison.      3.  Partial reexpansion of the right lung with residual patchy densities likely representing atelectasis.      4.  Groundglass infiltrates in the peripheral left upper lobe slightly improved, likely infectious/inflammatory.      5.  Stable diffuse osseous metastasis with thoracic compression fractures.            Workstation performed: [id number]         XR chest 1 view portable   Final Result by Felton Marley  MD Abbie (03/11 1832)      Near complete consolidation of the right hemithorax likely representing underlying lung infiltrate/atelectasis and large amount of pleural fluid. The pleural catheter is directed upwards.      No mediastinal shift.      Diffuse osseous metastases suggested by mottled appearance of the bones.            Workstation performed: [id number]               Procedures  Procedures      ED Course  ED Course as of 03/12/24 0604   Mon Mar 11, 2024   1230 This ECG was interpreted by me. The ECG demonstrates Normal sinus rhythm, normal intervals, normal axis, normal QRS, nonspecific ST changes present.     1243 Sodium(!): 126  Baseline 135   1613 Pulm has no further recommendations regarding admission vs discharge. They do not believe she needs admission for management of the right effusion and pleural drain. I will recheck a BMP for Na and K to determine dispo.       Medical Decision Making  Presents with dizziness. Patient noted to be quite tachycardic to 120-130s. I reviewed her chart, and this is a chronic issue for her. She is denying any chest pain or dyspnea currently.     DDX: pneumonia, malfunctioning pleural catheter, dehydration, electrolyte abnormality, anemia    Plan: labs, ECG, chest xray    Final disposition: Chest xray shows improvement of right effusion but with a diffuse edema of the lung parenchyma. A CT chest was ordered which showed continued but improved effusion but a large lung mass. Labs show continued chronic anemia. She is noted to have hyponatremia of 126 and hyperkalemia of 5.9. Will give 500 mL NS bolus and reassess electrolytes. Pulmonology was consulted regarding the pleural catheter and abnormal lung CT findings of continued effusion and consolidation. They recommended drainage of the catheter here and nebulizers. Drainage of the catheter resulted in only a few mL of fluid that was not cloudy.     Patient signed out to Dr. Chanda Barnes pending repeat BMP. If Na is 131 or  "greater, patient is ok for discharge. Pulmonology did not recommend admission for her pulmonary effusion or pleural cathter.     Upon review of chart, her repeat Na was 131 and K 3.9. Pulmonology did recommend a CTA chest to rule out PE as the cause of her persistent tachycardia, which was negative. Patient was able to ambulate and requested to be discharged to home.    Previous charting was reviewed.  History obtained from patient and her daughter.    Portions or all of this note were generated using voice recognition software.  Occasional wrong word or \"sound a like\" substitutions may have occurred due to the inherent limitations of voice recognition software.  Please interpret any errors within the intended context of the whole sentence or idea.      Amount and/or Complexity of Data Reviewed  Labs: ordered. Decision-making details documented in ED Course.  Radiology: ordered.    Risk  Prescription drug management.          Disposition  Final diagnoses:   Right lower lobe lung mass   Large pleural effusion   Vertigo   Sinus tachycardia     Time reflects when diagnosis was documented in both MDM as applicable and the Disposition within this note       Time User Action Codes Description Comment    3/11/2024  2:30 PM Jose Del Valle [R91.8] Right lower lobe lung mass     3/11/2024  2:30 PM Jose Del Valle [J90] Large pleural effusion     3/11/2024  3:56 PM Jose Del Valle [R42] Vertigo     3/11/2024  3:56 PM Jose Del Valle [R00.0] Sinus tachycardia           ED Disposition       ED Disposition   Discharge    Condition   Stable    Date/Time   Mon Mar 11, 2024  7:37 PM    Comment   Shannan Vee discharge to home/self care.                   Follow-up Information       Follow up With Specialties Details Why Contact Info Additional Information    [facility] Emergency Department Emergency Medicine  If symptoms worsen [address] [facility] " [id number]  [phone] [facility] Emergency Department, [address] [place], [place], [address]    [facility]    [address] [place] [id number]             Discharge Medication List as of 3/11/2024  7:41 PM        CONTINUE these medications which have NOT CHANGED    Details   acetaminophen (TYLENOL) 500 mg tablet Take 2 tablets (1,000 mg total) by mouth 3 (three) times a day as needed for mild pain, Starting Thu 2/8/2024, Until Wed 5/8/2024 at 2359, Normal      calcium citrate-vitamin D (Calcitrate Plus D) 315 mg-5 mcg tablet Take 1 tablet by mouth 2 (two) times a day, Starting Mon 10/23/2023, Until Sun 1/21/2024, Normal      ergocalciferol (VITAMIN D2) 50,000 units TAKE 1 CAPSULE BY MOUTH 1 TIME A WEEK, Starting Mon 1/22/2024, Normal      lidocaine (LIDODERM) 5 % Apply 2 patches topically over 12 hours daily Remove & Discard patch within 12 hours or as directed by MD, Starting u 2/8/2024, Normal      metoprolol succinate (TOPROL-XL) 50 mg 24 hr tablet Take 1 tablet (50 mg total) by mouth daily, Starting Wed 2/28/2024, Normal      Multiple Vitamin (MULTIVITAMIN ADULT PO) Take 1 tablet by mouth in the morning, Historical Med      naloxone (NARCAN) 4 mg/0.1 mL nasal spray Administer 1 spray into a nostril. If no response after 2-3 minutes, give another dose in the other nostril using a new spray., Normal      oxyCODONE (ROXICODONE) 10 MG TABS Take 1 tablet (10 mg total) by mouth every 4 (four) hours as needed for severe pain or moderate pain Max Daily Amount: 60 mg, Starting Sun 2/25/2024, Normal      polyethylene glycol (GLYCOLAX) 17 GM/SCOOP powder Historical Med      Ribociclib Succ, 600 MG Dose, 200 MG TBPK Take 600 mg by mouth in the morning For 3 weeks and then 1 week off, Starting Fri 10/27/2023, Normal      Senokot 8.6 MG tablet Take 17.2 mg by mouth, Starting Fri 1/19/2024, Until Sat 1/18/2025 at 2359, Historical Med      sodium chloride 1 g tablet  Take 1 tablet (1 g total) by mouth 2 (two) times a day, Starting Tue 1/30/2024, Normal           No discharge procedures on file.    PDMP Review         Value Time User    PDMP Reviewed  Yes 2/22/2024  7:52 AM Janie Briones MD             ED Provider  Attending physically available and evaluated Shannan Vee. I managed the patient along with the ED Attending.    Electronically Signed by           Jose Del Valle MD  03/12/24 0604

## 2024-03-11 NOTE — ED ATTENDING ATTESTATION
3/11/2024  I, Rl Pierre MD, saw and evaluated the patient. I have discussed the patient with the resident/non-physician practitioner and agree with the resident's/non-physician practitioner's findings, Plan of Care, and MDM as documented in the resident's/non-physician practitioner's note, except where noted. All available labs and Radiology studies were reviewed.  I was present for key portions of any procedure(s) performed by the resident/non-physician practitioner and I was immediately available to provide assistance.       At this point I agree with the current assessment done in the Emergency Department.  I have conducted an independent evaluation of this patient a history and physical is as follows:  Patient with hx of Lung cancer with bone mets, chronic tachycardia, pleural effusion with pleural catheter right side, comes with c/o dizziness for past 3 days, no headache, fever, chest pain, dyspnea, abd or back pain. On exam, conscious, alert, Vitals noted for tachycardia, stable BP, O2 sats, Lung exam with diminished entry at bases R>L, no abd tenderness, no edema, calf swelling. D/D: Non-specific dizziness, anemia, electrolyte derangement, worsening pleural effusion/cancer, we will get labs, EKG, CXR.    ED Course     Labs noted for low Na,  ml ordered.    CXR noted for right sided homogenous opacity, effusion Vs collapse, CT Chest done that showed improvement in effusion. Pulm Consult requested, advised to get CTA Chest to r/o PE. Case discussed with Dr. Rabia Maddox in ED sign out, BMP repeated, Na improved, pending CT results.    Critical Care Time  Procedures

## 2024-03-11 NOTE — ED NOTES
Drained pleurex catheter with assistance of FELIPA Gimenez, nurse educator. Patient tolerated procedure well. No complications. Sterile technique was used. Scant, immeasurable amount of serous fluid drained.      Araceli Wahl RN  03/11/24 6087

## 2024-03-11 NOTE — ED PROVIDER NOTES
Emergency Department Sign Out Note        Sign out and transfer of care from Dr. Jose Del Valle. See Separate Emergency Department note.     The patient, Shannan Vee, was evaluated by the previous provider for dizziness.    Workup Completed:  CBC   CMP  Coags  Procalcitonin   Troponin   BNP  EKG   CT chest   CXR    ED Course / Workup Pending (followup):  Repeat BMP to recheck sodium                                  ED Course as of 03/11/24 2201   Mon Mar 11, 2024   1624 SO: active. Lung cancer with mets to bone. Dizziness, vertigo. R pleural catheter in place, draining, CT shows improvement. Pending repeat BMP. Getting 500cc normal saline. If repeat Na is >131, can dc. If below, admit for hyponatremia.    1712 Sodium(!): 131   1932 CTA chest pe study  No pulmonary embolism.     Stable right pleural effusion with extensive pleural carcinomatosis and compression of the majority of the right lung.     Stable left upper lobe juxtapleural groundglass infiltrates.     Stable diffuse osseous metastatic disease with thoracic compression fractures.   1936 Patient and family updated on labs and imaging results. Offered admission and patient declined. Patient feels comfortable with discharge home.    1937 Stable for discharge. Strict return to ED precautions provided. Advised to follow up with PCP within 2-3 days for re-evaluation and further management. Patient and family verbalized understanding and agree with the plan of care.        Procedures  Medical Decision Making  Amount and/or Complexity of Data Reviewed  Labs: ordered. Decision-making details documented in ED Course.  Radiology: ordered. Decision-making details documented in ED Course.    Risk  Prescription drug management.      See ED course for details.       Disposition  Final diagnoses:   Right lower lobe lung mass   Large pleural effusion   Vertigo   Sinus tachycardia     Time reflects when diagnosis was documented in both MDM as applicable and the  Disposition within this note       Time User Action Codes Description Comment    3/11/2024  2:30 PM Jose Del Valle Add [R91.8] Right lower lobe lung mass     3/11/2024  2:30 PM Joes Del Valle Add [J90] Large pleural effusion     3/11/2024  3:56 PM Jose Del Valle [R42] Vertigo     3/11/2024  3:56 PM Jose Del Valle Add [R00.0] Sinus tachycardia           ED Disposition       ED Disposition   Discharge    Condition   Stable    Date/Time   Mon Mar 11, 2024  7:37 PM    Comment   Shannan Vee discharge to home/self care.                   Follow-up Information       Follow up With Specialties Details Why Contact Info Additional Information    [facility] Emergency Department Emergency Medicine  If symptoms worsen [address] [facility] [id number] [phone] [facility] Emergency Department, [address] [place], [place], [address]    [place] [address] [address] [place] [id number]           Discharge Medication List as of 3/11/2024  7:41 PM        CONTINUE these medications which have NOT CHANGED    Details   acetaminophen (TYLENOL) 500 mg tablet Take 2 tablets (1,000 mg total) by mouth 3 (three) times a day as needed for mild pain, Starting u 2/8/2024, Until Wed 5/8/2024 at 2359, Normal      calcium citrate-vitamin D (Calcitrate Plus D) 315 mg-5 mcg tablet Take 1 tablet by mouth 2 (two) times a day, Starting Mon 10/23/2023, Until Sun 1/21/2024, Normal      ergocalciferol (VITAMIN D2) 50,000 units TAKE 1 CAPSULE BY MOUTH 1 TIME A WEEK, Starting Mon 1/22/2024, Normal      lidocaine (LIDODERM) 5 % Apply 2 patches topically over 12 hours daily Remove & Discard patch within 12 hours or as directed by MD, Starting u 2/8/2024, Normal      metoprolol succinate (TOPROL-XL) 50 mg 24 hr tablet Take 1 tablet (50 mg total) by mouth daily, Starting Wed 2/28/2024, Normal      Multiple Vitamin (MULTIVITAMIN ADULT PO) Take 1  tablet by mouth in the morning, Historical Med      naloxone (NARCAN) 4 mg/0.1 mL nasal spray Administer 1 spray into a nostril. If no response after 2-3 minutes, give another dose in the other nostril using a new spray., Normal      oxyCODONE (ROXICODONE) 10 MG TABS Take 1 tablet (10 mg total) by mouth every 4 (four) hours as needed for severe pain or moderate pain Max Daily Amount: 60 mg, Starting Sun 2/25/2024, Normal      polyethylene glycol (GLYCOLAX) 17 GM/SCOOP powder Historical Med      Ribociclib Succ, 600 MG Dose, 200 MG TBPK Take 600 mg by mouth in the morning For 3 weeks and then 1 week off, Starting Fri 10/27/2023, Normal      Senokot 8.6 MG tablet Take 17.2 mg by mouth, Starting Fri 1/19/2024, Until Sat 1/18/2025 at 2359, Historical Med      sodium chloride 1 g tablet Take 1 tablet (1 g total) by mouth 2 (two) times a day, Starting Tue 1/30/2024, Normal           No discharge procedures on file.       ED Provider  Electronically Signed by     Deloris Barnes MD  03/11/24 9097

## 2024-03-11 NOTE — CONSULTS
"Consultation - Pulmonary Medicine   Shannan Vee 53 y.o. female MRN: [id number]  Unit/Bed#: ED-17 Encounter: [id number]      Assessment/Plan:    1.  Acute pulmonary deficiency likely multifaceted as listed below        -Currently on room air 94%, does not wear home O2        -Continue saturations greater than 80        -Pulmonary toileting: Deep breathing cough out of bed as tolerated    2.  Right-sided malignant effusion        -11/8/2023-thoracentesis- 220 mL         -1/24/2024-Pleurx catheter placement        -Overall mass and effusion is somewhat improving since January however there are still noted areas of possible loculation vs worsening malignancy        -Order placed for nursing to drain Pleurx today        -Patient's daughter reports Pleurx being drained every 5 days will await to see today's output may need to increase frequency of drainage        -Will obtain procalcitonin to see if there is a postobstructive component        -Will attempt some nebulizer treatments may need to consider vest therapy    3.  Recurrent breast cancer with bone and lung metastasis         -Initially diagnosed 2013        -Follows with Dr. Villavicencio        -initiated on: ribociclib 600 mg daily 3 weeks on 1 week off along with Faslodex          -Last infusion 3/4/2024        -Palliative care following        History of Present Illness   Physician Requesting Consult: Rl Pierre MD  Reason for Consult / Principal Problem: Lung cancer  Hx and PE limited by: Nothing  Chief Complaint: \" Breath\"  HPI: Shannan Vee is a 53 y.o.  female who presented to [facility] with complaints of dizziness.  Patient has past medical history of breast cancer appendectomy and vasectomy.  Patient reports that over the last several days she has become increasingly dizzy.  Patient reports somewhat increasing shortness of breath.  Upon ED admission patient was noted to have some worsening right-sided opacities.  Upon ED admission patient " was initiated on IV fluids.     Pulmonary was consulted for worsening right-sided opacity.  Today upon examination patient does report that she is feeling somewhat nauseous however she is feeling a little more short of breath.  Patient reports that she does feel like she needs her Pleurx catheter drained.  Currently denying any fevers, chills, night sweats, pleuritic chest pain, or palpitations.     From a pulmonary standpoint, patient follows with Dr. Gibbons for her effusion.. Patient has been a lifelong non-smoker and has never been diagnosed with COPD or asthma. Patient has never had any formal PFT testing. Patient is currently not maintained on any inhaled or oxygen therapies. 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 Patient denies any symptoms of GERD, LUCIE, seasonal allergies, or postnasal drip. Patient denies any recent acute exposures to dust, mold, spices, or silica.         Inpatient consult to Pulmonology  Consult performed by: JANNETH Mcrae  Consult ordered by: Rl Pierre MD          Review of Systems   Constitutional:  Negative for chills and fever.   HENT:  Negative for ear pain and sore throat.    Eyes:  Negative for pain and visual disturbance.   Respiratory:  Positive for shortness of breath. Negative for apnea, cough, choking, chest tightness, wheezing and stridor.    Cardiovascular:  Negative for chest pain and palpitations.   Gastrointestinal:  Negative for abdominal pain and vomiting.   Genitourinary:  Negative for dysuria and hematuria.   Musculoskeletal:  Negative for arthralgias and back pain.   Skin:  Negative for color change and rash.   Neurological:  Positive for dizziness and light-headedness. Negative for seizures and syncope.   Psychiatric/Behavioral:  Negative for agitation and behavioral problems.    All other systems reviewed and are negative.      Historical Information   Past Medical History:   Diagnosis Date    Breast cancer (HCC) 07/2013     left-chemotherapy    Fibroma     Goals of care, counseling/discussion     History of chemotherapy     History of external beam radiation therapy      Past Surgical History:   Procedure Laterality Date    APPENDECTOMY      BREAST SURGERY  2014    Left breast 2/2 breast CA    HYSTERECTOMY  2015    Due to fibroma    IR BIOPSY BONE  10/9/2023    IR PLEURAL EFFUSION LONG-TERM CATHETER PLACEMENT  1/24/2024    IR THORACENTESIS  11/8/2023    MASTECTOMY Left 2013    OOPHORECTOMY       Social History   Social History     Substance and Sexual Activity   Alcohol Use Not Currently     Social History     Substance and Sexual Activity   Drug Use No     Social History     Tobacco Use   Smoking Status Never    Passive exposure: Never   Smokeless Tobacco Never     E-Cigarette/Vaping    E-Cigarette Use Never User      E-Cigarette/Vaping Substances    Nicotine No     THC No     CBD No     Flavoring No     Other No     Unknown No      Occupational History: Noncontributory    Family History:   Family History   Problem Relation Age of Onset    Lung cancer Mother     No Known Problems Father     No Known Problems Sister     No Known Problems Daughter     No Known Problems Daughter     Cancer Maternal Grandmother     No Known Problems Maternal Grandfather     No Known Problems Paternal Grandmother     No Known Problems Paternal Grandfather     Uterine cancer Maternal Aunt     No Known Problems Son     No Known Problems Paternal Aunt        Meds/Allergies   pertinent pulmonary meds have been reviewed    Allergies   Allergen Reactions    Aspirin Rash     Other reaction(s): Palpitations       Objective   Vitals: Blood pressure 134/75, pulse (!) 128, temperature 98.1 °F (36.7 °C), temperature source Axillary, resp. rate (!) 29, SpO2 92%.,There is no height or weight on file to calculate BMI.  No intake or output data in the 24 hours ending 03/11/24 1439  Invasive Devices       Peripheral Intravenous Line  Duration             Peripheral IV  "03/11/24 Dorsal (posterior);Left Wrist <1 day              Drain  Duration             Pleural Effusion Long-Term Catheter 16 Fr. 46 days              Airway  Duration             Supraglottic Airway LMA 4 122 days                    Physical Exam  Constitutional:       General: She is not in acute distress.     Appearance: Normal appearance. She is normal weight. She is not ill-appearing.   HENT:      Head: Normocephalic and atraumatic.      Nose: Nose normal. No congestion or rhinorrhea.      Mouth/Throat:      Mouth: Mucous membranes are dry.      Pharynx: No oropharyngeal exudate or posterior oropharyngeal erythema.   Cardiovascular:      Rate and Rhythm: Normal rate and regular rhythm.      Pulses: Normal pulses.      Heart sounds: Normal heart sounds. No murmur heard.     No friction rub. No gallop.   Pulmonary:      Effort: Pulmonary effort is normal. No respiratory distress.      Breath sounds: No stridor. No wheezing, rhonchi or rales.      Comments: Diminished aeration at bases  Chest:      Chest wall: No tenderness.   Abdominal:      General: Abdomen is flat. Bowel sounds are normal. There is no distension.      Palpations: Abdomen is soft. There is no mass.   Musculoskeletal:         General: No swelling or tenderness. Normal range of motion.      Cervical back: Normal range of motion. No rigidity or tenderness.   Skin:     General: Skin is warm and dry.      Coloration: Skin is not jaundiced or pale.   Neurological:      General: No focal deficit present.      Mental Status: She is alert and oriented to person, place, and time. Mental status is at baseline.   Psychiatric:         Mood and Affect: Mood normal.         Behavior: Behavior normal.         Lab Results: I have personally reviewed pertinent lab results., ABG: No results found for: \"PHART\", \"HQX3LOP\", \"PO2ART\", \"SMQ8KJE\", \"U1PHKUGQ\", \"BEART\", \"SOURCE\", BNP:   Lab Results   Component Value Date    BNP 85 03/11/2024   , CBC:   Lab Results " "  Component Value Date    WBC 8.67 03/11/2024    HGB 7.4 (L) 03/11/2024    HCT 25.2 (L) 03/11/2024    MCV 91 03/11/2024     (H) 03/11/2024    RBC 2.77 (L) 03/11/2024    MCH 26.7 (L) 03/11/2024    MCHC 29.4 (L) 03/11/2024    RDW 21.3 (H) 03/11/2024    MPV 8.9 03/11/2024    NRBC 1 03/11/2024   , CMP:   Lab Results   Component Value Date    SODIUM 126 (L) 03/11/2024    K 5.9 (H) 03/11/2024    CL 92 (L) 03/11/2024    CO2 21 03/11/2024    BUN 11 03/11/2024    CREATININE 0.49 (L) 03/11/2024    CALCIUM 8.7 03/11/2024    AST 83 (H) 03/11/2024    ALT 11 03/11/2024    ALKPHOS 123 (H) 03/11/2024    EGFR 110 03/11/2024   , PT/INR: No results found for: \"PT\", \"INR\"      Imaging Studies:      Chest x-ray right-sided opacity    EKG, Pathology, and Other Studies: I have personally reviewed pertinent films in PACS     1/28/2024-grade 1 diastolic dysfunction EF 55%    Pulmonary Results (PFTs, PSG): I have personally reviewed pertinent films in PACS     No PFTs recorded    Code Status: Prior    Portions of the record may have been created with voice recognition software.  Occasional wrong word or \"sound a like\" substitutions may have occurred due to the inherent limitations of voice recognition software.  Read the chart carefully and recognize, using context, where substitutions have occurred.  "

## 2024-03-12 ENCOUNTER — HOSPITAL ENCOUNTER (INPATIENT)
Facility: HOSPITAL | Age: 54
LOS: 3 days | Discharge: HOME | DRG: 136 | End: 2024-03-15
Attending: EMERGENCY MEDICINE | Admitting: INTERNAL MEDICINE
Payer: COMMERCIAL

## 2024-03-12 ENCOUNTER — APPOINTMENT (EMERGENCY)
Dept: RADIOLOGY | Facility: HOSPITAL | Age: 54
DRG: 136 | End: 2024-03-12
Payer: COMMERCIAL

## 2024-03-12 ENCOUNTER — TELEPHONE (OUTPATIENT)
Dept: HEMATOLOGY ONCOLOGY | Facility: CLINIC | Age: 54
End: 2024-03-12

## 2024-03-12 DIAGNOSIS — G89.3 CANCER-RELATED PAIN: ICD-10-CM

## 2024-03-12 DIAGNOSIS — J90 PLEURAL EFFUSION: ICD-10-CM

## 2024-03-12 DIAGNOSIS — C50.812 MALIGNANT NEOPLASM OF OVERLAPPING SITES OF LEFT BREAST IN FEMALE, ESTROGEN RECEPTOR POSITIVE (HCC): ICD-10-CM

## 2024-03-12 DIAGNOSIS — D64.9 SYMPTOMATIC ANEMIA: ICD-10-CM

## 2024-03-12 DIAGNOSIS — R06.02 SHORTNESS OF BREATH: Primary | ICD-10-CM

## 2024-03-12 DIAGNOSIS — E43 SEVERE PROTEIN-CALORIE MALNUTRITION (HCC): ICD-10-CM

## 2024-03-12 DIAGNOSIS — R91.8 RIGHT LOWER LOBE LUNG MASS: ICD-10-CM

## 2024-03-12 DIAGNOSIS — K59.03 DRUG INDUCED CONSTIPATION: ICD-10-CM

## 2024-03-12 DIAGNOSIS — Z17.0 MALIGNANT NEOPLASM OF OVERLAPPING SITES OF LEFT BREAST IN FEMALE, ESTROGEN RECEPTOR POSITIVE (HCC): ICD-10-CM

## 2024-03-12 LAB
2HR DELTA HS TROPONIN: 1 NG/L
ALBUMIN SERPL BCP-MCNC: 3.1 G/DL (ref 3.5–5)
ALP SERPL-CCNC: 107 U/L (ref 34–104)
ALT SERPL W P-5'-P-CCNC: 12 U/L (ref 7–52)
ANION GAP SERPL CALCULATED.3IONS-SCNC: 13 MMOL/L (ref 4–13)
ANISOCYTOSIS BLD QL SMEAR: PRESENT
AST SERPL W P-5'-P-CCNC: 65 U/L (ref 13–39)
ATRIAL RATE: 106 BPM
ATRIAL RATE: 110 BPM
BASOPHILS # BLD MANUAL: 0 THOUSAND/UL (ref 0–0.1)
BASOPHILS NFR MAR MANUAL: 0 % (ref 0–1)
BILIRUB SERPL-MCNC: 0.52 MG/DL (ref 0.2–1)
BNP SERPL-MCNC: 98 PG/ML (ref 0–100)
BUN SERPL-MCNC: 11 MG/DL (ref 5–25)
CALCIUM ALBUM COR SERPL-MCNC: 9 MG/DL (ref 8.3–10.1)
CALCIUM SERPL-MCNC: 8.3 MG/DL (ref 8.4–10.2)
CARDIAC TROPONIN I PNL SERPL HS: 7 NG/L
CARDIAC TROPONIN I PNL SERPL HS: 8 NG/L
CHLORIDE SERPL-SCNC: 96 MMOL/L (ref 96–108)
CO2 SERPL-SCNC: 23 MMOL/L (ref 21–32)
CREAT SERPL-MCNC: 0.47 MG/DL (ref 0.6–1.3)
EOSINOPHIL # BLD MANUAL: 0 THOUSAND/UL (ref 0–0.4)
EOSINOPHIL NFR BLD MANUAL: 0 % (ref 0–6)
ERYTHROCYTE [DISTWIDTH] IN BLOOD BY AUTOMATED COUNT: 21.7 % (ref 11.6–15.1)
FLUAV RNA RESP QL NAA+PROBE: NEGATIVE
FLUBV RNA RESP QL NAA+PROBE: NEGATIVE
GFR SERPL CREATININE-BSD FRML MDRD: 111 ML/MIN/1.73SQ M
GIANT PLATELETS BLD QL SMEAR: PRESENT
GLUCOSE SERPL-MCNC: 98 MG/DL (ref 65–140)
HCT VFR BLD AUTO: 23.3 % (ref 34.8–46.1)
HELMET CELLS BLD QL SMEAR: PRESENT
HGB BLD-MCNC: 6.7 G/DL (ref 11.5–15.4)
LG PLATELETS BLD QL SMEAR: PRESENT
LYMPHOCYTES # BLD AUTO: 1.54 THOUSAND/UL (ref 0.6–4.47)
LYMPHOCYTES # BLD AUTO: 20 % (ref 14–44)
MCH RBC QN AUTO: 26.6 PG (ref 26.8–34.3)
MCHC RBC AUTO-ENTMCNC: 28.8 G/DL (ref 31.4–37.4)
MCV RBC AUTO: 93 FL (ref 82–98)
MONOCYTES # BLD AUTO: 0.77 THOUSAND/UL (ref 0–1.22)
MONOCYTES NFR BLD: 10 % (ref 4–12)
NEUTROPHILS # BLD MANUAL: 5.38 THOUSAND/UL (ref 1.85–7.62)
NEUTS BAND NFR BLD MANUAL: 3 % (ref 0–8)
NEUTS SEG NFR BLD AUTO: 67 % (ref 43–75)
NRBC BLD AUTO-RTO: 2 /100 WBC (ref 0–2)
P AXIS: 32 DEGREES
P AXIS: 65 DEGREES
PLATELET # BLD AUTO: 378 THOUSANDS/UL (ref 149–390)
PLATELET BLD QL SMEAR: ADEQUATE
PMV BLD AUTO: 8.5 FL (ref 8.9–12.7)
POLYCHROMASIA BLD QL SMEAR: PRESENT
POTASSIUM SERPL-SCNC: 4 MMOL/L (ref 3.5–5.3)
PR INTERVAL: 120 MS
PR INTERVAL: 126 MS
PROCALCITONIN SERPL-MCNC: 0.86 NG/ML
PROT SERPL-MCNC: 7.1 G/DL (ref 6.4–8.4)
QRS AXIS: 70 DEGREES
QRS AXIS: 78 DEGREES
QRSD INTERVAL: 64 MS
QRSD INTERVAL: 68 MS
QT INTERVAL: 318 MS
QT INTERVAL: 318 MS
QTC INTERVAL: 422 MS
QTC INTERVAL: 430 MS
RBC # BLD AUTO: 2.52 MILLION/UL (ref 3.81–5.12)
RSV RNA RESP QL NAA+PROBE: NEGATIVE
SARS-COV-2 RNA RESP QL NAA+PROBE: NEGATIVE
SODIUM SERPL-SCNC: 132 MMOL/L (ref 135–147)
T WAVE AXIS: 28 DEGREES
T WAVE AXIS: 31 DEGREES
VENTRICULAR RATE: 106 BPM
VENTRICULAR RATE: 110 BPM
WBC # BLD AUTO: 7.69 THOUSAND/UL (ref 4.31–10.16)

## 2024-03-12 PROCEDURE — 84484 ASSAY OF TROPONIN QUANT: CPT

## 2024-03-12 PROCEDURE — 85007 BL SMEAR W/DIFF WBC COUNT: CPT

## 2024-03-12 PROCEDURE — 85027 COMPLETE CBC AUTOMATED: CPT

## 2024-03-12 PROCEDURE — 84145 PROCALCITONIN (PCT): CPT | Performed by: PHYSICIAN ASSISTANT

## 2024-03-12 PROCEDURE — 99285 EMERGENCY DEPT VISIT HI MDM: CPT

## 2024-03-12 PROCEDURE — 93010 ELECTROCARDIOGRAM REPORT: CPT | Performed by: INTERNAL MEDICINE

## 2024-03-12 PROCEDURE — 0241U HB NFCT DS VIR RESP RNA 4 TRGT: CPT

## 2024-03-12 PROCEDURE — 83880 ASSAY OF NATRIURETIC PEPTIDE: CPT

## 2024-03-12 PROCEDURE — 93005 ELECTROCARDIOGRAM TRACING: CPT

## 2024-03-12 PROCEDURE — 36415 COLL VENOUS BLD VENIPUNCTURE: CPT

## 2024-03-12 PROCEDURE — 80053 COMPREHEN METABOLIC PANEL: CPT

## 2024-03-12 PROCEDURE — 71045 X-RAY EXAM CHEST 1 VIEW: CPT

## 2024-03-12 RX ORDER — OXYCODONE HYDROCHLORIDE 10 MG/1
10 TABLET ORAL EVERY 4 HOURS PRN
Qty: 120 TABLET | Refills: 0 | Status: ON HOLD | OUTPATIENT
Start: 2024-03-12

## 2024-03-12 RX ORDER — IPRATROPIUM BROMIDE AND ALBUTEROL SULFATE 2.5; .5 MG/3ML; MG/3ML
3 SOLUTION RESPIRATORY (INHALATION) ONCE
Status: COMPLETED | OUTPATIENT
Start: 2024-03-12 | End: 2024-03-12

## 2024-03-12 RX ADMIN — IPRATROPIUM BROMIDE AND ALBUTEROL SULFATE 3 ML: .5; 3 SOLUTION RESPIRATORY (INHALATION) at 19:55

## 2024-03-12 NOTE — ED PROVIDER NOTES
History  Chief Complaint   Patient presents with    Shortness of Breath     Pt reports was seeing yesterday, dx with fluids in her lungs. Reports SOB and unable to sleep, denies other symptoms      53-year-old female with history of breast cancer with metastasis to bone presents to ED for evaluation of shortness of breath.  Patient is accompanied by family members.  Patient states that for the past 2 weeks she has had a hard time maintaining consistent sleep regiment.  She is able to sleep for 10 to 15 minutes but wakes up due to shortness of breath.  Patient was seen in the ED yesterday for evaluation for dizziness.  She had extensive workup performed.  Had both a chest CT without contrast and PE study. These studies demonstrated continued pleural effusion of right lung.  Pulmonology was consulted and did not recommend admission.  They advised drainage of right pleural catheter and nebulizer treatment.  Per note from yesterday there was not large amount of drainage from catheter.  Labs did show hyponatremia which was replenished during visit.  At the end of visit patient declined offered admission.  Was advised to follow-up with PCP in 2 to 3 days for reevaluation.    Patient's family states that patient has similar shortness of breath when she is anemic.  Previously received blood transfusion for anemia.  On CBC yesterday she had a hemoglobin of 7.4.  On January 30, 2024 her hemoglobin was 10.0.             Prior to Admission Medications   Prescriptions Last Dose Informant Patient Reported? Taking?   Multiple Vitamin (MULTIVITAMIN ADULT PO)  Self Yes Yes   Sig: Take 1 tablet by mouth in the morning   Ribociclib Succ, 600 MG Dose, 200 MG TBPK Unknown  No No   Sig: Take 600 mg by mouth in the morning For 3 weeks and then 1 week off   Senokot 8.6 MG tablet Unknown  Yes No   Sig: Take 17.2 mg by mouth   acetaminophen (TYLENOL) 500 mg tablet   No Yes   Sig: Take 2 tablets (1,000 mg total) by mouth 3 (three) times a day  as needed for mild pain   calcium citrate-vitamin D (Calcitrate Plus D) 315 mg-5 mcg tablet   No Yes   Sig: Take 1 tablet by mouth 2 (two) times a day   ergocalciferol (VITAMIN D2) 50,000 units   No Yes   Sig: TAKE 1 CAPSULE BY MOUTH 1 TIME A WEEK   lidocaine (LIDODERM) 5 % Not Taking  No No   Sig: Apply 2 patches topically over 12 hours daily Remove & Discard patch within 12 hours or as directed by MD   Patient not taking: Reported on 3/12/2024   metoprolol succinate (TOPROL-XL) 50 mg 24 hr tablet   No Yes   Sig: Take 1 tablet (50 mg total) by mouth daily   naloxone (NARCAN) 4 mg/0.1 mL nasal spray Unknown  No No   Sig: Administer 1 spray into a nostril. If no response after 2-3 minutes, give another dose in the other nostril using a new spray.   oxyCODONE (ROXICODONE) 10 MG TABS   No Yes   Sig: Take 1 tablet (10 mg total) by mouth every 4 (four) hours as needed for severe pain or moderate pain Max Daily Amount: 60 mg   polyethylene glycol (GLYCOLAX) 17 GM/SCOOP powder   Yes Yes   sodium chloride 1 g tablet Unknown  No No   Sig: Take 1 tablet (1 g total) by mouth 2 (two) times a day      Facility-Administered Medications: None       Past Medical History:   Diagnosis Date    Breast cancer (HCC) 07/2013    left-chemotherapy    Fibroma     Goals of care, counseling/discussion     History of chemotherapy     History of external beam radiation therapy        Past Surgical History:   Procedure Laterality Date    APPENDECTOMY      BREAST SURGERY  2014    Left breast 2/2 breast CA    HYSTERECTOMY  2015    Due to fibroma    IR BIOPSY BONE  10/9/2023    IR PLEURAL EFFUSION LONG-TERM CATHETER PLACEMENT  1/24/2024    IR THORACENTESIS  11/8/2023    MASTECTOMY Left 2013    OOPHORECTOMY         Family History   Problem Relation Age of Onset    Lung cancer Mother     No Known Problems Father     No Known Problems Sister     No Known Problems Daughter     No Known Problems Daughter     Cancer Maternal Grandmother     No Known  Problems Maternal Grandfather     No Known Problems Paternal Grandmother     No Known Problems Paternal Grandfather     Uterine cancer Maternal Aunt     No Known Problems Son     No Known Problems Paternal Aunt      I have reviewed and agree with the history as documented.    E-Cigarette/Vaping    E-Cigarette Use Never User      E-Cigarette/Vaping Substances    Nicotine No     THC No     CBD No     Flavoring No     Other No     Unknown No      Social History     Tobacco Use    Smoking status: Never     Passive exposure: Never    Smokeless tobacco: Never   Vaping Use    Vaping status: Never Used   Substance Use Topics    Alcohol use: Not Currently    Drug use: No       Review of Systems    Physical Exam  Physical Exam  Vitals and nursing note reviewed.   Constitutional:       General: She is not in acute distress.     Appearance: She is well-developed. She is ill-appearing. She is not toxic-appearing or diaphoretic.   HENT:      Head: Normocephalic and atraumatic.   Eyes:      Conjunctiva/sclera: Conjunctivae normal.   Cardiovascular:      Rate and Rhythm: Regular rhythm. Tachycardia present.      Heart sounds: No murmur heard.     No friction rub. No gallop.   Pulmonary:      Effort: Pulmonary effort is normal. No respiratory distress.      Breath sounds: No stridor. Decreased breath sounds present. No wheezing, rhonchi or rales.   Abdominal:      Palpations: Abdomen is soft.      Tenderness: There is no abdominal tenderness.   Musculoskeletal:         General: No swelling.      Cervical back: Neck supple.      Right lower leg: Edema present.      Left lower leg: Edema present.   Skin:     General: Skin is warm and dry.      Capillary Refill: Capillary refill takes less than 2 seconds.   Neurological:      Mental Status: She is alert.   Psychiatric:         Mood and Affect: Mood normal.         Vital Signs  ED Triage Vitals [03/12/24 1800]   Temperature Pulse Respirations Blood Pressure SpO2   98.2 °F (36.8 °C) (!)  113 22 130/68 94 %      Temp Source Heart Rate Source Patient Position - Orthostatic VS BP Location FiO2 (%)   Oral Monitor Sitting Right arm --      Pain Score       9           Vitals:    03/12/24 1800 03/12/24 2015   BP: 130/68 144/92   Pulse: (!) 113 (!) 112   Patient Position - Orthostatic VS: Sitting Sitting         Visual Acuity      ED Medications  Medications   ipratropium-albuterol (DUO-NEB) 0.5-2.5 mg/3 mL inhalation solution 3 mL (3 mL Nebulization Given 3/12/24 1955)       Diagnostic Studies  Results Reviewed       Procedure Component Value Units Date/Time    HS Troponin I 2hr [[id number]]  (Normal) Resulted: 03/12/24 2225    Lab Status: Final result Specimen: Blood Updated: 03/12/24 2225     hs TnI 2hr 8 ng/L      Delta 2hr hsTnI 1 ng/L     Procalcitonin [[id number]]  (Abnormal) Collected: 03/12/24 2120    Lab Status: Final result Specimen: Blood from Arm, Right Updated: 03/12/24 2156     Procalcitonin 0.86 ng/ml     Comprehensive metabolic panel [[id number]]  (Abnormal) Collected: 03/12/24 2120    Lab Status: Final result Specimen: Blood from Hand, Right Updated: 03/12/24 2145     Sodium 132 mmol/L      Potassium 4.0 mmol/L      Chloride 96 mmol/L      CO2 23 mmol/L      ANION GAP 13 mmol/L      BUN 11 mg/dL      Creatinine 0.47 mg/dL      Glucose 98 mg/dL      Calcium 8.3 mg/dL      Corrected Calcium 9.0 mg/dL      AST 65 U/L      ALT 12 U/L      Alkaline Phosphatase 107 U/L      Total Protein 7.1 g/dL      Albumin 3.1 g/dL      Total Bilirubin 0.52 mg/dL      eGFR 111 ml/min/1.73sq m     Narrative:      National Kidney Disease Foundation guidelines for Chronic Kidney Disease (CKD):     Stage 1 with normal or high GFR (GFR > 90 mL/min/1.73 square meters)    Stage 2 Mild CKD (GFR = 60-89 mL/min/1.73 square meters)    Stage 3A Moderate CKD (GFR = 45-59 mL/min/1.73 square meters)    Stage 3B Moderate CKD (GFR = 30-44 mL/min/1.73 square meters)    Stage 4 Severe CKD (GFR = 15-29 mL/min/1.73 square meters)     Stage 5 End Stage CKD (GFR <15 mL/min/1.73 square meters)  Note: GFR calculation is accurate only with a steady state creatinine    FLU/RSV/COVID - if FLU/RSV clinically relevant [[id number]]  (Normal) Collected: 03/12/24 1952    Lab Status: Final result Specimen: Nares from Nose Updated: 03/12/24 2034     SARS-CoV-2 Negative     INFLUENZA A PCR Negative     INFLUENZA B PCR Negative     RSV PCR Negative    Narrative:      FOR PEDIATRIC PATIENTS - copy/paste COVID Guidelines URL to browser: [email or web address]    SARS-CoV-2 assay is a Nucleic Acid Amplification assay intended for the  qualitative detection of nucleic acid from SARS-CoV-2 in nasopharyngeal  swabs. Results are for the presumptive identification of SARS-CoV-2 RNA.    Positive results are indicative of infection with SARS-CoV-2, the virus  causing COVID-19, but do not rule out bacterial infection or co-infection  with other viruses. Laboratories within the [place] and its  territories are required to report all positive results to the appropriate  public health authorities. Negative results do not preclude SARS-CoV-2  infection and should not be used as the sole basis for treatment or other  patient management decisions. Negative results must be combined with  clinical observations, patient history, and epidemiological information.  This test has not been FDA cleared or approved.    This test has been authorized by FDA under an Emergency Use Authorization  (EUA). This test is only authorized for the duration of time the  declaration that circumstances exist justifying the authorization of the  emergency use of an in vitro diagnostic tests for detection of SARS-CoV-2  virus and/or diagnosis of COVID-19 infection under section 564(b)(1) of  the Act, 21 U.S.C. 360bbb-3(b)(1), unless the authorization is terminated  or revoked sooner. The test has been validated but independent review by FDA  and  CLIA is pending.    Test performed using [employer] GeneXpert: This RT-PCR assay targets N2,  a region unique to SARS-CoV-2. A conserved region in the E-gene was chosen  for pan-Sarbecovirus detection which includes SARS-CoV-2.    According to CMS-2020-01-R, this platform meets the definition of high-throughput technology.    HS Troponin I 4hr [[id number]]     Lab Status: No result Specimen: Blood     HS Troponin 0hr (reflex protocol) [[id number]]  (Normal) Collected: 03/12/24 1952    Lab Status: Final result Specimen: Blood from Hand, Right Updated: 03/12/24 2021     hs TnI 0hr 7 ng/L     B-Type Natriuretic Peptide(BNP) [[id number]]  (Normal) Collected: 03/12/24 1952    Lab Status: Final result Specimen: Blood from Hand, Right Updated: 03/12/24 2019     BNP 98 pg/mL     Manual Differential(PHLEBS Do Not Order) [[id number]] Collected: 03/12/24 1952    Lab Status: Final result Specimen: Blood from Hand, Right Updated: 03/12/24 2016     Segmented % 67 %      Bands % 3 %      Lymphocytes % 20 %      Monocytes % 10 %      Eosinophils % 0 %      Basophils % 0 %      Absolute Neutrophils 5.38 Thousand/uL      Absolute Lymphocytes 1.54 Thousand/uL      Absolute Monocytes 0.77 Thousand/uL      Absolute Eosinophils 0.00 Thousand/uL      Absolute Basophils 0.00 Thousand/uL      Total Counted --     nRBC 2 /100 WBC      Platelet Estimate Adequate     Giant PLTs Present     Large Platelet Present     Anisocytosis Present     Helmet Cells Present     Polychromasia Present    CBC and differential [[id number]]  (Abnormal) Collected: 03/12/24 1952    Lab Status: Final result Specimen: Blood from Hand, Right Updated: 03/12/24 1959     WBC 7.69 Thousand/uL      RBC 2.52 Million/uL      Hemoglobin 6.7 g/dL      Hematocrit 23.3 %      MCV 93 fL      MCH 26.6 pg      MCHC 28.8 g/dL      RDW 21.7 %      MPV 8.5 fL      Platelets 378 Thousands/uL     Narrative:      This is an appended report.  These results have been appended to a previously  verified report.                   XR chest portable    (Results Pending)              Procedures  ECG 12 Lead Documentation Only    Date/Time: 3/12/2024 7:45 PM    Performed by: Pineda Vail PA-C  Authorized by: Pineda Vail PA-C    Indications / Diagnosis:  Sob  Patient location:  ED  Previous ECG:     Previous ECG:  Compared to current  Interpretation:     Interpretation: non-specific    Rate:     ECG rate:  106    ECG rate assessment: tachycardic    Rhythm:     Rhythm: sinus tachycardia    Ectopy:     Ectopy: none    QRS:     QRS axis:  Normal    QRS intervals:  Normal  ECG 12 Lead Documentation Only    Date/Time: 3/12/2024 9:53 PM    Performed by: Pineda Vail PA-C  Authorized by: Pineda Vail PA-C    Indications / Diagnosis:  Sob  Patient location:  ED  Previous ECG:     Previous ECG:  Compared to current    Similarity:  No change  Interpretation:     Interpretation: non-specific    Rate:     ECG rate:  110    ECG rate assessment: tachycardic    Rhythm:     Rhythm: sinus tachycardia    Ectopy:     Ectopy: none    QRS:     QRS axis:  Normal    QRS intervals:  Normal  Conduction:     Conduction: normal    ST segments:     ST segments:  Normal  T waves:     T waves: normal             ED Course                                             Medical Decision Making  53-year-old female with extensive medical history presents to ED for evaluation of shortness of breath as above.  On physical examination patient is consistently tachycardic.  This is patient's baseline.  Normotensive, afebrile.  Low 90%'s on pulse ox.  Reduced breath sounds on right side.  No murmur.  Abdomen soft, nontender.  Patient overall ill-appearing.   used during encounter as patient is Arabic-speaking.  Obtained workup consisting of CBC, BMP, serial troponin, flu/RSV/COVID swab, CMP, EKG.  Provided DuoNeb for symptomatic relief.  Differential diagnosis includes but not limited to pleural effusion  secondary to oncological origin, pneumonia, COVID, RSV, flu, anemia, MI, ACS, pneumothorax    CBC returned with a hemoglobin of 6.7.  BNP nonelevated.  Initial troponin of 7 NG/L.  2-hour troponin 8 NG/L.  Negative flu/RSV/COVID.  CMP without concerning values compared to yesterday's measures.     Had conversation with patient and her family about her wishes for today.  Patient's family stating that she normally is short of breath when anemic.  CBC returned today demonstrating anemia.  Hemoglobin 6.7.  Was 7.4 yesterday.  Given that patient is symptomatic with a low hemoglobin, offered admission for symptomatic anemia. Patient and her family would like admission for blood transfusion.     Discussed patient with SLIM MADISON. Patient to be admitted to [facility] service.         Amount and/or Complexity of Data Reviewed  Labs: ordered.    Risk  Prescription drug management.  Decision regarding hospitalization.             Disposition  Final diagnoses:   Shortness of breath   Symptomatic anemia   Pleural effusion     Time reflects when diagnosis was documented in both MDM as applicable and the Disposition within this note       Time User Action Codes Description Comment    3/12/2024  9:59 PM Pineda Vail [R06.02] Shortness of breath     3/12/2024  9:59 PM Pineda Vail [D64.9] Symptomatic anemia     3/12/2024 10:00 PM Pineda Vail [J90] Pleural effusion           ED Disposition       ED Disposition   Admit    Condition   Stable    Date/Time   Tue Mar 12, 2024  9:59 PM    Comment   Case was discussed with [facility] and the patient's admission status was agreed to be Admission Status: inpatient status to the service of Dr. Call.               Follow-up Information    None         Current Discharge Medication List        CONTINUE these medications which have NOT CHANGED    Details   acetaminophen (TYLENOL) 500 mg tablet Take 2 tablets (1,000 mg total) by mouth 3 (three) times a day as needed for mild pain  Qty: 180  tablet, Refills: 0    Associated Diagnoses: Pain from bone metastases (HCC)      calcium citrate-vitamin D (Calcitrate Plus D) 315 mg-5 mcg tablet Take 1 tablet by mouth 2 (two) times a day  Qty: 180 tablet, Refills: 0    Associated Diagnoses: Use of anastrozole (Arimidex)      ergocalciferol (VITAMIN D2) 50,000 units TAKE 1 CAPSULE BY MOUTH 1 TIME A WEEK  Qty: 8 capsule, Refills: 0    Associated Diagnoses: Vitamin D deficiency      metoprolol succinate (TOPROL-XL) 50 mg 24 hr tablet Take 1 tablet (50 mg total) by mouth daily  Qty: 30 tablet, Refills: 0    Associated Diagnoses: Sinus tachycardia      Multiple Vitamin (MULTIVITAMIN ADULT PO) Take 1 tablet by mouth in the morning      oxyCODONE (ROXICODONE) 10 MG TABS Take 1 tablet (10 mg total) by mouth every 4 (four) hours as needed for severe pain or moderate pain Max Daily Amount: 60 mg  Qty: 120 tablet, Refills: 0    Comments: For intractable cancer related pain. Please dispense immediately  Associated Diagnoses: Cancer-related pain      polyethylene glycol (GLYCOLAX) 17 GM/SCOOP powder       lidocaine (LIDODERM) 5 % Apply 2 patches topically over 12 hours daily Remove & Discard patch within 12 hours or as directed by MD  Qty: 60 patch, Refills: 0    Associated Diagnoses: Pain from bone metastases (HCC)      naloxone (NARCAN) 4 mg/0.1 mL nasal spray Administer 1 spray into a nostril. If no response after 2-3 minutes, give another dose in the other nostril using a new spray.  Qty: 1 each, Refills: 0    Associated Diagnoses: Cancer-related pain; Pain from bone metastases (HCC); Mass of right lung      Ribociclib Succ, 600 MG Dose, 200 MG TBPK Take 600 mg by mouth in the morning For 3 weeks and then 1 week off  Qty: 21 each, Refills: 6    Associated Diagnoses: Malignant neoplasm of overlapping sites of left breast in female, estrogen receptor positive       Senokot 8.6 MG tablet Take 17.2 mg by mouth      sodium chloride 1 g tablet Take 1 tablet (1 g total) by mouth  2 (two) times a day  Qty: 60 tablet, Refills: 0    Associated Diagnoses: Hyponatremia             No discharge procedures on file.    PDMP Review         Value Time User    PDMP Reviewed  Yes 3/12/2024  3:03 PM Janie Briones MD            ED Provider  Electronically Signed by             Pineda Vail PA-C  03/12/24 0735

## 2024-03-12 NOTE — TELEPHONE ENCOUNTER
Controlled Substance Review    [place] PDMP or [place]  reviewed: No red flags were identified; safe to proceed with prescription..    Filled  Written  ID  Drug  QTY  Days  Prescriber  RX #  Dispenser  Refill  Daily Dose*  Pymt Type     02/25/2024 02/25/2024 1 Oxycodone Hcl (Ir) 10 Mg Tab 120.00 20 Ma Mil [id number] Pen (1007) 0 90.00 MME [place]  02/10/2024 02/10/2024 1 Oxycodone Hcl (Ir) 10 Mg Tab 90.00 23 Da [facility] [id number] Wal ([id number]) 0 58.70 MME [place]  01/21/2024 01/21/2024 3 Morphine Sulfate Ir 15 Mg Tab 56.00 14 [place] Mar [id number] Le ([id number]) 0 60.00 MME [place]  01/21/2024 01/21/2024 3 Oxycodone Hcl (Ir) 5 Mg Tablet 126.00 14 [place] Mar [id number] Le ([id number]) 0 67.50 MME [place]  01/18/2024 01/18/2024 3 Oxycodone Hcl (Ir) 5 Mg Tablet 20.00 5 Ta Hab [id number] Wal ([id number]) 0 30.00 MME [place]    Janie Briones MD  Palliative Medicine & Supportive Care  Internal Medicine  Available via [employer]  Office: [phone]  Fax: [phone]

## 2024-03-12 NOTE — TELEPHONE ENCOUNTER
Patient needs a follow up as soon as possible with Dr. Buitrago. Per Dr. Villavicencio. Patient was in the ED and had a scan completed that showed possible disease progression. Please schedule and contact patient. Thank you.

## 2024-03-13 PROBLEM — E87.1 HYPONATREMIA: Status: ACTIVE | Noted: 2024-03-13

## 2024-03-13 PROBLEM — R00.0 SINUS TACHYCARDIA: Status: RESOLVED | Noted: 2024-01-24 | Resolved: 2024-03-13

## 2024-03-13 LAB
4HR DELTA HS TROPONIN: 0 NG/L
ABO GROUP BLD: NORMAL
BASOPHILS # BLD AUTO: 0.02 THOUSANDS/ÂΜL (ref 0–0.1)
BASOPHILS NFR BLD AUTO: 0 % (ref 0–1)
BLD GP AB SCN SERPL QL: NEGATIVE
CARDIAC TROPONIN I PNL SERPL HS: 7 NG/L
EOSINOPHIL # BLD AUTO: 0 THOUSAND/ÂΜL (ref 0–0.61)
EOSINOPHIL NFR BLD AUTO: 0 % (ref 0–6)
ERYTHROCYTE [DISTWIDTH] IN BLOOD BY AUTOMATED COUNT: 19.5 % (ref 11.6–15.1)
HCT VFR BLD AUTO: 25.7 % (ref 34.8–46.1)
HGB BLD-MCNC: 8.1 G/DL (ref 11.5–15.4)
IMM GRANULOCYTES # BLD AUTO: 0.16 THOUSAND/UL (ref 0–0.2)
IMM GRANULOCYTES NFR BLD AUTO: 2 % (ref 0–2)
LYMPHOCYTES # BLD AUTO: 0.86 THOUSANDS/ÂΜL (ref 0.6–4.47)
LYMPHOCYTES NFR BLD AUTO: 11 % (ref 14–44)
MCH RBC QN AUTO: 27.2 PG (ref 26.8–34.3)
MCHC RBC AUTO-ENTMCNC: 31.5 G/DL (ref 31.4–37.4)
MCV RBC AUTO: 86 FL (ref 82–98)
MONOCYTES # BLD AUTO: 1.13 THOUSAND/ÂΜL (ref 0.17–1.22)
MONOCYTES NFR BLD AUTO: 15 % (ref 4–12)
NEUTROPHILS # BLD AUTO: 5.55 THOUSANDS/ÂΜL (ref 1.85–7.62)
NEUTS SEG NFR BLD AUTO: 72 % (ref 43–75)
NRBC BLD AUTO-RTO: 1 /100 WBCS
PLATELET # BLD AUTO: 339 THOUSANDS/UL (ref 149–390)
PMV BLD AUTO: 8.4 FL (ref 8.9–12.7)
RBC # BLD AUTO: 2.98 MILLION/UL (ref 3.81–5.12)
RH BLD: POSITIVE
SPECIMEN EXPIRATION DATE: NORMAL
WBC # BLD AUTO: 7.72 THOUSAND/UL (ref 4.31–10.16)

## 2024-03-13 PROCEDURE — 87081 CULTURE SCREEN ONLY: CPT | Performed by: NURSE PRACTITIONER

## 2024-03-13 PROCEDURE — P9016 RBC LEUKOCYTES REDUCED: HCPCS

## 2024-03-13 PROCEDURE — 30233N1 TRANSFUSION OF NONAUTOLOGOUS RED BLOOD CELLS INTO PERIPHERAL VEIN, PERCUTANEOUS APPROACH: ICD-10-PCS | Performed by: PHYSICIAN ASSISTANT

## 2024-03-13 PROCEDURE — 86923 COMPATIBILITY TEST ELECTRIC: CPT

## 2024-03-13 PROCEDURE — 86850 RBC ANTIBODY SCREEN: CPT | Performed by: PHYSICIAN ASSISTANT

## 2024-03-13 PROCEDURE — 85025 COMPLETE CBC W/AUTO DIFF WBC: CPT | Performed by: INTERNAL MEDICINE

## 2024-03-13 PROCEDURE — 99255 IP/OBS CONSLTJ NEW/EST HI 80: CPT | Performed by: INTERNAL MEDICINE

## 2024-03-13 PROCEDURE — 97166 OT EVAL MOD COMPLEX 45 MIN: CPT

## 2024-03-13 PROCEDURE — 84484 ASSAY OF TROPONIN QUANT: CPT

## 2024-03-13 PROCEDURE — 97163 PT EVAL HIGH COMPLEX 45 MIN: CPT

## 2024-03-13 PROCEDURE — 86900 BLOOD TYPING SEROLOGIC ABO: CPT | Performed by: PHYSICIAN ASSISTANT

## 2024-03-13 PROCEDURE — 99497 ADVNCD CARE PLAN 30 MIN: CPT | Performed by: INTERNAL MEDICINE

## 2024-03-13 PROCEDURE — NC001 PR NO CHARGE: Performed by: PHYSICIAN ASSISTANT

## 2024-03-13 PROCEDURE — 99223 1ST HOSP IP/OBS HIGH 75: CPT | Performed by: INTERNAL MEDICINE

## 2024-03-13 PROCEDURE — 86901 BLOOD TYPING SEROLOGIC RH(D): CPT | Performed by: PHYSICIAN ASSISTANT

## 2024-03-13 RX ORDER — ENOXAPARIN SODIUM 100 MG/ML
40 INJECTION SUBCUTANEOUS
Status: DISCONTINUED | OUTPATIENT
Start: 2024-03-14 | End: 2024-03-15 | Stop reason: HOSPADM

## 2024-03-13 RX ORDER — SODIUM CHLORIDE 1 G/1
1 TABLET ORAL 2 TIMES DAILY WITH MEALS
Status: DISCONTINUED | OUTPATIENT
Start: 2024-03-13 | End: 2024-03-13

## 2024-03-13 RX ORDER — SODIUM CHLORIDE 1 G/1
1 TABLET ORAL
Status: DISCONTINUED | OUTPATIENT
Start: 2024-03-14 | End: 2024-03-15 | Stop reason: HOSPADM

## 2024-03-13 RX ORDER — LIDOCAINE 50 MG/G
1 PATCH TOPICAL DAILY
Status: DISCONTINUED | OUTPATIENT
Start: 2024-03-13 | End: 2024-03-15 | Stop reason: HOSPADM

## 2024-03-13 RX ORDER — METOPROLOL SUCCINATE 50 MG/1
50 TABLET, EXTENDED RELEASE ORAL DAILY
Status: DISCONTINUED | OUTPATIENT
Start: 2024-03-13 | End: 2024-03-14

## 2024-03-13 RX ORDER — OXYCODONE HYDROCHLORIDE 10 MG/1
10 TABLET ORAL EVERY 4 HOURS PRN
Status: DISCONTINUED | OUTPATIENT
Start: 2024-03-13 | End: 2024-03-15 | Stop reason: HOSPADM

## 2024-03-13 RX ORDER — CEFTRIAXONE 1 G/50ML
1000 INJECTION, SOLUTION INTRAVENOUS EVERY 24 HOURS
Status: COMPLETED | OUTPATIENT
Start: 2024-03-13 | End: 2024-03-15

## 2024-03-13 RX ORDER — HYDROMORPHONE HCL IN WATER/PF 6 MG/30 ML
0.2 PATIENT CONTROLLED ANALGESIA SYRINGE INTRAVENOUS EVERY 4 HOURS PRN
Status: DISCONTINUED | OUTPATIENT
Start: 2024-03-13 | End: 2024-03-14

## 2024-03-13 RX ADMIN — OXYCODONE HYDROCHLORIDE 10 MG: 10 TABLET ORAL at 22:21

## 2024-03-13 RX ADMIN — HYDROMORPHONE HYDROCHLORIDE 0.2 MG: 0.2 INJECTION, SOLUTION INTRAMUSCULAR; INTRAVENOUS; SUBCUTANEOUS at 15:47

## 2024-03-13 RX ADMIN — LIDOCAINE 5% 1 PATCH: 700 PATCH TOPICAL at 13:41

## 2024-03-13 RX ADMIN — HYDROMORPHONE HYDROCHLORIDE 0.2 MG: 0.2 INJECTION, SOLUTION INTRAMUSCULAR; INTRAVENOUS; SUBCUTANEOUS at 07:35

## 2024-03-13 RX ADMIN — OXYCODONE HYDROCHLORIDE 10 MG: 10 TABLET ORAL at 05:35

## 2024-03-13 RX ADMIN — CEFTRIAXONE 1000 MG: 1 INJECTION, SOLUTION INTRAVENOUS at 12:00

## 2024-03-13 RX ADMIN — OXYCODONE HYDROCHLORIDE 10 MG: 10 TABLET ORAL at 13:24

## 2024-03-13 RX ADMIN — HYDROMORPHONE HYDROCHLORIDE 0.2 MG: 0.2 INJECTION, SOLUTION INTRAMUSCULAR; INTRAVENOUS; SUBCUTANEOUS at 23:22

## 2024-03-13 RX ADMIN — OXYCODONE HYDROCHLORIDE 10 MG: 10 TABLET ORAL at 09:42

## 2024-03-13 RX ADMIN — OXYCODONE HYDROCHLORIDE 10 MG: 10 TABLET ORAL at 18:10

## 2024-03-13 NOTE — UTILIZATION REVIEW
Initial Clinical Review    Admission: Date/Time/Statement:   Admission Orders (From admission, onward)       Ordered        03/12/24 2159  INPATIENT ADMISSION  Once                          Orders Placed This Encounter   Procedures    INPATIENT ADMISSION     Standing Status:   Standing     Number of Occurrences:   1     Order Specific Question:   Level of Care     Answer:   Med Surg [16]     Order Specific Question:   Estimated length of stay     Answer:   Not Applicable     ED Arrival Information       Expected   -    Arrival   3/12/2024 17:50    Acuity   Urgent              Means of arrival   Walk-In    Escorted by   Family Member    Service   Hospitalist    Admission type   Emergency              Arrival complaint   sob             Chief Complaint   Patient presents with    Shortness of Breath     Pt reports was seeing yesterday, dx with fluids in her lungs. Reports SOB and unable to sleep, denies other symptoms        Initial Presentation: 53 y.o. female presents to ED   from home with shortness of breath and dizziness. Seen in ED the day prior to this for same symptoms.  Ct scan negative for PE,  did show  stable pleural effusion and GURMEET infiltrates.  Was previously  treated  1/24  for possible pneumonia. Referred for admission  but declined.  Now  returns to ED with above symptoms, ongoing weakness  and PND  for 1 week.  Has pain, mostly   right thoracic back.  Does have  pleurx catheter  and  drains   Q  3 days or so.  Got  out large volume  of clear liquid  normally,  the day prior  to admission only got out about 5 cc.  Felt weak, tired, dizzy with sitting and walking and  returned to ED.  Labs reveal hemoglobin in the  mid  6's.  Repeat  CXR shows  complete opacification  R lung, no volume overload. Additional PMH  is stage  IV  metastatic breast cancer, mets to bone and now  lung, recurrent pleural effusion, chronic pain and anemia.   Admit  Ip with  Large pleural effusion, ST, Anemia  and plan is  IR  consult, monitor respiratory status, pain control  possibly  GI consult and continue home meds.       Date:   3/13   Day 2:   IR  consult  Catheter appears in proper position.  Some effusion noted  and large tumor burden in pleural space. Consider TPA  into tube for loculations,  hesitant  given hemoglobin  6.7.    No indication for IR tube check/manipulation.  Recommend pulmonary consult    Pulmonary consult  Acute pulmonary deficiency, multifaceted.  Needs  sats  >  89 %.  Currently  O2 sats  94  %, not on home O2.  Pleurx  catheter  flushed with 20 cc freely, drained  18 cc.  On JOHAN  for possible pneumonia.  S/P  1 U PRBC.   Possibly need to consider  hospice.  Lung cancer seems to be aggressively  advancing.     ED Triage Vitals [03/12/24 1800]   Temperature Pulse Respirations Blood Pressure SpO2   98.2 °F (36.8 °C) (!) 113 22 130/68 94 %      Temp Source Heart Rate Source Patient Position - Orthostatic VS BP Location FiO2 (%)   Oral Monitor Sitting Right arm --      Pain Score       9          Wt Readings from Last 1 Encounters:   02/28/24 54.9 kg (121 lb)     Additional Vital Signs:   98 °F (36.7 °C) -- -- -- -- -- -- --    03/13/24 10:34:08 -- 112 Abnormal  17 135/79 98 92 % -- --   03/13/24 08:40:53 -- 116 Abnormal  16 124/66 85 94 % -- --   03/13/24 0655 98.6 °F (37 °C) -- 21 -- -- -- -- Lying   03/13/24 06:54:11 -- 105 -- 126/75 92 91 % -- --   03/13/24 0630 98.8 °F (37.1 °C) -- 20 117/75 -- -- -- Lying   03/13/24 06:29:36 -- 111 Abnormal  -- 117/75 89 92 % -- --   03/12/24 2247 -- -- -- -- -- -- None (Room air) --   03/12/24 2015 -- 112 Abnormal  22 144/92 114 92 % None (Room air) Sitting   03/12/24 1800 98.2 °F (36.8 °C) 113 Abnormal  22 130/68 -- 94 % None (Room air) Sitting     Pertinent Labs/Diagnostic Test Results:   XR chest portable    (Results Pending)     Results from last 7 days   Lab Units 03/12/24 1952   SARS-COV-2  Negative     Results from last 7 days   Lab Units 03/12/24 1952  03/11/24  1208   WBC Thousand/uL 7.69 8.67   HEMOGLOBIN g/dL 6.7* 7.4*   HEMATOCRIT % 23.3* 25.2*   PLATELETS Thousands/uL 378 401*   NEUTROS ABS Thousands/µL  --  5.55   BANDS PCT % 3  --          Results from last 7 days   Lab Units 03/12/24 2120 03/11/24 1622 03/11/24  1208   SODIUM mmol/L 132* 131* 126*   POTASSIUM mmol/L 4.0 3.9 5.9*   CHLORIDE mmol/L 96 98 92*   CO2 mmol/L 23 22 21   ANION GAP mmol/L 13 11 13   BUN mg/dL 11 10 11   CREATININE mg/dL 0.47* 0.44* 0.49*   EGFR ml/min/1.73sq m 111 114 110   CALCIUM mg/dL 8.3* 7.8* 8.7     Results from last 7 days   Lab Units 03/12/24 2120 03/11/24  1208   AST U/L 65* 83*   ALT U/L 12 11   ALK PHOS U/L 107* 123*   TOTAL PROTEIN g/dL 7.1 7.9   ALBUMIN g/dL 3.1* 3.4*   TOTAL BILIRUBIN mg/dL 0.52 0.73         Results from last 7 days   Lab Units 03/12/24 2120 03/11/24 1622 03/11/24  1208   GLUCOSE RANDOM mg/dL 98 104 113               Results from last 7 days   Lab Units 03/13/24 0204 03/12/24 2225 03/12/24 1952 03/11/24  1424 03/11/24  1208   HS TNI 0HR ng/L  --   --  7  --  6   HS TNI 2HR ng/L  --  8  --  5  --    HSTNI D2 ng/L  --  1  --  -1  --    HS TNI 4HR ng/L 7  --   --   --   --    HSTNI D4 ng/L 0  --   --   --   --          Results from last 7 days   Lab Units 03/11/24  1208   PTT seconds 34         Results from last 7 days   Lab Units 03/12/24 2120 03/11/24  1208   PROCALCITONIN ng/ml 0.86* 0.98*                 Results from last 7 days   Lab Units 03/12/24 1952 03/11/24  1208   BNP pg/mL 98 85             Results from last 7 days   Lab Units 03/13/24  0618   UNIT PRODUCT CODE  [id number]   UNIT NUMBER  [id number]   UNITABO  B   UNITRH  POS   CROSSMATCH  Compatible   UNIT DISPENSE STATUS  Issued   UNIT PRODUCT VOL ml 350                             Results from last 7 days   Lab Units 03/12/24 1952   INFLUENZA A PCR  Negative   INFLUENZA B PCR  Negative   RSV PCR  Negative         ED Treatment:   Medication Administration from 03/12/2024 4300  to 03/12/2024 2246         Date/Time Order Dose Route Action Comments     03/12/2024 1955 EDT ipratropium-albuterol (DUO-NEB) 0.5-2.5 mg/3 mL inhalation solution 3 mL 3 mL Nebulization Given --            Present on Admission:   Large pleural effusion   Malignant neoplasm of overlapping sites of left female breast (HCC)   Right lower lobe lung mass   Pain from bone metastases (HCC)   Sinus tachycardia   Other specified anemias      Admitting Diagnosis: Shortness of breath [R06.02]  Pleural effusion [J90]  Symptomatic anemia [D64.9]  Age/Sex: 53 y.o. female  Admission Orders:  Scheduled Medications:  cefTRIAXone, 1,000 mg, Intravenous, Q24H  metoprolol succinate, 50 mg, Oral, Daily  sodium chloride, 1 g, Oral, BID With Meals      Continuous IV Infusions:     PRN Meds:  HYDROmorphone, 0.2 mg, Intravenous, Q4H PRN  oxyCODONE, 10 mg, Oral, Q4H PRN        INPATIENT CONSULT TO IR  IP CONSULT TO PULMONOLOGY  IP CONSULT TO ONCOLOGY    Network Utilization Review Department  ATTENTION: Please call with any questions or concerns to [phone] and carefully listen to the prompts so that you are directed to the right person. All voicemails are confidential.   For Discharge needs, contact Care Management DC Support Team at [phone] opt. 2  Send all requests for admission clinical reviews, approved or denied determinations and any other requests to dedicated fax number below belonging to the campus where the patient is receiving treatment. List of dedicated fax numbers for the Facilities:  FACILITY NAME UR FAX NUMBER   ADMISSION DENIALS (Administrative/Medical Necessity) [phone]   DISCHARGE SUPPORT TEAM (NETWORK) [phone]   PARENT CHILD HEALTH (Maternity/NICU/Pediatrics) [phone]   [facility] [phone]   [facility] [phone]   [facility] [phone]   [facility] [phone]   [facility]  [facility] [phone]   [facility] [phone]   [facility] [phone]   [facility] [phone]   [facility] [phone]   [facility] [phone]   [facility] [phone]   [facility] [phone]

## 2024-03-13 NOTE — UTILIZATION REVIEW
Notification of Unplanned, Urgent, or   Emergency Inpatient Admission   AUTHORIZATION REQUEST   Admitting [place] [address]  Tax ID: [id number]  NPI: [id number]  Place of Service: Acute Care Hospital  Admission Level of Care: Inpatient  Place of Service Code: 21     Attending Physician Information  Attending Name and NPI#: Aric Singer Do [[id number]]  Phone: [phone]     Admission Information  Inpatient Admission Date/Time: 3/12/24  9:59 PM  Discharge Date/Time: No discharge date for patient encounter.  Admitting Diagnosis Code/Description:  Shortness of breath [R06.02]  Pleural effusion [J90]  Symptomatic anemia [D64.9]     Utilization Review Contact  Jovana Berg, Utilization   Phone: [phone]  Fax: [phone]  Email: [email or web address]  Contact for approvals/pending authorizations, clinical reviews, and discharge.     Physician Advisory Services Contact  Medical Necessity Denial & Jkgc-uq-Osxa Discussion  Phone: [phone]  Fax: [phone]  Email: [email or web address]     DISCHARGE SUPPORT TEAM:  For Patients Discharge Needs & Updates  Phone: [phone] opt. 2 Fax: [phone]  Email: [email or web address]

## 2024-03-13 NOTE — ASSESSMENT & PLAN NOTE
Normocytic Worsening anemia to 6.7 despite improvement w/transfusion upwards of 0-10 during last admission.    Suspect anemia of chronic disease has bone marrow suppression  Transfused 1 unit of packed red blood cells

## 2024-03-13 NOTE — H&P
[facility]  H&P  Name: Shannan Vee 53 y.o. female I MRN: [id number]  Unit/Bed#: E5 -01 I Date of Admission: 3/12/2024   Date of Service: 3/13/2024 I Hospital Day: 1      Assessment/Plan   * Large pleural effusion  Assessment & Plan  S/pleurx catheter for malignant effusion in hx of breast ca w/mets to lung  -Cta chest pe study on 3/11/24 w/Stable right pleural effusion with extensive pleural carcinomatosis and compression of the majority of the right lung.  Stable left upper lobe juxtapleural groundglass infiltrates  -catheter had been draining large volume q 3 days but only about 5cc day pta despite effusion noted on ct and pt pnd/orthopnea x 1 week and intermittent dizziness in setting of significant pulmonary pathology and anemia  -repeat xray  -consult IR for eval of catheter position, monitor respiratory status around transfusion    Sinus tachycardia  Assessment & Plan  Felt to be possibly inappropriate tachycardia syndrome from cardoilogy given chronicity  Continue toprol    Other specified anemias  Assessment & Plan  Normocytic Worsening anemia to 6.7 despite improvement w/transfusion upwards of 0-10 during last admission.  Suspect anemia of chronic disease given no overt bleeding  No overt s/sx of bleeding and was draining clear fluid from pleurx cath tibc wnl in 11/23 and elevated ferritin in setting of acute phase reactant from active malignancy.  Folate and b12 levels at that time were acceptable  Hemoccult stool ordered will transfuse 1 iu prbc and monitor.  If positive would need gi eval    Pain from bone metastases (HCC)  Assessment & Plan  Continue op oxycodone    Malignant neoplasm of overlapping sites of left female breast (HCC)  Assessment & Plan  W/mets to bone (hormone positive) however metastatic breast cancer felt to lung is triple negative  Was seen by hem/onc during last admission in 01/24 to discuss w/dr rangel regarding changing systemic chemotherapy on  03/22/24  Op f/u with hem/onc           VTE Pharmacologic Prophylaxis:   High Risk (Score >/= 5) - Pharmacological DVT Prophylaxis Contraindicated. Sequential Compression Devices Ordered. No pharmacological therapy due to possible gib  Code Status: Level 1 - Full Code   Anticipated Length of Stay: Patient will be admitted on an inpatient basis with an anticipated length of stay of greater than 2 midnights secondary to large recurrent effusion symptomatic anemia.    Total Time Spent on Date of Encounter in care of patient:  mins. This time was spent on one or more of the following: performing physical exam; counseling and coordination of care; obtaining or reviewing history; documenting in the medical record; reviewing/ordering tests, medications or procedures; communicating with other healthcare professionals and discussing with patient's family/caregivers.    Chief Complaint: sob dizziness, PND/orthopnea x 1 week    History of Present Illness:  Shannan Vee is a 53 y.o. female with a PMH of stage IV metastatic breast cancer of left breast with metastases to bone (hormone positive) and now right lung (triple negative), recurrent pleural effusion, pleural  carcinomatosis of right lung, chronic pain, anemia  who presents with sob/dizziness.  Pt [place] speaking only.  Conversation occurred between the pt/myself in [place].  She was seen  the day prior for dizziness in the ED and sob underwent cta pe study and was given 500cc ns.  CT was negative for PE demonstrated stable pleural effusion stable left left upper lobe infiltrates (treated in 01/24 for possible pna). She was offerred admission but declined this.  She returned today for weakness/ongoing dizziness and PND x 1 week.  Cough is stable sob w/exertion is stable although does get subjective fevers with PND at night.  Notes pain is primarily right thoracic back.  She notes pleurx catheter usually drains every 3 days or so large volume of clear liquid but  yesterday only was able to be drained for about 5cc...  She returned as she was feeling weak/tired and dizzy with sitting up and with walking.  Admission was requested for symptomatic anemia w/hgb down to mid 6s.  On repeat cxr there is complete opacification of right lung and no significant volume overload on left despite ivf.      Review of Systems:  Review of Systems   Constitutional:  Negative for chills and fever.   Respiratory:  Positive for cough and shortness of breath.    Cardiovascular:  Negative for chest pain.   Gastrointestinal:  Negative for abdominal pain, diarrhea, nausea and vomiting.   Musculoskeletal:  Positive for back pain.   Psychiatric/Behavioral:  Positive for sleep disturbance.    All other systems reviewed and are negative.      Past Medical and Surgical History:   Past Medical History:   Diagnosis Date    Breast cancer (HCC) 07/2013    left-chemotherapy    Fibroma     Goals of care, counseling/discussion     History of chemotherapy     History of external beam radiation therapy        Past Surgical History:   Procedure Laterality Date    APPENDECTOMY      BREAST SURGERY  2014    Left breast 2/2 breast CA    HYSTERECTOMY  2015    Due to fibroma    IR BIOPSY BONE  10/9/2023    IR PLEURAL EFFUSION LONG-TERM CATHETER PLACEMENT  1/24/2024    IR THORACENTESIS  11/8/2023    MASTECTOMY Left 2013    OOPHORECTOMY         Meds/Allergies:  Prior to Admission medications    Medication Sig Start Date End Date Taking? Authorizing Provider   acetaminophen (TYLENOL) 500 mg tablet Take 2 tablets (1,000 mg total) by mouth 3 (three) times a day as needed for mild pain 2/8/24 5/8/24 Yes Todd Bañuelos MD   calcium citrate-vitamin D (Calcitrate Plus D) 315 mg-5 mcg tablet Take 1 tablet by mouth 2 (two) times a day 10/23/23 3/12/24 Yes Maxwell Villavicencio MD   ergocalciferol (VITAMIN D2) 50,000 units TAKE 1 CAPSULE BY MOUTH 1 TIME A WEEK 1/22/24  Yes Maxwell Villavicencio MD   metoprolol succinate (TOPROL-XL) 50 mg 24 hr  tablet Take 1 tablet (50 mg total) by mouth daily 2/28/24  Yes Todd Bañuelos MD   Multiple Vitamin (MULTIVITAMIN ADULT PO) Take 1 tablet by mouth in the morning   Yes Historical Provider, MD   oxyCODONE (ROXICODONE) 10 MG TABS Take 1 tablet (10 mg total) by mouth every 4 (four) hours as needed for severe pain or moderate pain Max Daily Amount: 60 mg 3/12/24  Yes Janie Briones MD   polyethylene glycol (GLYCOLAX) 17 GM/SCOOP powder  1/19/24  Yes Historical Provider, MD   lidocaine (LIDODERM) 5 % Apply 2 patches topically over 12 hours daily Remove & Discard patch within 12 hours or as directed by MD  Patient not taking: Reported on 3/12/2024 2/8/24   Todd Bañuelos MD   naloxone (NARCAN) 4 mg/0.1 mL nasal spray Administer 1 spray into a nostril. If no response after 2-3 minutes, give another dose in the other nostril using a new spray. 2/10/24 2/9/25  Gabe Copeland DO   Ribociclib Succ, 600 MG Dose, 200 MG TBPK Take 600 mg by mouth in the morning For 3 weeks and then 1 week off 10/27/23   Maxwell Villavicencio MD   Senokot 8.6 MG tablet Take 17.2 mg by mouth 1/19/24 1/18/25  Historical Provider, MD   sodium chloride 1 g tablet Take 1 tablet (1 g total) by mouth 2 (two) times a day 1/30/24   Ainsley Call MD     I have reviewed home medications with patient personally.    Allergies:   Allergies   Allergen Reactions    Aspirin Rash     Other reaction(s): Palpitations       Social History:  Marital Status: /Civil Union   Occupation:   Patient Pre-hospital Living Situation:   Patient Pre-hospital Level of Mobility:   Patient Pre-hospital Diet Restrictions:   Substance Use History:   Social History     Substance and Sexual Activity   Alcohol Use Not Currently     Social History     Tobacco Use   Smoking Status Never    Passive exposure: Never   Smokeless Tobacco Never     Social History     Substance and Sexual Activity   Drug Use No       Family History:  Family History   Problem Relation Age of Onset    Lung  "cancer Mother     No Known Problems Father     No Known Problems Sister     No Known Problems Daughter     No Known Problems Daughter     Cancer Maternal Grandmother     No Known Problems Maternal Grandfather     No Known Problems Paternal Grandmother     No Known Problems Paternal Grandfather     Uterine cancer Maternal Aunt     No Known Problems Son     No Known Problems Paternal Aunt        Physical Exam:     Vitals:   Blood Pressure: 144/92 (03/12/24 2015)  Pulse: (!) 112 (03/12/24 2015)  Temperature: 98.2 °F (36.8 °C) (03/12/24 1800)  Temp Source: Oral (03/12/24 1800)  Respirations: 22 (03/12/24 2015)  Height: 5' 5\" (165.1 cm) (03/12/24 1800)  SpO2: 92 % (03/12/24 2015)    Physical Exam  Vitals reviewed.   Constitutional:       General: She is not in acute distress.     Appearance: She is not ill-appearing, toxic-appearing or diaphoretic.   HENT:      Head: Normocephalic and atraumatic.      Right Ear: External ear normal.      Left Ear: External ear normal.      Nose: Nose normal.   Eyes:      Extraocular Movements: Extraocular movements intact.   Cardiovascular:      Rate and Rhythm: Regular rhythm. Tachycardia present.      Heart sounds: No murmur heard.     No friction rub. No gallop.   Pulmonary:      Breath sounds: Rales (faint rales in all right lung fields) present. No wheezing or rhonchi.   Abdominal:      General: There is no distension.      Palpations: There is no mass.      Tenderness: There is no abdominal tenderness. There is no guarding or rebound.      Hernia: No hernia is present.   Musculoskeletal:      Cervical back: No rigidity.      Right lower leg: Edema present.      Left lower leg: Edema present.   Skin:     General: Skin is warm.   Neurological:      Mental Status: She is alert.          Additional Data:     Lab Results:  Results from last 7 days   Lab Units 03/12/24  1952 03/11/24  1208   WBC Thousand/uL 7.69 8.67   HEMOGLOBIN g/dL 6.7* 7.4*   HEMATOCRIT % 23.3* 25.2*   PLATELETS " Thousands/uL 378 401*   BANDS PCT % 3  --    NEUTROS PCT %  --  64   LYMPHS PCT %  --  17   LYMPHO PCT % 20  --    MONOS PCT %  --  17*   MONO PCT % 10  --    EOS PCT % 0 0     Results from last 7 days   Lab Units 03/12/24 2120   SODIUM mmol/L 132*   POTASSIUM mmol/L 4.0   CHLORIDE mmol/L 96   CO2 mmol/L 23   BUN mg/dL 11   CREATININE mg/dL 0.47*   ANION GAP mmol/L 13   CALCIUM mg/dL 8.3*   ALBUMIN g/dL 3.1*   TOTAL BILIRUBIN mg/dL 0.52   ALK PHOS U/L 107*   ALT U/L 12   AST U/L 65*   GLUCOSE RANDOM mg/dL 98                 Results from last 7 days   Lab Units 03/12/24 2120 03/11/24  1208   PROCALCITONIN ng/ml 0.86* 0.98*       Lines/Drains:  Invasive Devices       Peripheral Intravenous Line  Duration             Peripheral IV 03/12/24 Dorsal (posterior);Right Hand <1 day              Drain  Duration             Pleural Effusion Long-Term Catheter 16 Fr. 48 days              Airway  Duration             Supraglottic Airway LMA 4 124 days                        Imaging: Reviewed radiology reports from this admission including: chest xray and cxr wet read stable from prior on my personal read w/complete opacification of right lung and no infiltrate/effusion of left lung appreciable.  XR chest portable    (Results Pending)       EKG and Other Studies Reviewed on Admission:   EKG:     ** Please Note: This note has been constructed using a voice recognition system. **

## 2024-03-13 NOTE — NUTRITION
03/13/24 1446   Biochemical Data,Medical Tests, and Procedures   Biochemical Data/Medical Tests/Procedures Lab values reviewed;Meds reviewed   Labs (Comment) Sodium 132   Meds (Comment) Ceftriaxone, sodium chloride tablet   Nutrition-Focused Physical Exam   Nutrition-Focused Physical Exam Findings RN skin assessment reviewed;No skin issues documented;Edema;Subcutaneous fat loss;Muscle Loss   Nutrition-Focused Physical Exam Findings +3 B/L lower extremity edema, severe muscle wasting (temporal, clavicle), severe fat wasting (orbital).   Medical-Related Concerns Presents with large pleural effusion. PMH of malignant noeplasm of overlapping sites of left female breat, right lower lobe lung mass, cancer associated pain, severe protein-calorie malnutrition.   Adequacy of Intake   Nutrition Modality PO   Feeding Route   PO Independent   Current PO Intake   Current Diet Order Regular   Current Meal Intake 50-75%   Estimated Fluid Intake %   Estimated calorie intake compared to estimated need Pt likely not meeting estimated needs. Improved PO intake in house, meeting more than 50% of meals, will continue to monitor.   PES Statement   Problem Intake   Oral or Nutritional Support Intake (2) Inadequate oral intake NI-2.1   Related to Decreased appetite;Catabolic illness   As evidenced by: Intake < estimated needs   Recommendations/Interventions   Malnutrition/BMI Present Yes   Adult Malnutrition type Chronic illness   Adult Degree of Malnutrition Other severe protein calorie malnutrition   Malnutrition Characteristics Muscle loss;Fat loss;Weight loss;Inadequate energy   360 Statement Severe protein-calorie malnutrition in context of chronic illness related to decreased appetite, catabolic illness as evidenced by weight loss of 19% in 6 months, severe muscle wasting (temporal, clavicle), and severe fat wasting (orbital), <75% energy intake compared to estimated needs for >1 month. Treated with oral supplement.    Summary Used  [id number]. Pt reports decreased appetite for at least 6 months, with a further decrease in appetite 3 weeks ago, and has only been eating 50% or just over since. At home, pt recieves help preparing meals. Pt not meeting estimated needs.   Interventions/Recommendations Continue current diet order;Supplement initiate   Intervention Comments Offered oral supplement, pt accepted.   Recommendations to Provider Recommend continuing current diet order, and providing chocolate Ensure High Protein BID.   Education Assessment   Education Education initiated/ completed   Education Notes Pt educated on benefit of oral supplement   Patient Nutrition Goals   Goal Adequate intake   Goal Status Initiated   Timeframe to complete goal by next f/u   Nutrition Complexity Risk   Nutrition complexity level High risk   Follow up date 03/17/24

## 2024-03-13 NOTE — PLAN OF CARE
Problem: Prexisting or High Potential for Compromised Skin Integrity  Goal: Skin integrity is maintained or improved  Description: INTERVENTIONS:  - Identify patients at risk for skin breakdown  - Assess and monitor skin integrity  - Assess and monitor nutrition and hydration status  - Monitor labs   - Assess for incontinence   - Turn and reposition patient  - Assist with mobility/ambulation  - Relieve pressure over bony prominences  - Avoid friction and shearing  - Provide appropriate hygiene as needed including keeping skin clean and dry  - Evaluate need for skin moisturizer/barrier cream  - Collaborate with interdisciplinary team   - Patient/family teaching  - Consider wound care consult   Outcome: Progressing     Problem: PAIN - ADULT  Goal: Verbalizes/displays adequate comfort level or baseline comfort level  Description: Interventions:  - Encourage patient to monitor pain and request assistance  - Assess pain using appropriate pain scale  - Administer analgesics based on type and severity of pain and evaluate response  - Implement non-pharmacological measures as appropriate and evaluate response  - Consider cultural and social influences on pain and pain management  - Notify physician/advanced practitioner if interventions unsuccessful or patient reports new pain  Outcome: Progressing     Problem: INFECTION - ADULT  Goal: Absence or prevention of progression during hospitalization  Description: INTERVENTIONS:  - Assess and monitor for signs and symptoms of infection  - Monitor lab/diagnostic results  - Monitor all insertion sites, i.e. indwelling lines, tubes, and drains  - Monitor endotracheal if appropriate and nasal secretions for changes in amount and color  - [place] appropriate cooling/warming therapies per order  - Administer medications as ordered  - Instruct and encourage patient and family to use good hand hygiene technique  - Identify and instruct in appropriate isolation precautions for  identified infection/condition  Outcome: Progressing  Goal: Absence of fever/infection during neutropenic period  Description: INTERVENTIONS:  - Monitor WBC    Outcome: Progressing     Problem: DISCHARGE PLANNING  Goal: Discharge to home or other facility with appropriate resources  Description: INTERVENTIONS:  - Identify barriers to discharge w/patient and caregiver  - Arrange for needed discharge resources and transportation as appropriate  - Identify discharge learning needs (meds, wound care, etc.)  - Arrange for interpretive services to assist at discharge as needed  - Refer to Case Management Department for coordinating discharge planning if the patient needs post-hospital services based on physician/advanced practitioner order or complex needs related to functional status, cognitive ability, or social support system  Outcome: Progressing     Problem: Knowledge Deficit  Goal: Patient/family/caregiver demonstrates understanding of disease process, treatment plan, medications, and discharge instructions  Description: Complete learning assessment and assess knowledge base.  Interventions:  - Provide teaching at level of understanding  - Provide teaching via preferred learning methods  Outcome: Progressing     Problem: Nutrition/Hydration-ADULT  Goal: Nutrient/Hydration intake appropriate for improving, restoring or maintaining nutritional needs  Description: Monitor and assess patient's nutrition/hydration status for malnutrition. Collaborate with interdisciplinary team and initiate plan and interventions as ordered.  Monitor patient's weight and dietary intake as ordered or per policy. Utilize nutrition screening tool and intervene as necessary. Determine patient's food preferences and provide high-protein, high-caloric foods as appropriate.     INTERVENTIONS:  - Monitor oral intake, urinary output, labs, and treatment plans  - Assess nutrition and hydration status and recommend course of action  - Evaluate  amount of meals eaten  - Assist patient with eating if necessary   - Allow adequate time for meals  - Recommend/ encourage appropriate diets, oral nutritional supplements, and vitamin/mineral supplements  - Order, calculate, and assess calorie counts as needed  - Recommend, monitor, and adjust tube feedings and TPN/PPN based on assessed needs  - Assess need for intravenous fluids  - Provide specific nutrition/hydration education as appropriate  - Include patient/family/caregiver in decisions related to nutrition  Outcome: Progressing

## 2024-03-13 NOTE — ASSESSMENT & PLAN NOTE
Normocytic Worsening anemia to 6.7 despite improvement w/transfusion upwards of 0-10 during last admission.  Suspect anemia of chronic disease given no overt bleeding  No overt s/sx of bleeding and was draining clear fluid from pleurx cath tibc wnl in 11/23 and elevated ferritin in setting of acute phase reactant from active malignancy.  Folate and b12 levels at that time were acceptable  Hemoccult stool ordered will transfuse 1 iu prbc and monitor.  If positive would need gi eval

## 2024-03-13 NOTE — ASSESSMENT & PLAN NOTE
W/mets to bone (hormone positive) however metastatic breast cancer felt to lung is triple negative  Was seen by hem/onc during last admission in 01/24 to discuss w/dr rangel regarding changing systemic chemotherapy on 03/22/24  Op f/u with hem/onc

## 2024-03-13 NOTE — ASSESSMENT & PLAN NOTE
S/p pleurx catheter for malignant effusion in hx of breast ca w/mets to lung  -Cta chest pe study on 3/11/24 w/Stable right pleural effusion with extensive pleural carcinomatosis and compression of the majority of the right lung.  Stable left upper lobe juxtapleural groundglass infiltrates  -catheter had been draining large volume q 3 days but only about 5cc day pta despite effusion noted on ct and pt pnd/orthopnea x 1 week and intermittent dizziness in setting of significant pulmonary pathology and anemia  -repeat xray  -consult IR for eval of catheter position, monitor respiratory status around transfusion

## 2024-03-13 NOTE — PLAN OF CARE
Problem: Prexisting or High Potential for Compromised Skin Integrity  Goal: Skin integrity is maintained or improved  Description: INTERVENTIONS:  - Identify patients at risk for skin breakdown  - Assess and monitor skin integrity  - Assess and monitor nutrition and hydration status  - Monitor labs   - Assess for incontinence   - Turn and reposition patient  - Assist with mobility/ambulation  - Relieve pressure over bony prominences  - Avoid friction and shearing  - Provide appropriate hygiene as needed including keeping skin clean and dry  - Evaluate need for skin moisturizer/barrier cream  - Collaborate with interdisciplinary team   - Patient/family teaching  - Consider wound care consult   Outcome: Progressing     Problem: PAIN - ADULT  Goal: Verbalizes/displays adequate comfort level or baseline comfort level  Description: Interventions:  - Encourage patient to monitor pain and request assistance  - Assess pain using appropriate pain scale  - Administer analgesics based on type and severity of pain and evaluate response  - Implement non-pharmacological measures as appropriate and evaluate response  - Consider cultural and social influences on pain and pain management  - Notify physician/advanced practitioner if interventions unsuccessful or patient reports new pain  Outcome: Progressing     Problem: INFECTION - ADULT  Goal: Absence or prevention of progression during hospitalization  Description: INTERVENTIONS:  - Assess and monitor for signs and symptoms of infection  - Monitor lab/diagnostic results  - Monitor all insertion sites, i.e. indwelling lines, tubes, and drains  - Monitor endotracheal if appropriate and nasal secretions for changes in amount and color  - [place] appropriate cooling/warming therapies per order  - Administer medications as ordered  - Instruct and encourage patient and family to use good hand hygiene technique  - Identify and instruct in appropriate isolation precautions for  identified infection/condition  Outcome: Progressing  Goal: Absence of fever/infection during neutropenic period  Description: INTERVENTIONS:  - Monitor WBC    Outcome: Progressing     Problem: DISCHARGE PLANNING  Goal: Discharge to home or other facility with appropriate resources  Description: INTERVENTIONS:  - Identify barriers to discharge w/patient and caregiver  - Arrange for needed discharge resources and transportation as appropriate  - Identify discharge learning needs (meds, wound care, etc.)  - Arrange for interpretive services to assist at discharge as needed  - Refer to Case Management Department for coordinating discharge planning if the patient needs post-hospital services based on physician/advanced practitioner order or complex needs related to functional status, cognitive ability, or social support system  Outcome: Progressing     Problem: Knowledge Deficit  Goal: Patient/family/caregiver demonstrates understanding of disease process, treatment plan, medications, and discharge instructions  Description: Complete learning assessment and assess knowledge base.  Interventions:  - Provide teaching at level of understanding  - Provide teaching via preferred learning methods  Outcome: Progressing      Private car

## 2024-03-13 NOTE — NURSING NOTE
Pt requested pain medication. RN notified provider. While awaiting order, daughter administered oxycodone 10mg home medication dose to pt. RN notified of self administration. RN educated pt and family on importance of not taking home medication during admission. Home medication confiscated, verified, and counted with charge nurse. Medication sent to inpatient pharmacy.

## 2024-03-13 NOTE — ASSESSMENT & PLAN NOTE
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

## 2024-03-13 NOTE — CONSULTS
Shannan Vee  1970    HEMATOLOGY/ONCOLOGY CONSULTATION REPORT - [place]    DISCUSSION/SUMMARY:    53-year-old female previously found to have locally advanced left-sided breast cancer more recently found to have metastatic disease (right-sided pleural effusion with positive cytology, significant right pleural tumor burden).  Mrs. Vee was recently placed on ribociclib and Faslodex.  Recent CT of the chest demonstrated stable right-sided pleural effusion, patient still with pleural carcinomatosis.  The plan is to continue with thoracentesis as needed.    Patient was recently seen/evaluated by IR.  Dr. Bautista's impression is that patient has extensive tumor of the right lung with some encasing the catheter.  He did not believe that further manipulation of the catheter would provide any clinical benefit.  Position of the catheter is well evaluated at this time.  tPA could be considered to break up loculations but the concern is bleeding especially in this patient with anemia.    Etiology for the anemia is likely multifactorial.  Patient should be transfused if the hemoglobin level approaches or goes below 7.0 g/deciliter.  No evidence of any bleeding.  WBC and platelet count are okay/acceptable.      Sepsis workup is in process; patient is on Rocephin (possible postobstructive PNA).    Patient states that her pain is controlled.  Patient is followed by palliative care.    Once patient is stable and ready for discharge, please contact [facility] hematology/oncology discharge coordinator via [employer] text.  She will schedule the patient to be seen by Dr. Villavicencio in clinic.    The above was discussed with the patient/family members; all questions were answered.  Please do not hesitate to contact me if you have any questions or need additional information.  Thank you for this consult.  ________________________________________________________________________________    Chief Complaint   Patient  presents with    Shortness of Breath     Pt reports was seeing yesterday, dx with fluids in her lungs. Reports SOB and unable to sleep, denies other symptoms      Oncology History   Carcinoma of left breast, estrogen receptor positive    6/18/2013 Biopsy    Left breast biopsy  Invasive mammary carcinoma with lobular features.    Grade 2.    ER 90%  %    HER2 negative     7/29/2013 - 12/9/2013 Chemotherapy    Dose dense AC x4 followed by paclitaxel x12     8/2013 Genetic Testing    Negative for BRCA 1 and BRCA 2 mutation     2/18/2014 Surgery    Left mastectomy with sentinel node biopsy  - Margins clear  - 2/3 lymph nodes  Dr. Mix     2/18/2014 -  Cancer Staged    IIIA ypT3, ypN1a (i+) (sn) G2     4/3/2014 - 5/20/2014 Radiation    Chest wall and draining lymph nodes.  Total dose 6040 cGy  Dr. Javed     5/19/2014 - 5/2015 Hormone Therapy    Tamoxifen     5/2015 -  Hormone Therapy    Anastrozole   Dr. Andrews       History of Present Illness: 53-year-old female with a history of metastatic breast cancer admitted on March 13, 2024 with progressive shortness of breath, dizziness, decreased activities (over 1 week).    Mrs. Vee was diagnosed with locally advanced left-sided breast cancer, hormone receptor positive and HER2 negative, grade 2 in 2013.  BRCA mutation was negative.  Patient is followed by Dr. Villavicencio.  Patient received neoadjuvant dose dense chemotherapy followed by mastectomy.  Pathology results demonstrated significant residual disease with 2 positive lymph nodes.  Patient received adjuvant RT and then was started on tamoxifen.  This was later changed to Arimidex in 2015 after patient had undergone hysterectomy with both ovaries removed.    In October 2023 patient was found to have extensive metastatic disease, a large lung mass and mediastinal adenopathy.  Biopsy was consistent with metastatic breast cancer.  Patient was started on case Rosita and Faslodex.  Patient also received radiation to  the spine.  Patient with recent right-sided pleural effusions, Pleurx catheter placed.    Presently patient states feeling +/-.  Still with dyspnea on exertion, no shortness of breath at rest.  Minimal cough, no hemoptysis..  Activities are limited.  Appetite is okay, no nausea or vomiting.  Patient has right-sided rib cage pain but this is about the same as before.  No fevers, no bruising or bleeding issues.    Review of Systems   Constitutional:  Positive for activity change and fatigue.   HENT: Negative.     Eyes: Negative.    Respiratory:  Positive for shortness of breath.    Cardiovascular: Negative.    Gastrointestinal: Negative.    Endocrine: Negative.    Genitourinary: Negative.    Musculoskeletal: Negative.    Skin: Negative.    Allergic/Immunologic: Negative.    Neurological: Negative.    Hematological: Negative.    Psychiatric/Behavioral: Negative.     All other systems reviewed and are negative.    Patient Active Problem List   Diagnosis    Carcinoma of left breast, estrogen receptor positive     Use of anastrozole (Arimidex)    Limb swelling    Malignant neoplasm of overlapping sites of left female breast (HCC)    Uterine leiomyoma    Personal history of malignant neoplasm of breast    Overweight (BMI 25.0-29.9)    Urinary frequency    Bladder wall thickening    Recurrent malignant neoplasm of breast (HCC)    Right lower lobe lung mass    Pain from bone metastases (HCC)    Other specified anemias    Large pleural effusion    Chronic bilateral low back pain without sciatica    Cancer associated pain    Sinus tachycardia    Severe protein-calorie malnutrition (HCC)    Drug induced constipation     Past Medical History:   Diagnosis Date    Breast cancer (HCC) 07/2013    left-chemotherapy    Fibroma     Goals of care, counseling/discussion     History of chemotherapy     History of external beam radiation therapy      Past Surgical History:   Procedure Laterality Date    APPENDECTOMY      BREAST SURGERY   2014    Left breast 2/2 breast CA    HYSTERECTOMY  2015    Due to fibroma    IR BIOPSY BONE  10/9/2023    IR PLEURAL EFFUSION LONG-TERM CATHETER PLACEMENT  1/24/2024    IR THORACENTESIS  11/8/2023    MASTECTOMY Left 2013    OOPHORECTOMY       Family History   Problem Relation Age of Onset    Lung cancer Mother     No Known Problems Father     No Known Problems Sister     No Known Problems Daughter     No Known Problems Daughter     Cancer Maternal Grandmother     No Known Problems Maternal Grandfather     No Known Problems Paternal Grandmother     No Known Problems Paternal Grandfather     Uterine cancer Maternal Aunt     No Known Problems Son     No Known Problems Paternal Aunt      Social History     Socioeconomic History    Marital status: /Civil Union     Spouse name: Not on file    Number of children: 3    Years of education: Not on file    Highest education level: 12th grade   Occupational History     Comment: unemployed   Tobacco Use    Smoking status: Never     Passive exposure: Never    Smokeless tobacco: Never   Vaping Use    Vaping status: Never Used   Substance and Sexual Activity    Alcohol use: Not Currently    Drug use: No    Sexual activity: Yes     Partners: Male     Birth control/protection: Surgical   Other Topics Concern    Not on file   Social History Narrative    Not on file     Social Determinants of Health     Financial Resource Strain: Low Risk  (2/28/2024)    Overall Financial Resource Strain (CARDIA)     Difficulty of Paying Living Expenses: Not hard at all   Recent Concern: Financial Resource Strain - Medium Risk (1/17/2024)    Received from [facility]    Overall Financial Resource Strain (CARDIA)     Difficulty of Paying Living Expenses: Somewhat hard   Food Insecurity: No Food Insecurity (3/13/2024)    Hunger Vital Sign     Worried About Running Out of Food in the Last Year: Never true     Ran Out of Food in the Last Year: Never true   Recent Concern: Food  Insecurity - Food Insecurity Present (1/17/2024)    Received from [facility]    Hunger Vital Sign     Worried About Running Out of Food in the Last Year: Sometimes true     Ran Out of Food in the Last Year: Patient declined   Transportation Needs: No Transportation Needs (3/13/2024)    PRAPARE - Transportation     Lack of Transportation (Medical): No     Lack of Transportation (Non-Medical): No   Recent Concern: Transportation Needs - Unmet Transportation Needs (1/17/2024)    Received from [facility]    PRAPARE - Transportation     Lack of Transportation (Medical): Yes     Lack of Transportation (Non-Medical): Patient declined   Physical Activity: Inactive (11/14/2023)    Received from [facility]    Exercise Vital Sign     Days of Exercise per Week: 0 days     Minutes of Exercise per Session: 0 min   Stress: No Stress Concern Present (11/14/2023)    Received from [facility]    [employer] [place] [employer] - Occupational Stress Questionnaire     Feeling of Stress : Only a little   Social Connections: Socially Integrated (11/14/2023)    Received from [facility]    Social Connection and Isolation Panel [NHANES]     Frequency of Communication with Friends and Family: More than three times a week     Frequency of Social Gatherings with Friends and Family: More than three times a week     Attends Jainism Services: More than 4 times per year     Active Member of Clubs or Organizations: Yes     Attends Club or Organization Meetings: More than 4 times per year     Marital Status:    Intimate Partner Violence: Not At Risk (1/17/2024)    Received from [facility]    Humiliation, Afraid, Rape, and Kick questionnaire     Fear of Current or Ex-Partner: No     Emotionally Abused: No     Physically Abused: No     Sexually Abused: No   Housing Stability: Low Risk  (3/13/2024)    Housing Stability Vital Sign      Unable to Pay for Housing in the Last Year: No     Number of Places Lived in the Last Year: 1     Unstable Housing in the Last Year: No       Current Facility-Administered Medications:     cefTRIAXone (ROCEPHIN) IVPB (premix in dextrose) 1,000 mg 50 mL, 1,000 mg, Intravenous, Q24H, JANNETH Mcrae, Last Rate: 100 mL/hr at 03/13/24 1200, 1,000 mg at 03/13/24 1200    HYDROmorphone HCl (DILAUDID) injection 0.2 mg, 0.2 mg, Intravenous, Q4H PRN, Janell Benito PA-C, 0.2 mg at 03/13/24 1547    lidocaine (LIDODERM) 5 % patch 1 patch, 1 patch, Topical, Daily, Aric Singer DO, 1 patch at 03/13/24 1341    metoprolol succinate (TOPROL-XL) 24 hr tablet 50 mg, 50 mg, Oral, Daily, Janell Benito PA-C    oxyCODONE (ROXICODONE) immediate release tablet 10 mg, 10 mg, Oral, Q4H PRN, Janell Benito PA-C, 10 mg at 03/13/24 1324    sodium chloride tablet 1 g, 1 g, Oral, BID With Meals, Janell Benito PA-C    Allergies   Allergen Reactions    Aspirin Rash     Other reaction(s): Palpitations       Vitals:    03/13/24 1041   BP:    Pulse:    Resp:    Temp: 98 °F (36.7 °C)   SpO2:      Physical Exam  Constitutional:       Appearance: She is well-developed.      Comments: Somewhat frail-appearing female, no respiratory distress, no signs of pain   HENT:      Head: Normocephalic and atraumatic.      Right Ear: External ear normal.      Left Ear: External ear normal.   Eyes:      Conjunctiva/sclera: Conjunctivae normal.      Pupils: Pupils are equal, round, and reactive to light.   Cardiovascular:      Rate and Rhythm: Normal rate and regular rhythm.      Heart sounds: Normal heart sounds.   Pulmonary:      Effort: Pulmonary effort is normal.      Breath sounds: Normal breath sounds.      Comments: Decreased breath sounds on right, rales on right base, right Pleurx catheter in place  Abdominal:      General: Bowel sounds are normal.      Palpations: Abdomen is soft.   Musculoskeletal:         General: Normal  range of motion.      Cervical back: Normal range of motion and neck supple.   Skin:     General: Skin is warm.      Comments: Pale, warm, moist, scattered few purpura   Neurological:      Mental Status: She is alert and oriented to person, place, and time.      Deep Tendon Reflexes: Reflexes are normal and symmetric.   Psychiatric:         Behavior: Behavior normal.         Thought Content: Thought content normal.         Judgment: Judgment normal.     Extremities: 0-1+ bilateral lower extremity AMADOR, no cords, pulses are 1+  Lymphatics: No adenopathy in the neck, supraclavicular region, axilla bilaterally    Labs    3/12/2024 WBC = 7.69 hemoglobin = 6.7 hematocrit = 23.3 platelet = 378    3/12/2024 BUN = 11 creatinine = 0.47 calcium = 8.3 AST = 65 ALT = 12 alkaline phosphatase = 107 total protein = 7.1 total bilirubin = 0.52    Imaging    3/13/2024 XR chest portable    Impression:     Overall, no significant change from recent prior. Near complete consolidation of the right hemithorax. Patchy to nodular faint left lung opacities, but otherwise well aerated left lung noted. Workstation performed: [id number]     3/11/2024 CTA chest pe study    Impression:     No pulmonary embolism. Stable right pleural effusion with extensive pleural carcinomatosis and compression of the majority of the right lung. Stable left upper lobe juxtapleural groundglass infiltrates. Stable diffuse osseous metastatic disease with thoracic compression fractures.     3/11/2024 CT chest wo contrast    Impression:     1.  Status post pleural drain placement with marked interval improvement of right pleural effusion. Residual loculated fluid is identified throughout the right hemithorax.     2.  Interval progression of pleural-based metastasis throughout the right lung though evaluation is limited without contrast and comparison.     3.  Partial reexpansion of the right lung with residual patchy densities likely representing atelectasis.     4.   Groundglass infiltrates in the peripheral left upper lobe slightly improved, likely infectious/inflammatory.     5.  Stable diffuse osseous metastasis with thoracic compression fractures.     Pathology    Case Report   Non-gynecologic Cytology                          Case: [id number]                                   Authorizing Provider:  Janell Benito PA-C    Collected:           11/08/2023 1124               Ordering Location:     [facility]        Received:            11/08/2023 1152                                      [facility]                                                                                  Department                                                                   Pathologist:           Ravi Leslie MD                                                           Specimens:   A) - Pleural, Right                                                                                  B) - Pleural, Right, CELL BLOCK                                                            Final Diagnosis   A-B. Pleural, Right, (ThinPrep and cell block preparations):  Metastatic adenocarcinoma, see comment.     Satisfactory for evaluation.      Comment: There are malignant cells present in the pleural fluid with expression of MOC-31, SAW-EP4 and KHANH-3. This immunoprofile supports the diagnosis and is most compatible with the patient's reported history of metastatic breast cancer. Clinical correlation is advised.     Intradepartmental consultation is in agreement (/EM)  Dr. Villavicencio is notified of the findings by Dr. Leslie on 11/13/23 via [employer].      Electronically signed by Ravi Leslie MD on 11/13/2023 at 10:59 AM

## 2024-03-13 NOTE — CONSULTS
"Consultation - Pulmonary Medicine   Shannan Vee 53 y.o. female MRN: [id number]  Unit/Bed#: E5 -01 Encounter: [id number]      Assessment/Plan:    1.  Acute pulmonary deficiency likely multifaceted as listed below         -Patient remains on room air 94%, does not wear home O2        -Any sats greater than 89%        -Pulmonary toileting: Deep breathing cough out of bed as tolerated incentive spirometry    2.  Right-sided malignant effusion        -11/8/2023-thoracentesis- 220 mL         -1/24/2024-Pleurx catheter placement        -Catheter in proper placement        -Pleurx flushed with 20 cc freely        -Attempted Pleurx drainage with 18 mL output    3.  Possible postobstructive PNA       -Patient is extremely immunocompromised       -Procalcitonin- 0.98--0.86       -Day # 1-ceftriaxone       -Will obtain MRSA swab    4.  Recurrent breast cancer with bone and lung metastasis         -Initially diagnosed 2013        -Unfortunately patient's lung cancer seems to be aggressively advancing causing tumor burden        -Recommend oncology follow-up to update progression         -initiated on: ribociclib 600 mg daily 3 weeks on 1 week off along with Faslodex            -Last infusion 3/4/2024         -Day of care following May need to consider hospice    5.  Acute anemia        -HgB- 7.8-- 7.4--6.7        -Likely secondary to malignancy        -1UPRBC        History of Present Illness   Physician Requesting Consult: Aric Singer,   Reason for Consult / Principal Problem: Shortness of breath  Hx and PE limited by: Nothing  Chief Complaint: \" I am having so much pain\"  HPI: Shannan Vee is a 53 y.o.  female who presented to [facility] with complaints of dizziness and shortness of breath.  Patient has past medical history of breast cancer, with lung and bone metastasis.  Patient presented to emergency department on Monday with complaints of dizziness however after an extensive workup overall " labs and imaging seemed stable.  Patient reports she went home and was having increasing episodes of shortness of breath.  Upon ED admission patient was noted to have a decreased hemoglobin of 7.4 and initiated on 1UPRBC     Pulmonary was consulted for maintenance of Pleurx catheter.  Today upon examination patient reports that she feels very weak and having some right-sided pleuritic pain.  Patient overall is failing to thrive and I discussed with her family how her cancer has significantly progressed.  Patient currently denying any fevers, chills, night sweats, pleuritic chest pain, or palpitations.     From a pulmonary standpoint, patient follows with Dr. Gibbons for her effusion.. Patient has been a lifelong non-smoker and has never been diagnosed with COPD or asthma. Patient has never had any formal PFT testing. Patient is currently not maintained on any inhaled or oxygen therapies. 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 Patient denies any symptoms of GERD, LUCIE, seasonal allergies, or postnasal drip. Patient denies any recent acute exposures to dust, mold, spices, or silica.     Inpatient consult to Pulmonology  Consult performed by: JANNETH Mcrae  Consult ordered by: Aric Singer DO          Review of Systems   Constitutional:  Positive for activity change, appetite change, fatigue and unexpected weight change. Negative for chills and fever.   HENT:  Negative for ear pain and sore throat.    Eyes:  Negative for pain and visual disturbance.   Respiratory:  Positive for shortness of breath. Negative for apnea, cough, choking, chest tightness, wheezing and stridor.    Cardiovascular:  Negative for chest pain and palpitations.   Gastrointestinal:  Negative for abdominal pain and vomiting.   Genitourinary:  Negative for dysuria and hematuria.   Musculoskeletal:  Negative for arthralgias and back pain.   Skin:  Negative for color change and rash.   Neurological:  Negative  "for seizures and syncope.   All other systems reviewed and are negative.      Historical Information   Past Medical History:   Diagnosis Date    Breast cancer (HCC) 07/2013    left-chemotherapy    Fibroma     Goals of care, counseling/discussion     History of chemotherapy     History of external beam radiation therapy      Past Surgical History:   Procedure Laterality Date    APPENDECTOMY      BREAST SURGERY  2014    Left breast 2/2 breast CA    HYSTERECTOMY  2015    Due to fibroma    IR BIOPSY BONE  10/9/2023    IR PLEURAL EFFUSION LONG-TERM CATHETER PLACEMENT  1/24/2024    IR THORACENTESIS  11/8/2023    MASTECTOMY Left 2013    OOPHORECTOMY       Social History   Social History     Substance and Sexual Activity   Alcohol Use Not Currently     Social History     Substance and Sexual Activity   Drug Use No     Social History     Tobacco Use   Smoking Status Never    Passive exposure: Never   Smokeless Tobacco Never     E-Cigarette/Vaping    E-Cigarette Use Never User      E-Cigarette/Vaping Substances    Nicotine No     THC No     CBD No     Flavoring No     Other No     Unknown No      Occupational History: Noncontributory    Family History:   Family History   Problem Relation Age of Onset    Lung cancer Mother     No Known Problems Father     No Known Problems Sister     No Known Problems Daughter     No Known Problems Daughter     Cancer Maternal Grandmother     No Known Problems Maternal Grandfather     No Known Problems Paternal Grandmother     No Known Problems Paternal Grandfather     Uterine cancer Maternal Aunt     No Known Problems Son     No Known Problems Paternal Aunt        Meds/Allergies   pertinent pulmonary meds have been reviewed    Allergies   Allergen Reactions    Aspirin Rash     Other reaction(s): Palpitations       Objective   Vitals: Blood pressure 124/66, pulse (!) 116, temperature 98.6 °F (37 °C), temperature source Temporal, resp. rate 16, height 5' 5\" (1.651 m), SpO2 94%.,Body mass index " "is 20.14 kg/m².  No intake or output data in the 24 hours ending 03/13/24 1021  Invasive Devices       Peripheral Intravenous Line  Duration             Peripheral IV 03/12/24 Dorsal (posterior);Right Hand <1 day              Drain  Duration             Pleural Effusion Long-Term Catheter 16 Fr. 48 days                    Physical Exam  Constitutional:       General: She is not in acute distress.     Appearance: Normal appearance. She is not ill-appearing.   HENT:      Nose: Nose normal. No congestion or rhinorrhea.      Mouth/Throat:      Mouth: Mucous membranes are dry.      Pharynx: Oropharynx is clear. No oropharyngeal exudate or posterior oropharyngeal erythema.   Cardiovascular:      Rate and Rhythm: Normal rate and regular rhythm.      Pulses: Normal pulses.      Heart sounds: Normal heart sounds. No murmur heard.     No friction rub. No gallop.   Pulmonary:      Effort: Pulmonary effort is normal. No respiratory distress.      Breath sounds: No stridor. No wheezing, rhonchi or rales.      Comments: Patient reports having pain  Chest:      Chest wall: No tenderness.   Abdominal:      General: Abdomen is flat. Bowel sounds are normal. There is no distension.      Palpations: Abdomen is soft. There is no mass.   Musculoskeletal:         General: No swelling or tenderness. Normal range of motion.      Cervical back: Normal range of motion. No rigidity or tenderness.   Skin:     General: Skin is warm and dry.      Coloration: Skin is not jaundiced or pale.   Neurological:      General: No focal deficit present.      Mental Status: She is alert and oriented to person, place, and time. Mental status is at baseline.   Psychiatric:         Mood and Affect: Mood normal.         Behavior: Behavior normal.         Lab Results: I have personally reviewed pertinent lab results., ABG: No results found for: \"PHART\", \"JVG9OIU\", \"PO2ART\", \"BHI3VXG\", \"B7DYIIEP\", \"BEART\", \"SOURCE\", BNP:   Lab Results   Component Value Date    " "BNP 98 03/12/2024   , CBC:   Lab Results   Component Value Date    WBC 7.69 03/12/2024    HGB 6.7 (L) 03/12/2024    HCT 23.3 (L) 03/12/2024    MCV 93 03/12/2024     03/12/2024    RBC 2.52 (L) 03/12/2024    MCH 26.6 (L) 03/12/2024    MCHC 28.8 (L) 03/12/2024    RDW 21.7 (H) 03/12/2024    MPV 8.5 (L) 03/12/2024    NRBC 2 03/12/2024   , CMP:   Lab Results   Component Value Date    SODIUM 132 (L) 03/12/2024    K 4.0 03/12/2024    CL 96 03/12/2024    CO2 23 03/12/2024    BUN 11 03/12/2024    CREATININE 0.47 (L) 03/12/2024    CALCIUM 8.3 (L) 03/12/2024    AST 65 (H) 03/12/2024    ALT 12 03/12/2024    ALKPHOS 107 (H) 03/12/2024    EGFR 111 03/12/2024   , PT/INR: No results found for: \"PT\", \"INR\"        Imaging Studies: I have personally reviewed pertinent films in PACS     Chest x-ray right-sided opacity     EKG, Pathology, and Other Studies: I have personally reviewed pertinent films in PACS     1/28/2024-grade 1 diastolic dysfunction EF 55%     Pulmonary Results (PFTs, PSG): I have personally reviewed pertinent films in PACS     No PFTs recorded     Code Status: Level 1 - Full Code    Portions of the record may have been created with voice recognition software.  Occasional wrong word or \"sound a like\" substitutions may have occurred due to the inherent limitations of voice recognition software.  Read the chart carefully and recognize, using context, where substitutions have occurred.  "

## 2024-03-13 NOTE — PHYSICAL THERAPY NOTE
PT EVALUATION    Pt. Name: Shannan Vee  Pt. Age: 53 y.o.  MRN: [id number]  LENGTH OF STAY: 1      Admitting Diagnoses:   Shortness of breath [R06.02]  Pleural effusion [J90]  Symptomatic anemia [D64.9]    Past Medical History:   Diagnosis Date    Breast cancer (HCC) 07/2013    left-chemotherapy    Fibroma     Goals of care, counseling/discussion     History of chemotherapy     History of external beam radiation therapy        Past Surgical History:   Procedure Laterality Date    APPENDECTOMY      BREAST SURGERY  2014    Left breast 2/2 breast CA    HYSTERECTOMY  2015    Due to fibroma    IR BIOPSY BONE  10/9/2023    IR PLEURAL EFFUSION LONG-TERM CATHETER PLACEMENT  1/24/2024    IR THORACENTESIS  11/8/2023    MASTECTOMY Left 2013    OOPHORECTOMY         Imaging Studies:  XR chest portable   Final Result by Felton Leiva MD (03/13 8985)      Overall, no significant change from recent prior. Near complete consolidation of the right hemithorax. Patchy to nodular faint left lung opacities, but otherwise well aerated left lung noted.            Workstation performed: [id number]               03/13/24 1138   PT Last Visit   PT Visit Date 03/13/24   Note Type   Note type Evaluation   Pain Assessment   Pain Score 7   Pain Location/Orientation Orientation: Lower;Location: Back   Hospital Pain Intervention(s) Repositioned;Ambulation/increased activity;Emotional support;Rest   Restrictions/Precautions   Weight Bearing Precautions Per Order No   Other Precautions Fall Risk;Pain;Multiple lines   Home Living   Type of Home House   Home Layout Two level;Bed/bath upstairs;Stairs to enter with rails;Other (Comment)  (5STE)   Bathroom Shower/Tub Walk-in shower   Bathroom Toilet Standard   Home Equipment Walker;Other (Comment);Cane  (hurrycane)   Additional Comments Pt currently staying with her dtr and LAUREN in a two level house with 5 MARIA LUZ. When not staying at dtr's, pt lives with her  in a house with 1st floor  setup and 3 MARIA LUZ.   Prior Function   Level of [place] Independent with functional mobility;Needs assistance with ADLs;Other (Comment)  (ambulates w/ hurrycane; assist w/ ADLs PRN)   Lives With Daughter;Other (Comment)  (& LAUREN)   Receives Help From Family   Falls in the last 6 months 0   Comments (-) ; (-) home alone   General   Family/Caregiver Present Yes  (daughter)   Cognition   Overall Cognitive Status WFL   Arousal/Participation Alert   Attention Within functional limits   Orientation Level Oriented to person;Oriented to place;Oriented to time   Following Commands Follows one step commands without difficulty   Comments pleasant & cooperative; pt Russian speaking only; daughter able to provide translation   Subjective   Subjective Pt agreeable to PT/OT evals.   RUE Assessment   RUE Assessment   (refer to OT)   LUE Assessment   LUE Assessment   (refer to OT)   RLE Assessment   RLE Assessment WFL  (4-/5 grossly; (+) pitting edema)   LLE Assessment   LLE Assessment WFL  (4-/5 grossly; (+) pitting edema)   Coordination   Movements are Fluid and Coordinated 1   Sensation WFL   Bed Mobility   Supine to Sit 4  Minimal assistance   Additional items Assist x 1;HOB elevated;Bedrails;Increased time required;Verbal cues;LE management   Additional Comments cues for techniques & safety   Transfers   Sit to Stand 5  Supervision   Additional items Bedrails;Increased time required;Verbal cues   Stand to Sit 5  Supervision   Additional items Increased time required;Verbal cues;Armrests   Additional Comments cues for techniques & safety   Ambulation/Elevation   Gait pattern Decreased foot clearance;Excessively slow;Decreased heel strike;Decreased toe off   Gait Assistance 4  Minimal assist   Additional items Assist x 1;Verbal cues;Tactile cues   Assistive Device Other (Comment);Straight cane  (hurrycane)   Distance 200'x1   Ambulation/Elevation Additional Comments no gross LOB noted; daughter notes that the pt's gait is  at baseline   Balance   Static Sitting Good   Dynamic Sitting Fair +   Static Standing Fair -  (w/ SPC/hurrycane)   Dynamic Standing Poor +  (w/ SPC/hurrycane)   Ambulatory Poor +  (w/ SPC/hurrycane)   Activity Tolerance   Activity Tolerance Patient limited by fatigue;Treatment limited secondary to medical complications (Comment)   Medical Staff Made Aware OTR Holley   Nurse Made Aware yes, Caitlyn   Assessment   Prognosis Good   Problem List Decreased strength;Decreased endurance;Impaired balance;Decreased mobility;Pain   Assessment Pt. 53 y.o.female w/ PMH of stage IV metastatic breast cancer of left breast with metastases to bone (hormone positive) and now right lung (triple negative), presented w/ c/o SOB & dizziness. Pt admitted for Large pleural effusion w/ sinus tachycardia & Symptomatic anemia (D64.9). Pt referred to PT for mobility assessment & D/C planning w/ orders of Up and OOB as tolerated . Please see above for information re: home set-up & PLOF as well as objective findings during PT assessment. PTA, pt reports being fairly I w/ amb w/ SPC/hurrycane & PRN assists for ADL's at baseline. On eval, pt functioning below baseline hence will continue skilled PT to improve function & safety. Pt require minAx1 for bed mobility & amb w/ SPC (hurrycane); S for transfers + cues for techniques & safety. Gait deviations as above but no gross LOB noted. Pt's daughter notes pt functioning close to baseline. The patient's AM-PAC Basic Mobility Inpatient Short Form Raw Score is 19. A Raw score of greater than 16 suggests the patient may benefit from discharge to home. Please also refer to the recommendation of the Physical Therapist for safe discharge planning. From PT standpoint, will recommend Level III (minimum resource intensity) rehab services and inc family support at D/C. No SOB & dizziness reported t/o session. Nsg staff most recent vital signs as follows: /79   Pulse (!) 112   Temp 98 °F (36.7 °C)   Resp  "17   Ht 5' 5\" (1.651 m)   SpO2 92%   BMI 20.14 kg/m² . At end of session, pt OOB in chair in stable condition, call bell & phone in reach, all lines intact. Fall precautions reinforced w/ good understanding. CM to follow. Nsg staff to continue to mobilized pt (OOB in chair for all meals & ambulate in room/unit) as tolerated to prevent further decline in function. Will also recommend Restorative for daily amb &/or daily OOB in chair as appropriate. Nsg notified. Co-eval was necessary to complete this PT eval for the pt's best interest given pt's medical acuity & complexity.   Goals   Patient Goals to go home   STG Expiration Date 03/23/24   Short Term Goal #1 Goals to be met in 10 days; pt will be able to: 1) inc strength & balance by 1/2 grade to improve overall functional mobility & dec fall risk; 2) inc bed mobility to modified I for pt to be able to get in/OOB safely w/ proper techniques 100% of the time, to dec caregiver burden & safely function at home; 3) inc transfers to modified I for pt to transition safely from one surface to another w/o % of the time, to dec caregiver burden & safely function at home; 4) inc amb w/ appropriate AD approx. >350' w/ S for pt to ambulate community distances w/o any % of the time, to dec caregiver burden & safely function at home; 5) negotiate stairs w/ S for inc safety during stair mgt inside/outside of home & dec caregiver burden; 6) pt/caregiver ed   PT Treatment Day 0   Plan   Treatment/Interventions Functional transfer training;LE strengthening/ROM;Elevations;Therapeutic exercise;Endurance training;Patient/family training;Bed mobility;Gait training;Spoke to nursing;OT;Family   PT Frequency 3-5x/wk   Discharge Recommendation   Rehab Resource Intensity Level, PT III (Minimum Resource Intensity)  (w/ inc family support)   Additional Comments restorative for daily amb   AM-PAC Basic Mobility Inpatient   Turning in Flat Bed Without Bedrails 4   Lying on Back " to Sitting on Edge of Flat Bed Without Bedrails 3   Moving Bed to Chair 3   Standing Up From Chair Using Arms 3   Walk in Room 3   Climb 3-5 Stairs With Railing 3   Basic Mobility Inpatient Raw Score 19   Basic Mobility Standardized Score 42.48   Highest Level Of Mobility   -HLM Goal 6: Walk 10 steps or more   JH-HLM Achieved 7: Walk 25 feet or more   End of Consult   Patient Position at End of Consult Bedside chair;All needs within reach   End of Consult Comments Pt in stable condition. All needs inr each. All lines intact. Family at bedside.   Hx/personal factors: co-morbidities, inaccessible home, mutliple lines, use of AD, pain, fall risk, and assist w/ ADL's  Examination: dec mobility, dec balance, dec endurance, dec amb, risk for falls, pain  Clinical: unpredictable (ongoing medical status, abnormal lab values, risk for falls, and pain mgt)  Complexity: high    Karel Lema

## 2024-03-13 NOTE — PLAN OF CARE
Problem: OCCUPATIONAL THERAPY ADULT  Goal: Performs self-care activities at highest level of function for planned discharge setting.  See evaluation for individualized goals.  Description: Treatment Interventions: ADL retraining, Functional transfer training, UE strengthening/ROM, Endurance training, Patient/family training, Equipment evaluation/education, Compensatory technique education, Continued evaluation, Activityengagement          See flowsheet documentation for full assessment, interventions and recommendations.   Note: Limitation: Decreased ADL status, Decreased UE strength, Decreased endurance, Decreased high-level ADLs, Decreased self-care trans  Prognosis: Good  Assessment: Pt is a 53 y.o. female seen for OT evaluation s/p adm to [facility] on 3/12/2024 w/ SOB and dizziness and admitted w/ Large pleural effusion . Comorbidities affecting pt’s functional performance include a significant PMH of stage IV metastatic breast cancer of left breast with metastases to bone (hormone positive) and now right lung (triple negative), recurrent pleural effusion, pleural  carcinomatosis of right lung, chronic pain, anemia. Pt with active OT orders and activity orders for Up and OOB as tolerated. Pt currently staying with her dtr and LAUREN in a two level house with 5 MARIA LUZ. When not staying at dtr's, pt lives with her  in a house with 1st floor setup and 3 MRAIA LUZ. At baseline, pt required assist w/ ADLs PRN and assist w/ IADLs. Mod I for functional transfers/mobility w/ use of Hurrycane. (-) . Denies falls PTA. Upon evaluation, pt currently requires Supervision for UB ADLs, Min A for LB ADLs, Min A for toileting, Min A for bed mobility, Supervision for transfers, and Min A for functional mobility 2* the following deficits impacting occupational performance: decreased strength , decreased balance, decreased activity tolerance, limited functional reach, and increased pain. These impairments, as well at  pt’s personal factors of: MARIA LUZ home environment, steps within home environment, difficulty performing ADLs, difficulty performing transfers/mobility, fall risk , functional decline , and access to transportation  limit pt’s ability to safely engage in all baseline areas of occupation. Pt to continue to benefit from continued acute OT services during hospital stay to address defined deficits and to maximize level of functional independence in the following Occupational Performance areas: grooming, bathing/shower, toilet hygiene, dressing, medication management, health maintenance, functional mobility, community mobility, clothing management, and social participation. The patient's raw score on the -PAC Daily Activity Inpatient Short Form is 19. A raw score of greater than or equal to 19 suggests the patient may benefit from discharge to home. Please refer to the recommendation of the Occupational Therapist for safe discharge planning. OT will continue to follow pt 2-3x/wk to address the following goals to  w/in 10-14 days:     Rehab Resource Intensity Level, OT: III (Minimum Resource Intensity)

## 2024-03-13 NOTE — PLAN OF CARE
Problem: Prexisting or High Potential for Compromised Skin Integrity  Goal: Skin integrity is maintained or improved  Description: INTERVENTIONS:  - Identify patients at risk for skin breakdown  - Assess and monitor skin integrity  - Assess and monitor nutrition and hydration status  - Monitor labs   - Assess for incontinence   - Turn and reposition patient  - Assist with mobility/ambulation  - Relieve pressure over bony prominences  - Avoid friction and shearing  - Provide appropriate hygiene as needed including keeping skin clean and dry  - Evaluate need for skin moisturizer/barrier cream  - Collaborate with interdisciplinary team   - Patient/family teaching  - Consider wound care consult   Outcome: Progressing     Problem: PAIN - ADULT  Goal: Verbalizes/displays adequate comfort level or baseline comfort level  Description: Interventions:  - Encourage patient to monitor pain and request assistance  - Assess pain using appropriate pain scale  - Administer analgesics based on type and severity of pain and evaluate response  - Implement non-pharmacological measures as appropriate and evaluate response  - Consider cultural and social influences on pain and pain management  - Notify physician/advanced practitioner if interventions unsuccessful or patient reports new pain  Outcome: Progressing     Problem: INFECTION - ADULT  Goal: Absence or prevention of progression during hospitalization  Description: INTERVENTIONS:  - Assess and monitor for signs and symptoms of infection  - Monitor lab/diagnostic results  - Monitor all insertion sites, i.e. indwelling lines, tubes, and drains  - Monitor endotracheal if appropriate and nasal secretions for changes in amount and color  - [place] appropriate cooling/warming therapies per order  - Administer medications as ordered  - Instruct and encourage patient and family to use good hand hygiene technique  - Identify and instruct in appropriate isolation precautions for  identified infection/condition  Outcome: Progressing  Goal: Absence of fever/infection during neutropenic period  Description: INTERVENTIONS:  - Monitor WBC    Outcome: Progressing     Problem: DISCHARGE PLANNING  Goal: Discharge to home or other facility with appropriate resources  Description: INTERVENTIONS:  - Identify barriers to discharge w/patient and caregiver  - Arrange for needed discharge resources and transportation as appropriate  - Identify discharge learning needs (meds, wound care, etc.)  - Arrange for interpretive services to assist at discharge as needed  - Refer to Case Management Department for coordinating discharge planning if the patient needs post-hospital services based on physician/advanced practitioner order or complex needs related to functional status, cognitive ability, or social support system  Outcome: Progressing     Problem: Knowledge Deficit  Goal: Patient/family/caregiver demonstrates understanding of disease process, treatment plan, medications, and discharge instructions  Description: Complete learning assessment and assess knowledge base.  Interventions:  - Provide teaching at level of understanding  - Provide teaching via preferred learning methods  Outcome: Progressing

## 2024-03-13 NOTE — PLAN OF CARE
Problem: PHYSICAL THERAPY ADULT  Goal: Performs mobility at highest level of function for planned discharge setting.  See evaluation for individualized goals.  Description: Treatment/Interventions: Functional transfer training, LE strengthening/ROM, Elevations, Therapeutic exercise, Endurance training, Patient/family training, Bed mobility, Gait training, Spoke to nursing, OT, Family          See flowsheet documentation for full assessment, interventions and recommendations.  Note: Prognosis: Good  Problem List: Decreased strength, Decreased endurance, Impaired balance, Decreased mobility, Pain  Assessment: Pt. 53 y.o.female w/ PMH of stage IV metastatic breast cancer of left breast with metastases to bone (hormone positive) and now right lung (triple negative), presented w/ c/o SOB & dizziness. Pt admitted for Large pleural effusion w/ sinus tachycardia & Symptomatic anemia (D64.9). Pt referred to PT for mobility assessment & D/C planning w/ orders of Up and OOB as tolerated . Please see above for information re: home set-up & PLOF as well as objective findings during PT assessment. PTA, pt reports being fairly I w/ amb w/ SPC/hurrycane & PRN assists for ADL's at baseline. On eval, pt functioning below baseline hence will continue skilled PT to improve function & safety. Pt require minAx1 for bed mobility & amb w/ SPC (hurrycane); S for transfers + cues for techniques & safety. Gait deviations as above but no gross LOB noted. Pt's daughter notes pt functioning close to baseline. The patient's AM-PAC Basic Mobility Inpatient Short Form Raw Score is 19. A Raw score of greater than 16 suggests the patient may benefit from discharge to home. Please also refer to the recommendation of the Physical Therapist for safe discharge planning. From PT standpoint, will recommend Level III (minimum resource intensity) rehab services and inc family support at D/C. No SOB & dizziness reported t/o session. [facility] staff most recent  "vital signs as follows: /79   Pulse (!) 112   Temp 98 °F (36.7 °C)   Resp 17   Ht 5' 5\" (1.651 m)   SpO2 92%   BMI 20.14 kg/m² . At end of session, pt OOB in chair in stable condition, call bell & phone in reach, all lines intact. Fall precautions reinforced w/ good understanding. CM to follow. Nsg staff to continue to mobilized pt (OOB in chair for all meals & ambulate in room/unit) as tolerated to prevent further decline in function. Will also recommend [facility] for daily amb &/or daily OOB in chair as appropriate. Nsg notified. Co-eval was necessary to complete this PT eval for the pt's best interest given pt's medical acuity & complexity.        Rehab Resource Intensity Level, PT: III (Minimum Resource Intensity) (w/ inc family support)    See flowsheet documentation for full assessment.        "

## 2024-03-13 NOTE — ASSESSMENT & PLAN NOTE
S/p pleurx catheter for malignant effusion in hx of breast ca w/mets to lung  -Cta chest pe study on 3/11/24 w/Stable right pleural effusion with extensive pleural carcinomatosis and compression of the majority of the right lung  Continue to drain on previous schedule  Abnormal CT is felt to be secondary to increasing tumor burden rather than malignant effusion  tPA instilled 3/14/2024 -no improvement this will be removed and patient will have outpatient thoracentesis for volume management  Etiology is felt to be secondary to metastatic disease.

## 2024-03-13 NOTE — ACP (ADVANCE CARE PLANNING)
Advanced Care Planning Progress Note    Serious Illness Conversation    1. What is your understanding now of where you are with your illness?  Prognostic Understanding: overestimates prognosis     2. How much information about what is likely to be ahead with your illness would you like to have?  Information: patient wants to be fully informed     3. What did you (clinician) communicate to the patient?  Prognostic Communication: Uncertain - It can be difficult to predict what will happen with your illness. I hope you will continue to live well for a long time but I’m worried that you could get sick quickly, and I think it is important to prepare for that possibility.     4. If your health situation worsens, what are your most important goals?     5. What are the biggest fears and worries about the future and your health?  Fears/Worries: pain     6. What abilities are so critical to your life that you cannot imagine living without them?     7. What gives you strength as you think about the future with your illness?     8. If you become sicker, how much are you willing to go through for the possibility of gaining more time?  Be in the hospital: Yes    Be in the ICU: Yes    Be on a ventilator: Yes     Undergo aggressive test and/or procedures: Yes   9. How much does your proxy and family know about your priorities and wishes?  Discussion Discussion: extensive discussion with family about goals and wishes     I’ve heard you say that continuing treatment and discussing further treatment modalities with your oncologist is really important to you. Keeping that in mind, and what we know about your illness, I recommend that we continue to remain goal focused, with plans for you to continue what ever treatment may be of some benefit.. This will help us make sure that your treatment plans reflect what’s important to you.     How does this plan sound to you? I will do everything I can to help you through this.  Patient  verbalized understanding of the plan     I have spent 14 minutes speaking with my patient on advanced care planning today or during this visit     Advanced directives  Five Wishes: Patient does not have Five Wishes- would not like information         Patient unaware of stage IV diagnosis and as well as family members.  I did inform them that the disease is most likely not curable.  Shortness of breath is likely multifactorial increasing tumor burden as well as anemia.  Patient is agreeable to undergo multiple aggressive procedures to help with her shortness of breath.  Given her high burden of disease pathology and worsening disease I did discuss that there may not be further treatment options if disease is refractory to current treatments.  Luckily renal function remains normal which does not prevent additional chemotherapy.    Will place oncology consultation to discuss possible need for therapy during admission    Aric Singer, DO

## 2024-03-13 NOTE — CASE MANAGEMENT
Case Management Assessment & Discharge Planning Note    Patient name Shannan Vee  Location [facility] /[facility] -* MRN [id number]  : 1970 Date 3/13/2024       Current Admission Date: 3/12/2024  Current Admission Diagnosis:Large pleural effusion   Patient Active Problem List    Diagnosis Date Noted    Drug induced constipation 2024    Severe protein-calorie malnutrition (HCC) 2024    Sinus tachycardia 2024    Chronic bilateral low back pain without sciatica 2024    Cancer associated pain 2024    Large pleural effusion 2023    Recurrent malignant neoplasm of breast (HCC) 10/23/2023    Right lower lobe lung mass 10/23/2023    Pain from bone metastases (HCC) 10/23/2023    Other specified anemias 10/23/2023    Bladder wall thickening 10/06/2023    Urinary frequency 2023    Overweight (BMI 25.0-29.9) 2021    Personal history of malignant neoplasm of breast 2019    Use of anastrozole (Arimidex) 2018    Carcinoma of left breast, estrogen receptor positive  2017    Malignant neoplasm of overlapping sites of left female breast (HCC) 2015    Uterine leiomyoma 10/15/2014    Limb swelling 2013      LOS (days): 1  Geometric Mean LOS (GMLOS) (days):   Days to GMLOS:     OBJECTIVE:    Risk of Unplanned Readmission Score: 28.26         Current admission status: Inpatient       Preferred Pharmacy:   [id number] [address] [place] [id number]  Phone: [phone] Fax: [phone]    Primary Care Provider: Todd Bañuelos MD    Primary Insurance: [employer]  Secondary Insurance:     ASSESSMENT:  Active Health Care Proxies    There are no active Health Care Proxies on file.       Advance Directives  Does patient have a Health Care POA?: No  Does patient have Advance Directives?: No  Primary Contact: giancarlo Fong [phone]    Readmission Root Cause  30 Day Readmission:  No    Patient Information  Admitted from:: Home  Mental Status: Alert  During Assessment patient was accompanied by: Daughter  Assessment information provided by:: Daughter  Primary Caregiver: Self  Support Systems: Self, Spouse/significant other, Family members  County of Residence: [place]  What city do you live in?: [place]  Home entry access options. Select all that apply.: Stairs  Number of steps to enter home.: One Flight  Type of Current Residence: 2 story home  Living Arrangements: Lives w/ Spouse/significant other  Is patient a ?: No    Activities of Daily Living Prior to Admission  Functional Status: Independent  Completes ADLs independently?: No  Level of ADL dependence: Assistance  Ambulates independently?: Yes  Does patient use assisted devices?: Yes  Assisted Devices (DME) used: Straight Cane  Does patient currently own DME?: Yes  What DME does the patient currently own?: Straight Cane  Does patient have a history of Outpatient Therapy (PT/OT)?: No  Does the patient have a history of Short-Term Rehab?: No  Does patient have a history of HHC?: No  Does patient currently have HHC?: No    Patient Information Continued  Income Source: Unemployed  Does patient have prescription coverage?: Yes  Does patient receive dialysis treatments?: No  Does patient have a history of substance abuse?: No  Does patient have a history of Mental Health Diagnosis?: No    Means of Transportation  Means of Transport to Appts:: Family transport      Social Determinants of Health (SDOH)      Flowsheet Row Most Recent Value   Housing Stability    In the last 12 months, was there a time when you were not able to pay the mortgage or rent on time? N   In the last 12 months, was there a time when you did not have a steady place to sleep or slept in a shelter (including now)? N   Transportation Needs    In the past 12 months, has lack of transportation kept you from medical appointments or from getting medications? no   In the  past 12 months, has lack of transportation kept you from meetings, work, or from getting things needed for daily living? No   Food Insecurity    Within the past 12 months, you worried that your food would run out before you got the money to buy more. Never true   Within the past 12 months, the food you bought just didn't last and you didn't have money to get more. Never true   Utilities    In the past 12 months has the electric, gas, oil, or water company threatened to shut off services in your home? No            DISCHARGE DETAILS:    Discharge planning discussed with:: patient's daughter  Freedom of Choice: Yes  Comments - Freedom of Choice: would like a visiting nurse if qualifies and will choose from accepting agencies  CM contacted family/caregiver?: Yes    Contacts  Patient Contacts: Fabian (Son-in-Law) [phone]  Relationship to Patient:: Family  Contact Method: Phone  Phone Number: Fabian (Son-in-Law) [phone]  Reason/Outcome: Continuity of Care, Emergency Contact, Discharge Planning    Requested Home Health Care         Is the patient interested in HHC at discharge?: Yes  Home Health Discipline requested:: Nursing  Home Health Agency Name:: Other  Home Health Follow-Up Provider:: PCP  Home Health Services Needed:: Evaluate Functional Status and Safety, Other (comment) (assistance w/ management of chronic illness)  Homebound Criteria Met:: Requires the Assistance of Another Person for Safe Ambulation or to Leave the Home, Uses an Assist Device (i.e. cane, walker, etc)  Supporting Clincal Findings:: Limited Endurance    DME Referral Provided  Referral made for DME?: No    Treatment Team Recommendation: Home with Home Health Care  Discharge Destination Plan:: Home with Home Health Care  Transport at Discharge : Family     Additional Comments: Patient lives at home w/ daughter Ajit, uses a cane and requires some assistance w/ bathing and dressing. Patient is recommended for home therapy, patient is  refusing the therapy, daughter would like a nurse visit if qualified.

## 2024-03-13 NOTE — MALNUTRITION/BMI
This medical record reflects one or more clinical indicators suggestive of malnutrition and/or morbid obesity.    Malnutrition Findings:   Adult Malnutrition type: Chronic illness  Adult Degree of Malnutrition: Other severe protein calorie malnutrition  Malnutrition Characteristics: Muscle loss, Fat loss, Weight loss, Inadequate energy                  360 Statement: Severe protein-calorie malnutrition in context of chronic illness related to decreased appetite, catabolic illness as evidenced by weight loss of 19% in 6 months, severe muscle wasting (temporal, clavicle), and severe fat wasting (orbital), <75% energy intake compared to estimated needs for >1 month. Treated with oral supplement.    BMI Findings:           Body mass index is 20.14 kg/m².     See Nutrition note dated 3/13/24 for additional details.  Completed nutrition assessment is viewable in the nutrition documentation.

## 2024-03-13 NOTE — CONSULTS
e-Consult (IPC)  - Interventional Radiology  Shannan Vee 53 y.o. female MRN: [id number]  Unit/Bed#: E5 -01 Encounter: [id number]          Interventional Radiology has been consulted to evaluate Shannan Vee    We were consulted by [facility] concerning this patient with right tunneled pleural catheter.    Inpatient Consult to IR  Consult performed by: Jeanette Loera PA-C  Consult ordered by: Janell Benito PA-C        03/13/24    Assessment/Recommendation:   Patient is a 54 y/o female with a hx of breast cancer s/p Chemo and XRT with malignant recurrent pleural effussions s/p tunneled pleural catheter placement on 1/24 who presented to the ED last night with SOB and was seen the day prior in the ED for dizziness. Patient admitted with symptomatic anemia with hgb of 6.7 and persistent right pleural effusion. Patient reports she had been draining large volumes of fluid from tube previously, but only 5cc from tube in the last few days per SLIM notes. We were consulted to evaluate patient's pleural catheter.    -Case discussed with Dr. Bautista and Dr. Singer  -After review of imaging, catheter appears to be in the proper position. There is some loculated effusion noted and large tumor burden noted within the pleural space. Could consider tpa infusion into the tube for loculations, but would be hesitant given current hgb of 6.7. There is no current indication for IR tube check/manipulation  -Recommend consulting pulmonology  -Remainder of care per primary team  -Please reach out to IR with any questions/concerns    11-20 minutes, >50% of the total time devoted to medical consultative verbal/EMR discussion between providers. Written report will be generated in the EMR.     Thank you for allowing Interventional Radiology to participate in the care of Shannan Vee. Please don't hesitate to call or TigerText us with any questions.     Jeanette Loera PA-C

## 2024-03-13 NOTE — ASSESSMENT & PLAN NOTE
Followed by [facility] oncology, Dr. Villavicencio  Initially diagnosed with breast cancer in 2013 now with recurrence  Stage IV disease  Evidence of bony metastasis in the thoracic spine  With significant right lung metastasis confirmed by biopsy.  Remains on oral hormonal therapy, -he has not yet started systemic chemotherapy  If an aggressive nature of disease, oncology consultation has been placed.  Continues with drainage from malignant pleural effusion  Oncology consultation placed  Continue home oral chemotherapy, Kisqali -patient is aware that she may need to bring medication in  Ongoing goals of care discussion

## 2024-03-13 NOTE — OCCUPATIONAL THERAPY NOTE
Occupational Therapy Evaluation     Patient Name: Shannan Vee  Today's Date: 3/13/2024  Problem List  Principal Problem:    Large pleural effusion  Active Problems:    Malignant neoplasm of overlapping sites of left female breast (HCC)    Right lower lobe lung mass    Pain from bone metastases (HCC)    Other specified anemias    Sinus tachycardia    Past Medical History  Past Medical History:   Diagnosis Date    Breast cancer (HCC) 07/2013    left-chemotherapy    Fibroma     Goals of care, counseling/discussion     History of chemotherapy     History of external beam radiation therapy      Past Surgical History  Past Surgical History:   Procedure Laterality Date    APPENDECTOMY      BREAST SURGERY  2014    Left breast 2/2 breast CA    HYSTERECTOMY  2015    Due to fibroma    IR BIOPSY BONE  10/9/2023    IR PLEURAL EFFUSION LONG-TERM CATHETER PLACEMENT  1/24/2024    IR THORACENTESIS  11/8/2023    MASTECTOMY Left 2013    OOPHORECTOMY             03/13/24 1137   OT Last Visit   OT Visit Date 03/13/24   Note Type   Note type Evaluation   Pain Assessment   Pain Assessment Tool 0-10   Pain Score 7   Pain Location/Orientation Orientation: Lower;Location: Back   Patient's Stated Pain Goal No pain   Hospital Pain Intervention(s) Repositioned;Ambulation/increased activity;Emotional support;Rest   Multiple Pain Sites No   Restrictions/Precautions   Weight Bearing Precautions Per Order No   Other Precautions Chair Alarm;Bed Alarm;Fall Risk;Pain;Multiple lines   Home Living   Type of Home House   Home Layout Two level;Bed/bath upstairs;Stairs to enter with rails  (5 MAIR[address] LUZ)   Bathroom Shower/Tub Walk-in shower   Bathroom Toilet Standard   Bathroom Equipment   (Denies DME)   Bathroom Accessibility Accessible   Home Equipment Walker;Other (Comment)  ([place])   Additional Comments Pt currently staying with her dtr and LAUREN in a two level house with 5 MARIA LUZ. When not staying at dtr's, pt lives with her  in a house with 1st  floor setup and 3 MARIA LUZ.   Prior Function   Level of Riley Independent with functional mobility;Needs assistance with ADLs;Needs assistance with IADLS  (assist w/ ADLs PRN)   Lives With Daughter;Other (Comment)  (Currently staying with dtr and LAUREN. Normally lives with spouse)   Receives Help From Family   IADLs Family/Friend/Other provides transportation;Family/Friend/Other provides meals;Family/Friend/Other provides medication management   Falls in the last 6 months 0   Comments At baseline, pt required assist w/ ADLs PRN and assist w/ IADLs. Mod I for functional transfers/mobility w/ use of Hurrycane. (-) . Denies falls PTA.   Lifestyle   Autonomy At baseline, pt required assist w/ ADLs PRN and assist w/ IADLs. Mod I for functional transfers/mobility w/ use of Hurrycane. (-) . Denies falls PTA.   Reciprocal Relationships Dtr, LAUREN, spouse   ADL   Where Assessed Chair   Eating Assistance 7  Independent   Grooming Assistance 5  Supervision/Setup   UB Bathing Assistance 5  Supervision/Setup   LB Bathing Assistance 4  Minimal Assistance   UB Dressing Assistance 5  Supervision/Setup   LB Dressing Assistance 4  Minimal Assistance   Toileting Assistance  4  Minimal Assistance   Functional Assistance 4  Minimal Assistance   Bed Mobility   Supine to Sit 4  Minimal assistance   Additional items Assist x 1;HOB elevated;Bedrails;Increased time required;Verbal cues   Sit to Supine Unable to assess   Additional Comments Pt seated OOB in chair at end of session. Call bell and phone within reach. All needs met and pt reports no further questions for OT at this time.   Transfers   Sit to Stand 5  Supervision   Additional items Bedrails;Increased time required;Verbal cues   Stand to Sit 5  Supervision   Additional items Armrests;Increased time required;Verbal cues   Additional Comments Cues for safe technique and hand placement   Functional Mobility   Functional Mobility 4  Minimal assistance   Additional Comments  Assist x1   Additional items Rolling walker   Balance   Static Sitting Fair   Dynamic Sitting Fair -   Static Standing Fair -   Dynamic Standing Poor +   Ambulatory Poor +   Activity Tolerance   Activity Tolerance Patient limited by pain;Treatment limited secondary to medical complications (Comment)   Medical Staff Made Aware Caitlyn RN; TG Vinson   Nurse Made Aware yes   RUE Assessment   RUE Assessment WFL  (4-/5 throughout)   LUE Assessment   LUE Assessment WFL  (4-/5 throughout)   Hand Function   Gross Motor Coordination Functional   Fine Motor Coordination Functional   Sensation   Light Touch No apparent deficits   Proprioception   Proprioception No apparent deficits   Vision - Complex Assessment   Acuity Able to read clock/calendar on wall without difficulty   Psychosocial   Psychosocial (WDL) WDL   Perception   Inattention/Neglect Appears intact   Cognition   Overall Cognitive Status WFL   Arousal/Participation Alert;Cooperative   Attention Attends with cues to redirect   Orientation Level Oriented to person;Oriented to place;Oriented to time   Memory Within functional limits   Following Commands Follows one step commands without difficulty   Assessment   Limitation Decreased ADL status;Decreased UE strength;Decreased endurance;Decreased high-level ADLs;Decreased self-care trans   Prognosis Good   Assessment Pt is a 53 y.o. female seen for OT evaluation s/p adm to [facility] on 3/12/2024 w/ SOB and dizziness and admitted w/ Large pleural effusion . Comorbidities affecting pt’s functional performance include a significant PMH of stage IV metastatic breast cancer of left breast with metastases to bone (hormone positive) and now right lung (triple negative), recurrent pleural effusion, pleural  carcinomatosis of right lung, chronic pain, anemia. Pt with active OT orders and activity orders for Up and OOB as tolerated. Pt currently staying with her dtr and LAUREN in a two level house with 5 MARIA LUZ. When not  staying at [facility]'s, pt lives with her  in a house with 1st floor setup and 3 MARIA LUZ. At baseline, pt required assist w/ ADLs PRN and assist w/ IADLs. Mod I for functional transfers/mobility w/ use of Hurrycane. (-) . Denies falls PTA. Upon evaluation, pt currently requires Supervision for UB ADLs, Min A for LB ADLs, Min A for toileting, Min A for bed mobility, Supervision for transfers, and Min A for functional mobility 2* the following deficits impacting occupational performance: decreased strength , decreased balance, decreased activity tolerance, limited functional reach, and increased pain. These impairments, as well at pt’s personal factors of: MARIA LUZ home environment, steps within home environment, difficulty performing ADLs, difficulty performing transfers/mobility, fall risk , functional decline , and access to transportation  limit pt’s ability to safely engage in all baseline areas of occupation. Pt to continue to benefit from continued acute OT services during hospital stay to address defined deficits and to maximize level of functional independence in the following Occupational Performance areas: grooming, bathing/shower, toilet hygiene, dressing, medication management, health maintenance, functional mobility, community mobility, clothing management, and social participation. The patient's raw score on the -PAC Daily Activity Inpatient Short Form is 19. A raw score of greater than or equal to 19 suggests the patient may benefit from discharge to home. Please refer to the recommendation of the Occupational Therapist for safe discharge planning. OT will continue to follow pt 2-3x/wk to address the following goals to  w/in 10-14 days:   Goals   Patient Goals To go home   LTG Time Frame 10-14   Long Term Goal Please refer to LTGs listed below   Plan   Treatment Interventions ADL retraining;Functional transfer training;UE strengthening/ROM;Endurance training;Patient/family training;Equipment  evaluation/education;Compensatory technique education;Continued evaluation;Activityengagement   Goal Expiration Date 03/27/24   OT Treatment Day 0   OT Frequency 2-3x/wk   Discharge Recommendation   Rehab Resource Intensity Level, OT III (Minimum Resource Intensity)   AM-PAC Daily Activity Inpatient   Lower Body Dressing 3   Bathing 3   Toileting 3   Upper Body Dressing 3   Grooming 3   Eating 4   Daily Activity Raw Score 19   Daily Activity Standardized Score (Calc for Raw Score >=11) 40.22   AM-PAC Applied Cognition Inpatient   Following a Speech/Presentation 4   Understanding Ordinary Conversation 4   Taking Medications 3   Remembering Where Things Are Placed or Put Away 4   Remembering List of 4-5 Errands 3   Taking Care of Complicated Tasks 3   Applied Cognition Raw Score 21   Applied Cognition Standardized Score 44.3        GOALS    Pt will improve activity tolerance to G for min 30 min txment sessions for increase engagement in functional tasks    Pt will complete bed mobility at a Mod I level w/ G balance/safety demonstrated to decrease caregiver assistance required     Pt will complete UB dressing/self care w/ mod I using adaptive device and DME as needed     Pt will complete LB dressing/self care w/ mod I using adaptive device and DME as needed    Pt will complete toileting w/ mod I w/ G hygiene/thoroughness using DME as needed    Pt will improve functional transfers to Mod I on/off all surfaces using DME as needed w/ G balance/safety     Pt will improve functional mobility during ADL/IADL/leisure tasks to Mod I using DME as needed w/ G balance/safety     Pt will be attentive 100% of the time during ongoing cognitive assessment w/ G participation to assist w/ safe d/c planning/recommendations    Pt will demonstrate G carryover of pt/caregiver education and training as appropriate w/o cues w/ good tolerance to increase safety during functional tasks    Pt will increase BUE strength by 1MM grade via AROM  exercises to increase independence in ADLs and transfers    Pt will verbalize 3 potential fall hazards and identify appropriate compensatory techniques to decrease fall risk in home environment     Pt will increase standing tolerance to 5-8 mins with Fair+ dynamic standing balance to increase safety during participation in ADLs       Holley Deng, OTR/L

## 2024-03-13 NOTE — ASSESSMENT & PLAN NOTE
Possibly complicated by gram-negative pneumonia  Continue ceftriaxone day #3 of 5  Pulmonary consultation reviewed

## 2024-03-13 NOTE — ASSESSMENT & PLAN NOTE
Felt to be possibly inappropriate tachycardia syndrome from cardoilogy given chronicity  Continue toprol

## 2024-03-14 ENCOUNTER — APPOINTMENT (INPATIENT)
Dept: NON INVASIVE DIAGNOSTICS | Facility: HOSPITAL | Age: 54
DRG: 136 | End: 2024-03-14
Payer: COMMERCIAL

## 2024-03-14 PROBLEM — R00.0 TACHYCARDIA: Status: ACTIVE | Noted: 2024-03-14

## 2024-03-14 LAB
ABO GROUP BLD BPU: NORMAL
ANION GAP SERPL CALCULATED.3IONS-SCNC: 12 MMOL/L (ref 4–13)
BPU ID: NORMAL
BUN SERPL-MCNC: 9 MG/DL (ref 5–25)
CALCIUM SERPL-MCNC: 7.9 MG/DL (ref 8.4–10.2)
CHLORIDE SERPL-SCNC: 96 MMOL/L (ref 96–108)
CO2 SERPL-SCNC: 23 MMOL/L (ref 21–32)
CREAT SERPL-MCNC: 0.41 MG/DL (ref 0.6–1.3)
CROSSMATCH: NORMAL
ERYTHROCYTE [DISTWIDTH] IN BLOOD BY AUTOMATED COUNT: 19.8 % (ref 11.6–15.1)
GFR SERPL CREATININE-BSD FRML MDRD: 116 ML/MIN/1.73SQ M
GLUCOSE SERPL-MCNC: 95 MG/DL (ref 65–140)
HCT VFR BLD AUTO: 24 % (ref 34.8–46.1)
HGB BLD-MCNC: 7.5 G/DL (ref 11.5–15.4)
MCH RBC QN AUTO: 27.4 PG (ref 26.8–34.3)
MCHC RBC AUTO-ENTMCNC: 31.3 G/DL (ref 31.4–37.4)
MCV RBC AUTO: 88 FL (ref 82–98)
MRSA NOSE QL CULT: NORMAL
PLATELET # BLD AUTO: 355 THOUSANDS/UL (ref 149–390)
PMV BLD AUTO: 8.6 FL (ref 8.9–12.7)
POTASSIUM SERPL-SCNC: 3.6 MMOL/L (ref 3.5–5.3)
RBC # BLD AUTO: 2.74 MILLION/UL (ref 3.81–5.12)
SODIUM SERPL-SCNC: 131 MMOL/L (ref 135–147)
T4 FREE SERPL-MCNC: 0.48 NG/DL (ref 0.61–1.12)
TSH SERPL DL<=0.05 MIU/L-ACNC: 5.4 UIU/ML (ref 0.45–4.5)
UNIT DISPENSE STATUS: NORMAL
UNIT PRODUCT CODE: NORMAL
UNIT PRODUCT VOLUME: 350 ML
UNIT RH: NORMAL
WBC # BLD AUTO: 8.38 THOUSAND/UL (ref 4.31–10.16)

## 2024-03-14 PROCEDURE — 93970 EXTREMITY STUDY: CPT

## 2024-03-14 PROCEDURE — 99232 SBSQ HOSP IP/OBS MODERATE 35: CPT | Performed by: NURSE PRACTITIONER

## 2024-03-14 PROCEDURE — 85027 COMPLETE CBC AUTOMATED: CPT | Performed by: INTERNAL MEDICINE

## 2024-03-14 PROCEDURE — 99233 SBSQ HOSP IP/OBS HIGH 50: CPT | Performed by: INTERNAL MEDICINE

## 2024-03-14 PROCEDURE — 3E0L3GC INTRODUCTION OF OTHER THERAPEUTIC SUBSTANCE INTO PLEURAL CAVITY, PERCUTANEOUS APPROACH: ICD-10-PCS | Performed by: INTERNAL MEDICINE

## 2024-03-14 PROCEDURE — 80048 BASIC METABOLIC PNL TOTAL CA: CPT | Performed by: INTERNAL MEDICINE

## 2024-03-14 PROCEDURE — 84443 ASSAY THYROID STIM HORMONE: CPT | Performed by: INTERNAL MEDICINE

## 2024-03-14 PROCEDURE — 84439 ASSAY OF FREE THYROXINE: CPT | Performed by: INTERNAL MEDICINE

## 2024-03-14 RX ORDER — CEFUROXIME AXETIL 250 MG/1
500 TABLET ORAL EVERY 12 HOURS SCHEDULED
Status: DISCONTINUED | OUTPATIENT
Start: 2024-03-15 | End: 2024-03-15 | Stop reason: HOSPADM

## 2024-03-14 RX ORDER — OXYCODONE HYDROCHLORIDE 5 MG/1
5 TABLET ORAL ONCE
Status: COMPLETED | OUTPATIENT
Start: 2024-03-14 | End: 2024-03-14

## 2024-03-14 RX ORDER — METOPROLOL TARTRATE 1 MG/ML
5 INJECTION, SOLUTION INTRAVENOUS ONCE
Status: DISCONTINUED | OUTPATIENT
Start: 2024-03-14 | End: 2024-03-15 | Stop reason: HOSPADM

## 2024-03-14 RX ORDER — HYDROMORPHONE HCL/PF 1 MG/ML
0.5 SYRINGE (ML) INJECTION EVERY 4 HOURS PRN
Status: DISCONTINUED | OUTPATIENT
Start: 2024-03-14 | End: 2024-03-15 | Stop reason: HOSPADM

## 2024-03-14 RX ADMIN — OXYCODONE 5 MG: 5 TABLET ORAL at 01:54

## 2024-03-14 RX ADMIN — LIDOCAINE 5% 1 PATCH: 700 PATCH TOPICAL at 08:55

## 2024-03-14 RX ADMIN — OXYCODONE HYDROCHLORIDE 10 MG: 10 TABLET ORAL at 08:55

## 2024-03-14 RX ADMIN — METOPROLOL SUCCINATE 50 MG: 50 TABLET, EXTENDED RELEASE ORAL at 08:55

## 2024-03-14 RX ADMIN — OXYCODONE HYDROCHLORIDE 10 MG: 10 TABLET ORAL at 22:37

## 2024-03-14 RX ADMIN — HYDROMORPHONE HYDROCHLORIDE 0.2 MG: 0.2 INJECTION, SOLUTION INTRAMUSCULAR; INTRAVENOUS; SUBCUTANEOUS at 03:23

## 2024-03-14 RX ADMIN — CEFTRIAXONE 1000 MG: 1 INJECTION, SOLUTION INTRAVENOUS at 11:29

## 2024-03-14 RX ADMIN — ENOXAPARIN SODIUM 40 MG: 40 INJECTION SUBCUTANEOUS at 08:56

## 2024-03-14 RX ADMIN — OXYCODONE HYDROCHLORIDE 10 MG: 10 TABLET ORAL at 18:27

## 2024-03-14 RX ADMIN — OXYCODONE HYDROCHLORIDE 10 MG: 10 TABLET ORAL at 14:17

## 2024-03-14 RX ADMIN — OXYCODONE HYDROCHLORIDE 10 MG: 10 TABLET ORAL at 05:11

## 2024-03-14 RX ADMIN — HYDROMORPHONE HYDROCHLORIDE 0.5 MG: 1 INJECTION, SOLUTION INTRAMUSCULAR; INTRAVENOUS; SUBCUTANEOUS at 11:29

## 2024-03-14 RX ADMIN — HYDROMORPHONE HYDROCHLORIDE 0.5 MG: 1 INJECTION, SOLUTION INTRAMUSCULAR; INTRAVENOUS; SUBCUTANEOUS at 06:41

## 2024-03-14 NOTE — PROGRESS NOTES
[facility]  Progress Note  Name: Shannan Vee I  MRN: [id number]  Unit/Bed#: E5 -01 I Date of Admission: 3/12/2024   Date of Service: 3/14/2024 I Hospital Day: 2    Assessment/Plan   * Malignant neoplasm of overlapping sites of left female breast (HCC)  Assessment & Plan  Followed by [facility] oncology, Dr. Villavicencio  Initially diagnosed with breast cancer in 2013 now with recurrence  Stage IV disease  Evidence of bony metastasis in the thoracic spine  With significant right lung metastasis confirmed by biopsy.  Remains on oral hormonal therapy, -he has not yet started systemic chemotherapy  If an aggressive nature of disease, oncology consultation has been placed.  Continues with drainage from malignant pleural effusion  Oncology consultation placed  Continue home oral chemotherapy, Kisqali -patient is aware that she may need to bring medication in  Ongoing goals of care discussion      Large pleural effusion  Assessment & Plan  S/p pleurx catheter for malignant effusion in hx of breast ca w/mets to lung  -Cta chest pe study on 3/11/24 w/Stable right pleural effusion with extensive pleural carcinomatosis and compression of the majority of the right lung  Continue to drain on previous schedule  Abnormal CT is felt to be secondary to increasing tumor burden rather than malignant effusion  tPA instilled 3/14/2024 -no improvement this will be removed and patient will have outpatient thoracentesis for volume management  Etiology is felt to be secondary to metastatic disease.      Tachycardia  Assessment & Plan  On beta-blocker prior to admission  Patient refuses additional medication titration  PE study negative for PE  Doppler negative for DVT  Echocardiogram from 1/28 with no evidence of wall motion abnormality  Close monitor, suspect elevated in the setting of anemia, and infection  Consider additional 1 unit of blood cell transfusion    Hyponatremia  Assessment & Plan  Likely secondary  to SIADH due to widely metastatic malignancy as well as pleural effusion  Sodium currently ranging 133-131  Patient was started on salt tablets 1 g 3 times daily prior to discharge  Patient refusing salt tabs, will encourage as necessary  Will place fluid restriction    Severe protein-calorie malnutrition (HCC)  Assessment & Plan  Malnutrition Findings:   Adult Malnutrition type: Chronic illness  Adult Degree of Malnutrition: Other severe protein calorie malnutrition  Malnutrition Characteristics: Muscle loss, Fat loss, Weight loss, Inadequate energy                  360 Statement: Severe protein-calorie malnutrition in context of chronic illness related to decreased appetite, catabolic illness as evidenced by weight loss of 19% in 6 months, severe muscle wasting (temporal, clavicle), and severe fat wasting (orbital), <75% energy intake compared to estimated needs for >1 month. Treated with oral supplement.    BMI Findings:           Body mass index is 20.14 kg/m².       Severe protein-calorie malnutrition in context of chronic illness related to decreased appetite, catabolic illness as evidenced by weight loss of 19% in 6 months, severe muscle wasting (temporal, clavicle), and severe fat wasting (orbital), <75% energy intake compared to estimated needs for >1 month. Treated with oral supplement.     BMI Findings: 20.14 kg/mA².          Other specified anemias  Assessment & Plan  Normocytic Worsening anemia to 6.7 despite improvement w/transfusion upwards of 0-10 during last admission.    Suspect anemia of chronic disease has bone marrow suppression  Transfused 1 unit of packed red blood cells    Pain from bone metastases (HCC)  Assessment & Plan  Continue op oxycodone  Continue lidocaine patch    Right lower lobe lung mass  Assessment & Plan  Possibly complicated by gram-negative pneumonia  Continue ceftriaxone day #3 of 5  Pulmonary consultation reviewed    Limb swelling  Assessment & Plan  Likely venous  congestion from tumor burden  Compression stockings  Elevate lower extremities    Sinus tachycardia-resolved as of 3/13/2024  Assessment & Plan  Felt to be possibly inappropriate tachycardia syndrome from cardoilogy given chronicity  Continue [facility]                 Hospital Course:     53-year-old female patient with stage IV metastatic breast cancer presenting with shortness of breath dyspnea on exertion.  Found to have nonfunctional Pleurx catheter likely secondary to tumor burden    Assessment:      Principal Problem:    Malignant neoplasm of overlapping sites of left female breast (HCC)  Active Problems:    Large pleural effusion    Limb swelling    Right lower lobe lung mass    Pain from bone metastases (HCC)    Other specified anemias    Severe protein-calorie malnutrition (HCC)    Hyponatremia    Tachycardia      Plan:    tPA instilled in Pleurx catheter  Recheck labs in a.m.  Continue antibiotic therapy  Venous Doppler of lower extremities  Start compression  Possible catheter removal       VTE Pharmacologic Prophylaxis:   Pharmacologic: Enoxaparin (Lovenox)  Mechanical VTE Prophylaxis in Place: Yes    AM-PAC Basic Mobility:  Basic Mobility Inpatient Raw Score: 19    -HLM Achieved: 1: Laying in bed  -HLM Goal: 6: Walk 10 steps or more    HLM Goal listed above. Continue with multidisciplinary rounding and encourage appropriate mobility to improve upon HLM goals.         Patient Centered Rounds: Case discussed and reviewed with nursing    Discussions with Specialists or Other Care Team Provider: Case management, oncology, pulmonary, interventional radiology    Education and Discussions with Family / Patient: Patient daughter at bedside    Time Spent for Care: 80 minutes.  More than 50% of total time spent on counseling and coordination of care as described above.    Current Length of Stay: 2 day(s)    Current Patient Status: Inpatient   Certification Statement: The patient will continue to require  additional inpatient hospital stay due to pneumonia, evaluation of Pleurx catheter    Discharge Plan / Estimated Discharge Date: 24 to 48 hours    Code Status: Level 1 - Full Code      Subjective:   Seen and examined, pain is controlled.  Still with lower extremity swelling.  Patient declined increase beta-blocker as she reports this causes dizziness.  She has been refusing her salt tablets    A complete and comprehensive 14 point organ system review has been performed and all other systems are negative other than stated above.    Objective:     Vitals:   Temp (24hrs), Av.2 °F (36.8 °C), Min:97.5 °F (36.4 °C), Max:99 °F (37.2 °C)    Temp:  [97.5 °F (36.4 °C)-99 °F (37.2 °C)] 98.5 °F (36.9 °C)  HR:  [119-135] 125  Resp:  [14-18] 16  BP: (101-146)/(76-87) 120/76  SpO2:  [89 %-93 %] 93 %  Body mass index is 20.14 kg/m².     Input and Output Summary (last 24 hours):       Intake/Output Summary (Last 24 hours) at 3/14/2024 1838  Last data filed at 3/14/2024 1402  Gross per 24 hour   Intake 480 ml   Output 50 ml   Net 430 ml       Physical Exam:     General: Ill-appearing appears older than stated age  HEENT: atraumatic, PERRLA, moist mucosa, normal pharynx, normal tonsils and adenoids, normal tongue, no fluid in sinuses  Neck: Trachea midline, no carotid bruit, no masses  Respiratory: normal chest wall expansion, CTA B, no r/r/w, no rubs  Cardiovascular: Tachycardic, no m/r/g, Normal S1 and S2  Abdomen: Soft, non-tender, non-distended, normal bowel sounds in all quadrants, no hepatosplenomegaly, no tympany  Rectal: deferred  Musculoskeletal: Moves all  Integumentary: warm, dry, and pink, with no rash, purpura, or petechia  Heme/Lymph: Extremity edema, nonpitting  Neurological: Cranial Nerves II-XII grossly intact  Psychiatric: cooperative with normal mood, affect, and cognition      Additional Data:     Labs:    Results from last 7 days   Lab Units 24  0552 03/13/24  1541   WBC Thousand/uL 8.38 7.72    HEMOGLOBIN g/dL 7.5* 8.1*   HEMATOCRIT % 24.0* 25.7*   PLATELETS Thousands/uL 355 339   NEUTROS PCT %  --  72   LYMPHS PCT %  --  11*   MONOS PCT %  --  15*   EOS PCT %  --  0     Results from last 7 days   Lab Units 03/14/24  0552 03/12/24  2120   POTASSIUM mmol/L 3.6 4.0   CHLORIDE mmol/L 96 96   CO2 mmol/L 23 23   BUN mg/dL 9 11   CREATININE mg/dL 0.41* 0.47*   CALCIUM mg/dL 7.9* 8.3*   ALK PHOS U/L  --  107*   ALT U/L  --  12   AST U/L  --  65*           * I Have Reviewed All Lab Data Listed Above.  * Additional Pertinent Lab Tests Reviewed: All Labs For Current Hospital Admission Reviewed    Imaging:    Imaging Reports Reviewed Today Include: No new imaging  Imaging Personally Reviewed by Myself Includes: No new imaging    Recent Cultures (last 7 days):           Last 24 Hours Medication List:   Current Facility-Administered Medications   Medication Dose Route Frequency Provider Last Rate    [START ON 3/15/2024] cefuroxime  500 mg Oral Q12H [facility] Enrique Dover MD      enoxaparin  40 mg Subcutaneous Q24H [facility] Aric Singer,       HYDROmorphone  0.5 mg Intravenous Q4H PRN Bryon Edge PA-C      lidocaine  1 patch Topical Daily Aric Singer DO      metoprolol  5 mg Intravenous Once Aric Sniger DO      metoprolol succinate  75 mg Oral Daily Aric Singer DO      oxyCODONE  10 mg Oral Q4H PRN Janell Benito PA-C      Ribociclib Succ (600 MG Dose)  600 mg Oral Daily Aric Singer DO      sodium chloride  1 g Oral TID With Meals Aric Singer DO         AM-PAC Basic Mobility:  Basic Mobility Inpatient Raw Score: 19    -HLM Achieved: 1: Laying in bed  -HLM Goal: 6: Walk 10 steps or more    HLM Goal listed above. Continue with multidisciplinary rounding and encourage appropriate mobility to improve upon HLM goals.     Today, Patient Was Seen By: Aric Singer DO    ** Please Note: This note was completed in part utilizing [employer] One [employer] software dictation.   Grammatical errors, random word insertions, spelling mistakes, and incomplete sentences may be an occasional consequence of this system secondary to software limitations, ambient noise, and hardware issues.  If you have any questions or concerns about the content, text, or information contained within the body of this dictation, please contact the provider for clarification. **

## 2024-03-14 NOTE — ASSESSMENT & PLAN NOTE
On beta-blocker prior to admission  Patient refuses additional medication titration  PE study negative for PE  Doppler negative for DVT  Echocardiogram from 1/28 with no evidence of wall motion abnormality  Close monitor, suspect elevated in the setting of anemia, and infection  Consider additional 1 unit of blood cell transfusion

## 2024-03-14 NOTE — PROGRESS NOTES
Progress Note -Interventional Radiology  Shannan Vee 53 y.o. female MRN: [id number]  Unit/Bed#: E5 -01 Encounter: [id number]    Assessment/Plan:    52 y/o female with h/o breast cancer s/p Chemo and XRT, c/b recurrent malignant pleural effussion s/p tunneled pleural catheter placement on 1/24/24 who presented to the ED on 3/12/24 with SOB and dizziness, found to have symptomatic anemia with hgb of 6.7 and persistent right pleural effusion.     Patient reporting minimal output from long-term tunneled pleural catheter, when she previously had been draining larger volumes.    IR was consulted to evaluate patient's pleural catheter. Please see consult note from Dr Bautista on 3/13/24    Patient seen today in follow up.  -Patient hemodynamically stable and afebrile. -Hemoglobin stable 8.1-->7.5  -tPa 4mg reconstituted in 10ml NSS was injected into long-term pleural catheter followed by additional 5ml NSS flush  at 1128 am. Catheter was capped for 1 hour, then placed to drain with reported output of 50 ml  -Pulmonary following, appreciate recommendations  -remainder of care per Primary team    Medical Problems       Problem List       * (Principal) Malignant neoplasm of overlapping sites of left female breast (HCC)    Carcinoma of left breast, estrogen receptor positive     Use of anastrozole (Arimidex)    Limb swelling    Uterine leiomyoma    Personal history of malignant neoplasm of breast    Overview Signed 5/1/2019  2:21 PM by Mik Perry MA     Added automatically from request for surgery [id number]         Overweight (BMI 25.0-29.9)    Urinary frequency    Bladder wall thickening    Recurrent malignant neoplasm of breast (HCC)    Right lower lobe lung mass    Pain from bone metastases (HCC)    Other specified anemias    Large pleural effusion    Chronic bilateral low back pain without sciatica    Cancer associated pain    Severe protein-calorie malnutrition (HCC)    Malnutrition Findings:   Adult Malnutrition  "type: Chronic illness  Adult Degree of Malnutrition: Other severe protein calorie malnutrition  Malnutrition Characteristics: Muscle loss, Fat loss, Weight loss, Inadequate energy                  360 Statement: Severe protein-calorie malnutrition in context of chronic illness related to decreased appetite, catabolic illness as evidenced by weight loss of 19% in 6 months, severe muscle wasting (temporal, clavicle), and severe fat wasting (orbital), <75% energy intake compared to estimated needs for >1 month. Treated with oral supplement.    BMI Findings:           Body mass index is 20.14 kg/m².         Drug induced constipation    Hyponatremia             Subjective: Shannan Vee is a 53 y.o. female   With right pleurex catheter in place. Patient has no complaints at this time    Objective:    Vitals:  /86 (BP Location: Right arm)   Pulse (!) 127   Temp 98.5 °F (36.9 °C) (Oral)   Resp 15   Ht 5' 5\" (1.651 m)   SpO2 (!) 89%   BMI 20.14 kg/m²   Body mass index is 20.14 kg/m².  Weight (last 2 days)       Date/Time Weight    03/12/24 1800 --     Weight: pt unable to  triage at 03/12/24 1800            I/Os:    Intake/Output Summary (Last 24 hours) at 3/14/2024 1658  Last data filed at 3/14/2024 1402  Gross per 24 hour   Intake 480 ml   Output 50 ml   Net 430 ml       Invasive Devices       Peripheral Intravenous Line  Duration             Peripheral IV 03/12/24 Dorsal (posterior);Right Hand 1 day              Drain  Duration             Pleural Effusion Long-Term Catheter 16 Fr. 50 days                    Physical Exam  HENT:      Head: Normocephalic and atraumatic.   Eyes:      Extraocular Movements: Extraocular movements intact.      Conjunctiva/sclera: Conjunctivae normal.   Cardiovascular:      Rate and Rhythm: Tachycardia present.   Pulmonary:      Effort: Pulmonary effort is normal. No respiratory distress.      Comments: Long-term pleurex catheter in anterior right chest, dressing " clean/dry/intact  Abdominal:      General: There is no distension.   Musculoskeletal:         General: Normal range of motion.      Cervical back: Normal range of motion.   Neurological:      Mental Status: She is alert. Mental status is at baseline.                 Lab Results and Cultures:   CBC with diff:   Lab Results   Component Value Date    WBC 8.38 03/14/2024    HGB 7.5 (L) 03/14/2024    HCT 24.0 (L) 03/14/2024    MCV 88 03/14/2024     03/14/2024    RBC 2.74 (L) 03/14/2024    MCH 27.4 03/14/2024    MCHC 31.3 (L) 03/14/2024    RDW 19.8 (H) 03/14/2024    MPV 8.6 (L) 03/14/2024    NRBC 1 03/13/2024      BMP/CMP:  Lab Results   Component Value Date     06/22/2018    K 3.6 03/14/2024    K 4.2 01/22/2024    CL 96 03/14/2024    CL 98 (L) 01/22/2024    CO2 23 03/14/2024    CO2 27 01/22/2024    ANIONGAP 12 06/22/2018    BUN 9 03/14/2024    BUN 7 01/22/2024    CREATININE 0.41 (L) 03/14/2024    CREATININE 0.63 01/22/2024    GLUCOSE 81 06/22/2018    CALCIUM 7.9 (L) 03/14/2024    CALCIUM 9.0 01/22/2024    AST 65 (H) 03/12/2024    AST 78 (H) 01/22/2024    ALT 12 03/12/2024    ALT 26 01/22/2024    ALKPHOS 107 (H) 03/12/2024    ALKPHOS 164 (H) 01/22/2024    PROT 8.1 06/22/2018    BILITOT 0.2 06/22/2018    EGFR 116 03/14/2024    EGFR 104 01/22/2024   ,     Coags:   Lab Results   Component Value Date    PT 14.0 01/17/2024    PTT 34 03/11/2024    PTT 31 02/12/2014    INR 1.1 01/17/2024   ,   Results from last 7 days   Lab Units 03/11/24  1208   PTT seconds 34        Lipid Panel:   Lab Results   Component Value Date    CHOL 166 06/22/2018     Lab Results   Component Value Date    HDL 63 06/20/2023     Lab Results   Component Value Date    HDL 63 06/20/2023     Lab Results   Component Value Date    LDLCALC 75 06/20/2023     Lab Results   Component Value Date    TRIG 53 06/20/2023       HgbA1c:   Lab Results   Component Value Date    HGBA1C 5.1 06/20/2023       Blood Culture:   Lab Results   Component Value Date     BLOODCX No Growth After 5 Days. 01/23/2024   ,   Urinalysis:   Lab Results   Component Value Date    COLORU Yellow 11/07/2023    COLORU Yellow 01/31/2015    CLARITYU Clear 11/07/2023    CLARITYU Hazy 01/31/2015    SPECGRAV 1.021 11/07/2023    SPECGRAV 1.010 01/31/2015    PHUR 6.5 11/07/2023    PHUR 5.5 10/06/2023    PHUR 7.0 01/31/2015    LEUKOCYTESUR Negative 11/07/2023    LEUKOCYTESUR Negative 01/31/2015    NITRITE Negative 11/07/2023    NITRITE Negative 01/31/2015    PROTEINUA Negative 01/31/2015    GLUCOSEU Negative 11/07/2023    GLUCOSEU Negative 01/31/2015    KETONESU 10 (1+) (A) 11/07/2023    KETONESU Negative 01/31/2015    BILIRUBINUR Negative 11/07/2023    BILIRUBINUR Negative 01/31/2015    BLOODU Trace (A) 11/07/2023    BLOODU Large (A) 01/31/2015     VTE Pharmacologic Prophylaxis: Enoxaparin (Lovenox)      Thank you for allowing me to participate in the care of Shannan Vee. Please don't hesitate to call, text, email, or TigerText with any questions.

## 2024-03-14 NOTE — PLAN OF CARE
Problem: Prexisting or High Potential for Compromised Skin Integrity  Goal: Skin integrity is maintained or improved  Description: INTERVENTIONS:  - Identify patients at risk for skin breakdown  - Assess and monitor skin integrity  - Assess and monitor nutrition and hydration status  - Monitor labs   - Assess for incontinence   - Turn and reposition patient  - Assist with mobility/ambulation  - Relieve pressure over bony prominences  - Avoid friction and shearing  - Provide appropriate hygiene as needed including keeping skin clean and dry  - Evaluate need for skin moisturizer/barrier cream  - Collaborate with interdisciplinary team   - Patient/family teaching  - Consider wound care consult   Outcome: Progressing     Problem: PAIN - ADULT  Goal: Verbalizes/displays adequate comfort level or baseline comfort level  Description: Interventions:  - Encourage patient to monitor pain and request assistance  - Assess pain using appropriate pain scale  - Administer analgesics based on type and severity of pain and evaluate response  - Implement non-pharmacological measures as appropriate and evaluate response  - Consider cultural and social influences on pain and pain management  - Notify physician/advanced practitioner if interventions unsuccessful or patient reports new pain  Outcome: Progressing     Problem: INFECTION - ADULT  Goal: Absence or prevention of progression during hospitalization  Description: INTERVENTIONS:  - Assess and monitor for signs and symptoms of infection  - Monitor lab/diagnostic results  - Monitor all insertion sites, i.e. indwelling lines, tubes, and drains  - Monitor endotracheal if appropriate and nasal secretions for changes in amount and color  - [place] appropriate cooling/warming therapies per order  - Administer medications as ordered  - Instruct and encourage patient and family to use good hand hygiene technique  - Identify and instruct in appropriate isolation precautions for  identified infection/condition  Outcome: Progressing  Goal: Absence of fever/infection during neutropenic period  Description: INTERVENTIONS:  - Monitor WBC    Outcome: Progressing     Problem: DISCHARGE PLANNING  Goal: Discharge to home or other facility with appropriate resources  Description: INTERVENTIONS:  - Identify barriers to discharge w/patient and caregiver  - Arrange for needed discharge resources and transportation as appropriate  - Identify discharge learning needs (meds, wound care, etc.)  - Arrange for interpretive services to assist at discharge as needed  - Refer to Case Management Department for coordinating discharge planning if the patient needs post-hospital services based on physician/advanced practitioner order or complex needs related to functional status, cognitive ability, or social support system  Outcome: Progressing     Problem: Knowledge Deficit  Goal: Patient/family/caregiver demonstrates understanding of disease process, treatment plan, medications, and discharge instructions  Description: Complete learning assessment and assess knowledge base.  Interventions:  - Provide teaching at level of understanding  - Provide teaching via preferred learning methods  Outcome: Progressing     Problem: Nutrition/Hydration-ADULT  Goal: Nutrient/Hydration intake appropriate for improving, restoring or maintaining nutritional needs  Description: Monitor and assess patient's nutrition/hydration status for malnutrition. Collaborate with interdisciplinary team and initiate plan and interventions as ordered.  Monitor patient's weight and dietary intake as ordered or per policy. Utilize nutrition screening tool and intervene as necessary. Determine patient's food preferences and provide high-protein, high-caloric foods as appropriate.     INTERVENTIONS:  - Monitor oral intake, urinary output, labs, and treatment plans  - Assess nutrition and hydration status and recommend course of action  - Evaluate  amount of meals eaten  - Assist patient with eating if necessary   - Allow adequate time for meals  - Recommend/ encourage appropriate diets, oral nutritional supplements, and vitamin/mineral supplements  - Order, calculate, and assess calorie counts as needed  - Recommend, monitor, and adjust tube feedings and TPN/PPN based on assessed needs  - Assess need for intravenous fluids  - Provide specific nutrition/hydration education as appropriate  - Include patient/family/caregiver in decisions related to nutrition  Outcome: Progressing

## 2024-03-14 NOTE — ASSESSMENT & PLAN NOTE
Malnutrition Findings:   Adult Malnutrition type: Chronic illness  Adult Degree of Malnutrition: Other severe protein calorie malnutrition  Malnutrition Characteristics: Muscle loss, Fat loss, Weight loss, Inadequate energy                  360 Statement: Severe protein-calorie malnutrition in context of chronic illness related to decreased appetite, catabolic illness as evidenced by weight loss of 19% in 6 months, severe muscle wasting (temporal, clavicle), and severe fat wasting (orbital), <75% energy intake compared to estimated needs for >1 month. Treated with oral supplement.    BMI Findings:           Body mass index is 20.14 kg/m².       Severe protein-calorie malnutrition in context of chronic illness related to decreased appetite, catabolic illness as evidenced by weight loss of 19% in 6 months, severe muscle wasting (temporal, clavicle), and severe fat wasting (orbital), <75% energy intake compared to estimated needs for >1 month. Treated with oral supplement.     BMI Findings: 20.14 kg/mA².

## 2024-03-14 NOTE — PROGRESS NOTES
"Progress Note - Pulmonary   Shannan Vee 53 y.o. female MRN: [id number]  Unit/Bed#: E5 -01 Encounter: [id number]    Assessment/Plan:    1.  Acute pulmonary deficiency likely multifaceted as listed below        -Remains on room air 94%, does not wear home O2 at all       -Maintain saturations greater than 88%       -Pulmonary toileting: Deep breathing cough out of bed as tolerated incentive spirometry    2.  Right-sided malignant effusion        -11/8/2023-thoracentesis- 220 mL         -1/24/2024-Pleurx catheter placement        -Catheter in proper place        -Plex was flushing freely with NSS,         -IR following- instilling TPA/Dornase to flush, however effusion looks almost resolved        -May consider discontinuing Pleurx if drainage continues to be minimal    3.  Possible postobstructive PNA         -procal- 0.98-- 0.86        -Day # 2/5-ceftriaxone        -MRSA swab pending    4.  Recurrent breast cancer with bone and lung metastasis          -Initially diagnosed 2013        -Unfortunately patient's lung cancer seems to be aggressively advancing causing tumor burden        -Recommend oncology follow-up to update progression         -initiated on: ribociclib 600 mg daily 3 weeks on 1 week off along with Faslodex            -Last infusion 3/4/2024         -Day of care following May need to consider hospice    5.  Acute anemia        -HgB- 7.8-- 7.4--6.7        -s/p 1UPRBC        -Pulmonary will sign off  -Outpatient follow-up on an as-needed basis    Chief Complaint:    \"I feel tired\"    Subjective:    Shannan is comfortably sitting in her bed.  She reports she is very tired and weak.  Denying any fevers, chills, mops this, headaches, night sweats, pleuritic chest pain, or palpitations.    Objective:    Vitals: Blood pressure 146/87, pulse (!) 135, temperature 99 °F (37.2 °C), resp. rate 18, height 5' 5\" (1.651 m), SpO2 91%.,Body mass index is 20.14 kg/m².      Intake/Output Summary (Last 24 hours) at " "3/14/2024 1053  Last data filed at 3/13/2024 2215  Gross per 24 hour   Intake 480 ml   Output --   Net 480 ml       Invasive Devices       Peripheral Intravenous Line  Duration             Peripheral IV 03/12/24 Dorsal (posterior);Right Hand 1 day              Drain  Duration             Pleural Effusion Long-Term Catheter 16 Fr. 49 days                    Physical Exam:     Physical Exam  Constitutional:       Appearance: She is ill-appearing.   HENT:      Head: Normocephalic and atraumatic.      Nose: Nose normal. No congestion or rhinorrhea.      Mouth/Throat:      Mouth: Mucous membranes are dry.      Pharynx: No oropharyngeal exudate or posterior oropharyngeal erythema.   Cardiovascular:      Rate and Rhythm: Normal rate and regular rhythm.      Pulses: Normal pulses.      Heart sounds: Normal heart sounds. No murmur heard.     No friction rub. No gallop.   Pulmonary:      Effort: Pulmonary effort is normal. No respiratory distress.      Breath sounds: Normal breath sounds. No stridor. No wheezing, rhonchi or rales.   Chest:      Chest wall: No tenderness.   Abdominal:      General: Abdomen is flat. Bowel sounds are normal. There is no distension.      Palpations: Abdomen is soft. There is no mass.   Musculoskeletal:         General: No swelling or tenderness. Normal range of motion.      Cervical back: Normal range of motion. No rigidity or tenderness.   Skin:     General: Skin is warm and dry.      Coloration: Skin is not jaundiced or pale.   Neurological:      General: No focal deficit present.      Mental Status: She is alert and oriented to person, place, and time. Mental status is at baseline.   Psychiatric:         Mood and Affect: Mood normal.         Behavior: Behavior normal.         Labs: I have personally reviewed pertinent lab results., ABG: No results found for: \"PHART\", \"OHP4TIB\", \"PO2ART\", \"QSH9CBK\", \"D5PNYBDJ\", \"BEART\", \"SOURCE\", BNP: No results found for: \"BNP\", CMP:   Lab Results   Component " "Value Date    SODIUM 131 (L) 03/14/2024    K 3.6 03/14/2024    CL 96 03/14/2024    CO2 23 03/14/2024    BUN 9 03/14/2024    CREATININE 0.41 (L) 03/14/2024    CALCIUM 7.9 (L) 03/14/2024    EGFR 116 03/14/2024   , PT/INR: No results found for: \"PT\", \"INR\", Troponin: No results found for: \"TROPONINI\"        Imaging and other studies: I have personally reviewed pertinent films in PACS    Chest x-ray right-sided opacity    "

## 2024-03-15 VITALS
HEIGHT: 65 IN | RESPIRATION RATE: 16 BRPM | OXYGEN SATURATION: 96 % | HEART RATE: 126 BPM | DIASTOLIC BLOOD PRESSURE: 71 MMHG | TEMPERATURE: 97.9 F | BODY MASS INDEX: 20.14 KG/M2 | SYSTOLIC BLOOD PRESSURE: 112 MMHG

## 2024-03-15 LAB
ANION GAP SERPL CALCULATED.3IONS-SCNC: 12 MMOL/L (ref 4–13)
BUN SERPL-MCNC: 10 MG/DL (ref 5–25)
CALCIUM SERPL-MCNC: 8.4 MG/DL (ref 8.4–10.2)
CHLORIDE SERPL-SCNC: 94 MMOL/L (ref 96–108)
CO2 SERPL-SCNC: 25 MMOL/L (ref 21–32)
CREAT SERPL-MCNC: 0.46 MG/DL (ref 0.6–1.3)
ERYTHROCYTE [DISTWIDTH] IN BLOOD BY AUTOMATED COUNT: 19.9 % (ref 11.6–15.1)
GFR SERPL CREATININE-BSD FRML MDRD: 112 ML/MIN/1.73SQ M
GLUCOSE SERPL-MCNC: 106 MG/DL (ref 65–140)
HCT VFR BLD AUTO: 25.5 % (ref 34.8–46.1)
HGB BLD-MCNC: 8 G/DL (ref 11.5–15.4)
MCH RBC QN AUTO: 27.1 PG (ref 26.8–34.3)
MCHC RBC AUTO-ENTMCNC: 31.4 G/DL (ref 31.4–37.4)
MCV RBC AUTO: 86 FL (ref 82–98)
PLATELET # BLD AUTO: 355 THOUSANDS/UL (ref 149–390)
PMV BLD AUTO: 8.6 FL (ref 8.9–12.7)
POTASSIUM SERPL-SCNC: 4 MMOL/L (ref 3.5–5.3)
RBC # BLD AUTO: 2.95 MILLION/UL (ref 3.81–5.12)
SODIUM SERPL-SCNC: 131 MMOL/L (ref 135–147)
WBC # BLD AUTO: 9.81 THOUSAND/UL (ref 4.31–10.16)

## 2024-03-15 PROCEDURE — 93970 EXTREMITY STUDY: CPT | Performed by: SURGERY

## 2024-03-15 PROCEDURE — 99239 HOSP IP/OBS DSCHRG MGMT >30: CPT | Performed by: INTERNAL MEDICINE

## 2024-03-15 PROCEDURE — 80048 BASIC METABOLIC PNL TOTAL CA: CPT | Performed by: INTERNAL MEDICINE

## 2024-03-15 PROCEDURE — 85027 COMPLETE CBC AUTOMATED: CPT | Performed by: INTERNAL MEDICINE

## 2024-03-15 RX ORDER — CEFUROXIME AXETIL 500 MG/1
500 TABLET ORAL EVERY 12 HOURS SCHEDULED
Qty: 9 TABLET | Refills: 0 | Status: SHIPPED | OUTPATIENT
Start: 2024-03-15 | End: 2024-03-20

## 2024-03-15 RX ADMIN — HYDROMORPHONE HYDROCHLORIDE 0.5 MG: 1 INJECTION, SOLUTION INTRAMUSCULAR; INTRAVENOUS; SUBCUTANEOUS at 05:35

## 2024-03-15 RX ADMIN — OXYCODONE HYDROCHLORIDE 10 MG: 10 TABLET ORAL at 11:31

## 2024-03-15 RX ADMIN — METOPROLOL SUCCINATE 50 MG: 50 TABLET, EXTENDED RELEASE ORAL at 09:07

## 2024-03-15 RX ADMIN — ENOXAPARIN SODIUM 40 MG: 40 INJECTION SUBCUTANEOUS at 09:06

## 2024-03-15 RX ADMIN — CEFUROXIME AXETIL 500 MG: 250 TABLET, FILM COATED ORAL at 09:07

## 2024-03-15 RX ADMIN — LIDOCAINE 5% 1 PATCH: 700 PATCH TOPICAL at 09:07

## 2024-03-15 RX ADMIN — OXYCODONE HYDROCHLORIDE 10 MG: 10 TABLET ORAL at 07:11

## 2024-03-15 RX ADMIN — OXYCODONE HYDROCHLORIDE 10 MG: 10 TABLET ORAL at 02:58

## 2024-03-15 NOTE — ASSESSMENT & PLAN NOTE
On beta-blocker prior to admission  Patient refuses additional medication titration  PE study negative for PE  Doppler negative for DVT  Echocardiogram from 1/28 with no evidence of wall motion abnormality  Close monitor, suspect elevated in the setting of anemia, and infection  Consider additional 1 unit of blood cell transfusion  Patient declining any additional beta-blocker

## 2024-03-15 NOTE — ASSESSMENT & PLAN NOTE
Possibly complicated by gram-negative pneumonia  Continue ceftriaxone day #3 of 5-transition to Ceftin to complete a course  Pulmonary consultation reviewed

## 2024-03-15 NOTE — ASSESSMENT & PLAN NOTE
S/p pleurx catheter for malignant effusion in hx of breast ca w/mets to lung  -Cta chest pe study on 3/11/24 w/Stable right pleural effusion with extensive pleural carcinomatosis and compression of the majority of the right lung  Continue to drain on previous schedule  Abnormal CT is felt to be secondary to increasing tumor burden rather than malignant effusion  tPA instilled 3/14/2024 -improvement in drainage, 50 cc removed today with some with clot

## 2024-03-15 NOTE — PLAN OF CARE
Problem: Prexisting or High Potential for Compromised Skin Integrity  Goal: Skin integrity is maintained or improved  Description: INTERVENTIONS:  - Identify patients at risk for skin breakdown  - Assess and monitor skin integrity  - Assess and monitor nutrition and hydration status  - Monitor labs   - Assess for incontinence   - Turn and reposition patient  - Assist with mobility/ambulation  - Relieve pressure over bony prominences  - Avoid friction and shearing  - Provide appropriate hygiene as needed including keeping skin clean and dry  - Evaluate need for skin moisturizer/barrier cream  - Collaborate with interdisciplinary team   - Patient/family teaching  - Consider wound care consult   Outcome: Progressing     Problem: PAIN - ADULT  Goal: Verbalizes/displays adequate comfort level or baseline comfort level  Description: Interventions:  - Encourage patient to monitor pain and request assistance  - Assess pain using appropriate pain scale  - Administer analgesics based on type and severity of pain and evaluate response  - Implement non-pharmacological measures as appropriate and evaluate response  - Consider cultural and social influences on pain and pain management  - Notify physician/advanced practitioner if interventions unsuccessful or patient reports new pain  Outcome: Progressing     Problem: INFECTION - ADULT  Goal: Absence or prevention of progression during hospitalization  Description: INTERVENTIONS:  - Assess and monitor for signs and symptoms of infection  - Monitor lab/diagnostic results  - Monitor all insertion sites, i.e. indwelling lines, tubes, and drains  - Monitor endotracheal if appropriate and nasal secretions for changes in amount and color  - [place] appropriate cooling/warming therapies per order  - Administer medications as ordered  - Instruct and encourage patient and family to use good hand hygiene technique  - Identify and instruct in appropriate isolation precautions for  identified infection/condition  Outcome: Progressing  Goal: Absence of fever/infection during neutropenic period  Description: INTERVENTIONS:  - Monitor WBC    Outcome: Progressing     Problem: DISCHARGE PLANNING  Goal: Discharge to home or other facility with appropriate resources  Description: INTERVENTIONS:  - Identify barriers to discharge w/patient and caregiver  - Arrange for needed discharge resources and transportation as appropriate  - Identify discharge learning needs (meds, wound care, etc.)  - Arrange for interpretive services to assist at discharge as needed  - Refer to Case Management Department for coordinating discharge planning if the patient needs post-hospital services based on physician/advanced practitioner order or complex needs related to functional status, cognitive ability, or social support system  Outcome: Progressing     Problem: Knowledge Deficit  Goal: Patient/family/caregiver demonstrates understanding of disease process, treatment plan, medications, and discharge instructions  Description: Complete learning assessment and assess knowledge base.  Interventions:  - Provide teaching at level of understanding  - Provide teaching via preferred learning methods  Outcome: Progressing     Problem: Nutrition/Hydration-ADULT  Goal: Nutrient/Hydration intake appropriate for improving, restoring or maintaining nutritional needs  Description: Monitor and assess patient's nutrition/hydration status for malnutrition. Collaborate with interdisciplinary team and initiate plan and interventions as ordered.  Monitor patient's weight and dietary intake as ordered or per policy. Utilize nutrition screening tool and intervene as necessary. Determine patient's food preferences and provide high-protein, high-caloric foods as appropriate.     INTERVENTIONS:  - Monitor oral intake, urinary output, labs, and treatment plans  - Assess nutrition and hydration status and recommend course of action  - Evaluate  amount of meals eaten  - Assist patient with eating if necessary   - Allow adequate time for meals  - Recommend/ encourage appropriate diets, oral nutritional supplements, and vitamin/mineral supplements  - Order, calculate, and assess calorie counts as needed  - Recommend, monitor, and adjust tube feedings and TPN/PPN based on assessed needs  - Assess need for intravenous fluids  - Provide specific nutrition/hydration education as appropriate  - Include patient/family/caregiver in decisions related to nutrition  Outcome: Progressing

## 2024-03-15 NOTE — PLAN OF CARE
Problem: Prexisting or High Potential for Compromised Skin Integrity  Goal: Skin integrity is maintained or improved  Description: INTERVENTIONS:  - Identify patients at risk for skin breakdown  - Assess and monitor skin integrity  - Assess and monitor nutrition and hydration status  - Monitor labs   - Assess for incontinence   - Turn and reposition patient  - Assist with mobility/ambulation  - Relieve pressure over bony prominences  - Avoid friction and shearing  - Provide appropriate hygiene as needed including keeping skin clean and dry  - Evaluate need for skin moisturizer/barrier cream  - Collaborate with interdisciplinary team   - Patient/family teaching  - Consider wound care consult   Outcome: Progressing     Problem: PAIN - ADULT  Goal: Verbalizes/displays adequate comfort level or baseline comfort level  Description: Interventions:  - Encourage patient to monitor pain and request assistance  - Assess pain using appropriate pain scale  - Administer analgesics based on type and severity of pain and evaluate response  - Implement non-pharmacological measures as appropriate and evaluate response  - Consider cultural and social influences on pain and pain management  - Notify physician/advanced practitioner if interventions unsuccessful or patient reports new pain  Outcome: Progressing     Problem: INFECTION - ADULT  Goal: Absence or prevention of progression during hospitalization  Description: INTERVENTIONS:  - Assess and monitor for signs and symptoms of infection  - Monitor lab/diagnostic results  - Monitor all insertion sites, i.e. indwelling lines, tubes, and drains  - Monitor endotracheal if appropriate and nasal secretions for changes in amount and color  - [place] appropriate cooling/warming therapies per order  - Administer medications as ordered  - Instruct and encourage patient and family to use good hand hygiene technique  - Identify and instruct in appropriate isolation precautions for  identified infection/condition  Outcome: Progressing  Goal: Absence of fever/infection during neutropenic period  Description: INTERVENTIONS:  - Monitor WBC    Outcome: Progressing     Problem: DISCHARGE PLANNING  Goal: Discharge to home or other facility with appropriate resources  Description: INTERVENTIONS:  - Identify barriers to discharge w/patient and caregiver  - Arrange for needed discharge resources and transportation as appropriate  - Identify discharge learning needs (meds, wound care, etc.)  - Arrange for interpretive services to assist at discharge as needed  - Refer to Case Management Department for coordinating discharge planning if the patient needs post-hospital services based on physician/advanced practitioner order or complex needs related to functional status, cognitive ability, or social support system  Outcome: Progressing     Problem: Knowledge Deficit  Goal: Patient/family/caregiver demonstrates understanding of disease process, treatment plan, medications, and discharge instructions  Description: Complete learning assessment and assess knowledge base.  Interventions:  - Provide teaching at level of understanding  - Provide teaching via preferred learning methods  Outcome: Progressing     Problem: Nutrition/Hydration-ADULT  Goal: Nutrient/Hydration intake appropriate for improving, restoring or maintaining nutritional needs  Description: Monitor and assess patient's nutrition/hydration status for malnutrition. Collaborate with interdisciplinary team and initiate plan and interventions as ordered.  Monitor patient's weight and dietary intake as ordered or per policy. Utilize nutrition screening tool and intervene as necessary. Determine patient's food preferences and provide high-protein, high-caloric foods as appropriate.     INTERVENTIONS:  - Monitor oral intake, urinary output, labs, and treatment plans  - Assess nutrition and hydration status and recommend course of action  - Evaluate  amount of meals eaten  - Assist patient with eating if necessary   - Allow adequate time for meals  - Recommend/ encourage appropriate diets, oral nutritional supplements, and vitamin/mineral supplements  - Order, calculate, and assess calorie counts as needed  - Recommend, monitor, and adjust tube feedings and TPN/PPN based on assessed needs  - Assess need for intravenous fluids  - Provide specific nutrition/hydration education as appropriate  - Include patient/family/caregiver in decisions related to nutrition  Outcome: Progressing

## 2024-03-15 NOTE — DISCHARGE SUMMARY
[facility]  Discharge- Shannan Vee 1970, 53 y.o. female MRN: [id number]  Unit/Bed#: E5 -01 Encounter: [id number]  Primary Care Provider: Todd Bañuelos MD   Date and time admitted to hospital: 3/12/2024  7:26 PM      Admitting Provider:  Ainsley Call MD  Discharge Provider:  Aric Singer DO  Admission Date: 3/12/2024       Discharge Date: 03/15/24   LOS: 3  Primary Care Physician at Discharge: Todd Bañuelos [phone]    HOSPITAL COURSE:  Shannan Vee is a 53 y.o. female who presented with shortness of breath and decreased output from patient's Pleurx catheter.  The patient has a past medical history of stage IV breast cancer with recurrence she is followed by Dr. Villavicencio and oncology.  She has known osseous and pulmonary metastasis and has a Pleurx catheter in place for drainage of malignant effusion.  The patient was subsequently admitted for IR evaluation of her Pleurx catheter.  The catheter was felt to be in good place however tube was felt to be obstructed by worsening tumor burden.  tPA was instilled into the catheter and resulted in improved drainage.    The patient will follow-up with interventional radiology should her catheter fail to drain.  If this becomes the case the only solution is to remove the catheter as he will likely occlude again secondary to tumor.    In terms of her cancer she was evaluated by oncology, she will need close outpatient follow-up to discuss additional treatment.  She is currently on Kisqali.    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  She declined any additional salt tablets.  She was also noted to have significant tachycardia which was felt to be secondary to tumor burden, as well as possible pneumonia.  She was started on ceftriaxone and treated with a course of antibiotics for the treatment of possible postobstructive pneumonia.    The patient remained tachycardic  throughout the hospitalization.  She is on beta-blocker therapy but declined any additional beta-blocker titration.  Previous echocardiogram showed normal EF and thyroid function studies remained within normal limits.  I suspect tachycardia in the setting of anemia as well as ongoing cancer burden.    The patient was transfused 1 unit packed red blood cells for anemia.  This was felt to be secondary to myelosuppression from cancer//anemia of chronic disease    DISCHARGE DIAGNOSES  * Malignant neoplasm of overlapping sites of left female breast (HCC)  Assessment & Plan  Followed by [facility] oncology, Dr. Villavicencio  Initially diagnosed with breast cancer in 2013 now with recurrence  Stage IV disease  Evidence of bony metastasis in the thoracic spine  With significant right lung metastasis confirmed by biopsy.  Remains on oral hormonal therapy, -he has not yet started systemic chemotherapy  If an aggressive nature of disease, oncology consultation has been placed.  Continues with drainage from malignant pleural effusion  Oncology consultation placed  Continue home oral chemotherapy, Kisqali -patient is aware that she may need to bring medication in  Ongoing goals of care discussion  Follow-up with oncology on 3/19      Large pleural effusion  Assessment & Plan  S/p pleurx catheter for malignant effusion in hx of breast ca w/mets to lung  -Cta chest pe study on 3/11/24 w/Stable right pleural effusion with extensive pleural carcinomatosis and compression of the majority of the right lung  Continue to drain on previous schedule  Abnormal CT is felt to be secondary to increasing tumor burden rather than malignant effusion  tPA instilled 3/14/2024 -improvement in drainage, 50 cc removed today with some with clot      Tachycardia  Assessment & Plan  On beta-blocker prior to admission  Patient refuses additional medication titration  PE study negative for PE  Doppler negative for DVT  Echocardiogram from 1/28 with no evidence  of wall motion abnormality  Close monitor, suspect elevated in the setting of anemia, and infection  Consider additional 1 unit of blood cell transfusion  Patient declining any additional beta-blocker    Hyponatremia  Assessment & Plan  Likely secondary to SIADH due to widely metastatic malignancy as well as pleural effusion  Sodium currently ranging 133-131  Patient was started on salt tablets 1 g 3 times daily prior to discharge  Patient refusing salt tabs, will encourage as necessary  Will place fluid restriction    Severe protein-calorie malnutrition (HCC)  Assessment & Plan  Malnutrition Findings:   Adult Malnutrition type: Chronic illness  Adult Degree of Malnutrition: Other severe protein calorie malnutrition  Malnutrition Characteristics: Muscle loss, Fat loss, Weight loss, Inadequate energy                  360 Statement: Severe protein-calorie malnutrition in context of chronic illness related to decreased appetite, catabolic illness as evidenced by weight loss of 19% in 6 months, severe muscle wasting (temporal, clavicle), and severe fat wasting (orbital), <75% energy intake compared to estimated needs for >1 month. Treated with oral supplement.    BMI Findings:           Body mass index is 20.14 kg/m².       Severe protein-calorie malnutrition in context of chronic illness related to decreased appetite, catabolic illness as evidenced by weight loss of 19% in 6 months, severe muscle wasting (temporal, clavicle), and severe fat wasting (orbital), <75% energy intake compared to estimated needs for >1 month. Treated with oral supplement.     BMI Findings: 20.14 kg/mA².          Other specified anemias  Assessment & Plan  Normocytic Worsening anemia to 6.7 despite improvement w/transfusion upwards of 0-10 during last admission.    Suspect anemia of chronic disease has bone marrow suppression  Transfused 1 unit of packed red blood cells    Pain from bone metastases (HCC)  Assessment & Plan  Continue op  oxycodone  Continue lidocaine patch    Right lower lobe lung mass  Assessment & Plan  Possibly complicated by gram-negative pneumonia  Continue ceftriaxone day #3 of 5-transition to Ceftin to complete a course  Pulmonary consultation reviewed    Limb swelling  Assessment & Plan  Likely venous congestion from tumor burden  Compression stockings  Elevate lower extremities      CONSULTING PROVIDERS   INPATIENT CONSULT TO IR  IP CONSULT TO PULMONOLOGY  IP CONSULT TO ONCOLOGY    PROCEDURES PERFORMED  * No surgery found *    RADIOLOGY RESULTS  XR chest portable    Result Date: 3/13/2024  Impression: Overall, no significant change from recent prior. Near complete consolidation of the right hemithorax. Patchy to nodular faint left lung opacities, but otherwise well aerated left lung noted. Workstation performed: [id number]       LABS  Results from last 7 days   Lab Units 03/15/24  0500 03/14/24  0552 03/13/24  1541 03/12/24  1952 03/11/24  1208   WBC Thousand/uL 9.81 8.38 7.72 7.69 8.67   HEMOGLOBIN g/dL 8.0* 7.5* 8.1* 6.7* 7.4*   HEMATOCRIT % 25.5* 24.0* 25.7* 23.3* 25.2*   MCV fL 86 88 86 93 91   TOTAL NEUT ABS Thousand/uL  --   --   --  5.38  --    BANDS PCT %  --   --   --  3  --    PLATELETS Thousands/uL 355 355 339 378 401*     Results from last 7 days   Lab Units 03/15/24  0500 03/14/24  0552 03/12/24  2120 03/11/24  1622 03/11/24  1208   SODIUM mmol/L 131* 131* 132* 131* 126*   POTASSIUM mmol/L 4.0 3.6 4.0 3.9 5.9*   CHLORIDE mmol/L 94* 96 96 98 92*   CO2 mmol/L 25 23 23 22 21   BUN mg/dL 10 9 11 10 11   CREATININE mg/dL 0.46* 0.41* 0.47* 0.44* 0.49*   CALCIUM mg/dL 8.4 7.9* 8.3* 7.8* 8.7   ALBUMIN g/dL  --   --  3.1*  --  3.4*   TOTAL BILIRUBIN mg/dL  --   --  0.52  --  0.73   ALK PHOS U/L  --   --  107*  --  123*   ALT U/L  --   --  12  --  11   AST U/L  --   --  65*  --  83*   EGFR ml/min/1.73sq m 112 116 111 114 110   GLUCOSE RANDOM mg/dL 106 95 98 104 113     Results from last 7 days   Lab Units 03/13/24  0208  "03/12/24 2225 03/12/24 1952   HS TNI 0HR ng/L  --   --  7   HS TNI 2HR ng/L  --  8  --    HS TNI 4HR ng/L 7  --   --                       Results from last 7 days   Lab Units 03/14/24  0552   TSH 3RD GENERATON uIU/mL 5.403*   FREE T4 ng/dL 0.48*     Results from last 7 days   Lab Units 03/12/24 2120   PROCALCITONIN ng/ml 0.86*           Cultures:         Invalid input(s): \"URIBILINOGEN\"        Results from last 7 days   Lab Units 03/12/24 1952   INFLUENZA A PCR  Negative     Results from last 7 days   Lab Units 03/12/24 1952   SARS-COV-2  Negative   INFLUENZA A PCR  Negative   INFLUENZA B PCR  Negative   RSV PCR  Negative         PHYSICAL EXAM:  Vitals:   Blood Pressure: 112/71 (03/15/24 1132)  Pulse: (!) 126 (03/15/24 1132)  Temperature: 97.9 °F (36.6 °C) (03/15/24 1130)  Temp Source: Temporal (03/15/24 1130)  Respirations: 16 (03/15/24 1130)  Height: 5' 5\" (165.1 cm) (03/13/24 1443)  SpO2: 96 % (03/15/24 1132)      General: well appearing, no acute distress  HEENT: atraumatic, PERRLA, moist mucosa, normal pharynx, normal tonsils and adenoids, normal tongue, no fluid in sinuses  Neck: Trachea midline, no carotid bruit, no masses  Respiratory: normal chest wall expansion, CTA B  Cardiovascular: RRR, no m/r/g, Normal S1 and S2  Abdomen: Soft, non-tender, non-distended, normal bowel sounds in all quadrants, no hepatosplenomegaly, no tympany  Rectal: deferred  Musculoskeletal: Moves all  Integumentary: warm, dry, and pink, with no visible rash, purpura, or petechia  Heme/Lymph: no lymphadenopathy, no bruises  Neurological: Cranial Nerves II-XII grossly intact  Psychiatric: cooperative with normal mood, affect, and cognition       Discharge Disposition: Home/Self Care    AM-PAC Basic Mobility:  Basic Mobility Inpatient Raw Score: 19    JH-HLM Achieved: 6: Walk 10 steps or more  -HLM Goal: 6: Walk 10 steps or more    HLM Goal listed above. Continue with ongoing physical therapy and encourage appropriate mobility " to improve upon HLM goals.      Test Results Pending at Discharge:           Medications   Summary of Medication Adjustments made as a result of this hospitalization: See discharge summary and AVS for medication changes  Medication Dosing Tapers - Please refer to Discharge Medication List for details on any medication dosing tapers (if applicable to patient).  Discharge Medication List: See after visit summary for reconciled discharge medications.     Diet restrictions:         Diet Orders   (From admission, onward)                 Start     Ordered    03/14/24 1834  Diet Regular; Regular House; Fluid Restriction 1800 ML  Diet effective now        References:    Adult Nutrition Support Algorithm    RD Therapeutic Diet Order Protocol   Question Answer Comment   Diet Type Regular    Regular Regular House    Other Restriction(s): Fluid Restriction 1800 ML    RD to adjust diet per protocol? Yes        03/14/24 1833    03/13/24 1528  Dietary nutrition supplements  Once        Question Answer Comment   Select Supplement: Ensure Plus High Protein Chocolate    Frequency Breakfast, Dinner        03/13/24 1527                  Activity restrictions: No strenuous activity  Discharge Condition: fair    Outpatient Follow-Up and Discharge Instructions  See after visit summary section titled Discharge Instructions for information provided to patient and family.      Code Status: Level 1 - Full Code  Discharge Statement   I spent 86 minutes discharging the patient. This time was spent on the day of discharge. Greater than 50% of total time was spent with the patient and / or family counseling and / or coordination of care.    ** Please Note: This note was completed in part utilizing [employer] Dragon [employer] One Software.  Grammatical errors, random word insertions, spelling mistakes, and incomplete sentences may be an occasional consequence of this system secondary to software limitations, ambient noise, and hardware issues.  If you  have any questions or concerns about the content, text, or information contained within the body of this dictation, please contact the provider for clarification.**

## 2024-03-15 NOTE — NURSING NOTE
Discharge instructions reviewed with patient and daughter. Questions/concerns addressed, both verbalized their understanding of same. #9 count of oxycodone brought in from home returned to patient.

## 2024-03-15 NOTE — DISCHARGE INSTR - AVS FIRST PAGE
Dear Shannan Vee,     [facility]  It is our hope that we were always able to exceed the expected standards for your care during your stay.  You were hospitalized due to shortness of breath, anemia and possibly blockage and Pleurx catheter.  You were cared for on the fifth floor by Airc Singer DO with the [facility] who covers for your primary care physician (PCP), Todd Bañuelos MD, while you were hospitalized.  If you have any questions or concerns related to this hospitalization, you may contact us at .  For follow up as well as any medication refills, we recommend that you follow up with your primary care physician.  A registered nurse will reach out to you by phone within a few days after your discharge to answer any additional questions that you may have after going home.  However, at this time we provide for you here, the most important instructions / recommendations at discharge:     Notable Medication Adjustments -   No changes to chronic home medication  Testing Required after Discharge -   Repeat CBC and BMP with PCP and/or oncology  Important follow up information -   Please follow-up with  oncologist Dr. Buitrago on 3/19/2024, [facility]  Other Instructions -   Please contact IR if your Pleurx catheter is not working, it potentially may need to be removed  Continue to drain it as you previously have done  Please review this entire after visit summary as additional general instructions including medication list, appointments, activity, diet, any pertinent wound care, and other additional recommendations from your care team that may be provided for you.      Sincerely,     Aric Singer DO

## 2024-03-15 NOTE — ASSESSMENT & PLAN NOTE
Followed by [facility] oncology, Dr. Villavicencio  Initially diagnosed with breast cancer in 2013 now with recurrence  Stage IV disease  Evidence of bony metastasis in the thoracic spine  With significant right lung metastasis confirmed by biopsy.  Remains on oral hormonal therapy, -he has not yet started systemic chemotherapy  If an aggressive nature of disease, oncology consultation has been placed.  Continues with drainage from malignant pleural effusion  Oncology consultation placed  Continue home oral chemotherapy, Kisqali -patient is aware that she may need to bring medication in  Ongoing goals of care discussion  Follow-up with oncology on 3/19

## 2024-03-18 ENCOUNTER — TELEPHONE (OUTPATIENT)
Dept: HEMATOLOGY ONCOLOGY | Facility: CLINIC | Age: 54
End: 2024-03-18

## 2024-03-18 NOTE — TELEPHONE ENCOUNTER
With the assistance of [employer]  [id number] (Lithuanian), we phoned the patient and I was able to introduce myself and indicated that I was calling from [facility]emat[facility]/oncology to remind her of her upcoming appointment. We reviewed that she has an appointment tomorrow with Dr. Nilda Buitrago at 0920 in the [place] office. Shannan asked if there was an appointment later in the morning, and I was able to provide her with an appointment with Dr. Buitrago at 1040. Shannan expressed that this would work for her, so we reviewed the office location. She was appreciative of the call.

## 2024-03-18 NOTE — UTILIZATION REVIEW
NOTIFICATION OF ADMISSION DISCHARGE   This is a Notification of Discharge from [facility]. Please be advised that this patient has been discharge from our facility. Below you will find the admission and discharge date and time including the patient’s disposition.   UTILIZATION REVIEW CONTACT:  Jovana Berg  Utilization   Network Utilization Review Department  Phone: [phone] x carefully listen to the prompts. All voicemails are confidential.  Email: [email or web address]     ADMISSION INFORMATION  PRESENTATION DATE: 3/12/2024  7:26 PM  OBERVATION ADMISSION DATE:   INPATIENT ADMISSION DATE: 3/12/24  9:59 PM   DISCHARGE DATE: 3/15/2024  3:15 PM   DISPOSITION:Home/Self Care    Network Utilization Review Department  ATTENTION: Please call with any questions or concerns to [phone] and carefully listen to the prompts so that you are directed to the right person. All voicemails are confidential.   For Discharge needs, contact Care Management DC Support Team at [phone] opt. 2  Send all requests for admission clinical reviews, approved or denied determinations and any other requests to dedicated fax number below belonging to the campus where the patient is receiving treatment. List of dedicated fax numbers for the Facilities:  FACILITY NAME UR FAX NUMBER   ADMISSION DENIALS (Administrative/Medical Necessity) [phone]   DISCHARGE SUPPORT TEAM ([facility]) [phone]   PARENT CHILD HEALTH (Maternity/NICU/Pediatrics) [phone]   [facility] [phone]   [facility] [phone]   [facility] [phone]   [facility] [phone]   [facility] [phone]   [facility] [phone]   [facility] [phone]   [facility] [phone]   [facility]  [facility] [phone]   [facility] [phone]   [facility] [phone]   [facility] [phone]

## 2024-03-19 ENCOUNTER — OFFICE VISIT (OUTPATIENT)
Dept: HEMATOLOGY ONCOLOGY | Facility: CLINIC | Age: 54
End: 2024-03-19
Payer: COMMERCIAL

## 2024-03-19 ENCOUNTER — TELEPHONE (OUTPATIENT)
Dept: HEMATOLOGY ONCOLOGY | Facility: CLINIC | Age: 54
End: 2024-03-19

## 2024-03-19 VITALS
SYSTOLIC BLOOD PRESSURE: 120 MMHG | TEMPERATURE: 97.6 F | HEART RATE: 124 BPM | BODY MASS INDEX: 20.91 KG/M2 | WEIGHT: 125.5 LBS | DIASTOLIC BLOOD PRESSURE: 70 MMHG | OXYGEN SATURATION: 95 % | RESPIRATION RATE: 16 BRPM | HEIGHT: 65 IN

## 2024-03-19 DIAGNOSIS — C50.812 MALIGNANT NEOPLASM OF OVERLAPPING SITES OF LEFT BREAST IN FEMALE, ESTROGEN RECEPTOR POSITIVE (HCC): Primary | ICD-10-CM

## 2024-03-19 DIAGNOSIS — C50.919 RECURRENT MALIGNANT NEOPLASM OF BREAST, UNSPECIFIED LATERALITY (HCC): ICD-10-CM

## 2024-03-19 DIAGNOSIS — G89.3 PAIN FROM BONE METASTASES (HCC): ICD-10-CM

## 2024-03-19 DIAGNOSIS — Z17.0 MALIGNANT NEOPLASM OF OVERLAPPING SITES OF LEFT BREAST IN FEMALE, ESTROGEN RECEPTOR POSITIVE (HCC): Primary | ICD-10-CM

## 2024-03-19 DIAGNOSIS — D64.9 NORMOCYTIC ANEMIA: ICD-10-CM

## 2024-03-19 DIAGNOSIS — C79.51 PAIN FROM BONE METASTASES (HCC): ICD-10-CM

## 2024-03-19 PROCEDURE — 99215 OFFICE O/P EST HI 40 MIN: CPT | Performed by: INTERNAL MEDICINE

## 2024-03-19 NOTE — PROGRESS NOTES
Patient:    MRN:  [id number]    Aidin Request ID:  [id number]    Level of care reserved:    Partner Reserved:    Clinical needs requested:    Geography searched:  [id number]    Start of Service:    Request sent:  12:11pm EDT on 3/13/2024 by Afua Watson    Partner reserved:  by Afua Watson    Choice list shared:  2:20pm EDT on 3/19/2024 by Afua Watson

## 2024-03-19 NOTE — TELEPHONE ENCOUNTER
Left message with patient for NM PET CT @  on April 12th at 9AM. Provided the phone# to reschedule

## 2024-03-19 NOTE — PROGRESS NOTES
Shannan Vee  1970  [address]  [facility] [place]  [address]  [facility] [id number]    Chief Complaint   Patient presents with    Follow-up     Assessment/plan:   1.  Metastatic breast carcinoma.  Metastatic disease to the bones and pleura.  Malignant pleural effusion.  2.  Cachexia  3.  Anemia       Shannan Vee is a 53-year-old surgically postmenopausal female with past medical history significant for locally advanced left-sided breast cancer, hormone receptor positive and HER2 negative, grade 2 initially diagnosed in June 2013.  Previously followed with Dr. Villavicencio.  She received neoadjuvant dose dense chemotherapy followed by mastectomy.  Pathology demonstrated significant residual disease with 2 positive lymph nodes.  She received adjuvant radiation and started on tamoxifen which was later changed to Arimidex in 2015-October 2022 after which she stopped Arimidex for unknown reason.  After patient had undergone hysterectomy and oophorectomy.  In October 2023, patient was found to have extensive metastatic disease, large lung mass and mediastinal adenopathy.  Biopsy of the bone was consistent with breast carcinoma (ER positive, HER2 negative by FISH) in October 2023.  Lung biopsy was consistent with malignant cells compatible with her history of metastatic breast carcinoma and Caris testing noted triple negative disease.  She has been on treatment with ribociclib and Faslodex.    She completed radiation at [facility].  Recent CT scan of the chest demonstrated stable right-sided pleural effusion, still with pleural carcinomatosis.  She continues with thoracentesis as needed.  Recently evaluated by IR and their impression is that patient has extensive tumor of the right lung with some encasing the catheter. He did not believe that further manipulation of the catheter would provide any clinical benefit.  Position of the catheter is well evaluated at this time.  tPA could be  considered to break up loculations but the concern is bleeding especially with anemia.     Lengthy discussion with the patient and her daughter via .  We discussed her diagnosis, treatment option and prognosis.  Chart and prior history leading up to her diagnosis reviewed. Biopsy of the bone was consistent with breast carcinoma (ER positive, HER2 negative by FISH) in October 2023.  Lung biopsy was consistent with malignant cells compatible with her history of metastatic breast carcinoma and Caris testing noted triple negative disease.  She has been on treatment with ribociclib and Faslodex.  Case discussed with patient's prior oncologist.  Her prior pathology from the bone shows ER positive disease and disease from pleural fluid shows triple negative.  Will review with pathologist.  I have ordered a Cerianna PET scan to evaluate further.  However, patient's declining performance status will make aggressive oncologic treatment very challenging.  Discussed goals of care with patient and her daughter.  Encouraged them to follow-up with palliative care.  She will discuss with her family.  She will follow-up after PET scan and after she discusses with her family.    Daniela Greenlandic Interpretor # [id number]    Oncology history:   Primary Diagnosis:  1.  Metastatic breast carcinoma.  Metastatic disease to the bones and right pleura.  Malignant pleural effusion.  Diagnosed October 2023.  2.  History of locally advanced left breast cancer with skin involvement, grade 2, ER/KY positive HER-2 negative disease. Diagnosed in June 2013.  3.  BRCA1/2 mutation negative.       Previous Hematologic/ Oncologic Treatment:   1. Neoadjuvant chemotherapy with dose dense AC X4 followed by weekly paclitaxel x12. Completed in December 2013.  2. S/p left mastectomy with sentinel lymph node biopsy.  February 2014  2. Adjuvant hormonal therapy with tamoxifen from March 2014 through May 2015.  3. Postmastectomy radiation therapy  completed in May 2014.   4. Adjuvant endocrine therapy with anastrozole 1 mg May 2015-     Current Hematologic/ Oncologic Treatment:    ribociclib and Faslodex.    Disease Status:      Test Results:  Pathology:  6/18/2013: Left breast biopsy  Invasive mammary carcinoma with lobular features.    Grade 2.    ER 90%  %    HER2 negative    10/9/2023: A. Iliac Crest, Right, Biospy:  - Metastatic lobular carcinoma of the breast.  ER positive (40%), AK negative (0%), HER2 equivocal by IHC (2+), negative by FISH.    11/8/2023: A-B. Pleural, Right, (ThinPrep and cell block preparations):  Metastatic adenocarcinoma, see comment.  Satisfactory for evaluation.   Comment: There are malignant cells present in the pleural fluid with expression of MOC-31, SAW-EP4 and KHANH-3. This immunoprofile supports the diagnosis and is most compatible with the patient's reported history of metastatic breast cancer. Clinical correlation is advised.    11/9/2023: A. Lung, Right Lower Lobe Bronchial Brushing:  Malignant cells present; positive for carcinoma, see note.      Radiology:  3/11/2024: CTA chest:  No pulmonary embolism.  Stable right pleural effusion with extensive pleural carcinomatosis and compression of the majority of the right lung.  Stable left upper lobe juxtapleural groundglass infiltrates.  Stable diffuse osseous metastatic disease with thoracic compression fractures    History of present illness:   Shannan Vee is a 53-year-old surgically postmenopausal female with history of locally advanced left breast cancer, grade 2, ER/AK positive HER-2 negative disease, diagnosed in 2013. She had no evidence of BRCA mutation. She underwent neoadjuvant chemotherapy with AC followed by paclitaxel resulting in clinical partial response. However, when she had mastectomy, she had significant residual disease, as well as 2 positive lymph node. She completed postmastectomy radiation therapy. She was on adjuvant hormonal therapy with  tamoxifen, until she underwent hysterectomy and bilateral oophorectomy for fibroid.  Subsequently, she was on anastrozole since 2015 through October 2022.      In October 2023, patient was found to have extensive metastatic disease, a large lung mass and mediastinal adenopathy. Biopsy was consistent with metastatic breast cancer. Patient was started on ribociclib and Faslodex. Patient also received radiation to the spine. Patient with recent right-sided pleural effusions, Pleurx catheter placed.  Patient was following with Dr. Villavicencio.    In March 2024, she had symptoms of progressive shortness of breath, dizziness and decreased activity.  Recently admitted in March 2024.    Patient presents for transfer of care/follow-up visit accompanied by her daughter.  She notes feeling fatigued and weak.  Spends a lot of time in bed.  She does feel sad about her diagnosis.  She has a Pleurx catheter which is drained by her daughter every 5 days.  She notes lower extremity swelling.    Review of systems:   Review of Systems   Constitutional:  Positive for fatigue. Negative for chills and fever.   HENT:  Negative for ear pain and sore throat.    Eyes:  Negative for pain and visual disturbance.   Respiratory:  Positive for shortness of breath. Negative for cough.    Cardiovascular:  Positive for leg swelling. Negative for chest pain and palpitations.   Gastrointestinal:  Negative for abdominal pain and vomiting.   Genitourinary:  Negative for dysuria and hematuria.   Musculoskeletal:  Negative for arthralgias and back pain.   Skin:  Negative for color change and rash.   Neurological:  Positive for weakness. Negative for seizures and syncope.   All other systems reviewed and are negative.      Patient Active Problem List   Diagnosis    Carcinoma of left breast, estrogen receptor positive     Use of anastrozole (Arimidex)    Limb swelling    Malignant neoplasm of overlapping sites of left female breast (HCC)    Uterine leiomyoma     Personal history of malignant neoplasm of breast    Overweight (BMI 25.0-29.9)    Urinary frequency    Bladder wall thickening    Recurrent malignant neoplasm of breast (HCC)    Right lower lobe lung mass    Pain from bone metastases (HCC)    Other specified anemias    Large pleural effusion    Chronic bilateral low back pain without sciatica    Cancer associated pain    Severe protein-calorie malnutrition (HCC)    Drug induced constipation    Hyponatremia    Tachycardia     Past Medical History:   Diagnosis Date    Breast cancer (HCC) 07/2013    left-chemotherapy    Fibroma     Goals of care, counseling/discussion     History of chemotherapy     History of external beam radiation therapy      Past Surgical History:   Procedure Laterality Date    APPENDECTOMY      BREAST SURGERY  2014    Left breast 2/2 breast CA    HYSTERECTOMY  2015    Due to fibroma    IR BIOPSY BONE  10/9/2023    IR PLEURAL EFFUSION LONG-TERM CATHETER PLACEMENT  1/24/2024    IR THORACENTESIS  11/8/2023    MASTECTOMY Left 2013    OOPHORECTOMY       Family History   Problem Relation Age of Onset    Lung cancer Mother     No Known Problems Father     No Known Problems Sister     No Known Problems Daughter     No Known Problems Daughter     Cancer Maternal Grandmother     No Known Problems Maternal Grandfather     No Known Problems Paternal Grandmother     No Known Problems Paternal Grandfather     Uterine cancer Maternal Aunt     No Known Problems Son     No Known Problems Paternal Aunt      Social History     Socioeconomic History    Marital status: /Civil Union     Spouse name: Not on file    Number of children: 3    Years of education: Not on file    Highest education level: 12th grade   Occupational History     Comment: unemployed   Tobacco Use    Smoking status: Never     Passive exposure: Never    Smokeless tobacco: Never   Vaping Use    Vaping status: Never Used   Substance and Sexual Activity    Alcohol use: Not Currently    Drug  use: No    Sexual activity: Yes     Partners: Male     Birth control/protection: Surgical   Other Topics Concern    Not on file   Social History Narrative    Not on file     Social Determinants of Health     Financial Resource Strain: Low Risk  (2/28/2024)    Overall Financial Resource Strain (CARDIA)     Difficulty of Paying Living Expenses: Not hard at all   Recent Concern: Financial Resource Strain - Medium Risk (1/17/2024)    Received from [facility]    Overall Financial Resource Strain (CARDIA)     Difficulty of Paying Living Expenses: Somewhat hard   Food Insecurity: No Food Insecurity (3/13/2024)    Hunger Vital Sign     Worried About Running Out of Food in the Last Year: Never true     Ran Out of Food in the Last Year: Never true   Recent Concern: Food Insecurity - Food Insecurity Present (1/17/2024)    Received from [facility]    Hunger Vital Sign     Worried About Running Out of Food in the Last Year: Sometimes true     Ran Out of Food in the Last Year: Patient declined   Transportation Needs: No Transportation Needs (3/13/2024)    PRAPARE - Transportation     Lack of Transportation (Medical): No     Lack of Transportation (Non-Medical): No   Recent Concern: Transportation Needs - Unmet Transportation Needs (1/17/2024)    Received from [facility]    PRAPARE - Transportation     Lack of Transportation (Medical): Yes     Lack of Transportation (Non-Medical): Patient declined   Physical Activity: Inactive (11/14/2023)    Received from [facility]    Exercise Vital Sign     Days of Exercise per Week: 0 days     Minutes of Exercise per Session: 0 min   Stress: No Stress Concern Present (11/14/2023)    Received from [facility]    [employer] - Occupational Stress Questionnaire     Feeling of Stress : Only a little   Social Connections: Socially Integrated (11/14/2023)    Received from [place]  [facility]    Social Connection and Isolation Panel [NHANES]     Frequency of Communication with Friends and Family: More than three times a week     Frequency of Social Gatherings with Friends and Family: More than three times a week     Attends Jewish Services: More than 4 times per year     Active Member of Clubs or Organizations: Yes     Attends Club or Organization Meetings: More than 4 times per year     Marital Status:    Intimate Partner Violence: Not At Risk (1/17/2024)    Received from [facility]    Humiliation, Afraid, Rape, and Kick questionnaire     Fear of Current or Ex-Partner: No     Emotionally Abused: No     Physically Abused: No     Sexually Abused: No   Housing Stability: Low Risk  (3/13/2024)    Housing Stability Vital Sign     Unable to Pay for Housing in the Last Year: No     Number of Places Lived in the Last Year: 1     Unstable Housing in the Last Year: No       Current Outpatient Medications:     acetaminophen (TYLENOL) 500 mg tablet, Take 2 tablets (1,000 mg total) by mouth 3 (three) times a day as needed for mild pain, Disp: 180 tablet, Rfl: 0    calcium citrate-vitamin D (Calcitrate Plus D) 315 mg-5 mcg tablet, Take 1 tablet by mouth 2 (two) times a day, Disp: 180 tablet, Rfl: 0    ergocalciferol (VITAMIN D2) 50,000 units, TAKE 1 CAPSULE BY MOUTH 1 TIME A WEEK, Disp: 8 capsule, Rfl: 0    lidocaine (LIDODERM) 5 %, Apply 2 patches topically over 12 hours daily Remove & Discard patch within 12 hours or as directed by MD, Disp: 60 patch, Rfl: 0    metoprolol succinate (TOPROL-XL) 50 mg 24 hr tablet, Take 1 tablet (50 mg total) by mouth daily, Disp: 30 tablet, Rfl: 0    Multiple Vitamin (MULTIVITAMIN ADULT PO), Take 1 tablet by mouth in the morning, Disp: , Rfl:     naloxone (NARCAN) 4 mg/0.1 mL nasal spray, Administer 1 spray into a nostril. If no response after 2-3 minutes, give another dose in the other nostril using a new spray., Disp: 1 each, Rfl:  0    oxyCODONE (ROXICODONE) 10 MG TABS, Take 1 tablet (10 mg total) by mouth every 4 (four) hours as needed for severe pain or moderate pain Max Daily Amount: 60 mg, Disp: 120 tablet, Rfl: 0    polyethylene glycol (GLYCOLAX) 17 GM/SCOOP powder, , Disp: , Rfl:     Ribociclib Succ, 600 MG Dose, 200 MG TBPK, Take 600 mg by mouth in the morning For 3 weeks and then 1 week off, Disp: 21 each, Rfl: 6    Senokot 8.6 MG tablet, Take 17.2 mg by mouth, Disp: , Rfl:     sodium chloride 1 g tablet, Take 1 tablet (1 g total) by mouth 2 (two) times a day, Disp: 60 tablet, Rfl: 0  Allergies   Allergen Reactions    Aspirin Rash     Other reaction(s): Palpitations     Vitals:    03/19/24 1105   BP: 120/70   Pulse: (!) 124   Resp: 16   Temp: 97.6 °F (36.4 °C)   SpO2: 95%     Wt Readings from Last 3 Encounters:   03/20/24 58.3 kg (128 lb 8.5 oz)   03/19/24 56.9 kg (125 lb 8 oz)   02/28/24 54.9 kg (121 lb)     Performance Status: ECOG/Zubrod/WHO: 3 - Symptomatic, >50% confined to bed  Physical Exam  Vitals reviewed.   Constitutional:       General: She is not in acute distress.     Comments: Frail appearance   HENT:      Head: Normocephalic and atraumatic.      Mouth/Throat:      Mouth: Mucous membranes are moist.   Eyes:      Extraocular Movements: Extraocular movements intact.      Conjunctiva/sclera: Conjunctivae normal.   Cardiovascular:      Rate and Rhythm: Normal rate.   Pulmonary:      Effort: Pulmonary effort is normal. No respiratory distress.      Comments: Decreased breath sounds.  Right Pleurx catheter in place.  Abdominal:      General: Abdomen is flat. There is no distension.      Palpations: Abdomen is soft.   Musculoskeletal:      Cervical back: Normal range of motion and neck supple.      Right lower leg: Edema (Edema up to her knees) present.      Left lower leg: Edema (Edema up to her knees) present.   Skin:     General: Skin is warm.      Coloration: Skin is not pale.   Neurological:      Mental Status: She is  alert and oriented to person, place, and time. Mental status is at baseline.      Cranial Nerves: No cranial nerve deficit.   Psychiatric:         Thought Content: Thought content normal.       Orders Placed This Encounter   Procedures    CBC and differential    Comprehensive metabolic panel    Cancer antigen 27.29     Return in about 2 weeks (around 4/2/2024).  1. Pet scan  2. FU in 2 weeks with CBC, CMP prior

## 2024-03-20 ENCOUNTER — APPOINTMENT (EMERGENCY)
Dept: RADIOLOGY | Facility: HOSPITAL | Age: 54
DRG: 382 | End: 2024-03-20
Payer: COMMERCIAL

## 2024-03-20 ENCOUNTER — HOSPITAL ENCOUNTER (EMERGENCY)
Facility: HOSPITAL | Age: 54
Discharge: HOME | DRG: 382 | End: 2024-03-20
Attending: EMERGENCY MEDICINE | Admitting: EMERGENCY MEDICINE
Payer: COMMERCIAL

## 2024-03-20 VITALS
SYSTOLIC BLOOD PRESSURE: 117 MMHG | BODY MASS INDEX: 21.39 KG/M2 | TEMPERATURE: 98.1 F | DIASTOLIC BLOOD PRESSURE: 58 MMHG | OXYGEN SATURATION: 99 % | WEIGHT: 128.53 LBS | RESPIRATION RATE: 18 BRPM | HEART RATE: 125 BPM

## 2024-03-20 DIAGNOSIS — J90 PLEURAL EFFUSION ON RIGHT: ICD-10-CM

## 2024-03-20 DIAGNOSIS — K59.03 DRUG-INDUCED CONSTIPATION: ICD-10-CM

## 2024-03-20 DIAGNOSIS — R06.02 SHORTNESS OF BREATH: Primary | ICD-10-CM

## 2024-03-20 LAB
ANION GAP SERPL CALCULATED.3IONS-SCNC: 10 MMOL/L (ref 4–13)
ANISOCYTOSIS BLD QL SMEAR: PRESENT
BASOPHILS # BLD MANUAL: 0 THOUSAND/UL (ref 0–0.1)
BASOPHILS NFR MAR MANUAL: 0 % (ref 0–1)
BUN SERPL-MCNC: 9 MG/DL (ref 5–25)
CALCIUM SERPL-MCNC: 8.3 MG/DL (ref 8.4–10.2)
CARDIAC TROPONIN I PNL SERPL HS: 10 NG/L
CHLORIDE SERPL-SCNC: 92 MMOL/L (ref 96–108)
CO2 SERPL-SCNC: 27 MMOL/L (ref 21–32)
CREAT SERPL-MCNC: 0.39 MG/DL (ref 0.6–1.3)
EOSINOPHIL # BLD MANUAL: 0 THOUSAND/UL (ref 0–0.4)
EOSINOPHIL NFR BLD MANUAL: 0 % (ref 0–6)
ERYTHROCYTE [DISTWIDTH] IN BLOOD BY AUTOMATED COUNT: 20 % (ref 11.6–15.1)
GFR SERPL CREATININE-BSD FRML MDRD: 118 ML/MIN/1.73SQ M
GLUCOSE SERPL-MCNC: 96 MG/DL (ref 65–140)
HCT VFR BLD AUTO: 25.7 % (ref 34.8–46.1)
HGB BLD-MCNC: 7.8 G/DL (ref 11.5–15.4)
LYMPHOCYTES # BLD AUTO: 1.05 THOUSAND/UL (ref 0.6–4.47)
LYMPHOCYTES # BLD AUTO: 12 % (ref 14–44)
MCH RBC QN AUTO: 26.9 PG (ref 26.8–34.3)
MCHC RBC AUTO-ENTMCNC: 30.4 G/DL (ref 31.4–37.4)
MCV RBC AUTO: 89 FL (ref 82–98)
MONOCYTES # BLD AUTO: 0.79 THOUSAND/UL (ref 0–1.22)
MONOCYTES NFR BLD: 9 % (ref 4–12)
MYELOCYTES NFR BLD MANUAL: 1 % (ref 0–1)
NEUTROPHILS # BLD MANUAL: 6.83 THOUSAND/UL (ref 1.85–7.62)
NEUTS BAND NFR BLD MANUAL: 1 % (ref 0–8)
NEUTS SEG NFR BLD AUTO: 77 % (ref 43–75)
PLATELET # BLD AUTO: 304 THOUSANDS/UL (ref 149–390)
PLATELET BLD QL SMEAR: ADEQUATE
PMV BLD AUTO: 9.5 FL (ref 8.9–12.7)
POTASSIUM SERPL-SCNC: 4.3 MMOL/L (ref 3.5–5.3)
RBC # BLD AUTO: 2.9 MILLION/UL (ref 3.81–5.12)
SODIUM SERPL-SCNC: 129 MMOL/L (ref 135–147)
WBC # BLD AUTO: 8.76 THOUSAND/UL (ref 4.31–10.16)

## 2024-03-20 PROCEDURE — 36415 COLL VENOUS BLD VENIPUNCTURE: CPT

## 2024-03-20 PROCEDURE — 96360 HYDRATION IV INFUSION INIT: CPT

## 2024-03-20 PROCEDURE — 93005 ELECTROCARDIOGRAM TRACING: CPT

## 2024-03-20 PROCEDURE — 99285 EMERGENCY DEPT VISIT HI MDM: CPT | Performed by: EMERGENCY MEDICINE

## 2024-03-20 PROCEDURE — 71046 X-RAY EXAM CHEST 2 VIEWS: CPT

## 2024-03-20 PROCEDURE — 85007 BL SMEAR W/DIFF WBC COUNT: CPT

## 2024-03-20 PROCEDURE — 84484 ASSAY OF TROPONIN QUANT: CPT

## 2024-03-20 PROCEDURE — 96372 THER/PROPH/DIAG INJ SC/IM: CPT

## 2024-03-20 PROCEDURE — 80048 BASIC METABOLIC PNL TOTAL CA: CPT

## 2024-03-20 PROCEDURE — 99285 EMERGENCY DEPT VISIT HI MDM: CPT

## 2024-03-20 PROCEDURE — 96361 HYDRATE IV INFUSION ADD-ON: CPT

## 2024-03-20 PROCEDURE — 85027 COMPLETE CBC AUTOMATED: CPT

## 2024-03-20 RX ORDER — OXYCODONE HYDROCHLORIDE 10 MG/1
10 TABLET ORAL ONCE
Status: DISCONTINUED | OUTPATIENT
Start: 2024-03-20 | End: 2024-03-20

## 2024-03-20 RX ORDER — OXYCODONE HYDROCHLORIDE 10 MG/1
10 TABLET ORAL ONCE
Status: COMPLETED | OUTPATIENT
Start: 2024-03-20 | End: 2024-03-20

## 2024-03-20 RX ORDER — POLYETHYLENE GLYCOL 3350 17 G/17G
17 POWDER, FOR SOLUTION ORAL ONCE
Status: COMPLETED | OUTPATIENT
Start: 2024-03-20 | End: 2024-03-20

## 2024-03-20 RX ADMIN — METHYLNALTREXONE BROMIDE 8 MG: 8 INJECTION, SOLUTION SUBCUTANEOUS at 22:07

## 2024-03-20 RX ADMIN — OXYCODONE HYDROCHLORIDE 10 MG: 10 TABLET ORAL at 22:05

## 2024-03-20 RX ADMIN — POLYETHYLENE GLYCOL 3350 17 G: 17 POWDER, FOR SOLUTION ORAL at 22:05

## 2024-03-20 RX ADMIN — SODIUM CHLORIDE 500 ML: 0.9 INJECTION, SOLUTION INTRAVENOUS at 20:33

## 2024-03-20 NOTE — ED PROVIDER NOTES
History  Chief Complaint   Patient presents with    Shortness of Breath     Pt reports shortness of breath, nausea, syncope, and chest pain. Symptoms started this morning.     Constipation     Pt has not had a BM for three days      Patient is a 53-year-old female with past medical history of metastatic breast cancer with right-sided pleural carcinomatosis with significant pleural effusion, anemia, cachexia, chronic tachycardia presenting with shortness of breath and chest pain earlier in the day.  Patient states that she had at least 1 episode of shortness of breath and chest pain earlier today which self resolved.  She states that she also felt lightheaded a few times throughout the day, but she denies syncope or the sensation of the room spinning.  Patient has a right-sided pleural drain in place which her daughter drains every 3 to 4 days due to this chronic right-sided pleural effusion. Patient states that she has been also feeling constipated for the last few days.  She states that her last full bowel movement was 3 days ago, but she had a small bowel movement today.  Patient states she states she takes daily medication for constipation.  She states that she sometimes has difficulty with urinating, but she has urinated multiple times today without any difficulty.  She states that she has had increased bilateral lower extremity swelling over the last few weeks.  Patient was recently admitted to the hospital and had CTA PE and bilateral venous duplex which were negative.  Patient denies headache, fever, chills, diaphoresis, abdominal pain, vomiting, numbness, tingling, or focal weakness.        Prior to Admission Medications   Prescriptions Last Dose Informant Patient Reported? Taking?   Multiple Vitamin (MULTIVITAMIN ADULT PO)  Self, Child Yes Yes   Sig: Take 1 tablet by mouth in the morning   Ribociclib Succ, 600 MG Dose, 200 MG TBPK  Self, Child No Yes   Sig: Take 600 mg by mouth in the morning For 3 weeks  and then 1 week off   Senokot 8.6 MG tablet  Self, Child Yes Yes   Sig: Take 17.2 mg by mouth   acetaminophen (TYLENOL) 500 mg tablet  Self, Child No Yes   Sig: Take 2 tablets (1,000 mg total) by mouth 3 (three) times a day as needed for mild pain   calcium citrate-vitamin D (Calcitrate Plus D) 315 mg-5 mcg tablet   No No   Sig: Take 1 tablet by mouth 2 (two) times a day   cefuroxime (CEFTIN) 500 mg tablet  Self, Child No Yes   Sig: Take 1 tablet (500 mg total) by mouth every 12 (twelve) hours for 9 doses   ergocalciferol (VITAMIN D2) 50,000 units  Self, Child No Yes   Sig: TAKE 1 CAPSULE BY MOUTH 1 TIME A WEEK   lidocaine (LIDODERM) 5 %  Self, Child No Yes   Sig: Apply 2 patches topically over 12 hours daily Remove & Discard patch within 12 hours or as directed by MD   metoprolol succinate (TOPROL-XL) 50 mg 24 hr tablet  Self, Child No Yes   Sig: Take 1 tablet (50 mg total) by mouth daily   naloxone (NARCAN) 4 mg/0.1 mL nasal spray  Self, Child No Yes   Sig: Administer 1 spray into a nostril. If no response after 2-3 minutes, give another dose in the other nostril using a new spray.   oxyCODONE (ROXICODONE) 10 MG TABS  Self, Child No Yes   Sig: Take 1 tablet (10 mg total) by mouth every 4 (four) hours as needed for severe pain or moderate pain Max Daily Amount: 60 mg   polyethylene glycol (GLYCOLAX) 17 GM/SCOOP powder  Self, Child Yes Yes   sodium chloride 1 g tablet  Self, Child No Yes   Sig: Take 1 tablet (1 g total) by mouth 2 (two) times a day      Facility-Administered Medications: None       Past Medical History:   Diagnosis Date    Breast cancer (HCC) 07/2013    left-chemotherapy    Fibroma     Goals of care, counseling/discussion     History of chemotherapy     History of external beam radiation therapy        Past Surgical History:   Procedure Laterality Date    APPENDECTOMY      BREAST SURGERY  2014    Left breast 2/2 breast CA    HYSTERECTOMY  2015    Due to fibroma    IR BIOPSY BONE  10/9/2023    IR  PLEURAL EFFUSION LONG-TERM CATHETER PLACEMENT  1/24/2024    IR THORACENTESIS  11/8/2023    MASTECTOMY Left 2013    OOPHORECTOMY         Family History   Problem Relation Age of Onset    Lung cancer Mother     No Known Problems Father     No Known Problems Sister     No Known Problems Daughter     No Known Problems Daughter     Cancer Maternal Grandmother     No Known Problems Maternal Grandfather     No Known Problems Paternal Grandmother     No Known Problems Paternal Grandfather     Uterine cancer Maternal Aunt     No Known Problems Son     No Known Problems Paternal Aunt      I have reviewed and agree with the history as documented.    E-Cigarette/Vaping    E-Cigarette Use Never User      E-Cigarette/Vaping Substances    Nicotine No     THC No     CBD No     Flavoring No     Other No     Unknown No      Social History     Tobacco Use    Smoking status: Never     Passive exposure: Never    Smokeless tobacco: Never   Vaping Use    Vaping status: Never Used   Substance Use Topics    Alcohol use: Not Currently    Drug use: No        Review of Systems    Physical Exam  ED Triage Vitals   Temperature Pulse Respirations Blood Pressure SpO2   03/20/24 1845 03/20/24 1832 03/20/24 1832 03/20/24 1842 03/20/24 1832   98.1 °F (36.7 °C) (!) 125 22 127/66 94 %      Temp Source Heart Rate Source Patient Position - Orthostatic VS BP Location FiO2 (%)   03/20/24 1845 03/20/24 1842 03/20/24 1842 03/20/24 1842 --   Oral Monitor Sitting Right arm       Pain Score       03/20/24 2205       4             Orthostatic Vital Signs  Vitals:    03/20/24 1832 03/20/24 1842 03/20/24 2142   BP:  127/66 117/58   Pulse: (!) 125 (!) 125    Patient Position - Orthostatic VS:  Sitting Sitting       Physical Exam  Vitals and nursing note reviewed.   Constitutional:       General: She is not in acute distress.     Appearance: She is not toxic-appearing or diaphoretic.      Comments: Cachectic and chronically ill-appearing   HENT:      Head:  Atraumatic.      Mouth/Throat:      Mouth: Mucous membranes are moist.      Pharynx: Oropharynx is clear.   Eyes:      Extraocular Movements: Extraocular movements intact.      Pupils: Pupils are equal, round, and reactive to light.   Cardiovascular:      Rate and Rhythm: Regular rhythm. Tachycardia present.      Heart sounds: Normal heart sounds.   Pulmonary:      Effort: Pulmonary effort is normal. No tachypnea or respiratory distress.      Breath sounds: Examination of the right-upper field reveals decreased breath sounds. Examination of the right-middle field reveals decreased breath sounds. Examination of the right-lower field reveals decreased breath sounds. Decreased breath sounds present.   Chest:      Chest wall: No tenderness.   Abdominal:      Palpations: Abdomen is soft.   Musculoskeletal:         General: Normal range of motion.      Right lower leg: Edema (Significant pitting edema up to the knee) present.      Left lower leg: Edema (Significant pitting edema up to the knee) present.   Skin:     General: Skin is warm and dry.      Capillary Refill: Capillary refill takes less than 2 seconds.   Neurological:      General: No focal deficit present.      Mental Status: She is alert and oriented to person, place, and time.         ED Medications  Medications   sodium chloride 0.9 % bolus 500 mL (0 mL Intravenous Stopped 3/20/24 2204)   oxyCODONE (ROXICODONE) immediate release tablet 10 mg (10 mg Oral Given 3/20/24 2205)   methylnaltrexone (Relistor) subcutaneous injection 8 mg (8 mg Subcutaneous Given 3/20/24 2207)   polyethylene glycol (MIRALAX) packet 17 g (17 g Oral Given 3/20/24 2205)       Diagnostic Studies  Results Reviewed       Procedure Component Value Units Date/Time    Basic metabolic panel [[id number]]  (Abnormal) Collected: 03/20/24 2032    Lab Status: Final result Specimen: Blood from Arm, Right Updated: 03/20/24 2109     Sodium 129 mmol/L      Potassium 4.3 mmol/L      Chloride 92 mmol/L       CO2 27 mmol/L      ANION GAP 10 mmol/L      BUN 9 mg/dL      Creatinine 0.39 mg/dL      Glucose 96 mg/dL      Calcium 8.3 mg/dL      eGFR 118 ml/min/1.73sq m     Narrative:      National Kid[employer] Disease Foundation guidelines for Chronic Kidney Disease (CKD):     Stage 1 with normal or high GFR (GFR > 90 mL/min/1.73 square meters)    Stage 2 Mild CKD (GFR = 60-89 mL/min/1.73 square meters)    Stage 3A Moderate CKD (GFR = 45-59 mL/min/1.73 square meters)    Stage 3B Moderate CKD (GFR = 30-44 mL/min/1.73 square meters)    Stage 4 Severe CKD (GFR = 15-29 mL/min/1.73 square meters)    Stage 5 End Stage CKD (GFR <15 mL/min/1.73 square meters)  Note: GFR calculation is accurate only with a steady state creatinine    HS Troponin 0hr (reflex protocol) [[id number]]  (Normal) Collected: 03/20/24 2032    Lab Status: Final result Specimen: Blood from Arm, Right Updated: 03/20/24 2105     hs TnI 0hr 10 ng/L     Manual Differential(PHLEBS Do Not Order) [[id number]]  (Abnormal) Collected: 03/20/24 2032    Lab Status: Final result Specimen: Blood from Arm, Right Updated: 03/20/24 2104     Segmented % 77 %      Bands % 1 %      Lymphocytes % 12 %      Monocytes % 9 %      Eosinophils % 0 %      Basophils % 0 %      Myelocytes % 1 %      Absolute Neutrophils 6.83 Thousand/uL      Absolute Lymphocytes 1.05 Thousand/uL      Absolute Monocytes 0.79 Thousand/uL      Absolute Eosinophils 0.00 Thousand/uL      Absolute Basophils 0.00 Thousand/uL      Total Counted --     Platelet Estimate Adequate     Anisocytosis Present    CBC and differential [[id number]]  (Abnormal) Collected: 03/20/24 2032    Lab Status: Final result Specimen: Blood from Arm, Right Updated: 03/20/24 2040     WBC 8.76 Thousand/uL      RBC 2.90 Million/uL      Hemoglobin 7.8 g/dL      Hematocrit 25.7 %      MCV 89 fL      MCH 26.9 pg      MCHC 30.4 g/dL      RDW 20.0 %      MPV 9.5 fL      Platelets 304 Thousands/uL     Narrative:      This is an appended report.  These  results have been appended to a previously verified report.                   X-ray chest 2 views    (Results Pending)         Procedures  Procedures      ED Course                             SBIRT 22yo+      Flowsheet Row Most Recent Value   Initial Alcohol Screen: US AUDIT-C     1. How often do you have a drink containing alcohol? 0 Filed at: 03/20/2024 1856   2. How many drinks containing alcohol do you have on a typical day you are drinking?  0 Filed at: 03/20/2024 1856   3b. FEMALE Any Age, or MALE 65+: How often do you have 4 or more drinks on one occassion? 0 Filed at: 03/20/2024 1856   Audit-C Score 0 Filed at: 03/20/2024 1856   ALEJO: How many times in the past year have you...    Used an illegal drug or used a prescription medication for non-medical reasons? Never Filed at: 03/20/2024 1856                  Medical Decision Making  Patient is a 53-year-old female presenting for shortness of breath and chest pain that occurred earlier today.    Differential includes worsening/baseline chronic symptoms of her metastatic lung cancer, ACS, pneumothorax, worsening anemia, electrolyte abnormality.  Patient's workup significant for right-sided pleural effusion, anemia, hyponatremia.  Nothing significantly different from patient's baseline.  Patient is feeling significantly better in the emergency department compared to earlier in the day.  Most likely baseline effects of her metastatic breast cancer.  Patient was treated for constipation that she was having.  Patient's goals were discussed and she wishes to not have the symptoms at home.  I told her that these are likely going to continue she needs to follow-up with oncology and palliative care for further goals of care discussion.    Patient was cleared for discharge with outpatient follow-up and return precautions.    Amount and/or Complexity of Data Reviewed  Labs: ordered.  Radiology: ordered.    Risk  Prescription drug management.          Disposition  Final  diagnoses:   Shortness of breath   Pleural effusion on right   Drug-induced constipation     Time reflects when diagnosis was documented in both MDM as applicable and the Disposition within this note       Time User Action Codes Description Comment    3/20/2024 10:03 PM Meet Fermin [R06.02] Shortness of breath     3/20/2024 10:04 PM Meet Fermin [J90] Pleural effusion on right     3/20/2024 10:04 PM Meet Fermin [K59.03] Drug-induced constipation           ED Disposition       ED Disposition   Discharge    Condition   Stable    Date/Time   Wed Mar 20, 2024 2203    Comment   Shannan SUMMERS Marisuz discharge to home/self care.                   Follow-up Information       Follow up With Specialties Details Why Contact Info Additional Information    [facility] Emergency [facility] Emergency Medicine   [address] [place] [id number]  [phone] [facility] Emergency Department, [address] [place] [address], [id number]            Patient's Medications   Discharge Prescriptions    No medications on file     No discharge procedures on file.    PDMP Review         Value Time User    PDMP Reviewed  Yes 3/12/2024  3:03 PM Janie Briones MD             ED Provider  Attending physically available and evaluated Shannan Trujillomez. I managed the patient along with the ED Attending.    Electronically Signed by           Meet Fermin MD  03/20/24 [id number]

## 2024-03-20 NOTE — ED ATTENDING ATTESTATION
3/20/2024  I, Jorge Rosario MD, saw and evaluated the patient. I have discussed the patient with the resident/non-physician practitioner and agree with the resident's/non-physician practitioner's findings, Plan of Care, and MDM as documented in the resident's/non-physician practitioner's note, except where noted. All available labs and Radiology studies were reviewed.  I was present for key portions of any procedure(s) performed by the resident/non-physician practitioner and I was immediately available to provide assistance.       At this point I agree with the current assessment done in the Emergency Department.  I have conducted an independent evaluation of this patient a history and physical is as follows:  52 yo F with metastatic breast cancer, with chronic pleural effusion managed by Pleurx catheter, c/o onset of shortness of breath, chest pain, nausea, and mutliple somatic complaints. She does not describe syncope with LOC clarified from triage.  No fever.      VS notable for sinus tachycardia, BP stable, afebrile, not hypoxic.      Reviewing recent hospitalization, notes, labs, and imaging and d/w patient and family, her findings are at baseline, c/w ongoing complications of metastatic cancer.  Goals of care were discussed which included for now, treating pain, treating constipation, and going over home regimen for bowel care and the pleural catheter.  I do not think hospitalization is indicated tonight.  Even her tachycardia is a baseline, attributed to increased cardiac output from tumor burden and decreased lung capacity    ED Course         Critical Care Time  Procedures

## 2024-03-21 ENCOUNTER — TELEPHONE (OUTPATIENT)
Dept: HEMATOLOGY ONCOLOGY | Facility: CLINIC | Age: 54
End: 2024-03-21

## 2024-03-21 LAB
ATRIAL RATE: 123 BPM
P AXIS: 57 DEGREES
PR INTERVAL: 138 MS
QRS AXIS: 72 DEGREES
QRSD INTERVAL: 70 MS
QT INTERVAL: 318 MS
QTC INTERVAL: 455 MS
T WAVE AXIS: 33 DEGREES
VENTRICULAR RATE: 123 BPM

## 2024-03-21 PROCEDURE — 93010 ELECTROCARDIOGRAM REPORT: CPT | Performed by: INTERNAL MEDICINE

## 2024-03-21 NOTE — TELEPHONE ENCOUNTER
Called patient and left voicemail with new appointment date and time. Provided patient with call back number if date and time does not work.

## 2024-03-21 NOTE — DISCHARGE INSTRUCTIONS
Please follow-up with your primary care provider and oncology provider.  Please return to ED with new or worsening symptoms-see attached.  Please discuss with your oncology provider about draining the pleural fluid more often and if they want to give you any medication for your swelling.  Please follow-up with palliative care for further goals of care discussion.    Jagjit un seguimiento con troy proveedor de atención primaria y troy proveedor de oncología.  Regrese a la nikolay de emergencias con síntomas nuevos o que empeoren, consulte adjunto.  Hable con troy proveedor de oncología sobre el drenaje del líquido pleural con más frecuencia y si desea darle algún medicamento para la hinchazón.  Realice un seguimiento con cuidados paliativos para conocer más objetivos de la discusión sobre la atención.

## 2024-03-22 ENCOUNTER — APPOINTMENT (EMERGENCY)
Dept: CT IMAGING | Facility: HOSPITAL | Age: 54
DRG: 382 | End: 2024-03-22
Payer: COMMERCIAL

## 2024-03-22 ENCOUNTER — APPOINTMENT (EMERGENCY)
Dept: RADIOLOGY | Facility: HOSPITAL | Age: 54
DRG: 382 | End: 2024-03-22
Payer: COMMERCIAL

## 2024-03-22 ENCOUNTER — HOSPITAL ENCOUNTER (INPATIENT)
Facility: HOSPITAL | Age: 54
LOS: 4 days | Discharge: HOME HEALTH | DRG: 382 | End: 2024-03-26
Attending: EMERGENCY MEDICINE | Admitting: INTERNAL MEDICINE
Payer: COMMERCIAL

## 2024-03-22 DIAGNOSIS — C79.9 METASTATIC DISEASE (HCC): ICD-10-CM

## 2024-03-22 DIAGNOSIS — R00.0 SINUS TACHYCARDIA: ICD-10-CM

## 2024-03-22 DIAGNOSIS — R60.0 LOWER EXTREMITY EDEMA: ICD-10-CM

## 2024-03-22 DIAGNOSIS — R06.00 DYSPNEA: ICD-10-CM

## 2024-03-22 DIAGNOSIS — R06.02 SOB (SHORTNESS OF BREATH): Primary | ICD-10-CM

## 2024-03-22 DIAGNOSIS — R07.9 CHEST PAIN: ICD-10-CM

## 2024-03-22 DIAGNOSIS — J90 LARGE PLEURAL EFFUSION: ICD-10-CM

## 2024-03-22 DIAGNOSIS — E43 SEVERE PROTEIN-CALORIE MALNUTRITION (HCC): ICD-10-CM

## 2024-03-22 DIAGNOSIS — N39.0 UTI (URINARY TRACT INFECTION): ICD-10-CM

## 2024-03-22 PROBLEM — J91.0 MALIGNANT PLEURAL EFFUSION: Status: ACTIVE | Noted: 2024-03-22

## 2024-03-22 LAB
2HR DELTA HS TROPONIN: -1 NG/L
ALBUMIN SERPL BCP-MCNC: 3 G/DL (ref 3.5–5)
ALP SERPL-CCNC: 138 U/L (ref 34–104)
ALT SERPL W P-5'-P-CCNC: 18 U/L (ref 7–52)
ANION GAP SERPL CALCULATED.3IONS-SCNC: 11 MMOL/L (ref 4–13)
APTT PPP: 37 SECONDS (ref 23–37)
AST SERPL W P-5'-P-CCNC: 86 U/L (ref 13–39)
ATRIAL RATE: 120 BPM
BACTERIA UR QL AUTO: ABNORMAL /HPF
BASOPHILS # BLD AUTO: 0.02 THOUSANDS/ÂΜL (ref 0–0.1)
BASOPHILS NFR BLD AUTO: 0 % (ref 0–1)
BILIRUB SERPL-MCNC: 0.48 MG/DL (ref 0.2–1)
BILIRUB UR QL STRIP: NEGATIVE
BNP SERPL-MCNC: 87 PG/ML (ref 0–100)
BUN SERPL-MCNC: 8 MG/DL (ref 5–25)
CALCIUM ALBUM COR SERPL-MCNC: 9.4 MG/DL (ref 8.3–10.1)
CALCIUM SERPL-MCNC: 8.6 MG/DL (ref 8.4–10.2)
CARDIAC TROPONIN I PNL SERPL HS: 7 NG/L
CARDIAC TROPONIN I PNL SERPL HS: 8 NG/L
CHLORIDE SERPL-SCNC: 91 MMOL/L (ref 96–108)
CLARITY UR: CLEAR
CO2 SERPL-SCNC: 29 MMOL/L (ref 21–32)
COLOR UR: YELLOW
CREAT SERPL-MCNC: 0.41 MG/DL (ref 0.6–1.3)
EOSINOPHIL # BLD AUTO: 0 THOUSAND/ÂΜL (ref 0–0.61)
EOSINOPHIL NFR BLD AUTO: 0 % (ref 0–6)
ERYTHROCYTE [DISTWIDTH] IN BLOOD BY AUTOMATED COUNT: 20.3 % (ref 11.6–15.1)
GFR SERPL CREATININE-BSD FRML MDRD: 116 ML/MIN/1.73SQ M
GLUCOSE SERPL-MCNC: 77 MG/DL (ref 65–140)
GLUCOSE UR STRIP-MCNC: NEGATIVE MG/DL
HCT VFR BLD AUTO: 27.7 % (ref 34.8–46.1)
HGB BLD-MCNC: 8.4 G/DL (ref 11.5–15.4)
HGB UR QL STRIP.AUTO: NEGATIVE
HYALINE CASTS #/AREA URNS LPF: ABNORMAL /LPF
IMM GRANULOCYTES # BLD AUTO: 0.19 THOUSAND/UL (ref 0–0.2)
IMM GRANULOCYTES NFR BLD AUTO: 2 % (ref 0–2)
INR PPP: 1.03 (ref 0.84–1.19)
KETONES UR STRIP-MCNC: ABNORMAL MG/DL
LACTATE SERPL-SCNC: 1.5 MMOL/L (ref 0.5–2)
LEUKOCYTE ESTERASE UR QL STRIP: ABNORMAL
LYMPHOCYTES # BLD AUTO: 0.87 THOUSANDS/ÂΜL (ref 0.6–4.47)
LYMPHOCYTES NFR BLD AUTO: 10 % (ref 14–44)
MAGNESIUM SERPL-MCNC: 1.9 MG/DL (ref 1.9–2.7)
MCH RBC QN AUTO: 27.2 PG (ref 26.8–34.3)
MCHC RBC AUTO-ENTMCNC: 30.3 G/DL (ref 31.4–37.4)
MCV RBC AUTO: 90 FL (ref 82–98)
MONOCYTES # BLD AUTO: 0.74 THOUSAND/ÂΜL (ref 0.17–1.22)
MONOCYTES NFR BLD AUTO: 9 % (ref 4–12)
MUCOUS THREADS UR QL AUTO: ABNORMAL
NEUTROPHILS # BLD AUTO: 6.54 THOUSANDS/ÂΜL (ref 1.85–7.62)
NEUTS SEG NFR BLD AUTO: 79 % (ref 43–75)
NITRITE UR QL STRIP: NEGATIVE
NON-SQ EPI CELLS URNS QL MICRO: ABNORMAL /HPF
NRBC BLD AUTO-RTO: 0 /100 WBCS
P AXIS: 63 DEGREES
PH UR STRIP.AUTO: 5 [PH]
PLATELET # BLD AUTO: 330 THOUSANDS/UL (ref 149–390)
PMV BLD AUTO: 8.4 FL (ref 8.9–12.7)
POTASSIUM SERPL-SCNC: 4.1 MMOL/L (ref 3.5–5.3)
PR INTERVAL: 130 MS
PROCALCITONIN SERPL-MCNC: 0.5 NG/ML
PROT SERPL-MCNC: 7 G/DL (ref 6.4–8.4)
PROT UR STRIP-MCNC: ABNORMAL MG/DL
PROTHROMBIN TIME: 13.7 SECONDS (ref 11.6–14.5)
QRS AXIS: 83 DEGREES
QRSD INTERVAL: 66 MS
QT INTERVAL: 322 MS
QTC INTERVAL: 455 MS
RBC # BLD AUTO: 3.09 MILLION/UL (ref 3.81–5.12)
RBC #/AREA URNS AUTO: ABNORMAL /HPF
SODIUM SERPL-SCNC: 131 MMOL/L (ref 135–147)
SP GR UR STRIP.AUTO: 1.02 (ref 1–1.03)
T WAVE AXIS: 33 DEGREES
UROBILINOGEN UR STRIP-ACNC: <2 MG/DL
VENTRICULAR RATE: 120 BPM
WBC # BLD AUTO: 8.36 THOUSAND/UL (ref 4.31–10.16)
WBC #/AREA URNS AUTO: ABNORMAL /HPF

## 2024-03-22 PROCEDURE — 93005 ELECTROCARDIOGRAM TRACING: CPT

## 2024-03-22 PROCEDURE — 81001 URINALYSIS AUTO W/SCOPE: CPT | Performed by: EMERGENCY MEDICINE

## 2024-03-22 PROCEDURE — 84484 ASSAY OF TROPONIN QUANT: CPT | Performed by: EMERGENCY MEDICINE

## 2024-03-22 PROCEDURE — 96375 TX/PRO/DX INJ NEW DRUG ADDON: CPT

## 2024-03-22 PROCEDURE — 80053 COMPREHEN METABOLIC PANEL: CPT | Performed by: EMERGENCY MEDICINE

## 2024-03-22 PROCEDURE — 85025 COMPLETE CBC W/AUTO DIFF WBC: CPT | Performed by: EMERGENCY MEDICINE

## 2024-03-22 PROCEDURE — 85730 THROMBOPLASTIN TIME PARTIAL: CPT | Performed by: EMERGENCY MEDICINE

## 2024-03-22 PROCEDURE — 85610 PROTHROMBIN TIME: CPT | Performed by: EMERGENCY MEDICINE

## 2024-03-22 PROCEDURE — 96361 HYDRATE IV INFUSION ADD-ON: CPT

## 2024-03-22 PROCEDURE — 36415 COLL VENOUS BLD VENIPUNCTURE: CPT | Performed by: EMERGENCY MEDICINE

## 2024-03-22 PROCEDURE — 83605 ASSAY OF LACTIC ACID: CPT | Performed by: EMERGENCY MEDICINE

## 2024-03-22 PROCEDURE — 83735 ASSAY OF MAGNESIUM: CPT | Performed by: EMERGENCY MEDICINE

## 2024-03-22 PROCEDURE — 84145 PROCALCITONIN (PCT): CPT | Performed by: EMERGENCY MEDICINE

## 2024-03-22 PROCEDURE — 99291 CRITICAL CARE FIRST HOUR: CPT | Performed by: EMERGENCY MEDICINE

## 2024-03-22 PROCEDURE — 71045 X-RAY EXAM CHEST 1 VIEW: CPT

## 2024-03-22 PROCEDURE — 74177 CT ABD & PELVIS W/CONTRAST: CPT

## 2024-03-22 PROCEDURE — 93010 ELECTROCARDIOGRAM REPORT: CPT | Performed by: STUDENT IN AN ORGANIZED HEALTH CARE EDUCATION/TRAINING PROGRAM

## 2024-03-22 PROCEDURE — 87086 URINE CULTURE/COLONY COUNT: CPT | Performed by: EMERGENCY MEDICINE

## 2024-03-22 PROCEDURE — 96374 THER/PROPH/DIAG INJ IV PUSH: CPT

## 2024-03-22 PROCEDURE — 83880 ASSAY OF NATRIURETIC PEPTIDE: CPT | Performed by: EMERGENCY MEDICINE

## 2024-03-22 PROCEDURE — 99223 1ST HOSP IP/OBS HIGH 75: CPT | Performed by: INTERNAL MEDICINE

## 2024-03-22 PROCEDURE — 99285 EMERGENCY DEPT VISIT HI MDM: CPT

## 2024-03-22 PROCEDURE — 71275 CT ANGIOGRAPHY CHEST: CPT

## 2024-03-22 PROCEDURE — 99497 ADVNCD CARE PLAN 30 MIN: CPT | Performed by: INTERNAL MEDICINE

## 2024-03-22 RX ORDER — OXYCODONE HYDROCHLORIDE 10 MG/1
10 TABLET ORAL EVERY 4 HOURS PRN
Status: DISCONTINUED | OUTPATIENT
Start: 2024-03-22 | End: 2024-03-26 | Stop reason: HOSPADM

## 2024-03-22 RX ORDER — HYDROMORPHONE HCL/PF 1 MG/ML
0.5 SYRINGE (ML) INJECTION EVERY 4 HOURS PRN
Status: DISCONTINUED | OUTPATIENT
Start: 2024-03-22 | End: 2024-03-26 | Stop reason: HOSPADM

## 2024-03-22 RX ORDER — HEPARIN SODIUM 5000 [USP'U]/ML
5000 INJECTION, SOLUTION INTRAVENOUS; SUBCUTANEOUS EVERY 8 HOURS SCHEDULED
Status: DISCONTINUED | OUTPATIENT
Start: 2024-03-22 | End: 2024-03-23

## 2024-03-22 RX ORDER — SODIUM CHLORIDE 9 MG/ML
150 INJECTION, SOLUTION INTRAVENOUS CONTINUOUS
Status: DISCONTINUED | OUTPATIENT
Start: 2024-03-22 | End: 2024-03-23

## 2024-03-22 RX ORDER — CEFAZOLIN SODIUM 1 G/50ML
1000 SOLUTION INTRAVENOUS ONCE
Status: COMPLETED | OUTPATIENT
Start: 2024-03-22 | End: 2024-03-22

## 2024-03-22 RX ORDER — SODIUM CHLORIDE 1 G/1
1 TABLET ORAL 2 TIMES DAILY
Status: DISCONTINUED | OUTPATIENT
Start: 2024-03-22 | End: 2024-03-23

## 2024-03-22 RX ORDER — ACETAMINOPHEN 325 MG/1
650 TABLET ORAL EVERY 6 HOURS PRN
Status: DISCONTINUED | OUTPATIENT
Start: 2024-03-22 | End: 2024-03-25

## 2024-03-22 RX ORDER — FENTANYL CITRATE 50 UG/ML
50 INJECTION, SOLUTION INTRAMUSCULAR; INTRAVENOUS
Status: DISCONTINUED | OUTPATIENT
Start: 2024-03-22 | End: 2024-03-23

## 2024-03-22 RX ORDER — SENNOSIDES 8.6 MG
17.2 TABLET ORAL
Status: DISCONTINUED | OUTPATIENT
Start: 2024-03-22 | End: 2024-03-23

## 2024-03-22 RX ORDER — METOPROLOL SUCCINATE 50 MG/1
50 TABLET, EXTENDED RELEASE ORAL DAILY
Status: DISCONTINUED | OUTPATIENT
Start: 2024-03-23 | End: 2024-03-25

## 2024-03-22 RX ORDER — OXYCODONE HYDROCHLORIDE 10 MG/1
10 TABLET ORAL ONCE
Status: COMPLETED | OUTPATIENT
Start: 2024-03-22 | End: 2024-03-22

## 2024-03-22 RX ADMIN — OXYCODONE HYDROCHLORIDE 10 MG: 10 TABLET ORAL at 17:59

## 2024-03-22 RX ADMIN — IOHEXOL 100 ML: 350 INJECTION, SOLUTION INTRAVENOUS at 16:47

## 2024-03-22 RX ADMIN — FENTANYL CITRATE 50 MCG: 50 INJECTION INTRAMUSCULAR; INTRAVENOUS at 15:34

## 2024-03-22 RX ADMIN — CEFAZOLIN SODIUM 1000 MG: 1 SOLUTION INTRAVENOUS at 19:28

## 2024-03-22 RX ADMIN — OXYCODONE HYDROCHLORIDE 10 MG: 10 TABLET ORAL at 21:51

## 2024-03-22 RX ADMIN — HEPARIN SODIUM 5000 UNITS: 5000 INJECTION INTRAVENOUS; SUBCUTANEOUS at 21:52

## 2024-03-22 RX ADMIN — SODIUM CHLORIDE 150 ML/HR: 0.9 INJECTION, SOLUTION INTRAVENOUS at 15:39

## 2024-03-22 RX ADMIN — SENNOSIDES 17.2 MG: 8.6 TABLET, FILM COATED ORAL at 21:51

## 2024-03-22 NOTE — ED PROVIDER NOTES
History  Chief Complaint   Patient presents with    Shortness of Breath     Pt here two days ago for SOB, back again for same symptoms. Metastatic breast cancer.     Pain     Right upper back pain, h/o right-sided pleural carcinomatosis        Patient is a 53 year old female here with family coming in with worsening shortness of breath, chest discomfort with her breathing, as well as urinary issues. Patient states she was here within the past 2 days and she initially felt better; however, symptoms are worsening. Patient does express chest discomfort intermittently with her shortness of breath. She denies any fevers, chills, cough. Her daughter did drain approximately 1 ML out of her catheter from the lung that was transparent. 2 days ago, the daughter states that she did drain the catheter and noted to be bloody in nature.  This was also again small amount of fluid that she was able to remove.  Patient also reports that her legs have been more swollen.  She has been trying to use compression stockings without any relief.  She has no history of coronary artery disease or cardiac disease.  She has no recent travel or sick contacts.  Patient's past medical history is noted to haveRecurrence of her breast carcinoma in October 2023 with noted large lung mass and mediastinal lymphadenopathy which was found after her hysterectomy and oophorectomy.  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  Patient had completed radiation at [facility] with noted right-sided pleural effusion with pleural carcinomatosis and noted Pleurx catheter in place.      Per chart review patient did follow with hematology oncology this week as well as was recently discharged on the 15th of this month.  There was concern about the progressive right-sided tumor that is encasing the catheter.  There is concern about using tPA as high risk of bleeding.  Per chart  "review \"However, patient's declining performance status will make aggressive oncologic treatment very challenging.  Discussed goals of care with patient and her daughter.  Encouraged them to follow-up with palliative care.  She will discuss with her family.  She will follow-up after PET scan and after she discusses with her family.\"  Patient and family state they have an appointment cannot tell me when with palliative care.  She has met with palliative care in the past as well.    1/28/24-Echo with EF 55%      History provided by:  Patient, medical records, spouse and relative   used: Yes ([facility]ley [id number])    Shortness of Breath  Severity:  Moderate  Onset quality:  Gradual  Duration:  3 days  Timing:  Constant  Progression:  Worsening  Chronicity:  Recurrent  Context: not activity, not animal exposure, not emotional upset, not fumes, not known allergens, not occupational exposure, not pollens, not smoke exposure, not strong odors, not URI and not weather changes    Relieved by:  Nothing  Worsened by:  Activity  Ineffective treatments: compression stocking.  Associated symptoms: no abdominal pain, no chest pain, no claudication, no cough, no diaphoresis, no ear pain, no fever, no headaches, no hemoptysis, no neck pain, no PND, no rash, no sore throat, no sputum production, no syncope, no swollen glands, no vomiting and no wheezing    Risk factors: hx of cancer    Risk factors: no recent alcohol use, no family hx of DVT, no obesity and no oral contraceptive use            Prior to Admission Medications   Prescriptions Last Dose Informant Patient Reported? Taking?   Multiple Vitamin (MULTIVITAMIN ADULT PO)  Self, Child Yes No   Sig: Take 1 tablet by mouth in the morning   Ribociclib Succ, 600 MG Dose, 200 MG TBPK  Self, Child No No   Sig: Take 600 mg by mouth in the morning For 3 weeks and then 1 week off   Senokot 8.6 MG tablet  Self, Child Yes No   Sig: Take 17.2 mg by mouth   acetaminophen (TYLENOL) " 500 mg tablet  Self, Child No No   Sig: Take 2 tablets (1,000 mg total) by mouth 3 (three) times a day as needed for mild pain   calcium citrate-vitamin D (Calcitrate Plus D) 315 mg-5 mcg tablet   No No   Sig: Take 1 tablet by mouth 2 (two) times a day   ergocalciferol (VITAMIN D2) 50,000 units  Self, Child No No   Sig: TAKE 1 CAPSULE BY MOUTH 1 TIME A WEEK   lidocaine (LIDODERM) 5 %  Self, Child No No   Sig: Apply 2 patches topically over 12 hours daily Remove & Discard patch within 12 hours or as directed by MD   metoprolol succinate (TOPROL-XL) 50 mg 24 hr tablet  Self, Child No No   Sig: Take 1 tablet (50 mg total) by mouth daily   naloxone (NARCAN) 4 mg/0.1 mL nasal spray  Self, Child No No   Sig: Administer 1 spray into a nostril. If no response after 2-3 minutes, give another dose in the other nostril using a new spray.   oxyCODONE (ROXICODONE) 10 MG TABS  Self, Child No No   Sig: Take 1 tablet (10 mg total) by mouth every 4 (four) hours as needed for severe pain or moderate pain Max Daily Amount: 60 mg   polyethylene glycol (GLYCOLAX) 17 GM/SCOOP powder  Self, Child Yes No   sodium chloride 1 g tablet  Self, Child No No   Sig: Take 1 tablet (1 g total) by mouth 2 (two) times a day      Facility-Administered Medications: None       Past Medical History:   Diagnosis Date    Breast cancer (HCC) 07/2013    left-chemotherapy    Fibroma     Goals of care, counseling/discussion     History of chemotherapy     History of external beam radiation therapy        Past Surgical History:   Procedure Laterality Date    APPENDECTOMY      BREAST SURGERY  2014    Left breast 2/2 breast CA    HYSTERECTOMY  2015    Due to fibroma    IR BIOPSY BONE  10/9/2023    IR PLEURAL EFFUSION LONG-TERM CATHETER PLACEMENT  1/24/2024    IR THORACENTESIS  11/8/2023    MASTECTOMY Left 2013    OOPHORECTOMY         Family History   Problem Relation Age of Onset    Lung cancer Mother     No Known Problems Father     No Known Problems Sister      No Known Problems Daughter     No Known Problems Daughter     Cancer Maternal Grandmother     No Known Problems Maternal Grandfather     No Known Problems Paternal Grandmother     No Known Problems Paternal Grandfather     Uterine cancer Maternal Aunt     No Known Problems Son     No Known Problems Paternal Aunt      I have reviewed and agree with the history as documented.    E-Cigarette/Vaping    E-Cigarette Use Never User      E-Cigarette/Vaping Substances    Nicotine No     THC No     CBD No     Flavoring No     Other No     Unknown No      Social History     Tobacco Use    Smoking status: Never     Passive exposure: Never    Smokeless tobacco: Never   Vaping Use    Vaping status: Never Used   Substance Use Topics    Alcohol use: Not Currently    Drug use: No       Review of Systems   Constitutional: Negative.  Negative for chills, diaphoresis and fever.   HENT: Negative.  Negative for ear pain and sore throat.    Eyes: Negative.  Negative for pain and visual disturbance.   Respiratory:  Positive for shortness of breath. Negative for cough, hemoptysis, sputum production and wheezing.    Cardiovascular:  Negative for chest pain, palpitations, claudication, syncope and PND.   Gastrointestinal:  Negative for abdominal pain and vomiting.   Genitourinary:  Negative for dysuria and hematuria.   Musculoskeletal: Negative.  Negative for arthralgias, back pain and neck pain.   Skin:  Negative for color change and rash.   Neurological:  Negative for seizures, syncope and headaches.   Hematological: Negative.    Psychiatric/Behavioral: Negative.     All other systems reviewed and are negative.      Physical Exam  Physical Exam  Vitals and nursing note reviewed.   Constitutional:       General: She is not in acute distress.     Appearance: She is well-developed. She is cachectic.   HENT:      Head: Normocephalic and atraumatic.      Right Ear: External ear normal.      Left Ear: External ear normal.      Nose: Nose normal.       Mouth/Throat:      Lips: Pink.      Pharynx: Oropharynx is clear. Uvula midline.      Comments: Patient maintaining airway and secretions. No stridor . No brawniness under tongue.     Dry MM  Eyes:      General: Lids are normal. Vision grossly intact.      Extraocular Movements: Extraocular movements intact.      Conjunctiva/sclera: Conjunctivae normal.      Pupils: Pupils are equal, round, and reactive to light.   Neck:      Trachea: Trachea normal.   Cardiovascular:      Rate and Rhythm: Regular rhythm. Tachycardia present.      Heart sounds: Normal heart sounds, S1 normal and S2 normal. No murmur heard.  Pulmonary:      Effort: Pulmonary effort is normal. Tachypnea present. No respiratory distress.      Breath sounds: Examination of the left-middle field reveals decreased breath sounds. Examination of the left-lower field reveals decreased breath sounds. Decreased breath sounds present.      Comments: Mild conversational dyspnea  Abdominal:      General: Bowel sounds are normal. There is distension.      Palpations: Abdomen is soft.      Tenderness: There is no abdominal tenderness.   Musculoskeletal:         General: No swelling.      Cervical back: Normal range of motion and neck supple.      Right lower leg: 3+ Edema present.      Left lower leg: 3+ Edema present.   Skin:     General: Skin is warm and dry.      Capillary Refill: Capillary refill takes less than 2 seconds.   Neurological:      General: No focal deficit present.      Mental Status: She is alert and oriented to person, place, and time.      GCS: GCS eye subscore is 4. GCS verbal subscore is 5. GCS motor subscore is 6.      Cranial Nerves: Cranial nerves 2-12 are intact.      Sensory: Sensation is intact.      Motor: Motor function is intact.      Coordination: Coordination is intact.   Psychiatric:         Attention and Perception: Attention and perception normal.         Mood and Affect: Mood normal.         Vital Signs  ED Triage Vitals  [03/22/24 1337]   Temperature Pulse Respirations Blood Pressure SpO2   97.6 °F (36.4 °C) (!) 128 20 114/58 96 %      Temp Source Heart Rate Source Patient Position - Orthostatic VS BP Location FiO2 (%)   Oral Monitor Sitting Right arm --      Pain Score       10 - Worst Possible Pain           Vitals:    03/22/24 1337 03/22/24 1629 03/22/24 1934   BP: 114/58 104/62 102/59   Pulse: (!) 128 (!) 114 (!) 119   Patient Position - Orthostatic VS: Sitting Lying Sitting         Visual Acuity      ED Medications  Medications   sodium chloride 0.9 % infusion (150 mL/hr Intravenous New Bag 3/22/24 1539)   fentaNYL injection 50 mcg (50 mcg Intravenous Given 3/22/24 1534)   ceFAZolin (ANCEF) IVPB (premix in dextrose) 1,000 mg 50 mL (1,000 mg Intravenous New Bag 3/22/24 1928)   iohexol (OMNIPAQUE) 350 MG/ML injection (SINGLE-DOSE) 100 mL (100 mL Intravenous Given 3/22/24 1647)   oxyCODONE (ROXICODONE) immediate release tablet 10 mg (10 mg Oral Given 3/22/24 1759)       Diagnostic Studies  Results Reviewed       Procedure Component Value Units Date/Time    HS Troponin I 2hr [[id number]]  (Normal) Collected: 03/22/24 1740    Lab Status: Final result Specimen: Blood from Arm, Right Updated: 03/22/24 1905     hs TnI 2hr 7 ng/L      Delta 2hr hsTnI -1 ng/L     Urine Microscopic [[id number]]  (Abnormal) Collected: 03/22/24 1531    Lab Status: Final result Specimen: Urine, Clean Catch Updated: 03/22/24 1802     RBC, UA 1-2 /hpf      WBC, UA 20-30 /hpf      Epithelial Cells Occasional /hpf      Bacteria, UA None Seen /hpf      MUCUS THREADS Moderate     Hyaline Casts, UA 3-5 /lpf     Urine culture [[id number]] Collected: 03/22/24 1531    Lab Status: In process Specimen: Urine, Clean Catch Updated: 03/22/24 1802    UA w Reflex to Microscopic w Reflex to Culture [[id number]]  (Abnormal) Collected: 03/22/24 1531    Lab Status: Final result Specimen: Urine, Clean Catch Updated: 03/22/24 2175     Color, UA Yellow     Clarity, UA Clear      Specific Gravity, UA 1.020     pH, UA 5.0     Leukocytes, UA Small     Nitrite, UA Negative     Protein, UA 30 (1+) mg/dl      Glucose, UA Negative mg/dl      Ketones, UA 40 (2+) mg/dl      Urobilinogen, UA <2.0 mg/dl      Bilirubin, UA Negative     Occult Blood, UA Negative    Procalcitonin [[id number]]  (Abnormal) Collected: 03/22/24 1524    Lab Status: Final result Specimen: Blood from Arm, Right Updated: 03/22/24 1608     Procalcitonin 0.50 ng/ml     HS Troponin 0hr (reflex protocol) [[id number]]  (Normal) Collected: 03/22/24 1524    Lab Status: Final result Specimen: Blood from Arm, Right Updated: 03/22/24 1604     hs TnI 0hr 8 ng/L     B-Type Natriuretic Peptide(BNP) [[id number]]  (Normal) Collected: 03/22/24 1524    Lab Status: Final result Specimen: Blood from Arm, Right Updated: 03/22/24 1603     BNP 87 pg/mL     Lactic acid, plasma (w/reflex if result > 2.0) [[id number]]  (Normal) Collected: 03/22/24 1524    Lab Status: Final result Specimen: Blood from Arm, Right Updated: 03/22/24 1600     LACTIC ACID 1.5 mmol/L     Narrative:      Result may be elevated if tourniquet was used during collection.    Comprehensive metabolic panel [[id number]]  (Abnormal) Collected: 03/22/24 1524    Lab Status: Final result Specimen: Blood from Arm, Right Updated: 03/22/24 1600     Sodium 131 mmol/L      Potassium 4.1 mmol/L      Chloride 91 mmol/L      CO2 29 mmol/L      ANION GAP 11 mmol/L      BUN 8 mg/dL      Creatinine 0.41 mg/dL      Glucose 77 mg/dL      Calcium 8.6 mg/dL      Corrected Calcium 9.4 mg/dL      AST 86 U/L      ALT 18 U/L      Alkaline Phosphatase 138 U/L      Total Protein 7.0 g/dL      Albumin 3.0 g/dL      Total Bilirubin 0.48 mg/dL      eGFR 116 ml/min/1.73sq m     Narrative:      National Kid[employer] Disease Foundation guidelines for Chronic Kidney Disease (CKD):     Stage 1 with normal or high GFR (GFR > 90 mL/min/1.73 square meters)    Stage 2 Mild CKD (GFR = 60-89 mL/min/1.73 square meters)    Stage 3A  Moderate CKD (GFR = 45-59 mL/min/1.73 square meters)    Stage 3B Moderate CKD (GFR = 30-44 mL/min/1.73 square meters)    Stage 4 Severe CKD (GFR = 15-29 mL/min/1.73 square meters)    Stage 5 End Stage CKD (GFR <15 mL/min/1.73 square meters)  Note: GFR calculation is accurate only with a steady state creatinine    Magnesium [[id number]]  (Normal) Collected: 03/22/24 1524    Lab Status: Final result Specimen: Blood from Arm, Right Updated: 03/22/24 1600     Magnesium 1.9 mg/dL     Protime-INR [[id number]]  (Normal) Collected: 03/22/24 1524    Lab Status: Final result Specimen: Blood from Arm, Right Updated: 03/22/24 1558     Protime 13.7 seconds      INR 1.03    APTT [[id number]]  (Normal) Collected: 03/22/24 1524    Lab Status: Final result Specimen: Blood from Arm, Right Updated: 03/22/24 1558     PTT 37 seconds     CBC and differential [[id number]]  (Abnormal) Collected: 03/22/24 1524    Lab Status: Final result Specimen: Blood from Arm, Right Updated: 03/22/24 1535     WBC 8.36 Thousand/uL      RBC 3.09 Million/uL      Hemoglobin 8.4 g/dL      Hematocrit 27.7 %      MCV 90 fL      MCH 27.2 pg      MCHC 30.3 g/dL      RDW 20.3 %      MPV 8.4 fL      Platelets 330 Thousands/uL      nRBC 0 /100 WBCs      Neutrophils Relative 79 %      Immature Grans % 2 %      Lymphocytes Relative 10 %      Monocytes Relative 9 %      Eosinophils Relative 0 %      Basophils Relative 0 %      Neutrophils Absolute 6.54 Thousands/µL      Absolute Immature Grans 0.19 Thousand/uL      Absolute Lymphocytes 0.87 Thousands/µL      Absolute Monocytes 0.74 Thousand/µL      Eosinophils Absolute 0.00 Thousand/µL      Basophils Absolute 0.02 Thousands/µL                    CT pe study w abdomen pelvis w contrast   Final Result by Ruthie Bose MD (03/22 1730)      No pulmonary emboli. No new pulmonary infiltrates or consolidation.      Extensive right thoracic pleural carcinomatosis without significant interval change compared to  the recent prior. Extensive associated mass effect as above. Vasculature remains patent though mildly compressed and displaced.      Caudal displacement of the right hemidiaphragm, with mass effect on the liver. Not significantly changed.      Mild ascites.      Third spacing of fluid as above.      Extensive metastatic disease without significant change compared to recent.                     Workstation performed: [id number]         XR chest 1 view portable   ED Interpretation by Aracely Agarwal DO (03/22 1544)   Compared to old x-rays no significant change was noted near complete opacification of right lung                 Procedures  ECG 12 Lead Documentation Only    Date/Time: 3/22/2024 5:46 PM    Performed by: Aracely Agarwal DO  Authorized by: Aracely Agarwal DO    Indications / Diagnosis:  Sob - TIME 1738  ECG reviewed by me, the ED Provider: yes    Patient location:  ED  Previous ECG:     Previous ECG:  Compared to current    Comparison ECG info:  Today  Interpretation:     Interpretation: normal    Quality:     Tracing quality:  Limited by artifact  Rate:     ECG rate:  115    ECG rate assessment: tachycardic    Rhythm:     Rhythm: sinus rhythm    Ectopy:     Ectopy: none    QRS:     QRS axis:  Normal  Conduction:     Conduction: normal    ST segments:     ST segments:  Non-specific  T waves:     T waves: normal    Comments:      Qtc @ 448 MS  CriticalCare Time    Date/Time: 3/22/2024 7:40 PM    Performed by: Aracely Agarwal DO  Authorized by: Aracely Agarwal DO    Critical care provider statement:     Critical care time (minutes):  60    Critical care time was exclusive of:  Separately billable procedures and treating other patients    Critical care was necessary to treat or prevent imminent or life-threatening deterioration of the following conditions:  Respiratory failure and sepsis    Critical care was time spent personally by me on the following activities:  Blood draw for specimens,  "obtaining history from patient or surrogate, development of treatment plan with patient or surrogate, discussions with consultants, discussions with primary provider, evaluation of patient's response to treatment, examination of patient, review of old charts, re-evaluation of patient's condition, ordering and review of radiographic studies, ordering and review of laboratory studies and ordering and performing treatments and interventions  Comments:      Spoke with Penny Jimenez   IM           ED Course  ED Course as of 03/22/24 1944   Fri Mar 22, 2024   1602 Patient is a 53 year old female here with family using interpretor services coming in with worsening sob, LE edema and urinary issues. On exam, cachetic appearing with mild conversational dyspnea . Will review chart, obtain EKG, cxr, labs and plan to progress to [facility]AP concerning for obstructing mass     Disclosure: Voice to text software was used in the preparation of this document and could have resulted in translational errors.      Occasional wrong word or \"sound a like\" substitutions may have occurred due to the inherent limitations of voice recognition software.  Read the chart carefully and recognize, using context, where substitutions have occurred.       I have independently reviewed external records are available to me to the level of detail possible within the time constraints of my patient care responsibilities in the ED.       1710 Labs reviewed and without actionable derangement         1710 With help of  services discussed with patient and family regarding workup.  She states that she is feeling mildly improved.    They are aware of lab findings and pending CT results.   1807 [facility]'s [facility] and third spacing.  She suggests no diuresis at this time and is a possibility and concern for third spacing with spread of disease to the omentum.    1816 Reji [id number]    I was using  services to " discuss with patient and family for 30 minutes.    Had extensive conversation with patient and family.  There is concern on my end about patient and family understanding about the extensive disease as well as the prognosis.    I reviewed patient's chart with her and her family regarding extensive bony metastatic disease which she states that she was unaware of.  Also reviewed with her the documentation regarding her Pleurex    Patient and family are asking if there is anything to be done about the fluid and I discussed with them over the course of this time regarding the catheter is in position however is being blocked by tumor.  There was discussion with pulmonology in the chart that shows possible removal of Pleurx with thoracentesis    Patient and family are asking if this can be completed today.  I discussed with them that this would have to be reconsultation with IR and pulmonology to further discuss this.  She was also asking in regards to restarting chemotherapy.  I made her aware that this is beyond my scope of practice and will have to be reconsidered and discussed with hematology oncology.    I do feel and discussed with with patient and family that is in her best interest to be admitted for rediscussion of prognosis as well as treatment with palliative care, IR, heme-onc as well as medicine.  Patient and family are in agreement.    They are aware at this time of the extent of disease and concern for progression into the omental tissue    Patient is alert and oriented but still has mild conversational dyspnea.     1907 Troponin negative 1.  Tiger text sent to medicine for admission   1913 Patient's urine does have leukocytes, ketones as well as WBCs bacteria.  This is new compared to old.  No old urine culture and will send urine culture and give Ancef   1942 Dr George accepts patient.              HEART Risk Score      Flowsheet Row Most Recent Value   Heart Score Risk Calculator    History 0 Filed at:  03/22/2024 1621   ECG 1 Filed at: 03/22/2024 1621   Age 1 Filed at: 03/22/2024 1621   Risk Factors 1 Filed at: 03/22/2024 1621   Troponin 0 Filed at: 03/22/2024 1621   HEART Score 3 Filed at: 03/22/2024 1621                  PERC Rule for PE      Flowsheet Row Most Recent Value   PERC Rule for PE    Age >=50 1 Filed at: 03/22/2024 1428   HR >=100 1 Filed at: 03/22/2024 1428   O2 Sat on room air < 95% 0 Filed at: 03/22/2024 1428   History of PE or DVT 0 Filed at: 03/22/2024 1428   Recent trauma or surgery 0 Filed at: 03/22/2024 1428   Hemoptysis 0 Filed at: 03/22/2024 1428   Exogenous estrogen 0 Filed at: 03/22/2024 1428   Unilateral leg swelling 0 Filed at: 03/22/2024 1428   PERC Rule for PE Results 2 Filed at: 03/22/2024 1428                SBIRT 20yo+      Flowsheet Row Most Recent Value   Initial Alcohol Screen: US AUDIT-C     1. How often do you have a drink containing alcohol? 0 Filed at: 03/22/2024 1600   2. How many drinks containing alcohol do you have on a typical day you are drinking?  0 Filed at: 03/22/2024 1600   3b. FEMALE Any Age, or MALE 65+: How often do you have 4 or more drinks on one occassion? 0 Filed at: 03/22/2024 1600   Audit-C Score 0 Filed at: 03/22/2024 1600   ALEJO: How many times in the past year have you...    Used an illegal drug or used a prescription medication for non-medical reasons? Never Filed at: 03/22/2024 1600            Wells' Criteria for PE      Flowsheet Row Most Recent Value   Wells' Criteria for PE    Clinical signs and symptoms of DVT 0 Filed at: 03/22/2024 1427   PE is primary diagnosis or equally likely 3 Filed at: 03/22/2024 1427   HR >100 1.5 Filed at: 03/22/2024 1427   Immobilization at least 3 days or Surgery in the previous 4 weeks 0 Filed at: 03/22/2024 1427   Previous, objectively diagnosed PE or DVT 0 Filed at: 03/22/2024 1427   Hemoptysis 0 Filed at: 03/22/2024 1427   Malignancy with treatment within 6 months or palliative 1 Filed at: 03/22/2024 1427   Mack  Criteria Total 5.5 Filed at: 03/22/2024 1427                  Medical Decision Making      EKG INTERPRETATION@ 1424  RHYTHM:sinus tachycardia @ 120 bpm  AXIS: normal axis  INTERVALS: KS @ 130 ms  QRS COMPLEX: QRS @ 66 ms  ST SEGMENT: non specific st segment changes. Diffuse artifact  QT INTERVAL: QTC @ 455 ms  COMPARED WITH PRIOR no change 3/20/24  Interpretation by Aracely Agarwal DO    Differential diagnosis includes but not limited to:  COPD exacerbation, asthma exacerbation, pneumonia, PE, pulmonary edema, pulmonary effusions, lung mass/malignancy, CHF, ACS, NSTEMI, acute kidney injury, electrolyte dysfunction, inhalation injury, anemia, valvular disorder, viral etiology,        Problems Addressed:  Chest pain: acute illness or injury  SOB (shortness of breath): chronic illness or injury    Amount and/or Complexity of Data Reviewed  Independent Historian: caregiver and spouse     Details:   Daughter    External Data Reviewed: notes.  Labs: ordered. Decision-making details documented in ED Course.     Details: Chronic anemia with no evidence of leukocytosis or thrombocytopenia  No acute kidney injury or electrolyte dysfunction except for sodium 131 which is improved compared to old  Troponin 8 with a heart score of 3  Lactic acid within normal range  Procalcitonin mildly elevated  Radiology: ordered and independent interpretation performed. Decision-making details documented in ED Course.     Details: Compared to old x-rays no significant change was noted near complete opacification of right lung  ECG/medicine tests: ordered and independent interpretation performed. Decision-making details documented in ED Course.     Details: No ischemia or arrhythmia     Risk  Prescription drug management.  Decision regarding hospitalization.             Disposition  Final diagnoses:   SOB (shortness of breath)   Chest pain   Dyspnea   Metastatic disease (HCC)   UTI (urinary tract infection)   Lower extremity edema      Time reflects when diagnosis was documented in both MDM as applicable and the Disposition within this note       Time User Action Codes Description Comment    3/22/2024  2:34 PM BendAracely mishra Add [R06.02] SOB (shortness of breath)     3/22/2024  2:34 PM Bendock, Aracely L Add [R07.9] Chest pain     3/22/2024  7:13 PM Bendock, Aracely L Add [R06.00] Dyspnea     3/22/2024  7:14 PM Bendock, Aracely L Add [C79.9] Metastatic disease (HCC)     3/22/2024  7:14 PM Bendock, Aracely L Add [N39.0] UTI (urinary tract infection)     3/22/2024  7:14 PM Benddanica, Aracely L Add [R60.0] Lower extremity edema           ED Disposition       ED Disposition   Admit    Condition   Stable    Date/Time   Fri Mar 22, 2024 1938    Comment   Case was discussed with Dr George and the patient's admission status was agreed to be Admission Status: inpatient status to the service of Dr. George .               Follow-up Information    None         Patient's Medications   Discharge Prescriptions    No medications on file       No discharge procedures on file.    PDMP Review         Value Time User    PDMP Reviewed  Yes 3/12/2024  3:03 PM Janie Briones MD            ED Provider  Electronically Signed by             Aracely Agarwal DO  03/22/24 1944

## 2024-03-23 ENCOUNTER — HOME CARE VISIT (OUTPATIENT)
Dept: HOME HEALTH SERVICES | Facility: HOME HEALTHCARE | Age: 54
End: 2024-03-23

## 2024-03-23 PROBLEM — Z51.5 ENCOUNTER FOR HOME HOSPICE CARE: Status: ACTIVE | Noted: 2024-03-23

## 2024-03-23 LAB
ANION GAP SERPL CALCULATED.3IONS-SCNC: 10 MMOL/L (ref 4–13)
ATRIAL RATE: 115 BPM
BACTERIA UR CULT: NORMAL
BUN SERPL-MCNC: 9 MG/DL (ref 5–25)
CALCIUM SERPL-MCNC: 8.2 MG/DL (ref 8.4–10.2)
CHLORIDE SERPL-SCNC: 94 MMOL/L (ref 96–108)
CO2 SERPL-SCNC: 28 MMOL/L (ref 21–32)
CREAT SERPL-MCNC: 0.41 MG/DL (ref 0.6–1.3)
ERYTHROCYTE [DISTWIDTH] IN BLOOD BY AUTOMATED COUNT: 20.5 % (ref 11.6–15.1)
GFR SERPL CREATININE-BSD FRML MDRD: 116 ML/MIN/1.73SQ M
GLUCOSE SERPL-MCNC: 97 MG/DL (ref 65–140)
HCT VFR BLD AUTO: 24.9 % (ref 34.8–46.1)
HGB BLD-MCNC: 7.6 G/DL (ref 11.5–15.4)
MCH RBC QN AUTO: 27.7 PG (ref 26.8–34.3)
MCHC RBC AUTO-ENTMCNC: 30.5 G/DL (ref 31.4–37.4)
MCV RBC AUTO: 91 FL (ref 82–98)
P AXIS: 56 DEGREES
PLATELET # BLD AUTO: 325 THOUSANDS/UL (ref 149–390)
PMV BLD AUTO: 8.7 FL (ref 8.9–12.7)
POTASSIUM SERPL-SCNC: 4 MMOL/L (ref 3.5–5.3)
PR INTERVAL: 144 MS
QRS AXIS: 83 DEGREES
QRSD INTERVAL: 64 MS
QT INTERVAL: 324 MS
QTC INTERVAL: 448 MS
RBC # BLD AUTO: 2.74 MILLION/UL (ref 3.81–5.12)
SODIUM SERPL-SCNC: 132 MMOL/L (ref 135–147)
T WAVE AXIS: 44 DEGREES
VENTRICULAR RATE: 115 BPM
WBC # BLD AUTO: 8 THOUSAND/UL (ref 4.31–10.16)

## 2024-03-23 PROCEDURE — 99497 ADVNCD CARE PLAN 30 MIN: CPT

## 2024-03-23 PROCEDURE — 93010 ELECTROCARDIOGRAM REPORT: CPT | Performed by: STUDENT IN AN ORGANIZED HEALTH CARE EDUCATION/TRAINING PROGRAM

## 2024-03-23 PROCEDURE — 99255 IP/OBS CONSLTJ NEW/EST HI 80: CPT | Performed by: INTERNAL MEDICINE

## 2024-03-23 PROCEDURE — 80048 BASIC METABOLIC PNL TOTAL CA: CPT | Performed by: INTERNAL MEDICINE

## 2024-03-23 PROCEDURE — 99233 SBSQ HOSP IP/OBS HIGH 50: CPT

## 2024-03-23 PROCEDURE — 85027 COMPLETE CBC AUTOMATED: CPT | Performed by: INTERNAL MEDICINE

## 2024-03-23 RX ORDER — HEPARIN SODIUM 5000 [USP'U]/ML
5000 INJECTION, SOLUTION INTRAVENOUS; SUBCUTANEOUS EVERY 8 HOURS SCHEDULED
Status: DISCONTINUED | OUTPATIENT
Start: 2024-03-23 | End: 2024-03-26 | Stop reason: HOSPADM

## 2024-03-23 RX ORDER — LIDOCAINE 50 MG/G
2 PATCH TOPICAL DAILY
Status: DISCONTINUED | OUTPATIENT
Start: 2024-03-23 | End: 2024-03-25

## 2024-03-23 RX ORDER — LORAZEPAM 1 MG/1
1 TABLET ORAL EVERY 6 HOURS PRN
Refills: 0 | OUTPATIENT
Start: 2024-03-23 | End: 2024-03-26

## 2024-03-23 RX ORDER — SENNOSIDES 8.6 MG
17.2 TABLET ORAL
Status: DISCONTINUED | OUTPATIENT
Start: 2024-03-23 | End: 2024-03-25

## 2024-03-23 RX ADMIN — OXYCODONE HYDROCHLORIDE 10 MG: 10 TABLET ORAL at 23:01

## 2024-03-23 RX ADMIN — HYDROMORPHONE HYDROCHLORIDE 0.5 MG: 1 INJECTION, SOLUTION INTRAMUSCULAR; INTRAVENOUS; SUBCUTANEOUS at 00:07

## 2024-03-23 RX ADMIN — SENNOSIDES 17.2 MG: 8.6 TABLET, FILM COATED ORAL at 21:11

## 2024-03-23 RX ADMIN — OXYCODONE HYDROCHLORIDE 10 MG: 10 TABLET ORAL at 01:49

## 2024-03-23 RX ADMIN — HYDROMORPHONE HYDROCHLORIDE 0.5 MG: 1 INJECTION, SOLUTION INTRAMUSCULAR; INTRAVENOUS; SUBCUTANEOUS at 13:45

## 2024-03-23 RX ADMIN — HEPARIN SODIUM 5000 UNITS: 5000 INJECTION INTRAVENOUS; SUBCUTANEOUS at 05:48

## 2024-03-23 RX ADMIN — FENTANYL CITRATE 50 MCG: 50 INJECTION INTRAMUSCULAR; INTRAVENOUS at 02:48

## 2024-03-23 RX ADMIN — LIDOCAINE 5% 2 PATCH: 700 PATCH TOPICAL at 15:52

## 2024-03-23 RX ADMIN — OXYCODONE HYDROCHLORIDE 10 MG: 10 TABLET ORAL at 06:39

## 2024-03-23 RX ADMIN — OXYCODONE HYDROCHLORIDE 10 MG: 10 TABLET ORAL at 18:23

## 2024-03-23 RX ADMIN — HYDROMORPHONE HYDROCHLORIDE 0.5 MG: 1 INJECTION, SOLUTION INTRAMUSCULAR; INTRAVENOUS; SUBCUTANEOUS at 21:10

## 2024-03-23 RX ADMIN — METOPROLOL SUCCINATE 50 MG: 50 TABLET, EXTENDED RELEASE ORAL at 10:02

## 2024-03-23 RX ADMIN — OXYCODONE HYDROCHLORIDE 10 MG: 10 TABLET ORAL at 10:36

## 2024-03-23 RX ADMIN — HEPARIN SODIUM 5000 UNITS: 5000 INJECTION INTRAVENOUS; SUBCUTANEOUS at 15:52

## 2024-03-23 NOTE — MALNUTRITION/BMI
This medical record reflects one or more clinical indicators suggestive of malnutrition and/or morbid obesity.    Malnutrition Findings:   Adult Malnutrition type: Chronic illness  Adult Degree of Malnutrition: Other severe protein calorie malnutrition  Malnutrition Characteristics: Fat loss, Muscle loss, Weight loss                  360 Statement: Severe protein-calorie malnutrition in context of chronic illness related to inadequate PO intake, catabolic illness as evidenced by weight loss of 18% in  7months, severe muscle wasting (temporal, clavicle), and severe fat wasting (orbital), Treated with oral supplement    BMI Findings:           Body mass index is 20.54 kg/m².     See Nutrition note dated 3/23/24 for additional details.  Completed nutrition assessment is viewable in the nutrition documentation.

## 2024-03-23 NOTE — ASSESSMENT & PLAN NOTE
Long discussion was done with , daughter and son-in-law at bedside regarding extent of her disease and limited treatment options.  Reviewed recent visit with oncology.  She was noted to have decreased functional status and there was no plan for further treatment.  Palliative care was recommended.  I relayed this to the patient and her family.  They explained to them what palliative care/hospice entails and route they would like to pursue.  Please see ACP note.

## 2024-03-23 NOTE — CASE MANAGEMENT
Case Management Discharge Planning Note    Patient name Shannan Vee  Location [facility] /E2 -* MRN [id number]  : 1970 Date 3/23/2024       Current Admission Date: 3/22/2024  Current Admission Diagnosis:Encounter for home hospice care   Patient Active Problem List    Diagnosis Date Noted    Encounter for home hospice care 2024    Tachycardia 2024    Hyponatremia 2024    Drug induced constipation 2024    Severe protein-calorie malnutrition (HCC) 2024    Chronic bilateral low back pain without sciatica 2024    Cancer associated pain 2024    Goals of care, counseling/discussion 2024    Large pleural effusion 2023    Recurrent malignant neoplasm of breast (HCC) 10/23/2023    Right lower lobe lung mass 10/23/2023    Pain from bone metastases (HCC) 10/23/2023    Other specified anemias 10/23/2023    Bladder wall thickening 10/06/2023    Urinary frequency 2023    Overweight (BMI 25.0-29.9) 2021    Personal history of malignant neoplasm of breast 2019    Use of anastrozole (Arimidex) 2018    Carcinoma of left breast, estrogen receptor positive  2017    Malignant neoplasm of overlapping sites of left female breast (HCC) 2015    Uterine leiomyoma 10/15/2014    Limb swelling 2013      LOS (days): 1  Geometric Mean LOS (GMLOS) (days):   Days to GMLOS:     OBJECTIVE:  Risk of Unplanned Readmission Score: 35.73         Current admission status: Inpatient   Preferred Pharmacy:   [id number] [place] - [address]  [address] [address]  Phone: [phone] Fax: [phone]    Primary Care Provider: Todd Bañuelos MD    Primary Insurance: [employer]  Secondary Insurance:     DISCHARGE DETAILS:         Treatment Team Recommendation: Hospice       Additional Comments:  spoke with provider. Patient would like to go home with home hospice. CM sent referral  via aidin to [facility] and tiger texted hospice team.

## 2024-03-23 NOTE — ASSESSMENT & PLAN NOTE
Related to recurrent metastatic breast cancer  Persistent  extensive carcinomatosis even with presence of Pleurx catheter.  Appreciate IR consult - no further recommendations inpatient

## 2024-03-23 NOTE — ASSESSMENT & PLAN NOTE
Malnutrition Findings:   BMI Findings:    Body mass index is 20.54 kg/m².     She looks cachectic.  Diminished muscle mass in the upper extremities..  She has prominent clavicles and ribs and intercostals.

## 2024-03-23 NOTE — ASSESSMENT & PLAN NOTE
Related to recurrent metastatic breast cancer  Persistent  extensive carcinomatosis even with presence of Pleurx catheter.  Thorough discussion was done with family regarding extent of her disease   They have decided to pursue palliative care/hospice (home)  Will consult IR for evaluation to see if the Pleurx catheter could be adjusted or if additional drainage can be done   (for comfort)

## 2024-03-23 NOTE — ACP (ADVANCE CARE PLANNING)
Serious Illness Conversation    What is your understanding now of where you are with your illness?  Prognostic Understanding: appropriate understanding of prognosis  Discussed bedside with patient and  via  today     How much information about what is likely to be ahead with your illness would you like to have?  Information: patient wants to be fully informed     What did you (clinician) communicate to the patient?  Prognostic Communication: Uncertain - It can be difficult to predict what will happen with your illness. I hope you will continue to live well for a long time but I’m worried that you could get sick quickly, and I think it is important to prepare for that possibility.  She may have few weeks to live given severity of disease and declining status (no plan for chemo)     If your health situation worsens, what are your most important goals?  Goals: be at home, be physically comfortable  If there is no further benefit to be seen from removing fluid, patient would like to go home and be comfortable with home hospice. This includes no invasive medical treatment, including compressions and intubation. Code status updated to level 4 comfort care.  Discussed in depth via [place]ic interpretation bedside what this entails. Patient and  are aware that if patient is to pass there is no medical intervention that would take place.  The main goal is to make patient comfortable.     How much does your proxy and family know about your priorities and wishes?  Discussion Discussion: extensive discussion with family about goals and wishes    Discussed at length with patient and  about her current condition and prognosis.  Reviewed conversation between previous provider and IR physician.  Discussed the following with patient and .  Given recent attempts to disrupt loculations of the effusion and extensive pleural carcinomatosis with marked mass effect throughout the right  hemithorax, attempting to do fluid drainage around the Pleurx catheter (as the catheter appears to be draining well) is not seen to have any benefit as the area around the cancer will likely not provide any meaningful lung reexpansion to help with patient's dyspnea.  Patient also previously had IR evaluation during her last hospital stay, at that time it was felt patient would not benefit from intervention.  Patient has also had multiple conversations with her  and outpatient oncologist who felt that patient's performance status was declining and ongoing aggressive oncologic treatment would be very challenging -therefore recommended palliative care.     I’ve heard you say that being at home is really important to you. Keeping that in mind, and what we know about your illness, I recommend that we will work with case management to get referrals sent for home  hospice. This will help us make sure that your treatment plans reflect what’s important to you.     How does this plan sound to you? I will do everything I can to help you through this.  Patient verbalized understanding of the plan     I have spent 45minutes speaking with my patient on advanced care planning today or during this visit     Advanced directives

## 2024-03-23 NOTE — HOSPICE NOTE
Received hospice referral, patient approved for [facility] hospce( at home or SNF). I called LAUREN Peralta via  line ( Larisa ID # [id number]). Patient, daughter and LAUREN present on call. Hospice services were reviewed with them in detail and all questions were answered. Family wants to be able to bring patient back to ER, I explained to them that with hospice we focus on comfort care in the home. They were very appreciative of my call but stated they are not interested in hospice at this time. They have my number should they change their mind. I updated JAUN Johnston as well.

## 2024-03-23 NOTE — NUTRITION
"   03/23/24 1610   Biochemical Data,Medical Tests, and Procedures   Biochemical Data/Medical Tests/Procedures Lab values reviewed;Meds reviewed   Labs (Comment) Na 131 K 4.1 Mg 1.9   Meds (Comment) senna   Nutrition-Focused Physical Exam   Nutrition-Focused Physical Exam Findings RN skin assessment reviewed;Edema;Weight loss;Subcutaneous fat loss;Muscle Loss   Nutrition-Focused Physical Exam Findings +4 BLE edema, severe muscle wasting (temporal, clavicle), and severe fat wasting (orbital),   Medical-Related Concerns metastatic breast cancer, large pleural effusion, sinus tachycardia, hyponatremia, anemia who presents with persistent shortness of breath described as feeling of \" suffocation.\" Pt with poor prognosis, no longer treatment recommended, family/pt declining hispice at this time   Adequacy of Intake   Nutrition Modality PO   Current PO Intake   Current Diet Order regular   Estimated calorie intake compared to estimated need will monitor   PES Statement   Problem Clinical   Weight (3) Unintended weight gain NC-3.4   Related to Increased energy needs;Inadequate intake   As evidenced by: Weight loss  (18% wt loss x 7 months)   Recommendations/Interventions   Malnutrition/BMI Present Yes   Adult Malnutrition type Chronic illness   Adult Degree of Malnutrition Other severe protein calorie malnutrition   Malnutrition Characteristics Fat loss;Muscle loss;Weight loss   360 Statement Severe protein-calorie malnutrition in context of chronic illness related to inadequate PO intake, catabolic illness as evidenced by weight loss of 18% in  7months, severe muscle wasting (temporal, clavicle), and severe fat wasting (orbital), Treated with oral supplement   Summary Pt known to Nutrition, diagnosed with malnutrition multiple times, 3/13/24 most recently, decreased PO intake over last 6 months, improved PO intake during last admission   Interventions/Recommendations Continue current diet order;Supplement initiate "   Recommendations to Provider Will add Ensure Plus Chocolate bid   Education Assessment   Education Education not indicated at this time   Patient Nutrition Goals   Goal Avoid weight loss;Tolerate PO diet   Goal Status Initiated   Timeframe to complete goal by next f/u   Nutrition Complexity Risk   Nutrition complexity level High risk   Follow up date 03/27/24

## 2024-03-23 NOTE — ASSESSMENT & PLAN NOTE
Hyponatremia, likely related to large pleural effusion and possible SIADH from malignancy.  Also volume overloaded  Patient is refusing sodium tablets as they make her nauseous  Start strict fluid restriction  Awaiting morning BW

## 2024-03-23 NOTE — ACP (ADVANCE CARE PLANNING)
Advanced Care Planning Progress Note    Serious Illness Conversation    1. What is your understanding now of where you are with your illness?  Discussed with patient,  , daughter, son in law about her advanced disease     2. How much information about what is likely to be ahead with your illness would you like to have?  Patient fully informed     3. What did you (clinician) communicate to the patient?  She may have few weeks to live given severity of disease and declining status (no plan for chemo)     4. If your health situation worsens, what are your most important goals?  She stated she wanted hospice at home     5. If you become sicker, how much are you willing to go through for the possibility of gaining more time?  Be in the ICU: Yes    Be on a ventilator: Yes    Discussed at length. She said she wants hospice but also wants to be full code  I tried to explain to her and her family but this is what she wants right now    6. How much does your proxy and family know about your priorities and wishes?  Discussion Discussion: extensive discussion with family about goals and wishes        I have spent 20 minutes speaking with my patient on advanced care planning today or during this visit     Advanced directives         Gibson George MD

## 2024-03-23 NOTE — CASE MANAGEMENT
Case Management Discharge Planning Note    Patient name Shannan Vee  Location [facility] /E2 -* MRN [id number]  : 1970 Date 3/23/2024       Current Admission Date: 3/22/2024  Current Admission Diagnosis:Recurrent malignant neoplasm of breast (HCC)   Patient Active Problem List    Diagnosis Date Noted    Encounter for home hospice care 2024    Tachycardia 2024    Hyponatremia 2024    Drug induced constipation 2024    Severe protein-calorie malnutrition (HCC) 2024    Chronic bilateral low back pain without sciatica 2024    Cancer associated pain 2024    Goals of care, counseling/discussion 2024    Large pleural effusion 2023    Recurrent malignant neoplasm of breast (HCC) 10/23/2023    Right lower lobe lung mass 10/23/2023    Pain from bone metastases (HCC) 10/23/2023    Other specified anemias 10/23/2023    Bladder wall thickening 10/06/2023    Urinary frequency 2023    Overweight (BMI 25.0-29.9) 2021    Personal history of malignant neoplasm of breast 2019    Use of anastrozole (Arimidex) 2018    Carcinoma of left breast, estrogen receptor positive  2017    Malignant neoplasm of overlapping sites of left female breast (HCC) 2015    Uterine leiomyoma 10/15/2014    Limb swelling 2013      LOS (days): 1  Geometric Mean LOS (GMLOS) (days):   Days to GMLOS:     OBJECTIVE:  Risk of Unplanned Readmission Score: 32.6         Current admission status: Inpatient   Preferred Pharmacy:   [id number] [place] - [address]  [address] [facility] [address]  Phone: [phone] Fax: [phone]    Primary Care Provider: Todd Bañuelos MD    Primary Insurance: [employer]  Secondary Insurance:     DISCHARGE DETAILS:             Additional Comments:  spoke with hospice liaison. She states she had a full in depth discussion with patient and family and they state  they want patient to come to the hospital multiple times in the future to treat her cancer. Hospice liaison does not think hospice is appropriate at this time. JAUN then notified URSULA Rosa whom also had an in depth conversation with patient and family. CM team to follow through discharge with further discharge plans. JAUN provided 5 wishes for patient in [place].

## 2024-03-23 NOTE — ASSESSMENT & PLAN NOTE
Chronic sinus tachycardia related to large pleural effusion/malignancy/anemia.  Continue Toprol-XL for now.

## 2024-03-23 NOTE — ASSESSMENT & PLAN NOTE
Multiple and extensive discussions done with , daughter and son-in-law at bedside regarding extent of her disease and limited treatment options  Reviewed recent visit with oncology  She was noted to have decreased functional status and there was no plan for further treatment.  Palliative care was recommended  Relayed this along with previous provider to patient and family  See ACP note  Previous provider and myself extensive discussion with IR - there are no further recommendations for thoracentesis or procedures given the risks.   Discussed with oncology, will see patient in consult  Continue oxygen prn, can offer home O2 eval at home

## 2024-03-23 NOTE — ASSESSMENT & PLAN NOTE
Malnutrition Findings:   BMI Findings:    Body mass index is 20.54 kg/m².     Patient is cachectic. Continue diet as tolerated, can offer nutritional supplement with dinner

## 2024-03-23 NOTE — ASSESSMENT & PLAN NOTE
Hyponatremia, likely related to large pleural effusion and possible SIADH from malignancy.  Asymptomatic.

## 2024-03-23 NOTE — CONSULTS
e-Consult (IPC)  - Interventional Radiology  Shannan Vee 53 y.o. female MRN: [id number]  Unit/Bed#: E2 -01 Encounter: [id number]          Interventional Radiology has been consulted to evaluate Shannan Vee    We were consulted by JESÚS concerning this patient with metastatic breast cancer, recurrent right malignant pleural effusion s/p placement of a palliative long-term tunneled pleural catheter 1/24/24, for whom we have recently been consulted regarding catheter optimization (see notes from 3/13 and 3/14).  She has returned with chest pain and we were re-consulted regarding whether any further palliation could be accomplished.    Inpatient Consult to IR  Consult performed by: Catrachito Ornelas MD  Consult ordered by: Gibson George MD        03/23/24    Assessment/Recommendation:   New CT imaging has been reviewed; as before there is extensive right thoracic metastasis/carcinomatosis primarily responsible for mass effect on the adjacent lung and the majority of the opacification of the right hemithorax.  There is a relatively minor contribution from loculated pleural effusion.  The tunneled catheter remains in what was a dominant locule that is well drained, and prior attempts have been made at tPA to disrupt loculations, without effect.    Individual aspiration of the remaining locules is unlikely to provide symptomatic benefit given degree of carcinomatosis.    IR procedures are not likely to provide any benefit at this point.  My partner Dr. Bautista previously suggested considering catheter removal to reduce infection risk.      Given her clinical decline and plan for hospice, I do not feel that catheter removal is in her best interest at this juncture either.    Discussed at length with Dr. George overnight and LEVI Scruggs during the day.    11-20 minutes, >50% of the total time devoted to medical consultative verbal/EMR discussion between providers. Written report will be generated in the  EMR.     Thank you for allowing Interventional Radiology to participate in the care of Shannan SUMMERS Vee. Please don't hesitate to call or TigerText us with any questions.     Catrachito Ornelas MD

## 2024-03-23 NOTE — UTILIZATION REVIEW
Initial Clinical Review    Admission: Date/Time/Statement:   Admission Orders (From admission, onward)       Ordered        03/22/24 1939  INPATIENT ADMISSION  Once                          Orders Placed This Encounter   Procedures    INPATIENT ADMISSION     Standing Status:   Standing     Number of Occurrences:   1     Order Specific Question:   Level of Care     Answer:   Med Surg [16]     Order Specific Question:   Estimated length of stay     Answer:   More than 2 Midnights     Order Specific Question:   Certification     Answer:   I certify that inpatient services are medically necessary for this patient for a duration of greater than two midnights. See H&P and MD Progress Notes for additional information about the patient's course of treatment.     ED Arrival Information       Expected   -    Arrival   3/22/2024 13:26    Acuity   Emergent              Means of arrival   Walk-In    Escorted by   Family Member    Service   Hospitalist    Admission type   Emergency              Arrival complaint   Shortness of Breath             Chief Complaint   Patient presents with    Shortness of Breath     Pt here two days ago for SOB, back again for same symptoms. Metastatic breast cancer.     Pain     Right upper back pain, h/o right-sided pleural carcinomatosis        Initial Presentation: 53 y.o. female who presented self from home to [facility] ED. Inpatient admission for evaluation and treatment of SOB. PMHx: breast cancer. Presented w/ persistent SOB over past two days at rest and with exertion; associated w/ leg swelling. No further treatment recommended for cancer per outpatient oncology. On exam, cachectic, temporal wasting, tachycardic, crackles in R, prominent ribs/intercostals/clavicle; b/l LE edema, frail. Plan: goals of care discussed at length, IR consult for palliative drainage w/ Pleurx catheter, continue Toprol-XL for now. Discontinue other medications.       Date: 03/23/24   Day 2: Pt is comfort  care, request is for home w/ home hospice. Case management notified Hospice team, ongoing planning. Supportive care. Comfort measures only.    IR: Procedures are not likely to provide any benefit or further comfort at this point. Maintain current Pleurx catheter.    ED Triage Vitals [03/22/24 1337]   Temperature Pulse Respirations Blood Pressure SpO2   97.6 °F (36.4 °C) (!) 128 20 114/58 96 %      Temp Source Heart Rate Source Patient Position - Orthostatic VS BP Location FiO2 (%)   Oral Monitor Sitting Right arm --      Pain Score       10 - Worst Possible Pain          Wt Readings from Last 1 Encounters:   03/22/24 56 kg (123 lb 7.3 oz)     Additional Vital Signs:   Date/Time Temp Pulse Resp BP MAP (mmHg) SpO2 O2 Device   03/23/24 1100 97.2 °F (36.2 °C) Abnormal  116 Abnormal  22 110/80 -- 90 % --   03/23/24 0740 97.5 °F (36.4 °C) 121 Abnormal  19 122/79 89 90 % None (Room air)   03/23/24 0300 -- 120 Abnormal  -- -- -- 90 % None (Room air)    03/22/24 2313 97.4 °F (36.3 °C) Abnormal  116 Abnormal  18 109/70 -- 94 % None (Room air)   03/22/24 2151 -- -- -- -- -- -- None (Room air)   03/22/24 2047 97.7 °F (36.5 °C) 113 Abnormal  18 113/72 -- 94 % None (Room air)   03/22/24 1934 -- 119 Abnormal  -- 102/59 74 94 % None (Room air)   03/22/24 1629 -- 114 Abnormal  -- 104/62 77 96 % None (Room air)     Pertinent Labs/Diagnostic Test Results:   3/22 - EKG  Sinus tachycardia     CT pe study w abdomen pelvis w contrast   Final Result by Ruthie Bose MD (03/22 1730)      No pulmonary emboli. No new pulmonary infiltrates or consolidation.      Extensive right thoracic pleural carcinomatosis without significant interval change compared to the recent prior. Extensive associated mass effect as above. Vasculature remains patent though mildly compressed and displaced.      Caudal displacement of the right hemidiaphragm, with mass effect on the liver. Not significantly changed.      Mild ascites.      Third  spacing of fluid as above.      Extensive metastatic disease without significant change compared to recent.                     Workstation performed: [id number]         XR chest 1 view portable   ED Interpretation by Aracely Agarwal DO (03/22 1544)   Compared to old x-rays no significant change was noted near complete opacification of right lung      Final Result by Reina López MD (03/23 0453)      Right chest tube with extensive opacification of the right hemithorax due to pleural tumor, effusion, and atelectasis. See subsequent chest CT.      Bone metastases.            Workstation performed: [id number]               Results from last 7 days   Lab Units 03/22/24  1524 03/20/24  2032   WBC Thousand/uL 8.36 8.76   HEMOGLOBIN g/dL 8.4* 7.8*   HEMATOCRIT % 27.7* 25.7*   PLATELETS Thousands/uL 330 304   NEUTROS ABS Thousands/µL 6.54  --    BANDS PCT %  --  1         Results from last 7 days   Lab Units 03/22/24  1524 03/20/24  2032   SODIUM mmol/L 131* 129*   POTASSIUM mmol/L 4.1 4.3   CHLORIDE mmol/L 91* 92*   CO2 mmol/L 29 27   ANION GAP mmol/L 11 10   BUN mg/dL 8 9   CREATININE mg/dL 0.41* 0.39*   EGFR ml/min/1.73sq m 116 118   CALCIUM mg/dL 8.6 8.3*   MAGNESIUM mg/dL 1.9  --      Results from last 7 days   Lab Units 03/22/24  1524   AST U/L 86*   ALT U/L 18   ALK PHOS U/L 138*   TOTAL PROTEIN g/dL 7.0   ALBUMIN g/dL 3.0*   TOTAL BILIRUBIN mg/dL 0.48         Results from last 7 days   Lab Units 03/22/24  1524 03/20/24  2032   GLUCOSE RANDOM mg/dL 77 96       Results from last 7 days   Lab Units 03/22/24  1740 03/22/24  1524 03/20/24  2032   HS TNI 0HR ng/L  --  8 10   HS TNI 2HR ng/L 7  --   --    HSTNI D2 ng/L -1  --   --          Results from last 7 days   Lab Units 03/22/24  1524   PROTIME seconds 13.7   INR  1.03   PTT seconds 37         Results from last 7 days   Lab Units 03/22/24  1524   PROCALCITONIN ng/ml 0.50*     Results from last 7 days   Lab Units 03/22/24  1524   LACTIC ACID mmol/L 1.5              Results from last 7 days   Lab Units 03/22/24  1524   BNP pg/mL 87     Results from last 7 days   Lab Units 03/22/24  1531   CLARITY UA  Clear   COLOR UA  Yellow   SPEC GRAV UA  1.020   PH UA  5.0   GLUCOSE UA mg/dl Negative   KETONES UA mg/dl 40 (2+)*   BLOOD UA  Negative   PROTEIN UA mg/dl 30 (1+)*   NITRITE UA  Negative   BILIRUBIN UA  Negative   UROBILINOGEN UA (BE) mg/dl <2.0   LEUKOCYTES UA  Small*   WBC UA /hpf 20-30*   RBC UA /hpf 1-2   BACTERIA UA /hpf None Seen   EPITHELIAL CELLS WET PREP /hpf Occasional   MUCUS THREADS  Moderate*         ED Treatment:   Medication Administration from 03/22/2024 1326 to 03/22/2024 2039         Date/Time Order Dose Route Action     03/22/2024 1539 EDT sodium chloride 0.9 % infusion 150 mL/hr Intravenous New Bag     03/22/2024 1534 EDT fentaNYL injection 50 mcg 50 mcg Intravenous Given     03/22/2024 1647 EDT iohexol (OMNIPAQUE) 350 MG/ML injection (SINGLE-DOSE) 100 mL 100 mL Intravenous Given     03/22/2024 1759 EDT oxyCODONE (ROXICODONE) immediate release tablet 10 mg 10 mg Oral Given     03/22/2024 1928 EDT ceFAZolin (ANCEF) IVPB (premix in dextrose) 1,000 mg 50 mL 1,000 mg Intravenous New Bag          Past Medical History:   Diagnosis Date    Breast cancer (HCC) 07/2013    left-chemotherapy    Fibroma     Goals of care, counseling/discussion     History of chemotherapy     History of external beam radiation therapy      Present on Admission:   Recurrent malignant neoplasm of breast (HCC)   Large pleural effusion   Other specified anemias   Hyponatremia   Tachycardia   Severe protein-calorie malnutrition (HCC)      Admitting Diagnosis: UTI (urinary tract infection) [N39.0]  Lower extremity edema [R60.0]  Chest pain [R07.9]  Dyspnea [R06.00]  SOB (shortness of breath) [R06.02]  Metastatic disease (HCC) [C79.9]  Large pleural effusion [J90]  Age/Sex: 53 y.o. female  Admission Orders:  Regular Diet.  Up & OOB as tolerated.  Comfort care.    Scheduled  Medications:  metoprolol succinate, 50 mg, Oral, Daily    Continuous IV Infusions:   sodium chloride 0.9 % infusion  Rate: 150 mL/hr Dose: 150 mL/hr  Freq: Continuous Route: IV  Indications of Use: IV Hydration  Last Dose: 150 mL/hr (03/22/24 1539)  Start: 03/22/24 1445 End: 03/23/24 0922     PRN Meds:  acetaminophen, 650 mg, Oral, Q6H PRN  fentaNYL, 50 mcg, Intravenous, Q1H PRN; 3/22 x1, 3/23 x1  HYDROmorphone, 0.5 mg, Intravenous, Q4H PRN; 3/23 x1  oxyCODONE, 10 mg, Oral, Q4H PRN; 3/22 x1, 3/23 x3    Discontinued Meds:  heparin (porcine) subcutaneous injection 5,000 Units  Dose: 5,000 Units  Freq: Every 8 hours scheduled Route: SC  Start: 03/22/24 2200 End: 03/23/24 1040   senna (SENOKOT) tablet 17.2 mg  Dose: 17.2 mg  Freq: Daily at bedtime Route: PO  Start: 03/22/24 2200 End: 03/23/24 1040     INPATIENT CONSULT TO IR  IP CONSULT TO HOSPICE    Network Utilization Review Department  ATTENTION: Please call with any questions or concerns to [phone] and carefully listen to the prompts so that you are directed to the right person. All voicemails are confidential.   For Discharge needs, contact Care Management DC Support Team at [phone] opt. 2  Send all requests for admission clinical reviews, approved or denied determinations and any other requests to dedicated fax number below belonging to the [facility] where the patient is receiving treatment. List of dedicated fax numbers for the Facilities:  FACILITY NAME UR FAX NUMBER   ADMISSION DENIALS (Administrative/Medical Necessity) [phone]   DISCHARGE SUPPORT TEAM (NETWORK) [phone]   PARENT CHILD HEALTH (Maternity/NICU/Pediatrics) [phone]   [facility] [phone]   [facility] [phone]   [facility] [phone]   [facility] [phone]   [facility] [phone]   [facility] [phone]    [facility] [phone]   [facility] [phone]   [facility] [phone]   [facility] [phone]   [facility] [phone]   [facility] [phone]

## 2024-03-23 NOTE — PLAN OF CARE
Problem: Potential for Falls  Goal: Patient will remain free of falls  Description: INTERVENTIONS:  - Educate patient/family on patient safety including physical limitations  - Instruct patient to call for assistance with activity   - Consult OT/PT to assist with strengthening/mobility   - Keep Call bell within reach  - Keep bed low and locked with side rails adjusted as appropriate  - Keep care items and personal belongings within reach  - Initiate and maintain comfort rounds  - Make Fall Risk Sign visible to staff  - Offer Toileting every 2 Hours, in advance of need  - Initiate/Maintain bed alarm  - Obtain necessary fall risk management equipment:    - Apply yellow socks and bracelet for high fall risk patients  - Consider moving patient to room near nurses station  Outcome: Progressing     Problem: PAIN - ADULT  Goal: Verbalizes/displays adequate comfort level or baseline comfort level  Description: Interventions:  - Encourage patient to monitor pain and request assistance  - Assess pain using appropriate pain scale  - Administer analgesics based on type and severity of pain and evaluate response  - Implement non-pharmacological measures as appropriate and evaluate response  - Consider cultural and social influences on pain and pain management  - Notify physician/advanced practitioner if interventions unsuccessful or patient reports new pain  Outcome: Progressing     Problem: DISCHARGE PLANNING  Goal: Discharge to home or other facility with appropriate resources  Description: INTERVENTIONS:  - Identify barriers to discharge w/patient and caregiver  - Arrange for needed discharge resources and transportation as appropriate  - Identify discharge learning needs (meds, wound care, etc.)  - Arrange for interpretive services to assist at discharge as needed  - Refer to Case Management Department for coordinating discharge planning if the patient needs post-hospital services based on physician/advanced practitioner  order or complex needs related to functional status, cognitive ability, or social support system  Outcome: Progressing     Problem: Knowledge Deficit  Goal: Patient/family/caregiver demonstrates understanding of disease process, treatment plan, medications, and discharge instructions  Description: Complete learning assessment and assess knowledge base.  Interventions:  - Provide teaching at level of understanding  - Provide teaching via preferred learning methods  Outcome: Progressing     Problem: RESPIRATORY - ADULT  Goal: Achieves optimal ventilation and oxygenation  Description: INTERVENTIONS:  - Assess for changes in respiratory status  - Assess for changes in mentation and behavior  - Position to facilitate oxygenation and minimize respiratory effort  - Oxygen administered by appropriate delivery if ordered  - Initiate smoking cessation education as indicated  - Encourage broncho-pulmonary hygiene including cough, deep breathe, Incentive Spirometry  - Assess the need for suctioning and aspirate as needed  - Assess and instruct to report SOB or any respiratory difficulty  - Respiratory Therapy support as indicated  Outcome: Progressing     Problem: METABOLIC, FLUID AND ELECTROLYTES - ADULT  Goal: Electrolytes maintained within normal limits  Description: INTERVENTIONS:  - Monitor labs and assess patient for signs and symptoms of electrolyte imbalances  - Administer electrolyte replacement as ordered  - Monitor response to electrolyte replacements, including repeat lab results as appropriate  - Instruct patient on fluid and nutrition as appropriate  Outcome: Progressing  Goal: Fluid balance maintained  Description: INTERVENTIONS:  - Monitor labs   - Monitor I/O and WT  - Instruct patient on fluid and nutrition as appropriate  - Assess for signs & symptoms of volume excess or deficit  Outcome: Progressing

## 2024-03-23 NOTE — CONSULTS
Consultation - Medical Oncology   Shannan Vee 53 y.o. female MRN: [id number]  Unit/Bed#: E2 -01 Encounter: [id number]    [facility]  Date of service: 3/23/2024.  Reason for Consult: Breast cancer with metastasis.  HPI: Shannan Vee is a 53 y.o. year old female .  A lot of family members are in the room, close to 10.  Patient speaks Palauan.  Some of the family members are bilingual and they helped with interpretation.  Patient was seen by our group member Dr. Buitrago in the office on 3/19/2024.  I reviewed her notes.  See details below:    Shannan Vee is a 53-year-old surgically postmenopausal female with past medical history significant for locally advanced left-sided breast cancer, hormone receptor positive and HER2 negative, grade 2 initially diagnosed in June 2013.  Previously followed with Dr. Villavicencio.  She received neoadjuvant dose dense chemotherapy followed by mastectomy.  Pathology demonstrated significant residual disease with 2 positive lymph nodes.  She received adjuvant radiation and started on tamoxifen which was later changed to Arimidex in 2015-October 2022 after which she stopped Arimidex for unknown reason.  After patient had undergone hysterectomy and oophorectomy.  In October 2023, patient was found to have extensive metastatic disease, large lung mass and mediastinal adenopathy.  Biopsy of the bone was consistent with breast carcinoma (ER positive, HER2 negative by FISH) in October 2023.  Lung biopsy was consistent with malignant cells compatible with her history of metastatic breast carcinoma and Caris testing noted triple negative disease.  She has been on treatment with ribociclib and Faslodex.     She completed radiation at [facility].  Recent CT scan of the chest demonstrated stable right-sided pleural effusion, still with pleural carcinomatosis.  She continues with thoracentesis as needed.  Recently evaluated by IR and their impression is  that patient has extensive tumor of the right lung with some encasing the catheter. He did not believe that further manipulation of the catheter would provide any clinical benefit.  Position of the catheter is well evaluated at this time.  tPA could be considered to break up loculations but the concern is bleeding especially with anemia.   Patient has weakness and tiredness and exertional dyspnea  Not much cough.  No  hemoptysis.  She has swollen legs.  She has decreased appetite and weight.  She is drinking only 1 protein shake daily.  ROS:  03/23/24 Reviewed 12 systems: See symptoms in HPI  Presently no other neurological, cardiac, pulmonary, GI and  symptoms other than listed in HPI.  Other symptoms are in HPI.  No  fever, chills, bleeding, bone pains, skin rash,  night sweats, arthritic symptoms,   numbness and claudication . No frequent infections.  Not unusually sensitive to heat or cold. No swollen glands.  Patient is anxious.       Historical Information   Past Medical History:   Diagnosis Date    Breast cancer (HCC) 07/2013    left-chemotherapy    Fibroma     Goals of care, counseling/discussion     History of chemotherapy     History of external beam radiation therapy      Past Surgical History:   Procedure Laterality Date    APPENDECTOMY      BREAST SURGERY  2014    Left breast 2/2 breast CA    HYSTERECTOMY  2015    Due to fibroma    IR BIOPSY BONE  10/9/2023    IR PLEURAL EFFUSION LONG-TERM CATHETER PLACEMENT  1/24/2024    IR THORACENTESIS  11/8/2023    MASTECTOMY Left 2013    OOPHORECTOMY       Social History   Social History     Substance and Sexual Activity   Alcohol Use Not Currently     Social History     Substance and Sexual Activity   Drug Use No     Social History     Tobacco Use   Smoking Status Never    Passive exposure: Never   Smokeless Tobacco Never     Family History:   Family History   Problem Relation Age of Onset    Lung cancer Mother     No Known Problems Father     No Known Problems  Sister     No Known Problems Daughter     No Known Problems Daughter     Cancer Maternal Grandmother     No Known Problems Maternal Grandfather     No Known Problems Paternal Grandmother     No Known Problems Paternal Grandfather     Uterine cancer Maternal Aunt     No Known Problems Son     No Known Problems Paternal Aunt          Current Facility-Administered Medications:     acetaminophen (TYLENOL) tablet 650 mg, 650 mg, Oral, Q6H PRN, Gibson George MD    heparin (porcine) subcutaneous injection 5,000 Units, 5,000 Units, Subcutaneous, Q8H LAEXANDRE, Moe Scruggs PA-C    HYDROmorphone (DILAUDID) injection 0.5 mg, 0.5 mg, Intravenous, Q4H PRN, Gibson George MD, 0.5 mg at 03/23/24 1345    lidocaine (LIDODERM) 5 % patch 2 patch, 2 patch, Topical, Daily, Moe Scruggs PA-C    metoprolol succinate (TOPROL-XL) 24 hr tablet 50 mg, 50 mg, Oral, Daily, Gibson George MD, 50 mg at 03/23/24 1002    oxyCODONE (ROXICODONE) immediate release tablet 10 mg, 10 mg, Oral, Q4H PRN, Gibson George MD, 10 mg at 03/23/24 1036    senna (SENOKOT) tablet 17.2 mg, 17.2 mg, Oral, HS, Moe Scruggs PA-C    Allergies   Allergen Reactions    Penicillins Hives     And cough    Aspirin Rash     Other reaction(s): Palpitations       Physical Exam:  Vitals:    03/22/24 2313 03/23/24 0300 03/23/24 0740 03/23/24 1100   BP: 109/70  122/79 110/80   BP Location: Left arm  Right arm Right arm   Pulse: (!) 116 (!) 120 (!) 121 (!) 116   Resp: 18 19 22   Temp: (!) 97.4 °F (36.3 °C)  97.5 °F (36.4 °C) (!) 97.2 °F (36.2 °C)   TempSrc: Temporal  Temporal Temporal   SpO2: 94% 90% 90% 90%   Weight:         Alert, oriented, not in distress, vitals are above, no icterus, no oral thrush, no palpable neck mass, absent breath sounds and dullness right lung, regular heart rate, abdomen  soft and non tender, no palpable abdominal mass, no ascites, 3+ edema of ankles and lower legs, no calf tenderness, no focal  neurological deficit, has generalized weakness and she ambulates with a cane, no skin rash, no palpable lymphadenopathy in the neck and axillary areas,  no clubbing.   Patient is anxious.  Performance status 3.  Decreased muscle mass and subcutaneous fat.  Malnourished.    Lab Results: I have reviewed all pertinent labs.  LABS:    Results for orders placed or performed during the hospital encounter of 03/22/24   CBC and differential   Result Value Ref Range    WBC 8.36 4.31 - 10.16 Thousand/uL    RBC 3.09 (L) 3.81 - 5.12 Million/uL    Hemoglobin 8.4 (L) 11.5 - 15.4 g/dL    Hematocrit 27.7 (L) 34.8 - 46.1 %    MCV 90 82 - 98 fL    MCH 27.2 26.8 - 34.3 pg    MCHC 30.3 (L) 31.4 - 37.4 g/dL    RDW 20.3 (H) 11.6 - 15.1 %    MPV 8.4 (L) 8.9 - 12.7 fL    Platelets 330 149 - 390 Thousands/uL    nRBC 0 /100 WBCs    Neutrophils Relative 79 (H) 43 - 75 %    Immature Grans % 2 0 - 2 %    Lymphocytes Relative 10 (L) 14 - 44 %    Monocytes Relative 9 4 - 12 %    Eosinophils Relative 0 0 - 6 %    Basophils Relative 0 0 - 1 %    Neutrophils Absolute 6.54 1.85 - 7.62 Thousands/µL    Absolute Immature Grans 0.19 0.00 - 0.20 Thousand/uL    Absolute Lymphocytes 0.87 0.60 - 4.47 Thousands/µL    Absolute Monocytes 0.74 0.17 - 1.22 Thousand/µL    Eosinophils Absolute 0.00 0.00 - 0.61 Thousand/µL    Basophils Absolute 0.02 0.00 - 0.10 Thousands/µL   Comprehensive metabolic panel   Result Value Ref Range    Sodium 131 (L) 135 - 147 mmol/L    Potassium 4.1 3.5 - 5.3 mmol/L    Chloride 91 (L) 96 - 108 mmol/L    CO2 29 21 - 32 mmol/L    ANION GAP 11 4 - 13 mmol/L    BUN 8 5 - 25 mg/dL    Creatinine 0.41 (L) 0.60 - 1.30 mg/dL    Glucose 77 65 - 140 mg/dL    Calcium 8.6 8.4 - 10.2 mg/dL    Corrected Calcium 9.4 8.3 - 10.1 mg/dL    AST 86 (H) 13 - 39 U/L    ALT 18 7 - 52 U/L    Alkaline Phosphatase 138 (H) 34 - 104 U/L    Total Protein 7.0 6.4 - 8.4 g/dL    Albumin 3.0 (L) 3.5 - 5.0 g/dL    Total Bilirubin 0.48 0.20 - 1.00 mg/dL    eGFR 116  "ml/min/1.73sq m   Magnesium   Result Value Ref Range    Magnesium 1.9 1.9 - 2.7 mg/dL   Lactic acid, plasma (w/reflex if result > 2.0)   Result Value Ref Range    LACTIC ACID 1.5 0.5 - 2.0 mmol/L   Procalcitonin   Result Value Ref Range    Procalcitonin 0.50 (H) <=0.25 ng/ml   HS Troponin 0hr (reflex protocol)   Result Value Ref Range    hs TnI 0hr 8 \"Refer to ACS Flowchart\"- see link ng/L   B-Type Natriuretic Peptide(BNP)   Result Value Ref Range    BNP 87 0 - 100 pg/mL   UA w Reflex to Microscopic w Reflex to Culture    Specimen: Urine, Clean Catch   Result Value Ref Range    Color, UA Yellow     Clarity, UA Clear     Specific Gravity, UA 1.020 1.003 - 1.030    pH, UA 5.0 4.5, 5.0, 5.5, 6.0, 6.5, 7.0, 7.5, 8.0    Leukocytes, UA Small (A) Negative    Nitrite, UA Negative Negative    Protein, UA 30 (1+) (A) Negative mg/dl    Glucose, UA Negative Negative mg/dl    Ketones, UA 40 (2+) (A) Negative mg/dl    Urobilinogen, UA <2.0 <2.0 mg/dl mg/dl    Bilirubin, UA Negative Negative    Occult Blood, UA Negative Negative   Protime-INR   Result Value Ref Range    Protime 13.7 11.6 - 14.5 seconds    INR 1.03 0.84 - 1.19   APTT   Result Value Ref Range    PTT 37 23 - 37 seconds   HS Troponin I 2hr   Result Value Ref Range    hs TnI 2hr 7 \"Refer to ACS Flowchart\"- see link ng/L    Delta 2hr hsTnI -1 <20 ng/L   Urine Microscopic   Result Value Ref Range    RBC, UA 1-2 None Seen, 1-2 /hpf    WBC, UA 20-30 (A) None Seen, 1-2 /hpf    Epithelial Cells Occasional None Seen, Occasional /hpf    Bacteria, UA None Seen None Seen, Occasional /hpf    MUCUS THREADS Moderate (A) None Seen    Hyaline Casts, UA 3-5 (A) None Seen /lpf   ECG 12 lead   Result Value Ref Range    Ventricular Rate 120 BPM    Atrial Rate 120 BPM    WV Interval 130 ms    QRSD Interval 66 ms    QT Interval 322 ms    QTC Interval 455 ms    P Axis 63 degrees    QRS Axis 83 degrees    T Wave Axis 33 degrees   ECG 12 lead   Result Value Ref Range    Ventricular Rate 115 " BPM    Atrial Rate 115 BPM    NC Interval 144 ms    QRSD Interval 64 ms    QT Interval 324 ms    QTC Interval 448 ms    P Axis 56 degrees    QRS Axis 83 degrees    T Wave Axis 44 degrees         Imaging Studies: I have personally reviewed pertinent reports.   Narrative & Impression   CT PULMONARY ANGIOGRAM OF THE CHEST AND CT ABDOMEN AND PELVIS WITH INTRAVENOUS CONTRAST     INDICATION: worseing sob. known large right lung mass and now abd distention with LE edema/urinary issues (concern for obstructing mass).     COMPARISON: 3/11/2024     TECHNIQUE: CT examination of the chest, abdomen and pelvis was performed. Thin section CT angiographic technique was used in the chest in order to evaluate for pulmonary embolus and coronal 3D MIP postprocessing was performed on the acquisition scanner.   Multiplanar 2D reformatted images were created from the source data.     This examination, like all CT scans performed in the [facility], was performed utilizing techniques to minimize radiation dose exposure, including the use of iterative reconstruction and automated exposure control. Radiation dose length   product (DLP) for this visit: 467 mGy-cm     IV Contrast: 100 mL of iohexol (OMNIPAQUE)  Enteric Contrast: Not administered.     FINDINGS:     CHEST     PULMONARY ARTERIAL TREE: No pulmonary embolus is seen.     LUNGS: As noted on the recent exam there is extensive right thoracic pleural carcinomatosis with marked mass effect throughout the right hemithorax, compressing much of the right lung, also with some degree of mass effect on the atria and right pulmonary   artery. Overall degree of atelectatic changes in the lung are minimally SVC and IVC remain patent. Minimally progressive. Minimal subpleural changes in the left upper lobe likely related to prior radiation therapy. No nodules or suspicious infiltrates   are otherwise demonstrated.     PLEURA: As noted above, extensive pleural carcinomatosis with  large lobulated masses occupying the pleural cavity with associated mass effect. An indwelling right thoracic drain is present, posteriorly located, tip in the apex. Small amounts of pleural   fluid separate from the drainage catheter site, loculated between the large pleural masses in the anterior mid thorax. Stable compared to prior.     HEART/AORTA: Heart is unremarkable for patient's age. No thoracic aortic aneurysm.     MEDIASTINUM AND AZAR: Unremarkable.     CHEST WALL AND LOWER NECK: Unremarkable.     ABDOMEN     LIVER/BILIARY TREE: Mass effect downward and medial due to the extensive pleural carcinomatosis and caudal displacement. Similar to prior. No intrahepatic mass is identified.     GALLBLADDER: No calcified gallstones. No pericholecystic inflammatory change.     SPLEEN: Unremarkable.     PANCREAS: Unremarkable.     ADRENAL GLANDS: Unremarkable.     KIDNEYS/URETERS: Unremarkable. No hydronephrosis.     STOMACH AND BOWEL: Unremarkable.     APPENDIX: No findings to suggest appendicitis.     ABDOMINOPELVIC CAVITY: Mild ascites seen within the pelvis and right perihepatic. Generalized increased attenuation within the mesenteric fat, likely representing component of third spacing of fluid. No pneumoperitoneum. No lymphadenopathy.     VESSELS: Unremarkable for patient's age. IVC and iliacs remain patent.     PELVIS     REPRODUCTIVE ORGANS: Unremarkable for patient's age.     URINARY BLADDER: Unremarkable.     ABDOMINAL WALL/INGUINAL REGIONS: Body wall edema suggesting third spacing of fluid.     BONES: Extensive lytic and blastic metastatic disease throughout the bones. Compression deformities throughout the spine appearing without significant change.     IMPRESSION:     No pulmonary emboli. No new pulmonary infiltrates or consolidation.     Extensive right thoracic pleural carcinomatosis without significant interval change compared to the recent prior. Extensive associated mass effect as above. Vasculature  remains patent though mildly compressed and displaced.     Caudal displacement of the right hemidiaphragm, with mass effect on the liver. Not significantly changed.     Mild ascites.     Third spacing of fluid as above.     Extensive metastatic disease without significant change compared to recent.                    Workstation performed: [id number]        Imaging    CT pe study w abdomen pelvis w contrast (Order: [id number]) -   Pathology, and Other Studies: I have personally reviewed pertinent reports.        Assessment and Plan:  See diagnoses, orders instructions below  -Left breast cancer..  -Metastatic disease to pleura with malignant pleural effusion.  -Metastatic disease to bones.  -Cachexia/malnourished.  -Anemia secondary to malignancy and metastatic disease and malnourishment.  Patient remains undecided about chemotherapy but she is leaning towards trying some more chemotherapy but she wants to get nutritionally better and she will try 3 protein shakes daily.  She needs nutritional/diet consultation.  Caris report showed triple negative disease and based on that I would favor chemotherapy or hormonal therapy.  She has other options for chemotherapy.  To check echocardiogram for cardiac status.  Outpatient follow-up with Dr. Buitrago.  I discussed above with the patient and her family members.  They helped with interpretation.  Questions answered.  Patient states she will have her decision in 1-2 days about chemotherapy, yes or no.   Patient will continue to follow with  primary physician and other consultants post discharge.  Patient voiced understanding and agrees via       Disclaimer: This document was prepared using a dictation device.  If a word or phrase is confusing, or does not make sense, this is likely due to recognition error which was not discovered during the providers review. If  patient or family believes an error has occurred, please Contact me through [facility]OPE Line service for  [place]?cation.    Counseling / Coordination of Care  ..  Provided counseling and support

## 2024-03-23 NOTE — H&P
[facility]  H&P  Name: Shannan Vee 53 y.o. female I MRN: [id number]  Unit/Bed#: ED-13 I Date of Admission: 3/22/2024   Date of Service: 3/22/2024 I Hospital Day: 0      Assessment/Plan   Large pleural effusion  Assessment & Plan  Related to recurrent metastatic breast cancer  Persistent  extensive carcinomatosis even with presence of Pleurx catheter.  Thorough discussion was done with family regarding extent of her disease   They have decided to pursue palliative care/hospice (home)  Will consult IR for evaluation to see if the Pleurx catheter could be adjusted or if additional drainage can be done   (for comfort)    Recurrent malignant neoplasm of breast (HCC)  Assessment & Plan  Long discussion was done with , daughter and son-in-law at bedside regarding extent of her disease and limited treatment options.  Reviewed recent visit with oncology.  She was noted to have decreased functional status and there was no plan for further treatment.  Palliative care was recommended.  I relayed this to the patient and her family.  They explained to them what palliative care/hospice entails and route they would like to pursue.  Please see ACP note.    Tachycardia  Assessment & Plan  Chronic sinus tachycardia related to large pleural effusion/malignancy/anemia.  Continue Toprol-XL for now.    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  Asymptomatic.    Severe protein-calorie malnutrition (HCC)  Assessment & Plan  Malnutrition Findings:   BMI Findings:    Body mass index is 20.54 kg/m².     She looks cachectic.  Diminished muscle mass in the upper extremities..  She has prominent clavicles and ribs and intercostals.    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  Stable.  No need for transfusion at this time         VTE Pharmacologic Prophylaxis: VTE Score: 3 Moderate Risk (Score 3-4)  "- Pharmacological DVT Prophylaxis Ordered: heparin.  Code Status: Prior still full code after thorough discussion with patient, daughter, son-in-law,  and sister.  The family wants to pursue hospice but also wants to be full code in the hospital  Discussion with family:  Discussed with daughter, , son-in-law at bedside and sister over the phone.     Chief Complaint: Shortness of breath    History of Present Illness:  Shannan Vee is a 53 y.o. female with a PMH of static breast cancer, large pleural effusion, sinus tachycardia, hyponatremia, anemia who presents with persistent shortness of breath described as feeling of \" suffocation.\"  I spoke to her via  #[id number].  She was brought to the hospital today due to 2 days of worsening shortness of breath both at rest and on exertion.  This was associated with  leg swelling.  She denied any fever or chills.  According to her daughter there was only minimal blood from her Pleurx catheter.  She was seen by oncology on 3/19/2024 and during that visit, palliative care was recommended since she was felt to have declining performance status making \"aggressive oncologic treatment very challenging.\"  According to the patient, she was told that she was too weak to have more chemotherapy.  No further treatment is currently planned.  I had a long talk with the family regarding her advanced disease, limited treatment and consideration for palliative care and hospice.  I explained to the patient and the family what hospice entails.  She mentioned that she wants to go home with hospice.  Son-in-law and the daughter also agreed with this.  However after thorough discussion again, they still want her to be a full code in the hospital.    Review of Systems:  Review of Systems   Constitutional:  Positive for fatigue. Negative for fever.   Respiratory:  Positive for shortness of breath.    Cardiovascular:  Positive for leg swelling.   Gastrointestinal: " Negative.    Genitourinary: Negative.    Musculoskeletal: Negative.    Skin: Negative.    Neurological: Negative.    Psychiatric/Behavioral: Negative.     All other systems reviewed and are negative.      Past Medical and Surgical History:   Past Medical History:   Diagnosis Date    Breast cancer (HCC) 07/2013    left-chemotherapy    Fibroma     Goals of care, counseling/discussion     History of chemotherapy     History of external beam radiation therapy        Past Surgical History:   Procedure Laterality Date    APPENDECTOMY      BREAST SURGERY  2014    Left breast 2/2 breast CA    HYSTERECTOMY  2015    Due to fibroma    IR BIOPSY BONE  10/9/2023    IR PLEURAL EFFUSION LONG-TERM CATHETER PLACEMENT  1/24/2024    IR THORACENTESIS  11/8/2023    MASTECTOMY Left 2013    OOPHORECTOMY         Meds/Allergies:  Prior to Admission medications    Medication Sig Start Date End Date Taking? Authorizing Provider   acetaminophen (TYLENOL) 500 mg tablet Take 2 tablets (1,000 mg total) by mouth 3 (three) times a day as needed for mild pain 2/8/24 5/8/24  Todd Bañuelos MD   calcium citrate-vitamin D (Calcitrate Plus D) 315 mg-5 mcg tablet Take 1 tablet by mouth 2 (two) times a day 10/23/23 3/19/24  Maxwell Villavicencio MD   ergocalciferol (VITAMIN D2) 50,000 units TAKE 1 CAPSULE BY MOUTH 1 TIME A WEEK 1/22/24   Maxwell Villavicencio MD   lidocaine (LIDODERM) 5 % Apply 2 patches topically over 12 hours daily Remove & Discard patch within 12 hours or as directed by MD 2/8/24   Todd Bañuelos MD   metoprolol succinate (TOPROL-XL) 50 mg 24 hr tablet Take 1 tablet (50 mg total) by mouth daily 2/28/24   Todd Bañuelos MD   Multiple Vitamin (MULTIVITAMIN ADULT PO) Take 1 tablet by mouth in the morning    Historical Provider, MD   naloxone (NARCAN) 4 mg/0.1 mL nasal spray Administer 1 spray into a nostril. If no response after 2-3 minutes, give another dose in the other nostril using a new spray. 2/10/24 2/9/25  Gabe Copeland,    oxyCODONE  (ROXICODONE) 10 MG TABS Take 1 tablet (10 mg total) by mouth every 4 (four) hours as needed for severe pain or moderate pain Max Daily Amount: 60 mg 3/12/24   Janie Briones MD   polyethylene glycol (GLYCOLAX) 17 GM/SCOOP powder  1/19/24   Historical Provider, MD   Ribociclib Succ, 600 MG Dose, 200 MG TBPK Take 600 mg by mouth in the morning For 3 weeks and then 1 week off 10/27/23   Maxwell Villavicencio MD   Senokot 8.6 MG tablet Take 17.2 mg by mouth 1/19/24 1/18/25  Historical Provider, MD   sodium chloride 1 g tablet Take 1 tablet (1 g total) by mouth 2 (two) times a day 1/30/24   Ainsley Call MD     I have reviewed home medications with a medical source (PCP, Pharmacy, other).    Allergies:   Allergies   Allergen Reactions    Aspirin Rash     Other reaction(s): Palpitations       Social History:  Marital Status: /Civil Union   Occupation: none  Patient Pre-hospital Living Situation: Home  Patient Pre-hospital Level of Mobility: unable to be assessed at time of evaluation  Patient Pre-hospital Diet Restrictions: none  Substance Use History:   Social History     Substance and Sexual Activity   Alcohol Use Not Currently     Social History     Tobacco Use   Smoking Status Never    Passive exposure: Never   Smokeless Tobacco Never     Social History     Substance and Sexual Activity   Drug Use No       Family History:  Family History   Problem Relation Age of Onset    Lung cancer Mother     No Known Problems Father     No Known Problems Sister     No Known Problems Daughter     No Known Problems Daughter     Cancer Maternal Grandmother     No Known Problems Maternal Grandfather     No Known Problems Paternal Grandmother     No Known Problems Paternal Grandfather     Uterine cancer Maternal Aunt     No Known Problems Son     No Known Problems Paternal Aunt        Physical Exam:     Vitals:   Blood Pressure: 102/59 (03/22/24 1934)  Pulse: (!) 119 (03/22/24 1934)  Temperature: 97.6 °F (36.4 °C) (03/22/24  1337)  Temp Source: Oral (03/22/24 1337)  Respirations: 20 (03/22/24 1337)  Weight - Scale: 56 kg (123 lb 7.3 oz) (03/22/24 1337)  SpO2: 94 % (03/22/24 1934)    Physical Exam  Vitals reviewed.   Constitutional:       Appearance: She is ill-appearing.      Comments: Cachectic   HENT:      Head:      Comments: Temporal wasting     Nose: No congestion or rhinorrhea.   Eyes:      General: No scleral icterus.  Cardiovascular:      Rate and Rhythm: Regular rhythm. Tachycardia present.   Pulmonary:      Comments: Crackles right lung fields  Prominent ribs, intercostals, clavicle  Abdominal:      General: There is no distension.      Palpations: Abdomen is soft.      Tenderness: There is no abdominal tenderness.   Musculoskeletal:      Right lower leg: Edema present.      Left lower leg: Edema present.      Comments: Diminished muscle mass   Skin:     General: Skin is warm and dry.   Neurological:      Comments: Awake alert, looks frail   Psychiatric:         Behavior: Behavior normal.         Additional Data:     Lab Results:  Results from last 7 days   Lab Units 03/22/24  1524 03/20/24  2032   WBC Thousand/uL 8.36 8.76   HEMOGLOBIN g/dL 8.4* 7.8*   HEMATOCRIT % 27.7* 25.7*   PLATELETS Thousands/uL 330 304   BANDS PCT %  --  1   NEUTROS PCT % 79*  --    LYMPHS PCT % 10*  --    LYMPHO PCT %  --  12*   MONOS PCT % 9  --    MONO PCT %  --  9   EOS PCT % 0 0     Results from last 7 days   Lab Units 03/22/24  1524   SODIUM mmol/L 131*   POTASSIUM mmol/L 4.1   CHLORIDE mmol/L 91*   CO2 mmol/L 29   BUN mg/dL 8   CREATININE mg/dL 0.41*   ANION GAP mmol/L 11   CALCIUM mg/dL 8.6   ALBUMIN g/dL 3.0*   TOTAL BILIRUBIN mg/dL 0.48   ALK PHOS U/L 138*   ALT U/L 18   AST U/L 86*   GLUCOSE RANDOM mg/dL 77     Results from last 7 days   Lab Units 03/22/24  1524   INR  1.03             Results from last 7 days   Lab Units 03/22/24  1524   LACTIC ACID mmol/L 1.5   PROCALCITONIN ng/ml 0.50*       Lines/Drains:  Invasive Devices        Peripheral Intravenous Line  Duration             Peripheral IV 03/22/24 Proximal;Right;Ventral (anterior) Forearm <1 day              Drain  Duration             Pleural Effusion Long-Term Catheter 16 Fr. 58 days                        Imaging: Reviewed radiology reports from this admission including: chest CT scan  CT pe study w abdomen pelvis w contrast   Final Result by Ruthie Bose MD (03/22 1730)      No pulmonary emboli. No new pulmonary infiltrates or consolidation.      Extensive right thoracic pleural carcinomatosis without significant interval change compared to the recent prior. Extensive associated mass effect as above. Vasculature remains patent though mildly compressed and displaced.      Caudal displacement of the right hemidiaphragm, with mass effect on the liver. Not significantly changed.      Mild ascites.      Third spacing of fluid as above.      Extensive metastatic disease without significant change compared to recent.                     Workstation performed: [id number]         XR chest 1 view portable   ED Interpretation by Aracely Agarwal DO (03/22 3521)   Compared to old x-rays no significant change was noted near complete opacification of right lung        CT images shown and explained to the patient and family in simple terms.      EKG and Other Studies Reviewed on Admission:   EKG:  Sinus tachycardia.    ** Please Note: This note has been constructed using a voice recognition system. **

## 2024-03-23 NOTE — PROGRESS NOTES
[facility]  Progress Note  Name: Shannan Vee I  MRN: [id number]  Unit/Bed#: E2 -01 I Date of Admission: 3/22/2024   Date of Service: 3/23/2024 I Hospital Day: 1    Assessment/Plan   * Recurrent malignant neoplasm of breast (HCC)  Assessment & Plan  Multiple and extensive discussions done with , daughter and son-in-law at bedside regarding extent of her disease and limited treatment options  Reviewed recent visit with oncology  She was noted to have decreased functional status and there was no plan for further treatment.  Palliative care was recommended  Relayed this along with previous provider to patient and family  See ACP note  Previous provider and myself extensive discussion with IR - there are no further recommendations for thoracentesis or procedures given the risks.   Discussed with oncology, will see patient in consult  Continue oxygen prn, can offer home O2 eval at home    Large pleural effusion  Assessment & Plan  Related to recurrent metastatic breast cancer  Persistent  extensive carcinomatosis even with presence of Pleurx catheter.  Appreciate IR consult - no further recommendations inpatient    Goals of care, counseling/discussion  Assessment & Plan  See ACP  Continue ongoing discussion    Tachycardia  Assessment & Plan  Chronic sinus tachycardia related to large pleural effusion/malignancy/anemia  Continue Toprol XL    Hyponatremia  Assessment & Plan  Hyponatremia, likely related to large pleural effusion and possible SIADH from malignancy.  Also volume overloaded  Patient is refusing sodium tablets as they make her nauseous  Start strict fluid restriction  Awaiting morning BW    Severe protein-calorie malnutrition (HCC)  Assessment & Plan  Malnutrition Findings:   BMI Findings:    Body mass index is 20.54 kg/m².     Patient is cachectic. 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 Awaiting morning blood work         VTE Pharmacologic Prophylaxis: VTE Score: 3 Moderate Risk (Score 3-4) - Pharmacological DVT Prophylaxis Ordered: heparin.    Mobility:   Basic Mobility Inpatient Raw Score: 22  JH-HLM Goal: 7: Walk 25 feet or more  JH-HLM Achieved: 7: Walk 25 feet or more  JH-HLM Goal achieved. Continue to encourage appropriate mobility.    Patient Centered Rounds: I performed bedside rounds with nursing staff today.   Discussions with Specialists or Other Care Team Provider: Interventional Radiology, Oncology, Case management    Education and Discussions with Family / Patient: Updated  (, daughter, and son in law) at bedside.    Total Time Spent on Date of Encounter in care of patient: 90 mins. This time was spent on one or more of the following: performing physical exam; counseling and coordination of care; obtaining or reviewing history; documenting in the medical record; reviewing/ordering tests, medications or procedures; communicating with other healthcare professionals and discussing with patient's family/caregivers.    Current Length of Stay: 1 day(s)  Current Patient Status: Inpatient   Certification Statement: The patient will continue to require additional inpatient hospital stay due to oncology eval  Discharge Plan: pending    Code Status: Level 1 - Full Code    Subjective:   Patient seen and examined twice today. She is having some right sided chest pain and still feeling shortness of breath. Denies chest pressure, abdominal pain. Multiple goals of care discussions have occurred. Please see ACP.    Objective:     Vitals:   Temp (24hrs), Av.5 °F (36.4 °C), Min:97.2 °F (36.2 °C), Max:97.7 °F (36.5 °C)    Temp:  [97.2 °F (36.2 °C)-97.7 °F (36.5 °C)] 97.2 °F (36.2 °C)  HR:  [113-121] 116  Resp:  [18-22] 22  BP: (102-122)/(59-80) 110/80  SpO2:  [90 %-96 %] 90 %  Body mass index is 20.54 kg/m².     Input and Output Summary (last  24 hours):     Intake/Output Summary (Last 24 hours) at 3/23/2024 1456  Last data filed at 3/23/2024 0001  Gross per 24 hour   Intake 1120 ml   Output --   Net 1120 ml       Physical Exam:   Physical Exam  Vitals and nursing note reviewed.   Constitutional:       General: She is not in acute distress.     Appearance: She is cachectic. She is ill-appearing.      Comments: Appears older then stated age   HENT:      Head:      Comments: Temporal wasting  Cardiovascular:      Rate and Rhythm: Tachycardia present.   Pulmonary:      Effort: Pulmonary effort is normal. No respiratory distress.      Breath sounds: Rales present.      Comments: Diminished right lung sounds, crackles right lungs  Abdominal:      General: Bowel sounds are normal.      Tenderness: There is no abdominal tenderness.   Musculoskeletal:      Right lower leg: Edema present.      Left lower leg: Edema present.      Comments: Diminished muscle mass   Skin:     General: Skin is warm and dry.      Coloration: Skin is pale.   Neurological:      Mental Status: She is alert. Mental status is at baseline.          Additional Data:     Labs:  Results from last 7 days   Lab Units 03/22/24  1524 03/20/24 2032   WBC Thousand/uL 8.36 8.76   HEMOGLOBIN g/dL 8.4* 7.8*   HEMATOCRIT % 27.7* 25.7*   PLATELETS Thousands/uL 330 304   BANDS PCT %  --  1   NEUTROS PCT % 79*  --    LYMPHS PCT % 10*  --    LYMPHO PCT %  --  12*   MONOS PCT % 9  --    MONO PCT %  --  9   EOS PCT % 0 0     Results from last 7 days   Lab Units 03/22/24  1524   SODIUM mmol/L 131*   POTASSIUM mmol/L 4.1   CHLORIDE mmol/L 91*   CO2 mmol/L 29   BUN mg/dL 8   CREATININE mg/dL 0.41*   ANION GAP mmol/L 11   CALCIUM mg/dL 8.6   ALBUMIN g/dL 3.0*   TOTAL BILIRUBIN mg/dL 0.48   ALK PHOS U/L 138*   ALT U/L 18   AST U/L 86*   GLUCOSE RANDOM mg/dL 77     Results from last 7 days   Lab Units 03/22/24  1524   INR  1.03     Results from last 7 days   Lab Units 03/22/24  1524   LACTIC ACID mmol/L 1.5    PROCALCITONIN ng/ml 0.50*     Lines/Drains:  Invasive Devices       Peripheral Intravenous Line  Duration             Peripheral IV 03/22/24 Proximal;Right;Ventral (anterior) Forearm <1 day              Drain  Duration             Pleural Effusion Long-Term Catheter 16 Fr. 58 days                  Imaging: Reviewed radiology reports from this admission including: chest CT scan    Recent Cultures (last 7 days):   Last 24 Hours Medication List:   Current Facility-Administered Medications   Medication Dose Route Frequency Provider Last Rate    acetaminophen  650 mg Oral Q6H PRN Gibson George MD      HYDROmorphone  0.5 mg Intravenous Q4H PRN Gibson George MD      metoprolol succinate  50 mg Oral Daily Gibson George MD      oxyCODONE  10 mg Oral Q4H PRN Gibson George MD      senna  17.2 mg Oral HS Moe Scruggs PA-C          Today, Patient Was Seen By: Moe Scruggs PA-C    **Please Note: This note may have been constructed using a voice recognition system.**

## 2024-03-23 NOTE — QUICK NOTE
Met with patient and family at bedside including , daughter and son-in-law. [place] interpretation was used via [employer]. Patient and her family expressed wanting fluid drained from the her lung.  There was extensive discussion about her imaging findings and recommendations how this is not provide any symptomatic benefit given the degree of her carcinomatosis, and the current tunneled catheter that is in place is draining. After given this information patients daughter still expressed the desire for drainage. I explained this was not the recommendation and may put her mother in more harm with complications including bleeding/pneumothorax. Per repeat conversation today it was decided patient would like to change her code Status to Level 1 Full Code.  Patient and family are aware this includes full medical treatment. They would like to have ongoing discussions about their goals at this time.  Case management is following for disposition planning.  Personally spoke with Dr. Hearn to have further discussion with the family bedside. Re-discussed and updated IR Physician. Will update code Status at this time.

## 2024-03-24 ENCOUNTER — APPOINTMENT (INPATIENT)
Dept: NON INVASIVE DIAGNOSTICS | Facility: HOSPITAL | Age: 54
DRG: 382 | End: 2024-03-24
Payer: COMMERCIAL

## 2024-03-24 LAB
ABO GROUP BLD: NORMAL
ALBUMIN SERPL BCP-MCNC: 2.7 G/DL (ref 3.5–5)
ALP SERPL-CCNC: 125 U/L (ref 34–104)
ALT SERPL W P-5'-P-CCNC: 17 U/L (ref 7–52)
ANION GAP SERPL CALCULATED.3IONS-SCNC: 9 MMOL/L (ref 4–13)
AORTIC ROOT: 3 CM
APICAL FOUR CHAMBER EJECTION FRACTION: 62 %
AST SERPL W P-5'-P-CCNC: 92 U/L (ref 13–39)
BILIRUB SERPL-MCNC: 0.35 MG/DL (ref 0.2–1)
BLD GP AB SCN SERPL QL: NEGATIVE
BSA FOR ECHO PROCEDURE: 1.61 M2
BUN SERPL-MCNC: 10 MG/DL (ref 5–25)
CALCIUM ALBUM COR SERPL-MCNC: 9.4 MG/DL (ref 8.3–10.1)
CALCIUM SERPL-MCNC: 8.4 MG/DL (ref 8.4–10.2)
CHLORIDE SERPL-SCNC: 95 MMOL/L (ref 96–108)
CO2 SERPL-SCNC: 28 MMOL/L (ref 21–32)
CREAT SERPL-MCNC: 0.42 MG/DL (ref 0.6–1.3)
ERYTHROCYTE [DISTWIDTH] IN BLOOD BY AUTOMATED COUNT: 20.3 % (ref 11.6–15.1)
FRACTIONAL SHORTENING: 20 % (ref 28–44)
GFR SERPL CREATININE-BSD FRML MDRD: 115 ML/MIN/1.73SQ M
GLUCOSE SERPL-MCNC: 91 MG/DL (ref 65–140)
HCT VFR BLD AUTO: 23.3 % (ref 34.8–46.1)
HCT VFR BLD AUTO: 23.4 % (ref 34.8–46.1)
HGB BLD-MCNC: 6.9 G/DL (ref 11.5–15.4)
HGB BLD-MCNC: 7 G/DL (ref 11.5–15.4)
INTERVENTRICULAR SEPTUM IN DIASTOLE (PARASTERNAL SHORT AXIS VIEW): 0.9 CM
INTERVENTRICULAR SEPTUM: 0.6 CM (ref 0.6–1.1)
LAAS-AP2: 17.6 CM2
LAAS-AP4: 18.4 CM2
LEFT ATRIUM SIZE: 3.5 CM
LEFT ATRIUM VOLUME (MOD BIPLANE): 51 ML
LEFT ATRIUM VOLUME INDEX (MOD BIPLANE): 31.7 ML/M2
LEFT INTERNAL DIMENSION IN SYSTOLE: 3.6 CM (ref 2.1–4)
LEFT VENTRICULAR INTERNAL DIMENSION IN DIASTOLE: 4.5 CM (ref 3.5–6)
LEFT VENTRICULAR POSTERIOR WALL IN END DIASTOLE: 0.9 CM
LEFT VENTRICULAR STROKE VOLUME: 72 ML
LVSV (TEICH): 72 ML
MCH RBC QN AUTO: 27.3 PG (ref 26.8–34.3)
MCHC RBC AUTO-ENTMCNC: 29.5 G/DL (ref 31.4–37.4)
MCV RBC AUTO: 93 FL (ref 82–98)
MV E'TISSUE VEL-LAT: 12 CM/S
MV E'TISSUE VEL-SEP: 12 CM/S
PLATELET # BLD AUTO: 338 THOUSANDS/UL (ref 149–390)
PMV BLD AUTO: 9 FL (ref 8.9–12.7)
POTASSIUM SERPL-SCNC: 4.3 MMOL/L (ref 3.5–5.3)
PROT SERPL-MCNC: 6.2 G/DL (ref 6.4–8.4)
RA PRESSURE ESTIMATED: 15 MMHG
RBC # BLD AUTO: 2.53 MILLION/UL (ref 3.81–5.12)
RH BLD: POSITIVE
RIGHT ATRIAL 2D VOLUME: 30 ML
RIGHT ATRIUM AREA SYSTOLE A4C: 12.6 CM2
RIGHT VENTRICLE ID DIMENSION: 3.1 CM
RV PSP: 46 MMHG
SL CV LEFT ATRIUM LENGTH A2C: 5.1 CM
SL CV LV EF: 65
SL CV PED ECHO LEFT VENTRICLE DIASTOLIC VOLUME (MOD BIPLANE) 2D: 128 ML
SL CV PED ECHO LEFT VENTRICLE SYSTOLIC VOLUME (MOD BIPLANE) 2D: 56 ML
SODIUM SERPL-SCNC: 132 MMOL/L (ref 135–147)
SPECIMEN EXPIRATION DATE: NORMAL
TR MAX PG: 31 MMHG
TR PEAK VELOCITY: 2.8 M/S
TRICUSPID ANNULAR PLANE SYSTOLIC EXCURSION: 2.1 CM
TRICUSPID VALVE PEAK REGURGITATION VELOCITY: 2.77 M/S
WBC # BLD AUTO: 8.78 THOUSAND/UL (ref 4.31–10.16)

## 2024-03-24 PROCEDURE — 86923 COMPATIBILITY TEST ELECTRIC: CPT

## 2024-03-24 PROCEDURE — 30233N1 TRANSFUSION OF NONAUTOLOGOUS RED BLOOD CELLS INTO PERIPHERAL VEIN, PERCUTANEOUS APPROACH: ICD-10-PCS | Performed by: STUDENT IN AN ORGANIZED HEALTH CARE EDUCATION/TRAINING PROGRAM

## 2024-03-24 PROCEDURE — 93321 DOPPLER ECHO F-UP/LMTD STD: CPT | Performed by: STUDENT IN AN ORGANIZED HEALTH CARE EDUCATION/TRAINING PROGRAM

## 2024-03-24 PROCEDURE — 85018 HEMOGLOBIN: CPT | Performed by: STUDENT IN AN ORGANIZED HEALTH CARE EDUCATION/TRAINING PROGRAM

## 2024-03-24 PROCEDURE — 93321 DOPPLER ECHO F-UP/LMTD STD: CPT

## 2024-03-24 PROCEDURE — 86900 BLOOD TYPING SEROLOGIC ABO: CPT | Performed by: STUDENT IN AN ORGANIZED HEALTH CARE EDUCATION/TRAINING PROGRAM

## 2024-03-24 PROCEDURE — P9016 RBC LEUKOCYTES REDUCED: HCPCS

## 2024-03-24 PROCEDURE — 93308 TTE F-UP OR LMTD: CPT

## 2024-03-24 PROCEDURE — 99232 SBSQ HOSP IP/OBS MODERATE 35: CPT | Performed by: STUDENT IN AN ORGANIZED HEALTH CARE EDUCATION/TRAINING PROGRAM

## 2024-03-24 PROCEDURE — 85027 COMPLETE CBC AUTOMATED: CPT

## 2024-03-24 PROCEDURE — 93325 DOPPLER ECHO COLOR FLOW MAPG: CPT

## 2024-03-24 PROCEDURE — 85014 HEMATOCRIT: CPT | Performed by: STUDENT IN AN ORGANIZED HEALTH CARE EDUCATION/TRAINING PROGRAM

## 2024-03-24 PROCEDURE — 93308 TTE F-UP OR LMTD: CPT | Performed by: STUDENT IN AN ORGANIZED HEALTH CARE EDUCATION/TRAINING PROGRAM

## 2024-03-24 PROCEDURE — 86850 RBC ANTIBODY SCREEN: CPT | Performed by: STUDENT IN AN ORGANIZED HEALTH CARE EDUCATION/TRAINING PROGRAM

## 2024-03-24 PROCEDURE — 93325 DOPPLER ECHO COLOR FLOW MAPG: CPT | Performed by: STUDENT IN AN ORGANIZED HEALTH CARE EDUCATION/TRAINING PROGRAM

## 2024-03-24 PROCEDURE — 86901 BLOOD TYPING SEROLOGIC RH(D): CPT | Performed by: STUDENT IN AN ORGANIZED HEALTH CARE EDUCATION/TRAINING PROGRAM

## 2024-03-24 PROCEDURE — 80053 COMPREHEN METABOLIC PANEL: CPT

## 2024-03-24 RX ADMIN — OXYCODONE HYDROCHLORIDE 10 MG: 10 TABLET ORAL at 11:23

## 2024-03-24 RX ADMIN — OXYCODONE HYDROCHLORIDE 10 MG: 10 TABLET ORAL at 03:15

## 2024-03-24 RX ADMIN — HEPARIN SODIUM 5000 UNITS: 5000 INJECTION INTRAVENOUS; SUBCUTANEOUS at 05:19

## 2024-03-24 RX ADMIN — OXYCODONE HYDROCHLORIDE 10 MG: 10 TABLET ORAL at 16:04

## 2024-03-24 RX ADMIN — HEPARIN SODIUM 5000 UNITS: 5000 INJECTION INTRAVENOUS; SUBCUTANEOUS at 13:34

## 2024-03-24 RX ADMIN — LIDOCAINE 5% 2 PATCH: 700 PATCH TOPICAL at 08:32

## 2024-03-24 RX ADMIN — HYDROMORPHONE HYDROCHLORIDE 0.5 MG: 1 INJECTION, SOLUTION INTRAMUSCULAR; INTRAVENOUS; SUBCUTANEOUS at 19:38

## 2024-03-24 RX ADMIN — HYDROMORPHONE HYDROCHLORIDE 0.5 MG: 1 INJECTION, SOLUTION INTRAMUSCULAR; INTRAVENOUS; SUBCUTANEOUS at 04:33

## 2024-03-24 RX ADMIN — ACETAMINOPHEN 325MG 650 MG: 325 TABLET ORAL at 07:28

## 2024-03-24 RX ADMIN — OXYCODONE HYDROCHLORIDE 10 MG: 10 TABLET ORAL at 22:48

## 2024-03-24 RX ADMIN — ACETAMINOPHEN 325MG 650 MG: 325 TABLET ORAL at 13:33

## 2024-03-24 RX ADMIN — HEPARIN SODIUM 5000 UNITS: 5000 INJECTION INTRAVENOUS; SUBCUTANEOUS at 22:48

## 2024-03-24 NOTE — ASSESSMENT & PLAN NOTE
Malignant pleural effusion in a patient with a history of metastatic breast cancer s/p Pleurx Catheter placement.

## 2024-03-24 NOTE — PLAN OF CARE
Problem: PAIN - ADULT  Goal: Verbalizes/displays adequate comfort level or baseline comfort level  Description: Interventions:  - Encourage patient to monitor pain and request assistance  - Assess pain using appropriate pain scale  - Administer analgesics based on type and severity of pain and evaluate response  - Implement non-pharmacological measures as appropriate and evaluate response  - Consider cultural and social influences on pain and pain management  - Notify physician/advanced practitioner if interventions unsuccessful or patient reports new pain  Outcome: Progressing     Problem: Potential for Falls  Goal: Patient will remain free of falls  Description: INTERVENTIONS:  - Educate patient/family on patient safety including physical limitations  - Instruct patient to call for assistance with activity   - Consult OT/PT to assist with strengthening/mobility   - Keep Call bell within reach  - Keep bed low and locked with side rails adjusted as appropriate  - Keep care items and personal belongings within reach  - Initiate and maintain comfort rounds  - Make Fall Risk Sign visible to staff  - Offer Toileting every 2 Hours, in advance of need  - Initiate/Maintain bed alarm  - Obtain necessary fall risk management equipment:    - Apply yellow socks and bracelet for high fall risk patients  - Consider moving patient to room near nurses station  Outcome: Progressing     Problem: DISCHARGE PLANNING  Goal: Discharge to home or other facility with appropriate resources  Description: INTERVENTIONS:  - Identify barriers to discharge w/patient and caregiver  - Arrange for needed discharge resources and transportation as appropriate  - Identify discharge learning needs (meds, wound care, etc.)  - Arrange for interpretive services to assist at discharge as needed  - Refer to Case Management Department for coordinating discharge planning if the patient needs post-hospital services based on physician/advanced practitioner  order or complex needs related to functional status, cognitive ability, or social support system  Outcome: Progressing     Problem: METABOLIC, FLUID AND ELECTROLYTES - ADULT  Goal: Electrolytes maintained within normal limits  Description: INTERVENTIONS:  - Monitor labs and assess patient for signs and symptoms of electrolyte imbalances  - Administer electrolyte replacement as ordered  - Monitor response to electrolyte replacements, including repeat lab results as appropriate  - Instruct patient on fluid and nutrition as appropriate  Outcome: Progressing  Goal: Fluid balance maintained  Description: INTERVENTIONS:  - Monitor labs   - Monitor I/O and WT  - Instruct patient on fluid and nutrition as appropriate  - Assess for signs & symptoms of volume excess or deficit  Outcome: Progressing     Problem: Nutrition/Hydration-ADULT  Goal: Nutrient/Hydration intake appropriate for improving, restoring or maintaining nutritional needs  Description: Monitor and assess patient's nutrition/hydration status for malnutrition. Collaborate with interdisciplinary team and initiate plan and interventions as ordered.  Monitor patient's weight and dietary intake as ordered or per policy. Utilize nutrition screening tool and intervene as necessary. Determine patient's food preferences and provide high-protein, high-caloric foods as appropriate.     INTERVENTIONS:  - Monitor oral intake, urinary output, labs, and treatment plans  - Assess nutrition and hydration status and recommend course of action  - Evaluate amount of meals eaten  - Assist patient with eating if necessary   - Allow adequate time for meals  - Recommend/ encourage appropriate diets, oral nutritional supplements, and vitamin/mineral supplements  - Order, calculate, and assess calorie counts as needed  - Recommend, monitor, and adjust tube feedings and TPN/PPN based on assessed needs  - Assess need for intravenous fluids  - Provide specific nutrition/hydration education  as appropriate  - Include patient/family/caregiver in decisions related to nutrition  Outcome: Progressing

## 2024-03-24 NOTE — ASSESSMENT & PLAN NOTE
Malnutrition Findings:   Adult Malnutrition type: Chronic illness  Adult Degree of Malnutrition: Other severe protein calorie malnutrition    BMI Findings:        Body mass index is 20.47 kg/m².

## 2024-03-24 NOTE — ASSESSMENT & PLAN NOTE
Likely due to SIADH in the setting of malignancy  Patient refusing salt tabs  Continue fluid restriction    Recent Labs     03/22/24  1524 03/23/24  1609 03/24/24  0518   SODIUM 131* 132* 132*

## 2024-03-24 NOTE — ASSESSMENT & PLAN NOTE
For transfusion today, Might require diuretics if she develops signs or symptoms of overload.    Results from last 7 days   Lab Units 03/24/24  0923 03/24/24  0518 03/23/24  1609 03/22/24  1524 03/20/24  2032   HEMOGLOBIN g/dL 7.0* 6.9* 7.6* 8.4* 7.8*   MCV fL  --  93 91 90 89   RDW %  --  20.3* 20.5* 20.3* 20.0*

## 2024-03-24 NOTE — PLAN OF CARE
Problem: Potential for Falls  Goal: Patient will remain free of falls  Description: INTERVENTIONS:  - Educate patient/family on patient safety including physical limitations  - Instruct patient to call for assistance with activity   - Consult OT/PT to assist with strengthening/mobility   - Keep Call bell within reach  - Keep bed low and locked with side rails adjusted as appropriate  - Keep care items and personal belongings within reach  - Initiate and maintain comfort rounds  - Make Fall Risk Sign visible to staff  - Offer Toileting every 2 Hours, in advance of need  - Initiate/Maintain bed alarm  - Obtain necessary fall risk management equipment:    - Apply yellow socks and bracelet for high fall risk patients  - Consider moving patient to room near nurses station  Outcome: Progressing     Problem: PAIN - ADULT  Goal: Verbalizes/displays adequate comfort level or baseline comfort level  Description: Interventions:  - Encourage patient to monitor pain and request assistance  - Assess pain using appropriate pain scale  - Administer analgesics based on type and severity of pain and evaluate response  - Implement non-pharmacological measures as appropriate and evaluate response  - Consider cultural and social influences on pain and pain management  - Notify physician/advanced practitioner if interventions unsuccessful or patient reports new pain  Outcome: Progressing     Problem: DISCHARGE PLANNING  Goal: Discharge to home or other facility with appropriate resources  Description: INTERVENTIONS:  - Identify barriers to discharge w/patient and caregiver  - Arrange for needed discharge resources and transportation as appropriate  - Identify discharge learning needs (meds, wound care, etc.)  - Arrange for interpretive services to assist at discharge as needed  - Refer to Case Management Department for coordinating discharge planning if the patient needs post-hospital services based on physician/advanced practitioner  order or complex needs related to functional status, cognitive ability, or social support system  Outcome: Progressing     Problem: Knowledge Deficit  Goal: Patient/family/caregiver demonstrates understanding of disease process, treatment plan, medications, and discharge instructions  Description: Complete learning assessment and assess knowledge base.  Interventions:  - Provide teaching at level of understanding  - Provide teaching via preferred learning methods  Outcome: Progressing     Problem: RESPIRATORY - ADULT  Goal: Achieves optimal ventilation and oxygenation  Description: INTERVENTIONS:  - Assess for changes in respiratory status  - Assess for changes in mentation and behavior  - Position to facilitate oxygenation and minimize respiratory effort  - Oxygen administered by appropriate delivery if ordered  - Initiate smoking cessation education as indicated  - Encourage broncho-pulmonary hygiene including cough, deep breathe, Incentive Spirometry  - Assess the need for suctioning and aspirate as needed  - Assess and instruct to report SOB or any respiratory difficulty  - Respiratory Therapy support as indicated  Outcome: Progressing     Problem: METABOLIC, FLUID AND ELECTROLYTES - ADULT  Goal: Electrolytes maintained within normal limits  Description: INTERVENTIONS:  - Monitor labs and assess patient for signs and symptoms of electrolyte imbalances  - Administer electrolyte replacement as ordered  - Monitor response to electrolyte replacements, including repeat lab results as appropriate  - Instruct patient on fluid and nutrition as appropriate  Outcome: Progressing  Goal: Fluid balance maintained  Description: INTERVENTIONS:  - Monitor labs   - Monitor I/O and WT  - Instruct patient on fluid and nutrition as appropriate  - Assess for signs & symptoms of volume excess or deficit  Outcome: Progressing     Problem: Nutrition/Hydration-ADULT  Goal: Nutrient/Hydration intake appropriate for improving, restoring  or maintaining nutritional needs  Description: Monitor and assess patient's nutrition/hydration status for malnutrition. Collaborate with interdisciplinary team and initiate plan and interventions as ordered.  Monitor patient's weight and dietary intake as ordered or per policy. Utilize nutrition screening tool and intervene as necessary. Determine patient's food preferences and provide high-protein, high-caloric foods as appropriate.     INTERVENTIONS:  - Monitor oral intake, urinary output, labs, and treatment plans  - Assess nutrition and hydration status and recommend course of action  - Evaluate amount of meals eaten  - Assist patient with eating if necessary   - Allow adequate time for meals  - Recommend/ encourage appropriate diets, oral nutritional supplements, and vitamin/mineral supplements  - Order, calculate, and assess calorie counts as needed  - Recommend, monitor, and adjust tube feedings and TPN/PPN based on assessed needs  - Assess need for intravenous fluids  - Provide specific nutrition/hydration education as appropriate  - Include patient/family/caregiver in decisions related to nutrition  Outcome: Progressing

## 2024-03-24 NOTE — PROGRESS NOTES
Pending sale to [facility]  Progress Note  Name: Shannan Vee I  MRN: [id number]  Unit/Bed#: E2 -01 I Date of Admission: 3/22/2024   Date of Service: 3/24/2024 I Hospital Day: 2    Assessment/Plan   * Recurrent malignant neoplasm of breast (HCC)  Assessment & Plan  Metastatic breast cancer. Hormone receptor positive and HER2 negative diagnosed 2013. Prior history of neoadjuvant chemotherapy followed by mastectomy. She is s/p Hysterectomy and oophorectomy. 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 Completed radiation at [facility].    Recent CT-scan showing extensive right thoracic pleural carcinomatosis, Extensive associated mass effect.     Goals of care conducted on admission, initially wanted comfort measures, but decided to pursue further treatment.   Appreciate oncology recommendations. She was offered therapy by Dr. Hearn and was amenable to it. Oncology will assist in arranging her follow-up for chemotherapy initiation as long as her ECOG status does not preclude this.  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       Goals of care, counseling/discussion  Assessment & Plan  Goals of care done by previous provider, initially considered hospice, but reconsidered.    Large pleural effusion  Assessment & Plan  Malignant pleural effusion in a patient with a history of metastatic breast cancer s/p Pleurx Catheter placement.    Other specified anemias  Assessment & Plan  For transfusion  today, Might require diuretics if she develops signs or symptoms of overload.    Results from last 7 days   Lab Units 24  0923 24  0518 24  1609 24  1524 24  2032   HEMOGLOBIN g/dL 7.0* 6.9* 7.6* 8.4* 7.8*   MCV fL  --  93 91 90 89   RDW %  --  20.3* 20.5* 20.3* 20.0*                VTE Pharmacologic Prophylaxis:   Pharmacologic: Heparin  Mechanical VTE Prophylaxis in Place: Yes    Discussions with Specialists or Other Care Team Provider: oncology    Education and Discussions with Family / Patient: patient, family (sister)    Current Length of Stay: 2 day(s)    Current Patient Status: Inpatient   Certification Statement: The patient will continue to require additional inpatient hospital stay due to ongoing goals of care discussion    Discharge Plan: active    Code Status: Level 1 - Full Code      Subjective:   Patient seen and examined at bedside. Comfortable, No new issues overnight besides continued shortness of breath. Patient and her family stated that they would like to continue pursuing treatment.    Objective:     Vitals:   Temp (24hrs), Av.4 °F (36.3 °C), Min:94.8 °F (34.9 °C), Max:98.5 °F (36.9 °C)    Temp:  [94.8 °F (34.9 °C)-98.5 °F (36.9 °C)] 97.9 °F (36.6 °C)  HR:  [110-124] 120  Resp:  [18-20] 20  BP: (100-117)/(56-75) 108/58  SpO2:  [98 %-100 %] 99 %  Body mass index is 20.47 kg/m².     Input and Output Summary (last 24 hours):       Intake/Output Summary (Last 24 hours) at 3/24/2024 1650  Last data filed at 3/24/2024 1301  Gross per 24 hour   Intake 360 ml   Output --   Net 360 ml       Physical Exam:     Physical Exam  Vitals reviewed.   Constitutional:       General: She is not in acute distress.  HENT:      Head: Normocephalic.      Nose: Nose normal.      Mouth/Throat:      Mouth: Mucous membranes are moist.   Eyes:      General: No scleral icterus.  Cardiovascular:      Rate and Rhythm: Normal rate.   Pulmonary:      Effort: Respiratory distress present.       Breath sounds: Decreased breath sounds (right) present. No wheezing.   Abdominal:      General: There is no distension.      Palpations: Abdomen is soft.      Tenderness: There is no abdominal tenderness.   Skin:     General: Skin is warm.   Neurological:      Mental Status: She is alert. Mental status is at baseline.   Psychiatric:         Mood and Affect: Mood normal.         Behavior: Behavior normal.       Additional Data:     Labs:    Results from last 7 days   Lab Units 03/24/24  0923 03/24/24  0518 03/23/24  1609 03/22/24  1524 03/20/24  2032   WBC Thousand/uL  --  8.78   < > 8.36 8.76   HEMOGLOBIN g/dL 7.0* 6.9*   < > 8.4* 7.8*   HEMATOCRIT % 23.3* 23.4*   < > 27.7* 25.7*   PLATELETS Thousands/uL  --  338   < > 330 304   BANDS PCT %  --   --   --   --  1   NEUTROS PCT %  --   --   --  79*  --    LYMPHS PCT %  --   --   --  10*  --    LYMPHO PCT %  --   --   --   --  12*   MONOS PCT %  --   --   --  9  --    MONO PCT %  --   --   --   --  9   EOS PCT %  --   --   --  0 0    < > = values in this interval not displayed.     Results from last 7 days   Lab Units 03/24/24  0518   SODIUM mmol/L 132*   POTASSIUM mmol/L 4.3   CHLORIDE mmol/L 95*   CO2 mmol/L 28   BUN mg/dL 10   CREATININE mg/dL 0.42*   ANION GAP mmol/L 9   CALCIUM mg/dL 8.4   ALBUMIN g/dL 2.7*   TOTAL BILIRUBIN mg/dL 0.35   ALK PHOS U/L 125*   ALT U/L 17   AST U/L 92*   GLUCOSE RANDOM mg/dL 91     Results from last 7 days   Lab Units 03/22/24  1524   INR  1.03             Results from last 7 days   Lab Units 03/22/24  1524   LACTIC ACID mmol/L 1.5   PROCALCITONIN ng/ml 0.50*           * I Have Reviewed All Lab Data Listed Above.  * Additional Pertinent Lab Tests Reviewed: All Labs For Current Hospital Admission Reviewed    Mobility:  Basic Mobility Inpatient Raw Score: 22  -[facility] Goal: 7: Walk 25 feet or more  -[facility] Achieved: 7: Walk 25 feet or more    Lines:   Invasive Devices       Peripheral Intravenous Line  Duration             Peripheral IV  03/22/24 Proximal;Right;Ventral (anterior) Forearm 2 days              Drain  Duration             Pleural Effusion Long-Term Catheter 16 Fr. 60 days                       Imaging:    Imaging Reports Reviewed Today Include: xr chest, ct pe study    Recent Cultures (last 7 days):     Results from last 7 days   Lab Units 03/22/24  1531   URINE CULTURE  <10,000 cfu/ml       Last 24 Hours Medication List:   Current Facility-Administered Medications   Medication Dose Route Frequency Provider Last Rate    acetaminophen  650 mg Oral Q6H PRN Gibson George MD      heparin (porcine)  5,000 Units Subcutaneous Q8H [facility] Moe Scruggs PA-C      HYDROmorphone  0.5 mg Intravenous Q4H PRN Gibson George MD      lidocaine  2 patch Topical Daily Moe Scruggs PA-C      metoprolol succinate  50 mg Oral Daily Gibson George MD      oxyCODONE  10 mg Oral Q4H PRN Gibson George MD      senna  17.2 mg Oral HS Moe Scruggs PA-C          Today, Patient Was Seen By: Juan David Palm MD    ** Please Note: Dictation voice to text software may have been used in the creation of this document. **

## 2024-03-24 NOTE — ASSESSMENT & PLAN NOTE
Metastatic breast cancer. Hormone receptor positive and HER2 negative diagnosed 2013. Prior history of neoadjuvant chemotherapy followed by mastectomy. She is s/p Hysterectomy and oophorectomy. 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 Completed radiation at [facility].    Recent CT-scan showing extensive right thoracic pleural carcinomatosis, Extensive associated mass effect.     Goals of care conducted on admission, initially wanted comfort measures, but decided to pursue further treatment.   Appreciate oncology recommendations. She was offered therapy by Dr. Hearn and was amenable to it. Oncology will assist in arranging her follow-up for chemotherapy initiation as long as her ECOG status does not preclude this.  PT/OT

## 2024-03-25 ENCOUNTER — TELEPHONE (OUTPATIENT)
Dept: HEMATOLOGY ONCOLOGY | Facility: CLINIC | Age: 54
End: 2024-03-25

## 2024-03-25 LAB
ABO GROUP BLD BPU: NORMAL
ANION GAP SERPL CALCULATED.3IONS-SCNC: 11 MMOL/L (ref 4–13)
ATRIAL RATE: 123 BPM
BPU ID: NORMAL
BUN SERPL-MCNC: 10 MG/DL (ref 5–25)
CALCIUM SERPL-MCNC: 8.5 MG/DL (ref 8.4–10.2)
CHLORIDE SERPL-SCNC: 96 MMOL/L (ref 96–108)
CO2 SERPL-SCNC: 28 MMOL/L (ref 21–32)
CREAT SERPL-MCNC: 0.36 MG/DL (ref 0.6–1.3)
CROSSMATCH: NORMAL
ERYTHROCYTE [DISTWIDTH] IN BLOOD BY AUTOMATED COUNT: 19 % (ref 11.6–15.1)
GFR SERPL CREATININE-BSD FRML MDRD: 121 ML/MIN/1.73SQ M
GLUCOSE SERPL-MCNC: 94 MG/DL (ref 65–140)
HCT VFR BLD AUTO: 28 % (ref 34.8–46.1)
HGB BLD-MCNC: 8.4 G/DL (ref 11.5–15.4)
MAGNESIUM SERPL-MCNC: 1.8 MG/DL (ref 1.9–2.7)
MCH RBC QN AUTO: 27.2 PG (ref 26.8–34.3)
MCHC RBC AUTO-ENTMCNC: 30 G/DL (ref 31.4–37.4)
MCV RBC AUTO: 91 FL (ref 82–98)
P AXIS: 59 DEGREES
PLATELET # BLD AUTO: 337 THOUSANDS/UL (ref 149–390)
PMV BLD AUTO: 8.9 FL (ref 8.9–12.7)
POTASSIUM SERPL-SCNC: 4.4 MMOL/L (ref 3.5–5.3)
PR INTERVAL: 132 MS
QRS AXIS: 81 DEGREES
QRSD INTERVAL: 72 MS
QT INTERVAL: 294 MS
QTC INTERVAL: 420 MS
RBC # BLD AUTO: 3.09 MILLION/UL (ref 3.81–5.12)
SODIUM SERPL-SCNC: 135 MMOL/L (ref 135–147)
T WAVE AXIS: 59 DEGREES
UNIT DISPENSE STATUS: NORMAL
UNIT PRODUCT CODE: NORMAL
UNIT PRODUCT VOLUME: 350 ML
UNIT RH: NORMAL
VENTRICULAR RATE: 123 BPM
WBC # BLD AUTO: 9.35 THOUSAND/UL (ref 4.31–10.16)

## 2024-03-25 PROCEDURE — 80048 BASIC METABOLIC PNL TOTAL CA: CPT | Performed by: STUDENT IN AN ORGANIZED HEALTH CARE EDUCATION/TRAINING PROGRAM

## 2024-03-25 PROCEDURE — 85027 COMPLETE CBC AUTOMATED: CPT | Performed by: STUDENT IN AN ORGANIZED HEALTH CARE EDUCATION/TRAINING PROGRAM

## 2024-03-25 PROCEDURE — 93005 ELECTROCARDIOGRAM TRACING: CPT

## 2024-03-25 PROCEDURE — 99255 IP/OBS CONSLTJ NEW/EST HI 80: CPT | Performed by: SURGERY

## 2024-03-25 PROCEDURE — 97163 PT EVAL HIGH COMPLEX 45 MIN: CPT

## 2024-03-25 PROCEDURE — 97166 OT EVAL MOD COMPLEX 45 MIN: CPT

## 2024-03-25 PROCEDURE — 83735 ASSAY OF MAGNESIUM: CPT | Performed by: STUDENT IN AN ORGANIZED HEALTH CARE EDUCATION/TRAINING PROGRAM

## 2024-03-25 PROCEDURE — 99232 SBSQ HOSP IP/OBS MODERATE 35: CPT | Performed by: INTERNAL MEDICINE

## 2024-03-25 PROCEDURE — 93010 ELECTROCARDIOGRAM REPORT: CPT | Performed by: INTERNAL MEDICINE

## 2024-03-25 RX ORDER — ACETAMINOPHEN 325 MG/1
650 TABLET ORAL EVERY 8 HOURS SCHEDULED
Status: DISCONTINUED | OUTPATIENT
Start: 2024-03-25 | End: 2024-03-26 | Stop reason: HOSPADM

## 2024-03-25 RX ORDER — LIDOCAINE 50 MG/G
3 PATCH TOPICAL DAILY
Status: DISCONTINUED | OUTPATIENT
Start: 2024-03-25 | End: 2024-03-26 | Stop reason: HOSPADM

## 2024-03-25 RX ORDER — MAGNESIUM SULFATE HEPTAHYDRATE 40 MG/ML
2 INJECTION, SOLUTION INTRAVENOUS ONCE
Status: COMPLETED | OUTPATIENT
Start: 2024-03-25 | End: 2024-03-25

## 2024-03-25 RX ORDER — METOPROLOL SUCCINATE 50 MG/1
50 TABLET, EXTENDED RELEASE ORAL 2 TIMES DAILY
Status: DISCONTINUED | OUTPATIENT
Start: 2024-03-25 | End: 2024-03-25

## 2024-03-25 RX ORDER — SENNOSIDES 8.6 MG
17.2 TABLET ORAL 2 TIMES DAILY
Status: DISCONTINUED | OUTPATIENT
Start: 2024-03-25 | End: 2024-03-25

## 2024-03-25 RX ORDER — POLYETHYLENE GLYCOL 3350 17 G/17G
17 POWDER, FOR SOLUTION ORAL DAILY PRN
Status: DISCONTINUED | OUTPATIENT
Start: 2024-03-25 | End: 2024-03-26 | Stop reason: HOSPADM

## 2024-03-25 RX ORDER — SENNOSIDES 8.6 MG
2 TABLET ORAL
Status: DISCONTINUED | OUTPATIENT
Start: 2024-03-25 | End: 2024-03-26 | Stop reason: HOSPADM

## 2024-03-25 RX ORDER — METOPROLOL SUCCINATE 50 MG/1
50 TABLET, EXTENDED RELEASE ORAL 2 TIMES DAILY
Status: DISCONTINUED | OUTPATIENT
Start: 2024-03-25 | End: 2024-03-26 | Stop reason: HOSPADM

## 2024-03-25 RX ADMIN — LIDOCAINE 5% 2 PATCH: 700 PATCH TOPICAL at 09:15

## 2024-03-25 RX ADMIN — OXYCODONE HYDROCHLORIDE 10 MG: 10 TABLET ORAL at 11:08

## 2024-03-25 RX ADMIN — HYDROMORPHONE HYDROCHLORIDE 0.5 MG: 1 INJECTION, SOLUTION INTRAMUSCULAR; INTRAVENOUS; SUBCUTANEOUS at 05:52

## 2024-03-25 RX ADMIN — HYDROMORPHONE HYDROCHLORIDE 0.5 MG: 1 INJECTION, SOLUTION INTRAMUSCULAR; INTRAVENOUS; SUBCUTANEOUS at 10:03

## 2024-03-25 RX ADMIN — LIDOCAINE 3 PATCH: 700 PATCH TOPICAL at 16:00

## 2024-03-25 RX ADMIN — OXYCODONE HYDROCHLORIDE 10 MG: 10 TABLET ORAL at 23:24

## 2024-03-25 RX ADMIN — HYDROMORPHONE HYDROCHLORIDE 0.5 MG: 1 INJECTION, SOLUTION INTRAMUSCULAR; INTRAVENOUS; SUBCUTANEOUS at 19:56

## 2024-03-25 RX ADMIN — MAGNESIUM SULFATE HEPTAHYDRATE 2 G: 40 INJECTION, SOLUTION INTRAVENOUS at 09:16

## 2024-03-25 RX ADMIN — OXYCODONE HYDROCHLORIDE 10 MG: 10 TABLET ORAL at 07:22

## 2024-03-25 RX ADMIN — METOPROLOL SUCCINATE 50 MG: 50 TABLET, EXTENDED RELEASE ORAL at 07:47

## 2024-03-25 RX ADMIN — ACETAMINOPHEN 325MG 650 MG: 325 TABLET ORAL at 16:00

## 2024-03-25 RX ADMIN — HYDROMORPHONE HYDROCHLORIDE 0.5 MG: 1 INJECTION, SOLUTION INTRAMUSCULAR; INTRAVENOUS; SUBCUTANEOUS at 00:16

## 2024-03-25 RX ADMIN — OXYCODONE HYDROCHLORIDE 10 MG: 10 TABLET ORAL at 02:47

## 2024-03-25 RX ADMIN — HEPARIN SODIUM 5000 UNITS: 5000 INJECTION INTRAVENOUS; SUBCUTANEOUS at 14:01

## 2024-03-25 RX ADMIN — HEPARIN SODIUM 5000 UNITS: 5000 INJECTION INTRAVENOUS; SUBCUTANEOUS at 05:52

## 2024-03-25 RX ADMIN — OXYCODONE HYDROCHLORIDE 10 MG: 10 TABLET ORAL at 17:36

## 2024-03-25 NOTE — UTILIZATION REVIEW
Notification of Unplanned, Urgent, or   Emergency Inpatient Admission   AUTHORIZATION REQUEST   [place] [address]  Tax ID: [id number]  NPI: [id number]  Place of Service: Acute [facility]  Admission Level of Care: Inpatient  Place of Service Code: 21     Attending Physician Information  Attending Name and NPI#: Julius IsbellagDo shoaib [[id number]]  Phone: [phone]     Admission Information  Inpatient Admission Date/Time: 3/22/24  7:39 PM  Discharge Date/Time: No discharge date for patient encounter.  Admitting Diagnosis Code/Description:  UTI (urinary tract infection) [N39.0]  Lower extremity edema [R60.0]  Chest pain [R07.9]  Dyspnea [R06.00]  SOB (shortness of breath) [R06.02]  Metastatic disease (HCC) [C79.9]  Large pleural effusion [J90]     Utilization Review Contact  Jovana Berg, Utilization   Phone: [phone]  Fax: [phone]  Email: [email or web address]  Contact for approvals/pending authorizations, clinical reviews, and discharge.     Physician Advisory Services Contact  Medical Necessity Denial & Vuas-um-Tjvc Discussion  Phone: [phone]  Fax: [phone]  Email: [email or web address]     DISCHARGE SUPPORT TEAM:  For Patients Discharge Needs & Updates  Phone: [phone] opt. 2 Fax: [phone]  Email: [email or web address]

## 2024-03-25 NOTE — CONSULTS
Consultation - Palliative & Supportive Care   Shannan Vee 53 y.o. female MRN: [id number]  Unit/Bed#: E2 -01 Encounter: [id number]      Physician Requesting Consult: Julius Sanders DO  Reason for Consult / Principal Problem: symptom management, metastatic disease      Assessment/Plan:  Metastatic breast cancer  Malignant pleural effusion, pleural carcinomatosis R  Extensive metastatic disease to bones, with compression deformities throughout spine  Cancer related pain  Cachexia, severe protein-calorie malnutrition  Constipation   Anemia   Generalized weakness, debility, deconditioning, ambulatory dysfunction  Palliative care patient  Goals of care  -continue current medical care  -confirmed level 1, full code status with daughter at bedside and sister/Ajit; no limits on care; hoping for increased nutrition and strength for more chemotherapy after discussion with HemOnc/Dr. Hearn; not interested in hospice as patient/family want to return to hospital if needed; expressed concern that patient will not have improved nutrition/strength given current condition and lethargy  -pain regimen   -xochitl tylenol 650mg po q8h   -cont oxyIR 10mg po q4h prn mod-severe pain    -will not xochitl given patient' s confusion/lethargy today   -cont dilaudid 0.5mg IV q4h prn bt pain   -inc lidoderm 5% patch to 3 patches daily over 12h spine  -bowel regimen    -cont senna to 2 tabs at bedtime    -patient/family refused last pm   -add prn miralax  -difficulty addressing goals of care with patient given current state/some confusion/lethargy, daughter -phone  #[phone] and ipad  #[id number] difficult to hear/understand; -sister/Gisela coming in later today, but probably best for all family members to be present  to have further goc discussions; as of now patient/family hoping for increased nutrition/strength for possibility of more treatment in future as discussed with HemOnc  -will follow, outpt palliative  f/u scheduled for 3/29/24    Met with patient/daughter. Introduced palliative roll in care and as covering for outpt palliative provider through phone  #[phone]. Patient sleeping comfortably. Unable to hear phone . Daughter having difficulty understanding. Called aunt/Patient's sister/Gisela. Patient awakened, confused, trying to get out of bed. States has to use bathroom. Lethargic, confused, would not sit back and wait. Appears unsteady. Called for assistance. Patient with gaze, not following commands, sister/Gisela attempting to provide interpretation. RN reports patient was ambulating this am with daughter's help. Feels it is due to pain meds. Discussed current regimen, patient has been taking oxyIR at home at same dose. 3 doses of IV dilaudid given since early this am, last dose at 10am, dose of oxyIR given at 1108. Patient not waiting. Helped to bedside commode by RN and assistant. Sister reports that family wants patient to remain full code and attempt regaining strength/nutrition for more treatment. She is also concerned about whether patient will be able to do this. Attempted further interpretation with ipad,  #[id number]. Patient seems more alert, interactive once situated in bed. Patient wants to get better and have more time with family. Confirmed with daughter, no limits on care, not interested in hospice as patient/family want patient to return to hospital if needed. They also hope for improvement in nutrition and strength for more treatment if possible. Expressed concern that this may not be possible given extent of disease, current malnutrition, anasarca. Also expressed concern that patient may have limited time. Discussed that further treatment is not curative given extent of cancer, sister had expressed understanding earlier; daughter/patient remain silent. Patient continues with some pain along spine. Will hold on scheduling oxyIR given patient's somewhat  "confused/lethargic state today, partner will re-eval. Continue with prn oxyIR and prn IV dilaudid for bt pain, will schedule tylenol and inc lidoderm patch. Add prn miralax. Daughter wants to make sure patient has enough pain medications prior to discharge, patient had recent fill on PDMP. Emotional support and validation provided.     Code Status: Level 1 - Full Code  Advance Directive and Living Will:    No  Power of :  No  POLST:  No  Emergency contact/daughter/Ajit: [phone]  Spouse/Paco Herrera: [phone]  Sister/Gisela        History of Present Illness   HPI: Shannan Vee is a 53 y.o. year old female who admitted 3/22/24 with shortness of breath, feeling of suffocation at rest and with exertion, over last few days, along with LE selling. PMH significant for stage IV breast cancer, bone metastases, large malignant pleural effusion with PleurX catheter, sinus tachycardia, hyponatremia, anemia. Patient seen by Oncology as outpt 3/19/24, patient with declining performance status and told too weak to have more chemotherapy. Patient also follows with palliative partner as outpt, seen on 2/14/24 with f/u scheduled for 3/29/24. Per notes, patient on oxyIR 10mg po q4h prn. Imaging on admission showed no PE, extensive R thoracic pleural carcinomatosis without significant change, extensive associated mass effect, vasculature remains patent though mildly compressed and displaced, caudal displacement of R hemidiaphragm with mass effect on liver, mild ascites, third spacing of fluid, extensive metastatic disease throughout bones with compression deformities throughout the spine. Patient/family initially elected for home hospice, however, changed mind as want to return to hospital for treatment. Patient also seen by HemOnc/Dr. Hearn on 3/23/24, note states \"patient remains undecided about chemotherapy but she is leaning towards trying some more chemotherapy but she wants to get nutritionally better " and will try 3 protein shakes/day. Palliative care consulted for symptom management.     Received:  oxyIR 40mg po + dilaudid 1.5mg IV/last 24h = 68mg approx OME    Last BM: 3/23/24, medium/formed      Review of Systems   Constitutional:  Positive for activity change, appetite change and fatigue.   Cardiovascular:  Positive for leg swelling.   Gastrointestinal:  Positive for constipation.   Musculoskeletal:  Positive for back pain, gait problem and myalgias.   Skin:         weeping   Neurological:  Positive for dizziness and weakness.   Psychiatric/Behavioral:  Positive for confusion, decreased concentration and dysphoric mood.    All other systems reviewed and are negative.      Historical Information   Past Medical History:   Diagnosis Date    Breast cancer (HCC) 07/2013    left-chemotherapy    Fibroma     Goals of care, counseling/discussion     History of chemotherapy     History of external beam radiation therapy      Patient Active Problem List   Diagnosis    Carcinoma of left breast, estrogen receptor positive     Use of anastrozole (Arimidex)    Limb swelling    Malignant neoplasm of overlapping sites of left female breast (HCC)    Uterine leiomyoma    Personal history of malignant neoplasm of breast    Overweight (BMI 25.0-29.9)    Urinary frequency    Bladder wall thickening    Recurrent malignant neoplasm of breast (HCC)    Right lower lobe lung mass    Pain from bone metastases (HCC)    Other specified anemias    Large pleural effusion    Chronic bilateral low back pain without sciatica    Cancer associated pain    Goals of care, counseling/discussion    Severe protein-calorie malnutrition (HCC)    Drug induced constipation    Hyponatremia    Tachycardia    Encounter for home hospice care     Past Surgical History:   Procedure Laterality Date    APPENDECTOMY      BREAST SURGERY  2014    Left breast 2/2 breast CA    HYSTERECTOMY  2015    Due to fibroma    IR BIOPSY BONE  10/9/2023    IR PLEURAL EFFUSION  LONG-TERM CATHETER PLACEMENT  1/24/2024    IR THORACENTESIS  11/8/2023    MASTECTOMY Left 2013    OOPHORECTOMY       Social History     Socioeconomic History    Marital status: /Civil Union     Spouse name: None    Number of children: 3    Years of education: None    Highest education level: 12th grade   Occupational History     Comment: unemployed   Tobacco Use    Smoking status: Never     Passive exposure: Never    Smokeless tobacco: Never   Vaping Use    Vaping status: Never Used   Substance and Sexual Activity    Alcohol use: Not Currently    Drug use: No    Sexual activity: Yes     Partners: Male     Birth control/protection: Surgical   Other Topics Concern    None   Social History Narrative    None     Social Determinants of Health     Financial Resource Strain: Low Risk  (2/28/2024)    Overall Financial Resource Strain (CARDIA)     Difficulty of Paying Living Expenses: Not hard at all   Recent Concern: Financial Resource Strain - Medium Risk (1/17/2024)    Received from [facility]    Overall Financial Resource Strain (CARDIA)     Difficulty of Paying Living Expenses: Somewhat hard   Food Insecurity: No Food Insecurity (3/13/2024)    Hunger Vital Sign     Worried About Running Out of Food in the Last Year: Never true     Ran Out of Food in the Last Year: Never true   Recent Concern: Food Insecurity - Food Insecurity Present (1/17/2024)    Received from [facility]    Hunger Vital Sign     Worried About Running Out of Food in the Last Year: Sometimes true     Ran Out of Food in the Last Year: Patient declined   Transportation Needs: No Transportation Needs (3/13/2024)    PRAPARE - Transportation     Lack of Transportation (Medical): No     Lack of Transportation (Non-Medical): No   Recent Concern: Transportation Needs - Unmet Transportation Needs (1/17/2024)    Received from [facility]    PRAPARE - Transportation     Lack of Transportation (Medical):  Yes     Lack of Transportation (Non-Medical): Patient declined   Physical Activity: Inactive (11/14/2023)    Received from [facility]    Exercise Vital Sign     Days of Exercise per Week: 0 days     Minutes of Exercise per Session: 0 min   Stress: No Stress Concern Present (11/14/2023)    Received from [facility]    [place] [employer] - Occupational Stress Questionnaire     Feeling of Stress : Only a little   Social Connections: Socially Integrated (11/14/2023)    Received from [facility]    Social Connection and Isolation Panel [NHANES]     Frequency of Communication with Friends and Family: More than three times a week     Frequency of Social Gatherings with Friends and Family: More than three times a week     Attends Orthodoxy Services: More than 4 times per year     Active Member of Clubs or Organizations: Yes     Attends Club or Organization Meetings: More than 4 times per year     Marital Status:    Intimate Partner Violence: Not At Risk (1/17/2024)    Received from [facility]    Humiliation, Afraid, Rape, and Kick questionnaire     Fear of Current or Ex-Partner: No     Emotionally Abused: No     Physically Abused: No     Sexually Abused: No   Housing Stability: Low Risk  (3/13/2024)    Housing Stability Vital Sign     Unable to Pay for Housing in the Last Year: No     Number of Places Lived in the Last Year: 1     Unstable Housing in the Last Year: No     Family History   Problem Relation Age of Onset    Lung cancer Mother     No Known Problems Father     No Known Problems Sister     No Known Problems Daughter     No Known Problems Daughter     Cancer Maternal Grandmother     No Known Problems Maternal Grandfather     No Known Problems Paternal Grandmother     No Known Problems Paternal Grandfather     Uterine cancer Maternal Aunt     No Known Problems Son     No Known Problems Paternal Aunt   "      Meds/Allergies   all current active meds have been reviewed and current meds:   Current Facility-Administered Medications   Medication Dose Route Frequency    acetaminophen (TYLENOL) tablet 650 mg  650 mg Oral Q6H PRN    heparin (porcine) subcutaneous injection 5,000 Units  5,000 Units Subcutaneous Q8H ALEXANDRE    HYDROmorphone (DILAUDID) injection 0.5 mg  0.5 mg Intravenous Q4H PRN    lidocaine (LIDODERM) 5 % patch 2 patch  2 patch Topical Daily    magnesium sulfate 2 g/50 mL IVPB (premix) 2 g  2 g Intravenous Once    metoprolol succinate (TOPROL-XL) 24 hr tablet 50 mg  50 mg Oral Daily    oxyCODONE (ROXICODONE) immediate release tablet 10 mg  10 mg Oral Q4H PRN    senna (SENOKOT) tablet 17.2 mg  17.2 mg Oral HS       Allergies   Allergen Reactions    Penicillins Hives     And cough    Aspirin Rash     Other reaction(s): Palpitations       I have reviewed the patient's controlled substance dispensing history in the Prescription Drug Monitoring Program in compliance with the [facility] regulations before prescribing any controlled substances.   Last fill:   3/13/24: oxyIR 10mg, #120    Objective   /81 (BP Location: Right arm)   Pulse (!) 133   Temp 98 °F (36.7 °C) (Oral)   Resp 18   Ht 5' 5\" (1.651 m)   Wt 55.8 kg (123 lb)   SpO2 99%   BMI 20.47 kg/m²   Physical Exam  Vitals and nursing note reviewed.   Constitutional:       General: She is not in acute distress.     Appearance: She is ill-appearing. She is not toxic-appearing or diaphoretic.      Comments: Lethargic, somewhat confused, dazed look, requires    HENT:      Head: Normocephalic and atraumatic.      Nose: Nose normal.      Mouth/Throat:      Mouth: Mucous membranes are moist.      Pharynx: Oropharynx is clear.   Eyes:      General: No scleral icterus.     Extraocular Movements: Extraocular movements intact.   Cardiovascular:      Rate and Rhythm: Regular rhythm. Tachycardia present.   Pulmonary:      Effort: Pulmonary effort is " "normal. No respiratory distress.      Breath sounds: No stridor.   Abdominal:      General: There is no distension.      Palpations: Abdomen is soft.      Tenderness: There is no abdominal tenderness.   Musculoskeletal:         General: Swelling present.      Cervical back: Normal range of motion.      Right lower leg: Edema present.      Left lower leg: Edema present.      Comments: Anasarca, heavy   Skin:     General: Skin is warm.      Comments: LE weeping   Neurological:      General: No focal deficit present.      Mental Status: She is disoriented.   Psychiatric:      Comments: Unable to assess, flat affect, does not appear to have capacity at time of exam         Lab Results: I have personally reviewed pertinent labs., CBC:   Lab Results   Component Value Date    WBC 9.35 03/25/2024    HGB 8.4 (L) 03/25/2024    HCT 28.0 (L) 03/25/2024    MCV 91 03/25/2024     03/25/2024    RBC 3.09 (L) 03/25/2024    MCH 27.2 03/25/2024    MCHC 30.0 (L) 03/25/2024    RDW 19.0 (H) 03/25/2024    MPV 8.9 03/25/2024   , CMP:   Lab Results   Component Value Date    SODIUM 135 03/25/2024    K 4.4 03/25/2024    CL 96 03/25/2024    CO2 28 03/25/2024    BUN 10 03/25/2024    CREATININE 0.36 (L) 03/25/2024    CALCIUM 8.5 03/25/2024    EGFR 121 03/25/2024   , PT/PTT:No results found for: \"PT\", \"PTT\"  Imaging Studies: I have personally reviewed pertinent reports.    EKG, Pathology, and Other Studies: I have personally reviewed pertinent reports.        Counseling / Coordination of Care  Total floor / unit time spent today 75 minutes. Greater than 50% of total time was spent with the patient and / or family counseling and / or coordination of care. A description of the counseling / coordination of care: current condition, symptom management, goals of care, emotional support.    Jailyn Omalley, DO   "

## 2024-03-25 NOTE — CASE MANAGEMENT
Case Management Assessment & Discharge Planning Note    Patient name Shannan Vee  Location [facility] /E2 -* MRN [id number]  : 1970 Date 3/25/2024       Current Admission Date: 3/22/2024  Current Admission Diagnosis:Recurrent malignant neoplasm of breast (HCC)   Patient Active Problem List    Diagnosis Date Noted    Encounter for home hospice care 2024    Tachycardia 2024    Hyponatremia 2024    Drug induced constipation 2024    Severe protein-calorie malnutrition (HCC) 2024    Chronic bilateral low back pain without sciatica 2024    Cancer associated pain 2024    Goals of care, counseling/discussion 2024    Large pleural effusion 2023    Recurrent malignant neoplasm of breast (HCC) 10/23/2023    Right lower lobe lung mass 10/23/2023    Pain from bone metastases (HCC) 10/23/2023    Other specified anemias 10/23/2023    Bladder wall thickening 10/06/2023    Urinary frequency 2023    Overweight (BMI 25.0-29.9) 2021    Personal history of malignant neoplasm of breast 2019    Use of anastrozole (Arimidex) 2018    Carcinoma of left breast, estrogen receptor positive  2017    Malignant neoplasm of overlapping sites of left female breast (HCC) 2015    Uterine leiomyoma 10/15/2014    Limb swelling 2013      LOS (days): 3  Geometric Mean LOS (GMLOS) (days):   Days to GMLOS:     OBJECTIVE:  PATIENT READMITTED TO HOSPITAL  Risk of Unplanned Readmission Score: 35.16         Current admission status: Inpatient       Preferred Pharmacy:   [id number] [place] - [address]  [address] [facility] [address]  Phone: [phone] Fax: [phone]    Primary Care Provider: Todd Bañuelos MD    Primary Insurance: [employer]  Secondary Insurance:     ASSESSMENT:  Active Health Care Proxies       [facility] - Daughter   Primary Phone: [phone]  (Mobile)                 Advance Directives  Does patient have a Health Care POA?: No  Was patient offered paperwork?: Yes (pt was provided with [place] 5 wishes)  Does patient currently have a Health Care decision maker?: Yes, please see Health Care Proxy section  Does patient have Advance Directives?: No  Was patient offered paperwork?: Yes (pt was provided with [place] 5 wishes)  Primary Contact: Ajit Vee (daughter) [phone]         Readmission Root Cause  30 Day Readmission: Yes  Who directed you to return to the hospital?: Family  Did you understand whom to contact if you had questions or problems?: Yes  Did you get your prescriptions before you left the hospital?: No  Reason:: Not preferred pharmacy  Were you able to get your prescriptions filled when you left the hospital?: Yes  Did you take your medications as prescribed?: Yes  Were you able to get to your follow-up appointments?: Yes  During previous admission, was a post-acute recommendation made?: Yes  What post-acute resources were offered?: [facility] (Pt was set up with [facility] for PT/OT/SN)  Patient was readmitted due to: Recurrent malignany neoplasm of breast. Pt with increased shortness of breath and pain at home.  Action Plan: Admit to medicine. Goals of care conversation has been on-going, both pt and family would like to continue pursuing chemotherapy. Pleurx cath in place due to continuous large pleural effusion. Transfuse for anemia. Palliative consult. YUNIOR for [facility] w/ [facility]    Patient Information  Admitted from:: Home  Mental Status: Alert  During Assessment patient was accompanied by: Daughter, Spouse  Assessment information provided by:: Daughter  Primary Caregiver: Family  Caregiver's Name:: Daughter and son-in-law  Caregiver's Relationship to Patient:: Family Member  Support Systems: Daughter, Family members, Spouse/significant other  County of Residence: [place]  What city do you live in?: [place]  Home entry access options. Select  all that apply.: Stairs  Number of steps to enter home.: 3  Type of Current Residence: 2 story home  Upon entering residence, is there a bedroom on the main floor (no further steps)?: No  A bedroom is located on the following floor levels of residence (select all that apply):: 2nd Floor  Upon entering residence, is there a bathroom on the main floor (no further steps)?: No  Indicate which floors of current residence have a bathroom (select all the apply):: 2nd Floor  Number of steps to 2nd floor from main floor: One Flight  Living Arrangements: Lives w/ Spouse/significant other, Lives w/ Daughter (son-in-law)    Activities of Daily Living Prior to Admission  Functional Status: Independent  Completes ADLs independently?: No  Level of ADL dependence: Assistance (Pt requires assistance with ADLs and IADLs which family helps provide)  Ambulates independently?: No  Level of ambulatory dependence: Assistance  Does patient use assisted devices?: Yes  Assisted Devices (DME) used: Bedside Commode, Walker, Other (Comment) (andrésrycane)  Does patient currently own DME?: Yes  What DME does the patient currently own?: Bedside Commode, Walker, Other (Comment) (hurrycane)  Does patient have a history of Outpatient Therapy (PT/OT)?: No  Does the patient have a history of Short-Term Rehab?: No  Does patient have a history of HHC?: Yes  Does patient currently have HHC?: Yes    Current Home Health Care  Type of Current Home Care Services: Home PT, Home OT, Nurse visit  Current Home Health Agency:: Other (please enter name in comment) ([facility])  Current Home Health Follow-Up Provider:: PCP    Patient Information Continued  Income Source: SSI/SSD  Does patient have prescription coverage?: Yes  Does patient receive dialysis treatments?: No  Does patient have a history of substance abuse?: No  Does patient have a history of Mental Health Diagnosis?: No         Means of Transportation  Means of Transport to Appts:: Family  transport      Social Determinants of Health (SDOH)      Flowsheet Row Most Recent Value   Housing Stability    In the last 12 months, was there a time when you were not able to pay the mortgage or rent on time? N   In the last 12 months, how many places have you lived? 1   In the last 12 months, was there a time when you did not have a steady place to sleep or slept in a shelter (including now)? N   Transportation Needs    In the past 12 months, has lack of transportation kept you from medical appointments or from getting medications? no   In the past 12 months, has lack of transportation kept you from meetings, work, or from getting things needed for daily living? No   Food Insecurity    Within the past 12 months, you worried that your food would run out before you got the money to buy more. Never true   Within the past 12 months, the food you bought just didn't last and you didn't have money to get more. Never true   Utilities    In the past 12 months has the electric, gas, oil, or water company threatened to shut off services in your home? No            DISCHARGE DETAILS:    Discharge planning discussed with:: Family  Freedom of Choice: Yes  Comments - Freedom of Choice: [facility]IOR sent to [facility]   contacted family/caregiver?: Yes  Were Treatment Team discharge recommendations reviewed with patient/caregiver?: Yes  Did patient/caregiver verbalize understanding of patient care needs?: Yes            Requested Home Health Care         Is the patient interested in HHC at discharge?: Yes  Home Health Discipline requested:: Occupational Therapy, Physical Therapy, Nursing  Home Health Agency Name:: [facility]  A External Referral Reason (only applicable if external HHA name selected): Patient has established relationship with provider  Home Health Follow-Up Provider:: PCP  Home Health Services Needed:: Evaluate Functional Status and Safety, Gait/ADL Training, Strengthening/Theraputic Exercises to Improve Function, Oxygen  Via Nasal Cannula  Homebound Criteria Met:: Requires the Assistance of Another Person for Safe Ambulation or to Leave the Home, Uses an Assist Device (i.e. cane, walker, etc)  Supporting Clincal Findings:: Fatigues Easliy in Short Distances, Limited Endurance         Other Referral/Resources/Interventions Provided:  Interventions: [facility]  Referral Comments: YUNIOR sent to [facility]         Treatment Team Recommendation: Home with Home Health Care  Discharge Destination Plan:: Home with Home Health Care  Transport at Discharge : Family

## 2024-03-25 NOTE — PLAN OF CARE
"  Problem: PHYSICAL THERAPY ADULT  Goal: Performs mobility at highest level of function for planned discharge setting.  See evaluation for individualized goals.  Description: Treatment/Interventions: Functional transfer training, LE strengthening/ROM, Elevations, Therapeutic exercise, Endurance training, Patient/family training, Bed mobility, Gait training, Spoke to nursing, OT, Family, Spoke to case management          See flowsheet documentation for full assessment, interventions and recommendations.  Note: Prognosis: Fair  Problem List: Decreased strength, Decreased endurance, Impaired balance, Decreased mobility  Assessment: Pt. 53 y.o.female w/ known metastatic breast CA presented w/ c/o SOB & BLE swelling. Pt admitted for large pleural effusion, tachycardia & hyponatremia . Pt referred to PT for mobility assessment & D/C planning w/ orders of Up and OOB as tolerated . Please see above for information re: home set-up & PLOF as well as objective findings during PT assessment. PTA, pt reports being (A) w/ ADL's and (A) w/ amb w/ hurrycane. On eval, pt functioning below baseline hence will continue skilled PT to improve function & safety. Pt require minAx1 for most functional mobility w/ hurrycame + cues for techniques & safety. Gait deviations as above but no gross LOB noted.  Dec amb tolerance 2* to weakness & fatigue. The patient's AM-PAC Basic Mobility Inpatient Short Form Raw Score is 18. A Raw score of greater than 16 suggests the patient may benefit from discharge to home. Please also refer to the recommendation of the Physical Therapist for safe discharge planning. From PT standpoint, will recommend Level III (minimum resource intensity) rehab services and inc family support at D/C. No SOB & dizziness reported t/o session. Nsg staff most recent vital signs as follows: /81 (BP Location: Right arm)   Pulse (!) 133   Temp 98 °F (36.7 °C) (Oral)   Resp 18   Ht 5' 5\" (1.651 m)   Wt 55.8 kg (123 lb)  "  SpO2 99%   BMI 20.47 kg/m² . At end of session, pt back in bed in stable condition, call bell & phone in reach, all lines intact. Fall precautions reinforced w/ good understanding. CM to follow. Nsg staff to continue to mobilized pt (OOB in chair for all meals & ambulate in room/unit) as tolerated to prevent further decline in function. Will also recommend [facility] for daily amb &/or daily OOB in chair as appropriate. Nsg notified. Co-eval was necessary to complete this PT eval for the pt's best interest given pt's medical acuity & complexity.        Rehab Resource Intensity Level, PT: III (Minimum Resource Intensity) (w/ inc family support & pending progress)    See flowsheet documentation for full assessment.

## 2024-03-25 NOTE — OCCUPATIONAL THERAPY NOTE
Occupational Therapy Evaluation     Patient Name: Shannan Vee  Today's Date: 3/25/2024  Problem List  Principal Problem:    Recurrent malignant neoplasm of breast (HCC)  Active Problems:    Other specified anemias    Large pleural effusion    Goals of care, counseling/discussion    Severe protein-calorie malnutrition (HCC)    Hyponatremia    Tachycardia    Past Medical History  Past Medical History:   Diagnosis Date    Breast cancer (HCC) 07/2013    left-chemotherapy    Fibroma     Goals of care, counseling/discussion     History of chemotherapy     History of external beam radiation therapy      Past Surgical History  Past Surgical History:   Procedure Laterality Date    APPENDECTOMY      BREAST SURGERY  2014    Left breast 2/2 breast CA    HYSTERECTOMY  2015    Due to fibroma    IR BIOPSY BONE  10/9/2023    IR PLEURAL EFFUSION LONG-TERM CATHETER PLACEMENT  1/24/2024    IR THORACENTESIS  11/8/2023    MASTECTOMY Left 2013    OOPHORECTOMY           03/25/24 0945   OT Last Visit   OT Visit Date 03/25/24   Note Type   Note type Evaluation   Pain Assessment   Pain Assessment Tool 0-10   Pain Score No Pain   Restrictions/Precautions   Weight Bearing Precautions Per Order No   Other Precautions Chair Alarm;Bed Alarm;Cognitive;Fall Risk;Multiple lines;O2  (4LO2)   Home Living   Type of Home House   Home Layout Two level;Stairs to enter with rails  (5 [address] w/ rail, mainly stay on 2nd floor)   Bathroom Shower/Tub Walk-in shower   Bathroom Toilet Raised   Bathroom Equipment Commode   Bathroom Accessibility Accessible   Home Equipment Cane;Walker   Additional Comments discussed using BSC as a seat in the shower w/ the patient   Prior Function   Level of [place] Needs assistance with ADLs;Needs assistance with functional mobility;Needs assistance with IADLS   Lives With Daughter;Family  (emily)   Receives Help From Family   IADLs Family/Friend/Other provides transportation;Family/Friend/Other provides  meals;Family/Friend/Other provides medication management   Falls in the last 6 months 0   Vocational Retired   Comments pt reports daughter or family is always supporting her when ambulating w/ SPC   Lifestyle   Autonomy per pt daughter setup-assist UB ADLs, assist LB ADLs, supervision to assist x1 functional transfers and mobility. assist x1 functional mobility w/ SPC, assist w/ IADLs   Reciprocal Relationships daughter and family   Intrinsic Gratification spend time w/ family   ADL   Where Assessed Edge of bed   Eating Assistance 5  Supervision/Setup   Grooming Assistance 4  Minimal Assistance   UB Bathing Assistance 4  Minimal Assistance   LB Bathing Assistance 3  Moderate Assistance   UB Dressing Assistance 4  Minimal Assistance   LB Dressing Assistance 3  Moderate Assistance   Toileting Assistance  4  Minimal Assistance   Toileting Deficit Setup;Steadying;Verbal cueing;Supervison/safety;Increased time to complete   Bed Mobility   Sit to Supine 4  Minimal assistance   Additional items Assist x 1;Increased time required;Verbal cues;Bedrails;HOB elevated   Additional Comments on BSC upon arrival   Transfers   Sit to Stand 4  Minimal assistance   Additional items Assist x 1;Increased time required;Verbal cues;Bedrails   Stand to Sit 4  Minimal assistance   Additional items Assist x 1;Increased time required;Verbal cues;Armrests   Toilet transfer 4  Minimal assistance   Additional items Assist x 1;Increased time required;Verbal cues;Commode   Additional Comments VCs for positioning and safety   Functional Mobility   Functional Mobility 4  Minimal assistance   Additional Comments assist x1 w/ SPC and increased time in homelike environment w/ assist O2 line management and SPO2 on 4LO2 97/98%   Additional items SPC   Balance   Static Sitting Fair +   Dynamic Sitting Fair   Static Standing Fair -   Dynamic Standing Poor +   Ambulatory Poor +   Activity Tolerance   Activity Tolerance Patient limited by fatigue;Treatment  limited secondary to medical complications (Comment)   Medical Staff Made Aware PT Karel: Pt seen for co-session with skilled Physical therapist 2* clinically unstable presentation, medical complexity, new precautions, performance deficits/functional limitations, impaired cognition/safety awareness, limited activity tolerance and present impairments which are a regression from patient patient's baseline and impacting overall occupational performance   Nurse Made Aware appropriate to see per Ailyn LAZARO Assessment   RUE Assessment WFL  (grossly 3+/5, MMT not formally tested due to CA)   LUE Assessment   LUE Assessment WFL  (grossly 3+/5, MMT not formally tested due to CA)   Hand Function   Gross Motor Coordination Functional   Fine Motor Coordination Functional   Sensation   Light Touch No apparent deficits   Proprioception   Proprioception No apparent deficits   Vision-Basic Assessment   Current Vision No visual deficits   Vision - Complex Assessment   Ocular Range of Motion Intact   Perception   Inattention/Neglect Appears intact   Cognition   Overall Cognitive Status WFL   Arousal/Participation Alert;Responsive;Cooperative   Attention Attends with cues to redirect   Orientation Level Oriented to person;Oriented to place;Oriented to time   Memory Decreased recall of precautions   Following Commands Follows one step commands with increased time or repetition   Comments flat affect, daughter prefers to translate as pt primarily Occitan speaking   Assessment   Limitation Decreased ADL status;Decreased Safe judgement during ADL;Decreased UE strength;Decreased cognition;Decreased endurance;Decreased high-level ADLs;Decreased self-care trans;Decreased fine motor control   Prognosis Good;Fair   Assessment Pt is a 53 y.o. female seen for OT evaluation s/p admit to [facility] on 3/22/2024 w/ Recurrent malignant neoplasm of breast (HCC).  Pt currently on 4 LO2 and no O2 use baseline. Comorbidities affecting pt's functional  "performance at time of assessment include:hyponatremia, large pleural effusion, anemia, hyponatremia. Personal factors affecting pt at time of IE include:steps to enter environment, difficulty performing ADLS, difficulty performing IADLS , flat affect, and decreased initiation and engagement . Prior to admission, pt was living w/ daughter and family and reports: per pt daughter setup-assist UB ADLs, assist LB ADLs, supervision to assist x1 functional transfers and mobility. assist x1 functional mobility w/ SPC, assist w/ IADLs. Upon evaluation: Pt requires MIN assist grooming, MIN assist UB ADLs, MOD assist LB ADLs, MIN assist toileting, MIN assist functional transfers from bed and BSC, MIN assist x1 functional mobility w/ SPC and assist line management 2* the following deficits impacting occupational performance: decreased strength and endurance, impaired balance, impaired functional reach, edema b/l LEs, increased pain, fall risk, flat affect. Pt to benefit from continued skilled OT tx while in the hospital to address deficits as defined above and maximize level of functional independence w ADL's and functional mobility. Occupational Performance areas to address include: grooming, bathing/shower, toilet hygiene, dressing, health maintenance, and functional mobility. From OT standpoint, recommendation at time of d/c would be level III minimal resources.   The patient's raw score on the AM-PAC Daily Activity Inpatient Short Form is 18. A raw score of less than 19 suggests the patient may benefit from discharge to post-acute rehabilitation services, however pt w/ assist at baseline. Please refer to the recommendation of the Occupational Therapist for safe discharge planning.   Goals   Patient Goals \"to rest'\"   LTG Time Frame 10-14   Long Term Goal please see below goals   Discharge Recommendation   Rehab Resource Intensity Level, OT III (Minimum Resource Intensity)   Equipment Recommended Shower/Tub chair with back " ($)   AM-PAC Daily Activity Inpatient   Lower Body Dressing 2   Bathing 3   Toileting 3   Upper Body Dressing 3   Grooming 3   Eating 4   Daily Activity Raw Score 18   Daily Activity Standardized Score (Calc for Raw Score >=11) 38.66   AM-PAC Applied Cognition Inpatient   Following a Speech/Presentation 4   Understanding Ordinary Conversation 4   Taking Medications 3   Remembering Where Things Are Placed or Put Away 3   Remembering List of 4-5 Errands 3   Taking Care of Complicated Tasks 3   Applied Cognition Raw Score 20   Applied Cognition Standardized Score 41.76   Modified [place] Scale   Modified [place] Scale 4   End of Consult   Education Provided Yes;Family or social support of family present for education by provider   Patient Position at End of Consult Supine;Bed/Chair alarm activated;All needs within reach   Nurse Communication Nurse aware of consult   End of Consult Comments educated family on EC techniques and safety at home and use of seat or BSC for shower, family receptive     Occupational Therapy Goals to be met in 7-10 days:  1) Pt will improve activity tolerance to G for 30 min txment sessions to enhance ADLs  2) Pt will complete UB ADLs/self care w/ setup and min assist LB ADLs w/ LHAE prn  3) Pt will complete toileting w/ supervision w/ G hygiene/thoroughness using DME PRN  4) Pt will improve functional transfers on/off all surfaces using DME PRN w/ G balance/safety including toileting w/ supervision  5) Pt will improve fx'l mobility during I/ADl/leisure tasks using DME PRN w/ g balance/safety w/ supervision  6) Pt will engage in ongoing cognitive assessment w/ G participation to A w/ safe d/c planning/recommendations  7) Pt will demonstrate G carryover of pt/caregiver education and training as appropriate w/ mod I  w/ G tolerance  8) Pt will engage in depression screen/leisure interest checklist w/ G participation to monitor s/s depression and ID 3 positive coping strategies to A w/ emotional  regulation and management  9) Pt will demonstrate 100% carryover of E.C. techniques w/ mod I t/o fx'l I/ADL/leisure tasks w/o cues s/p skilled education  10) Pt will demonstrate improved bed mobility to supervision to enhance ADLs  11) Pt will engage in activity configuration activity w/ G participation and mod I to increase time management skills and improve participation in a structured routine to improve overall quality of life  12) Pt will demonstrate 100% carryover of LHAE for LB ADLs/self care and leisure s/p skilled education w/ mod I and G participation  13) Pt will demonstrate improved standing tolerance to 3-5 minutes during functional tasks w/ no LOB to enhance ADL performance  14) Pt will demonstrate improved b/l UE strength by 1 MMT grade to enhance ADLS and functional transfers  Documentation completed by: Berta Encinas MS, OTR/L

## 2024-03-25 NOTE — ASSESSMENT & PLAN NOTE
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

## 2024-03-25 NOTE — ASSESSMENT & PLAN NOTE
Malignant pleural effusion in a patient with a history of metastatic breast cancer s/p Pleurx Catheter placement.  Evaluated by IR.  Relatively minor contribution from loculated pleural effusion discontinue.  Discontinue extensive right thoracic metastasis/carcinomatosis primarily responsible for mass effect on adjacent lung and majority of the opacification of right hemithorax.  Individual aspiration unlikely to provide symptomatic benefit.  IR procedures not likely to provide any benefit at this point.

## 2024-03-25 NOTE — ASSESSMENT & PLAN NOTE
Metastatic breast cancer. Hormone receptor positive and HER2 negative diagnosed 2013. Prior history of neoadjuvant chemotherapy followed by mastectomy. She is s/p Hysterectomy and oophorectomy. 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 Completed radiation at [facility].    Recent CT-scan showing extensive right thoracic pleural carcinomatosis, Extensive associated mass effect.     Goals of care conducted on admission, initially wanted comfort measures, but decided to pursue further treatment.   Appreciate oncology recommendations. She was offered therapy by Dr. Hearn and was amenable to it. Oncology will assist in arranging her follow-up for chemotherapy initiation as long as her ECOG status does not preclude this.  PT/OT- minimum resource intensity   Palliative care consulted for symptom management   Readdressed poor prognosis with family at the bedside. Wish to pursue treatment if possible.  Will plan to discharge home tomorrow with home oxygen

## 2024-03-25 NOTE — ASSESSMENT & PLAN NOTE
Goals of care done by previous provider, initially considered hospice, but reconsidered.  Family wishes to pursue chemotherapy if possible  I discussed today that patient has a quite poor prognosis and will unlikely be able to tolerate chemotherapy. Will plan to get her home in the morning so that she can see her primary doctors and discuss management moving forward.  If chemotherapy cannot be offered or she cannot tolerate, recommendation would be for hospice.

## 2024-03-25 NOTE — PROGRESS NOTES
[facility]  Progress Note  Name: Shannan Vee I  MRN: [id number]  Unit/Bed#: E2 -01 I Date of Admission: 3/22/2024   Date of Service: 3/25/2024 I Hospital Day: 3    Assessment/Plan   * Recurrent malignant neoplasm of breast (HCC)  Assessment & Plan  Metastatic breast cancer. Hormone receptor positive and HER2 negative diagnosed 2013. Prior history of neoadjuvant chemotherapy followed by mastectomy. She is s/p Hysterectomy and oophorectomy. 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 Completed radiation at [facility].    Recent CT-scan showing extensive right thoracic pleural carcinomatosis, Extensive associated mass effect.     Goals of care conducted on admission, initially wanted comfort measures, but decided to pursue further treatment.   Appreciate oncology recommendations. She was offered therapy by Dr. Hearn and was amenable to it. Oncology will assist in arranging her follow-up for chemotherapy initiation as long as her ECOG status does not preclude this.  PT/OT- minimum resource intensity   Palliative care consulted for symptom management   Readdressed poor prognosis with family at the bedside. Wish to pursue treatment if possible.  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       Goals of care,  counseling/discussion  Assessment & Plan  Goals of care done by previous provider, initially considered hospice, but reconsidered.  Family wishes to pursue chemotherapy if possible  I discussed today that patient has a quite poor prognosis and will unlikely be able to tolerate chemotherapy. Will plan to get her home in the morning so that she can see her primary doctors and discuss management moving forward.  If chemotherapy cannot be offered or she cannot tolerate, recommendation would be for hospice.    Large pleural effusion  Assessment & Plan  Malignant pleural effusion in a patient with a history of metastatic breast cancer s/p Pleurx Catheter placement.  Evaluated by IR.  Relatively minor contribution from loculated pleural effusion discontinue.  Discontinue extensive right thoracic metastasis/carcinomatosis primarily responsible for mass effect on adjacent lung and majority of the opacification of right hemithorax.  Individual aspiration unlikely to provide symptomatic benefit.  IR procedures not likely to provide any benefit at this point.    Other specified anemias  Assessment & Plan  S/p transfusion with appropriate increase             VTE Pharmacologic Prophylaxis: heparin     Patient Centered Rounds:  Patient care rounds were performed with nursing    Discussions with Specialists or Other Care Team Provider: Palliative     Education and Discussions with Family / Patient: Updated sister on the phone    Time Spent for Care: I have spent a total time of 45 minutes on 03/25/24 in caring for this patient including Diagnostic results, Patient and family education, Importance of tx compliance, Impressions, Documenting in the medical record, Reviewing / ordering tests, medicine, procedures  , Obtaining or reviewing history  , and Communicating with other healthcare professionals .      Current Length of Stay: 3 day(s)    Current Patient Status: Inpatient   Certification Statement: The patient will continue to  require additional inpatient hospital stay due to management of hypoxic respiratory failure     Discharge Plan: Home tomorrow with 02 if remains stable     Code Status: Level 1 - Full Code      Subjective:   Patient seen and evaluated at bedside. Continues to have back pain.     Objective:     Vitals:   Temp (24hrs), Av.8 °F (36.6 °C), Min:97.3 °F (36.3 °C), Max:98.1 °F (36.7 °C)    Temp:  [97.3 °F (36.3 °C)-98.1 °F (36.7 °C)] 98 °F (36.7 °C)  HR:  [118-133] 118  Resp:  [16-20] 18  BP: (105-120)/(63-81) 105/74  SpO2:  [98 %-100 %] 100 %  Body mass index is 20.47 kg/m².     Input and Output Summary (last 24 hours):       Intake/Output Summary (Last 24 hours) at 3/25/2024 1715  Last data filed at 3/25/2024 1158  Gross per 24 hour   Intake 519.17 ml   Output --   Net 519.17 ml       Physical Exam:     Physical Exam  Vitals reviewed.   Constitutional:       General: She is not in acute distress.     Appearance: She is well-developed. She is ill-appearing and toxic-appearing. She is not diaphoretic.   HENT:      Head: Normocephalic and atraumatic.      Mouth/Throat:      Mouth: Mucous membranes are moist.   Eyes:      General: No scleral icterus.     Extraocular Movements: Extraocular movements intact.   Cardiovascular:      Rate and Rhythm: Regular rhythm. Tachycardia present.      Heart sounds: Normal heart sounds.   Pulmonary:      Effort: Pulmonary effort is normal.      Comments: Decreased breath sounds on the right   Abdominal:      General: There is no distension.      Palpations: Abdomen is soft.      Tenderness: There is no abdominal tenderness. There is no guarding or rebound.   Musculoskeletal:         General: No swelling, tenderness or deformity.   Skin:     General: Skin is warm and dry.   Neurological:      General: No focal deficit present.      Mental Status: She is alert.   Psychiatric:         Mood and Affect: Mood normal.         Behavior: Behavior normal.         Additional Data:     Labs: I have  reviewed pertinent results     Results from last 7 days   Lab Units 03/25/24  0506 03/23/24  1609 03/22/24  1524 03/20/24  2032   WBC Thousand/uL 9.35   < > 8.36 8.76   HEMOGLOBIN g/dL 8.4*   < > 8.4* 7.8*   HEMATOCRIT % 28.0*   < > 27.7* 25.7*   PLATELETS Thousands/uL 337   < > 330 304   BANDS PCT %  --   --   --  1   NEUTROS PCT %  --   --  79*  --    LYMPHS PCT %  --   --  10*  --    LYMPHO PCT %  --   --   --  12*   MONOS PCT %  --   --  9  --    MONO PCT %  --   --   --  9   EOS PCT %  --   --  0 0    < > = values in this interval not displayed.     Results from last 7 days   Lab Units 03/25/24  0506 03/24/24  0518   SODIUM mmol/L 135 132*   POTASSIUM mmol/L 4.4 4.3   CHLORIDE mmol/L 96 95*   CO2 mmol/L 28 28   BUN mg/dL 10 10   CREATININE mg/dL 0.36* 0.42*   ANION GAP mmol/L 11 9   CALCIUM mg/dL 8.5 8.4   ALBUMIN g/dL  --  2.7*   TOTAL BILIRUBIN mg/dL  --  0.35   ALK PHOS U/L  --  125*   ALT U/L  --  17   AST U/L  --  92*   GLUCOSE RANDOM mg/dL 94 91     Results from last 7 days   Lab Units 03/22/24  1524   INR  1.03             Results from last 7 days   Lab Units 03/22/24  1524   LACTIC ACID mmol/L 1.5   PROCALCITONIN ng/ml 0.50*         Imaging: I have reviewed pertinent imaging       Recent Cultures (last 7 days):     Results from last 7 days   Lab Units 03/22/24  1531   URINE CULTURE  <10,000 cfu/ml       Last 24 Hours Medication List:   Current Facility-Administered Medications   Medication Dose Route Frequency Provider Last Rate    acetaminophen  650 mg Oral Q8H ALEXANDRE Omalley DO      heparin (porcine)  5,000 Units Subcutaneous Q8H ALEXANDRE Scruggs PA-C      HYDROmorphone  0.5 mg Intravenous Q4H PRN Gibson George MD      lidocaine  3 patch Topical Daily Donnelle Lisa [place], DO      metoprolol succinate  50 mg Oral BID Julius Sanders, DO      oxyCODONE  10 mg Oral Q4H PRN Gibson George MD      polyethylene glycol  17 g Oral Daily PRN Jailyn Omalley,        senna  2 tablet Oral HS Jailyn Omalley DO          Today, Patient Was Seen By: Julius Sanders DO    ** Please Note: Dictation voice to text software may have been used in the creation of this document. **

## 2024-03-25 NOTE — PLAN OF CARE
Problem: OCCUPATIONAL THERAPY ADULT  Goal: Performs self-care activities at highest level of function for planned discharge setting.  See evaluation for individualized goals.  Description:    Equipment Recommended: Shower/Tub chair with back ($)       See flowsheet documentation for full assessment, interventions and recommendations.   Note: Limitation: Decreased ADL status, Decreased Safe judgement during ADL, Decreased UE strength, Decreased cognition, Decreased endurance, Decreased high-level ADLs, Decreased self-care trans, Decreased fine motor control  Prognosis: Good, Fair  Assessment: Pt is a 53 y.o. female seen for OT evaluation s/p admit to [facility] on 3/22/2024 w/ Recurrent malignant neoplasm of breast (HCC).  Pt currently on 4 LO2 and no O2 use baseline. Comorbidities affecting pt's functional performance at time of assessment include:hyponatremia, large pleural effusion, anemia, hyponatremia. Personal factors affecting pt at time of IE include:steps to enter environment, difficulty performing ADLS, difficulty performing IADLS , flat affect, and decreased initiation and engagement . Prior to admission, pt was living w/ daughter and family and reports: per pt daughter setup-assist UB ADLs, assist LB ADLs, supervision to assist x1 functional transfers and mobility. assist x1 functional mobility w/ SPC, assist w/ IADLs. Upon evaluation: Pt requires MIN assist grooming, MIN assist UB ADLs, MOD assist LB ADLs, MIN assist toileting, MIN assist functional transfers from bed and BSC, MIN assist x1 functional mobility w/ SPC and assist line management 2* the following deficits impacting occupational performance: decreased strength and endurance, impaired balance, impaired functional reach, edema b/l LEs, increased pain, fall risk, flat affect. Pt to benefit from continued skilled OT tx while in the hospital to address deficits as defined above and maximize level of functional independence w ADL's and functional  mobility. Occupational Performance areas to address include: grooming, bathing/shower, toilet hygiene, dressing, health maintenance, and functional mobility. From OT standpoint, recommendation at time of d/c would be level III minimal resources.   The patient's raw score on the -PAC Daily Activity Inpatient Short Form is 18. A raw score of less than 19 suggests the patient may benefit from discharge to post-acute rehabilitation services, however pt w/ assist at baseline. Please refer to the recommendation of the Occupational Therapist for safe discharge planning.     Rehab Resource Intensity Level, OT: III (Minimum Resource Intensity)

## 2024-03-25 NOTE — PLAN OF CARE
Problem: METABOLIC, FLUID AND ELECTROLYTES - ADULT  Goal: Electrolytes maintained within normal limits  Description: INTERVENTIONS:  - Monitor labs and assess patient for signs and symptoms of electrolyte imbalances  - Administer electrolyte replacement as ordered  - Monitor response to electrolyte replacements, including repeat lab results as appropriate  - Instruct patient on fluid and nutrition as appropriate  Outcome: Progressing  Goal: Fluid balance maintained  Description: INTERVENTIONS:  - Monitor labs   - Monitor I/O and WT  - Instruct patient on fluid and nutrition as appropriate  - Assess for signs & symptoms of volume excess or deficit  Outcome: Progressing     Problem: PAIN - ADULT  Goal: Verbalizes/displays adequate comfort level or baseline comfort level  Description: Interventions:  - Encourage patient to monitor pain and request assistance  - Assess pain using appropriate pain scale  - Administer analgesics based on type and severity of pain and evaluate response  - Implement non-pharmacological measures as appropriate and evaluate response  - Consider cultural and social influences on pain and pain management  - Notify physician/advanced practitioner if interventions unsuccessful or patient reports new pain  Outcome: Progressing     Problem: RESPIRATORY - ADULT  Goal: Achieves optimal ventilation and oxygenation  Description: INTERVENTIONS:  - Assess for changes in respiratory status  - Assess for changes in mentation and behavior  - Position to facilitate oxygenation and minimize respiratory effort  - Oxygen administered by appropriate delivery if ordered  - Initiate smoking cessation education as indicated  - Encourage broncho-pulmonary hygiene including cough, deep breathe, Incentive Spirometry  - Assess the need for suctioning and aspirate as needed  - Assess and instruct to report SOB or any respiratory difficulty  - Respiratory Therapy support as indicated  Outcome: Progressing     Problem:  [facility]/Hospital: [facility]  [address] [facility]    Psychiatric Progress Note  MRN#: [id number]  Rula Mcnamara 13 y o  male    This Patient was seen in the office today at [facility] location  Recent Visits  No visits were found meeting these conditions  Showing recent visits within past 7 days and meeting all other requirements  Today's Visits  Date Type Provider Dept   06/21/23 Office Visit Cinthia Henry, [address] [address] today's visits and meeting all other requirements  Future Appointments  No visits were found meeting these conditions  Showing future appointments within next 150 days and meeting all other requirements       Reason for visit is   Chief Complaint   Patient presents with   • Follow-up   • Medication Management       SUBJECTIVE:    Subjective:  Medication compliance: Yes  Medication side effects:none    Attention, focus, and impulsivity stable for completion of school, completion of homework, and wresting  Continues to enjoy hobbies and activities (wresting; club and school)   Looking forward to [employer] camps and tournaments   Social with friends when able -- recent sleepover  Sleep stable   Appetite previously stable; change with recent invisalign -- overall weight stable  Patient denies significant worries or sadness   No significant reactivity, mood variability, or aggression noted       collateral from guardian confirms stable adhd symptoms   Variable social conflicts --- navigating middle school social dynamics; some negative behavior towards patient by school peers    Current friend group (through wrestling) attend different middle school within district     Engaged in outpatient psychotherapy at [facility]     Review Of Systems:  ROS: no complaints    Past Medical History:   Patient Active Problem List   Diagnosis   • ADHD (attention deficit hyperactivity disorder) Nutrition/Hydration-ADULT  Goal: Nutrient/Hydration intake appropriate for improving, restoring or maintaining nutritional needs  Description: Monitor and assess patient's nutrition/hydration status for malnutrition. Collaborate with interdisciplinary team and initiate plan and interventions as ordered.  Monitor patient's weight and dietary intake as ordered or per policy. Utilize nutrition screening tool and intervene as necessary. Determine patient's food preferences and provide high-protein, high-caloric foods as appropriate.     INTERVENTIONS:  - Monitor oral intake, urinary output, labs, and treatment plans  - Assess nutrition and hydration status and recommend course of action  - Evaluate amount of meals eaten  - Assist patient with eating if necessary   - Allow adequate time for meals  - Recommend/ encourage appropriate diets, oral nutritional supplements, and vitamin/mineral supplements  - Order, calculate, and assess calorie counts as needed  - Recommend, monitor, and adjust tube feedings and TPN/PPN based on assessed needs  - Assess need for intravenous fluids  - Provide specific nutrition/hydration education as appropriate  - Include patient/family/caregiver in decisions related to nutrition  Outcome: Progressing     Problem: DISCHARGE PLANNING  Goal: Discharge to home or other facility with appropriate resources  Description: INTERVENTIONS:  - Identify barriers to discharge w/patient and caregiver  - Arrange for needed discharge resources and transportation as appropriate  - Identify discharge learning needs (meds, wound care, etc.)  - Arrange for interpretive services to assist at discharge as needed  - Refer to Case Management Department for coordinating discharge planning if the patient needs post-hospital services based on physician/advanced practitioner order or complex needs related to functional status, cognitive ability, or social support system  Outcome: Progressing      • Depression   • Anxiety       Allergies: No Known Allergies    Medications:  Current Outpatient Medications on File Prior to Visit   Medication Sig   • FLUoxetine (PROzac) 10 mg capsule Take 1 capsule (10 mg total) by mouth daily   • lisdexamfetamine (Vyvanse) 30 MG capsule Take 1 capsule (30 mg total) by mouth every morning Max Daily Amount: 30 mg Do not start before May 9, 2023  • lisdexamfetamine (Vyvanse) 30 MG capsule Take 1 capsule (30 mg total) by mouth every morning Max Daily Amount: 30 mg Do not start before April 12, 2023  • lisdexamfetamine (Vyvanse) 30 MG capsule Take 1 capsule (30 mg total) by mouth every morning Max Daily Amount: 30 mg       Current Outpatient Medications   Medication Sig Dispense Refill   • FLUoxetine (PROzac) 10 mg capsule Take 1 capsule (10 mg total) by mouth daily 90 capsule 0   • lisdexamfetamine (Vyvanse) 30 MG capsule Take 1 capsule (30 mg total) by mouth every morning Max Daily Amount: 30 mg Do not start before May 9, 2023  30 capsule 0   • lisdexamfetamine (Vyvanse) 30 MG capsule Take 1 capsule (30 mg total) by mouth every morning Max Daily Amount: 30 mg Do not start before April 12, 2023  30 capsule 0   • lisdexamfetamine (Vyvanse) 30 MG capsule Take 1 capsule (30 mg total) by mouth every morning Max Daily Amount: 30 mg 30 capsule 0     No current facility-administered medications for this visit  Past Surgical History: No past surgical history on file  Pertinent Past Psychiatric History:   Failed fluoxetine discontinuation trial spring 2022 with restart of fluoxetine at 06/1/2022 appointment     FHx significant for depression/anxiety, ADHD, and substance use disorders      Social History:   Housing: stable housing with mom, dad, younger sister (6y) , and dog     Mom =  for [employer]      Education:   [employer] 8th grade ([id number])   504 plan    improved social interactions and participation in class   Slight difficulties "noted in BE re:comprehension and inferences -- grades remain good  Substance Abuse History: denies       The following portions of the patient's history were reviewed and updated as appropriate: allergies, current medications, past family history, past medical history, past social history, past surgical history and problem list     OBJECTIVE:     Mental status:  Appearance sitting comfortably in chair, dressed in casual clothing, adequate hygiene and grooming, cooperative with interview (improved vs previously)  good eye contact   Mood \"ok\"   Affect Appears generally euthymic, stable, mood-congruent     Speech Normal rate, rhythm, and volume   Thought Processes Linear and goal directed   Associations intact associations   Hallucinations Denies any auditory or visual hallucinations   Thought Content No passive or active suicidal or homicidal ideation, intent, or plan  Orientation Oriented to person, place, time, and situation   Recent and Remote Memory Grossly intact   Attention Span and Concentration Concentration intact   Intellect Appears to be of Average to above average  Intelligence   Insight Insight intact   Judgement judgment was intact   Muscle Strength Muscle strength and tone were normal   Language Within normal limits   Fund of Knowledge Age appropriate     Labs: NA     Assessment/Plan:     Impression:  attention deficit and hyperactivity disorder, inattentive type - stable with medication in place   Unspecified depressive disorder - stable   Generalized anxiety disorder - stable     1  Counseled patient and family to continue vyvanse 30mg qAM due to effective control of attention and focus for the school setting  Monitor with transition to new school year     2  Counseled patient and family to continue prozac 10mg daily due to stable mood symptoms      3   Continue outpatient psychotherapy     4, Reassurance and supportive psychotherapy provided     The clinical diagnosis, course and prognosis " were explained to the patient and guardian  Discussed with patient and guardian the clinical indications, interactions, benefits, the most common and serious side effects of all current medications  The alternative treatment options were discussed  The importance of continuing psychotherapy was discussed  The patient and guardian were receptive and appeared to understand the information provided  Patient and guardian's concerns and questions were addressed to their satisfaction during the appointment       Controlled Medication Discussion: The patient has been filling controlled prescriptions on time as prescribed to [facility] program   consistent with some missed doses on non school days     Follow-up 3m with transition to new prescriber at [facility] due to provider resignation     Treatment Plan:  Completed and signed at current appointment - No   Completed by [facility] psychotherapist      Visit Time    Visit Start Time: 9623  Visit Stop Time: 4186  Total Visit Duration: 33 minutes

## 2024-03-25 NOTE — PHYSICAL THERAPY NOTE
PT EVALUATION    Pt. Name: Shannan Vee  Pt. Age: 53 y.o.  MRN: [id number]  LENGTH OF STAY: 3      Admitting Diagnoses:   UTI (urinary tract infection) [N39.0]  Lower extremity edema [R60.0]  Chest pain [R07.9]  Dyspnea [R06.00]  SOB (shortness of breath) [R06.02]  Metastatic disease (HCC) [C79.9]  Large pleural effusion [J90]    Past Medical History:   Diagnosis Date    Breast cancer (HCC) 07/2013    left-chemotherapy    Fibroma     Goals of care, counseling/discussion     History of chemotherapy     History of external beam radiation therapy        Past Surgical History:   Procedure Laterality Date    APPENDECTOMY      BREAST SURGERY  2014    Left breast 2/2 breast CA    HYSTERECTOMY  2015    Due to fibroma    IR BIOPSY BONE  10/9/2023    IR PLEURAL EFFUSION LONG-TERM CATHETER PLACEMENT  1/24/2024    IR THORACENTESIS  11/8/2023    MASTECTOMY Left 2013    OOPHORECTOMY         Imaging Studies:  CT pe study w abdomen pelvis w contrast   Final Result by Ruthie Bose MD (03/22 9250)      No pulmonary emboli. No new pulmonary infiltrates or consolidation.      Extensive right thoracic pleural carcinomatosis without significant interval change compared to the recent prior. Extensive associated mass effect as above. Vasculature remains patent though mildly compressed and displaced.      Caudal displacement of the right hemidiaphragm, with mass effect on the liver. Not significantly changed.      Mild ascites.      Third spacing of fluid as above.      Extensive metastatic disease without significant change compared to recent.                     Workstation performed: [id number]         XR chest 1 view portable   ED Interpretation by Aracely Agarwal DO (03/22 8371)   Compared to old x-rays no significant change was noted near complete opacification of right lung      Final Result by Reina López MD (03/23 7607)      Right chest tube with extensive opacification of the right hemithorax  due to pleural tumor, effusion, and atelectasis. See subsequent chest CT.      Bone metastases.            Workstation performed: [id number]               03/25/24 0926   PT Last Visit   PT Visit Date 03/25/24   Note Type   Note type Evaluation   Pain Assessment   Pain Score No Pain   Restrictions/Precautions   Weight Bearing Precautions Per Order No   Other Precautions Chair Alarm;Bed Alarm;Multiple lines;O2;Fall Risk  (currently on 4L O2 NC; (+) R pleurix drain)   Home Living   Type of Home House   Home Layout Two level;Stairs to enter with rails  (3STE; FOS to 2nd floor bed/bath)   Bathroom Shower/Tub Walk-in shower   Bathroom Toilet Raised   Bathroom Equipment Commode   Home Equipment Walker;Cane  (hurrycane)   Additional Comments pt not on home O2 at baseline   Prior Function   Level of [place] Needs assistance with ADLs;Needs assistance with functional mobility;Needs assistance with IADLS  (w/ hurrycane)   Lives With Daughter;Other (Comment)  (LAUREN & RIA)   Receives Help From Family   Falls in the last 6 months 0   Vocational Retired   Comments Pt's daughter reports that pt is never alone between daughter, LAUREN, RIA & ex-. Family provides assistance for overall functional mobility, ADLs & IADLs at baseline.   General   Additional Pertinent History metastatic breast CA   Family/Caregiver Present Yes  (daughter)   Cognition   Overall Cognitive Status WFL   Arousal/Participation Alert   Attention Attends with cues to redirect   Orientation Level Oriented to person;Oriented to place;Oriented to time   Following Commands Follows one step commands with increased time or repetition   Comments cooperative; pt Greenlandic speaking only; daughter able to provide translation   Subjective   Subjective Pt agreeable to PT/OT evals.   RUE Assessment   RUE Assessment   (refer to OT)   LUE Assessment   LUE Assessment   (refer to OT)   RLE Assessment   RLE Assessment WFL  (3+/5 grossly; (+) pitting edema)   LLE Assessment    LLE Assessment WFL  (3+/5 grossly; (+) pitting edema)   Coordination   Movements are Fluid and Coordinated 1   Sensation WFL   Bed Mobility   Supine to Sit Unable to assess  (pt OOB on BSC upon my arrival)   Sit to Supine 4  Minimal assistance   Additional items Assist x 1;Increased time required;Verbal cues;LE management   Additional Comments cues for techniques & safety   Transfers   Sit to Stand 4  Minimal assistance   Additional items Assist x 1;Increased time required;Verbal cues;Armrests   Stand to Sit 4  Minimal assistance   Additional items Assist x 1;Bedrails;Increased time required;Verbal cues   Toilet transfer 4  Minimal assistance   Additional items Assist x 1;Armrests;Increased time required;Verbal cues;Commode   Additional Comments cues for hand placement & safety   Ambulation/Elevation   Gait pattern Wide ROSIE;Decreased foot clearance;Short stride;Excessively slow   Gait Assistance 4  Minimal assist   Additional items Assist x 1;Verbal cues;Tactile cues   Assistive Device Straight cane   Distance 20'x1  (limited to lines)   Ambulation/Elevation Additional Comments no gross LOB noted   Balance   Static Sitting Fair +   Dynamic Sitting Fair   Static Standing Fair -  (w/ SPC)   Dynamic Standing Poor +  (w/ SPC)   Ambulatory Poor +  (w/ SPC)   Activity Tolerance   Activity Tolerance Patient limited by fatigue;Treatment limited secondary to medical complications (Comment)   Medical Staff Made Aware OTR Berta   Nurse Made Aware yes, Gisela   Assessment   Prognosis Fair   Problem List Decreased strength;Decreased endurance;Impaired balance;Decreased mobility   Assessment Pt. 53 y.o.female w/ known metastatic breast CA presented w/ c/o SOB & BLE swelling. Pt admitted for large pleural effusion, tachycardia & hyponatremia . Pt referred to PT for mobility assessment & D/C planning w/ orders of Up and OOB as tolerated . Please see above for information re: home set-up & PLOF as well as objective findings  "during PT assessment. PTA, pt reports being (A) w/ ADL's and (A) w/ amb w/ hurrycane. On eval, pt functioning below baseline hence will continue skilled PT to improve function & safety. Pt require minAx1 for most functional mobility w/ hurrycame + cues for techniques & safety. Gait deviations as above but no gross LOB noted.  Dec amb tolerance 2* to weakness & fatigue. The patient's AM-PAC Basic Mobility Inpatient Short Form Raw Score is 18. A Raw score of greater than 16 suggests the patient may benefit from discharge to home. Please also refer to the recommendation of the Physical Therapist for safe discharge planning. From PT standpoint, will recommend Level III (minimum resource intensity) rehab services and inc family support at D/C. No SOB & dizziness reported t/o session. Nsg staff most recent vital signs as follows: /81 (BP Location: Right arm)   Pulse (!) 133   Temp 98 °F (36.7 °C) (Oral)   Resp 18   Ht 5' 5\" (1.651 m)   Wt 55.8 kg (123 lb)   SpO2 99%   BMI 20.47 kg/m² . At end of session, pt back in bed in stable condition, call bell & phone in reach, all lines intact. Fall precautions reinforced w/ good understanding. CM to follow. Nsg staff to continue to mobilized pt (OOB in chair for all meals & ambulate in room/unit) as tolerated to prevent further decline in function. Will also recommend Restorative for daily amb &/or daily OOB in chair as appropriate. Nsg notified. Co-eval was necessary to complete this PT eval for the pt's best interest given pt's medical acuity & complexity.   Goals   Patient Goals to get back in bed to rest   STG Expiration Date 04/08/24   Short Term Goal #1 Goals to be met in 14 days; pt will be able to: 1) inc strength & balance by 1/2 grade to improve overall functional mobility & dec fall risk; 2) inc bed mobility to modified I for pt to be able to get in/OOB safely w/ proper techniques 100% of the time, to dec caregiver burden & safely function at home; 3) inc " transfers to modified I for pt to transition safely from one surface to another w/o % of the time, to dec caregiver burden & safely function at home; 4) inc amb w/ appropriate AD approx. >150' w/ S for pt to ambulate household distances w/o any % of the time, to dec caregiver burden & safely function at home; 5) negotiate stairs w/ S for inc safety during stair mgt inside/outside of home & dec caregiver burden; 6) pt/caregiver ed   PT Treatment Day 0   Plan   Treatment/Interventions Functional transfer training;LE strengthening/ROM;Elevations;Therapeutic exercise;Endurance training;Patient/family training;Bed mobility;Gait training;Spoke to nursing;OT;Family;Spoke to case management   PT Frequency 3-5x/wk   Discharge Recommendation   Rehab Resource Intensity Level, PT III (Minimum Resource Intensity)  (w/ inc family support & pending progress)   Additional Comments restorative for daily amb   AM-PAC Basic Mobility Inpatient   Turning in Flat Bed Without Bedrails 3   Lying on Back to Sitting on Edge of Flat Bed Without Bedrails 3   Moving Bed to Chair 3   Standing Up From Chair Using Arms 3   Walk in Room 3   Climb 3-5 Stairs With Railing 3   Basic Mobility Inpatient Raw Score 18   Basic Mobility Standardized Score 41.05   [facility] Highest Level Of Mobility   -HLM Goal 6: Walk 10 steps or more   -HLM Achieved 6: Walk 10 steps or more   End of Consult   Patient Position at End of Consult Supine;All needs within reach;Bed/Chair alarm activated   End of Consult Comments Pt in stable condition. All needs in reach. All lines intact.   Hx/personal factors: co-morbidities, inaccessible home, mutliple lines, use of AD, pain, fall risk, assist w/ ADL's, and O2  Examination: dec mobility, dec balance, dec endurance, dec amb, risk for falls, pain  Clinical: unpredictable (ongoing medical status, abnormal lab values, risk for falls, and pain mgt)  Complexity: high    Karel Pita

## 2024-03-25 NOTE — TELEPHONE ENCOUNTER
Appointment Confirmation   Who are you speaking with? Child and  [phone]   If it is not the patient, are they listed on an active communication consent form? Yes   Which provider is the appointment scheduled with?  Dr. Buitrago   When is the appointment scheduled?  Please list date and time 4/23/24 1:00 PM   At which location is the appointment scheduled to take place? [facility]   Did caller verbalize understanding of appointment details? Yes

## 2024-03-26 VITALS
WEIGHT: 123 LBS | SYSTOLIC BLOOD PRESSURE: 106 MMHG | TEMPERATURE: 98.1 F | DIASTOLIC BLOOD PRESSURE: 70 MMHG | HEIGHT: 65 IN | OXYGEN SATURATION: 94 % | HEART RATE: 114 BPM | RESPIRATION RATE: 24 BRPM | BODY MASS INDEX: 20.49 KG/M2

## 2024-03-26 PROCEDURE — 99239 HOSP IP/OBS DSCHRG MGMT >30: CPT | Performed by: INTERNAL MEDICINE

## 2024-03-26 RX ORDER — METOPROLOL SUCCINATE 50 MG/1
TABLET, EXTENDED RELEASE ORAL
Qty: 60 TABLET | Refills: 0 | Status: SHIPPED | OUTPATIENT
Start: 2024-03-26

## 2024-03-26 RX ORDER — SODIUM CHLORIDE 9 MG/ML
125 INJECTION, SOLUTION INTRAVENOUS CONTINUOUS
Status: DISCONTINUED | OUTPATIENT
Start: 2024-03-26 | End: 2024-03-26

## 2024-03-26 RX ADMIN — OXYCODONE HYDROCHLORIDE 10 MG: 10 TABLET ORAL at 07:50

## 2024-03-26 RX ADMIN — HEPARIN SODIUM 5000 UNITS: 5000 INJECTION INTRAVENOUS; SUBCUTANEOUS at 06:23

## 2024-03-26 RX ADMIN — SODIUM CHLORIDE 125 ML/HR: 0.9 INJECTION, SOLUTION INTRAVENOUS at 11:54

## 2024-03-26 RX ADMIN — ACETAMINOPHEN 325MG 650 MG: 325 TABLET ORAL at 13:56

## 2024-03-26 RX ADMIN — OXYCODONE HYDROCHLORIDE 10 MG: 10 TABLET ORAL at 16:12

## 2024-03-26 RX ADMIN — OXYCODONE HYDROCHLORIDE 10 MG: 10 TABLET ORAL at 11:51

## 2024-03-26 RX ADMIN — OXYCODONE HYDROCHLORIDE 10 MG: 10 TABLET ORAL at 03:48

## 2024-03-26 RX ADMIN — LIDOCAINE 3 PATCH: 700 PATCH TOPICAL at 10:00

## 2024-03-26 RX ADMIN — ACETAMINOPHEN 325MG 650 MG: 325 TABLET ORAL at 06:23

## 2024-03-26 RX ADMIN — HYDROMORPHONE HYDROCHLORIDE 0.5 MG: 1 INJECTION, SOLUTION INTRAMUSCULAR; INTRAVENOUS; SUBCUTANEOUS at 01:07

## 2024-03-26 RX ADMIN — METOPROLOL SUCCINATE 50 MG: 50 TABLET, EXTENDED RELEASE ORAL at 07:50

## 2024-03-26 NOTE — PLAN OF CARE
Problem: Potential for Falls  Goal: Patient will remain free of falls  Description: INTERVENTIONS:  - Educate patient/family on patient safety including physical limitations  - Instruct patient to call for assistance with activity   - Consult OT/PT to assist with strengthening/mobility   - Keep Call bell within reach  - Keep bed low and locked with side rails adjusted as appropriate  - Keep care items and personal belongings within reach  - Initiate and maintain comfort rounds  - Make Fall Risk Sign visible to staff  - Offer Toileting every 2 Hours, in advance of need  - Initiate/Maintain bed alarm  - Obtain necessary fall risk management equipment:    - Apply yellow socks and bracelet for high fall risk patients  - Consider moving patient to room near nurses station  Outcome: Progressing    Problem: Potential for Falls  Goal: Patient will remain free of falls  Description: INTERVENTIONS:  - Educate patient/family on patient safety including physical limitations  - Instruct patient to call for assistance with activity   - Consult OT/PT to assist with strengthening/mobility   - Keep Call bell within reach  - Keep bed low and locked with side rails adjusted as appropriate  - Keep care items and personal belongings within reach  - Initiate and maintain comfort rounds  - Make Fall Risk Sign visible to staff  - Offer Toileting every 2 Hours, in advance of need  - Initiate/Maintain bed alarm  - Obtain necessary fall risk management equipment:    - Apply yellow socks and bracelet for high fall risk patients  - Consider moving patient to room near nurses station  Outcome: Progressing     Problem: DISCHARGE PLANNING  Goal: Discharge to home or other facility with appropriate resources  Description: INTERVENTIONS:  - Identify barriers to discharge w/patient and caregiver  - Arrange for needed discharge resources and transportation as appropriate  - Identify discharge learning needs (meds, wound care, etc.)  - Arrange for  interpretive services to assist at discharge as needed  - Refer to Case Management Department for coordinating discharge planning if the patient needs post-hospital services based on physician/advanced practitioner order or complex needs related to functional status, cognitive ability, or social support system  Outcome: Progressing     Problem: Knowledge Deficit  Goal: Patient/family/caregiver demonstrates understanding of disease process, treatment plan, medications, and discharge instructions  Description: Complete learning assessment and assess knowledge base.  Interventions:  - Provide teaching at level of understanding  - Provide teaching via preferred learning methods  Outcome: Progressing     Problem: RESPIRATORY - ADULT  Goal: Achieves optimal ventilation and oxygenation  Description: INTERVENTIONS:  - Assess for changes in respiratory status  - Assess for changes in mentation and behavior  - Position to facilitate oxygenation and minimize respiratory effort  - Oxygen administered by appropriate delivery if ordered  - Initiate smoking cessation education as indicated  - Encourage broncho-pulmonary hygiene including cough, deep breathe, Incentive Spirometry  - Assess the need for suctioning and aspirate as needed  - Assess and instruct to report SOB or any respiratory difficulty  - Respiratory Therapy support as indicated  Outcome: Progressing     Problem: METABOLIC, FLUID AND ELECTROLYTES - ADULT  Goal: Electrolytes maintained within normal limits  Description: INTERVENTIONS:  - Monitor labs and assess patient for signs and symptoms of electrolyte imbalances  - Administer electrolyte replacement as ordered  - Monitor response to electrolyte replacements, including repeat lab results as appropriate  - Instruct patient on fluid and nutrition as appropriate  Outcome: Progressing  Goal: Fluid balance maintained  Description: INTERVENTIONS:  - Monitor labs   - Monitor I/O and WT  - Instruct patient on fluid and  nutrition as appropriate  - Assess for signs & symptoms of volume excess or deficit  Outcome: Progressing     Problem: Nutrition/Hydration-ADULT  Goal: Nutrient/Hydration intake appropriate for improving, restoring or maintaining nutritional needs  Description: Monitor and assess patient's nutrition/hydration status for malnutrition. Collaborate with interdisciplinary team and initiate plan and interventions as ordered.  Monitor patient's weight and dietary intake as ordered or per policy. Utilize nutrition screening tool and intervene as necessary. Determine patient's food preferences and provide high-protein, high-caloric foods as appropriate.     INTERVENTIONS:  - Monitor oral intake, urinary output, labs, and treatment plans  - Assess nutrition and hydration status and recommend course of action  - Evaluate amount of meals eaten  - Assist patient with eating if necessary   - Allow adequate time for meals  - Recommend/ encourage appropriate diets, oral nutritional supplements, and vitamin/mineral supplements  - Order, calculate, and assess calorie counts as needed  - Recommend, monitor, and adjust tube feedings and TPN/PPN based on assessed needs  - Assess need for intravenous fluids  - Provide specific nutrition/hydration education as appropriate  - Include patient/family/caregiver in decisions related to nutrition  Outcome: Progressing        Problem: DISCHARGE PLANNING  Goal: Discharge to home or other facility with appropriate resources  Description: INTERVENTIONS:  - Identify barriers to discharge w/patient and caregiver  - Arrange for needed discharge resources and transportation as appropriate  - Identify discharge learning needs (meds, wound care, etc.)  - Arrange for interpretive services to assist at discharge as needed  - Refer to Case Management Department for coordinating discharge planning if the patient needs post-hospital services based on physician/advanced practitioner order or complex needs  related to functional status, cognitive ability, or social support system  Outcome: Progressing     Problem: Knowledge Deficit  Goal: Patient/family/caregiver demonstrates understanding of disease process, treatment plan, medications, and discharge instructions  Description: Complete learning assessment and assess knowledge base.  Interventions:  - Provide teaching at level of understanding  - Provide teaching via preferred learning methods  Outcome: Progressing     Problem: RESPIRATORY - ADULT  Goal: Achieves optimal ventilation and oxygenation  Description: INTERVENTIONS:  - Assess for changes in respiratory status  - Assess for changes in mentation and behavior  - Position to facilitate oxygenation and minimize respiratory effort  - Oxygen administered by appropriate delivery if ordered  - Initiate smoking cessation education as indicated  - Encourage broncho-pulmonary hygiene including cough, deep breathe, Incentive Spirometry  - Assess the need for suctioning and aspirate as needed  - Assess and instruct to report SOB or any respiratory difficulty  - Respiratory Therapy support as indicated  Outcome: Progressing     Problem: METABOLIC, FLUID AND ELECTROLYTES - ADULT  Goal: Electrolytes maintained within normal limits  Description: INTERVENTIONS:  - Monitor labs and assess patient for signs and symptoms of electrolyte imbalances  - Administer electrolyte replacement as ordered  - Monitor response to electrolyte replacements, including repeat lab results as appropriate  - Instruct patient on fluid and nutrition as appropriate  Outcome: Progressing  Goal: Fluid balance maintained  Description: INTERVENTIONS:  - Monitor labs   - Monitor I/O and WT  - Instruct patient on fluid and nutrition as appropriate  - Assess for signs & symptoms of volume excess or deficit  Outcome: Progressing     Problem: Nutrition/Hydration-ADULT  Goal: Nutrient/Hydration intake appropriate for improving, restoring or maintaining  nutritional needs  Description: Monitor and assess patient's nutrition/hydration status for malnutrition. Collaborate with interdisciplinary team and initiate plan and interventions as ordered.  Monitor patient's weight and dietary intake as ordered or per policy. Utilize nutrition screening tool and intervene as necessary. Determine patient's food preferences and provide high-protein, high-caloric foods as appropriate.     INTERVENTIONS:  - Monitor oral intake, urinary output, labs, and treatment plans  - Assess nutrition and hydration status and recommend course of action  - Evaluate amount of meals eaten  - Assist patient with eating if necessary   - Allow adequate time for meals  - Recommend/ encourage appropriate diets, oral nutritional supplements, and vitamin/mineral supplements  - Order, calculate, and assess calorie counts as needed  - Recommend, monitor, and adjust tube feedings and TPN/PPN based on assessed needs  - Assess need for intravenous fluids  - Provide specific nutrition/hydration education as appropriate  - Include patient/family/caregiver in decisions related to nutrition  Outcome: Progressing

## 2024-03-26 NOTE — RESPIRATORY THERAPY NOTE
Home Oxygen Qualifying Test       Patient name: Shannan Vee        : 1970   Date of Test:  2024  Diagnosis:    Home Oxygen Test:    **Medicare Guidelines require item(s) 1-5 on all ambulatory patients or 1 and 2 on non-ambulatory patients.    1.  Baseline SPO2 on Room Air at rest 90 %.     2.  SPO2 during exercise on Room Air 86 %.               During exercise monitor SpO2. If SPO2 increases >=89% with ambulation do not add supplemental             oxygen.              If <= 88% on room air add O2 via NC and titrate patient.     Patient must be ambulated with O2 and titrated to > 88% with exertion.     3.  SPO2 on Oxygen at rest 93 % 2 lpm.    4.  SPO2 during exercise on Oxygen  91% a liter flow of 3 lpm.    5.  Exercise performed:          walking        [x]  Supplemental Home Oxygen is indicated.    []  Client does not qualify for home oxygen.    Respiratory Additional Notes- Pt is very weak but able to stand and walk slowly with a walker.    Stephane Pitts, RT

## 2024-03-26 NOTE — DISCHARGE SUMMARY
Formerly [facility]  Discharge- Shannan Vee 1970, 53 y.o. female MRN: [id number]  Unit/Bed#: E2 -01 Encounter: [id number]  Primary Care Provider: Todd Bañuelos MD   Date and time admitted to hospital: 3/22/2024  1:35 PM    * Recurrent malignant neoplasm of breast (HCC)  Assessment & Plan  Metastatic breast cancer. Hormone receptor positive and HER2 negative diagnosed 2013. Prior history of neoadjuvant chemotherapy followed by mastectomy. She is s/p Hysterectomy and oophorectomy. 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 Completed radiation at [facility].  Recent CT-scan showing extensive right thoracic pleural carcinomatosis, Extensive associated mass effect.   Interventional radiology reviewed-IR procedures or not likely to provide benefit at this point.  Relatively minor contribution from loculated pleural effusion.  Extensive metastasis/carcinomatosis primarily responsible for for mass effect on adjacent lung and majority of the opacification of the right hemithorax.    Goals of care conducted on admission, initially wanted comfort measures, but decided to pursue further treatment.   Appreciate oncology recommendations. She was offered therapy by Dr. Hearn and was amenable to it. Oncology will assist in arranging her follow-up for chemotherapy initiation as long as her ECOG status does not preclude this.  PT/OT- minimum resource intensity   Requires 2 L of oxygen with exertion.  Sent home with home oxygen.  Palliative care consulted for symptom management while hospitalized   I again addressed to sister on the telephone who provided translation, patient and patient's daughter that unfortunately I feel that Patel time is quite limited.  She is absolutely appropriate for hospice care. If chemotherapy cannot be offered or she cannot tolerate, recommendation would be for hospice.  If patient were to return to the hospital for  symptom management, recommendation for hospice care.  Outpatient follow-up with oncology  Outpatient follow-up with palliative care    Tachycardia  Assessment & Plan  Chronic sinus tachycardia related to large pleural effusion/malignancy/anemia/cancer related pain  Continue Toprol XL, can trial 1/2 tablet in the evenings     Hyponatremia  Assessment & Plan  Likely due to SIADH in the setting of malignancy  Patient refusing salt tabs  Continue fluid restriction              Severe protein-calorie malnutrition (HCC)  Assessment & Plan  Malnutrition Findings:   Adult Malnutrition type: Chronic illness  Adult Degree of Malnutrition: Other severe protein calorie malnutrition    BMI Findings:        Body mass index is 20.47 kg/m².       Goals of care, counseling/discussion  Assessment & Plan  Goals of care done by previous provider, initially considered hospice, but reconsidered.  Family wishes to pursue chemotherapy if possible  I discussed today that patient has a quite poor prognosis and will unlikely be able to tolerate chemotherapy. Will plan to get her home so that she can see her primary doctors and discuss management moving forward.      Large pleural effusion  Assessment & Plan  Malignant pleural effusion in a patient with a history of metastatic breast cancer s/p Pleurx Catheter placement.  Management as above     Other specified anemias  Assessment & Plan  S/p transfusion with appropriate increase      Discharging Physician / Practitioner: Julius Sanders DO  PCP: Todd Bañuelos MD  Admission Date:   Admission Orders (From admission, onward)       Ordered        03/22/24 1939  INPATIENT ADMISSION  Once                          Discharge Date: 03/26/24    Medical Problems       Resolved Problems  Date Reviewed: 3/24/2024   None         Consultations During Hospital Stay:   INPATIENT CONSULT TO IR  IP CONSULT TO HOSPICE  IP CONSULT TO ONCOLOGY  IP CONSULT TO NUTRITION SERVICES  IP CONSULT TO PALLIATIVE  CARE    Procedures Performed: None    Significant Findings / Test Results:     XR chest 1 view portable    Result Date: 3/23/2024  Impression: Right chest tube with extensive opacification of the right hemithorax due to pleural tumor, effusion, and atelectasis. See subsequent chest CT. Bone metastases. Workstation performed: [id number]     CT pe study w abdomen pelvis w contrast    Result Date: 3/22/2024  Impression: No pulmonary emboli. No new pulmonary infiltrates or consolidation. Extensive right thoracic pleural carcinomatosis without significant interval change compared to the recent prior. Extensive associated mass effect as above. Vasculature remains patent though mildly compressed and displaced. Caudal displacement of the right hemidiaphragm, with mass effect on the liver. Not significantly changed. Mild ascites. Third spacing of fluid as above. Extensive metastatic disease without significant change compared to recent. Workstation performed: [id number]     Test Results Pending at Discharge (will require follow up): None     Outpatient Tests Requested: None    Reason for Admission: Shortness of breath     Hospital Course:     Shannan Vee is a 53 y.o. female With past medical history of metastatic breast cancer with extensive metastasis/carcinomatosis, chronic anemia, loculated effusions with Pleurx catheter, severe malnutrition, hypertension who presents to the hospital with shortness of breath.  Given presentation and limited medical or interventional options, patient was initially evaluated by hospice care with family agreeable to comfort measures.  After further discussion has a family/oncology, they wish to pursue chemotherapy as an outpatient.  Myself and several medical providers informed the family of patient's poor prognosis.  We will attempt to optimize patient's symptoms/home environment and provide her with home oxygen so that she may see her primary doctors to discuss management moving forward.   "She will need close follow-up with palliative care and oncology.    Given clinical status, patient is at high risk for readmission.    Please see above list of diagnoses and related plan for additional information.     Condition at Discharge: stable     Discharge Day Visit / Exam:     Subjective: Patient was weak this morning with ambulation.    Vitals: Blood Pressure: 114/66 (03/26/24 0750)  Pulse: (!) 120 (03/26/24 0750)  Temperature: 98.1 °F (36.7 °C) (03/26/24 0809)  Temp Source: Temporal (03/26/24 0809)  Respirations: 20 (03/26/24 0750)  Height: 5' 5\" (165.1 cm) (03/24/24 1150)  Weight - Scale: 55.8 kg (123 lb) (03/24/24 1150)  SpO2: 98 % (03/26/24 0750)  Exam:   Physical Exam  Vitals reviewed.   Constitutional:       General: She is not in acute distress.     Appearance: She is well-developed. She is ill-appearing and toxic-appearing. She is not diaphoretic.   HENT:      Head: Normocephalic and atraumatic.      Mouth/Throat:      Mouth: Mucous membranes are dry.   Eyes:      General: No scleral icterus.     Extraocular Movements: Extraocular movements intact.   Cardiovascular:      Rate and Rhythm: Regular rhythm. Tachycardia present.      Heart sounds: Normal heart sounds.   Pulmonary:      Effort: Pulmonary effort is normal. No respiratory distress.      Breath sounds: Normal breath sounds.      Comments: Decreased breath sounds on right   Abdominal:      General: There is no distension.      Palpations: Abdomen is soft.      Tenderness: There is no abdominal tenderness. There is no guarding or rebound.   Musculoskeletal:         General: No swelling, tenderness or deformity.   Skin:     General: Skin is warm and dry.   Neurological:      General: No focal deficit present.      Mental Status: She is alert.   Psychiatric:         Mood and Affect: Mood normal.         Behavior: Behavior normal.           Discharge instructions/Information to patient and family:   See after visit summary for information " provided to patient and family.      Provisions for Follow-Up Care:  See after visit summary for information related to follow-up care and any pertinent home health orders.      Disposition:     Home with home health      Discharge Statement:  I spent 60 minutes discharging the patient. This time was spent on the day of discharge. I had direct contact with the patient on the day of discharge. Greater than 50% of the total time was spent examining patient, answering all patient questions, arranging and discussing plan of care with patient as well as directly providing post-discharge instructions.  Additional time then spent on discharge activities.    Discharge Medications:  See after visit summary for reconciled discharge medications provided to patient and family.      ** Please Note: This note has been constructed using a voice recognition system **

## 2024-03-26 NOTE — ASSESSMENT & PLAN NOTE
Malignant pleural effusion in a patient with a history of metastatic breast cancer s/p Pleurx Catheter placement.  Management as above

## 2024-03-26 NOTE — NURSING NOTE
AVS reviewed with patient/daughter. Patient discharged to home with all belongings including portable oxygen tank.

## 2024-03-26 NOTE — CASE MANAGEMENT
Case Management Discharge Planning Note    Patient name Shannan Vee  Location [facility] /E2 -* MRN [id number]  : 1970 Date 3/26/2024       Current Admission Date: 3/22/2024  Current Admission Diagnosis:Recurrent malignant neoplasm of breast (HCC)   Patient Active Problem List    Diagnosis Date Noted    Encounter for home hospice care 2024    Tachycardia 2024    Hyponatremia 2024    Drug induced constipation 2024    Severe protein-calorie malnutrition (HCC) 2024    Chronic bilateral low back pain without sciatica 2024    Cancer associated pain 2024    Goals of care, counseling/discussion 2024    Large pleural effusion 2023    Recurrent malignant neoplasm of breast (HCC) 10/23/2023    Right lower lobe lung mass 10/23/2023    Pain from bone metastases (HCC) 10/23/2023    Other specified anemias 10/23/2023    Bladder wall thickening 10/06/2023    Urinary frequency 2023    Overweight (BMI 25.0-29.9) 2021    Personal history of malignant neoplasm of breast 2019    Use of anastrozole (Arimidex) 2018    Carcinoma of left breast, estrogen receptor positive  2017    Malignant neoplasm of overlapping sites of left female breast (HCC) 2015    Uterine leiomyoma 10/15/2014    Limb swelling 2013      LOS (days): 4  Geometric Mean LOS (GMLOS) (days):   Days to GMLOS:     OBJECTIVE:  Risk of Unplanned Readmission Score: 35.85         Current admission status: Inpatient   Preferred Pharmacy:   [id number] - [place] - [address]  [address] [address]  Phone: [phone] Fax: [phone]    Primary Care Provider: Todd Bañuelos MD    Primary Insurance: [email or web address]  Secondary Insurance:     DISCHARGE DETAILS:    Discharge planning discussed with:: Patient, daughter, and son-in-law  Freedom of Choice: Yes  Comments - Freedom of Choice: YUNIOR sent to [facility]  contacted family/caregiver?: Yes  Were Treatment Team discharge recommendations reviewed with patient/caregiver?: Yes  Did patient/caregiver verbalize understanding of patient care needs?: Yes       Contacts  Patient Contacts: Fabian (Son-in-Law) [phone] / Daughter in person  Relationship to Patient:: Family  Contact Method: Phone  Phone Number: [phone]  Reason/Outcome: Discharge Planning    Requested Home Health Care         Is the patient interested in HHC at discharge?: Yes  Home Health Discipline requested:: Nursing, Occupational Therapy, Physical Therapy  Home Health Agency Name:: [facility]  HHA External Referral Reason (only applicable if external HHA name selected): Patient has established relationship with provider  Home Health Follow-Up Provider:: PCP  Home Health Services Needed:: Evaluate Functional Status and Safety, Gait/ADL Training, Strengthening/Theraputic Exercises to Improve Function, Oxygen Via Nasal Cannula  Homebound Criteria Met:: Uses an Assist Device (i.e. cane, walker, etc), Requires the Assistance of Another Person for Safe Ambulation or to Leave the Home  Supporting Clincal Findings:: Fatigues Easliy in Short Distances, Limited Endurance, Requires Oxygen    DME Referral Provided  Referral made for DME?: Yes  DME referral completed for the following items:: Portable Oxygen tanks, Home Oxygen concentrator  DME Supplier Name:: [employer]    Other Referral/Resources/Interventions Provided:  Interventions: HHC, DME         Treatment Team Recommendation: Home with Home Health Care  Discharge Destination Plan:: Home with Home Health Care  Transport at Discharge : Family                                      Additional Comments: CM met with pt and daughter at the bedside. Daughter called her spouse to translate. CM explained that home O2 is being ordered and that they would contact family to discuss delivery. Pt is being provided with the eclipse concentrator as delivery cannot be made  until tomorrow. Family aware this is to be returned when O2 is delivered home.

## 2024-03-26 NOTE — CASE MANAGEMENT
Case Management Discharge Planning Note    Patient name Shannan Vee  Location [facility] 2 /E2 -* MRN [id number]  : 1970 Date 3/26/2024       Current Admission Date: 3/22/2024  Current Admission Diagnosis:Recurrent malignant neoplasm of breast (HCC)   Patient Active Problem List    Diagnosis Date Noted    Encounter for home hospice care 2024    Tachycardia 2024    Hyponatremia 2024    Drug induced constipation 2024    Severe protein-calorie malnutrition (HCC) 2024    Chronic bilateral low back pain without sciatica 2024    Cancer associated pain 2024    Goals of care, counseling/discussion 2024    Large pleural effusion 2023    Recurrent malignant neoplasm of breast (HCC) 10/23/2023    Right lower lobe lung mass 10/23/2023    Pain from bone metastases (HCC) 10/23/2023    Other specified anemias 10/23/2023    Bladder wall thickening 10/06/2023    Urinary frequency 2023    Overweight (BMI 25.0-29.9) 2021    Personal history of malignant neoplasm of breast 2019    Use of anastrozole (Arimidex) 2018    Carcinoma of left breast, estrogen receptor positive  2017    Malignant neoplasm of overlapping sites of left female breast (HCC) 2015    Uterine leiomyoma 10/15/2014    Limb swelling 2013      LOS (days): 4  Geometric Mean LOS (GMLOS) (days):   Days to GMLOS:     OBJECTIVE:  Risk of Unplanned Readmission Score: 35.85         Current admission status: Inpatient   Preferred Pharmacy:   [id number] - [place] - [address] [facility]  [address] [facility] [address]  Phone: [phone] Fax: [phone]    Primary Care Provider: Todd Bañuelos MD    Primary Insurance: [employer]  Secondary Insurance:     DISCHARGE DETAILS:                                     DME Referral Provided  Referral made for DME?: Yes  DME referral completed for the following items:: Home Oxygen  concentrator, Portable Oxygen tanks  DME Supplier Name:: [employer]

## 2024-03-26 NOTE — DISCHARGE INSTR - AVS FIRST PAGE
Dear Shannan Vee,     It was our pleasure to care for you here at [facility].  It is our hope that we were always able to exceed the expected standards for your care during your stay.  You were hospitalized due to shortness of breath.  You were cared for on the 2nd floor under the service of Julius Sanders DO with the [facility] who covers for your primary care physician (PCP), Todd Bañuelos MD, while you were hospitalized.  If you have any questions or concerns related to this hospitalization, you may contact us at .  For follow up as well as medication refills, we recommend that you follow up with your primary care physician.  A registered nurse will reach out to you by phone within a few days after your discharge to answer any additional questions that you may have after going home.  However, at this time we provide for you here, the most important instructions / recommendations at discharge:     Notable Medication Adjustments -   Add 25 mg metoprolol at night   Use 2l of oxygen with exertion.   Testing Required after Discharge -   None  ** Please contact your PCP to request testing orders for any of the testing recommended here **  Important Follow Up Information -   Please see your primary doctor and oncologist ASAP  Please review this entire after visit summary as additional general instructions including medication list, appointments, activity, diet, any pertinent wound care, and other additional recommendations from your care team that may be provided for you.      Sincerely,     Julius Sanders DO

## 2024-03-26 NOTE — PLAN OF CARE
Problem: Potential for Falls  Goal: Patient will remain free of falls  Description: INTERVENTIONS:  - Educate patient/family on patient safety including physical limitations  - Instruct patient to call for assistance with activity   - Consult OT/PT to assist with strengthening/mobility   - Keep Call bell within reach  - Keep bed low and locked with side rails adjusted as appropriate  - Keep care items and personal belongings within reach  - Initiate and maintain comfort rounds  - Make Fall Risk Sign visible to staff  - Offer Toileting every 2 Hours, in advance of need  - Initiate/Maintain bed alarm  - Obtain necessary fall risk management equipment:    - Apply yellow socks and bracelet for high fall risk patients  - Consider moving patient to room near nurses station  Outcome: Progressing     Problem: PAIN - ADULT  Goal: Verbalizes/displays adequate comfort level or baseline comfort level  Description: Interventions:  - Encourage patient to monitor pain and request assistance  - Assess pain using appropriate pain scale  - Administer analgesics based on type and severity of pain and evaluate response  - Implement non-pharmacological measures as appropriate and evaluate response  - Consider cultural and social influences on pain and pain management  - Notify physician/advanced practitioner if interventions unsuccessful or patient reports new pain  Outcome: Progressing     Problem: RESPIRATORY - ADULT  Goal: Achieves optimal ventilation and oxygenation  Description: INTERVENTIONS:  - Assess for changes in respiratory status  - Assess for changes in mentation and behavior  - Position to facilitate oxygenation and minimize respiratory effort  - Oxygen administered by appropriate delivery if ordered  - Initiate smoking cessation education as indicated  - Encourage broncho-pulmonary hygiene including cough, deep breathe, Incentive Spirometry  - Assess the need for suctioning and aspirate as needed  - Assess and instruct  to report SOB or any respiratory difficulty  - Respiratory Therapy support as indicated  Outcome: Progressing     Problem: METABOLIC, FLUID AND ELECTROLYTES - ADULT  Goal: Electrolytes maintained within normal limits  Description: INTERVENTIONS:  - Monitor labs and assess patient for signs and symptoms of electrolyte imbalances  - Administer electrolyte replacement as ordered  - Monitor response to electrolyte replacements, including repeat lab results as appropriate  - Instruct patient on fluid and nutrition as appropriate  Outcome: Progressing     Problem: Nutrition/Hydration-ADULT  Goal: Nutrient/Hydration intake appropriate for improving, restoring or maintaining nutritional needs  Description: Monitor and assess patient's nutrition/hydration status for malnutrition. Collaborate with interdisciplinary team and initiate plan and interventions as ordered.  Monitor patient's weight and dietary intake as ordered or per policy. Utilize nutrition screening tool and intervene as necessary. Determine patient's food preferences and provide high-protein, high-caloric foods as appropriate.     INTERVENTIONS:  - Monitor oral intake, urinary output, labs, and treatment plans  - Assess nutrition and hydration status and recommend course of action  - Evaluate amount of meals eaten  - Assist patient with eating if necessary   - Allow adequate time for meals  - Recommend/ encourage appropriate diets, oral nutritional supplements, and vitamin/mineral supplements  - Order, calculate, and assess calorie counts as needed  - Recommend, monitor, and adjust tube feedings and TPN/PPN based on assessed needs  - Assess need for intravenous fluids  - Provide specific nutrition/hydration education as appropriate  - Include patient/family/caregiver in decisions related to nutrition  Outcome: Progressing

## 2024-03-26 NOTE — ASSESSMENT & PLAN NOTE
Goals of care done by previous provider, initially considered hospice, but reconsidered.  Family wishes to pursue chemotherapy if possible  I discussed today that patient has a quite poor prognosis and will unlikely be able to tolerate chemotherapy. Will plan to get her home so that she can see her primary doctors and discuss management moving forward.

## 2024-03-26 NOTE — ASSESSMENT & PLAN NOTE
Metastatic breast cancer. Hormone receptor positive and HER2 negative diagnosed 2013. Prior history of neoadjuvant chemotherapy followed by mastectomy. She is s/p Hysterectomy and oophorectomy. 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 Completed radiation at [facility].  Recent CT-scan showing extensive right thoracic pleural carcinomatosis, Extensive associated mass effect.   Interventional radiology reviewed-IR procedures or not likely to provide benefit at this point.  Relatively minor contribution from loculated pleural effusion.  Extensive metastasis/carcinomatosis primarily responsible for for mass effect on adjacent lung and majority of the opacification of the right hemithorax.    Goals of care conducted on admission, initially wanted comfort measures, but decided to pursue further treatment.   Appreciate oncology recommendations. She was offered therapy by Dr. Hearn and was amenable to it. Oncology will assist in arranging her follow-up for chemotherapy initiation as long as her ECOG status does not preclude this.  PT/OT- minimum resource intensity   Requires 2 L of oxygen with exertion.  Sent home with home oxygen.  Palliative care consulted for symptom management while hospitalized   I again addressed to sister on the telephone who provided translation, patient and patient's daughter that unfortunately I feel that [place] time is quite limited.  She is absolutely appropriate for hospice care. If chemotherapy cannot be offered or she cannot tolerate, recommendation would be for hospice.  If patient were to return to the hospital for symptom management, recommendation for hospice care.  Outpatient follow-up with oncology  Outpatient follow-up with palliative care

## 2024-03-26 NOTE — ASSESSMENT & PLAN NOTE
Chronic sinus tachycardia related to large pleural effusion/malignancy/anemia/cancer related pain  Continue Toprol XL, can trial 1/2 tablet in the evenings

## 2024-03-27 ENCOUNTER — TRANSITIONAL CARE MANAGEMENT (OUTPATIENT)
Dept: FAMILY MEDICINE CLINIC | Facility: CLINIC | Age: 54
End: 2024-03-27

## 2024-03-28 LAB

## 2024-03-29 ENCOUNTER — TELEMEDICINE (OUTPATIENT)
Dept: PALLIATIVE MEDICINE | Facility: CLINIC | Age: 54
End: 2024-03-29
Payer: COMMERCIAL

## 2024-03-29 ENCOUNTER — TELEPHONE (OUTPATIENT)
Dept: PALLIATIVE MEDICINE | Facility: CLINIC | Age: 54
End: 2024-03-29

## 2024-03-29 DIAGNOSIS — G89.3 PAIN FROM BONE METASTASES (HCC): ICD-10-CM

## 2024-03-29 DIAGNOSIS — K59.03 DRUG INDUCED CONSTIPATION: ICD-10-CM

## 2024-03-29 DIAGNOSIS — G89.3 CANCER ASSOCIATED PAIN: Primary | ICD-10-CM

## 2024-03-29 DIAGNOSIS — C50.812 MALIGNANT NEOPLASM OF OVERLAPPING SITES OF LEFT BREAST IN FEMALE, ESTROGEN RECEPTOR POSITIVE (HCC): ICD-10-CM

## 2024-03-29 DIAGNOSIS — C79.51 PAIN FROM BONE METASTASES (HCC): ICD-10-CM

## 2024-03-29 DIAGNOSIS — Z17.0 MALIGNANT NEOPLASM OF OVERLAPPING SITES OF LEFT BREAST IN FEMALE, ESTROGEN RECEPTOR POSITIVE (HCC): ICD-10-CM

## 2024-03-29 DIAGNOSIS — Z71.89 GOALS OF CARE, COUNSELING/DISCUSSION: ICD-10-CM

## 2024-03-29 PROCEDURE — 99497 ADVNCD CARE PLAN 30 MIN: CPT | Performed by: INTERNAL MEDICINE

## 2024-03-29 PROCEDURE — 99214 OFFICE O/P EST MOD 30 MIN: CPT | Performed by: INTERNAL MEDICINE

## 2024-03-29 RX ORDER — POLYETHYLENE GLYCOL 3350 17 G/17G
17 POWDER, FOR SOLUTION ORAL DAILY
Qty: 510 G | Refills: 2 | Status: SHIPPED | OUTPATIENT
Start: 2024-03-29

## 2024-03-29 RX ORDER — OXYCODONE HYDROCHLORIDE 15 MG/1
15 TABLET ORAL EVERY 4 HOURS PRN
Qty: 90 TABLET | Refills: 0 | Status: SHIPPED | OUTPATIENT
Start: 2024-03-31

## 2024-03-29 RX ORDER — STANDARDIZED SENNA CONCENTRATE 8.6 MG/1
1 TABLET ORAL DAILY
Qty: 30 TABLET | Refills: 3 | Status: SHIPPED | OUTPATIENT
Start: 2024-03-29

## 2024-03-29 RX ORDER — LIDOCAINE 50 MG/G
2 PATCH TOPICAL DAILY
Qty: 60 PATCH | Refills: 0 | Status: SHIPPED | OUTPATIENT
Start: 2024-03-29

## 2024-03-29 NOTE — PROGRESS NOTES
Telemedicine - Palliative and Supportive Care   Shannan Vee 53 y.o. female [id number]    Telemedicine consent    Patient: Shannan Vee  Provider: Janie Briones MD  Provider located at PALLIATIVE CARE [facility] [address]  [facility] [address] [phone]    The patient was identified by name and date of birth. Shannan Vee was informed that this is a telemedicine visit and that the visit is being conducted through the Epic Embedded platform. She agrees to proceed..  My office door was closed. No one else was in the room.  She acknowledged consent and understanding of privacy and security of the video platform. The patient has agreed to participate and understands they can discontinue the visit at any time.    Patient is aware this is a billable service.     I spent 25 minutes with the patient during this visit.     Assessment/Plan:  1. Cancer associated pain    2. Pain from bone metastases (HCC)    3. Drug induced constipation    4. Goals of care, counseling/discussion    5. Malignant neoplasm of overlapping sites of left breast in female, estrogen receptor positive       Symptoms - poor ECOG; increased cancer related pain; poor oral intake; intermittent confusion; ongoing generalized weakness; sleeping most of the times    1. Cancer associated pain  Assessment & Plan:  Mostly in the R upper back, same location as before, but worsening now  Increase oxyIR (from 10mg PO q4H prn) to 15mg PO q4H prn       Orders:  -     oxyCODONE (ROXICODONE) 15 mg immediate release tablet; Take 1 tablet (15 mg total) by mouth every 4 (four) hours as needed for moderate pain Max Daily Amount: 90 mg Do not start before March 31, 2024.    2. Pain from bone metastases (HCC)  -     oxyCODONE (ROXICODONE) 15 mg immediate release tablet; Take 1 tablet (15 mg total) by mouth every 4 (four) hours as needed for moderate pain Max Daily Amount: 90 mg Do not start before March 31,  2024.  -     lidocaine (LIDODERM) 5 %; Apply 2 patches topically over 12 hours daily Remove & Discard patch within 12 hours or as directed by MD    3. Drug induced constipation  Assessment & Plan:  Daily senokot 1 tab with prn miralax 1 a day     Orders:  -     polyethylene glycol (GLYCOLAX) 17 GM/SCOOP powder; Take 17 g by mouth daily  -     Senokot 8.6 MG tablet; Take 1 tablet (8.6 mg total) by mouth daily    4. Goals of care, counseling/discussion  Assessment & Plan:  I discussed current ECOG status of patient which appears poor at 3. Family tells me she is not a candidate for chemo at this time given this poor status.   Because cancer will only progress without cancer directed therapies of which she is not receiving currently, I discussed likelihood of continued decline that will pre-empt any further cancer cares in the future  I strongly encouraged them to consider hospice now or in the immediate future should they see more decline.   At this time, they are not ready for hospice yet and wants her to be given the chance to feel better/get stronger. But they will continue to talk about hospice amongst themselves.       5. Malignant neoplasm of overlapping sites of left breast in female, estrogen receptor positive   Assessment & Plan:  Not currently a candidate for chemotherapy because of current ECOG  If ECOG improves, chemo can be re-considered        Follow up   RTO in 4 weeks    Controlled Substance Review    [place]MP or [place]  reviewed: No red flags were identified; safe to proceed with prescription..    Filled  Written  ID  Drug  QTY  Days  Prescriber  RX #  Dispenser  Refill  Daily Dose*  Pymt Type      03/13/2024 03/12/2024 1 Oxycodone Hcl (Ir) 10 Mg Tab 120.00 20 Ti Jake [id number] Pen ([id number]) 0 90.00 MME [place]   02/25/2024 02/25/2024 1 Oxycodone Hcl (Ir) 10 Mg Tab 120.00 20 Ma Mil [id number] Pen (1007) 0 90.00 MME [place]   02/10/2024 02/10/2024 1 Oxycodone Hcl (Ir) 10 Mg Tab 90.00 23 Da Caron  [id number] [facility] ([id number]) 0 58.70 MME [place]   01/21/2024 01/21/2024 3 Morphine Sulfate Ir 15 Mg Tab 56.00 14 [place] [id number] [facility] ([id number]) 0 60.00 MME [place]       Requested Prescriptions     Signed Prescriptions Disp Refills    oxyCODONE (ROXICODONE) 15 mg immediate release tablet 90 tablet 0     Sig: Take 1 tablet (15 mg total) by mouth every 4 (four) hours as needed for moderate pain Max Daily Amount: 90 mg Do not start before March 31, 2024.    polyethylene glycol (GLYCOLAX) 17 GM/SCOOP powder 510 g 2     Sig: Take 17 g by mouth daily    Senokot 8.6 MG tablet 30 tablet 3     Sig: Take 1 tablet (8.6 mg total) by mouth daily    lidocaine (LIDODERM) 5 % 60 patch 0     Sig: Apply 2 patches topically over 12 hours daily Remove & Discard patch within 12 hours or as directed by MD     Medications Discontinued During This Encounter   Medication Reason    oxyCODONE (ROXICODONE) 10 MG TABS     lidocaine (LIDODERM) 5 % Reorder    polyethylene glycol (GLYCOLAX) 17 GM/SCOOP powder Reorder    Senokot 8.6 MG tablet Reorder     Representatives have queried the patient's controlled substance dispensing history in the Prescription Drug Monitoring Program in compliance with regulations before I have prescribed any controlled substances.  The prescription history is consistent with prescribed therapy and our practice policies.      25 minutes were spent face to face with Shannan Vee and her daughter with greater than 50% of the time spent in counseling or coordination of care including discussions of etiology of diagnosis, pathogenesis of diagnosis, diagnostic results, impression, and recommendations, risks and benefits of treatment, instructions for disease self management, treatment instructions, follow up requirements, risk factors and risk reduction of disease, patient and family counseling/involvement in care, and compliance with treatment regimen .  All of the patient's questions were answered during this  discussion.    No follow-ups on file.    Subjective:   Chief Complaint  Follow up visit for:  symptom management, pain, neoplasm related, assessment of goals of care, disease process education and discussion of prognosis, advance care planning  HPI     Shannan Vee is a 53 y.o. female with breast cancer (diagnosed in 2013) s/p chemo, mastectomy, RT. Unfortunately she appeared to have recurrence in 10/2023 when a large right lower lobe lung mass with regional lymphadenopathy and metastatic bone disease were found in CT imaging. She was first seen in the hospital in 11/2023 when she was admitted for pleural effusion. She went for an IR lung biopsy that confirmed lung mets. She was hospitalized again in late 1/2024 for malignant pleural effusion and received IR tunneled pleural catheter. She is now s/p RT to the C2, T8-L1 (from 1/19 to 2/2/2024) at [facility]. She was on PO kisqali plus Faslodex. On her last outpatient oncology appt on 3/19/2024, patient was told she was too weak to receive more chemotherapy. She was hospitalized 3/22/2024 to 3/26/2024 because of SOB and tachycardia related to large pleural effusion and extensive right thoracic pleural carcinomatosis with associated mass effect. Overall imaging was read as stable findings. Goals of care was discussed in detail during that hospital stay and hospice was consulted but they were not ready for hospice cares at that time.    Saw patient today on video. Her son in law was providing translation. Her daughter was also at bedside. She is still very weak and is sleeping most of the times. She is also somewhat intermittently confused. She is also with poor oral intake. She has increasing pain in the R upper back/R side that used to be controlled with oxyIR 10mg q4H prn but this is no longer lasting long. Given confusion, will mildly increase to 15mg PO q4H prn. She still has 10mg tabs left, so they will use 1 1/2 tabs until they ran out and they will  a  new script for 15mg tab next week. She will also take senokot daily to prevent OIC and add miralax if not enough. Lidocaine 5% patch also is helpful so we will continue as well.     I re-addressed goals of care and strongly encouraged consideration for hospice given poor ECOG with high likelihood of only ongoing decline in the near future.     The following portions of the medical history were reviewed: past medical history, problem list, medication list, and social history.    Current Outpatient Medications:     lidocaine (LIDODERM) 5 %, Apply 2 patches topically over 12 hours daily Remove & Discard patch within 12 hours or as directed by MD, Disp: 60 patch, Rfl: 0    [START ON 3/31/2024] oxyCODONE (ROXICODONE) 15 mg immediate release tablet, Take 1 tablet (15 mg total) by mouth every 4 (four) hours as needed for moderate pain Max Daily Amount: 90 mg Do not start before March 31, 2024., Disp: 90 tablet, Rfl: 0    polyethylene glycol (GLYCOLAX) 17 GM/SCOOP powder, Take 17 g by mouth daily, Disp: 510 g, Rfl: 2    Senokot 8.6 MG tablet, Take 1 tablet (8.6 mg total) by mouth daily, Disp: 30 tablet, Rfl: 3    acetaminophen (TYLENOL) 500 mg tablet, Take 2 tablets (1,000 mg total) by mouth 3 (three) times a day as needed for mild pain, Disp: 180 tablet, Rfl: 0    calcium citrate-vitamin D (Calcitrate Plus D) 315 mg-5 mcg tablet, Take 1 tablet by mouth 2 (two) times a day, Disp: 180 tablet, Rfl: 0    ergocalciferol (VITAMIN D2) 50,000 units, TAKE 1 CAPSULE BY MOUTH 1 TIME A WEEK, Disp: 8 capsule, Rfl: 0    metoprolol succinate (TOPROL-XL) 50 mg 24 hr tablet, Take 50 mg daily, add 1/2 tablet qhs, Disp: 60 tablet, Rfl: 0    Multiple Vitamin (MULTIVITAMIN ADULT PO), Take 1 tablet by mouth in the morning, Disp: , Rfl:     naloxone (NARCAN) 4 mg/0.1 mL nasal spray, Administer 1 spray into a nostril. If no response after 2-3 minutes, give another dose in the other nostril using a new spray., Disp: 1 each, Rfl: 0    Ribociclib  Succ, 600 MG Dose, 200 MG TBPK, Take 600 mg by mouth in the morning For 3 weeks and then 1 week off (Patient not taking: Reported on 3/22/2024), Disp: 21 each, Rfl: 6    sodium chloride 1 g tablet, Take 1 tablet (1 g total) by mouth 2 (two) times a day, Disp: 60 tablet, Rfl: 0  Review of Systems   Constitutional:  Positive for activity change, appetite change and fatigue.   HENT:  Negative for trouble swallowing.    Respiratory:  Negative for shortness of breath.    Cardiovascular:  Negative for chest pain.   Gastrointestinal:  Negative for abdominal pain.   Musculoskeletal:  Positive for back pain.        R upper back pain   Neurological:  Positive for weakness.   Psychiatric/Behavioral:  Positive for confusion. Negative for sleep disturbance. The patient is not nervous/anxious.    All other systems reviewed and are negative.      All other systems negative    Objective:  Vital Signs  There were no vitals taken for this visit.   Physical Exam    Constitutional: Appears well-developed and well-nourished. Appears frail, thinner, chronically ill looking but not toxic appearing. In no acute physical or emotional distress.   Head: Normocephalic and atraumatic. Face is somewhat puffy looking  Eyes: EOM are normal. No ocular discharge. No scleral icterus.   Neck: No visible adenopathy or masses  Respiratory: Effort normal. No stridor. No respiratory distress. Wearing NC.   Gastrointestinal: No abdominal distension.   Neurological: Alert, oriented and appropriately conversant.   Skin: No diaphoresis, no rashes seen on exposed areas of skin.   Psychiatric: Displays a normal mood and affect. Behavior, judgement and thought content appear normal.     Janie Briones MD  Palliative Medicine & Supportive Care  Internal Medicine  Available via MD-[employer] Text  Office: [phone]  Fax: [phone]

## 2024-03-29 NOTE — ASSESSMENT & PLAN NOTE
Mostly in the R upper back, same location as before, but worsening now  Increase oxyIR (from 10mg PO q4H prn) to 15mg PO q4H prn

## 2024-03-29 NOTE — ASSESSMENT & PLAN NOTE
I discussed current ECOG status of patient which appears poor at 3. Family tells me she is not a candidate for chemo at this time given this poor status.   Because cancer will only progress without cancer directed therapies of which she is not receiving currently, I discussed likelihood of continued decline that will pre-empt any further cancer cares in the future  I strongly encouraged them to consider hospice now or in the immediate future should they see more decline.   At this time, they are not ready for hospice yet and wants her to be given the chance to feel better/get stronger. But they will continue to talk about hospice amongst themselves.

## 2024-03-29 NOTE — ASSESSMENT & PLAN NOTE
Not currently a candidate for chemotherapy because of current ECOG  If ECOG improves, chemo can be re-considered

## 2024-04-01 ENCOUNTER — TELEPHONE (OUTPATIENT)
Dept: HEMATOLOGY ONCOLOGY | Facility: CLINIC | Age: 54
End: 2024-04-01

## 2024-04-01 ENCOUNTER — HOSPITAL ENCOUNTER (OUTPATIENT)
Dept: INFUSION CENTER | Facility: HOSPITAL | Age: 54
End: 2024-04-01
Attending: INTERNAL MEDICINE

## 2024-04-01 NOTE — TELEPHONE ENCOUNTER
Pt's daughter called in stating that Shannan, is on oxygen after her discharge from the hospital. Shannan is taking oxycodone which they state it makes her very weak.     They are requesting if they can send someone to their house to provide vitamins through IV to stabilize her. Pt's daughter states Shannan is very weak. Not talking and that she is unable to leave the home for an appointment.

## 2024-04-01 NOTE — TELEPHONE ENCOUNTER
Patient Call    Who are you speaking with? Patient    If it is not the patient, are they listed on an active communication consent form? N/A   What is the reason for this call? She asked if appt tomorrow can be virtual   Does this require a call back? Yes   If a call back is required, please list best call back number [phone]    If a call back is required, advise that a message will be forwarded to their care team and someone will return their call as soon as possible.   Did you relay this information to the patient? Yes

## 2024-04-01 NOTE — TELEPHONE ENCOUNTER
Patient Call    Who are you speaking with? Child    If it is not the patient, are they listed on an active communication consent form? Yes   What is the reason for this call? She said pt can't leave the house. She asked if Dr. Buitrago could prescribe vitamins   Does this require a call back? Yes   If a call back is required, please list best call back number [phone]    If a call back is required, advise that a message will be forwarded to their care team and someone will return their call as soon as possible.   Did you relay this information to the patient? Yes

## 2024-04-01 NOTE — TELEPHONE ENCOUNTER
Left  for patient's daughter using [employer]  # [id number]. I let her know pt's appt with Dr. Buitrago can be switched to a virtual appointment and can further discuss plan and medications at that time. I also let her know her infusion appt today was rescheduled to 4/8 at 11am and will also further be discussed during the appt. Left direct call back number for any questions or concerns

## 2024-04-02 ENCOUNTER — TELEMEDICINE (OUTPATIENT)
Dept: HEMATOLOGY ONCOLOGY | Facility: CLINIC | Age: 54
End: 2024-04-02
Payer: COMMERCIAL

## 2024-04-02 DIAGNOSIS — C50.812 MALIGNANT NEOPLASM OF OVERLAPPING SITES OF LEFT BREAST IN FEMALE, ESTROGEN RECEPTOR POSITIVE (HCC): Primary | ICD-10-CM

## 2024-04-02 DIAGNOSIS — C79.51 PAIN FROM BONE METASTASES (HCC): ICD-10-CM

## 2024-04-02 DIAGNOSIS — Z17.0 MALIGNANT NEOPLASM OF OVERLAPPING SITES OF LEFT BREAST IN FEMALE, ESTROGEN RECEPTOR POSITIVE (HCC): Primary | ICD-10-CM

## 2024-04-02 DIAGNOSIS — C50.912 CARCINOMA OF LEFT BREAST IN FEMALE, ESTROGEN RECEPTOR POSITIVE, UNSPECIFIED SITE OF BREAST (HCC): ICD-10-CM

## 2024-04-02 DIAGNOSIS — G89.3 PAIN FROM BONE METASTASES (HCC): ICD-10-CM

## 2024-04-02 DIAGNOSIS — C50.919 RECURRENT MALIGNANT NEOPLASM OF BREAST, UNSPECIFIED LATERALITY (HCC): ICD-10-CM

## 2024-04-02 DIAGNOSIS — Z17.0 CARCINOMA OF LEFT BREAST IN FEMALE, ESTROGEN RECEPTOR POSITIVE, UNSPECIFIED SITE OF BREAST (HCC): ICD-10-CM

## 2024-04-02 PROCEDURE — 99214 OFFICE O/P EST MOD 30 MIN: CPT | Performed by: INTERNAL MEDICINE

## 2024-04-02 NOTE — PROGRESS NOTES
Virtual Regular Visit    Verification of patient location:    Patient is located at Home in the following state in which I hold an active license [place]    Assessment/plan:     1.  Metastatic breast carcinoma.  Metastatic disease to the bones and pleura.  Malignant pleural effusion.  2.  Cachexia  3.  Anemia  4.  Cancer associated pain         Shannan Vee is a 53-year-old surgically postmenopausal female with past medical history significant for locally advanced left-sided breast cancer, hormone receptor positive and HER2 negative, grade 2 initially diagnosed in June 2013.  Previously followed with Dr. Villavicencio.  She received neoadjuvant dose dense chemotherapy followed by mastectomy.  Pathology demonstrated significant residual disease with 2 positive lymph nodes.  She received adjuvant radiation and started on tamoxifen which was later changed to Arimidex in 2015-October 2022 after which she stopped Arimidex for unknown reason.  After patient had undergone hysterectomy and oophorectomy.  In October 2023, patient was found to have extensive metastatic disease, large lung mass and mediastinal adenopathy.  Biopsy of the bone was consistent with breast carcinoma (ER positive, HER2 negative by FISH) in October 2023.  Lung biopsy was consistent with malignant cells compatible with her history of metastatic breast carcinoma and Caris testing noted triple negative disease.  She has been on treatment with ribociclib and Faslodex.     She completed radiation at [facility].  Recent CT scan of the chest demonstrated stable right-sided pleural effusion, still with pleural carcinomatosis.  She continues with thoracentesis as needed.  Recently evaluated by IR and their impression is that patient has extensive tumor of the right lung with some encasing the catheter. He did not believe that further manipulation of the catheter would provide any clinical benefit.  Position of the catheter is well evaluated at this time.  tPA could be considered  to break up loculations but the concern is bleeding especially with anemia.      Lengthy discussion with the patient and her daughter via .  We discussed her diagnosis, treatment option and prognosis.  Chart and prior history leading up to her diagnosis reviewed. Biopsy of the bone was consistent with breast carcinoma (ER positive, HER2 negative by FISH) in October 2023.  Lung biopsy was consistent with malignant cells compatible with her history of metastatic breast carcinoma and Caris testing noted triple negative disease.  We discussed triple negative breast cancer treatment which consist of chemotherapy which she is too weak to receive.  Will d/c treatment with Faslodex and ribociclib.    I expressed concern regarding patient's declining performance status.  We addressed goals of care and I encouraged them to consider hospice.  She is not a candidate for aggressive treatment.  At this juncture, patient's daughter would like to continue supportive care and continue to follow with palliative care.     Daniela : Ke [id number]     Oncology history:   Primary Diagnosis:  1.  Metastatic breast carcinoma.  Metastatic disease to the bones and right pleura.  Malignant pleural effusion.  Diagnosed October 2023.  2.  History of locally advanced left breast cancer with skin involvement, grade 2, ER/WA positive HER-2 negative disease. Diagnosed in June 2013.  3.  BRCA1/2 mutation negative.       Previous Hematologic/ Oncologic Treatment:   1. Neoadjuvant chemotherapy with dose dense AC X4 followed by weekly paclitaxel x12. Completed in December 2013.  2. S/p left mastectomy with sentinel lymph node biopsy.  February 2014  2. Adjuvant hormonal therapy with tamoxifen from March 2014 through May 2015.  3. Postmastectomy radiation therapy completed in May 2014.   4. Adjuvant endocrine therapy with anastrozole 1 mg May 2015-  5. ribociclib and Faslodex.      Test  Results:  Pathology:  6/18/2013: Left breast biopsy  Invasive mammary carcinoma with lobular features.    Grade 2.    ER 90%  %    HER2 negative     10/9/2023: A. Iliac Crest, Right, Biospy:  - Metastatic lobular carcinoma of the breast.  ER positive (40%), VT negative (0%), HER2 equivocal by IHC (2+), negative by FISH.     11/8/2023: A-B. Pleural, Right, (ThinPrep and cell block preparations):  Metastatic adenocarcinoma, see comment.  Satisfactory for evaluation.   Comment: There are malignant cells present in the pleural fluid with expression of MOC-31, SAW-EP4 and KHANH-3. This immunoprofile supports the diagnosis and is most compatible with the patient's reported history of metastatic breast cancer. Clinical correlation is advised.     11/9/2023: A. Lung, Right Lower Lobe Bronchial Brushing:  Malignant cells present; positive for carcinoma, see note.      Radiology:  3/11/2024: CTA chest:  No pulmonary embolism.  Stable right pleural effusion with extensive pleural carcinomatosis and compression of the majority of the right lung.  Stable left upper lobe juxtapleural groundglass infiltrates.  Stable diffuse osseous metastatic disease with thoracic compression fractures     Problem List Items Addressed This Visit    None       Reason for visit is   Chief Complaint   Patient presents with    Virtual Regular Visit        Encounter provider Nilda Buitrago DO    Provider located at [address] [place] [address] [facility] [address] [id number]      Recent Visits  No visits were found meeting these conditions.  Showing recent visits within past 7 days and meeting all other requirements  Today's Visits  Date Type Provider Dept   04/02/24 Telemedicine Nilda Buitrago DO [facility]   Showing today's visits and meeting all other requirements  Future Appointments  No visits were found meeting these conditions.  Showing future appointments within next  150 days and meeting all other requirements       The patient was identified by name and date of birth. Shannan Vee was informed that this is a telemedicine visit and that the visit is being conducted through the Epic Embedded platform. She agrees to proceed..  My office door was closed. No one else was in the room.  She acknowledged consent and understanding of privacy and security of the video platform. The patient has agreed to participate and understands they can discontinue the visit at any time.    Patient is aware this is a billable service.     Subjective  Shannan Vee is a 53 y.o. female with metastatic breast carcinoma. Patient was evaluated via video today.   phone used.  Daughter at bedside.  Patient is very weak and continues to have declining performance status.  Per daughter, she has poor oral intake and continues to have cancer associated pain.  Intermittent episodes of confusion and ongoing generalized weakness.    Patient with declining performance status, increased cancer related pain, poor oral intake, intermittent confusion and ongoing generalized weakness.  Patient's daughter states she sleeps most of the time.  Now requiring oxygen via nasal cannula.    Past Medical History:   Diagnosis Date    Breast cancer (HCC) 07/2013    left-chemotherapy    Fibroma     Goals of care, counseling/discussion     History of chemotherapy     History of external beam radiation therapy        Past Surgical History:   Procedure Laterality Date    APPENDECTOMY      BREAST SURGERY  2014    Left breast 2/2 breast CA    HYSTERECTOMY  2015    Due to fibroma    IR BIOPSY BONE  10/9/2023    IR PLEURAL EFFUSION LONG-TERM CATHETER PLACEMENT  1/24/2024    IR THORACENTESIS  11/8/2023    MASTECTOMY Left 2013    OOPHORECTOMY         Current Outpatient Medications   Medication Sig Dispense Refill    acetaminophen (TYLENOL) 500 mg tablet Take 2 tablets (1,000 mg total) by mouth 3 (three) times a day as needed for  mild pain 180 tablet 0    calcium citrate-vitamin D (Calcitrate Plus D) 315 mg-5 mcg tablet Take 1 tablet by mouth 2 (two) times a day 180 tablet 0    ergocalciferol (VITAMIN D2) 50,000 units TAKE 1 CAPSULE BY MOUTH 1 TIME A WEEK 8 capsule 0    lidocaine (LIDODERM) 5 % Apply 2 patches topically over 12 hours daily Remove & Discard patch within 12 hours or as directed by MD 60 patch 0    metoprolol succinate (TOPROL-XL) 50 mg 24 hr tablet Take 50 mg daily, add 1/2 tablet qhs 60 tablet 0    Multiple Vitamin (MULTIVITAMIN ADULT PO) Take 1 tablet by mouth in the morning      naloxone (NARCAN) 4 mg/0.1 mL nasal spray Administer 1 spray into a nostril. If no response after 2-3 minutes, give another dose in the other nostril using a new spray. 1 each 0    oxyCODONE (ROXICODONE) 15 mg immediate release tablet Take 1 tablet (15 mg total) by mouth every 4 (four) hours as needed for moderate pain Max Daily Amount: 90 mg Do not start before March 31, 2024. 90 tablet 0    polyethylene glycol (GLYCOLAX) 17 GM/SCOOP powder Take 17 g by mouth daily 510 g 2    Ribociclib Succ, 600 MG Dose, 200 MG TBPK Take 600 mg by mouth in the morning For 3 weeks and then 1 week off (Patient not taking: Reported on 3/22/2024) 21 each 6    Senokot 8.6 MG tablet Take 1 tablet (8.6 mg total) by mouth daily 30 tablet 3    sodium chloride 1 g tablet Take 1 tablet (1 g total) by mouth 2 (two) times a day 60 tablet 0     No current facility-administered medications for this visit.        Allergies   Allergen Reactions    Penicillins Hives     And cough    Aspirin Rash     Other reaction(s): Palpitations       Review of Systems   Constitutional:  Positive for activity change, appetite change, fatigue and unexpected weight change.   Respiratory:  Positive for shortness of breath.    Cardiovascular:  Positive for leg swelling.   Neurological:  Positive for weakness.   Psychiatric/Behavioral:  Positive for confusion.        Video Exam    There were no vitals  filed for this visit.    Physical Exam  Constitutional:       Appearance: She is ill-appearing.   Pulmonary:      Comments: Oxygen via nasal cannula  Neurological:      Mental Status: She is alert.        Visit Time  Total Visit Duration: 20 minutes

## 2024-04-05 NOTE — TELEPHONE ENCOUNTER
PCP SIGNATURE NEEDED FOR [facility] FORM RECEIVED VIA FAX AND PLACED IN PCP FOLDER TO BE DELIVERED AT ASSIGNED TIMES.

## 2024-04-05 NOTE — TELEPHONE ENCOUNTER
PCP SIGNATURE NEEDED FOR [facility] RECEIVED VIA FAX AND PLACED IN PCP FOLDER TO BE DELIVERED AT ASSIGNED TIMES.    CERTIFICATION PERIOD 3/28/24-5/26/24

## 2024-04-06 ENCOUNTER — ANESTHESIA EVENT (INPATIENT)
Dept: ANESTHESIOLOGY | Facility: HOSPITAL | Age: 54
DRG: 130 | End: 2024-04-06
Payer: COMMERCIAL

## 2024-04-06 ENCOUNTER — ANESTHESIA (INPATIENT)
Dept: ANESTHESIOLOGY | Facility: HOSPITAL | Age: 54
DRG: 130 | End: 2024-04-06
Payer: COMMERCIAL

## 2024-04-06 PROBLEM — J96.01 ACUTE HYPOXIC RESPIRATORY FAILURE (HCC): Status: ACTIVE | Noted: 2024-01-01

## 2024-04-06 PROBLEM — R57.9 SHOCK (HCC): Status: ACTIVE | Noted: 2024-01-01

## 2024-04-06 PROBLEM — I46.9 CARDIAC ARREST (HCC): Status: ACTIVE | Noted: 2024-01-01

## 2024-04-06 PROBLEM — R06.02 SHORTNESS OF BREATH: Status: ACTIVE | Noted: 2024-04-06

## 2024-04-06 PROBLEM — F11.90 CHRONIC, CONTINUOUS USE OF OPIOIDS: Status: ACTIVE | Noted: 2024-01-01

## 2024-04-06 PROBLEM — J96.20 ACUTE ON CHRONIC RESPIRATORY FAILURE (HCC): Status: ACTIVE | Noted: 2024-01-01

## 2024-04-06 NOTE — ED NOTES
Patient requesting pain medication at this time. RN explained that the order is in for 15mg PO oxycodone. Family states that is too much for her and at home they usually give her only 10mg of PO oyx. Family requesting RN to break 15mg pill in half for pain management. RN explained she cannot do that, but RN messaged provider to change order to 10mg po oxy. Family aware.      Lena Keller RN  04/06/24 [id number]

## 2024-04-06 NOTE — PROGRESS NOTES
Pt intubated. Family at bedside, sister in law providing majority of history. Reports pt with ongoing poor oral intake, was only taking a few bites or sips of liquids at home, very limited po intake. Says does like ensure supplements though did not appear to have significant intake of these. NKFA. Chart review of weight hx: 9/20/23 68kg, 11/29/23 60.8kg, 2/28/24 54.9kg, 3/22/24 56kg, 4/6/24 55.9kg. 17.8% weight loss x ~7 mo significant. Pt with noted severe muscle and fat wasting.     Meets criteria for chronic severe malnutrition.   Pt would be at risk of refeeding with EN initiation or IV dextrose use.   Monitor -lytes daily to q 12 h as indicated, replete as medically appropriate.   If initiating EN while intubated, would recommend Jevity 1.2 initiated at 5 ml/h, advance slowly by 5 ml q 6-8 h as medically appropriate/tolerated/electrolytes WNL to goal rate of 59 ml/h. This will provide 1699 kcal, 79 g PRO, 1143 ml free water. Water flush per MD.   Consider providing IV thiamine prior to starting EN.   Recommend daily weights for nutrition monitoring.

## 2024-04-06 NOTE — H&P
VTE Pharmacologic Prophylaxis:   Moderate Risk (Score 3-4) - Pharmacological DVT Prophylaxis Ordered: heparin.  Code Status: Level 1 - Full Code   Discussion with family: Updated  () at bedside.     Anticipated Length of Stay: Patient will be admitted on an inpatient basis with an anticipated length of stay of greater than 2 midnights secondary to  .     Total Time Spent on Date of Encounter in care of patient:  mins. This time was spent on one or more of the following: performing physical exam; counseling and coordination of care; obtaining or reviewing history; documenting in the medical record; reviewing/ordering tests, medications or procedures; communicating with other healthcare professionals and discussing with patient's family/caregivers.     Chief Complaint: Shortness of breath     History of Present Illness:  Shannan Vee is a 53 y.o. female with a PMH of  who presents with PMHx of   metastatic breast cancer with extensive metastasis/carcinomatosis, chronic anemia, loculated effusions with Pleurx catheter, severe malnutrition, hypertension who presents to the hospital with shortness of breath.   Pt with metastatic breast cancer, hormone receptor positive and HER2 negative diagnosed 2013. Prior history of neoadjuvant chemotherapy followed by mastectomy. She is s/p Hysterectomy and oophorectomy. 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 Completed radiation at [facility].   At home uses 3 L of nasal cannula, now requires 4 L.   Chest x-ray today shows worsening of pleural effusion  Recent CT scan of the chest demonstrated stable right-sided pleural effusion, still with pleural carcinomatosis. She continues with thoracentesis as needed. Recently evaluated by IR and their impression is that patient has extensive tumor of the right lung with some encasing the catheter. IR did not believe that further manipulation of the catheter would  provide any clinical benefit. Position of the catheter is well evaluated at this time. tPA could be considered to break up loculations but the concern is bleeding especially with anemia.   Spoke with the family at the bedside and patient, they want to continue treatment and requesting IR consult.   On my evaluation patient not in acute distress, hemodynamically stable.  Patient denies chest pain, fever, chills, abdominal pain, changes in urinary/bowel habits, nausea, vomiting.     Review of Systems:  Review of Systems   Constitutional:  Positive for activity change, appetite change and fatigue. Negative for chills and fever.   HENT:  Negative for congestion.    Respiratory:  Positive for cough and shortness of breath. Negative for chest tightness.    Cardiovascular:  Positive for leg swelling. Negative for chest pain and palpitations.   Gastrointestinal:  Negative for abdominal distention, abdominal pain, nausea and vomiting.   Genitourinary:  Negative for dysuria.   Musculoskeletal:  Negative for back pain.   Neurological:  Negative for dizziness, seizures, light-headedness and headaches.   Psychiatric/Behavioral:  Negative for confusion.          Past Medical and Surgical History:   Medical History        Past Medical History:   Diagnosis Date    Breast cancer (HCC) 07/2013     left-chemotherapy    Fibroma      Goals of care, counseling/discussion      History of chemotherapy      History of external beam radiation therapy              Surgical History         Past Surgical History:   Procedure Laterality Date    APPENDECTOMY        BREAST SURGERY   2014     Left breast 2/2 breast CA    HYSTERECTOMY   2015     Due to fibroma    IR BIOPSY BONE   10/9/2023    IR PLEURAL EFFUSION LONG-TERM CATHETER PLACEMENT   1/24/2024    IR THORACENTESIS   11/8/2023    MASTECTOMY Left 2013    OOPHORECTOMY                Meds/Allergies:          Prior to Admission medications    Medication Sig Start Date End Date Taking? Authorizing  Provider   acetaminophen (TYLENOL) 500 mg tablet Take 2 tablets (1,000 mg total) by mouth 3 (three) times a day as needed for mild pain 2/8/24 5/8/24   Todd Bañuelos MD   calcium citrate-vitamin D (Calcitrate Plus D) 315 mg-5 mcg tablet Take 1 tablet by mouth 2 (two) times a day 10/23/23 4/6/24   Maxwell Villavicencio MD   ergocalciferol (VITAMIN D2) 50,000 units TAKE 1 CAPSULE BY MOUTH 1 TIME A WEEK 1/22/24     Maxwell Villavicencio MD   lidocaine (LIDODERM) 5 % Apply 2 patches topically over 12 hours daily Remove & Discard patch within 12 hours or as directed by MD 3/29/24     Janie Briones MD   metoprolol succinate (TOPROL-XL) 50 mg 24 hr tablet Take 50 mg daily, add 1/2 tablet qhs 3/26/24     Julius Sanders,    Multiple Vitamin (MULTIVITAMIN ADULT PO) Take 1 tablet by mouth in the morning       Historical Provider, MD   naloxone (NARCAN) 4 mg/0.1 mL nasal spray Administer 1 spray into a nostril. If no response after 2-3 minutes, give another dose in the other nostril using a new spray. 2/10/24 2/9/25   Gabe Copeland,    oxyCODONE (ROXICODONE) 15 mg immediate release tablet Take 1 tablet (15 mg total) by mouth every 4 (four) hours as needed for moderate pain Max Daily Amount: 90 mg Do not start before March 31, 2024. 3/31/24     Janie Briones MD   polyethylene glycol (GLYCOLAX) 17 GM/SCOOP powder Take 17 g by mouth daily 3/29/24     Janie Briones MD   Ribociclib Succ, 600 MG Dose, 200 MG TBPK Take 600 mg by mouth in the morning For 3 weeks and then 1 week off  Patient not taking: Reported on 3/22/2024 10/27/23     Maxwell Villavicencio MD   Senokot 8.6 MG tablet Take 1 tablet (8.6 mg total) by mouth daily  Patient not taking: Reported on 4/6/2024 3/29/24     Janie Briones MD   sodium chloride 1 g tablet Take 1 tablet (1 g total) by mouth 2 (two) times a day  Patient not taking: Reported on 4/6/2024 1/30/24     Ainsley Call MD      I have reviewed home medications with patient personally.     Allergies:          Allergies   Allergen Reactions    Penicillins Hives       And cough    Aspirin Rash       Other reaction(s): Palpitations         Social History:  Marital Status: /Civil Union   Occupation:   Patient Pre-hospital Living Situation: Home  Patient Pre-hospital Level of Mobility:     Patient Pre-hospital Diet Restrictions:   Substance Use History:   Social History          Substance and Sexual Activity   Alcohol Use Not Currently      Social History           Tobacco Use   Smoking Status Never    Passive exposure: Never   Smokeless Tobacco Never      Social History          Substance and Sexual Activity   Drug Use No         Family History:        Physical Exam:      Vitals:   Blood Pressure: 117/76 (04/06/24 0615)  Pulse: (!) 128 (04/06/24 0615)  Temperature: 97.6 °F (36.4 °C) (04/06/24 0431)  Temp Source: Oral (04/06/24 0431)  Respirations: 22 (04/06/24 0615)  SpO2: 99 % (04/06/24 0615)     Physical Exam  Constitutional:       Appearance: She is ill-appearing.   HENT:      Head: Normocephalic and atraumatic.      Nose: Nose normal.      Mouth/Throat:      Mouth: Mucous membranes are dry.   Eyes:      Extraocular Movements: Extraocular movements intact.      Pupils: Pupils are equal, round, and reactive to light.   Cardiovascular:      Rate and Rhythm: Regular rhythm. Tachycardia present.      Pulses: Normal pulses.      Heart sounds: Normal heart sounds.      Comments: Bilateral pedal edema 3+ in the lower extremities  Pulmonary:      Effort: No respiratory distress.      Comments: Breath sounds decreased on right  Abdominal:      General: Abdomen is flat. Bowel sounds are normal. There is no distension.      Palpations: Abdomen is soft.      Tenderness: There is no abdominal tenderness.   Musculoskeletal:      Cervical back: Normal range of motion and neck supple.   Skin:     General: Skin is warm and dry.      Capillary Refill: Capillary refill takes less than 2 seconds.   Neurological:      General: No  focal deficit present.      Mental Status: She is alert and oriented to person, place, and time. Mental status is at baseline.            Additional Data:      Lab Results:       Results from last 7 days   Lab Units 04/06/24  0453   WBC Thousand/uL 6.59   HEMOGLOBIN g/dL 9.3*   HEMATOCRIT % 32.3*   PLATELETS Thousands/uL 272   NEUTROS PCT % 72   LYMPHS PCT % 12*   MONOS PCT % 14*   EOS PCT % 0           Results from last 7 days   Lab Units 04/06/24  0453   SODIUM mmol/L 144   POTASSIUM mmol/L 4.2   CHLORIDE mmol/L 96   CO2 mmol/L 36*   BUN mg/dL 19   CREATININE mg/dL 0.44*   ANION GAP mmol/L 12   CALCIUM mg/dL 8.9   ALBUMIN g/dL 3.2*   TOTAL BILIRUBIN mg/dL 0.48   ALK PHOS U/L 187*   ALT U/L 26   AST U/L 178*   GLUCOSE RANDOM mg/dL 117           Results from last 7 days   Lab Units 04/06/24  0626   INR   1.17                     Lines/Drains:  Invasive Devices         Peripheral Intravenous Line  Duration                Peripheral IV 04/06/24 Distal;Left;Ventral (anterior) Forearm <1 day     Peripheral IV 04/06/24 Left Antecubital <1 day                  Drain  Duration                Pleural Effusion Long-Term Catheter 16 Fr. 72 days                                Imaging: Reviewed radiology reports from this admission including: chest xray  XR chest 1 view portable   ED Interpretation by Lena Rutherford PA-C (04/06 0636)   Large right sided pleural effusion as interpreted by me        CTA ED chest PE Study    (Results Pending)         EKG and Other Studies Reviewed on Admission:   EKG:      ** Please Note: This note has been constructed using a voice recognition system. **  [facility]  Progress Note  Name: Shannan Vee I  MRN: [id number]  Unit/Bed#: ED-15 I Date of Admission: 4/6/2024   Date of Service: 4/6/2024 I Hospital Day: 0     Assessment/Plan   * Shortness of breath  Assessment & Plan  Chest x-ray showed worsening of pleural effusion  Follow-up CT chest  DuoNebs as  needed for shortness of breath  Continuous pulse ox  Continue oxygen supplementation, at home baseline 3 L now requires 4 L     Tachycardia  Assessment & Plan  Continue metoprolol 50 mg daily     Severe protein-calorie malnutrition (HCC)  Assessment & Plan  Malnutrition Findings:   Chronic illness  Adult Degree of Malnutrition: Other severe protein calorie malnutrition  BMI Findings: 20.47     There is no height or weight on file to calculate BMI.         Large pleural effusion  Assessment & Plan  Chest x-ray shows worsening of pleural effusion  Recent CT scan of the chest demonstrated stable right-sided pleural effusion, still with pleural carcinomatosis. She continues with thoracentesis as needed. Recently evaluated by IR and their impression is that patient has extensive tumor of the right lung with some encasing the catheter. IR did not believe that further manipulation of the catheter would provide any clinical benefit. Position of the catheter is well evaluated at this time. tPA could be considered to break up loculations but the concern is bleeding especially with anemia.   Follow-up CT chest  Family is requesting IR consult, follow-up recommendations     Recurrent malignant neoplasm of breast (HCC)  Assessment & Plan  Metastatic breast cancer. Hormone receptor positive and HER2 negative diagnosed 2013. Prior history of neoadjuvant chemotherapy followed by mastectomy. She is s/p Hysterectomy and oophorectomy. 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 Completed radiation at [facility].     Recent CT-scan showing extensive right thoracic pleural carcinomatosis, Extensive associated mass effect.      Will ask palliative and hospice consult

## 2024-04-06 NOTE — RESPIRATORY THERAPY NOTE
RT Ventilator Management Note  Shannan Vee 53 y.o. female MRN: [id number]  Unit/Bed#: ICU 05 Encounter: [id number]      Daily Screen         4/6/2024  1009             Patient safety screen outcome:: Failed    Not Ready for Weaning due to:: Underline problem not resolved              Physical Exam:          Resp Comments: Patient intubated by anesthesia with 7.5 ETT at 20@lips.  Patient transported to ICU without incident and placed on P(CMV) with documented settings.  Attending at bedside.

## 2024-04-06 NOTE — ANESTHESIA PROCEDURE NOTES
Emergent Intubation  Date/Time: 4/6/2024 9:55 AM  Airway not difficult    General Information and Staff    Performed by: Dwight Burnett DO  Authorized by: Dwight Burnett DO      Indications and Patient Condition  Indications for airway management: cardiovascular instability (Code Blue)  Breathing: Agonal Breathing.  Preoxygenated: yes  MILS not maintained throughout  Mask assessment prior to intubation: BVM iventilation.    Final Airway Details  Final airway type: endotracheal airway      Successful airway: cuffed     Successful intubation technique: video laryngoscopy  Endotracheal tube insertion site: oral  Blade type: [place].  Blade size: #3  Cormack-Lehane Classification: grade I - full view of glottis  Placement verified by: chest auscultation, capnometry and palpation of cuff   Measured from: teeth  ETT to teeth (cm): 20  Number of attempts at approach: 1  Ventilation between attempts: BVM  Number of other approaches attempted: 0    Additional Comments  Code blue  CPR in progress on arrival . Intubation done as noted above. ROSC with transfer to ICU

## 2024-04-06 NOTE — ASSESSMENT & PLAN NOTE
"2/2 chronic effusion, and invasive carcinomatosis.  4/6 CTA chest: negative PE.  \"Re-demonstration of extensive thoracic pleural carcinomatosis with complete opacification of the R hemithorax.  Findings are also secondary to a combination of atelectasis and effusion and worsened since prior.  Moderate L pleural effusion with subjacent compressive atelectasis. GGO of GURMEET and lingula.  Extensive osseous metastatic disease with multilevel compression deformities\"  4/6 Patient intubated 2/2 cardiac arrest  Vent: ACPC 20/ Pi 25/6 peep/60%  Titrate FiO2 for SpO2 > 92%  If increasing FiO2 requirements, place Left side down  Vent bundle ordered  Sedation/analgesia with fentanyl gtt and ativan as needed  Daily SAT/SBT  Ascept cath in place R pleura-- no drainage.  May need new catheter placed  To consider L chest tube with IR  "

## 2024-04-06 NOTE — MALNUTRITION/BMI
This medical record reflects one or more clinical indicators suggestive of malnutrition.    Malnutrition Findings:   Adult Malnutrition type: Chronic illness  Adult Degree of Malnutrition: Other severe protein calorie malnutrition  Malnutrition Characteristics: Fat loss, Muscle loss, Inadequate energy, Weight loss                  360 Statement: Chronic severe malnutrition related to catabolic illness, poor oral intake as evidenced by < 75% estimated energy intake > 1 mo, severe muscle loss to temporalis muscles, severe fat loss to buccal pads, 17.8% weight loss x ~7 mo (9/20/23 68kg, 4/6/24 55.9kg). Treated with: see RD progress note for TF recommendations. Recommend daily weights for nutrition monitoring.    BMI Findings:           Body mass index is 20.51 kg/m².     See Nutrition note dated 4/6/24 for additional details.  Completed nutrition assessment is viewable in the nutrition documentation.

## 2024-04-06 NOTE — ASSESSMENT & PLAN NOTE
Metastatic breast cancer. Hormone receptor positive and HER2 negative diagnosed 2013. Prior history of neoadjuvant chemotherapy followed by mastectomy. She is s/p Hysterectomy and oophorectomy. 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 Completed radiation at [facility].     Recent CT-scan showing extensive right thoracic pleural carcinomatosis, Extensive associated mass effect.     Will ask palliative and hospice consult

## 2024-04-06 NOTE — CONSULTS
Consult - Critical Care   Shannan Vee 53 y.o. female MRN: [id number]  Unit/Bed#: ICU 05 Encounter: [id number]    Physician Requesting Consult: Dr. Valiente  Reason for Consult: cardiac arrest    Assessment/Plan:  Acute on chronic hypoxic respiratory failure - 3L home oxygen  Cardiac arrest - unclear cause, consider arrhythmia vs hypoxia  Undifferentiated shock- may be due to sedation  Atrial fibrillation with RVR  Moderate left-sided pleural effusion  Right-sided pleural effusion with pleurex in place - no drainage due to catheter surrounded by tumor  Metastatic triple negative metastatic breast cancer - bone mets, lung mets, not candidate for chemotherapy  Severe protein calorie malnutrition  Anemia of chronic disease    The patient is critically ill with cardiac arrest possibly due to arrhythmia in setting of chronic illness including metastatic breast cancer with severe malnutrition not candidate for further treatment.  Neuro: use Fentanyl and Versed PRN for sedation goal RASS -2  CV: Start Norepinephrine and titrate for MAP >65; hold O/P beta blocker; start Digoxin; hold Lasix for now  Lungs: cont. Current PS settings and await ABG; check CXR for tube placement; pulmonary toilet  GI: Start TF; bowel regimen  : No acute issues  FEN: replete PRN  Heme: Heparin SQ  ID: No acute issues  Endo: no acute issues  MSK: reposition, turn  Lines:   Peripherals  No anna  No mckeon  Full code    Discussed on rounds.    Addendum: Family meeting with patient's , brother, sister, daughter, son, nephew, son's girlfriend all present. We discussed events of hospital stay including cardiac arrest, intubation, and need for pressors. Discussed recent note from oncologist including recommendation for hospice and that pt is not a candidate for chemotherapy.  Multiple questions and concerns addressed.  Family would like to change code status to DNR.  At this time, they wish to continue aggressive care and will continue to discuss  goals of care together.      Critical Care Time: 55 minutes  Documented critical care time excludes any procedures documented elsewhere. It also excludes any family updates    _____________________________________________________________________    C/C:  Unable to obtain due to unresponsive    HPI:    Shannan Vee is a 53 y.o. female PMH chronic hypoxic respiratory failure, metastatic breast cancer, cachexia, anemia who presented to the ER today with SOB x 1 hour. Also chest pain, back pain and diarrhea x 3 days. In the ER, pt found to be in afib with RVR.  Labs revealed elevated , anemia. Received Metoprolol succinate 50 mg oral. Magnesium repleted. Admitted to floors.     Per RN, pt came to floors from ER and was weak but responsive. Noted to be in afib with RVR. Pt was out of bed to commode then back to bed.  Pt then became unresponsive and RRT was called.  Pt noted to have lost pulse and code blue was called.    I was present for the entire code. Please see code records.    Pt received 1 round of Epi, bicarb, Calcium with BLS.  ROSC after 5 minutes of treatment.    Found lying in bed. On ventilator. Sedated, arousable. Not following commands or interacting.    Records reviewed and pt was diagnosed with left-sided breast cancer HER2 negative in 2013.Treated with chemotherapy, aromatase inhibitor, XRT 2015/10/22.  She stopped her aromatase inhibitor on her own in October 2022 and underwent hysterectomy and oophorectomy.  In October 2023, she was found to have extensive metastatic disease with large lung mass and mediastinal adenopathy, bone mets. Biopsy showed breast carcinoma ER positive, HER2 negative. Treated with Ribociclib and Faslodex.  Treated with radiation.  Imaging showed stable right-sided pleural effusion with pleural carcinomatosis. Has a right-sided ASEPT in place that is not functioning and is thought to be surrounded by tumor.     Seen by oncology April 2, 2024. Not a candidate for further  chemotherapy due to weakness.  Encouraged to consider hospice.     Admission at [facility] 3/22-3-26 noted. Presented with SOB and discharged on oxygen.    Review of Systems:  Unable to obtain due to intubated.    Historical Information   Past Medical History:   Diagnosis Date    Breast cancer (HCC) 07/2013    left-chemotherapy    Fibroma     Goals of care, counseling/discussion     History of chemotherapy     History of external beam radiation therapy      Past Surgical History:   Procedure Laterality Date    APPENDECTOMY      BREAST SURGERY  2014    Left breast 2/2 breast CA    HYSTERECTOMY  2015    Due to fibroma    IR BIOPSY BONE  10/9/2023    IR PLEURAL EFFUSION LONG-TERM CATHETER PLACEMENT  1/24/2024    IR THORACENTESIS  11/8/2023    MASTECTOMY Left 2013    OOPHORECTOMY       Social History   Social History     Substance and Sexual Activity   Alcohol Use Not Currently     Social History     Substance and Sexual Activity   Drug Use No     Social History     Tobacco Use   Smoking Status Never    Passive exposure: Never   Smokeless Tobacco Never       Family History:   Family History   Problem Relation Age of Onset    Lung cancer Mother     No Known Problems Father     No Known Problems Sister     No Known Problems Daughter     No Known Problems Daughter     Cancer Maternal Grandmother     No Known Problems Maternal Grandfather     No Known Problems Paternal Grandmother     No Known Problems Paternal Grandfather     Uterine cancer Maternal Aunt     No Known Problems Son     No Known Problems Paternal Aunt        Medications:  Pertinent medications were reviewed  Current Facility-Administered Medications   Medication Dose Route Frequency Provider Last Rate    [Transfer Hold] acetaminophen  650 mg Oral Q6H PRN Venecia Green MD      [Transfer Hold] diltiazem  10 mg Intravenous Once Moustapha Azevedo MD      fentanyl citrate (PF)           fentaNYL  100 mcg Intravenous Q2H PRN JANNETH Paz      [Transfer  Hold] heparin (porcine)  5,000 Units Subcutaneous Q8H [facility] Venecia Green MD      [Transfer Hold] HYDROmorphone  0.5 mg Intravenous Q4H PRN Nghia Valiente DO      [Transfer Hold] ipratropium-albuterol  3 mL Nebulization Q4H PRN Max 6/day Venecia Green MD      lidocaine  2 patch Topical Daily Venecia Green MD      metoprolol succinate  50 mg Oral Daily Venecia Green MD      oxyCODONE  15 mg Oral Q4H PRN Venecia Green MD           Allergies   Allergen Reactions    Penicillins Hives     And cough    Aspirin Rash     Other reaction(s): Palpitations         Vitals:   /80   Pulse (!) 153   Temp 98.4 °F (36.9 °C) (Temporal)   Resp 21   SpO2 97%   There is no height or weight on file to calculate BMI.  SpO2: 97 %,   SpO2 Activity: At Rest,   O2 Device: Ventilator      Intake/Output Summary (Last 24 hours) at 4/6/2024 1027  Last data filed at 4/6/2024 0633  Gross per 24 hour   Intake 2050 ml   Output --   Net 2050 ml     Invasive Devices       Peripheral Intravenous Line  Duration             Peripheral IV 04/06/24 Distal;Left;Ventral (anterior) Forearm <1 day    Peripheral IV 04/06/24 Left Antecubital <1 day              Drain  Duration             Pleural Effusion Long-Term Catheter 16 Fr. 72 days              Airway  Duration             ETT  Cuffed <1 day                    Physical Exam:  Gen: cachectic, appears chronically ill  HEENT: NCAT; eyes close; MM dry; ETT in place  Neck:supple  Chest: assisted breathing no bs on right and minimal bs on left-side  CVS:  tachy and irregular  Abd:soft, nd; right-sided pleurex in place  Ext: +3 pitting edema b/l l/e  Neuro: moving, opens eyes, not following commands  Skin: limited exam, no visible rash      Diagnostic Data:  Lab: I have personally reviewed pertinent lab results.,   CBC:  Results from last 7 days   Lab Units 04/06/24  0453   WBC Thousand/uL 6.59   HEMOGLOBIN g/dL 9.3*   HEMATOCRIT % 32.3*   PLATELETS Thousands/uL 272      CMP:   Lab  "Results   Component Value Date    SODIUM 144 04/06/2024    K 4.2 04/06/2024    CL 96 04/06/2024    CO2 36 (H) 04/06/2024    BUN 19 04/06/2024    CREATININE 0.44 (L) 04/06/2024    CALCIUM 8.9 04/06/2024     (H) 04/06/2024    ALT 26 04/06/2024    ALKPHOS 187 (H) 04/06/2024    EGFR 114 04/06/2024   ,   PT/INR:   Lab Results   Component Value Date    INR 1.17 04/06/2024     Labs per my review reveal normal renal function; metabolic alkalosis; low albumin; anemia stable; elevated BNP        Imaging: I have personally reviewed the pertinent imaging studies on the PACS system  CTA chest per my review reveals opacification of right hemithorax with extensive thoracic pleural carcinomatosis with effusion and atelectasis, moderate left pleural effusion with atelectasis worsened; GGO GURMEET and lingula; narrowed pulmonary artery and venous vasculature; extensive osseous mets.    Cardiac/EKG/telemetry/Echo:   ECHO 3/24/24: EF normal, RV pressure 46 mm Hg; small pericardial effusion    VTE Prophylaxis: Heparin    Code Status: Level 1 - Full Code    Carmen Blue DO    Portions of the record may have been created with voice recognition software. Occasional wrong word or \"sound a like\" substitutions may have occurred due to the inherent limitations of voice recognition software. Read the chart carefully and recognize, using context, where substitutions have occurred.   "

## 2024-04-06 NOTE — ASSESSMENT & PLAN NOTE
Possibly in the setting of sedation vs cardiogenic, doubt sepsis  Was started on levophed, will continue.  Titrate for goal MAP > 65 mmHg  Holding home Toprol XL in the setting of hypotension  Check echo  If vasopressor requirements increasing, consider placing central line and check ScvO2. Currently Levo 8 mcg/min

## 2024-04-06 NOTE — LETTER
[facility] INTENSIVE CARE UNIT  [address] [facility] [address]  Dept: [phone]    April 7, 2024     Patient: Shannan Vee   YOB: 1970   Date of Visit: 4/6/2024       To Whom it May Concern:    Shannan Vee is under my professional care. She was admitted to the hospital on 4/6/2024 and remains admitted to the hospital in critical condition.    If you have any questions or concerns, please don't hesitate to call.         Sincerely,          Dennis Knowles MD

## 2024-04-06 NOTE — CASE MANAGEMENT
Case Management Discharge Planning Note    Patient name Shannan Vee  Location ICU 05/ICU 05 MRN [id number]  : 1970 Date 2024       Current Admission Date: 2024  Current Admission Diagnosis:Shortness of breath   Patient Active Problem List    Diagnosis Date Noted    Shortness of breath 2024    Encounter for home hospice care 2024    Tachycardia 2024    Hyponatremia 2024    Drug induced constipation 2024    Severe protein-calorie malnutrition (HCC) 2024    Chronic bilateral low back pain without sciatica 2024    Cancer associated pain 2024    Goals of care, counseling/discussion 2024    Large pleural effusion 2023    Recurrent malignant neoplasm of breast (HCC) 10/23/2023    Right lower lobe lung mass 10/23/2023    Pain from bone metastases (HCC) 10/23/2023    Other specified anemias 10/23/2023    Bladder wall thickening 10/06/2023    Urinary frequency 2023    Overweight (BMI 25.0-29.9) 2021    Personal history of malignant neoplasm of breast 2019    Use of anastrozole (Arimidex) 2018    Carcinoma of left breast, estrogen receptor positive (HCC) 2017    Malignant neoplasm of overlapping sites of left female breast (HCC) 2015    Uterine leiomyoma 10/15/2014    Limb swelling 2013      LOS (days): 0  Geometric Mean LOS (GMLOS) (days):   Days to GMLOS:     OBJECTIVE:  Risk of Unplanned Readmission Score: 39.68         Current admission status: Inpatient   Preferred Pharmacy:   [id number] - [place] - [address]  [address] [facility] [address]  Phone: [phone] Fax: [phone]    Primary Care Provider: Todd Bañuelos MD    Primary Insurance:   Secondary Insurance:     DISCHARGE DETAILS:    Discharge planning discussed with:: Ajit Vee (Daughter) [phone]  Freedom of Choice: Yes  Comments - Freedom of Choice: CM consulted to present Hospice as next stage  of treatment. Family is declining pending further discussion.  CM contacted family/caregiver?: Yes (Dtr at bedside)  Were Treatment Team discharge recommendations reviewed with patient/caregiver?: Yes  Did patient/caregiver verbalize understanding of patient care needs?: Yes  Were patient/caregiver advised of the risks associated with not following Treatment Team discharge recommendations?: Yes    Contacts  Patient Contacts: Ajit Vee (Daughter) [phone]  Contact Method: In Person  Reason/Outcome: Referral    Requested Home Health Care         Is the patient interested in HHC at discharge?: No  DME Referral Provided  Referral made for DME?: No    Treatment Team Recommendation: Hospice  Discharge Destination Plan::  (Family seeking further treatment at this time.)    Additional Comments: CM Consult for Hospice. CM met w/ Dtr and Pt who was awake and alert to the discussion. Pt's Son was present by telephone assisting as interpretor. Hopsice presented. Pt nodded agreement whilst her Dtr and Son are not ready at this time. Son and Dtr expressed their goals for treatment and emphasized they will want to continue to being Pt to the ER when needed. CM presented further clarification of the Hopsice benefit and Comfort Care then subsequently honoured the children's preference to continue seeling treatment. Son did inform that he and his sister will discuss Hospice as an option in the future. CM to remain available for Pt and her family as needed, and will remain on standby shoulf family choose to agree to Hospice. CM Consult addressed.

## 2024-04-06 NOTE — ANESTHESIA PREPROCEDURE EVALUATION
Procedure:  EMERGENT INTUBATION    Relevant Problems   GYN   (+) Carcinoma of left breast, estrogen receptor positive (HCC)   (+) Malignant neoplasm of overlapping sites of left female breast (HCC)   (+) Recurrent malignant neoplasm of breast (HCC)      HEMATOLOGY   (+) Other specified anemias      MUSCULOSKELETAL   (+) Chronic bilateral low back pain without sciatica      NEURO/PSYCH   (+) Chronic bilateral low back pain without sciatica      PULMONARY   (+) Large pleural effusion   (+) Shortness of breath             Anesthesia Plan  ASA Score- 5 Emergent    Anesthesia Type-          Additional Monitors:     Airway Plan: ETT.    Comment: Code Blue.       Plan Factors-    Induction-     Postoperative Plan-     Informed Consent- Anesthetic plan and risks discussed with spouse.

## 2024-04-06 NOTE — PROGRESS NOTES
VTE Pharmacologic Prophylaxis:   Moderate Risk (Score 3-4) - Pharmacological DVT Prophylaxis Ordered: heparin.  Code Status: Level 1 - Full Code   Discussion with family: Updated  () at bedside.    Anticipated Length of Stay: Patient will be admitted on an inpatient basis with an anticipated length of stay of greater than 2 midnights secondary to  .    Total Time Spent on Date of Encounter in care of patient:  mins. This time was spent on one or more of the following: performing physical exam; counseling and coordination of care; obtaining or reviewing history; documenting in the medical record; reviewing/ordering tests, medications or procedures; communicating with other healthcare professionals and discussing with patient's family/caregivers.    Chief Complaint: Shortness of breath    History of Present Illness:  Shannan Vee is a 53 y.o. female with a PMH of  who presents with PMHx of   metastatic breast cancer with extensive metastasis/carcinomatosis, chronic anemia, loculated effusions with Pleurx catheter, severe malnutrition, hypertension who presents to the hospital with shortness of breath.   Pt with metastatic breast cancer, hormone receptor positive and HER2 negative diagnosed 2013. Prior history of neoadjuvant chemotherapy followed by mastectomy. She is s/p Hysterectomy and oophorectomy. 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 Completed radiation at [facility].   At home uses 3 L of nasal cannula, now requires 4 L.   Chest x-ray today shows worsening of pleural effusion  Recent CT scan of the chest demonstrated stable right-sided pleural effusion, still with pleural carcinomatosis. She continues with thoracentesis as needed. Recently evaluated by IR and their impression is that patient has extensive tumor of the right lung with some encasing the catheter. IR did not believe that further manipulation of the catheter would  provide any clinical benefit. Position of the catheter is well evaluated at this time. tPA could be considered to break up loculations but the concern is bleeding especially with anemia.   Spoke with the family at the bedside and patient, they want to continue treatment and requesting IR consult.   On my evaluation patient not in acute distress, hemodynamically stable.  Patient denies chest pain, fever, chills, abdominal pain, changes in urinary/bowel habits, nausea, vomiting.    Review of Systems:  Review of Systems   Constitutional:  Positive for activity change, appetite change and fatigue. Negative for chills and fever.   HENT:  Negative for congestion.    Respiratory:  Positive for cough and shortness of breath. Negative for chest tightness.    Cardiovascular:  Positive for leg swelling. Negative for chest pain and palpitations.   Gastrointestinal:  Negative for abdominal distention, abdominal pain, nausea and vomiting.   Genitourinary:  Negative for dysuria.   Musculoskeletal:  Negative for back pain.   Neurological:  Negative for dizziness, seizures, light-headedness and headaches.   Psychiatric/Behavioral:  Negative for confusion.        Past Medical and Surgical History:   Past Medical History:   Diagnosis Date    Breast cancer (HCC) 07/2013    left-chemotherapy    Fibroma     Goals of care, counseling/discussion     History of chemotherapy     History of external beam radiation therapy        Past Surgical History:   Procedure Laterality Date    APPENDECTOMY      BREAST SURGERY  2014    Left breast 2/2 breast CA    HYSTERECTOMY  2015    Due to fibroma    IR BIOPSY BONE  10/9/2023    IR PLEURAL EFFUSION LONG-TERM CATHETER PLACEMENT  1/24/2024    IR THORACENTESIS  11/8/2023    MASTECTOMY Left 2013    OOPHORECTOMY         Meds/Allergies:  Prior to Admission medications    Medication Sig Start Date End Date Taking? Authorizing Provider   acetaminophen (TYLENOL) 500 mg tablet Take 2 tablets (1,000 mg total) by  mouth 3 (three) times a day as needed for mild pain 2/8/24 5/8/24  Todd Bañuelos MD   calcium citrate-vitamin D (Calcitrate Plus D) 315 mg-5 mcg tablet Take 1 tablet by mouth 2 (two) times a day 10/23/23 4/6/24  Maxwell Villavicencio MD   ergocalciferol (VITAMIN D2) 50,000 units TAKE 1 CAPSULE BY MOUTH 1 TIME A WEEK 1/22/24   Maxwell Villavicencio MD   lidocaine (LIDODERM) 5 % Apply 2 patches topically over 12 hours daily Remove & Discard patch within 12 hours or as directed by MD 3/29/24   Janie Briones MD   metoprolol succinate (TOPROL-XL) 50 mg 24 hr tablet Take 50 mg daily, add 1/2 tablet qhs 3/26/24   Julius Sanders DO   Multiple Vitamin (MULTIVITAMIN ADULT PO) Take 1 tablet by mouth in the morning    Historical Provider, MD   naloxone (NARCAN) 4 mg/0.1 mL nasal spray Administer 1 spray into a nostril. If no response after 2-3 minutes, give another dose in the other nostril using a new spray. 2/10/24 2/9/25  Gabe Copeland DO   oxyCODONE (ROXICODONE) 15 mg immediate release tablet Take 1 tablet (15 mg total) by mouth every 4 (four) hours as needed for moderate pain Max Daily Amount: 90 mg Do not start before March 31, 2024. 3/31/24   Janie Briones MD   polyethylene glycol (GLYCOLAX) 17 GM/SCOOP powder Take 17 g by mouth daily 3/29/24   Janie Briones MD   Ribociclib Succ, 600 MG Dose, 200 MG TBPK Take 600 mg by mouth in the morning For 3 weeks and then 1 week off  Patient not taking: Reported on 3/22/2024 10/27/23   Maxwell Villavicencio MD   Senokot 8.6 MG tablet Take 1 tablet (8.6 mg total) by mouth daily  Patient not taking: Reported on 4/6/2024 3/29/24   Janie Briones MD   sodium chloride 1 g tablet Take 1 tablet (1 g total) by mouth 2 (two) times a day  Patient not taking: Reported on 4/6/2024 1/30/24   Ainsley Call MD     I have reviewed home medications with patient personally.    Allergies:   Allergies   Allergen Reactions    Penicillins Hives     And cough    Aspirin Rash     Other reaction(s): Palpitations        Social History:  Marital Status: /Civil Union   Occupation:   Patient Pre-hospital Living Situation: Home  Patient Pre-hospital Level of Mobility:     Patient Pre-hospital Diet Restrictions:   Substance Use History:   Social History     Substance and Sexual Activity   Alcohol Use Not Currently     Social History     Tobacco Use   Smoking Status Never    Passive exposure: Never   Smokeless Tobacco Never     Social History     Substance and Sexual Activity   Drug Use No       Family History:      Physical Exam:     Vitals:   Blood Pressure: 117/76 (04/06/24 0615)  Pulse: (!) 128 (04/06/24 0615)  Temperature: 97.6 °F (36.4 °C) (04/06/24 0431)  Temp Source: Oral (04/06/24 0431)  Respirations: 22 (04/06/24 0615)  SpO2: 99 % (04/06/24 0615)    Physical Exam  Constitutional:       Appearance: She is ill-appearing.   HENT:      Head: Normocephalic and atraumatic.      Nose: Nose normal.      Mouth/Throat:      Mouth: Mucous membranes are dry.   Eyes:      Extraocular Movements: Extraocular movements intact.      Pupils: Pupils are equal, round, and reactive to light.   Cardiovascular:      Rate and Rhythm: Regular rhythm. Tachycardia present.      Pulses: Normal pulses.      Heart sounds: Normal heart sounds.      Comments: Bilateral pedal edema 3+ in the lower extremities  Pulmonary:      Effort: No respiratory distress.      Comments: Breath sounds decreased on right  Abdominal:      General: Abdomen is flat. Bowel sounds are normal. There is no distension.      Palpations: Abdomen is soft.      Tenderness: There is no abdominal tenderness.   Musculoskeletal:      Cervical back: Normal range of motion and neck supple.   Skin:     General: Skin is warm and dry.      Capillary Refill: Capillary refill takes less than 2 seconds.   Neurological:      General: No focal deficit present.      Mental Status: She is alert and oriented to person, place, and time. Mental status is at baseline.          Additional Data:      Lab Results:  Results from last 7 days   Lab Units 04/06/24  0453   WBC Thousand/uL 6.59   HEMOGLOBIN g/dL 9.3*   HEMATOCRIT % 32.3*   PLATELETS Thousands/uL 272   NEUTROS PCT % 72   LYMPHS PCT % 12*   MONOS PCT % 14*   EOS PCT % 0     Results from last 7 days   Lab Units 04/06/24  0453   SODIUM mmol/L 144   POTASSIUM mmol/L 4.2   CHLORIDE mmol/L 96   CO2 mmol/L 36*   BUN mg/dL 19   CREATININE mg/dL 0.44*   ANION GAP mmol/L 12   CALCIUM mg/dL 8.9   ALBUMIN g/dL 3.2*   TOTAL BILIRUBIN mg/dL 0.48   ALK PHOS U/L 187*   ALT U/L 26   AST U/L 178*   GLUCOSE RANDOM mg/dL 117     Results from last 7 days   Lab Units 04/06/24  0626   INR  1.17                   Lines/Drains:  Invasive Devices       Peripheral Intravenous Line  Duration             Peripheral IV 04/06/24 Distal;Left;Ventral (anterior) Forearm <1 day    Peripheral IV 04/06/24 Left Antecubital <1 day              Drain  Duration             Pleural Effusion Long-Term Catheter 16 Fr. 72 days                        Imaging: Reviewed radiology reports from this admission including: chest xray  XR chest 1 view portable   ED Interpretation by Lena Rutherford PA-C (04/06 0636)   Large right sided pleural effusion as interpreted by me       CTA ED chest PE Study    (Results Pending)       EKG and Other Studies Reviewed on Admission:   EKG:     ** Please Note: This note has been constructed using a voice recognition system. **  [facility]  Progress Note  Name: Shannan Vee I  MRN: [id number]  Unit/Bed#: ED-15 I Date of Admission: 4/6/2024   Date of Service: 4/6/2024 I Hospital Day: 0    Assessment/Plan   * Shortness of breath  Assessment & Plan  Chest x-ray showed worsening of pleural effusion  Follow-up CT chest  DuoNebs as needed for shortness of breath  Continuous pulse ox  Continue oxygen supplementation, at home baseline 3 L now requires 4 L    Tachycardia  Assessment & Plan  Continue metoprolol 50 mg daily    Severe  protein-calorie malnutrition (HCC)  Assessment & Plan  Malnutrition Findings:   Chronic illness  Adult Degree of Malnutrition: Other severe protein calorie malnutrition  BMI Findings: 20.47     There is no height or weight on file to calculate BMI.       Large pleural effusion  Assessment & Plan  Chest x-ray shows worsening of pleural effusion  Recent CT scan of the chest demonstrated stable right-sided pleural effusion, still with pleural carcinomatosis. She continues with thoracentesis as needed. Recently evaluated by IR and their impression is that patient has extensive tumor of the right lung with some encasing the catheter. IR did not believe that further manipulation of the catheter would provide any clinical benefit. Position of the catheter is well evaluated at this time. tPA could be considered to break up loculations but the concern is bleeding especially with anemia.   Follow-up CT chest  Family is requesting IR consult, follow-up recommendations    Recurrent malignant neoplasm of breast (HCC)  Assessment & Plan  Metastatic breast cancer. Hormone receptor positive and HER2 negative diagnosed 2013. Prior history of neoadjuvant chemotherapy followed by mastectomy. She is s/p Hysterectomy and oophorectomy. 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 Completed radiation at [facility].     Recent CT-scan showing extensive right thoracic pleural carcinomatosis, Extensive associated mass effect.     Will ask palliative and hospice consult

## 2024-04-06 NOTE — ASSESSMENT & PLAN NOTE
Malnutrition Findings:   Chronic illness  Adult Degree of Malnutrition: Other severe protein calorie malnutrition  BMI Findings: 20.47     There is no height or weight on file to calculate BMI.

## 2024-04-06 NOTE — ED PROVIDER NOTES
"History  Chief Complaint   Patient presents with    Atrial Fibrillation     Pt arrives via EMS. Pt reports feeling SOB for the past hour. Pt reports having chest pain, back pain, and diarrhea x3 days. Pt Hr is 169 in triage. Pt has hx of lung and spinal cancer and gets paracentesis every 3 days.      Patient is a 53 year old female here with family coming in with worsening shortness of breath, chest pain, and non-bloody diarrhea. Her shortness of breath worsened tonight, prompting family to call 911. She has had diarrhea x 3 days. Patient has pleurx catheter in place for right-sided pleural effusion with pleural carcinomatosis. Her family reports small amount of yellow, red-tinged drainage from catheter.      Patient was initially diagnosed in 2013 and was on tamoxifen which she later stopped several years later.  Patient had completed radiation at [facility]. She had recurrence of her breast carcinoma in October 2023 with noted large lung mass and mediastinal lymphadenopathy which was found after her hysterectomy and oophorectomy.  Recent CT scan of the chest demonstrated stable right-sided pleural effusion, still with pleural carcinomatosis.  She continues with thoracentesis as needed.  Recently evaluated by IR and their impression is that patient has extensive tumor of the right lung with some encasing the catheter. IR did not believe that further manipulation of the catheter would provide any clinical benefit.  Position of the catheter is well evaluated at this time.  tPA could be considered to break up loculations but the concern is bleeding especially with anemia.      Per recent Heme/Onc note, \"I expressed concern regarding patient's declining performance status.  We addressed goals of care and I encouraged them to consider hospice.  She is not a candidate for aggressive treatment.  At this juncture, patient's daughter would like to continue supportive care and continue to follow with palliative " "care.\"          Prior to Admission Medications   Prescriptions Last Dose Informant Patient Reported? Taking?   Multiple Vitamin (MULTIVITAMIN ADULT PO) Unknown Self, Child Yes No   Sig: Take 1 tablet by mouth in the morning   Ribociclib Succ, 600 MG Dose, 200 MG TBPK Unknown Self, Child No No   Sig: Take 600 mg by mouth in the morning For 3 weeks and then 1 week off   Patient not taking: Reported on 3/22/2024   Senokot 8.6 MG tablet Unknown  No No   Sig: Take 1 tablet (8.6 mg total) by mouth daily   Patient not taking: Reported on 4/6/2024   acetaminophen (TYLENOL) 500 mg tablet Unknown Self, Child No No   Sig: Take 2 tablets (1,000 mg total) by mouth 3 (three) times a day as needed for mild pain   calcium citrate-vitamin D (Calcitrate Plus D) 315 mg-5 mcg tablet Unknown  No No   Sig: Take 1 tablet by mouth 2 (two) times a day   ergocalciferol (VITAMIN D2) 50,000 units Unknown Self, Child No No   Sig: TAKE 1 CAPSULE BY MOUTH 1 TIME A WEEK   lidocaine (LIDODERM) 5 % Unknown  No No   Sig: Apply 2 patches topically over 12 hours daily Remove & Discard patch within 12 hours or as directed by MD   metoprolol succinate (TOPROL-XL) 50 mg 24 hr tablet Unknown  No No   Sig: Take 50 mg daily, add 1/2 tablet qhs   naloxone (NARCAN) 4 mg/0.1 mL nasal spray Unknown Self, Child No No   Sig: Administer 1 spray into a nostril. If no response after 2-3 minutes, give another dose in the other nostril using a new spray.   oxyCODONE (ROXICODONE) 15 mg immediate release tablet Unknown  No No   Sig: Take 1 tablet (15 mg total) by mouth every 4 (four) hours as needed for moderate pain Max Daily Amount: 90 mg Do not start before March 31, 2024.   polyethylene glycol (GLYCOLAX) 17 GM/SCOOP powder Unknown  No No   Sig: Take 17 g by mouth daily   sodium chloride 1 g tablet Not Taking Self, Child No No   Sig: Take 1 tablet (1 g total) by mouth 2 (two) times a day   Patient not taking: Reported on 4/6/2024      Facility-Administered " Medications: None       Past Medical History:   Diagnosis Date    Breast cancer (HCC) 07/2013    left-chemotherapy    Fibroma     Goals of care, counseling/discussion     History of chemotherapy     History of external beam radiation therapy        Past Surgical History:   Procedure Laterality Date    APPENDECTOMY      BREAST SURGERY  2014    Left breast 2/2 breast CA    HYSTERECTOMY  2015    Due to fibroma    IR BIOPSY BONE  10/9/2023    IR PLEURAL EFFUSION LONG-TERM CATHETER PLACEMENT  1/24/2024    IR THORACENTESIS  11/8/2023    MASTECTOMY Left 2013    OOPHORECTOMY         Family History   Problem Relation Age of Onset    Lung cancer Mother     No Known Problems Father     No Known Problems Sister     No Known Problems Daughter     No Known Problems Daughter     Cancer Maternal Grandmother     No Known Problems Maternal Grandfather     No Known Problems Paternal Grandmother     No Known Problems Paternal Grandfather     Uterine cancer Maternal Aunt     No Known Problems Son     No Known Problems Paternal Aunt      I have reviewed and agree with the history as documented.    E-Cigarette/Vaping    E-Cigarette Use Never User      E-Cigarette/Vaping Substances    Nicotine No     THC No     CBD No     Flavoring No     Other No     Unknown No      Social History     Tobacco Use    Smoking status: Never     Passive exposure: Never    Smokeless tobacco: Never   Vaping Use    Vaping status: Never Used   Substance Use Topics    Alcohol use: Not Currently    Drug use: No       Review of Systems   Constitutional:  Negative for chills and fever.   HENT:  Negative for congestion and rhinorrhea.    Respiratory:  Positive for shortness of breath. Negative for cough.    Cardiovascular:  Positive for chest pain. Negative for leg swelling.   Gastrointestinal:  Positive for diarrhea. Negative for abdominal pain, constipation, nausea and vomiting.   Genitourinary:  Negative for dysuria and flank pain.   Musculoskeletal:  Negative for  arthralgias and myalgias.   Skin:  Negative for rash and wound.   Neurological:  Positive for weakness. Negative for dizziness.       Physical Exam  Physical Exam  Vitals and nursing note reviewed.   Constitutional:       General: She is not in acute distress.     Appearance: She is well-developed. She is cachectic. She is ill-appearing and toxic-appearing.   HENT:      Head: Normocephalic and atraumatic.      Mouth/Throat:      Mouth: Mucous membranes are dry.   Eyes:      Conjunctiva/sclera: Conjunctivae normal.   Cardiovascular:      Rate and Rhythm: Regular rhythm. Tachycardia present.      Heart sounds: No murmur heard.  Pulmonary:      Effort: Pulmonary effort is normal. Tachypnea present. No respiratory distress.      Breath sounds: Examination of the right-upper field reveals decreased breath sounds. Examination of the right-middle field reveals decreased breath sounds. Examination of the right-lower field reveals decreased breath sounds. Decreased breath sounds present.   Abdominal:      Palpations: Abdomen is soft.      Tenderness: There is no abdominal tenderness.   Musculoskeletal:         General: No swelling.      Cervical back: Neck supple.      Right lower leg: Edema present.      Left lower leg: Edema present.   Skin:     General: Skin is warm and dry.      Capillary Refill: Capillary refill takes less than 2 seconds.   Neurological:      Mental Status: She is alert.   Psychiatric:         Mood and Affect: Mood normal.         Vital Signs  ED Triage Vitals   Temperature Pulse Respirations Blood Pressure SpO2   04/06/24 0431 04/06/24 0427 04/06/24 0427 04/06/24 0429 04/06/24 0429   97.6 °F (36.4 °C) (!) 157 20 102/69 100 %      Temp Source Heart Rate Source Patient Position - Orthostatic VS BP Location FiO2 (%)   04/06/24 0429 04/06/24 0427 04/06/24 0429 04/06/24 0427 --   Oral Monitor Lying Right arm       Pain Score       04/06/24 0429       9           Vitals:    04/06/24 0500 04/06/24 0530  04/06/24 0600 04/06/24 0615   BP: 108/67 115/74 119/77 117/76   Pulse: (!) 163 (!) 120 (!) 126 (!) 128   Patient Position - Orthostatic VS: Lying Lying Lying Lying         Visual Acuity      ED Medications  Medications   lidocaine (LIDODERM) 5 % patch 2 patch (has no administration in time range)   metoprolol succinate (TOPROL-XL) 24 hr tablet 50 mg (has no administration in time range)   oxyCODONE (ROXICODONE) IR tablet 15 mg (has no administration in time range)   acetaminophen (TYLENOL) tablet 650 mg (has no administration in time range)   heparin (porcine) subcutaneous injection 5,000 Units (has no administration in time range)   sodium chloride 0.9 % bolus 2,000 mL (0 mL Intravenous Stopped 4/6/24 0633)   magnesium sulfate 2 g/50 mL IVPB (premix) 2 g (0 g Intravenous Stopped 4/6/24 0633)   fentaNYL injection 50 mcg (50 mcg Intravenous Given 4/6/24 0627)       Diagnostic Studies  Results Reviewed       Procedure Component Value Units Date/Time    Protime-INR [[id number]] Collected: 04/06/24 0626    Lab Status: In process Specimen: Blood from Arm, Left Updated: 04/06/24 0634    APTT [[id number]] Collected: 04/06/24 0626    Lab Status: In process Specimen: Blood from Arm, Left Updated: 04/06/24 0634    Platelet count [[id number]]     Lab Status: No result Specimen: Blood     B-Type Natriuretic Peptide(BNP) [[id number]]  (Abnormal) Collected: 04/06/24 0453    Lab Status: Final result Specimen: Blood from Arm, Left Updated: 04/06/24 0519      pg/mL     Comprehensive metabolic panel [[id number]]  (Abnormal) Collected: 04/06/24 0453    Lab Status: Final result Specimen: Blood from Arm, Left Updated: 04/06/24 0513     Sodium 144 mmol/L      Potassium 4.2 mmol/L      Chloride 96 mmol/L      CO2 36 mmol/L      ANION GAP 12 mmol/L      BUN 19 mg/dL      Creatinine 0.44 mg/dL      Glucose 117 mg/dL      Calcium 8.9 mg/dL      Corrected Calcium 9.5 mg/dL       U/L      ALT 26 U/L      Alkaline Phosphatase 187 U/L       Total Protein 6.9 g/dL      Albumin 3.2 g/dL      Total Bilirubin 0.48 mg/dL      eGFR 114 ml/min/1.73sq m     Narrative:      National Kid[employer] Foundation guidelines for Chronic Kidney Disease (CKD):     Stage 1 with normal or high GFR (GFR > 90 mL/min/1.73 square meters)    Stage 2 Mild CKD (GFR = 60-89 mL/min/1.73 square meters)    Stage 3A Moderate CKD (GFR = 45-59 mL/min/1.73 square meters)    Stage 3B Moderate CKD (GFR = 30-44 mL/min/1.73 square meters)    Stage 4 Severe CKD (GFR = 15-29 mL/min/1.73 square meters)    Stage 5 End Stage CKD (GFR <15 mL/min/1.73 square meters)  Note: GFR calculation is accurate only with a steady state creatinine    Magnesium [[id number]]  (Normal) Collected: 04/06/24 0453    Lab Status: Final result Specimen: Blood from Arm, Left Updated: 04/06/24 0513     Magnesium 1.9 mg/dL     CBC and differential [[id number]]  (Abnormal) Collected: 04/06/24 0453    Lab Status: Final result Specimen: Blood from Arm, Left Updated: 04/06/24 0459     WBC 6.59 Thousand/uL      RBC 3.37 Million/uL      Hemoglobin 9.3 g/dL      Hematocrit 32.3 %      MCV 96 fL      MCH 27.6 pg      MCHC 28.8 g/dL      RDW 20.5 %      MPV 8.8 fL      Platelets 272 Thousands/uL      nRBC 2 /100 WBCs      Neutrophils Relative 72 %      Immature Grans % 2 %      Lymphocytes Relative 12 %      Monocytes Relative 14 %      Eosinophils Relative 0 %      Basophils Relative 0 %      Neutrophils Absolute 4.82 Thousands/µL      Absolute Immature Grans 0.10 Thousand/uL      Absolute Lymphocytes 0.77 Thousands/µL      Absolute Monocytes 0.89 Thousand/µL      Eosinophils Absolute 0.00 Thousand/µL      Basophils Absolute 0.01 Thousands/µL                    XR chest 1 view portable   ED Interpretation by Lena Rutherford PA-C (04/06 0636)   Large right sided pleural effusion as interpreted by me       CTA ED chest PE Study    (Results Pending)              Procedures  Procedures         ED Course        EKG:  Atrial flutter at 169 BPM, QTc 459, no ST elevation or depression as interpreted by me       SBIRT 22yo+      Flowsheet Row Most Recent Value   Initial Alcohol Screen: US AUDIT-C     1. How often do you have a drink containing alcohol? 0 Filed at: 04/06/2024 0431   2. How many drinks containing alcohol do you have on a typical day you are drinking?  0 Filed at: 04/06/2024 0431   3a. Male UNDER 65: How often do you have five or more drinks on one occasion? 0 Filed at: 04/06/2024 0431   3b. FEMALE Any Age, or MALE 65+: How often do you have 4 or more drinks on one occassion? 0 Filed at: 04/06/2024 0431   Audit-C Score 0 Filed at: 04/06/2024 0431   ALEJO: How many times in the past year have you...    Used an illegal drug or used a prescription medication for non-medical reasons? Never Filed at: 04/06/2024 0431          Medical Decision Making  Will obtain EKG, troponin to evaluate for ACS.  Will obtain chest x-ray to evaluate pleural effusion.  Will obtain CBC to evaluate for leukocytosis, anemia.  Will obtain CMP to evaluate kidney function, for electrolyte disturbance.  Will obtain coags.  Will give IVF for aflutter, re-evaluate for improvement. Patient does take Metoprolol, last took it yesterday morning.    Labs without acute abnormalities. Hgb stable. BNP mildly elevated, nonspecific. Following IVF, patient's HR has significantly improved, is now in 120s. Patient endorses feeling improved.     I had extensive conversation with patient and family using .  Patient and family are asking if there is anything to be done about the fluid and I discussed with them over the course of this time regarding the catheter is in position however is being blocked by tumor.  There was discussion with pulmonology in the chart that shows possible removal of Pleurx with thoracentesis.    Patient and family are asking if tPA can be re-injected today. I also discussed that there is concern with bleeding in setting of anemia.   I discussed with them that this would have to be reconsultation with IR to further discuss this.  I made her aware that this is beyond my scope of practice and will have to be reconsidered and discussed with IR. Patient and family are in agreement.    At the time of admission, the patient is in no acute distress. I discussed with the patient and family the diagnosis, treatment plan, and plan for admission; they were given the opportunity to ask questions and verbalized understanding. They agree with plan.        Problems Addressed:  Atrial flutter with rapid ventricular response (HCC): acute illness or injury  Large pleural effusion: chronic illness or injury with exacerbation, progression, or side effects of treatment  Shortness of breath: acute illness or injury    Amount and/or Complexity of Data Reviewed  External Data Reviewed: labs, radiology, ECG and notes.  Labs: ordered. Decision-making details documented in ED Course.  Radiology: ordered. Decision-making details documented in ED Course.  ECG/medicine tests: ordered and independent interpretation performed. Decision-making details documented in ED Course.    Risk  Prescription drug management.             Disposition  Final diagnoses:   Shortness of breath   Large pleural effusion   Atrial flutter with rapid ventricular response (HCC)     Time reflects when diagnosis was documented in both MDM as applicable and the Disposition within this note       Time User Action Codes Description Comment    4/6/2024  6:23 AM Lena Rutherford Add [R06.02] Shortness of breath     4/6/2024  6:23 AM Lena Rutherford Add [J90] Large pleural effusion     4/6/2024  6:24 AM Lena Rutherford Add [I48.92] Atrial flutter with rapid ventricular response (HCC)           ED Disposition       ED Disposition   Admit    Condition   Stable    Date/Time   Sat Apr 6, 2024 0624    Comment   Case was discussed with JESÚS and the patient's admission status was agreed to be inpatient  to the  service of Dr. Green               Follow-up Information    None         Patient's Medications   Discharge Prescriptions    No medications on file       No discharge procedures on file.    PDMP Review         Value Time User    PDMP Reviewed  Yes 3/29/2024  1:11 PM Janie Briones MD            ED Provider  Electronically Signed by             Lena Rutherford PA-C  04/06/24 0637

## 2024-04-06 NOTE — ASSESSMENT & PLAN NOTE
Chest x-ray showed worsening of pleural effusion  Follow-up CT chest  DuoNebs as needed for shortness of breath  Continuous pulse ox  Continue oxygen supplementation, at home baseline 3 L now requires 4 L

## 2024-04-06 NOTE — ASSESSMENT & PLAN NOTE
4/6/24 In-hospital witnessed cardiac arrest. Patient was ambulating from toilet back to bed with nursing staff and became unresponsive. ACLS with 1 round epinephrine/CPR and ROSC was obtained. Suspect PEA 2/2 hypoxia.  follow up echo, ordered  Last echo 3/24/24 EF 65%, RVSP 46  Holding home Toprol XL in the setting of hypotension  Continue home digoxin for Atrial fibrillation with HR hold parameters  Maintain MAP > 65 mmHg  Continue telemetry  Avoid further hypoxia

## 2024-04-06 NOTE — ASSESSMENT & PLAN NOTE
2/2 cancer related pain and chronic back pain  PDMP reviewed.  Patient has Rx for Oxycodone   Continue fentanyl gtt.  Ativan prn for anxiety, agitation  Can add back fentanyl and Dilaudid prns if current regimen ineffective or inadequate  Bowel regimen with senna and miralax  Monitor for constipation

## 2024-04-06 NOTE — ASSESSMENT & PLAN NOTE
Chest x-ray shows worsening of pleural effusion  Recent CT scan of the chest demonstrated stable right-sided pleural effusion, still with pleural carcinomatosis. She continues with thoracentesis as needed. Recently evaluated by IR and their impression is that patient has extensive tumor of the right lung with some encasing the catheter. IR did not believe that further manipulation of the catheter would provide any clinical benefit. Position of the catheter is well evaluated at this time. tPA could be considered to break up loculations but the concern is bleeding especially with anemia.   Follow-up CT chest  Family is requesting IR consult, follow-up recommendations

## 2024-04-06 NOTE — ED NOTES
Multiple attempts at IV access unsuccessful. Pt family arrived and notified pt had a mastectomy 6 years ago to the left breast. EMS line placed in left wrist. PA notified. PA verbal ordered to start IV in any available access due to pt condition.      Marah Kennedy RN  04/06/24 0485

## 2024-04-06 NOTE — PLAN OF CARE
Problem: Potential for Falls  Goal: Patient will remain free of falls  Description: INTERVENTIONS:  - Educate patient/family on patient safety including physical limitations  - Instruct patient to call for assistance with activity   - Consult OT/PT to assist with strengthening/mobility   - Keep Call bell within reach  - Keep bed low and locked with side rails adjusted as appropriate  - Keep care items and personal belongings within reach  - Initiate and maintain comfort rounds  - Make Fall Risk Sign visible to staff  - Initiate/Maintain bed alarm  - Apply yellow socks and bracelet for high fall risk patients  - Consider moving patient to room near nurses station  Outcome: Progressing     Problem: Prexisting or High Potential for Compromised Skin Integrity  Goal: Skin integrity is maintained or improved  Description: INTERVENTIONS:  - Identify patients at risk for skin breakdown  - Assess and monitor skin integrity  - Assess and monitor nutrition and hydration status  - Monitor labs   - Assess for incontinence   - Turn and reposition patient  - Assist with mobility/ambulation  - Relieve pressure over bony prominences  - Avoid friction and shearing  - Provide appropriate hygiene as needed including keeping skin clean and dry  - Evaluate need for skin moisturizer/barrier cream  - Collaborate with interdisciplinary team   - Patient/family teaching  - Consider wound care consult   Outcome: Progressing     Problem: CARDIOVASCULAR - ADULT  Goal: Maintains optimal cardiac output and hemodynamic stability  Description: INTERVENTIONS:  - Monitor I/O, vital signs and rhythm  - Monitor for S/S and trends of decreased cardiac output  - Administer and titrate ordered vasoactive medications to optimize hemodynamic stability  - Assess quality of pulses, skin color and temperature  - Assess for signs of decreased coronary artery perfusion  - Instruct patient to report change in severity of symptoms  Outcome: Progressing  Goal:  Absence of cardiac dysrhythmias or at baseline rhythm  Description: INTERVENTIONS:  - Continuous cardiac monitoring, vital signs, obtain 12 lead EKG if ordered  - Administer antiarrhythmic and heart rate control medications as ordered  - Monitor electrolytes and administer replacement therapy as ordered  Outcome: Progressing     Problem: PAIN - ADULT  Goal: Verbalizes/displays adequate comfort level or baseline comfort level  Description: Interventions:  - Encourage patient to monitor pain and request assistance  - Assess pain using appropriate pain scale  - Administer analgesics based on type and severity of pain and evaluate response  - Implement non-pharmacological measures as appropriate and evaluate response  - Consider cultural and social influences on pain and pain management  - Notify physician/advanced practitioner if interventions unsuccessful or patient reports new pain  Outcome: Progressing     Problem: SAFETY ADULT  Goal: Patient will remain free of falls  Description: INTERVENTIONS:  - Educate patient/family on patient safety including physical limitations  - Instruct patient to call for assistance with activity   - Consult OT/PT to assist with strengthening/mobility   - Keep Call bell within reach  - Keep bed low and locked with side rails adjusted as appropriate  - Keep care items and personal belongings within reach  - Initiate and maintain comfort rounds  - Make Fall Risk Sign visible to staff  - Initiate/Maintain bed alarm  - Apply yellow socks and bracelet for high fall risk patients  - Consider moving patient to room near nurses station  Outcome: Progressing  Goal: Maintain or return to baseline ADL function  Description: INTERVENTIONS:  -  Assess patient's ability to carry out ADLs; assess patient's baseline for ADL function and identify physical deficits which impact ability to perform ADLs (bathing, care of mouth/teeth, toileting, grooming, dressing, etc.)  - Assess/evaluate cause of  self-care deficits   - Assess range of motion  - Assess patient's mobility; develop plan if impaired  - Assess patient's need for assistive devices and provide as appropriate  - Encourage maximum independence but intervene and supervise when necessary  - Involve family in performance of ADLs  - Assess for home care needs following discharge   - Consider OT consult to assist with ADL evaluation and planning for discharge  - Provide patient education as appropriate  Outcome: Progressing  Goal: Maintains/Returns to pre admission functional level  Description: INTERVENTIONS:  - Perform AM-PAC 6 Click Basic Mobility/ Daily Activity assessment daily.  - Set and communicate daily mobility goal to care team and patient/family/caregiver.   - Collaborate with rehabilitation services on mobility goals if consulted  - Perform Range of Motion 3 times a day.  - Reposition patient every 2 hours.  - Record patient progress and toleration of activity level   Outcome: Progressing     Problem: Knowledge Deficit  Goal: Patient/family/caregiver demonstrates understanding of disease process, treatment plan, medications, and discharge instructions  Description: Complete learning assessment and assess knowledge base.  Interventions:  - Provide teaching at level of understanding  - Provide teaching via preferred learning methods  Outcome: Progressing

## 2024-04-07 NOTE — QUICK NOTE
Pt placed on PS 12/6 SBT, RSBI 170-190.  This is likely due to known low lung volumes and uncontrolled pain off Fentanyl drip.     I met with son, daughter, , and multiple other family members at bedside.  I reviewed patient's current condition.     Pt is awake and complaining of pain. I reviewed that we held the Fentanyl drip to allow pt to wake up to attempt SBT.  I reviewed the CT images with the family including comparing images from March to current, which shows progression of disease in right chest with no functional lung tissue visible. Left lung has a pleural effusion.    I discussed role of chest tube or thoracentesis on left side. I reviewed risk of pneumothorax with procedure and that the risk is higher with the pt on the ventilator. I reviewed that even if we remove left-sided fluid, pt's breathing is compromised by her right lung opacification and breathing thru ETT, it's difficult to rely on RSBI.     Pt family does not want left-sided procedure at this point due to risk of adverse event.  I reviewed role of hospice and they are not currently interested in hospice.   They asked how long she can remain intubated.      They will continue ongoing goals of care conversations.

## 2024-04-07 NOTE — RESPIRATORY THERAPY NOTE
Pt  s/p cardiac arrest. Pt remains on mechanical ventilation. Currently VS stable and pulmonary mechanics stable. Decreased FiO2 to 60%. Goal is to wean FiIO2 to 50% for SBT in a.m. Obtained a.m. ABG and sent to lab. Will continue current vent settings as ordered and will continue to monitor pt.

## 2024-04-07 NOTE — ASSESSMENT & PLAN NOTE
Malnutrition Findings:   Adult Malnutrition type: Chronic illness  Adult Degree of Malnutrition: Other severe protein calorie malnutrition  Malnutrition Characteristics: Fat loss, Muscle loss, Inadequate energy, Weight loss                  360 Statement: Chronic severe malnutrition related to catabolic illness, poor oral intake as evidenced by < 75% estimated energy intake > 1 mo, severe muscle loss to temporalis muscles, severe fat loss to buccal pads, 17.8% weight loss x ~7 mo (9/20/23 68kg, 4/6/24 55.9kg). Treated with: see RD progress note for TF recommendations. Recommend daily weights for nutrition monitoring.    BMI Findings:           Body mass index is 20.51 kg/m².

## 2024-04-07 NOTE — PLAN OF CARE
Problem: RESPIRATORY - ADULT  Goal: Achieves optimal ventilation and oxygenation  Description: INTERVENTIONS:  - Assess for changes in respiratory status  - Assess for changes in mentation and behavior  - Position to facilitate oxygenation and minimize respiratory effort  - Oxygen administered by appropriate delivery if ordered  - Initiate smoking cessation education as indicated  - Encourage broncho-pulmonary hygiene including cough, deep breathe, Incentive Spirometry  - Assess the need for suctioning and aspirate as needed  - Assess and instruct to report SOB or any respiratory difficulty  - Respiratory Therapy support as indicated  Outcome: Progressing     Problem: SAFETY ADULT  Goal: Patient will remain free of falls  Description: INTERVENTIONS:  - Educate patient/family on patient safety including physical limitations  - Instruct patient to call for assistance with activity   - Consult OT/PT to assist with strengthening/mobility   - Keep Call bell within reach  - Keep bed low and locked with side rails adjusted as appropriate  - Keep care items and personal belongings within reach  - Initiate and maintain comfort rounds  - Make Fall Risk Sign visible to staff  - Offer Toileting every 2 Hours, in advance of need  - Initiate/Maintain bedalarm  - Obtain necessary fall risk management equipment: yellow sock  Problem: CARDIOVASCULAR - ADULT  Goal: Maintains optimal cardiac output and hemodynamic stability  Description: INTERVENTIONS:  - Monitor I/O, vital signs and rhythm  - Monitor for S/S and trends of decreased cardiac output  - Administer and titrate ordered vasoactive medications to optimize hemodynamic stability  - Assess quality of pulses, skin color and temperature  - Assess for signs of decreased coronary artery perfusion  - Instruct patient to report change in severity of symptoms  Outcome: Progressing     Problem: Nutrition/Hydration-ADULT  Goal: Nutrient/Hydration intake appropriate for improving,  restoring or maintaining nutritional needs  Description: Monitor and assess patient's nutrition/hydration status for malnutrition. Collaborate with interdisciplinary team and initiate plan and interventions as ordered.  Monitor patient's weight and dietary intake as ordered or per policy. Utilize nutrition screening tool and intervene as necessary. Determine patient's food preferences and provide high-protein, high-caloric foods as appropriate.     INTERVENTIONS:  - Monitor oral intake, urinary output, labs, and treatment plans  - Assess nutrition and hydration status and recommend course of action  - Evaluate amount of meals eaten  - Assist patient with eating if necessary   - Allow adequate time for meals  - Recommend/ encourage appropriate diets, oral nutritional supplements, and vitamin/mineral supplements  - Order, calculate, and assess calorie counts as needed  - Recommend, monitor, and adjust tube feedings and TPN/PPN based on assessed needs  - Assess need for intravenous fluids  - Provide specific nutrition/hydration education as appropriate  - Include patient/family/caregiver in decisions related to nutrition  Outcome: Progressing     Problem: Potential for Falls  Goal: Patient will remain free of falls  Description: INTERVENTIONS:  - Educate patient/family on patient safety including physical limitations  - Instruct patient to call for assistance with activity   - Consult OT/PT to assist with strengthening/mobility   - Keep Call bell within reach  - Keep bed low and locked with side rails adjusted as appropriate  - Keep care items and personal belongings within reach  - Initiate and maintain comfort rounds  - Make Fall Risk Sign visible to staff  - Apply yellow socks and bracelet for high fall risk patients  - Consider moving patient to room near nurses station  Outcome: Progressing     - Apply yellow socks and bracelet for high fall risk patients  - Consider moving patient to room near nurses  station  Outcome: Progressing     Problem: Knowledge Deficit  Goal: Patient/family/caregiver demonstrates understanding of disease process, treatment plan, medications, and discharge instructions  Description: Complete learning assessment and assess knowledge base.  Interventions:  - Provide teaching at level of understanding  - Provide teaching via preferred learning methods  Outcome: Progressing

## 2024-04-07 NOTE — ASSESSMENT & PLAN NOTE
Metastatic breast cancer. Hormone receptor positive and HER2 negative diagnosed 2013. Prior history of neoadjuvant chemotherapy followed by mastectomy. She is s/p Hysterectomy and oophorectomy. 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 Completed radiation at [facility].     Recent CT-scan showing extensive right thoracic pleural carcinomatosis, Extensive associated mass effect.     Palliative care consulted

## 2024-04-07 NOTE — RESPIRATORY THERAPY NOTE
RT Ventilator Management Note  Shannan Vee 53 y.o. female MRN: [id number]  Unit/Bed#: ICU 05 Encounter: [id number]      Daily Screen         4/6/2024 1947 4/7/2024  0851          Patient safety screen outcome:: Failed Failed      Not Ready for Weaning due to:: Underline problem not resolved Underline problem not resolved                Physical Exam:          Resp Comments: Patient received on P-CMV settings.  Wean not attempted due to underlying issues; CRNP aware.

## 2024-04-07 NOTE — PLAN OF CARE
Problem: Potential for Falls  Goal: Patient will remain free of falls  Description: INTERVENTIONS:  - Educate patient/family on patient safety including physical limitations  - Instruct patient to call for assistance with activity   - Consult OT/PT to assist with strengthening/mobility   - Keep Call bell within reach  - Keep bed low and locked with side rails adjusted as appropriate  - Keep care items and personal belongings within reach  - Initiate and maintain comfort rounds  - Make Fall Risk Sign visible to staff  - Initiate/Maintain bed alarm  - Apply yellow socks and bracelet for high fall risk patients  - Consider moving patient to room near nurses station  Outcome: Progressing     Problem: Nutrition/Hydration-ADULT  Goal: Nutrient/Hydration intake appropriate for improving, restoring or maintaining nutritional needs  Description: Monitor and assess patient's nutrition/hydration status for malnutrition. Collaborate with interdisciplinary team and initiate plan and interventions as ordered.  Monitor patient's weight and dietary intake as ordered or per policy. Utilize nutrition screening tool and intervene as necessary. Determine patient's food preferences and provide high-protein, high-caloric foods as appropriate.     INTERVENTIONS:  - Monitor oral intake, urinary output, labs, and treatment plans  - Assess nutrition and hydration status and recommend course of action  - Evaluate amount of meals eaten  - Assist patient with eating if necessary   - Allow adequate time for meals  - Recommend/ encourage appropriate diets, oral nutritional supplements, and vitamin/mineral supplements  - Order, calculate, and assess calorie counts as needed  - Recommend, monitor, and adjust tube feedings and TPN/PPN based on assessed needs  - Assess need for intravenous fluids  - Provide specific nutrition/hydration education as appropriate  - Include patient/family/caregiver in decisions related to nutrition  Outcome:  Progressing     Problem: CARDIOVASCULAR - ADULT  Goal: Maintains optimal cardiac output and hemodynamic stability  Description: INTERVENTIONS:  - Monitor I/O, vital signs and rhythm  - Monitor for S/S and trends of decreased cardiac output  - Administer and titrate ordered vasoactive medications to optimize hemodynamic stability  - Assess quality of pulses, skin color and temperature  - Assess for signs of decreased coronary artery perfusion  - Instruct patient to report change in severity of symptoms  Outcome: Progressing  Goal: Absence of cardiac dysrhythmias or at baseline rhythm  Description: INTERVENTIONS:  - Continuous cardiac monitoring, vital signs, obtain 12 lead EKG if ordered  - Administer antiarrhythmic and heart rate control medications as ordered  - Monitor electrolytes and administer replacement therapy as ordered  Outcome: Progressing     Problem: PAIN - ADULT  Goal: Verbalizes/displays adequate comfort level or baseline comfort level  Description: Interventions:  - Encourage patient to monitor pain and request assistance  - Assess pain using appropriate pain scale  - Administer analgesics based on type and severity of pain and evaluate response  - Implement non-pharmacological measures as appropriate and evaluate response  - Consider cultural and social influences on pain and pain management  - Notify physician/advanced practitioner if interventions unsuccessful or patient reports new pain  Outcome: Progressing     Problem: SAFETY ADULT  Goal: Patient will remain free of falls  Description: INTERVENTIONS:  - Educate patient/family on patient safety including physical limitations  - Instruct patient to call for assistance with activity   - Consult OT/PT to assist with strengthening/mobility   - Keep Call bell within reach  - Keep bed low and locked with side rails adjusted as appropriate  - Keep care items and personal belongings within reach  - Initiate and maintain comfort rounds  - Make Fall Risk  Sign visible to staff  - Initiate/Maintain bed alarm  - Apply yellow socks and bracelet for high fall risk patients  - Consider moving patient to room near nurses station  Outcome: Progressing  Goal: Maintain or return to baseline ADL function  Description: INTERVENTIONS:  -  Assess patient's ability to carry out ADLs; assess patient's baseline for ADL function and identify physical deficits which impact ability to perform ADLs (bathing, care of mouth/teeth, toileting, grooming, dressing, etc.)  - Assess/evaluate cause of self-care deficits   - Assess range of motion  - Assess patient's mobility; develop plan if impaired  - Assess patient's need for assistive devices and provide as appropriate  - Encourage maximum independence but intervene and supervise when necessary  - Involve family in performance of ADLs  - Assess for home care needs following discharge   - Consider OT consult to assist with ADL evaluation and planning for discharge  - Provide patient education as appropriate  Outcome: Progressing  Goal: Maintains/Returns to pre admission functional level  Description: INTERVENTIONS:  - Perform AM-PAC 6 Click Basic Mobility/ Daily Activity assessment daily.  - Set and communicate daily mobility goal to care team and patient/family/caregiver.   - Collaborate with rehabilitation services on mobility goals if consulted  - Perform Range of Motion 3 times a day.  - Reposition patient every 3 hours.  - Record patient progress and toleration of activity level   Outcome: Progressing     Problem: Knowledge Deficit  Goal: Patient/family/caregiver demonstrates understanding of disease process, treatment plan, medications, and discharge instructions  Description: Complete learning assessment and assess knowledge base.  Interventions:  - Provide teaching at level of understanding  - Provide teaching via preferred learning methods  Outcome: Progressing

## 2024-04-07 NOTE — UTILIZATION REVIEW
Initial Clinical Review    Admission: Date/Time/Statement:   Admission Orders (From admission, onward)       Ordered        04/06/24 0630  INPATIENT ADMISSION  Once                          Orders Placed This Encounter   Procedures    INPATIENT ADMISSION     Standing Status:   Standing     Number of Occurrences:   1     Order Specific Question:   Level of Care     Answer:   Med Surg [16]     Order Specific Question:   Estimated length of stay     Answer:   More than 2 Midnights     Order Specific Question:   Certification     Answer:   I certify that inpatient services are medically necessary for this patient for a duration of greater than two midnights. See H&P and MD Progress Notes for additional information about the patient's course of treatment.     ED Arrival Information       Expected   -    Arrival   4/6/2024 04:23    Acuity   Emergent              Means of arrival   Ambulance    Escorted by   [facility] ([facility])    Service   Critical Care/ICU    Admission type   Emergency              Arrival complaint   sob             Chief Complaint   Patient presents with    Atrial Fibrillation     Pt arrives via EMS. Pt reports feeling SOB for the past hour. Pt reports having chest pain, back pain, and diarrhea x3 days. Pt Hr is 169 in triage. Pt has hx of lung and spinal cancer and gets paracentesis every 3 days.        Initial Presentation: 53 y.o. female , presented to the ED @ [facility], from home via EMS.    Admitted as Inpatient.  Diagnosis:  SOB worsening Pleural Effusion.    PMH:  metastatic breast cancer with extensive metastasis/carcinomatosis, chronic anemia, loculated effusions with Pleurx catheter, severe malnutrition, hypertension   Date: 04/06/2024   Presents with SOB.   Pt with metastatic breast cancer, hormone receptor positive and HER2 negative diagnosed 2013. Prior history of neoadjuvant chemotherapy followed by mastectomy. She is s/p Hysterectomy and oophorectomy. 2023: Extensive  metastatic disease, lung mass, mediastinal adenopathy now with triple negative disease. Completed radiation at [facility].   At home uses 3 L of nasal cannula, now requires 4 L.   Chest x-ray today shows worsening of pleural effusion  Recent CT scan of the chest demonstrated stable right-sided pleural effusion, still with pleural carcinomatosis. She continues with thoracentesis as needed. Recently evaluated by IR and their impression is that patient has extensive tumor of the right lung with some encasing the catheter. IR did not believe that further manipulation of the catheter would provide any clinical benefit. Position of the catheter is well evaluated at this time. tPA could be considered to break up loculations but the concern is bleeding especially with anemia.   Spoke with the family at the bedside and patient, they want to continue treatment and requesting IR consult.   Follow up with chest CT.    Recent CT-scan showing extensive right thoracic pleural carcinomatosis, Extensive associated mass effect.    Consult Palliative Care.  Consult Hospice Care.      04/06/2024  Consult Pulmonary:    Acute on chronic hypoxic respiratory failure - 3L home oxygen  Cardiac arrest - unclear cause, consider arrhythmia vs hypoxia  Undifferentiated shock- may be due to sedation  Atrial fibrillation with RVR  Moderate left-sided pleural effusion  Right-sided pleural effusion with pleurex in place - no drainage due to catheter surrounded by tumor  Metastatic triple negative metastatic breast cancer - bone mets, lung mets, not candidate for chemotherapy  Severe protein calorie malnutrition  Anemia of chronic disease     The patient is critically ill with cardiac arrest possibly due to arrhythmia in setting of chronic illness including metastatic breast cancer with severe malnutrition not candidate for further treatment.  Neuro: use Fentanyl and Versed PRN for sedation goal RASS -2  CV: Start Norepinephrine and titrate for MAP >65;  hold O/P beta blocker; start Digoxin; hold Lasix for now  Lungs: cont. Current PS settings and await ABG; check CXR for tube placement; pulmonary toilet  GI: Start TF; bowel regimen  : No acute issues  FEN: replete PRN  Heme: Heparin SQ  ID: No acute issues  Endo: no acute issues  MSK: reposition, turn  Lines:   Peripherals  No anna  No mckeon  Full code    04/06/2024 @ 0955 Code blue CPR in progress on arrival . Intubation done.   ROSC with transfer to ICU     Day 2: 04/07/2024   Cardio-pulmonary monitoring.  Intubated / vented.    Neuro: Hold Fentanyl drip to allow to wake up and extubate;  CV: ECHO pending; norepinephrine to keep MAP >65; Digoxin load;  hold beta blocker while on Norepinephrine; not on a/c due to low CHADSVASC2 score  Resp: RR lowered, repeat ABG pending; eventual left-sided thoracentesis once extubated to help optimize pulmonary status; plan for extubation today  GI: NPO for planned extubation; cont bowel regimen  FEN: replete PRN  : cont. Bowel regimen  Heme: Change to LMWH  ID: no acute issues  Endo: no acute issues  MSK: reposition, turn  Lines:   Peripherals  No anna  Mckeon - keep until optimized    ED Triage Vitals   Temperature Pulse Respirations Blood Pressure SpO2   04/06/24 0431 04/06/24 0427 04/06/24 0427 04/06/24 0429 04/06/24 0429   97.6 °F (36.4 °C) (!) 157 20 102/69 100 %      Temp Source Heart Rate Source Patient Position - Orthostatic VS BP Location FiO2 (%)   04/06/24 0429 04/06/24 0427 04/06/24 0429 04/06/24 0427 04/06/24 1945   Oral Monitor Lying Right arm 80      Pain Score       04/06/24 0429       9          Wt Readings from Last 1 Encounters:   04/06/24 55.9 kg (123 lb 3.8 oz)     Additional Vital Signs:   Date/Time Temp Pulse Resp BP MAP (mmHg) SpO2 FiO2 (%) Calculated FIO2 (%) - Nasal Cannula Nasal Cannula O2 Flow Rate (L/min) O2 Device Patient Position - Orthostatic VS   04/06/24 2315 -- 110 Abnormal  20 90/58 67 98 % -- -- -- -- --   04/06/24 1900 -- 110 Abnormal   20 93/64 75 98 % -- -- -- -- --   04/06/24 2250 -- 110 Abnormal  20 103/59 71 98 % -- -- -- -- --   04/06/24 2230 -- 110 Abnormal  20 91/59 73 98 % -- -- -- -- --   04/06/24 2215 -- 110 Abnormal  20 100/59 73 98 % -- -- -- -- --   04/06/24 2200 97.9 °F (36.6 °C) 106 Abnormal  20 114/64 80 97 % 80 -- -- Ventilator Lying   04/06/24 2145 -- 108 Abnormal  20 77/48 Abnormal  65 96 % -- -- -- -- --   04/06/24 2135 -- 108 Abnormal  19 77/51 Abnormal  58 Abnormal  96 % -- -- -- -- --   04/06/24 2100 -- 110 Abnormal  0 Abnormal  82/51 Abnormal  71 96 % -- -- -- -- --   04/06/24 2000 -- 110 Abnormal  8 Abnormal  84/57 Abnormal  72 96 % -- -- -- -- --   04/06/24 1947 -- 108 Abnormal  0 Abnormal  82/53 Abnormal  63 Abnormal  95 % -- -- -- -- --   04/06/24 1945 -- -- -- -- -- 96 % 80 -- -- Ventilator --   04/06/24 1930 -- 110 Abnormal  9 Abnormal  106/71 80 97 % -- -- -- -- --   04/06/24 1915 -- 110 Abnormal  19 104/71 79 96 % -- -- -- -- --   04/06/24 1910 -- 110 Abnormal  16 91/67 72 95 % -- -- -- -- --   04/06/24 1900 -- 110 Abnormal  20 78/61 Abnormal  69 95 % -- -- -- -- --   04/06/24 1800 -- 114 Abnormal  18 109/82 92 95 % -- -- -- -- --   04/06/24 1700 -- 108 Abnormal  18 83/62 Abnormal  70 94 % -- -- -- -- --   04/06/24 1600 -- 108 Abnormal  18 103/64 80 98 % -- -- -- -- --   04/06/24 1548 -- -- -- -- -- 98 % -- -- -- -- --   04/06/24 1530 -- -- -- -- -- 99 % -- -- -- -- --   04/06/24 1500 -- 102 18 108/67 79 98 % -- -- -- -- --   04/06/24 1415 -- 102 25 Abnormal  100/63 73 94 % -- -- -- -- --   04/06/24 1400 -- 106 Abnormal  26 Abnormal  86/57 Abnormal  66 99 % -- -- -- -- --   04/06/24 1300 -- 104 18 108/59 78 97 % -- -- -- -- --   04/06/24 1210 -- 108 Abnormal  18 97/64 76 97 % -- -- -- -- --   04/06/24 1209 97.8 °F (36.6 °C) -- -- -- -- -- -- -- -- -- --   04/06/24 1152 -- -- -- -- -- 100 % -- -- -- -- --   04/06/24 1100 -- 106 Abnormal  18 108/68 81 100 % -- -- -- -- --   04/06/24 1009 -- -- -- -- -- 97 % -- -- --  -- --   04/06/24 1004 -- 153 Abnormal  21 110/80 89 100 % -- -- -- Ventilator --   04/06/24 09:58:32 -- -- -- 142/99 -- -- -- -- -- -- --   04/06/24 0955 -- --  -- -- -- -- -- -- -- -- --   Pulse: detected at 04/06/24 0955 04/06/24 09:54:54 -- 0 Abnormal  -- -- -- -- -- -- -- -- --   04/06/24 0732 98.4 °F (36.9 °C) 105 22 128/89 -- 98 % -- -- -- Nasal cannula Lying   04/06/24 0700 -- 118 Abnormal  22 117/75 90 99 % -- -- -- Nasal cannula Lying   04/06/24 0615 -- 128 Abnormal  22 117/76 92 99 % -- 36 4 L/min Nasal cannula Lying   04/06/24 0600 -- 126 Abnormal  22 119/77 94 99 % -- 36 4 L/min Nasal cannula Lying   04/06/24 0530 -- 120 Abnormal  18 115/74 90 100 % -- 36 4 L/min Nasal cannula Lying   04/06/24 0500 -- 163 Abnormal  22 108/67 83 100 % -- 36 4 L/min Nasal cannula Lying       Pertinent Labs/Diagnostic Test Results:   XR chest portable ICU   Final Result by Felton Leiva MD (04/07 0828)      Tip of endotracheal tube is well-positioned above luna. Endogastric tube extends into the abdomen. Opacified right hemithorax. Left pleural effusion and left basilar consolidation.      CTA ED chest PE Study   Final Result by Driss Oneal MD (04/06 0719)      No definite evidence for pulmonary embolism.      Redemonstration of extensive thoracic pleural carcinomatosis with complete opacification of the right hemithorax. Findings are also secondary to a combination of atelectasis and effusion and worsened since the prior exam. Right-sided chest tube in place.      Moderate left pleural effusion with subjacent compressive atelectasis, worsened.      Groundglass opacities within the left upper lobe and lingula are new and may be on the basis of infection or edema.      Extensive osseous metastatic disease with multilevel compression deformities are stable.      XR chest 1 view portable   ED Interpretation by Lena Rutherford PA-C (04/06 0636)   Large right sided pleural effusion as interpreted by me        Final Result by Felton Leiva MD (04/06 1248)      Complete opacification of the right hemithorax which represents progression of abnormality since the prior study. To what extent this is pleural tumor or effusion or lung consolidation cannot be determined.      Left pleural effusion and interstitial prominence in the left lung. This could be edema or viral syndrome or tumor spread.      Numerous lytic lesions. No gross evidence of acute pathologic fractures as near as can be determined.        Results from last 7 days   Lab Units 04/07/24  0607 04/06/24  1113 04/06/24  0453   WBC Thousand/uL 8.55 7.18 6.59   HEMOGLOBIN g/dL 8.3* 10.0* 9.3*   HEMATOCRIT % 28.0* 36.1 32.3*   PLATELETS Thousands/uL 234 261 272   NEUTROS ABS Thousands/µL  --   --  4.82     Results from last 7 days   Lab Units 04/07/24  0607 04/06/24  0453   SODIUM mmol/L 147 144   POTASSIUM mmol/L 4.4 4.2   CHLORIDE mmol/L 101 96   CO2 mmol/L 30 36*   ANION GAP mmol/L 16* 12   BUN mg/dL 24 19   CREATININE mg/dL 0.47* 0.44*   EGFR ml/min/1.73sq m 111 114   CALCIUM mg/dL 8.6 8.9   MAGNESIUM mg/dL  --  1.9     Results from last 7 days   Lab Units 04/06/24  0453   AST U/L 178*   ALT U/L 26   ALK PHOS U/L 187*   TOTAL PROTEIN g/dL 6.9   ALBUMIN g/dL 3.2*   TOTAL BILIRUBIN mg/dL 0.48     Results from last 7 days   Lab Units 04/07/24  0607 04/06/24  0453   GLUCOSE RANDOM mg/dL 108 117      Results from last 7 days   Lab Units 04/07/24  0608 04/06/24  1542 04/06/24  1102   PH ART  7.531* 7.464* 7.285*   PCO2 ART mm Hg 35.6* 44.8* 69.4*   PO2 ART mm Hg 104.8 80.4 111.1   HCO3 ART mmol/L 29.1* 31.4* 32.2*   BASE EXC ART mmol/L 6.2 6.9 4.3   O2 CONTENT ART mL/dL 13.2* 12.3* 13.7*   O2 HGB, ARTERIAL % 97.5* 94.9 96.7   ABG SOURCE  Radial, Right Radial, Right Radial, Right     Results from last 7 days   Lab Units 04/06/24  1113 04/06/24  0626   PROTIME seconds 16.4* 15.1*   INR  1.30* 1.17   PTT seconds  --  20*     Results from last 7 days   Lab Units  04/06/24  0453   BNP pg/mL 223*     ED Treatment:   Medication Administration from 04/06/2024 0423 to 04/06/2024 0731         Date/Time Order Dose Route Action     04/06/2024 0454 EDT sodium chloride 0.9 % bolus 2,000 mL 2,000 mL Intravenous New Bag     04/06/2024 0458 EDT magnesium sulfate 2 g/50 mL IVPB (premix) 2 g 2 g Intravenous New Bag     04/06/2024 0627 EDT fentaNYL injection 50 mcg 50 mcg Intravenous Given     04/06/2024 0659 EDT iohexol (OMNIPAQUE) 350 MG/ML injection (MULTI-DOSE) 61 mL 61 mL Intravenous Given          Past Medical History:   Diagnosis Date    Breast cancer (HCC) 07/2013    left-chemotherapy    Fibroma     Goals of care, counseling/discussion     History of chemotherapy     History of external beam radiation therapy      Present on Admission:   Large pleural effusion   Recurrent malignant neoplasm of breast (HCC)   Severe protein-calorie malnutrition (HCC)   Tachycardia   Limb swelling      Admitting Diagnosis: Shortness of breath [R06.02]  Atrial flutter with rapid ventricular response (HCC) [I48.92]  Atrial fib/flutter, transient (HCC) [I48.91, I48.92]  Large pleural effusion [J90]  Age/Sex: 53 y.o. female  Admission Orders:  Cardio-Pulmonary monitoring  Up & OOB as tolerated / OOB to Chair / Fall precautions  Assess Sedation level q4h  Intubated / vented FiO2 60%  Cardio-Pulmonary Monitoring  Daily weight  I&O q2h  Neuro checks q4h  Dysphagia Screen > NPO / Oral Care  Obtain ECHO  Elevate HOB  Edd SCDs    Scheduled Medications:  chlorhexidine, 15 mL, Mouth/Throat, Q12H ALEXANDRE  digoxin, 500 mcg, Intravenous, Once  heparin (porcine), 5,000 Units, Subcutaneous, Q8H ALEXANDRE  lidocaine, 2 patch, Topical, Daily  polyethylene glycol, 17 g, Oral, Daily  senna, 8.8 mg, Oral, HS      Continuous IV Infusions:  fentaNYL, 50 mcg/hr, Intravenous, Continuous  norepinephrine, 1-30 mcg/min, Intravenous, Titrated      PRN Meds:  acetaminophen, 650 mg, Oral, Q6H PRN  ipratropium-albuterol, 3 mL, Nebulization,  Q4H PRN Max 6/day  LORazepam, 1 mg, Intravenous, Q4H PRN        IP CONSULT TO PALLIATIVE CARE  IP CONSULT TO HOSPICE  IP CONSULT TO CASE MANAGEMENT    Network Utilization Review Department  ATTENTION: Please call with any questions or concerns to [phone] and carefully listen to the prompts so that you are directed to the right person. All voicemails are confidential.   For Discharge needs, contact Care Management DC Support Team at [phone] opt. [phone]  Send all requests for admission clinical reviews, approved or denied determinations and any other requests to dedicated fax number below belonging to the campus where the patient is receiving treatment. List of dedicated fax numbers for the Facilities:  FACILITY NAME UR FAX NUMBER   ADMISSION DENIALS (Administrative/Medical Necessity) [phone]   DISCHARGE SUPPORT TEAM (NETWORK) [phone]   PARENT CHILD HEALTH (Maternity/NICU/Pediatrics) [phone]   [facility] [phone]   [facility] [phone]   [facility] [phone]   [facility] [phone]   [facility] [phone]   [facility] [phone]   [facility] [phone]   [facility] [phone]   [facility] [phone]   Critical access hospital [phone]   [facility] [phone]   [facility] [phone]

## 2024-04-07 NOTE — PROGRESS NOTES
"[facility]  Progress Note  Name: Shannan Vee I  MRN: [id number]  Unit/Bed#: ICU 05 I Date of Admission: 4/6/2024   Date of Service: 4/7/2024 I Hospital Day: 1    Assessment/Plan   * Acute on chronic respiratory failure (HCC)  Assessment & Plan  2/2 chronic effusion, and invasive carcinomatosis.  4/6 CTA chest: negative PE.  \"Re-demonstration of extensive thoracic pleural carcinomatosis with complete opacification of the R hemithorax.  Findings are also secondary to a combination of atelectasis and effusion and worsened since prior.  Moderate L pleural effusion with subjacent compressive atelectasis. GGO of GURMEET and lingula.  Extensive osseous metastatic disease with multilevel compression deformities\"  4/6 Patient intubated 2/2 cardiac arrest  Vent: ACPC 20/ Pi 25/6 peep/60%  Titrate FiO2 for SpO2 > 92%  If increasing FiO2 requirements, place Left side down  Vent bundle ordered  Sedation/analgesia with fentanyl gtt and ativan as needed  Daily SAT/SBT  Ascept cath in place R pleura-- no drainage.  May need new catheter placed  To consider L chest tube with IR    Cardiac arrest (HCC)  Assessment & Plan  4/6/24 In-hospital witnessed cardiac arrest. Patient was ambulating from toilet back to bed with nursing staff and became unresponsive. ACLS with 1 round epinephrine/CPR and ROSC was obtained. Suspect PEA 2/2 hypoxia.  follow up echo, ordered  Last echo 3/24/24 EF 65%, RVSP 46  Holding home Toprol XL in the setting of hypotension  Continue home digoxin for Atrial fibrillation with HR hold parameters  Maintain MAP > 65 mmHg  Continue telemetry  Avoid further hypoxia    Shock (HCC)  Assessment & Plan  Possibly in the setting of sedation vs cardiogenic, doubt sepsis  Was started on levophed, will continue.  Titrate for goal MAP > 65 mmHg  Holding home Toprol XL in the setting of hypotension  Check echo  If vasopressor requirements increasing, consider placing central line and check ScvO2. " Currently Levo 8 mcg/min    Chronic, continuous use of opioids  Assessment & Plan  2/2 cancer related pain and chronic back pain  PDMP reviewed.  Patient has Rx for Oxycodone   Continue fentanyl gtt.  Ativan prn for anxiety, agitation  Can add back fentanyl and Dilaudid prns if current regimen ineffective or inadequate  Bowel regimen with senna and miralax  Monitor for constipation    Severe protein-calorie malnutrition (HCC)  Assessment & Plan  Malnutrition Findings:   Adult Malnutrition type: Chronic illness  Adult Degree of Malnutrition: Other severe protein calorie malnutrition  Malnutrition Characteristics: Fat loss, Muscle loss, Inadequate energy, Weight loss                  360 Statement: Chronic severe malnutrition related to catabolic illness, poor oral intake as evidenced by < 75% estimated energy intake > 1 mo, severe muscle loss to temporalis muscles, severe fat loss to buccal pads, 17.8% weight loss x ~7 mo (9/20/23 68kg, 4/6/24 55.9kg). Treated with: see RD progress note for TF recommendations. Recommend daily weights for nutrition monitoring.    BMI Findings:           Body mass index is 20.51 kg/m².       Recurrent malignant neoplasm of breast (HCC)  Assessment & Plan  Metastatic breast cancer. Hormone receptor positive and HER2 negative diagnosed 2013. Prior history of neoadjuvant chemotherapy followed by mastectomy. She is s/p Hysterectomy and oophorectomy. 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 Completed radiation at [facility].     Recent CT-scan showing extensive right thoracic pleural carcinomatosis, Extensive associated mass effect.     Palliative care consulted       ----------------------------------------------------------------------------------------  HPI/24hr events: Rapid response yesterday for acute unresponsiveness progressing to code blue due to cardiac arrest, suspect 2/2 hypoxia. Transferred to ICU.    Patient appropriate  for transfer out of the ICU today?: No  Disposition: Continue Critical Care   Code Status: Level 2 - DNAR: but accepts endotracheal intubation  ---------------------------------------------------------------------------------------  SUBJECTIVE  This morning, Ms. Vee is seen lying in bed, intubated, awake, alert, engaged with exam, in no acute distress. She responds appropriately to questions by nodding or shaking her head, denies pain at this time.     Review of Systems  Review of systems was unable to be performed secondary to intubation  ---------------------------------------------------------------------------------------  OBJECTIVE    Vitals   Vitals:    24 0700 24 0800 24 0805 24 0851   BP: (!) 87/60 102/66     Pulse: (!) 120 (!) 126     Resp: 19 20     Temp:   98.3 °F (36.8 °C)    TempSrc:       SpO2: 94% 96%  97%   Weight:       Height:         Temp (24hrs), Av °F (36.7 °C), Min:97.8 °F (36.6 °C), Max:98.3 °F (36.8 °C)  Current: Temperature: 98.3 °F (36.8 °C)          Respiratory:  SpO2: SpO2: 97 %, SpO2 Activity: SpO2 Activity: At Rest, SpO2 Device: O2 Device: Ventilator, Capnography:    Nasal Cannula O2 Flow Rate (L/min): 4 L/min    Invasive/non-invasive ventilation settings   Respiratory      Lab Data (Last 4 hours)         0608            pH, Arterial       7.531             pCO2, Arterial       35.6             pO2, Arterial       104.8             HCO3, Arterial       29.1             Base Excess, Arterial       6.2                    O2/Vent Data             Most Recent        Patient safety screen outcome:   Failed                      Physical Exam  Vitals and nursing note reviewed.   Constitutional:       General: She is not in acute distress.     Appearance: She is normal weight. She is ill-appearing. She is not toxic-appearing or diaphoretic.   HENT:      Head: Normocephalic and atraumatic.   Eyes:      General: No scleral icterus.        Right eye: No  discharge.         Left eye: No discharge.      Conjunctiva/sclera: Conjunctivae normal.   Cardiovascular:      Rate and Rhythm: Regular rhythm. Tachycardia present.      Pulses: Normal pulses.      Heart sounds: Normal heart sounds. No murmur heard.     No friction rub. No gallop.   Pulmonary:      Breath sounds: Rales present.      Comments: intubated  Abdominal:      General: There is no distension.      Palpations: Abdomen is soft.      Tenderness: There is no abdominal tenderness. There is no guarding or rebound.      Comments: Bowel sounds present but decreased   Musculoskeletal:         General: No tenderness.      Right lower leg: Edema (trace) present.      Left lower leg: Edema (trace) present.   Skin:     General: Skin is warm and dry.      Coloration: Skin is not jaundiced or pale.   Neurological:      Mental Status: She is alert.         Laboratory and Diagnostics:  Results from last 7 days   Lab Units 04/07/24  0607 04/06/24  1113 04/06/24  0453   WBC Thousand/uL 8.55 7.18 6.59   HEMOGLOBIN g/dL 8.3* 10.0* 9.3*   HEMATOCRIT % 28.0* 36.1 32.3*   PLATELETS Thousands/uL 234 261 272   NEUTROS PCT %  --   --  72   MONOS PCT %  --   --  14*   MONO PCT %  --  8  --    EOS PCT %  --  0 0     Results from last 7 days   Lab Units 04/07/24  0607 04/06/24  0453   SODIUM mmol/L 147 144   POTASSIUM mmol/L 4.4 4.2   CHLORIDE mmol/L 101 96   CO2 mmol/L 30 36*   ANION GAP mmol/L 16* 12   BUN mg/dL 24 19   CREATININE mg/dL 0.47* 0.44*   CALCIUM mg/dL 8.6 8.9   GLUCOSE RANDOM mg/dL 108 117   ALT U/L  --  26   AST U/L  --  178*   ALK PHOS U/L  --  187*   ALBUMIN g/dL  --  3.2*   TOTAL BILIRUBIN mg/dL  --  0.48     Results from last 7 days   Lab Units 04/06/24  0453   MAGNESIUM mg/dL 1.9      Results from last 7 days   Lab Units 04/06/24  1113 04/06/24  0626   INR  1.30* 1.17   PTT seconds  --  20*              ABG:  Results from last 7 days   Lab Units 04/07/24  0608   PH ART  7.531*   PCO2 ART mm Hg 35.6*   PO2 ART mm Hg  "104.8   HCO3 ART mmol/L 29.1*   BASE EXC ART mmol/L 6.2   ABG SOURCE  Radial, Right     VBG:  Results from last 7 days   Lab Units 04/07/24  0608   ABG SOURCE  Radial, Right           Micro        EKG: Sinus tach in 130s  Imaging: I have personally reviewed pertinent reports.   and I have personally reviewed pertinent films in PACS    Intake and Output  I/O         04/05 0701 04/06 0700 04/06 0701 04/07 0700 04/07 0701 04/08 0700    I.V. (mL/kg)  362.8 (6.5)     IV Piggyback 2050      Total Intake(mL/kg) 2050 362.8 (6.5)     Urine (mL/kg/hr)  215 (0.2)     Total Output  215     Net +2050 +147.8                    Height and Weights   Height: 5' 5\" (165.1 cm)  IBW (Ideal Body Weight): 57 kg  Body mass index is 20.51 kg/m².  Weight (last 2 days)       Date/Time Weight    04/06/24 1215 55.9 (123.24)    04/06/24 1045 55.9 (123.24)              Nutrition        Active Medications  Scheduled Meds:  Current Facility-Administered Medications   Medication Dose Route Frequency Provider Last Rate    acetaminophen  650 mg Oral Q6H PRN Ayaz TongekALISANP      chlorhexidine  15 mL Mouth/Throat Q12H [facility] Edana Tongek, ALISANP      digoxin  500 mcg Intravenous Once Ayaz TongekALISANP      fentaNYL  50 mcg/hr Intravenous Continuous Ainsley Vallejo PA-C 50 mcg/hr (04/07/24 0211)    heparin (porcine)  5,000 Units Subcutaneous Q8H [facility] Edana C Timoteocsek, CRNP      ipratropium-albuterol  3 mL Nebulization Q4H PRN Max 6/day EdALISA RiveroNP      lidocaine  2 patch Topical Daily EdALISA RiveroNP      LORazepam  1 mg Intravenous Q4H PRN Dennis Knowles MD      norepinephrine  1-30 mcg/min Intravenous Titrated Edward C lAyciaek, CRNP 8 mcg/min (04/07/24 0536)    polyethylene glycol  17 g Oral Daily ALISA UreñaNP      senna  8.8 mg Oral HS Aracely Shyanne Gieniec, CRNP       Continuous Infusions:  fentaNYL, 50 mcg/hr, Last Rate: 50 mcg/hr (04/07/24 0211)  norepinephrine, 1-30 mcg/min, Last Rate: 8 mcg/min " "(04/07/24 0536)      PRN Meds:   acetaminophen, 650 mg, Q6H PRN  ipratropium-albuterol, 3 mL, Q4H PRN Max 6/day  LORazepam, 1 mg, Q4H PRN        Invasive Devices Review  Invasive Devices       Peripheral Intravenous Line  Duration             Peripheral IV 04/06/24 Distal;Left;Ventral (anterior) Forearm 1 day    Peripheral IV 04/06/24 Left Antecubital 1 day    Peripheral IV 04/06/24 Dorsal (posterior);Right Forearm <1 day    Peripheral IV 04/06/24 Right;Upper Arm <1 day              Drain  Duration             Pleural Effusion Long-Term Catheter 16 Fr. 73 days    NG/OG/Enteral Tube Orogastric 16 Fr Center mouth <1 day    Urethral Catheter Latex 14 Fr. <1 day              Airway  Duration             ETT  Cuffed <1 day                    Rationale for remaining devices: PIVs for access while intubated. ETT for respiratory support, SBT later today and hopefully extubation this afternoon. OG while intubated. [place] while intubated.  ---------------------------------------------------------------------------------------  Advance Directive and Living Will:      Power of :    POLST:    ---------------------------------------------------------------------------------------      Dennis Knowles MD      Portions of the record may have been created with voice recognition software.  Occasional wrong word or \"sound a like\" substitutions may have occurred due to the inherent limitations of voice recognition software.  Read the chart carefully and recognize, using context, where substitutions have occurred     "

## 2024-04-07 NOTE — CASE MANAGEMENT
Case Management Discharge Planning Note    Patient name Shannan Vee  Location ICU 05/ICU 05 MRN [id number]  : 1970 Date 2024       Current Admission Date: 2024  Current Admission Diagnosis:Acute on chronic respiratory failure (HCC)   Patient Active Problem List    Diagnosis Date Noted    Cardiac arrest (HCC) 2024    Acute on chronic respiratory failure (HCC) 2024    Shock (HCC) 2024    Chronic, continuous use of opioids 2024    Encounter for home hospice care 2024    Tachycardia 2024    Hyponatremia 2024    Drug induced constipation 2024    Severe protein-calorie malnutrition (HCC) 2024    Chronic bilateral low back pain without sciatica 2024    Cancer associated pain 2024    Goals of care, counseling/discussion 2024    Large pleural effusion 2023    Recurrent malignant neoplasm of breast (HCC) 10/23/2023    Right lower lobe lung mass 10/23/2023    Pain from bone metastases (HCC) 10/23/2023    Other specified anemias 10/23/2023    Bladder wall thickening 10/06/2023    Urinary frequency 2023    Overweight (BMI 25.0-29.9) 2021    Personal history of malignant neoplasm of breast 2019    Use of anastrozole (Arimidex) 2018    Carcinoma of left breast, estrogen receptor positive (HCC) 2017    Malignant neoplasm of overlapping sites of left female breast (HCC) 2015    Uterine leiomyoma 10/15/2014    Limb swelling 2013      LOS (days): 1  Geometric Mean LOS (GMLOS) (days):   Days to GMLOS:     OBJECTIVE:  Risk of Unplanned Readmission Score: 54.03         Current admission status: Inpatient   Preferred Pharmacy:   [id number] - [place] - [address]  [address]  [place] [address]  Phone: [phone] Fax: [phone]    Primary Care Provider: Todd Bañuelos MD    Primary Insurance:   Secondary Insurance:     DISCHARGE DETAILS:    Other  Referral/Resources/Interventions Provided:  Financial Resources Provided: Financial Counselor  Referral Comments: Pt listed as self-pay in EPIC, email sent to insurance verifiers requesting confirmation and a PATHS referral if pt does not have insurance. CM department to follow.

## 2024-04-08 NOTE — ASSESSMENT & PLAN NOTE
3+ pitting edema in all 4 extremities  +3.8 liters this admission  Sinus tachycardia demonstrated on multiple EKGs prior to this admission, unlikely that current sinus tach is due to hypovolemia    D/C IVF   Give Lasix 20 mg IV x2 doses today  Monitor UOP, volume status, renal indices to guide decision regarding further diuretic

## 2024-04-08 NOTE — PLAN OF CARE
Problem: RESPIRATORY - ADULT  Goal: Achieves optimal ventilation and oxygenation  Description: INTERVENTIONS:  - Assess for changes in respiratory status  - Assess for changes in mentation and behavior  - Position to facilitate oxygenation and minimize respiratory effort  - Oxygen administered by appropriate delivery if ordered  - Initiate smoking cessation education as indicated  - Encourage broncho-pulmonary hygiene including cough, deep breathe, Incentive Spirometry  - Assess the need for suctioning and aspirate as needed  - Assess and instruct to report SOB or any respiratory difficulty  - Respiratory Therapy support as indicated  4/8/2024 0039 by Porsha Hurtado RN  Outcome: Progressing  4/8/2024 0038 by Porsha Hurtado RN  Outcome: Progressing     Problem: SAFETY,RESTRAINT: NV/NON-SELF DESTRUCTIVE BEHAVIOR  Goal: Remains free of harm/injury (restraint for non violent/non self-detsructive behavior)  Description: INTERVENTIONS:  - Instruct patient/family regarding restraint use   - Assess and monitor physiologic and psychological status   - Provide interventions and comfort measures to meet assessed patient needs   - Identify and implement measures to help patient regain control  - Assess readiness for release of restraint   Outcome: Progressing     Problem: Knowledge Deficit  Goal: Patient/family/caregiver demonstrates understanding of disease process, treatment plan, medications, and discharge instructions  Description: Complete learning assessment and assess knowledge base.  Interventions:  - Provide teaching at level of understanding  - Provide teaching via preferred learning methods  4/8/2024 0039 by Porsha Hurtado RN  Outcome: Progressing  4/8/2024 0038 by Porsha Hurtado RN  Outcome: Progressing     Problem: CARDIOVASCULAR - ADULT  Goal: Maintains optimal cardiac output and hemodynamic stability  Description: INTERVENTIONS:  - Monitor I/O, vital signs and rhythm  - Monitor for S/S and trends of  decreased cardiac output  - Administer and titrate ordered vasoactive medications to optimize hemodynamic stability  - Assess quality of pulses, skin color and temperature  - Assess for signs of decreased coronary artery perfusion  - Instruct patient to report change in severity of symptoms  4/8/2024 0039 by Porsha Hurtado RN  Outcome: Progressing  4/8/2024 0038 by Porsha Hurtado RN  Outcome: Progressing     Problem: Nutrition/Hydration-ADULT  Goal: Nutrient/Hydration intake appropriate for improving, restoring or maintaining nutritional needs  Description: Monitor and assess patient's nutrition/hydration status for malnutrition. Collaborate with interdisciplinary team and initiate plan and interventions as ordered.  Monitor patient's weight and dietary intake as ordered or per policy. Utilize nutrition screening tool and intervene as necessary. Determine patient's food preferences and provide high-protein, high-caloric foods as appropriate.     INTERVENTIONS:  - Monitor oral intake, urinary output, labs, and treatment plans  - Assess nutrition and hydration status and recommend course of action  - Evaluate amount of meals eaten  - Assist patient with eating if necessary   - Allow adequate time for meals  - Recommend/ encourage appropriate diets, oral nutritional supplements, and vitamin/mineral supplements  - Order, calculate, and assess calorie counts as needed  - Recommend, monitor, and adjust tube feedings and TPN/PPN based on assessed needs  - Assess need for intravenous fluids  - Provide specific nutrition/hydration education as appropriate  - Include patient/family/caregiver in decisions related to nutrition  4/8/2024 0039 by Porsha Hurtado RN  Outcome: Progressing  4/8/2024 0038 by Porsha Hurtado RN  Outcome: Progressing

## 2024-04-08 NOTE — ASSESSMENT & PLAN NOTE
Malnutrition Findings:   Adult Malnutrition type: Chronic illness  Adult Degree of Malnutrition: Other severe protein calorie malnutrition  Malnutrition Characteristics: Fat loss, Muscle loss, Inadequate energy, Weight loss                  360 Statement: Chronic severe malnutrition related to catabolic illness, poor oral intake as evidenced by < 75% estimated energy intake > 1 mo, severe muscle loss to temporalis muscles, severe fat loss to buccal pads, 17.8% weight loss x ~7 mo (9/20/23 68kg, 4/6/24 55.9kg). Treated with: see RD progress note for TF recommendations. Recommend daily weights for nutrition monitoring.    BMI Findings:           Body mass index is 20.47 kg/m².     During GOC conversation, discussed with family that we would not recommend placement of NGT or PEG tube could increase pt discomfort without significantly improving prognosis.

## 2024-04-08 NOTE — PLAN OF CARE
Problem: RESPIRATORY - ADULT  Goal: Achieves optimal ventilation and oxygenation  Description: INTERVENTIONS:  - Assess for changes in respiratory status  - Assess for changes in mentation and behavior  - Position to facilitate oxygenation and minimize respiratory effort  - Oxygen administered by appropriate delivery if ordered  - Initiate smoking cessation education as indicated  - Encourage broncho-pulmonary hygiene including cough, deep breathe, Incentive Spirometry  - Assess the need for suctioning and aspirate as needed  - Assess and instruct to report SOB or any respiratory difficulty  - Respiratory Therapy support as indicated  Outcome: Progressing     Problem: SAFETY,RESTRAINT: NV/NON-SELF DESTRUCTIVE BEHAVIOR  Goal: Remains free of harm/injury (restraint for non violent/non self-detsructive behavior)  Description: INTERVENTIONS:  - Instruct patient/family regarding restraint use   - Assess and monitor physiologic and psychological status   - Provide interventions and comfort measures to meet assessed patient needs   - Identify and implement measures to help patient regain control  - Assess readiness for release of restraint   Outcome: Progressing  Goal: Returns to optimal restraint-free functioning  Description: INTERVENTIONS:  - Assess the patient's behavior and symptoms that indicate continued need for restraint  - Identify and implement measures to help patient regain control  - Assess readiness for release of restraint   Outcome: Progressing     Problem: Nutrition/Hydration-ADULT  Goal: Nutrient/Hydration intake appropriate for improving, restoring or maintaining nutritional needs  Description: Monitor and assess patient's nutrition/hydration status for malnutrition. Collaborate with interdisciplinary team and initiate plan and interventions as ordered.  Monitor patient's weight and dietary intake as ordered or per policy. Utilize nutrition screening tool and intervene as necessary. Determine  patient's food preferences and provide high-protein, high-caloric foods as appropriate.     INTERVENTIONS:  - Monitor oral intake, urinary output, labs, and treatment plans  - Assess nutrition and hydration status and recommend course of action  - Evaluate amount of meals eaten  - Assist patient with eating if necessary   - Allow adequate time for meals  - Recommend/ encourage appropriate diets, oral nutritional supplements, and vitamin/mineral supplements  - Order, calculate, and assess calorie counts as needed  - Recommend, monitor, and adjust tube feedings and TPN/PPN based on assessed needs  - Assess need for intravenous fluids  - Provide specific nutrition/hydration education as appropriate  - Include patient/family/caregiver in decisions related to nutrition  Outcome: Progressing     Problem: COPING  Goal: Pt/Family able to verbalize concerns and demonstrate effective coping strategies  Description: INTERVENTIONS:  - Assist patient/family to identify coping skills, available support systems and cultural and spiritual values  - Provide emotional support, including active listening and acknowledgement of concerns of patient and caregivers  - Reduce environmental stimuli, as able  - Provide patient education  - Assess for spiritual pain/suffering and initiate spiritual care, including notification of Pastoral Care or nicholas based community as needed  - Assess effectiveness of coping strategies  4/8/2024 0916 by Wanda Hagen, RN  Outcome: Progressing  4/8/2024 0916 by Wanda Hagen, RN  Outcome: Progressing  Goal: Will report anxiety at manageable levels  Description: INTERVENTIONS:  - Administer medication as ordered  - Teach and encourage coping skills  - Provide emotional support  - Assess patient/family for anxiety and ability to cope  4/8/2024 0916 by Wanda Hagen, RN  Outcome: Progressing  4/8/2024 0916 by Wanda Hagen, RN  Outcome: Progressing     Problem: Prexisting or High Potential for Compromised Skin  Integrity  Goal: Skin integrity is maintained or improved  Description: INTERVENTIONS:  - Identify patients at risk for skin breakdown  - Assess and monitor skin integrity  - Assess and monitor nutrition and hydration status  - Monitor labs   - Assess for incontinence   - Turn and reposition patient  - Assist with mobility/ambulation  - Relieve pressure over bony prominences  - Avoid friction and shearing  - Provide appropriate hygiene as needed including keeping skin clean and dry  - Evaluate need for skin moisturizer/barrier cream  - Collaborate with interdisciplinary team   - Patient/family teaching  - Consider wound care consult   4/8/2024 0916 by Wanda Hagen, RN  Outcome: Progressing  4/8/2024 0916 by Wanda Hagen, RN  Outcome: Progressing  4/8/2024 0914 by Wanda Hagen, RN  Outcome: Progressing

## 2024-04-08 NOTE — ASSESSMENT & PLAN NOTE
4/6/24 In-hospital witnessed cardiac arrest. Patient was ambulating from toilet back to bed with nursing staff and became unresponsive. ACLS with 1 round epinephrine/CPR and ROSC was obtained. Suspect PEA 2/2 hypoxia.  follow up echo, ordered  Last echo 3/24/24 EF 65%, RVSP 46  Holding home Toprol XL in the setting of hypotension  Noted to be in new Afib on 4/6, received digoxin on 4/6 and 4/7, now in sinus tachycardia  Maintain MAP > 65 mmHg  Continue telemetry  Avoid further hypoxia

## 2024-04-08 NOTE — RESPIRATORY THERAPY NOTE
Please check if patient was able to  medication.  Thanks Pt S/p cardiac arrest. VS stable and pulmonary mechanics stable. Pt failed wean yesterday due to increased wob and RR. No vent changes or events this p.m. ABG obtained this a.m. and sent to lab. Will attempt SBT this a.m. Will continue current vent settings as ordered and will continue to monitor pt.

## 2024-04-08 NOTE — ACP (ADVANCE CARE PLANNING)
I was told by the nurse that patient's family decided to withdraw care and start hospice.    I met with the family by bedside including , daughter and sister and other family member, the sister speaks English but also one of my residents on the team was helping interpretation to Maltese language.    I explained the hospice care to the family and they seemed initially to understand but they requested that she gets extubated and then gets thoracentesis to help her to get better.  At that time I wanted to clarify their understanding about hospice and after further discussion I believe they did not not understand the concept very clearly.  I had more discussion with them and explained that going on hospice means stopping any targeted treatment towards the disease and to concentrate on comfort measures and medications to keep her free of pain and suffering.  And I explained to them that thoracentesis although can have some palliative impact but in her condition the pleural effusion is not large and given that she has completely obscured right lung due to the tumor then although less likely to happen but any complications to the left thoracentesis may need to bleeding or pneumothorax then will require more invasive interventions that may put her in more suffering or pain.  They verbalized understanding and they decided to hold on doing the thoracentesis or the hospice and they wanted to discuss further amongst themselves.  I asked palliative care to speak to them as well.  After further discussions with the family it appears to be that they still wanted the patient to improve to get more chemotherapy and they asked for extubation because they felt she would be better.  We checked with ultrasound and there was no significant pleural effusion to safely tap the patient at this time.  Based on the above discussion we will continue DNR status, will continue management as per critical care note earlier.  Will continue to  wean patient as tolerated.  Will readdress goals of care and advance care planning again tomorrow.    Added ACP critical care time 15 minutes.

## 2024-04-08 NOTE — CONSULTS
Consultation - Palliative & Supportive Care   Shannan Vee 53 y.o. female MRN: [id number]  Unit/Bed#: ICU 05 Encounter: [id number]      Physician Requesting Consult: Carmen Blue DO  Reason for Consult / Principal Problem: goals of care, Uzbek speaking      Assessment/Plan:  Metastatic breast cancer - mets to bone, lung not a candidate for further chemotherapy per HemOnc note 4/2/24, encouraged to consider hospice  Extensive osseous metastatic disease with multilevel compression deformities  Extensive thoracic pleural carcinomatosis, moderate L pleural effusion  Acute on chronic hypoxic respiratory failure, on vent, failed weaning 4/7/24  S/p cardiac arrest  Shock  R pleural effusion with PleurX, no drainage, catheter encased by tumor  Severe protein calorie malnutrition  Cancer related pain  Generalized weakness, declining performance status  Palliative care patient  Goals of care  -continue current medical care  -level 2, DNAR; no additional limits on care; family not ready to transition to comfort with extubation/liberation from ventilator; disconnect and poor insight into disease; 2 family meetings today to discuss goals of care and possible transition to comfort with/without hospice/liberation from ventilator; family still hoping for improvement and to extend patient's life; they want patient comfortable but do not fully grasp that transition to comfort is accepting of patient's death; if extubated in current condition, patient likely has hours to days; patient failed SBT yesterday and today(lasted 40min); family also concerned with patient's pain, but explained that pain meds were held to have patient more awake for SBT and hope for extubation; informed family that as we are prolonging patient's survival we are also prolonging her death given the extent of her disease and no longer having ability to tolerated cancer directed treatments given performance status/decline; hospice has been recommended by  HemOnc and palliative care as outpt; family continues to struggle with patient's condition and decision of remaining treatment focused vs comfort focused; after 2nd family meeting today, decision to continue with current care, discuss with other family members, and reconvene tomorrow after ICU team assessment in am  -prognosis poor  -will follow    Hungarian interpreters #[id number] and #[id number] utilized briefly, family had difficult time understanding; sister/Gisela bilingual, ICU intern/Dr. Holden Fuchs fluent and present during both family meetings.      Family meeting #1:  Met with patient, family at bedside. , daughter, sister, brother at bedside. , daughter, sister preferred to speak outside of room. Dr. Knowles and Dr. Fuchs from ICU team present, Dr. Fuchs providing translation in addition to  services when ICU team left meeting briefly. Overview of patient's clinical condition provided. Family requesting thoracentesis of L pleural effusion. Risks discussed. Discussed extensive cancer and no longer a candidate for treatments. Discussed that continuing with current care likely presenting additional suffering to patient and as we are doing these aggressive measures to prolong her life, we are also prolonging her death. Discussed 3 options. Option 1- continuing with current care, hoping for some improvement and eventual extubation if ICU team feels patient able; revisiting goals in day or so depending condition  Option 2- continuing current care with hope of extubation if passes SBT, but then transition to comfort +/- hospice and not reintubating patient or escalating care  Option 3- transition to comfort now with extubation/liberation from ventilator.  I discussed all options and what that care would entail. Family does not seem to understand that patient will likely have ongoing decline/abrupt deterioration, but tried to explain this multiple times. Daughter asked how long  patient can remain on machines and asked about nutrition through IV or feeding tubes. Discussed that feeding tubes/nutrition at eol often cause additional discomfort and complications to patient. Emotional support and validation of difficulty in making these decisions provided. Family would like to speak with each other some more today and will discuss with ICU team later.     Called back to ICU. Family expressed to nurse that they were ready for extubation/hospice. ICU attending spoke with family, asking that once extubated, a thoracentesis be performed on L side with hope of improvement in breathing and relief.     Family meeting #2:   Met with patient, , daughter, sister, brother at bedside. Attempted use of  initially, but sister/Gisela felt it was not going well with . ICU team, Dr. Knowles and Dr. Fuchs joined. Interpretation provided by Dr. Fuchs and by sister/Gisela. Discussed current condition again and extend of disease. Family asking for extubation, then thoracentesis with hope of significant improvement in condition/breathing. Discussed that L effusion is likely not reason patient sustained cardiac arrest, and uncertain if it will provide significant improvement in patient's breathing given overall weakness and cachexia. Discussed that patient failed SBT earlier today after 40min and if extubate patient will likely die within hours, maybe day or so. Family is not ready for this. Family tried to get patient to weigh in on this asking if she wants the tube out. Informed that every patient would want the tube out and that patient is back on fentanyl infusion since failing SBT and unlikely to understand ramifications of removing tube/extubation now. Family struggling, but it is clear they are not ready and not understanding of extubating in current state means transitioning to comfort and accepting of patient's eventual death. All in agreement to continue current  medical care. ICU team will reevaluate L pleural effusion. Discussed that option of comfort/liberation from machine is available at anytime. Palliative care will continue to follow for support and ongoing [facility] discussions.      Code Status: Level 2 - DNAR: but accepts endotracheal intubation  Advance Directive and Living Will:    No  Power of :  No  POLST:  No  Emergency contact/daughter/Ajit: [phone]  Sister/Gisela speaks English: [phone]        History of Present Illness   HPI: Shannan Vee is a 53 y.o. year old female who presents with shortness of breath, admitted 4/6/24 with worsening pleural effusion. PMH significant for metastatic breast cancer s/p mastectomy + chemoradiation with extensive metastases/pleural carcinomatosis, loculated effusions with R PleurX catheter, severe malnutrition, HTN. R pleurX recently evaluated by IR, feel extensive tumor of R lung encasing catheter, did not believe manipulation would provide clinical benefit. Patient with significant pain on admission, received 10mg po oxy in ER. Came to floor, weak but responsive, in Afib with RVR, oob to commode then back to bed. Patient became unresponsive, RRT called, patient pulseless, code blue called, received 1 round of Epi, bicarb, calcium, ROSC after 5 minutes. Patient follows with palliative partner as outpatient, last seen 3/29/24 via telemedicine. Palliative care consulted for goals of care.     Review of Systems   Unable to perform ROS: Intubated       Historical Information   Past Medical History:   Diagnosis Date    Breast cancer (HCC) 07/2013    left-chemotherapy    Fibroma     Goals of care, counseling/discussion     History of chemotherapy     History of external beam radiation therapy      Patient Active Problem List   Diagnosis    Carcinoma of left breast, estrogen receptor positive (HCC)    Use of anastrozole (Arimidex)    Limb swelling    Malignant neoplasm of overlapping sites of left female breast  (HCC)    Uterine leiomyoma    Personal history of malignant neoplasm of breast    Overweight (BMI 25.0-29.9)    Urinary frequency    Bladder wall thickening    Recurrent malignant neoplasm of breast (HCC)    Right lower lobe lung mass    Pain from bone metastases (HCC)    Other specified anemias    Large pleural effusion    Chronic bilateral low back pain without sciatica    Cancer associated pain    Goals of care, counseling/discussion    Severe protein-calorie malnutrition (HCC)    Drug induced constipation    Hyponatremia    Tachycardia    Encounter for home hospice care    Cardiac arrest (HCC)    Acute on chronic respiratory failure (HCC)    Shock (HCC)    Chronic, continuous use of opioids     Past Surgical History:   Procedure Laterality Date    APPENDECTOMY      BREAST SURGERY  2014    Left breast 2/2 breast CA    HYSTERECTOMY  2015    Due to fibroma    IR BIOPSY BONE  10/9/2023    IR PLEURAL EFFUSION LONG-TERM CATHETER PLACEMENT  1/24/2024    IR THORACENTESIS  11/8/2023    MASTECTOMY Left 2013    OOPHORECTOMY       Social History     Socioeconomic History    Marital status: /Civil Union     Spouse name: None    Number of children: 3    Years of education: None    Highest education level: 12th grade   Occupational History     Comment: unemployed   Tobacco Use    Smoking status: Never     Passive exposure: Never    Smokeless tobacco: Never   Vaping Use    Vaping status: Never Used   Substance and Sexual Activity    Alcohol use: Not Currently    Drug use: No    Sexual activity: Yes     Partners: Male     Birth control/protection: Surgical   Other Topics Concern    None   Social History Narrative    None     Social Determinants of Health     Financial Resource Strain: Low Risk  (2/28/2024)    Overall Financial Resource Strain (CARDIA)     Difficulty of Paying Living Expenses: Not hard at all   Recent Concern: Financial Resource Strain - Medium Risk (1/17/2024)    Received from [facility]     Overall Financial Resource Strain (CARDIA)     Difficulty of Paying Living Expenses: Somewhat hard   Food Insecurity: No Food Insecurity (3/25/2024)    Hunger Vital Sign     Worried About Running Out of Food in the Last Year: Never true     Ran Out of Food in the Last Year: Never true   Recent Concern: Food Insecurity - Food Insecurity Present (1/17/2024)    Received from [facility]    Hunger Vital Sign     Worried About Running Out of Food in the Last Year: Sometimes true     Ran Out of Food in the Last Year: Patient declined   Transportation Needs: No Transportation Needs (3/25/2024)    PRAPARE - Transportation     Lack of Transportation (Medical): No     Lack of Transportation (Non-Medical): No   Recent Concern: Transportation Needs - Unmet Transportation Needs (1/17/2024)    Received from [facility]    PRAPARE - Transportation     Lack of Transportation (Medical): Yes     Lack of Transportation (Non-Medical): Patient declined   Physical Activity: Inactive (11/14/2023)    Received from [facility]    Exercise Vital Sign     Days of Exercise per Week: 0 days     Minutes of Exercise per Session: 0 min   Stress: No Stress Concern Present (11/14/2023)    Received from [facility]    [employer] - Occupational Stress Questionnaire     Feeling of Stress : Only a little   Social Connections: Socially Integrated (11/14/2023)    Received from [facility]    Social Connection and Isolation Panel [NHANES]     Frequency of Communication with Friends and Family: More than three times a week     Frequency of Social Gatherings with Friends and Family: More than three times a week     Attends [employer] Services: More than 4 times per year     Active Member of Clubs or Organizations: Yes     Attends Club or Organization Meetings: More than 4 times per year     Marital Status:    Intimate Partner Violence:  Not At Risk (1/17/2024)    Received from [facility]    Humiliation, Afraid, Rape, and Kick questionnaire     Fear of Current or Ex-Partner: No     Emotionally Abused: No     Physically Abused: No     Sexually Abused: No   Housing Stability: Low Risk  (3/25/2024)    Housing Stability Vital Sign     Unable to Pay for Housing in the Last Year: No     Number of Places Lived in the Last Year: 1     Unstable Housing in the Last Year: No     Family History   Problem Relation Age of Onset    Lung cancer Mother     No Known Problems Father     No Known Problems Sister     No Known Problems Daughter     No Known Problems Daughter     Cancer Maternal Grandmother     No Known Problems Maternal Grandfather     No Known Problems Paternal Grandmother     No Known Problems Paternal Grandfather     Uterine cancer Maternal Aunt     No Known Problems Son     No Known Problems Paternal Aunt        Meds/Allergies   all current active meds have been reviewed and current meds:   Current Facility-Administered Medications   Medication Dose Route Frequency    acetaminophen (TYLENOL) tablet 650 mg  650 mg Oral Q6H PRN    chlorhexidine (PERIDEX) 0.12 % oral rinse 15 mL  15 mL Mouth/Throat Q12H ALEXANDRE    fentaNYL 1000 mcg in sodium chloride 0.9% 100mL infusion  25 mcg/hr Intravenous Continuous    heparin (porcine) subcutaneous injection 5,000 Units  5,000 Units Subcutaneous Q8H ALEXANDRE    ipratropium-albuterol (DUO-NEB) 0.5-2.5 mg/3 mL inhalation solution 3 mL  3 mL Nebulization Q4H PRN Max 6/day    lidocaine (LIDODERM) 5 % patch 2 patch  2 patch Topical Daily    LORazepam (ATIVAN) injection 1 mg  1 mg Intravenous Q4H PRN    multi-electrolyte (PLASMALYTE-A/ISOLYTE-S PH 7.4) IV solution  100 mL/hr Intravenous Continuous    NOREPINEPHRINE 4 MG  ML NSS (CMPD ORDER) infusion  1-30 mcg/min Intravenous Titrated    polyethylene glycol (MIRALAX) packet 17 g  17 g Oral Daily    senna oral syrup 8.8 mg  8.8 mg Oral HS       Allergies  "  Allergen Reactions    Penicillins Hives     And cough    Aspirin Rash     Other reaction(s): Palpitations       I have reviewed the patient's controlled substance dispensing history in the Prescription Drug Monitoring Program in compliance with [facility] regulations before prescribing any controlled substances.     Objective   /55   Pulse (!) 130   Temp 98.8 °F (37.1 °C)   Resp 22   Ht 5' 5\" (1.651 m)   Wt 55.8 kg (123 lb)   SpO2 97%   BMI 20.47 kg/m²   Physical Exam  Vitals and nursing note reviewed.   Constitutional:       General: She is not in acute distress.     Appearance: She is ill-appearing. She is not toxic-appearing or diaphoretic.      Comments: cachectic   HENT:      Head: Normocephalic and atraumatic.      Mouth/Throat:      Comments: ETT, OGT intact  Eyes:      General: No scleral icterus.  Cardiovascular:      Rate and Rhythm: Tachycardia present.   Pulmonary:      Breath sounds: No stridor.      Comments: On vent  Abdominal:      General: There is no distension.      Palpations: Abdomen is soft.   Musculoskeletal:         General: Swelling present.      Comments: anasarca   Skin:     General: Skin is warm and dry.   Neurological:      Mental Status: She is disoriented.      Comments: Opens eyes to voice, more alert but in pain off sedation; on fentanyl infusion later in day after failing SBT   Psychiatric:      Comments: Unable to assess         Lab Results: I have personally reviewed pertinent labs., CBC:   Lab Results   Component Value Date    WBC 6.87 04/08/2024    HGB 7.6 (L) 04/08/2024    HCT 26.2 (L) 04/08/2024    MCV 96 04/08/2024     04/08/2024    RBC 2.74 (L) 04/08/2024    MCH 27.7 04/08/2024    MCHC 29.0 (L) 04/08/2024    RDW 21.0 (H) 04/08/2024    MPV 9.0 04/08/2024    NRBC 6 04/08/2024   , CMP:   Lab Results   Component Value Date    SODIUM 147 04/08/2024    K 3.9 04/08/2024     04/08/2024    CO2 34 (H) 04/08/2024    BUN 21 04/08/2024    CREATININE 0.43 (L) " "04/08/2024    CALCIUM 8.0 (L) 04/08/2024    EGFR 114 04/08/2024   , PT/PTT:No results found for: \"PT\", \"PTT\"  Imaging Studies: I have personally reviewed pertinent reports.    EKG, Pathology, and Other Studies: I have personally reviewed pertinent reports.        Counseling / Coordination of Care  Total floor / unit time spent today 180 minutes. Greater than 50% of total time was spent with the patient and / or family counseling and / or coordination of care. A description of the counseling / coordination of care: current condition, goals of care, hospice, comfort transition with liberal extubation, advanced cancer, emotional support.    Jailyn Omalley,    "

## 2024-04-08 NOTE — ASSESSMENT & PLAN NOTE
Possibly in the setting of sedation vs cardiogenic, doubt sepsis  Was started on levophed, will continue.  Titrate for goal MAP > 65 mmHg  Holding home Toprol XL in the setting of hypotension  Echo shows LVEF 65%, normal RV systolic function, some evidence of RV diastolic collapse in apical region 2/2 pericardial effusion. No definite evidence of cardiac tamponade.  Weaning Levo, down from 8 mcg/min yesterday to 4 today

## 2024-04-08 NOTE — ASSESSMENT & PLAN NOTE
Metastatic breast cancer. Hormone receptor positive and HER2 negative diagnosed 2013. Prior history of neoadjuvant chemotherapy followed by mastectomy. She is s/p Hysterectomy and oophorectomy.   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 Completed radiation at [facility].     Recent CT-scan showing extensive right thoracic pleural carcinomatosis, extensive associated mass effect.     Palliative care consulted, appreciate recommendations

## 2024-04-08 NOTE — PROGRESS NOTES
"[facility]  Progress Note  Name: Shannan Vee I  MRN: [id number]  Unit/Bed#: ICU 05 I Date of Admission: 4/6/2024   Date of Service: 4/8/2024 I Hospital Day: 2    Assessment/Plan   * Acute on chronic respiratory failure (HCC)  Assessment & Plan  2/2 chronic effusion and invasive carcinomatosis.  4/6 CTA chest: negative PE.  \"Re-demonstration of extensive thoracic pleural carcinomatosis with complete opacification of the R hemithorax.  Findings are also secondary to a combination of atelectasis and effusion and worsened since prior.  Moderate L pleural effusion with subjacent compressive atelectasis. GGO of GURMEET and lingula.  Extensive osseous metastatic disease with multilevel compression deformities\"  4/6 Patient intubated 2/2 cardiac arrest, likely in the setting of arhythmia precipitated by hypoxia  Vent: ACPC 16/ Pi 25/6 peep/50%  Titrate FiO2 for SpO2 > 92%  If increasing FiO2 requirements, place Left side down  Hold fentanyl gtt this AM for SBT today  Daily SAT/SBT  Ascept cath in place R pleura-- no drainage.   To consider palliative L thoracentesis after extubation    Cardiac arrest (HCC)  Assessment & Plan  4/6/24 In-hospital witnessed cardiac arrest. Patient was ambulating from toilet back to bed with nursing staff and became unresponsive. ACLS with 1 round epinephrine/CPR and ROSC was obtained. Suspect PEA 2/2 hypoxia.  follow up echo, ordered  Last echo 3/24/24 EF 65%, RVSP 46  Holding home Toprol XL in the setting of hypotension  Noted to be in new Afib on 4/6, received digoxin on 4/6 and 4/7, now in sinus tachycardia  Maintain MAP > 65 mmHg  Continue telemetry  Avoid further hypoxia    Shock (HCC)  Assessment & Plan  Possibly in the setting of sedation vs cardiogenic, doubt sepsis  Was started on levophed, will continue.  Titrate for goal MAP > 65 mmHg  Holding home Toprol XL in the setting of hypotension  Echo shows LVEF 65%, normal RV systolic function, some evidence of RV " diastolic collapse in apical region 2/2 pericardial effusion. No definite evidence of cardiac tamponade.  Weaning Levo, down from 8 mcg/min yesterday to 4 today    Chronic, continuous use of opioids  Assessment & Plan  2/2 cancer related pain and chronic back pain  PDMP reviewed.  Patient has Rx for Oxycodone   Continue fentanyl gtt, hold for SBT today  Bowel regimen with senna and miralax  Monitor for constipation    Tachycardia  Assessment & Plan  History of sinus tachycardia prior to admission, for which she takes metoprolol at home, currently on hold due to hypotension.    Did not change with administration of IVF and patient appears volume overloaded on exam.    Severe protein-calorie malnutrition (HCC)  Assessment & Plan  Malnutrition Findings:   Adult Malnutrition type: Chronic illness  Adult Degree of Malnutrition: Other severe protein calorie malnutrition  Malnutrition Characteristics: Fat loss, Muscle loss, Inadequate energy, Weight loss                  360 Statement: Chronic severe malnutrition related to catabolic illness, poor oral intake as evidenced by < 75% estimated energy intake > 1 mo, severe muscle loss to temporalis muscles, severe fat loss to buccal pads, 17.8% weight loss x ~7 mo (9/20/23 68kg, 4/6/24 55.9kg). Treated with: see RD progress note for TF recommendations. Recommend daily weights for nutrition monitoring.    BMI Findings:           Body mass index is 20.47 kg/m².     During GOC conversation, discussed with family that we would not recommend placement of NGT or PEG tube could increase pt discomfort without significantly improving prognosis.    Large pleural effusion  Assessment & Plan  CTA ED chest PE Study 4/6/2024:   No definite evidence for pulmonary embolism. Redemonstration of extensive thoracic pleural carcinomatosis with complete opacification of the right hemithorax. Findings are also secondary to a combination of atelectasis and effusion and worsened since the prior exam.  Right-sided chest tube in place. Moderate left pleural effusion with subjacent compressive atelectasis, worsened. Groundglass opacities within the left upper lobe and lingula are new and may be on the basis of infection or edema. Extensive osseous metastatic disease with multilevel compression deformities are stable.    Recently evaluated by IR and their impression is that patient has extensive tumor of the right lung with some encasing the catheter. IR did not believe that further manipulation of the catheter would provide any clinical benefit.     Moderate pleural effusion on left, to consider palliative thoracentesis after extubation.    Recurrent malignant neoplasm of breast (HCC)  Assessment & Plan  Metastatic breast cancer. Hormone receptor positive and HER2 negative diagnosed 2013. Prior history of neoadjuvant chemotherapy followed by mastectomy. She is s/p Hysterectomy and oophorectomy.   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 Completed radiation at [facility].     Recent CT-scan showing extensive right thoracic pleural carcinomatosis, extensive associated mass effect.     Palliative care consulted, appreciate recommendations    Anasarca  Assessment & Plan  3+ pitting edema in all 4 extremities  +3.8 liters this admission  Sinus tachycardia demonstrated on multiple EKGs prior to this admission, unlikely that current sinus tach is due to hypovolemia    D/C IVF   Give Lasix 20 mg IV x2 doses today  Monitor UOP, volume status, renal indices to guide decision regarding further diuretic       ----------------------------------------------------------------------------------------  HPI/24hr events: High RSBI during SBT yestwerday, did not attempt to extubate. Overnight, one episode of desaturation, with significant improvement after suctioning thick airway secretions.     Patient appropriate for transfer out of the ICU today?: No  Disposition: Continue  Critical Care   Code Status: Level 2 - DNAR: but accepts endotracheal intubation  ---------------------------------------------------------------------------------------  SUBJECTIVE  This morning, Shannan is seen lying intubated in bed, drowsy but rouseable, attentive and following commands, but globally very weak. Endorses that she is in pain. Fentanyl drip is currently on hold to facilitate SBT.     Review of Systems  Review of systems was unable to be performed secondary to intubation and drowsy mental status.  ---------------------------------------------------------------------------------------  OBJECTIVE    Vitals   Vitals:    24 0620 24 0630 24 0645 24 0815   BP: 100/54 96/56 106/55 110/60   BP Location:    Left arm   Pulse: (!) 124 (!) 128 (!) 130 (!) 130   Resp: 18 18 22 21   Temp:    98.1 °F (36.7 °C)   TempSrc:    Axillary   SpO2: 97% 96% 97% 97%   Weight:       Height:         Temp (24hrs), Av.7 °F (37.1 °C), Min:98.1 °F (36.7 °C), Max:99.1 °F (37.3 °C)  Current: Temperature: 98.1 °F (36.7 °C)          Respiratory:  SpO2: SpO2: 97 %, SpO2 Activity: SpO2 Activity: At Rest, SpO2 Device: O2 Device: Ventilator, Capnography:    Nasal Cannula O2 Flow Rate (L/min): 4 L/min    Invasive/non-invasive ventilation settings   Respiratory      Lab Data (Last 4 hours)      None           O2/Vent Data (Last 4 hours)        0812  0900        Patient safety screen outcome: Passed       RSBI  280                      Physical Exam  Vitals and nursing note reviewed.   Constitutional:       General: She is not in acute distress.     Appearance: She is ill-appearing. She is not toxic-appearing or diaphoretic.   HENT:      Head: Normocephalic and atraumatic.   Eyes:      General: No scleral icterus.        Right eye: No discharge.         Left eye: No discharge.      Conjunctiva/sclera: Conjunctivae normal.   Cardiovascular:      Rate and Rhythm: Regular rhythm. Tachycardia present.       Pulses: Normal pulses.      Heart sounds: Normal heart sounds. No murmur heard.     No friction rub. No gallop.   Pulmonary:      Comments: Intubated. Auscultation limited by patient positioning. Markedly diminished breath sounds on R.   Abdominal:      General: Bowel sounds are normal. There is no distension.      Palpations: Abdomen is soft.      Tenderness: There is no abdominal tenderness. There is no guarding or rebound.   Musculoskeletal:         General: Swelling present.      Right lower leg: Edema present.      Left lower leg: Edema present.      Comments: 3+ pitting edema in all four extremities   Skin:     General: Skin is warm and dry.   Neurological:      Comments: Drowsy, following commands, generalized weakness             Laboratory and Diagnostics:  Results from last 7 days   Lab Units 04/08/24  0545 04/07/24  0607 04/06/24  1113 04/06/24  0453   WBC Thousand/uL 6.87 8.55 7.18 6.59   HEMOGLOBIN g/dL 7.6* 8.3* 10.0* 9.3*   HEMATOCRIT % 26.2* 28.0* 36.1 32.3*   PLATELETS Thousands/uL 203 234 261 272   NEUTROS PCT %  --   --   --  72   MONOS PCT %  --   --   --  14*   MONO PCT %  --   --  8  --    EOS PCT %  --   --  0 0     Results from last 7 days   Lab Units 04/08/24  0545 04/07/24  0607 04/06/24  0453   SODIUM mmol/L 147 147 144   POTASSIUM mmol/L 3.9 4.4 4.2   CHLORIDE mmol/L 103 101 96   CO2 mmol/L 34* 30 36*   ANION GAP mmol/L 10 16* 12   BUN mg/dL 21 24 19   CREATININE mg/dL 0.43* 0.47* 0.44*   CALCIUM mg/dL 8.0* 8.6 8.9   GLUCOSE RANDOM mg/dL 108 108 117   ALT U/L  --   --  26   AST U/L  --   --  178*   ALK PHOS U/L  --   --  187*   ALBUMIN g/dL  --   --  3.2*   TOTAL BILIRUBIN mg/dL  --   --  0.48     Results from last 7 days   Lab Units 04/06/24  0453   MAGNESIUM mg/dL 1.9      Results from last 7 days   Lab Units 04/06/24  1113 04/06/24  0626   INR  1.30* 1.17   PTT seconds  --  20*          Results from last 7 days   Lab Units 04/07/24  1346 04/07/24  1050   LACTIC ACID mmol/L 2.1* 2.4*  "    ABG:  Results from last 7 days   Lab Units 04/08/24  0440   PH ART  7.495*   PCO2 ART mm Hg 39.3   PO2 ART mm Hg 88.8   HCO3 ART mmol/L 29.6*   BASE EXC ART mmol/L 5.9   ABG SOURCE  Radial, Right     VBG:  Results from last 7 days   Lab Units 04/08/24  0440   ABG SOURCE  Radial, Right           Micro        EKG: Sinus tachycardia in 130s. Last Afib on 4/7.  Imaging: I have personally reviewed pertinent reports.   and I have personally reviewed pertinent films in PACS    Intake and Output  I/O         04/06 0701 04/07 0700 04/07 0701 04/08 0700 04/08 0701 04/09 0700    I.V. (mL/kg) 362.8 (6.5) 2414.4 (43.3) 0.2 (0)    IV Piggyback       Total Intake(mL/kg) 362.8 (6.5) 2414.4 (43.3) 0.2 (0)    Urine (mL/kg/hr) 315 (0.2) 630 (0.5) 50 (0.3)    Stool  0     Total Output 315 630 50    Net +47.8 +1784.4 -49.8           Unmeasured Stool Occurrence  2 x             Height and Weights   Height: 5' 5\" (165.1 cm)  IBW (Ideal Body Weight): 57 kg  Body mass index is 20.47 kg/m².  Weight (last 2 days)       Date/Time Weight    04/07/24 1301 55.8 (123)    04/06/24 1215 55.9 (123.24)    04/06/24 1045 55.9 (123.24)              Nutrition        Active Medications  Scheduled Meds:  Current Facility-Administered Medications   Medication Dose Route Frequency Provider Last Rate    acetaminophen  650 mg Oral Q6H PRN JANNETH Paz      chlorhexidine  15 mL Mouth/Throat Q12H JANNETH Castañeda      fentaNYL  25 mcg/hr Intravenous Continuous Dennis Knowles MD 25 mcg/hr (04/08/24 0904)    furosemide  20 mg Intravenous BID (diuretic) Dennis Knowles MD      heparin (porcine)  5,000 Units Subcutaneous Q8H Replaced by [facility] JANNETH Paz      ipratropium-albuterol  3 mL Nebulization Q4H PRN Max 6/day Edana Hart, CRNP      lidocaine  2 patch Topical Daily Edana Mahmoodcsjoey, CRNP      norepinephrine  1-30 mcg/min Intravenous Titrated Ayaz Mahmoodcsek, CRNP 2 mcg/min (04/08/24 0831)    polyethylene glycol  17 g Oral Daily " "JANNETH Ureña      senna  8.8 mg Oral HS JANNETH Ureña       Continuous Infusions:  fentaNYL, 25 mcg/hr, Last Rate: 25 mcg/hr (04/08/24 0904)  norepinephrine, 1-30 mcg/min, Last Rate: 2 mcg/min (04/08/24 0831)      PRN Meds:   acetaminophen, 650 mg, Q6H PRN  ipratropium-albuterol, 3 mL, Q4H PRN Max 6/day        Invasive Devices Review  Invasive Devices       Peripheral Intravenous Line  Duration             Peripheral IV 04/06/24 Right;Upper Arm 1 day    Peripheral IV 04/07/24 Dorsal (posterior);Right Forearm 1 day    Peripheral IV 04/07/24 Dorsal (posterior);Left Wrist <1 day    Peripheral IV 04/08/24 Left;Upper;Ventral (anterior) Arm <1 day              Drain  Duration             Pleural Effusion Long-Term Catheter 16 Fr. 74 days    NG/OG/Enteral Tube Orogastric 16 Fr Center mouth 2 days    Urethral Catheter Latex 14 Fr. 1 day              Airway  Duration             ETT  Cuffed 2 days                    Rationale for remaining devices: PIVs for access while admitted. Chronic pleurex catheter. OG while intubated. ETT for acute respiratory failure. Daily SBT.  ---------------------------------------------------------------------------------------  Advance Directive and Living Will:      Power of :    POLST:    ---------------------------------------------------------------------------------------      Dennis Knowles MD      Portions of the record may have been created with voice recognition software.  Occasional wrong word or \"sound a like\" substitutions may have occurred due to the inherent limitations of voice recognition software.  Read the chart carefully and recognize, using context, where substitutions have occurred     "

## 2024-04-08 NOTE — ASSESSMENT & PLAN NOTE
2/2 cancer related pain and chronic back pain  PDMP reviewed.  Patient has Rx for Oxycodone   Continue fentanyl gtt, hold for SBT today  Bowel regimen with senna and miralax  Monitor for constipation

## 2024-04-08 NOTE — ASSESSMENT & PLAN NOTE
CTA ED chest PE Study 4/6/2024:   No definite evidence for pulmonary embolism. Redemonstration of extensive thoracic pleural carcinomatosis with complete opacification of the right hemithorax. Findings are also secondary to a combination of atelectasis and effusion and worsened since the prior exam. Right-sided chest tube in place. Moderate left pleural effusion with subjacent compressive atelectasis, worsened. Groundglass opacities within the left upper lobe and lingula are new and may be on the basis of infection or edema. Extensive osseous metastatic disease with multilevel compression deformities are stable.    Recently evaluated by IR and their impression is that patient has extensive tumor of the right lung with some encasing the catheter. IR did not believe that further manipulation of the catheter would provide any clinical benefit.     Moderate pleural effusion on left, to consider palliative thoracentesis after extubation.

## 2024-04-08 NOTE — ASSESSMENT & PLAN NOTE
History of sinus tachycardia prior to admission, for which she takes metoprolol at home, currently on hold due to hypotension.    Did not change with administration of IVF and patient appears volume overloaded on exam.

## 2024-04-08 NOTE — ASSESSMENT & PLAN NOTE
"2/2 chronic effusion and invasive carcinomatosis.  4/6 CTA chest: negative PE.  \"Re-demonstration of extensive thoracic pleural carcinomatosis with complete opacification of the R hemithorax.  Findings are also secondary to a combination of atelectasis and effusion and worsened since prior.  Moderate L pleural effusion with subjacent compressive atelectasis. GGO of GURMEET and lingula.  Extensive osseous metastatic disease with multilevel compression deformities\"  4/6 Patient intubated 2/2 cardiac arrest, likely in the setting of arhythmia precipitated by hypoxia  Vent: ACPC 16/ Pi 25/6 peep/50%  Titrate FiO2 for SpO2 > 92%  If increasing FiO2 requirements, place Left side down  Hold fentanyl gtt this AM for SBT today  Daily SAT/SBT  Ascept cath in place R pleura-- no drainage.   To consider palliative L thoracentesis after extubation  "

## 2024-04-08 NOTE — PROGRESS NOTES
Pastoral Care Progress Note    2024  Patient: Shannan Vee : 1970  Admission Date & Time: 2024 0423  MRN: [id number] CSN: [id number]    Family member presence doesn't speak English, provided pastoral care prsence. Will continue to follow patient .

## 2024-04-09 PROBLEM — J18.9 PNEUMONIA: Status: ACTIVE | Noted: 2024-01-01

## 2024-04-09 PROBLEM — R65.10 SIRS (SYSTEMIC INFLAMMATORY RESPONSE SYNDROME) (HCC): Status: ACTIVE | Noted: 2024-01-01

## 2024-04-09 NOTE — PLAN OF CARE
Problem: Potential for Falls  Goal: Patient will remain free of falls  Description: INTERVENTIONS:  - Educate patient/family on patient safety including physical limitations  - Instruct patient to call for assistance with activity   - Consult OT/PT to assist with strengthening/mobility   - Keep Call bell within reach  - Keep bed low and locked with side rails adjusted as appropriate  - Keep care items and personal belongings within reach  - Initiate and maintain comfort rounds  - Make Fall Risk Sign visible to staff  - Offer Toileting every 2 Hours, in advance of need  - Initiate/Maintain bed/chair alarm  - Obtain necessary fall risk management equipment:   - Apply yellow socks and bracelet for high fall risk patients  - Consider moving patient to room near nurses station  Outcome: Progressing     Problem: Nutrition/Hydration-ADULT  Goal: Nutrient/Hydration intake appropriate for improving, restoring or maintaining nutritional needs  Description: Monitor and assess patient's nutrition/hydration status for malnutrition. Collaborate with interdisciplinary team and initiate plan and interventions as ordered.  Monitor patient's weight and dietary intake as ordered or per policy. Utilize nutrition screening tool and intervene as necessary. Determine patient's food preferences and provide high-protein, high-caloric foods as appropriate.     INTERVENTIONS:  - Monitor oral intake, urinary output, labs, and treatment plans  - Assess nutrition and hydration status and recommend course of action  - Evaluate amount of meals eaten  - Assist patient with eating if necessary   - Allow adequate time for meals  - Recommend/ encourage appropriate diets, oral nutritional supplements, and vitamin/mineral supplements  - Order, calculate, and assess calorie counts as needed  - Recommend, monitor, and adjust tube feedings and TPN/PPN based on assessed needs  - Assess need for intravenous fluids  - Provide specific nutrition/hydration  education as appropriate  - Include patient/family/caregiver in decisions related to nutrition  Outcome: Not Progressing     Problem: Prexisting or High Potential for Compromised Skin Integrity  Goal: Skin integrity is maintained or improved  Description: INTERVENTIONS:  - Identify patients at risk for skin breakdown  - Assess and monitor skin integrity  - Assess and monitor nutrition and hydration status  - Monitor labs   - Assess for incontinence   - Turn and reposition patient  - Assist with mobility/ambulation  - Relieve pressure over bony prominences  - Avoid friction and shearing  - Provide appropriate hygiene as needed including keeping skin clean and dry  - Evaluate need for skin moisturizer/barrier cream  - Collaborate with interdisciplinary team   - Patient/family teaching  - Consider wound care consult   Outcome: Progressing     Problem: CARDIOVASCULAR - ADULT  Goal: Maintains optimal cardiac output and hemodynamic stability  Description: INTERVENTIONS:  - Monitor I/O, vital signs and rhythm  - Monitor for S/S and trends of decreased cardiac output  - Administer and titrate ordered vasoactive medications to optimize hemodynamic stability  - Assess quality of pulses, skin color and temperature  - Assess for signs of decreased coronary artery perfusion  - Instruct patient to report change in severity of symptoms  Outcome: Not Progressing  Goal: Absence of cardiac dysrhythmias or at baseline rhythm  Description: INTERVENTIONS:  - Continuous cardiac monitoring, vital signs, obtain 12 lead EKG if ordered  - Administer antiarrhythmic and heart rate control medications as ordered  - Monitor electrolytes and administer replacement therapy as ordered  Outcome: Not Progressing     Problem: RESPIRATORY - ADULT  Goal: Achieves optimal ventilation and oxygenation  Description: INTERVENTIONS:  - Assess for changes in respiratory status  - Assess for changes in mentation and behavior  - Position to facilitate  oxygenation and minimize respiratory effort  - Oxygen administered by appropriate delivery if ordered  - Initiate smoking cessation education as indicated  - Encourage broncho-pulmonary hygiene including cough, deep breathe, Incentive Spirometry  - Assess the need for suctioning and aspirate as needed  - Assess and instruct to report SOB or any respiratory difficulty  - Respiratory Therapy support as indicated  Outcome: Progressing     Problem: PAIN - ADULT  Goal: Verbalizes/displays adequate comfort level or baseline comfort level  Description: Interventions:  - Encourage patient to monitor pain and request assistance  - Assess pain using appropriate pain scale  - Administer analgesics based on type and severity of pain and evaluate response  - Implement non-pharmacological measures as appropriate and evaluate response  - Consider cultural and social influences on pain and pain management  - Notify physician/advanced practitioner if interventions unsuccessful or patient reports new pain  Outcome: Progressing     Problem: SAFETY ADULT  Goal: Patient will remain free of falls  Description: INTERVENTIONS:  - Educate patient/family on patient safety including physical limitations  - Instruct patient to call for assistance with activity   - Consult OT/PT to assist with strengthening/mobility   - Keep Call bell within reach  - Keep bed low and locked with side rails adjusted as appropriate  - Keep care items and personal belongings within reach  - Initiate and maintain comfort rounds  - Make Fall Risk Sign visible to staff  - Offer Toileting every 2 Hours, in advance of need  - Initiate/Maintain bed/chair alarm  - Obtain necessary fall risk management equipment:   - Apply yellow socks and bracelet for high fall risk patients  - Consider moving patient to room near nurses station  Outcome: Progressing     Problem: SAFETY,RESTRAINT: NV/NON-SELF DESTRUCTIVE BEHAVIOR  Goal: Remains free of harm/injury (restraint for non  violent/non self-detsructive behavior)  Description: INTERVENTIONS:  - Instruct patient/family regarding restraint use   - Assess and monitor physiologic and psychological status   - Provide interventions and comfort measures to meet assessed patient needs   - Identify and implement measures to help patient regain control  - Assess readiness for release of restraint   Outcome: Progressing     Problem: COPING  Goal: Pt/Family able to verbalize concerns and demonstrate effective coping strategies  Description: INTERVENTIONS:  - Assist patient/family to identify coping skills, available support systems and cultural and spiritual values  - Provide emotional support, including active listening and acknowledgement of concerns of patient and caregivers  - Reduce environmental stimuli, as able  - Provide patient education  - Assess for spiritual pain/suffering and initiate spiritual care, including notification of Pastoral Care or [facility] based community as needed  - Assess effectiveness of coping strategies  Outcome: Not Progressing

## 2024-04-09 NOTE — ASSESSMENT & PLAN NOTE
3+ pitting edema in all 4 extremities  +3.2 liters this admission  Sinus tachycardia demonstrated on multiple EKGs prior to this admission, unlikely that current sinus tach is due to hypovolemia    D/C IVF   Give Lasix 40 mg IV x2 doses today--inadequate response when trialed on 20 mg 4/8  Monitor UOP, volume status, renal indices to guide decision regarding further diuretic

## 2024-04-09 NOTE — ASSESSMENT & PLAN NOTE
New fever 100.8 overnight  Persistent sinus tachycardia  Hypotension overnight requiring resumption of Levo  CT chest showed opacities in the GURMEET, edema vs infection  No leukocytosis    Ordered BCx x2, SpCx, procal  Can consider repeat imaging of the chest  Procal--interpret with higher cut-off of 1.14 in the setting of malignancy with extensive metastisis  Monitor fever and WBC curves

## 2024-04-09 NOTE — ASSESSMENT & PLAN NOTE
Metastatic breast cancer. Hormone receptor positive and HER2 negative diagnosed 2013. Prior history of neoadjuvant chemotherapy followed by mastectomy. She is s/p Hysterectomy and oophorectomy.   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 Completed radiation at [facility].     Recent CT-scan showing extensive right thoracic pleural carcinomatosis, extensive associated mass effect.   GOC conversations are ongoing with family  Palliative care on board, appreciate recommendations

## 2024-04-09 NOTE — ASSESSMENT & PLAN NOTE
Malnutrition Findings:   Adult Malnutrition type: Chronic illness  Adult Degree of Malnutrition: Other severe protein calorie malnutrition  Malnutrition Characteristics: Fat loss, Muscle loss, Inadequate energy, Weight loss                  360 Statement: Chronic severe malnutrition related to catabolic illness, poor oral intake as evidenced by < 75% estimated energy intake > 1 mo, severe muscle loss to temporalis muscles, severe fat loss to buccal pads, 17.8% weight loss x ~7 mo (9/20/23 68kg, 4/6/24 55.9kg). Treated with: see RD progress note for TF recommendations. Recommend daily weights for nutrition monitoring.    BMI Findings:           Body mass index is 23.11 kg/m².     During GOC conversation, discussed with family that we would not recommend placement of NGT or PEG tube, as these would increase pt discomfort without significantly improving prognosis.  Start Tube feeds while OGT in place with ETT

## 2024-04-09 NOTE — PROGRESS NOTES
"[facility]  Progress Note  Name: Shannan Vee I  MRN: [id number]  Unit/Bed#: ICU 05 I Date of Admission: 4/6/2024   Date of Service: 4/9/2024 I Hospital Day: 3    Assessment/Plan   * Acute on chronic respiratory failure (HCC)  Assessment & Plan  2/2 chronic effusion and invasive carcinomatosis.  4/6 CTA chest: negative PE.  \"Re-demonstration of extensive thoracic pleural carcinomatosis with complete opacification of the R hemithorax.  Findings are also secondary to a combination of atelectasis and effusion and worsened since prior.  Moderate L pleural effusion with subjacent compressive atelectasis. GGO of GURMEET and lingula.  Extensive osseous metastatic disease with multilevel compression deformities\"  4/6 Patient intubated 2/2 cardiac arrest, likely in the setting of arhythmia precipitated by hypoxia  Vent: ACPC 16/ Pi 25/6 peep/50%  Titrate FiO2 for SpO2 > 92%  If increasing FiO2 requirements, place Left side down  Hold fentanyl gtt this AM for SBT today  Daily SAT/SBT  Ascept cath in place R pleura-- no drainage.   To consider thoracentesis pending further GOC conversation with family. POCUS shows narrow but accessible pocket of pleural fluid. Risks and benefits have been discussed with family.    SIRS (systemic inflammatory response syndrome) ([facility])  Assessment & Plan  New fever 100.8 overnight  Persistent sinus tachycardia  Hypotension overnight requiring resumption of Levo  CT chest showed opacities in the GURMEET, edema vs infection  No leukocytosis    Ordered BCx x2, SpCx, procal  Can consider repeat imaging of the chest  Procal--interpret with higher cut-off of 1.14 in the setting of malignancy with extensive metastisis  Monitor fever and WBC curves    Cardiac arrest (HCC)  Assessment & Plan  4/6/24 In-hospital witnessed cardiac arrest. Patient was ambulating from toilet back to bed with nursing staff and became unresponsive. ACLS with 1 round epinephrine/CPR and ROSC was obtained. " Suspect PEA 2/2 hypoxia.  Echo 4/7: Mild concentric left ventricular hypertrophy, normal left ventricular systolic function, normal diastolic function.  EF 65%. Normal right ventricular size, normal right ventricular systolic function.  Some evidence of right ventricular diastolic collapse involving the apical region secondary to pericardial effusion  Holding home Toprol XL in the setting of hypotension  Noted to be in new Afib on 4/6, received digoxin on 4/6 and 4/7, now in sinus tachycardia  Maintain MAP > 65 mmHg  Continue telemetry  Avoid further hypoxia  Dig level supra-therapeutic 4/8. Repeat dig level in process morning of 4/9.    Shock (HCC)  Assessment & Plan  In the setting of sedation vs cardiogenic vs sepsis  Started on levophed 4/6, weaned off 4/8, but had to be restarted o/n 4/9.  Titrate for goal MAP > 65 mmHg  Holding home Toprol XL in the setting of hypotension  Echo shows LVEF 65%, normal RV systolic function, some evidence of RV diastolic collapse in apical region 2/2 pericardial effusion. No definite evidence of cardiac tamponade.  New fever overnight 4/9 to T max of 100.8  Weaning Levo as able  SIRS w/u as above    Anasarca  Assessment & Plan  3+ pitting edema in all 4 extremities  +3.2 liters this admission  Sinus tachycardia demonstrated on multiple EKGs prior to this admission, unlikely that current sinus tach is due to hypovolemia    D/C IVF   Give Lasix 40 mg IV x2 doses today--inadequate response when trialed on 20 mg 4/8  Monitor UOP, volume status, renal indices to guide decision regarding further diuretic    Chronic, continuous use of opioids  Assessment & Plan  2/2 cancer related pain and chronic back pain  PDMP reviewed.  Patient has Rx for Oxycodone   Continue fentanyl gtt, hold for SBT this morning  Bowel regimen with senna and miralax  Monitor for constipation    Tachycardia  Assessment & Plan  History of sinus tachycardia prior to admission, for which she takes metoprolol at home,  currently on hold due to hypotension.    Did not change with administration of IVF and patient appears volume overloaded on exam.    Severe protein-calorie malnutrition (HCC)  Assessment & Plan  Malnutrition Findings:   Adult Malnutrition type: Chronic illness  Adult Degree of Malnutrition: Other severe protein calorie malnutrition  Malnutrition Characteristics: Fat loss, Muscle loss, Inadequate energy, Weight loss                  360 Statement: Chronic severe malnutrition related to catabolic illness, poor oral intake as evidenced by < 75% estimated energy intake > 1 mo, severe muscle loss to temporalis muscles, severe fat loss to buccal pads, 17.8% weight loss x ~7 mo (9/20/23 68kg, 4/6/24 55.9kg). Treated with: see RD progress note for TF recommendations. Recommend daily weights for nutrition monitoring.    BMI Findings:           Body mass index is 23.11 kg/m².     During GOC conversation, discussed with family that we would not recommend placement of NGT or PEG tube, as these would increase pt discomfort without significantly improving prognosis.  Start Tube feeds while OGT in place with ETT    Pleural effusion  Assessment & Plan  CTA ED chest PE Study 4/6/2024:   No definite evidence for pulmonary embolism. Redemonstration of extensive thoracic pleural carcinomatosis with complete opacification of the right hemithorax. Findings are also secondary to a combination of atelectasis and effusion and worsened since the prior exam. Right-sided chest tube in place. Moderate left pleural effusion with subjacent compressive atelectasis, worsened. Groundglass opacities within the left upper lobe and lingula are new and may be on the basis of infection or edema. Extensive osseous metastatic disease with multilevel compression deformities are stable.    Recently evaluated by IR and their impression is that patient has extensive tumor of the right lung with some encasing the catheter. IR did not believe that further  manipulation of the catheter would provide any clinical benefit.     Moderate pleural effusion on left, to consider palliative thoracentesis if desired by family; have discussed risks and benefits and conversation is ongoing.    Recurrent malignant neoplasm of breast (HCC)  Assessment & Plan  Metastatic breast cancer. Hormone receptor positive and HER2 negative diagnosed 2013. Prior history of neoadjuvant chemotherapy followed by mastectomy. She is s/p Hysterectomy and oophorectomy.   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 Completed radiation at [facility].     Recent CT-scan showing extensive right thoracic pleural carcinomatosis, extensive associated mass effect.   GOC conversations are ongoing with family  Palliative care on board, appreciate recommendations       ----------------------------------------------------------------------------------------  HPI/24hr events: Pt with high RSBI during SBT yesterday, did not attempt extubation. GOC with family yesterday--remain medically/therapeutically directed in their goals of care. Please refer to Palliative Care note of 48 for further details. Levo weaned off during the day. Hypotensive overnight. Restarted Levo.    Patient appropriate for transfer out of the ICU today?: No  Disposition: Continue Critical Care   Code Status: Level 2 - DNAR: but accepts endotracheal intubation  ---------------------------------------------------------------------------------------  SUBJECTIVE  This morning, Ms. Vee is seen asleep in bed, no response to voice, but does wake briefly to physical exam maneuvers.     Review of Systems  Review of systems was unable to be performed secondary to sedation and intubation.  ---------------------------------------------------------------------------------------  OBJECTIVE    Vitals   Vitals:    04/09/24 0600 04/09/24 0700 04/09/24 0800 04/09/24 0826   BP: 100/54 101/59 107/59    BP  Location:       Pulse: (!) 126 (!) 122 (!) 122    Resp: 15 15 16    Temp:    98.7 °F (37.1 °C)   TempSrc:    Axillary   SpO2: 97% 97% 96%    Weight:       Height:         Temp (24hrs), Av.1 °F (37.3 °C), Min:98.1 °F (36.7 °C), Max:100.8 °F (38.2 °C)  Current: Temperature: 98.7 °F (37.1 °C)          Respiratory:  SpO2: SpO2: 96 %, SpO2 Activity: SpO2 Activity: At Rest, SpO2 Device: O2 Device: Ventilator  Nasal Cannula O2 Flow Rate (L/min): 4 L/min    Invasive/non-invasive ventilation settings   Respiratory      Lab Data (Last 4 hours)      None           O2/Vent Data (Last 4 hours)      None                    Physical Exam  Vitals and nursing note reviewed.   Constitutional:       General: She is not in acute distress.     Appearance: She is ill-appearing. She is not toxic-appearing or diaphoretic.      Comments: RASS -4   HENT:      Head: Normocephalic and atraumatic.   Eyes:      General: No scleral icterus.        Right eye: No discharge.         Left eye: No discharge.      Conjunctiva/sclera: Conjunctivae normal.   Cardiovascular:      Rate and Rhythm: Regular rhythm. Tachycardia present.      Pulses: Normal pulses.      Heart sounds: Normal heart sounds. No murmur heard.     No friction rub. No gallop.   Pulmonary:      Breath sounds: No stridor. Rales (coarse, inspiratory and expiratory) present. No wheezing or rhonchi.      Comments: Intubated, RR 16-19  Chest:      Chest wall: No tenderness.   Abdominal:      General: There is no distension.      Palpations: Abdomen is soft.      Tenderness: There is no abdominal tenderness. There is no guarding or rebound.      Comments: Decreased bowel sounds   Musculoskeletal:         General: Swelling present.      Right lower leg: Edema present.      Left lower leg: Edema present.      Comments: 3+ pitting in all four extremities   Skin:     General: Skin is warm and dry.             Laboratory and Diagnostics:  Results from last 7 days   Lab Units 24  0516  04/09/24  0148 04/08/24  0545 04/07/24  0607 04/06/24  1113 04/06/24  0453   WBC Thousand/uL 6.51  --  6.87 8.55 7.18 6.59   HEMOGLOBIN g/dL 7.6* 8.1* 7.6* 8.3* 10.0* 9.3*   HEMATOCRIT % 26.2* 27.8* 26.2* 28.0* 36.1 32.3*   PLATELETS Thousands/uL 206  --  203 234 261 272   SEGS PCT %  --   --   --   --   --  72   MONO PCT %  --   --   --   --  8 14*   EOS PCT %  --   --   --   --  0 0     Results from last 7 days   Lab Units 04/09/24  0758 04/08/24 1854 04/08/24  0545 04/07/24  0607 04/06/24  0453   SODIUM mmol/L 149* 148* 147 147 144   POTASSIUM mmol/L 4.6 3.7 3.9 4.4 4.2   CHLORIDE mmol/L 103 103 103 101 96   CO2 mmol/L 34* 32 34* 30 36*   ANION GAP mmol/L 12 13 10 16* 12   BUN mg/dL 24 22 21 24 19   CREATININE mg/dL 0.52* 0.50* 0.43* 0.47* 0.44*   CALCIUM mg/dL 7.7* 8.3* 8.0* 8.6 8.9   GLUCOSE RANDOM mg/dL 124 117 108 108 117   ALT U/L  --   --   --   --  26   AST U/L  --   --   --   --  178*   ALK PHOS U/L  --   --   --   --  187*   ALBUMIN g/dL  --   --   --   --  3.2*   TOTAL BILIRUBIN mg/dL  --   --   --   --  0.48     Results from last 7 days   Lab Units 04/08/24 1854 04/06/24  0453   MAGNESIUM mg/dL 1.9 1.9   PHOSPHORUS mg/dL 2.3*  --       Results from last 7 days   Lab Units 04/06/24  1113 04/06/24  0626   INR  1.30* 1.17   PTT seconds  --  20*          Results from last 7 days   Lab Units 04/07/24  1346 04/07/24  1050   LACTIC ACID mmol/L 2.1* 2.4*     ABG:  Results from last 7 days   Lab Units 04/08/24  0440   PH ART  7.495*   PCO2 ART mm Hg 39.3   PO2 ART mm Hg 88.8   HCO3 ART mmol/L 29.6*   BASE EXC ART mmol/L 5.9   ABG SOURCE  Radial, Right     VBG:  Results from last 7 days   Lab Units 04/09/24  0520 04/08/24  0440   PH ALEXANDRE  7.436*  --    PCO2 ALEXANDRE mm Hg 45.1  --    PO2 ALEXANDRE mm Hg 84.0*  --    HCO3 ALEXANDRE mmol/L 29.7  --    BASE EXC ALEXANDRE mmol/L 4.9  --    ABG SOURCE   --  Radial, Right           Micro        EKG: Sinus tachycardia this morning, rate 120s on tele. Numerous episodes of irregular  "supraventricular tachycardia yesterday.  Imaging:  No new imaging    Intake and Output  I/O         04/07 0701  04/08 0700 04/08 0701  04/09 0700 04/09 0701  04/10 0700    I.V. (mL/kg) 2414.4 (43.3) 618.9 (9.8) 79.6 (1.3)    IV Piggyback  150     Total Intake(mL/kg) 2414.4 (43.3) 768.9 (12.2) 79.6 (1.3)    Urine (mL/kg/hr) 630 (0.5) 925 (0.6) 35 (0.3)    Emesis/NG output  500     Stool 0 0     Total Output 630 1425 35    Net +1784.4 -656.1 +44.6           Unmeasured Stool Occurrence 2 x 2 x             Height and Weights   Height: 5' 5\" (165.1 cm)  IBW (Ideal Body Weight): 57 kg  Body mass index is 23.11 kg/m².  Weight (last 2 days)       Date/Time Weight    04/09/24 0541 63 (138.89)    04/07/24 1301 55.8 (123)              Nutrition       Diet Orders   (From admission, onward)                 Start     Ordered    04/09/24 0904  Diet Enteral/Parenteral; Tube Feeding No Oral Diet; Jevity 1.5; Continuous; 40; 200; Water; Every 4 hours  Diet effective now        References:    Adult Nutrition Support Algorithm    RD Therapeutic Diet Order Protocol   Question Answer Comment   Diet Type Enteral/Parenteral    Enteral/Parenteral Tube Feeding No Oral Diet    Tube Feeding Formula: Jevity 1.5    Bolus/Cyclic/Continuous Continuous    Tube Feeding Goal Rate (mL/hr): 40    Tube Feeding flush (mL): 200    Water Flush type: Water    Water flush frequency: Every 4 hours    RD to adjust diet per protocol? Yes        04/09/24 0905                      Active Medications  Scheduled Meds:  Current Facility-Administered Medications   Medication Dose Route Frequency Provider Last Rate    acetaminophen  650 mg Oral Q6H PRN JANNETH Paz      chlorhexidine  15 mL Mouth/Throat Q12H On license of [facility] JANNETH Paz      fentaNYL  25 mcg/hr Intravenous Continuous Dennis Knowles MD Stopped (04/09/24 0827)    fentaNYL  12.5 mcg Intravenous Q3H PRN Dennis Knowles MD      fentaNYL  12.5 mcg Intravenous Q2H PRN Dennis Knowles MD      furosemide "  40 mg Intravenous BID (diuretic) Dennis Knowles MD      heparin (porcine)  5,000 Units Subcutaneous Q8H [facility] JANNETH Paz      ipratropium-albuterol  3 mL Nebulization Q4H PRN Max 6/day Edward REDD TongekALISANP      lidocaine  2 patch Topical Daily JANNETH Paz      norepinephrine  1-30 mcg/min Intravenous Titrated EdALISA RiveroNP 8 mcg/min (04/09/24 0827)    polyethylene glycol  17 g Oral Daily JANNETH Ureña      senna  8.8 mg Oral HS JANNETH Ureña       Continuous Infusions:  fentaNYL, 25 mcg/hr, Last Rate: Stopped (04/09/24 0827)  norepinephrine, 1-30 mcg/min, Last Rate: 8 mcg/min (04/09/24 0827)      PRN Meds:   acetaminophen, 650 mg, Q6H PRN  fentaNYL, 12.5 mcg, Q3H PRN  fentaNYL, 12.5 mcg, Q2H PRN  ipratropium-albuterol, 3 mL, Q4H PRN Max 6/day        Invasive Devices Review  Invasive Devices       Peripheral Intravenous Line  Duration             Peripheral IV 04/06/24 Right;Upper Arm 2 days    Peripheral IV 04/07/24 Dorsal (posterior);Left Wrist 1 day    Peripheral IV 04/07/24 Dorsal (posterior);Right Forearm 1 day    Peripheral IV 04/08/24 Left;Upper;Ventral (anterior) Arm 1 day              Drain  Duration             Pleural Effusion Long-Term Catheter 16 Fr. 75 days    NG/OG/Enteral Tube Orogastric 16 Fr Center mouth 2 days    Urethral Catheter Latex 14 Fr. 2 days              Airway  Duration             ETT  Cuffed 2 days                    Rationale for remaining devices: ETT for acute respiratory failure. OGT while intubated. PIVs for access while admitted. [place] while intubated and sedated.   ---------------------------------------------------------------------------------------  Advance Directive and Living Will:      Power of :    POLST:    ---------------------------------------------------------------------------------------      Dennis Knowles MD      Portions of the record may have been created with voice recognition software.   "Occasional wrong word or \"sound a like\" substitutions may have occurred due to the inherent limitations of voice recognition software.  Read the chart carefully and recognize, using context, where substitutions have occurred     "

## 2024-04-09 NOTE — ASSESSMENT & PLAN NOTE
"2/2 chronic effusion and invasive carcinomatosis.  4/6 CTA chest: negative PE.  \"Re-demonstration of extensive thoracic pleural carcinomatosis with complete opacification of the R hemithorax.  Findings are also secondary to a combination of atelectasis and effusion and worsened since prior.  Moderate L pleural effusion with subjacent compressive atelectasis. GGO of GURMEET and lingula.  Extensive osseous metastatic disease with multilevel compression deformities\"  4/6 Patient intubated 2/2 cardiac arrest, likely in the setting of arhythmia precipitated by hypoxia  Vent: ACPC 16/ Pi 25/6 peep/50%  Titrate FiO2 for SpO2 > 92%  If increasing FiO2 requirements, place Left side down  Hold fentanyl gtt this AM for SBT today  Daily SAT/SBT  Ascept cath in place R pleura-- no drainage.   To consider thoracentesis pending further GOC conversation with family. POCUS shows narrow but accessible pocket of pleural fluid. Risks and benefits have been discussed with family.  "

## 2024-04-09 NOTE — ASSESSMENT & PLAN NOTE
4/6/24 In-hospital witnessed cardiac arrest. Patient was ambulating from toilet back to bed with nursing staff and became unresponsive. ACLS with 1 round epinephrine/CPR and ROSC was obtained. Suspect PEA 2/2 hypoxia.  Echo 4/7: Mild concentric left ventricular hypertrophy, normal left ventricular systolic function, normal diastolic function.  EF 65%. Normal right ventricular size, normal right ventricular systolic function.  Some evidence of right ventricular diastolic collapse involving the apical region secondary to pericardial effusion  Holding home Toprol XL in the setting of hypotension  Noted to be in new Afib on 4/6, received digoxin on 4/6 and 4/7, now in sinus tachycardia  Maintain MAP > 65 mmHg  Continue telemetry  Avoid further hypoxia  Dig level supra-therapeutic 4/8. Repeat dig level in process morning of 4/9.

## 2024-04-09 NOTE — ASSESSMENT & PLAN NOTE
CTA ED chest PE Study 4/6/2024:   No definite evidence for pulmonary embolism. Redemonstration of extensive thoracic pleural carcinomatosis with complete opacification of the right hemithorax. Findings are also secondary to a combination of atelectasis and effusion and worsened since the prior exam. Right-sided chest tube in place. Moderate left pleural effusion with subjacent compressive atelectasis, worsened. Groundglass opacities within the left upper lobe and lingula are new and may be on the basis of infection or edema. Extensive osseous metastatic disease with multilevel compression deformities are stable.    Recently evaluated by IR and their impression is that patient has extensive tumor of the right lung with some encasing the catheter. IR did not believe that further manipulation of the catheter would provide any clinical benefit.     Moderate pleural effusion on left, to consider palliative thoracentesis if desired by family; have discussed risks and benefits and conversation is ongoing.

## 2024-04-09 NOTE — ASSESSMENT & PLAN NOTE
2/2 cancer related pain and chronic back pain  PDMP reviewed.  Patient has Rx for Oxycodone   Continue fentanyl gtt, hold for SBT this morning  Bowel regimen with senna and miralax  Monitor for constipation

## 2024-04-09 NOTE — ASSESSMENT & PLAN NOTE
In the setting of sedation vs cardiogenic vs sepsis  Started on levophed 4/6, weaned off 4/8, but had to be restarted o/n 4/9.  Titrate for goal MAP > 65 mmHg  Holding home Toprol XL in the setting of hypotension  Echo shows LVEF 65%, normal RV systolic function, some evidence of RV diastolic collapse in apical region 2/2 pericardial effusion. No definite evidence of cardiac tamponade.  New fever overnight 4/9 to T max of 100.8  Weaning Levo as able  SIRS w/u as above

## 2024-04-09 NOTE — PLAN OF CARE
Problem: Potential for Falls  Goal: Patient will remain free of falls  Description: INTERVENTIONS:  - Educate patient/family on patient safety including physical limitations  - Instruct patient to call for assistance with activity   - Consult OT/PT to assist with strengthening/mobility   - Keep Call bell within reach  - Keep bed low and locked with side rails adjusted as appropriate  - Keep care items and personal belongings within reach  - Initiate and maintain comfort rounds  - Make Fall Risk Sign visible to staff  - Offer Toileting every  Hours, in advance of need  - Initiate/Maintain alarm  - Obtain necessary fall risk management equipment:   - Apply yellow socks and bracelet for high fall risk patients  - Consider moving patient to room near nurses station  Outcome: Progressing     Problem: Nutrition/Hydration-ADULT  Goal: Nutrient/Hydration intake appropriate for improving, restoring or maintaining nutritional needs  Description: Monitor and assess patient's nutrition/hydration status for malnutrition. Collaborate with interdisciplinary team and initiate plan and interventions as ordered.  Monitor patient's weight and dietary intake as ordered or per policy. Utilize nutrition screening tool and intervene as necessary. Determine patient's food preferences and provide high-protein, high-caloric foods as appropriate.     INTERVENTIONS:  - Monitor oral intake, urinary output, labs, and treatment plans  - Assess nutrition and hydration status and recommend course of action  - Evaluate amount of meals eaten  - Assist patient with eating if necessary   - Allow adequate time for meals  - Recommend/ encourage appropriate diets, oral nutritional supplements, and vitamin/mineral supplements  - Order, calculate, and assess calorie counts as needed  - Recommend, monitor, and adjust tube feedings and TPN/PPN based on assessed needs  - Assess need for intravenous fluids  - Provide specific nutrition/hydration education as  appropriate  - Include patient/family/caregiver in decisions related to nutrition  Outcome: Progressing     Problem: Prexisting or High Potential for Compromised Skin Integrity  Goal: Skin integrity is maintained or improved  Description: INTERVENTIONS:  - Identify patients at risk for skin breakdown  - Assess and monitor skin integrity  - Assess and monitor nutrition and hydration status  - Monitor labs   - Assess for incontinence   - Turn and reposition patient  - Assist with mobility/ambulation  - Relieve pressure over bony prominences  - Avoid friction and shearing  - Provide appropriate hygiene as needed including keeping skin clean and dry  - Evaluate need for skin moisturizer/barrier cream  - Collaborate with interdisciplinary team   - Patient/family teaching  - Consider wound care consult   Outcome: Progressing     Problem: CARDIOVASCULAR - ADULT  Goal: Maintains optimal cardiac output and hemodynamic stability  Description: INTERVENTIONS:  - Monitor I/O, vital signs and rhythm  - Monitor for S/S and trends of decreased cardiac output  - Administer and titrate ordered vasoactive medications to optimize hemodynamic stability  - Assess quality of pulses, skin color and temperature  - Assess for signs of decreased coronary artery perfusion  - Instruct patient to report change in severity of symptoms  Outcome: Progressing  Goal: Absence of cardiac dysrhythmias or at baseline rhythm  Description: INTERVENTIONS:  - Continuous cardiac monitoring, vital signs, obtain 12 lead EKG if ordered  - Administer antiarrhythmic and heart rate control medications as ordered  - Monitor electrolytes and administer replacement therapy as ordered  Outcome: Progressing     Problem: RESPIRATORY - ADULT  Goal: Achieves optimal ventilation and oxygenation  Description: INTERVENTIONS:  - Assess for changes in respiratory status  - Assess for changes in mentation and behavior  - Position to facilitate oxygenation and minimize  respiratory effort  - Oxygen administered by appropriate delivery if ordered  - Initiate smoking cessation education as indicated  - Encourage broncho-pulmonary hygiene including cough, deep breathe, Incentive Spirometry  - Assess the need for suctioning and aspirate as needed  - Assess and instruct to report SOB or any respiratory difficulty  - Respiratory Therapy support as indicated  Outcome: Progressing     Problem: PAIN - ADULT  Goal: Verbalizes/displays adequate comfort level or baseline comfort level  Description: Interventions:  - Encourage patient to monitor pain and request assistance  - Assess pain using appropriate pain scale  - Administer analgesics based on type and severity of pain and evaluate response  - Implement non-pharmacological measures as appropriate and evaluate response  - Consider cultural and social influences on pain and pain management  - Notify physician/advanced practitioner if interventions unsuccessful or patient reports new pain  Outcome: Progressing     Problem: SAFETY ADULT  Goal: Patient will remain free of falls  Description: INTERVENTIONS:  - Educate patient/family on patient safety including physical limitations  - Instruct patient to call for assistance with activity   - Consult OT/PT to assist with strengthening/mobility   - Keep Call bell within reach  - Keep bed low and locked with side rails adjusted as appropriate  - Keep care items and personal belongings within reach  - Initiate and maintain comfort rounds  - Make Fall Risk Sign visible to staff  - Offer Toileting every  Hours, in advance of need  - Initiate/Maintain alarm  - Obtain necessary fall risk management equipment:   - Apply yellow socks and bracelet for high fall risk patients  - Consider moving patient to room near nurses station  Outcome: Progressing  Goal: Maintain or return to baseline ADL function  Description: INTERVENTIONS:  -  Assess patient's ability to carry out ADLs; assess patient's baseline for  ADL function and identify physical deficits which impact ability to perform ADLs (bathing, care of mouth/teeth, toileting, grooming, dressing, etc.)  - Assess/evaluate cause of self-care deficits   - Assess range of motion  - Assess patient's mobility; develop plan if impaired  - Assess patient's need for assistive devices and provide as appropriate  - Encourage maximum independence but intervene and supervise when necessary  - Involve family in performance of ADLs  - Assess for home care needs following discharge   - Consider OT consult to assist with ADL evaluation and planning for discharge  - Provide patient education as appropriate  Outcome: Progressing  Goal: Maintains/Returns to pre admission functional level  Description: INTERVENTIONS:  - Perform AM-PAC 6 Click Basic Mobility/ Daily Activity assessment daily.  - Set and communicate daily mobility goal to care team and patient/family/caregiver.   - Collaborate with rehabilitation services on mobility goals if consulted  - Perform Range of Motion  times a day.  - Reposition patient every  hours.  - Dangle patient  times a day  - Stand patient  times a day  - Ambulate patient  times a day  - Out of bed to chair  times a day   - Out of bed for meals  times a day  - Out of bed for toileting  - Record patient progress and toleration of activity level   Outcome: Progressing     Problem: Knowledge Deficit  Goal: Patient/family/caregiver demonstrates understanding of disease process, treatment plan, medications, and discharge instructions  Description: Complete learning assessment and assess knowledge base.  Interventions:  - Provide teaching at level of understanding  - Provide teaching via preferred learning methods  Outcome: Progressing     Problem: SAFETY,RESTRAINT: NV/NON-SELF DESTRUCTIVE BEHAVIOR  Goal: Remains free of harm/injury (restraint for non violent/non self-detsructive behavior)  Description: INTERVENTIONS:  - Instruct patient/family regarding  restraint use   - Assess and monitor physiologic and psychological status   - Provide interventions and comfort measures to meet assessed patient needs   - Identify and implement measures to help patient regain control  - Assess readiness for release of restraint   Outcome: Progressing  Goal: Returns to optimal restraint-free functioning  Description: INTERVENTIONS:  - Assess the patient's behavior and symptoms that indicate continued need for restraint  - Identify and implement measures to help patient regain control  - Assess readiness for release of restraint   Outcome: Progressing     Problem: COPING  Goal: Pt/Family able to verbalize concerns and demonstrate effective coping strategies  Description: INTERVENTIONS:  - Assist patient/family to identify coping skills, available support systems and cultural and spiritual values  - Provide emotional support, including active listening and acknowledgement of concerns of patient and caregivers  - Reduce environmental stimuli, as able  - Provide patient education  - Assess for spiritual pain/suffering and initiate spiritual care, including notification of Pastoral Care or nicholas based community as needed  - Assess effectiveness of coping strategies  Outcome: Progressing  Goal: Will report anxiety at manageable levels  Description: INTERVENTIONS:  - Administer medication as ordered  - Teach and encourage coping skills  - Provide emotional support  - Assess patient/family for anxiety and ability to cope  Outcome: Progressing

## 2024-04-09 NOTE — RESPIRATORY THERAPY NOTE
Pt s/p cardiac arrest. VS stable and pulomonary mechanics stable. Pt has failed SBT previous 3 days due to increased RR and WOB. Requested pt have A-line due to multiple arterial sticks and while pt remains intubated. No vent changes or events this p.m. Will attempt SBT this a.m. Will continue current vent settings as ordered and will continue to monitor pt.

## 2024-04-09 NOTE — PROGRESS NOTES
Shannan Vee is a 53 y.o. female who is currently ordered Vancomycin IV with management by the Pharmacy Consult service.  Relevant clinical data and objective / subjective history reviewed.  Vancomycin Assessment:  Indication and Goal AUC/Trough: Pneumonia (goal -600, trough >10)  Clinical Status:  New  Micro: Blood and sputum cultures pending  Renal Function:  SCr: 0.52 mg/dL  CrCl: 110 mL/min  Renal replacement: Not on dialysis  Days of Therapy: 1  Current Dose: 1500mg IV Load  Vancomycin Plan:  New Dosinmg IV Q12H  Estimated AUC: 496 mcg*hr/mL  Estimated Trough: 11.3 mcg/mL  Next Level:  with AM labs   Renal Function Monitoring: Daily BMP and UOP  Pharmacy will continue to follow closely for s/sx of nephrotoxicity, infusion reactions and appropriateness of therapy.  BMP and CBC will be ordered per protocol. We will continue to follow the patient’s culture results and clinical progress daily.    Gisela Humphrey, Pharmacist

## 2024-04-09 NOTE — CASE MANAGEMENT
Case Management Discharge Planning Note    Patient name Shannan Vee  Location ICU 05/ICU 05 MRN [id number]  : 1970 Date 2024       Current Admission Date: 2024  Current Admission Diagnosis:Acute on chronic respiratory failure (HCC)   Patient Active Problem List    Diagnosis Date Noted    SIRS (systemic inflammatory response syndrome) (HCC) 2024    Cardiac arrest (HCC) 2024    Acute on chronic respiratory failure (HCC) 2024    Shock (HCC) 2024    Chronic, continuous use of opioids 2024    Encounter for home hospice care 2024    Tachycardia 2024    Hyponatremia 2024    Drug induced constipation 2024    Severe protein-calorie malnutrition (HCC) 2024    Chronic bilateral low back pain without sciatica 2024    Cancer associated pain 2024    Goals of care, counseling/discussion 2024    Pleural effusion 2023    Recurrent malignant neoplasm of breast (HCC) 10/23/2023    Right lower lobe lung mass 10/23/2023    Pain from bone metastases (HCC) 10/23/2023    Other specified anemias 10/23/2023    Bladder wall thickening 10/06/2023    Urinary frequency 2023    Overweight (BMI 25.0-29.9) 2021    Personal history of malignant neoplasm of breast 2019    Use of anastrozole (Arimidex) 2018    Carcinoma of left breast, estrogen receptor positive (HCC) 2017    Malignant neoplasm of overlapping sites of left female breast (HCC) 2015    Uterine leiomyoma 10/15/2014    Anasarca 2013      LOS (days): 3  Geometric Mean LOS (GMLOS) (days): 3.6  Days to GMLOS:0.2     OBJECTIVE:  Risk of Unplanned Readmission Score: 60.51     Current admission status: Inpatient   Preferred Pharmacy:   [id number] - [place] - [address]  [address]  [place] [address]  Phone: [phone] Fax: [phone]    Primary Care Provider: Todd Bañuelos MD    Primary Insurance:   Secondary  Insurance:     DISCHARGE DETAILS:    Additional Comments: per financial counselors, PATHS referral was made  FPL 81 qualifies 100%

## 2024-04-10 NOTE — RESPIRATORY THERAPY NOTE
Patient weaned for approx 30 mins, then RSBI increased to 187, placed back on original settings. Vent settings were then changed to SIMV.   04/10/24 1600   Vent Information   Vent ID [id number]   Vent type Crain G5   Crain Vent Mode SIMV   SpO2 94 %   SIMV Settings   Resp Rate (BPM) 16 BPM   VT (mL) 240 mL   FiO2 (%) 50 %   PEEP (cmH2O) 6 cmH2O   Pressure Support (cmH2O) 12 cmH2O   Insp Time (S) 1 sec   Flow Trigger (LPM) 2 LPM   P-ramp (ms) 50 (ms)   ETS (%) 25 %   Humidification Heater   Heater Temp 98.6 °F (37 °C)   Pause Time (%) 0 %   SIMV Actuals   Resp Rate (BPM) 16 BPM   VT (mL) 244   MV (Obs) 4   MAP (cm H2O) 13   Peak Pressure (cmH2O) 33 cmH2O   Heater Temperature (Obs) 98.6 °F (37 °C)   SIMV ALARMS   High Peak Pressure (cmH2O) 45 cm H2O   High Resp Rate (BPM) 50 BPM   High MV (L/min) 15 L/min   Low MV (L/min) 3 L/min   High VT (mL) 750 mL   Low VT (mL) 150 mL   Apnea Time (s) 20 S   Leak (%) 0 %   SIMV Apnea Settings   Resp Rate (BPM) 16 BPM   VT (mL) 240 mL

## 2024-04-10 NOTE — ASSESSMENT & PLAN NOTE
"2/2 chronic effusion and invasive carcinomatosis.  4/6 CTA chest: negative PE.  \"Re-demonstration of extensive thoracic pleural carcinomatosis with complete opacification of the R hemithorax.  Findings are also secondary to a combination of atelectasis and effusion and worsened since prior.  Moderate L pleural effusion with subjacent compressive atelectasis. GGO of GURMEET and lingula.  Extensive osseous metastatic disease with multilevel compression deformities\"  4/6 Patient intubated 2/2 cardiac arrest, likely in the setting of arhythmia precipitated by hypoxia  Vent: ACPC 16/ Pi 25/6 peep/50%  Titrate FiO2 for SpO2 > 92%  If increasing FiO2 requirements, place Left side down  Hold fentanyl gtt this AM for SBT today  Daily SAT/SBT  Ascept cath in place R pleura-- no drainage.   To consider thoracentesis pending further GOC conversation with family. POCUS shows narrow but accessible pocket of pleural fluid. Risks and benefits have been discussed with family.  Started empiric abx with vanc/cefepime on 4/9 for suspected PNA  "

## 2024-04-10 NOTE — ASSESSMENT & PLAN NOTE
Sepsis vs sedation vs cardiogenic  Started on levophed 4/6, weaned off 4/8, but had to be restarted o/n 4/9.  Titrate for goal MAP > 65 mmHg  Holding home Toprol XL in the setting of hypotension  Echo shows LVEF 65%, normal RV systolic function, some evidence of RV diastolic collapse in apical region 2/2 pericardial effusion. No definite evidence of cardiac tamponade.  New fever overnight 4/9 to T max of 100.8. Tmax 100.1 o/n 4/10  Procal elevated 2.23  Sputum culture from tracheal aspirate preliminarily shows 3+ GPC, 1+ GPR, rare GNR  Bcx x2 in process  MRSA nares cx in process    Empiric abx for possible pneumonia--vanc, cefepime started 4/9 (D2 abx)  Check CXR  F/u Cx results  Monitor WBC and fever curve  Weaning Levo as able

## 2024-04-10 NOTE — WOUND OSTOMY CARE
Consult Note - Wound   Shannan Vee 53 y.o. female MRN: [id number]  Unit/Bed#: ICU 05 Encounter: [id number]      History and Present Illness:  53 year old female presented to the hospital with shortness of breath.  Patient's history significant for metastatic breast cancer with chemotherapy and radiation, left mastectomy, anemia, malnutrition, HTN.    Patient had cardiac arrest on 4/6/24 while ambulating.     Assessment Findings:   Patient assessed along with primary RN.  Patient is on ventilator, restrained, sedated, on pressors, dependent for all cares and repositioning.  On low air-loss mattress with ATR positioning system and offloading heel boots in use.  Vallejo catheter in place, incontinent of loose stools.  Nutrition team following, receiving tube feeding.  Present on admission evolving deep tissue pressure injury to sacrum/buttocks--on exam today, wound to sacrum presenting as unstageable with yellow slough and pink, epithelialized edges. No drainage. Alyse-wound intact with hyperpigmentation.  No induration or fluctuance.  Present on admission evolving deep tissue pressure injury to right back--on exam today, wound presents as unstageable with yellow slough and pink edges.  No drainage, fluctuance, or induration.  Alyse-wound intact.      See flowsheet for wound details.    If patient continues to have frequently loose stools, recommend considering fecal management system.    Wound Care Plan:   1-Apply silicone bordered foam dressings to bilateral heels for prevention.  Nishant with P.  Peel back for skin assessments at least daily and re-apply.  Change dressings every 3 days and as needed.  2-Elevate heels off of bed/chair surface--offloading heel boots.  3-Low air-loss mattress.  4-Apply moisturizing skin cream to body daily and as needed.  5-Turn/reposition every 2 hours while in bed and weight shift frequently while in chair for pressure re-distribution on skin.   6-Right back--cleanse with soap and water,  pat dry.  Apply Triad paste and silicone bordered foam dressing for treatment.  Change dressing every other day and as needed.  7-Sacrum--cleanse with soap and water, pat dry.  Apply Triad paste three times daily and as needed with incontinence care.    Wound care team to follow.  Plan of care reviewed with primary RN.  Palliative care team following.      Wound 04/06/24 Pressure Injury Sacrum (Active)   Wound Image   04/10/24 1036   Wound Length (cm) 1.5 cm 04/10/24 1036   Wound Width (cm) 3 cm 04/10/24 1036   Wound Depth (cm) 0.1 cm 04/10/24 1036   Wound Surface Area (cm^2) 4.5 cm^2 04/10/24 1036   Wound Volume (cm^3) 0.45 cm^3 04/10/24 1036   Calculated Wound Volume (cm^3) 0.45 cm^3 04/10/24 1036   Drainage Amount None 04/10/24 1036   Treatments Cleansed 04/10/24 1036   Dressing Moisture barrier 04/10/24 1610       Wound 04/07/24 Pressure Injury Back Medial;Right (Active)   Wound Image   04/10/24 1037   Wound Length (cm) 2.5 cm 04/10/24 1037   Wound Width (cm) 1 cm 04/10/24 1037   Wound Depth (cm) 0.1 cm 04/10/24 1037   Wound Surface Area (cm^2) 2.5 cm^2 04/10/24 1037   Wound Volume (cm^3) 0.25 cm^3 04/10/24 1037   Calculated Wound Volume (cm^3) 0.25 cm^3 04/10/24 1037   Drainage Amount None 04/10/24 1610   Treatments Cleansed 04/10/24 1037   Dressing Foam, Silicon (eg. Allevyn, etc) 04/10/24 1610   Dressing Changed Changed 04/10/24 1037   Patient Tolerance Tolerated well 04/10/24 1037   Dressing Status Clean;Dry;Intact 04/10/24 1610       Cecile VALADEZN, RN, CWON

## 2024-04-10 NOTE — PROGRESS NOTES
"[facility]  Progress Note  Name: Shannan Vee I  MRN: [id number]  Unit/Bed#: ICU 05 I Date of Admission: 4/6/2024   Date of Service: 4/10/2024 I Hospital Day: 4    Assessment/Plan   * Acute on chronic respiratory failure (HCC)  Assessment & Plan  2/2 chronic effusion and invasive carcinomatosis.  4/6 CTA chest: negative PE.  \"Re-demonstration of extensive thoracic pleural carcinomatosis with complete opacification of the R hemithorax.  Findings are also secondary to a combination of atelectasis and effusion and worsened since prior.  Moderate L pleural effusion with subjacent compressive atelectasis. GGO of GURMEET and lingula.  Extensive osseous metastatic disease with multilevel compression deformities\"  4/6 Patient intubated 2/2 cardiac arrest, likely in the setting of arhythmia precipitated by hypoxia  Vent: ACPC 16/ Pi 25/6 peep/50%  Titrate FiO2 for SpO2 > 92%  If increasing FiO2 requirements, place Left side down  Hold fentanyl gtt this AM for SBT today  Daily SAT/SBT  Ascept cath in place R pleura-- no drainage.   To consider thoracentesis pending further GOC conversation with family. POCUS shows narrow but accessible pocket of pleural fluid. Risks and benefits have been discussed with family.  Started empiric abx with vanc/cefepime on 4/9 for suspected PNA    SIRS (systemic inflammatory response syndrome) (Formerly [facility])  Assessment & Plan  New fever 100.8 overnight  Persistent sinus tachycardia  Hypotension overnight requiring resumption of Levo  CT chest showed opacities in the GURMEET, edema vs infection  No leukocytosis  Procal elevated 2.23    Suspect infection, most likely PNA    Continue empiric vanc, cefepime  Check repeat CXR to assess for possible evolution of GURMEET opacity seen on previous imaging  F/u Cx results  Monitor fever and WBC curves    Cardiac arrest (HCC)  Assessment & Plan  4/6/24 In-hospital witnessed cardiac arrest. Patient was ambulating from toilet back to bed with nursing " staff and became unresponsive. ACLS with 1 round epinephrine/CPR and ROSC was obtained. Suspect PEA 2/2 hypoxia.  Echo 4/7: Mild concentric left ventricular hypertrophy, normal left ventricular systolic function, normal diastolic function.  EF 65%. Normal right ventricular size, normal right ventricular systolic function.  Some evidence of right ventricular diastolic collapse involving the apical region secondary to pericardial effusion  Holding home Toprol XL in the setting of hypotension  Noted to be in new Afib on 4/6, received digoxin on 4/6 and 4/7, now in sinus tachycardia  Maintain MAP > 65 mmHg  Continue telemetry  Avoid further hypoxia  Dig level supra-therapeutic 4/8. Repeat dig level within therapeutic range 4/9.   Continue to monitor tele while in ICU--remains in sinus tach    Shock (HCC)  Assessment & Plan  Sepsis vs sedation vs cardiogenic  Started on levophed 4/6, weaned off 4/8, but had to be restarted o/n 4/9.  Titrate for goal MAP > 65 mmHg  Holding home Toprol XL in the setting of hypotension  Echo shows LVEF 65%, normal RV systolic function, some evidence of RV diastolic collapse in apical region 2/2 pericardial effusion. No definite evidence of cardiac tamponade.  New fever overnight 4/9 to T max of 100.8. Tmax 100.1 o/n 4/10  Procal elevated 2.23  Sputum culture from tracheal aspirate preliminarily shows 3+ GPC, 1+ GPR, rare GNR  Bcx x2 in process  MRSA nares cx in process    Empiric abx for possible pneumonia--vanc, cefepime started 4/9 (D2 abx)  Check CXR  F/u Cx results  Monitor WBC and fever curve  Weaning Levo as able    Anasarca  Assessment & Plan  3+ pitting edema in all 4 extremities  +3.2 liters this admission    Give Lasix 40 mg IV x2 doses today--inadequate response when trialed on 20 mg  Monitor UOP, volume status, renal indices to guide decision regarding further diuretic    Chronic, continuous use of opioids  Assessment & Plan  2/2 cancer related pain and chronic back pain  PDMP  reviewed.  Patient has Rx for Oxycodone   Continue fentanyl gtt, hold for SBT this morning  Bowel regimen with senna and miralax  Monitor for constipation    Tachycardia  Assessment & Plan  History of sinus tachycardia prior to admission, for which she takes metoprolol at home, currently on hold due to hypotension.    Did not change with administration of IVF and patient appears volume overloaded on exam.    Severe protein-calorie malnutrition (HCC)  Assessment & Plan  Malnutrition Findings:   Adult Malnutrition type: Chronic illness  Adult Degree of Malnutrition: Other severe protein calorie malnutrition  Malnutrition Characteristics: Fat loss, Muscle loss, Inadequate energy, Weight loss                  360 Statement: Chronic severe malnutrition related to catabolic illness, poor oral intake as evidenced by < 75% estimated energy intake > 1 mo, severe muscle loss to temporalis muscles, severe fat loss to buccal pads, 17.8% weight loss x ~7 mo (9/20/23 68kg, 4/6/24 55.9kg). Treated with: see RD progress note for TF recommendations. Recommend daily weights for nutrition monitoring.    BMI Findings:           Body mass index is 23.15 kg/m².     During GOC conversation, discussed with family that we would not recommend placement of NGT or PEG tube, as these would increase pt discomfort without significantly improving prognosis.  Start Tube feeds while OGT in place with ETT    Pleural effusion  Assessment & Plan  CTA ED chest PE Study 4/6/2024:   No definite evidence for pulmonary embolism. Redemonstration of extensive thoracic pleural carcinomatosis with complete opacification of the right hemithorax. Findings are also secondary to a combination of atelectasis and effusion and worsened since the prior exam. Right-sided chest tube in place. Moderate left pleural effusion with subjacent compressive atelectasis, worsened. Groundglass opacities within the left upper lobe and lingula are new and may be on the basis of  infection or edema. Extensive osseous metastatic disease with multilevel compression deformities are stable.    Recently evaluated by IR and their impression is that patient has extensive tumor of the right lung with some encasing the catheter. IR did not believe that further manipulation of the catheter would provide any clinical benefit.     Moderate pleural effusion on left, to consider palliative thoracentesis if desired by family; have discussed risks and benefits and conversation is ongoing.    Recurrent malignant neoplasm of breast (HCC)  Assessment & Plan  Metastatic breast cancer. Hormone receptor positive and HER2 negative diagnosed 2013. Prior history of neoadjuvant chemotherapy followed by mastectomy. She is s/p Hysterectomy and oophorectomy.   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 Completed radiation at [facility].     Recent CT-scan showing extensive right thoracic pleural carcinomatosis, extensive associated mass effect.   [facility] conversations are ongoing with family  Palliative care on board, appreciate recommendations  CM on board, appreciate assistance coordinating family meeting       ----------------------------------------------------------------------------------------  HPI/24hr events: Pt remained on Levo yesterday and overnight--able to wean dose down but not off. No acute events. Abx started for suspected infection, most likely pneumonia, though metastatic disease could be a confound for findings.     Patient appropriate for transfer out of the ICU today?: No  Disposition: Continue Critical Care   Code Status: Level 2 - DNAR: but accepts endotracheal intubation  ---------------------------------------------------------------------------------------  SUBJECTIVE  This morning, Ms. Vee is seen lying in bed, intubated, awake, alert, engaged with exam and responding appropriately to questions by nodding or shaking her head.   present at bedside. Pt endorses pain, difficult to localize given limitations, but responds in the affirmative regarding discomfort due to ETT, pain in chest, and pain in abd.     Review of Systems  Review of systems was unable to be performed secondary to intubation  ---------------------------------------------------------------------------------------  OBJECTIVE    Vitals   Vitals:    04/10/24 0730 04/10/24 0800 04/10/24 0900 04/10/24 1000   BP: 112/68 142/79 101/56 111/62   BP Location:  Left arm     Pulse: (!) 132 (!) 124 (!) 126 (!) 130   Resp: 19 (!) 36 18 (!) 23   Temp:  98.3 °F (36.8 °C)     TempSrc:  Oral     SpO2: 98% 91% 97% 96%   Weight:       Height:         Temp (24hrs), Av.8 °F (37.1 °C), Min:97.5 °F (36.4 °C), Max:100.1 °F (37.8 °C)  Current: Temperature: 98.3 °F (36.8 °C)          Respiratory:  SpO2: SpO2: 96 %, SpO2 Activity: SpO2 Activity: At Rest, SpO2 Device: O2 Device: Ventilator, Capnography:    Nasal Cannula O2 Flow Rate (L/min): 4 L/min    Invasive/non-invasive ventilation settings   Respiratory      Lab Data (Last 4 hours)      None           O2/Vent Data (Last 4 hours)        04/10 0820          Patient safety screen outcome: Passed       RSBI 180                       Physical Exam  Vitals and nursing note reviewed.   Constitutional:       General: She is in acute distress.      Appearance: She is ill-appearing. She is not toxic-appearing or diaphoretic.   HENT:      Head: Normocephalic and atraumatic.   Eyes:      General: No scleral icterus.        Right eye: No discharge.         Left eye: No discharge.      Conjunctiva/sclera: Conjunctivae normal.   Cardiovascular:      Rate and Rhythm: Regular rhythm. Tachycardia present.      Pulses: Normal pulses.      Heart sounds: Normal heart sounds. No murmur heard.     No friction rub. No gallop.   Pulmonary:      Breath sounds: Rales present.      Comments: Coarse upper airway breath sounds. Diminished breath sounds on R. Rales on  L.  Abdominal:      General: There is no distension.      Palpations: Abdomen is soft.      Tenderness: There is no abdominal tenderness. There is no guarding or rebound.      Comments: Decreased bowel sounds   Musculoskeletal:         General: Swelling present.      Right lower leg: Edema present.      Left lower leg: Edema present.      Comments: 3+ pitting edema in all four extremities   Skin:     General: Skin is warm and dry.      Coloration: Skin is not jaundiced or pale.   Neurological:      Mental Status: She is alert.             Laboratory and Diagnostics:  Results from last 7 days   Lab Units 04/09/24  0558 04/09/24  0148 04/08/24  0545 04/07/24  0607 04/06/24  1113 04/06/24  0453   WBC Thousand/uL 6.51  --  6.87 8.55 7.18 6.59   HEMOGLOBIN g/dL 7.6* 8.1* 7.6* 8.3* 10.0* 9.3*   HEMATOCRIT % 26.2* 27.8* 26.2* 28.0* 36.1 32.3*   PLATELETS Thousands/uL 206  --  203 234 261 272   SEGS PCT %  --   --   --   --   --  72   BANDS PCT %  --  23*  --   --   --   --    MONO PCT %  --  8  --   --  8 14*   EOS PCT %  --   --   --   --  0 0     Results from last 7 days   Lab Units 04/10/24  0427 04/09/24  0758 04/08/24  1854 04/08/24  0545 04/07/24  0607 04/06/24  0453   SODIUM mmol/L 147 149* 148* 147 147 144   POTASSIUM mmol/L 3.4* 4.6 3.7 3.9 4.4 4.2   CHLORIDE mmol/L 102 103 103 103 101 96   CO2 mmol/L 36* 34* 32 34* 30 36*   ANION GAP mmol/L 9 12 13 10 16* 12   BUN mg/dL 26* 24 22 21 24 19   CREATININE mg/dL 0.54* 0.52* 0.50* 0.43* 0.47* 0.44*   CALCIUM mg/dL 8.4 7.7* 8.3* 8.0* 8.6 8.9   GLUCOSE RANDOM mg/dL 194* 124 117 108 108 117   ALT U/L  --   --   --   --   --  26   AST U/L  --   --   --   --   --  178*   ALK PHOS U/L  --   --   --   --   --  187*   ALBUMIN g/dL  --   --   --   --   --  3.2*   TOTAL BILIRUBIN mg/dL  --   --   --   --   --  0.48     Results from last 7 days   Lab Units 04/08/24  1854 04/06/24  0453   MAGNESIUM mg/dL 1.9 1.9   PHOSPHORUS mg/dL 2.3*  --       Results from last 7 days   Lab  "Units 04/06/24  1113 04/06/24  0626   INR  1.30* 1.17   PTT seconds  --  20*          Results from last 7 days   Lab Units 04/07/24  1346 04/07/24  1050   LACTIC ACID mmol/L 2.1* 2.4*     ABG:  Results from last 7 days   Lab Units 04/08/24  0440   PH ART  7.495*   PCO2 ART mm Hg 39.3   PO2 ART mm Hg 88.8   HCO3 ART mmol/L 29.6*   BASE EXC ART mmol/L 5.9   ABG SOURCE  Radial, Right     VBG:  Results from last 7 days   Lab Units 04/09/24  0520 04/08/24  0440   PH ALEXANDRE  7.436*  --    PCO2 ALEXANDRE mm Hg 45.1  --    PO2 ALEXANDRE mm Hg 84.0*  --    HCO3 ALEXANDRE mmol/L 29.7  --    BASE EXC ALEXANDRE mmol/L 4.9  --    ABG SOURCE   --  Radial, Right     Results from last 7 days   Lab Units 04/09/24  1250   PROCALCITONIN ng/ml 2.23*       Micro  Results from last 7 days   Lab Units 04/09/24  1253 04/09/24  1249 04/09/24  0840   BLOOD CULTURE   --  Received in Microbiology Lab. Culture in Progress. Received in Microbiology Lab. Culture in Progress.   GRAM STAIN RESULT  No Epithelial cells per low power field*  Rare Polys*  3+ Gram positive cocci in pairs, chains and clusters*  1+ Gram positive rods*  Rare Gram negative rods*  --   --        EKG: Sinus tachycardia in 120s on tele  Imaging: No new imaging. CXR ordered.    Intake and Output  I/O         04/08 0701 04/09 0700 04/09 0701  04/10 0700 04/10 0701 04/11 0700    I.V. (mL/kg) 618.9 (9.8) 716.3 (11.4) 95.3 (1.5)    NG/GT  511 60    IV Piggyback 150 500 300    Feedings  234 131    Total Intake(mL/kg) 768.9 (12.2) 1961.3 (31.1) 586.3 (9.3)    Urine (mL/kg/hr) 925 (0.6) 1520 (1) 50 (0.3)    Emesis/NG output 500 425     Stool 0 0 0    Total Output 1425 1945 50    Net -656.1 +16.3 +536.3           Unmeasured Stool Occurrence 2 x 1 x 1 x            Height and Weights   Height: 5' 5\" (165.1 cm)  IBW (Ideal Body Weight): 57 kg  Body mass index is 23.15 kg/m².  Weight (last 2 days)       Date/Time Weight    04/10/24 0600 63.1 (139.11)    04/09/24 0541 63 (138.89)              Nutrition     "   Diet Orders   (From admission, onward)                 Start     Ordered    04/09/24 0904  Diet Enteral/Parenteral; Tube Feeding No Oral Diet; Jevity 1.5; Continuous; 40; 200; Water; Every 4 hours  Diet effective now        References:    Adult Nutrition Support Algorithm    RD Therapeutic Diet Order Protocol   Question Answer Comment   Diet Type Enteral/Parenteral    Enteral/Parenteral Tube Feeding No Oral Diet    Tube Feeding Formula: Jevity 1.5    Bolus/Cyclic/Continuous Continuous    Tube Feeding Goal Rate (mL/hr): 40    Tube Feeding flush (mL): 200    Water Flush type: Water    Water flush frequency: Every 4 hours    RD to adjust diet per protocol? Yes        04/09/24 0905                      Active Medications  Scheduled Meds:  Current Facility-Administered Medications   Medication Dose Route Frequency Provider Last Rate    acetaminophen  650 mg Oral Q6H PRN JANNETH Paz      Albumin 25%  12.5 g Intravenous BID (diuretic) Dennis Knowles MD      cefepime  2,000 mg Intravenous Q12H Dennis Knowles MD Stopped (04/10/24 0705)    chlorhexidine  15 mL Mouth/Throat Q12H [facility] JANNETH Paz      fentaNYL  16 mcg/hr Intravenous Continuous Dennis Knowles MD 16 mcg/hr (04/10/24 0905)    fentaNYL  12.5 mcg Intravenous Q2H PRN Dennis Knowles MD      fentaNYL  12.5 mcg Intravenous Q3H PRN Dennis Knowles MD      furosemide  40 mg Intravenous BID (diuretic) Dennis Knowles MD      heparin (porcine)  5,000 Units Subcutaneous Q8H [facility] JANNETH Paz      ipratropium-albuterol  3 mL Nebulization Q4H PRN Max 6/day JANNETH Paz      lidocaine  2 patch Topical Daily JANNETH Paz      norepinephrine  1-30 mcg/min Intravenous Titrated JANNETH Paz 3 mcg/min (04/10/24 0805)    polyethylene glycol  17 g Oral Daily JANNETH Ureña      potassium chloride  20 mEq Intravenous Q2H Dennis Knowles MD      senna  8.8 mg Oral HS JANNETH Ureña      vancomycin  1,250  "mg Intravenous Q12H Dennis Knowles MD 1,250 mg (04/10/24 0805)     Continuous Infusions:  fentaNYL, 16 mcg/hr, Last Rate: 16 mcg/hr (04/10/24 0905)  norepinephrine, 1-30 mcg/min, Last Rate: 3 mcg/min (04/10/24 0805)      PRN Meds:   acetaminophen, 650 mg, Q6H PRN  fentaNYL, 12.5 mcg, Q2H PRN  fentaNYL, 12.5 mcg, Q3H PRN  ipratropium-albuterol, 3 mL, Q4H PRN Max 6/day        Invasive Devices Review  Invasive Devices       Peripheral Intravenous Line  Duration             Peripheral IV 04/06/24 Right;Upper Arm 3 days    Peripheral IV 04/07/24 Dorsal (posterior);Right Forearm 3 days    Peripheral IV 04/08/24 Left;Upper;Ventral (anterior) Arm 2 days              Drain  Duration             Pleural Effusion Long-Term Catheter 16 Fr. 76 days    NG/OG/Enteral Tube Orogastric 16 Fr Center mouth 4 days    Urethral Catheter Latex 14 Fr. 3 days              Airway  Duration             ETT  Cuffed 4 days                    Rationale for remaining devices: PIVs for access while admitted. ETT for acute respiratory failure. OGT while intubated for TF. [place] while intubated and sedated. Pleurex catheter chronic, no plans for removal.  ---------------------------------------------------------------------------------------  Advance Directive and Living Will:      Power of :    POLST:    ---------------------------------------------------------------------------------------        Dennis Knowles MD      Portions of the record may have been created with voice recognition software.  Occasional wrong word or \"sound a like\" substitutions may have occurred due to the inherent limitations of voice recognition software.  Read the chart carefully and recognize, using context, where substitutions have occurred      "

## 2024-04-10 NOTE — ASSESSMENT & PLAN NOTE
Malnutrition Findings:   Adult Malnutrition type: Chronic illness  Adult Degree of Malnutrition: Other severe protein calorie malnutrition  Malnutrition Characteristics: Fat loss, Muscle loss, Inadequate energy, Weight loss                  360 Statement: Chronic severe malnutrition related to catabolic illness, poor oral intake as evidenced by < 75% estimated energy intake > 1 mo, severe muscle loss to temporalis muscles, severe fat loss to buccal pads, 17.8% weight loss x ~7 mo (9/20/23 68kg, 4/6/24 55.9kg). Treated with: see RD progress note for TF recommendations. Recommend daily weights for nutrition monitoring.    BMI Findings:           Body mass index is 23.15 kg/m².     During GOC conversation, discussed with family that we would not recommend placement of NGT or PEG tube, as these would increase pt discomfort without significantly improving prognosis.  Start Tube feeds while OGT in place with ETT

## 2024-04-10 NOTE — CONSULTS
e-Consult (IPC)   Shannan Vee 53 y.o. female MRN: [id number]  Unit/Bed#: ICU 05 Encounter: [id number]      Reason for Consult / Principal Problem: Aggressive/progressive metastatic breast cancer.  Inpatient consult to Oncology  Consult performed by: Maxwell Villavicencio MD  Consult ordered by: Dennis Knowles MD        04/10/24      ASSESSMENT:  This is a 53-year-old postmenopausal female with past medical history significant for locally advanced left-sided breast cancer, hormone receptor positive and HER2 negative, grade 2 initially diagnosed in June 2013.   She received neoadjuvant dose dense chemotherapy followed by mastectomy.  Pathology demonstrated significant residual disease with 2 positive lymph nodes.  She received adjuvant radiation and started on tamoxifen which was later changed to Arimidex in 2015-October 2022 after which she stopped Arimidex for unknown reason.  After patient had undergone hysterectomy and oophorectomy.  In October 2023, patient was found to have extensive metastatic disease, large lung mass and mediastinal adenopathy.  Biopsy of the bone was consistent with breast carcinoma (ER positive, HER2 negative by FISH) in October 2023.  Lung biopsy was consistent with malignant cells compatible with her history of metastatic breast carcinoma and Caris testing noted triple negative disease.  She has been on treatment with ribociclib and Faslodex.     The patient unfortunately was found to have rather very aggressive course of progression of her metastatic breast cancer.  The plan was to pursue palliative chemotherapy or Sacituzumab due to significant progression of her disease on the prior regiment.  However, the patient had multiple admissions to the hospital.  Her last virtual visit on 4/2/24 she was felt to be very weak to be able to tolerate any palliative treatment.  Comfort care under hospice was encouraged by Dr. Buitrago her oncologist.    The patient seems to be in the ICU with multiple  complications including shock, heart arrest, etc.      RECOMMENDATIONS:  Aggressive/progressive metastatic breast cancer: The patient unfortunately is not a candidate for any palliative treatment.  Hospice care should be strongly considered.    Please, call with any questions      31 + minutes, >50% of the total time devoted to medical consultative verbal/EMR discussion between providers. Written report will be generated in the EMR.    Maxwell Villavicencio MD

## 2024-04-10 NOTE — ASSESSMENT & PLAN NOTE
Metastatic breast cancer. Hormone receptor positive and HER2 negative diagnosed 2013. Prior history of neoadjuvant chemotherapy followed by mastectomy. She is s/p Hysterectomy and oophorectomy.   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 Completed radiation at [facility].     Recent CT-scan showing extensive right thoracic pleural carcinomatosis, extensive associated mass effect.   GOC conversations are ongoing with family  Palliative care on board, appreciate recommendations  CM on board, appreciate assistance coordinating family meeting

## 2024-04-10 NOTE — RESPIRATORY THERAPY NOTE
RT Ventilator Management Note  Shannan Vee 53 y.o. female MRN: [id number]  Unit/Bed#: ICU 05 Encounter: [id number]      Daily Screen         4/10/2024  0820 4/10/2024  1936          Patient safety screen outcome:: Passed Passed (P)       Spont breathing trial % for 30 min: Yes --      Spont breathing trial outcome:: Failed --      Spont breathing trial reason failed: RSBI > 100;RR > 35 bpm --      Previous settings resumed: Yes --      RSBI: 180 --                Physical Exam:   Assessment Type: (P) Assess only  General Appearance: (P) Sedated  Respiratory Pattern: (P) Assisted  Chest Assessment: (P) Chest expansion symmetrical  Bilateral Breath Sounds: (P) Coarse, Diminished      Resp Comments: (P) patient remains intubated on SIMV. No changes were made to the patient's vent settings at this time, ETT was repositined to the patinet's right and remains in place with a commercial tube saldana. No apparent skin breakdown or discoloration was noted.     04/10/24 1936   Respiratory Assessment   Assessment Type Assess only   General Appearance Sedated   Respiratory Pattern Assisted   Chest Assessment Chest expansion symmetrical   Bilateral Breath Sounds Coarse;Diminished   Resp Comments patient remains intubated on SIMV. No changes were made to the patient's vent settings at this time, ETT was repositined to the patinet's right and remains in place with a commercial tube saldana. No apparent skin breakdown or discoloration was noted.   SIMV Settings   Resp Rate (BPM) 16 BPM   VT (mL) 240 mL   FiO2 (%) 50 %   PEEP (cmH2O) 6 cmH2O   Pressure Support (cmH2O) 12 cmH2O   Insp Time (S) 1 sec   Flow Trigger (LPM) 2 LPM   P-ramp (ms) 50 (ms)   ETS (%) 25 %   Humidification Heater   Heater Temp 98.6 °F (37 °C)   SIMV Actuals   Resp Rate (BPM) 16 BPM   VT (mL) 230   MV (Obs) 3.7   MAP (cm H2O) 13   Peak Pressure (cmH2O) 38 cmH2O   I:E Ratio (Obs) 1:2.8   Static Compliance (mL/cmH2O) 7.4 mL/cmH2O   Plateau Pressure (Obs) 38    Heater Temperature (Obs) 98.8 °F (37.1 °C)   SIMV ALARMS   High Peak Pressure (cmH2O) 45 cm H2O   High Resp Rate (BPM) 50 BPM   High MV (L/min) 15 L/min   Low MV (L/min) 3 L/min   High VT (mL) 750 mL   Low VT (mL) 150 mL   Apnea Time (s) 20 S   Leak (%) 0 %   SIMV Apnea Settings   Resp Rate (BPM) 16 BPM   VT (mL) 240 mL   Maintenance   Alarm (pink) cable attached No   Resuscitation bag with peep valve at bedside Yes   Water bag changed No   Circuit changed No   Daily Screen   Patient safety screen outcome: Passed   IHI Ventilator Associated Pneumonia Bundle   Daily Assessment of Readiness to Extubate Yes   Head of Bed Elevated HOB 30   ETT  Cuffed   Placement Date/Time: 04/06/24 (c) 0988   Mask Ventilation: (c)   Preoxygenated: Yes  Technique: Video laryngoscopy  Type: Cuffed  Laryngoscope: (c)   Blade Size: 3  Location: Oral  Grade View: Full view of the glottis  Placement Verification: Ausculta...   Secured at (cm) 20   Measured from Lips   Secured Location Right   Repositioned Center to Right   Secured by Commercial tube saldana   Site Condition Dry   HI-LO Suction  Intermittent suction   HI-LO Secretions Scant   HI-LO Intervention Patent

## 2024-04-10 NOTE — ASSESSMENT & PLAN NOTE
New fever 100.8 overnight  Persistent sinus tachycardia  Hypotension overnight requiring resumption of Levo  CT chest showed opacities in the GURMEET, edema vs infection  No leukocytosis  Procal elevated 2.23    Suspect infection, most likely PNA, though malignancy with extensive metastatic disease could be confounding.    Continue empiric vanc, cefepime  Check repeat CXR to assess for possible evolution of GURMEET opacity seen on previous imaging  F/u Cx results  Monitor fever and WBC curves

## 2024-04-10 NOTE — DISCHARGE INSTR - OTHER ORDERS
Wound Care Plan:   1-Apply silicone bordered foam dressings to bilateral heels for prevention.  Nishant with P.  Peel back for skin assessments at least daily and re-apply.  Change dressings every 3 days and as needed.  2-Elevate heels off of bed/chair surface--offloading heel boots.  3-Low air-loss mattress.  4-Apply moisturizing skin cream to body daily and as needed.  5-Turn/reposition every 2 hours while in bed and weight shift frequently while in chair for pressure re-distribution on skin.   6-Right back--cleanse with soap and water, pat dry.  Apply Triad paste and silicone bordered foam dressing for treatment.  Change dressing every other day and as needed.  7-Sacrum--cleanse with soap and water, pat dry.  Apply Triad paste three times daily and as needed with incontinence care.    Follow-up at the [facility]--[phone].

## 2024-04-10 NOTE — ASSESSMENT & PLAN NOTE
4/6/24 In-hospital witnessed cardiac arrest. Patient was ambulating from toilet back to bed with nursing staff and became unresponsive. ACLS with 1 round epinephrine/CPR and ROSC was obtained. Suspect PEA 2/2 hypoxia.  Echo 4/7: Mild concentric left ventricular hypertrophy, normal left ventricular systolic function, normal diastolic function.  EF 65%. Normal right ventricular size, normal right ventricular systolic function.  Some evidence of right ventricular diastolic collapse involving the apical region secondary to pericardial effusion  Holding home Toprol XL in the setting of hypotension  Noted to be in new Afib on 4/6, received digoxin on 4/6 and 4/7, now in sinus tachycardia  Maintain MAP > 65 mmHg  Continue telemetry  Avoid further hypoxia  Dig level supra-therapeutic 4/8. Repeat dig level within therapeutic range 4/9.   Continue to monitor tele while in ICU--remains in sinus tach

## 2024-04-10 NOTE — CONSULTS
Nutrition consult received and appreciated      Recommend to continue Jevity 1.5cal at goal rate 40 ml/hr ( provides 1440kcal, 60g protein, 730 ml free water) Free water flushes per md ; Monitor K, Phos, Mg and replete PRN     Nutrition will continue to follow up as per policy    Shraddha Gallo MS RD LDN CNSC

## 2024-04-10 NOTE — PROGRESS NOTES
Shannan Vee is a 53 y.o. female who is currently ordered Vancomycin IV with management by the Pharmacy Consult Service.  Relevant clinical data and objective / subjective history reviewed.  Vancomycin Assessment:  Indication and Goal AUC/Trough:  Pneumonia (goal -600, trough >10)  Clinical Status: stable  Micro:      Renal Function:  SCr: 0.54 mg/dL from 0.52 mg/dL yesterday  CrCl: > 100 mL/min  UOP: 1 mL/kg/hr in the last 24 hours  Renal replacement: Not on dialysis  Days of Therapy: 2  Current Dose:  1,250 mg q12h  Vancomycin Plan:  New Dosing:  Continue current dose of 1,250 mg q12h  Estimated AUC: 517 mcg*hr/mL  Estimated Trough: 12.1 mcg/mL  Next Level: Random level with AM labs on 4/11  Renal Function Monitoring: Daily BMP and UOP assessment  Pharmacy will continue to follow closely for s/sx of nephrotoxicity, infusion reactions and appropriateness of therapy.  BMP and CBC will be ordered per protocol. We will continue to follow the patient’s culture results and clinical progress daily.    Aida Pruitt, PharmD, BCCCP  Critical Care and Internal Medicine Clinical Pharmacist  [phone] or via [employer]   19

## 2024-04-10 NOTE — PLAN OF CARE
Problem: Potential for Falls  Goal: Patient will remain free of falls  Description: INTERVENTIONS:  - Educate patient/family on patient safety including physical limitations  - Instruct patient to call for assistance with activity   - Consult OT/PT to assist with strengthening/mobility   - Keep Call bell within reach  - Keep bed low and locked with side rails adjusted as appropriate  - Keep care items and personal belongings within reach  - Initiate and maintain comfort rounds  - Make Fall Risk Sign visible to staff  - Offer Toileting every 2 Hours, in advance of need  - Initiate/Maintain bed alarm  - Apply yellow socks and bracelet for high fall risk patients  - Consider moving patient to room near nurses station  Outcome: Progressing     Problem: Nutrition/Hydration-ADULT  Goal: Nutrient/Hydration intake appropriate for improving, restoring or maintaining nutritional needs  Description: Monitor and assess patient's nutrition/hydration status for malnutrition. Collaborate with interdisciplinary team and initiate plan and interventions as ordered.  Monitor patient's weight and dietary intake as ordered or per policy. Utilize nutrition screening tool and intervene as necessary. Determine patient's food preferences and provide high-protein, high-caloric foods as appropriate.     INTERVENTIONS:  - Monitor oral intake, urinary output, labs, and treatment plans  - Assess nutrition and hydration status and recommend course of action  - Evaluate amount of meals eaten  - Assist patient with eating if necessary   - Allow adequate time for meals  - Recommend/ encourage appropriate diets, oral nutritional supplements, and vitamin/mineral supplements  - Order, calculate, and assess calorie counts as needed  - Recommend, monitor, and adjust tube feedings and TPN/PPN based on assessed needs  - Assess need for intravenous fluids  - Provide specific nutrition/hydration education as appropriate  - Include patient/family/caregiver  in decisions related to nutrition  Outcome: Progressing     Problem: Prexisting or High Potential for Compromised Skin Integrity  Goal: Skin integrity is maintained or improved  Description: INTERVENTIONS:  - Identify patients at risk for skin breakdown  - Assess and monitor skin integrity  - Assess and monitor nutrition and hydration status  - Monitor labs   - Assess for incontinence   - Turn and reposition patient  - Assist with mobility/ambulation  - Relieve pressure over bony prominences  - Avoid friction and shearing  - Provide appropriate hygiene as needed including keeping skin clean and dry  - Evaluate need for skin moisturizer/barrier cream  - Collaborate with interdisciplinary team   - Patient/family teaching  - Consider wound care consult   Outcome: Progressing     Problem: CARDIOVASCULAR - ADULT  Goal: Maintains optimal cardiac output and hemodynamic stability  Description: INTERVENTIONS:  - Monitor I/O, vital signs and rhythm  - Monitor for S/S and trends of decreased cardiac output  - Administer and titrate ordered vasoactive medications to optimize hemodynamic stability  - Assess quality of pulses, skin color and temperature  - Assess for signs of decreased coronary artery perfusion  - Instruct patient to report change in severity of symptoms  Outcome: Progressing  Goal: Absence of cardiac dysrhythmias or at baseline rhythm  Description: INTERVENTIONS:  - Continuous cardiac monitoring, vital signs, obtain 12 lead EKG if ordered  - Administer antiarrhythmic and heart rate control medications as ordered  - Monitor electrolytes and administer replacement therapy as ordered  Outcome: Progressing     Problem: RESPIRATORY - ADULT  Goal: Achieves optimal ventilation and oxygenation  Description: INTERVENTIONS:  - Assess for changes in respiratory status  - Assess for changes in mentation and behavior  - Position to facilitate oxygenation and minimize respiratory effort  - Oxygen administered by appropriate  delivery if ordered  - Initiate smoking cessation education as indicated  - Encourage broncho-pulmonary hygiene including cough, deep breathe, Incentive Spirometry  - Assess the need for suctioning and aspirate as needed  - Assess and instruct to report SOB or any respiratory difficulty  - Respiratory Therapy support as indicated  Outcome: Progressing     Problem: PAIN - ADULT  Goal: Verbalizes/displays adequate comfort level or baseline comfort level  Description: Interventions:  - Encourage patient to monitor pain and request assistance  - Assess pain using appropriate pain scale  - Administer analgesics based on type and severity of pain and evaluate response  - Implement non-pharmacological measures as appropriate and evaluate response  - Consider cultural and social influences on pain and pain management  - Notify physician/advanced practitioner if interventions unsuccessful or patient reports new pain  Outcome: Progressing     Problem: SAFETY ADULT  Goal: Patient will remain free of falls  Description: INTERVENTIONS:  - Educate patient/family on patient safety including physical limitations  - Instruct patient to call for assistance with activity   - Consult OT/PT to assist with strengthening/mobility   - Keep Call bell within reach  - Keep bed low and locked with side rails adjusted as appropriate  - Keep care items and personal belongings within reach  - Initiate and maintain comfort rounds  - Make Fall Risk Sign visible to staff  - Offer Toileting every 2 Hours, in advance of need  - Initiate/Maintain bed alarm  - Apply yellow socks and bracelet for high fall risk patients  - Consider moving patient to room near nurses station  Outcome: Progressing  Goal: Maintain or return to baseline ADL function  Description: INTERVENTIONS:  -  Assess patient's ability to carry out ADLs; assess patient's baseline for ADL function and identify physical deficits which impact ability to perform ADLs (bathing, care of  mouth/teeth, toileting, grooming, dressing, etc.)  - Assess/evaluate cause of self-care deficits   - Assess range of motion  - Assess patient's mobility; develop plan if impaired  - Assess patient's need for assistive devices and provide as appropriate  - Encourage maximum independence but intervene and supervise when necessary  - Involve family in performance of ADLs  - Assess for home care needs following discharge   - Consider OT consult to assist with ADL evaluation and planning for discharge  - Provide patient education as appropriate  Outcome: Progressing  Goal: Maintains/Returns to pre admission functional level  Description: INTERVENTIONS:  - Perform AM-PAC 6 Click Basic Mobility/ Daily Activity assessment daily.  - Set and communicate daily mobility goal to care team and patient/family/caregiver.   - Collaborate with rehabilitation services on mobility goals if consulted  - Perform Range of Motion 3 times a day.  - Reposition patient every 3 hours.  - Dangle patient 3 times a day  - Stand patient 3 times a day  - Ambulate patient 3 times a day  - Out of bed to chair 3 times a day   - Out of bed for meals 3 times a day  - Out of bed for toileting  - Record patient progress and toleration of activity level   Outcome: Progressing     Problem: Knowledge Deficit  Goal: Patient/family/caregiver demonstrates understanding of disease process, treatment plan, medications, and discharge instructions  Description: Complete learning assessment and assess knowledge base.  Interventions:  - Provide teaching at level of understanding  - Provide teaching via preferred learning methods  Outcome: Progressing     Problem: SAFETY,RESTRAINT: NV/NON-SELF DESTRUCTIVE BEHAVIOR  Goal: Remains free of harm/injury (restraint for non violent/non self-detsructive behavior)  Description: INTERVENTIONS:  - Instruct patient/family regarding restraint use   - Assess and monitor physiologic and psychological status   - Provide interventions  and comfort measures to meet assessed patient needs   - Identify and implement measures to help patient regain control  - Assess readiness for release of restraint   Outcome: Progressing  Goal: Returns to optimal restraint-free functioning  Description: INTERVENTIONS:  - Assess the patient's behavior and symptoms that indicate continued need for restraint  - Identify and implement measures to help patient regain control  - Assess readiness for release of restraint   Outcome: Progressing     Problem: COPING  Goal: Pt/Family able to verbalize concerns and demonstrate effective coping strategies  Description: INTERVENTIONS:  - Assist patient/family to identify coping skills, available support systems and cultural and spiritual values  - Provide emotional support, including active listening and acknowledgement of concerns of patient and caregivers  - Reduce environmental stimuli, as able  - Provide patient education  - Assess for spiritual pain/suffering and initiate spiritual care, including notification of Pastoral Care or nicholas based community as needed  - Assess effectiveness of coping strategies  Outcome: Progressing  Goal: Will report anxiety at manageable levels  Description: INTERVENTIONS:  - Administer medication as ordered  - Teach and encourage coping skills  - Provide emotional support  - Assess patient/family for anxiety and ability to cope  Outcome: Progressing

## 2024-04-10 NOTE — ASSESSMENT & PLAN NOTE
3+ pitting edema in all 4 extremities  +3.2 liters this admission    Give Lasix 40 mg IV x2 doses today--inadequate response when trialed on 20 mg  Monitor UOP, volume status, renal indices to guide decision regarding further diuretic

## 2024-04-11 PROBLEM — B95.61 STAPHYLOCOCCUS AUREUS BACTEREMIA: Status: ACTIVE | Noted: 2024-01-01

## 2024-04-11 PROBLEM — R78.81 STAPHYLOCOCCUS AUREUS BACTEREMIA: Status: ACTIVE | Noted: 2024-01-01

## 2024-04-11 NOTE — CONSULTS
Vancomycin therapy has been discontinued. Pharmacy will sign off. Thank you for this consult. Please do not hesitate to call us with questions or re-consult us if the need arises.    Aida Pruitt, PharmD, [facility]  Critical Care and Internal Medicine Clinical Pharmacist  [phone] or via [employer]

## 2024-04-11 NOTE — ASSESSMENT & PLAN NOTE
3+ pitting edema in all 4 extremities  +6.7 liters this admission    Continue Lasix 40 mg IV BID   Pretreat w 25% alvumin  Monitor UOP, volume status, renal indices, lytes while diuresing

## 2024-04-11 NOTE — ASSESSMENT & PLAN NOTE
Malnutrition Findings:   Adult Malnutrition type: Chronic illness  Adult Degree of Malnutrition: Other severe protein calorie malnutrition  Malnutrition Characteristics: Fat loss, Muscle loss, Inadequate energy, Weight loss                  360 Statement: Chronic severe malnutrition related to catabolic illness, poor oral intake as evidenced by < 75% estimated energy intake > 1 mo, severe muscle loss to temporalis muscles, severe fat loss to buccal pads, 17.8% weight loss x ~7 mo (9/20/23 68kg, 4/6/24 55.9kg). Treated with: see RD progress note for TF recommendations. Recommend daily weights for nutrition monitoring.    BMI Findings:           Body mass index is 23.11 kg/m².     During GOC conversation, discussed with family that we would not recommend placement of NGT or PEG tube, as these would increase pt discomfort without significantly improving prognosis.  Continue Tube feeds while OGT in place with ETT

## 2024-04-11 NOTE — ASSESSMENT & PLAN NOTE
"2/2 chronic effusion and invasive carcinomatosis.  4/6 CTA chest: negative PE.  \"Re-demonstration of extensive thoracic pleural carcinomatosis with complete opacification of the R hemithorax.  Findings are also secondary to a combination of atelectasis and effusion and worsened since prior.  Moderate L pleural effusion with subjacent compressive atelectasis. GGO of GURMEET and lingula.  Extensive osseous metastatic disease with multilevel compression deformities\"  4/6 Patient intubated 2/2 cardiac arrest, likely in the setting of arhythmia precipitated by hypoxia  Vent: CMV 16/300/6/50  Daily SAT/SBT  Ascept cath in place R pleura-- no drainage.   To consider thoracentesis pending further GOC conversation with family. POCUS shows narrow but accessible pocket of pleural fluid. Risks and benefits have been discussed with family.  Started empiric abx with vanc/cefepime on 4/9 for suspected PNA  Sputum Cx growing S. aureus, susceptibilities pending. Can discontinue cefepime this morning, and narrow from vanc if MSSA  F/u susceptibilities  "

## 2024-04-11 NOTE — ASSESSMENT & PLAN NOTE
GURMEET opacity, new this admission, in addition to other findings noted on CT chest  SpCx positive for Staph aureus  Bcx positive for Staph aureus    Vanc started 4/9, D3  Can D/C cefepime  Remains intubated, with poor performance on daily SBT

## 2024-04-11 NOTE — ASSESSMENT & PLAN NOTE
In the setting of sepsis d/t Staph bacteremia and PNA  Started on levophed 4/6, weaned off 4/8, but had to be restarted o/n 4/9.  Titrate for goal MAP > 65 mmHg  Holding home Toprol XL in the setting of hypotension  Echo shows LVEF 65%, normal RV systolic function, some evidence of RV diastolic collapse in apical region 2/2 pericardial effusion. No definite evidence of cardiac tamponade.  New fever overnight 4/9 to T max of 100.8. Tmax 100.1 o/n 4/10.  Procal elevated 2.23  Sputum culture from tracheal aspirate preliminarily shows 4+ Staph aureus  Bcx x2: 1 of 2 growing Staph aureus  MRSA nares cx in process    Narrow abx-- continue vanc, discontinue cefepime (D3 abx); anticipate narrowing further if MSSA  F/u susceptibilities  Monitor WBC and fever curve  Weaning Levo as able

## 2024-04-11 NOTE — RESPIRATORY THERAPY NOTE
RT Ventilator Management Note  Shannan Vee 53 y.o. female MRN: [id number]  Unit/Bed#: ICU 05 Encounter: [id number]      Daily Screen         4/10/2024  1936 4/10/2024  2256          Patient safety screen outcome:: Passed Passed (P)                 Physical Exam:   Assessment Type: (P) Assess only  General Appearance: (P) Drowsy  Respiratory Pattern: (P) Assisted  Chest Assessment: (P) Chest expansion symmetrical  Bilateral Breath Sounds: (P) Rhonchi, Coarse      Resp Comments: (P) no chagnes made to the patient's vent settings at this time.     04/10/24 2256   Respiratory Assessment   Assessment Type Assess only   General Appearance Drowsy   Respiratory Pattern Assisted   Chest Assessment Chest expansion symmetrical   Bilateral Breath Sounds Rhonchi;Coarse   Resp Comments no chagnes made to the patient's vent settings at this time.   Vent Information   Vent ID [id number]   Vent type Crain G5   Crain Vent Mode SIMV   $ Pulse Oximetry Spot Check Charge Completed   SIMV Settings   Resp Rate (BPM) 16 BPM   VT (mL) 240 mL   FiO2 (%) 50 %   PEEP (cmH2O) 6 cmH2O   Pressure Support (cmH2O) 12 cmH2O   Insp Time (S) 1 sec   Flow Trigger (LPM) 2 LPM   P-ramp (ms) 50 (ms)   ETS (%) 25 %   Humidification Heater   Heater Temp 98.6 °F (37 °C)   SIMV Actuals   Resp Rate (BPM) 16 BPM   VT (mL) 242   MV (Obs) 3.9   MAP (cm H2O) 12   Peak Pressure (cmH2O) 35 cmH2O   I:E Ratio (Obs) 1:2.8   Static Compliance (mL/cmH2O) 8 mL/cmH2O   Plateau Pressure (Obs) 35   Heater Temperature (Obs) 98.6 °F (37 °C)   SIMV ALARMS   High Peak Pressure (cmH2O) 45 cm H2O   High Resp Rate (BPM) 50 BPM   High MV (L/min) 15 L/min   Low MV (L/min) 3 L/min   High VT (mL) 750 mL   Low VT (mL) 150 mL   Apnea Time (s) 20 S   SIMV Apnea Settings   Resp Rate (BPM) 16 BPM   VT (mL) 240 mL   Maintenance   Alarm (pink) cable attached No   Resuscitation bag with peep valve at bedside Yes   Water bag changed No   Circuit changed No   Daily Screen   Patient safety  screen outcome: Passed   IHI Ventilator Associated Pneumonia Bundle   Daily Assessment of Readiness to Extubate Yes   Head of Bed Elevated HOB 30   ETT  Cuffed   Placement Date/Time: 04/06/24 (c) 9147   Mask Ventilation: (c)   Preoxygenated: Yes  Technique: Video laryngoscopy  Type: Cuffed  Laryngoscope: (c)   Blade Size: 3  Location: Oral  Grade View: Full view of the glottis  Placement Verification: Ausculta...   Secured at (cm) 20   Measured from Lips   Secured Location Left   Repositioned Right to Left   Secured by Commercial tube saldana   Site Condition Dry   HI-LO Suction  Intermittent suction   HI-LO Secretions Scant   HI-LO Intervention Patent

## 2024-04-11 NOTE — ASSESSMENT & PLAN NOTE
2/2 cancer related pain and chronic back pain  PDMP reviewed.  Patient has Rx for Oxycodone   Restarted oxycodone  Fent gtt held overnight for somnolence  Will monitor pain control and sedation and adjust regimen as needed  Bowel regimen with senna and miralax  Monitor for constipation--BM x2 yesterday

## 2024-04-11 NOTE — RESPIRATORY THERAPY NOTE
RT Ventilator Management Note  Shannan Vee 53 y.o. female MRN: [id number]  Unit/Bed#: ICU 05 Encounter: [id number]      Daily Screen         4/11/2024  0755 4/11/2024  0805          Patient safety screen outcome:: Passed --      Spont breathing trial outcome:: -- Failed      Spont breathing trial reason failed: -- RR > 35 bpm;RSBI > 100      Previous settings resumed: -- Yes      Name of Medical Team Notified:: -- --      RSBI: -- 394               04/11/24 0755   Respiratory Assessment   Assessment Type Assess only   Respiratory Pattern Assisted   Chest Assessment Chest expansion symmetrical   Bilateral Breath Sounds Rhonchi;Coarse   Resp Comments Received patient on documented settings. Placed patient on SPONT 10/6. Patient desaturated to 82%. RSBI 394. Placed patient back on full support. CRNP and RN aware.   Vent Information   Vent ID [id number]   Vent type Crain G5   Crain Vent Mode (S)CMV   $ Pulse Oximetry Spot Check Charge Completed   SpO2 94 %   (S)CMV Settings   Resp Rate (BPM) 16 BPM   VT (mL) 300 mL   FIO2 (%) 50 %   PEEP (cmH2O) 6 cmH2O   I:E Ratio 1:2.1   Insp Time (%) 1 %   Flow Trigger (LPM) 2   Humidification Heater   Heater Temperature (Set) 98.6 °F (37 °C)   Pause Time (%) 0 %   (S)CMV Actuals   Resp Rate (BPM) 19 BPM   VT (mL) 309   MV 5.8   MAP (cmH2O) 15 cmH2O   Peak Pressure (cmH2O) 46 cmH2O   I:E Ratio (Obs) 1:2.8   Insp Resistance 27   Heater Temperature (Obs) 98.6 °F (37 °C)   Static Compliance (mL/cmH20) 7.6 mL/cmH2O   Plateau Pressure (cm H2O) 39 cm H2O   (S)CMV Alarms   High Peak Pressure (cmH2O) 55  (By previous RRT)   Low Pressure (cmH2O) 5 cm H2O   High Resp Rate (BPM) 50 BPM   Low Resp Rate (BPM) 8 BPM   High MV (L/min) 15 L/min   Low MV (L/min) 3 L/min   High VT (mL) 750 mL   Low VT (mL) 150 mL   Leak (%) 0 %   Apnea Time (s) 20 S   Maintenance   Resuscitation bag with peep valve at bedside Yes   Water bag changed No   Circuit changed No   Daily Screen   Patient safety screen  outcome: Passed   IHI Ventilator Associated Pneumonia Bundle   Daily Assessment of Readiness to Extubate Yes   ETT  Cuffed   Placement Date/Time: 04/06/24 (c) 2219   Mask Ventilation: (c)   Preoxygenated: Yes  Technique: Video laryngoscopy  Type: Cuffed  Laryngoscope: (c)   Blade Size: 3  Location: Oral  Grade View: Full view of the glottis  Placement Verification: Ausculta...   Secured at (cm) 20   Measured from Lips   Secured Location Left   Repositioned Left to Center   Secured by Commercial tube saldana   HI-LO Suction  Intermittent suction   HI-LO Secretions Scant   HI-LO Intervention Patent         Resp Comments: Received patient on documented settings. Placed patient on SPONT 10/6. Patient desaturated to 82%. RSBI 394. Placed patient back on full support. CRNP and RN aware.

## 2024-04-11 NOTE — CASE MANAGEMENT
Case Management Discharge Planning Note    Patient name Shannan Vee  Location ICU 05/ICU 05 MRN [id number]  : 1970 Date 2024       Current Admission Date: 2024  Current Admission Diagnosis:Acute on chronic respiratory failure (HCC)   Patient Active Problem List    Diagnosis Date Noted    Staphylococcus aureus bacteremia 2024    Staph aureus pneumonia 2024    Cardiac arrest (HCC) 2024    Acute on chronic respiratory failure (HCC) 2024    Shock (HCC) 2024    Chronic, continuous use of opioids 2024    Encounter for home hospice care 2024    Hyponatremia 2024    Drug induced constipation 2024    Severe protein-calorie malnutrition (HCC) 2024    Chronic bilateral low back pain without sciatica 2024    Cancer associated pain 2024    Goals of care, counseling/discussion 2024    Pleural effusion 2023    Recurrent malignant neoplasm of breast (HCC) 10/23/2023    Right lower lobe lung mass 10/23/2023    Pain from bone metastases (HCC) 10/23/2023    Other specified anemias 10/23/2023    Bladder wall thickening 10/06/2023    Urinary frequency 2023    Overweight (BMI 25.0-29.9) 2021    Personal history of malignant neoplasm of breast 2019    Use of anastrozole (Arimidex) 2018    Carcinoma of left breast, estrogen receptor positive (HCC) 2017    Malignant neoplasm of overlapping sites of left female breast (HCC) 2015    Uterine leiomyoma 10/15/2014    Anasarca 2013      LOS (days): 5  Geometric Mean LOS (GMLOS) (days): 3.6  Days to GMLOS:-1.8     OBJECTIVE:  Risk of Unplanned Readmission Score: 58.07         Current admission status: Inpatient   Preferred Pharmacy:   [id number] - [place] - [address]  [address]  [place] [address]  Phone: [phone] Fax: [phone]    Primary Care Provider: Todd Bañuelos MD    Primary Insurance: [place]RER[place]  PENDING  Secondary Insurance:     DISCHARGE DETAILS:    CM consulted to arrange [facility] family meeting with care team. Invite sent to Dr Carson from Palliative, Dr Triana from Onc, Critical Care and family is aware. Scheduled for 4/12/24 @ 1300.    Addendum--Dr Triana from Onc declined the [facility] family mtg request sent in Teams.

## 2024-04-11 NOTE — PROGRESS NOTES
"[facility]  Progress Note  Name: hSannan Vee I  MRN: [id number]  Unit/Bed#: ICU 05 I Date of Admission: 4/6/2024   Date of Service: 4/11/2024 I Hospital Day: 5    Assessment/Plan   * Acute on chronic respiratory failure (HCC)  Assessment & Plan  2/2 chronic effusion and invasive carcinomatosis.  4/6 CTA chest: negative PE.  \"Re-demonstration of extensive thoracic pleural carcinomatosis with complete opacification of the R hemithorax.  Findings are also secondary to a combination of atelectasis and effusion and worsened since prior.  Moderate L pleural effusion with subjacent compressive atelectasis. GGO of GURMEET and lingula.  Extensive osseous metastatic disease with multilevel compression deformities\"  4/6 Patient intubated 2/2 cardiac arrest, likely in the setting of arhythmia precipitated by hypoxia  Vent: CMV 16/300/6/50  Daily SAT/SBT  Ascept cath in place R pleura-- no drainage.   To consider thoracentesis pending further GOC conversation with family. POCUS shows narrow but accessible pocket of pleural fluid. Risks and benefits have been discussed with family.  Started empiric abx with vanc/cefepime on 4/9 for suspected PNA  Sputum Cx growing S. aureus, susceptibilities pending. Can discontinue cefepime this morning, and narrow from vanc if MSSA  F/u susceptibilities    Staphylococcus aureus bacteremia  Assessment & Plan  Tmax 100.8  Persistent sinus tachycardia  Persistent hypotension requiring low-dose Levo  CT chest findings included new opacities in the GURMEET, edema vs infection  No leukocytosis  Procal elevated 2.23    Bcx and SpCx collected 4/9, now growing Staph aureus, susceptibilities pending    Suspect primary pulmonary source of  bacteremia    Continue vanc (D3)  D/C cefepime  F/u susceptibilities  Monitor fever and WBC curves    Shock (HCC)  Assessment & Plan  In the setting of sepsis d/t Staph bacteremia and PNA  Started on levophed 4/6, weaned off 4/8, but had to be " restarted o/n 4/9.  Titrate for goal MAP > 65 mmHg  Holding home Toprol XL in the setting of hypotension  Echo shows LVEF 65%, normal RV systolic function, some evidence of RV diastolic collapse in apical region 2/2 pericardial effusion. No definite evidence of cardiac tamponade.  New fever overnight 4/9 to T max of 100.8. Tmax 100.1 o/n 4/10.  Procal elevated 2.23  Sputum culture from tracheal aspirate preliminarily shows 4+ Staph aureus  Bcx x2: 1 of 2 growing Staph aureus  MRSA nares cx in process    Narrow abx-- continue vanc, discontinue cefepime (D3 abx); anticipate narrowing further if MSSA  F/u susceptibilities  Monitor WBC and fever curve  Weaning Levo as able    Staph aureus pneumonia  Assessment & Plan  GURMEET opacity, new this admission, in addition to other findings noted on CT chest  SpCx positive for Staph aureus  Bcx positive for Staph aureus    Vanc started 4/9, D3  Can D/C cefepime  Remains intubated, with poor performance on daily SBT    Anasarca  Assessment & Plan  3+ pitting edema in all 4 extremities  +6.7 liters this admission    Continue Lasix 40 mg IV BID   Pretreat w 25% alvumin  Monitor UOP, volume status, renal indices, lytes while diuresing     Chronic, continuous use of opioids  Assessment & Plan  2/2 cancer related pain and chronic back pain  PDMP reviewed.  Patient has Rx for Oxycodone   Restarted oxycodone  Fent gtt held overnight for somnolence  Will monitor pain control and sedation and adjust regimen as needed  Bowel regimen with senna and miralax  Monitor for constipation--BM x2 yesterday    Cardiac arrest (HCC)  Assessment & Plan  4/6/24 In-hospital witnessed cardiac arrest. Patient was ambulating from toilet back to bed with nursing staff and became unresponsive. ACLS with 1 round epinephrine/CPR and ROSC was obtained. Suspect PEA 2/2 hypoxia.  Echo 4/7: Mild concentric left ventricular hypertrophy, normal left ventricular systolic function, normal diastolic function.  EF 65%.  Normal right ventricular size, normal right ventricular systolic function.  Some evidence of right ventricular diastolic collapse involving the apical region secondary to pericardial effusion  Holding home Toprol XL in the setting of hypotension  Noted to be in new Afib on 4/6, received digoxin on 4/6 and 4/7, now in sinus tachycardia  Maintain MAP > 65 mmHg  Continue telemetry  Avoid further hypoxia  Dig level supra-therapeutic 4/8. Repeat dig level within therapeutic range 4/9.   Continue to monitor tele while in ICU--remains in sinus tach    Severe protein-calorie malnutrition (HCC)  Assessment & Plan  Malnutrition Findings:   Adult Malnutrition type: Chronic illness  Adult Degree of Malnutrition: Other severe protein calorie malnutrition  Malnutrition Characteristics: Fat loss, Muscle loss, Inadequate energy, Weight loss                  360 Statement: Chronic severe malnutrition related to catabolic illness, poor oral intake as evidenced by < 75% estimated energy intake > 1 mo, severe muscle loss to temporalis muscles, severe fat loss to buccal pads, 17.8% weight loss x ~7 mo (9/20/23 68kg, 4/6/24 55.9kg). Treated with: see RD progress note for TF recommendations. Recommend daily weights for nutrition monitoring.    BMI Findings:           Body mass index is 23.11 kg/m².     During GOC conversation, discussed with family that we would not recommend placement of NGT or PEG tube, as these would increase pt discomfort without significantly improving prognosis.  Continue Tube feeds while OGT in place with ETT    Pleural effusion  Assessment & Plan  CTA ED chest PE Study 4/6/2024:   No definite evidence for pulmonary embolism. Redemonstration of extensive thoracic pleural carcinomatosis with complete opacification of the right hemithorax. Findings are also secondary to a combination of atelectasis and effusion and worsened since the prior exam. Right-sided chest tube in place. Moderate left pleural effusion with  subjacent compressive atelectasis, worsened. Groundglass opacities within the left upper lobe and lingula are new and may be on the basis of infection or edema. Extensive osseous metastatic disease with multilevel compression deformities are stable.    Recently evaluated by IR and their impression is that patient has extensive tumor of the right lung with some encasing the catheter. IR did not believe that further manipulation of the catheter would provide any clinical benefit.     Moderate pleural effusion on left, to consider palliative thoracentesis if desired by family; have discussed risks and benefits and conversation is ongoing.    Recurrent malignant neoplasm of breast (HCC)  Assessment & Plan  Metastatic breast cancer. Hormone receptor positive and HER2 negative diagnosed 2013. Prior history of neoadjuvant chemotherapy followed by mastectomy. She is s/p Hysterectomy and oophorectomy.   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 Completed radiation at [facility].     Recent CT-scan showing extensive right thoracic pleural carcinomatosis, extensive associated mass effect.   GO conversations are ongoing with family  Palliative care on board, appreciate recommendations  Oncology on board, pt is not a candidate for palliative chemo and hospice care encouraged  CM on board, appreciate assistance coordinating family meeting       ----------------------------------------------------------------------------------------  HPI/24hr events: Overnight, woke up in discomfort, dysynchronous with vent, improved with sedation after administration of fentanyl prns.     Patient appropriate for transfer out of the ICU today?: No  Disposition: Continue Critical Care   Code Status: Level 2 - DNAR: but accepts endotracheal intubation  ---------------------------------------------------------------------------------------  SUBJECTIVE  This morning, Ms. Vee is seen awake but  drowsy, intubated, responsive to questions and physical exam maneuvers. RASS -1 to 0.    Review of Systems  Review of systems was unable to be performed secondary to intubation.  ---------------------------------------------------------------------------------------  OBJECTIVE    Vitals   Vitals:    24 0815 24 0830 24 0845 24 0900   BP: 97/53 112/59 99/51 97/52   Pulse: (!) 126 (!) 126 (!) 124 (!) 132   Resp: 16 15 16 16   Temp:       TempSrc:       SpO2: 93% 93% 92% 92%   Weight:       Height:         Temp (24hrs), Av.5 °F (36.9 °C), Min:97.5 °F (36.4 °C), Max:99.8 °F (37.7 °C)  Current: Temperature: 98.3 °F (36.8 °C)          Respiratory:  SpO2: SpO2: 92 %, SpO2 Activity: SpO2 Activity: At Rest, SpO2 Device: O2 Device: Ventilator, Capnography:    Nasal Cannula O2 Flow Rate (L/min): 4 L/min    Invasive/non-invasive ventilation settings   Respiratory      Lab Data (Last 4 hours)      None           O2/Vent Data (Last 4 hours)         0755  08        Patient safety screen outcome: Passed       RSBI  394                      Physical Exam  Vitals and nursing note reviewed.   Constitutional:       General: She is not in acute distress.     Appearance: She is ill-appearing. She is not toxic-appearing or diaphoretic.   HENT:      Head: Normocephalic and atraumatic.   Eyes:      General: No scleral icterus.        Right eye: No discharge.         Left eye: No discharge.      Conjunctiva/sclera: Conjunctivae normal.   Cardiovascular:      Rate and Rhythm: Regular rhythm. Tachycardia present.      Pulses: Normal pulses.      Heart sounds: Normal heart sounds. No murmur heard.     No friction rub. No gallop.   Pulmonary:      Breath sounds: Rales present.      Comments: Intubated. Diminished breath sounds on R and at L lateral base. Rales on R. Auscultation limited by patient positioning.   Abdominal:      General: There is no distension.      Palpations: Abdomen is soft.       Tenderness: There is no abdominal tenderness. There is no guarding or rebound.      Comments: Decreased bowel sounds   Musculoskeletal:         General: Swelling present.      Right lower leg: Edema present.      Left lower leg: Edema present.      Comments: 3+ pitting edema in all four extremities   Skin:     General: Skin is warm and dry.      Coloration: Skin is not jaundiced or pale.   Neurological:      Mental Status: She is alert.      Comments: RASS -1 to 0             Laboratory and Diagnostics:  Results from last 7 days   Lab Units 04/11/24  0524 04/10/24  1053 04/09/24  0558 04/09/24  0148 04/08/24  0545 04/07/24  0607 04/06/24  1113 04/06/24  0453   WBC Thousand/uL 4.45 6.30 6.51  --  6.87 8.55 7.18 6.59   HEMOGLOBIN g/dL 7.0* 7.5* 7.6* 8.1* 7.6* 8.3* 10.0* 9.3*   HEMATOCRIT % 25.1* 26.5* 26.2* 27.8* 26.2* 28.0* 36.1 32.3*   PLATELETS Thousands/uL 179 193 206  --  203 234 261 272   SEGS PCT % 74  --   --   --   --   --   --  72   BANDS PCT %  --  23*  --  23*  --   --   --   --    MONO PCT % 6 5  --  8  --   --  8 14*   EOS PCT % 0 0  --   --   --   --  0 0     Results from last 7 days   Lab Units 04/11/24  0524 04/10/24  0427 04/09/24  0758 04/08/24  1854 04/08/24  0545 04/07/24  0607 04/06/24  0453   SODIUM mmol/L 147 147 149* 148* 147 147 144   POTASSIUM mmol/L 4.6 3.4* 4.6 3.7 3.9 4.4 4.2   CHLORIDE mmol/L 105 102 103 103 103 101 96   CO2 mmol/L 38* 36* 34* 32 34* 30 36*   ANION GAP mmol/L 4 9 12 13 10 16* 12   BUN mg/dL 26* 26* 24 22 21 24 19   CREATININE mg/dL 0.53* 0.54* 0.52* 0.50* 0.43* 0.47* 0.44*   CALCIUM mg/dL 8.5 8.4 7.7* 8.3* 8.0* 8.6 8.9   GLUCOSE RANDOM mg/dL 189* 194* 124 117 108 108 117   ALT U/L 22  --   --   --   --   --  26   AST U/L 94*  --   --   --   --   --  178*   ALK PHOS U/L 107*  --   --   --   --   --  187*   ALBUMIN g/dL 2.6*  --   --   --   --   --  3.2*   TOTAL BILIRUBIN mg/dL 0.45  --   --   --   --   --  0.48     Results from last 7 days   Lab Units 04/11/24  3332  04/10/24  0427 04/08/24  1854 04/06/24  0453   MAGNESIUM mg/dL 2.0 2.1 1.9 1.9   PHOSPHORUS mg/dL 2.7 2.3* 2.3*  --       Results from last 7 days   Lab Units 04/06/24  1113 04/06/24  0626   INR  1.30* 1.17   PTT seconds  --  20*          Results from last 7 days   Lab Units 04/07/24  1346 04/07/24  1050   LACTIC ACID mmol/L 2.1* 2.4*     ABG:  Results from last 7 days   Lab Units 04/08/24  0440   PH ART  7.495*   PCO2 ART mm Hg 39.3   PO2 ART mm Hg 88.8   HCO3 ART mmol/L 29.6*   BASE EXC ART mmol/L 5.9   ABG SOURCE  Radial, Right     VBG:  Results from last 7 days   Lab Units 04/11/24  0524 04/09/24  0520 04/08/24  0440   PH ALEXANDRE  7.370   < >  --    PCO2 ALEXANDRE mm Hg 59.7*   < >  --    PO2 ALEXANDRE mm Hg 63.4*   < >  --    HCO3 ALEXANDRE mmol/L 33.7*   < >  --    BASE EXC ALEXANDRE mmol/L 7.4   < >  --    ABG SOURCE   --   --  Radial, Right    < > = values in this interval not displayed.     Results from last 7 days   Lab Units 04/09/24  1250   PROCALCITONIN ng/ml 2.23*       Micro  Results from last 7 days   Lab Units 04/09/24  1253 04/09/24  1249 04/09/24  0840   BLOOD CULTURE   --   --  No Growth at 24 hrs.   SPUTUM CULTURE  4+ Growth of Staphylococcus aureus*  4+ Growth of  --   --    GRAM STAIN RESULT  No Epithelial cells per low power field*  Rare Polys*  3+ Gram positive cocci in pairs, chains and clusters*  1+ Gram positive rods*  Rare Gram negative rods* Gram positive cocci in clusters*  --        EKG: sinus tachy 120s-130s  Imaging: I have personally reviewed pertinent reports.   and I have personally reviewed pertinent films in PACS    Intake and Output  I/O         04/09 0701  04/10 0700 04/10 0701 04/11 0700 04/11 0701 04/12 0700    I.V. (mL/kg) 716.3 (11.4) 486.2 (7.7) 117.8 (1.9)    NG/ 1210 175    IV Piggyback 500 1250     Feedings 234 1082     Total Intake(mL/kg) 1961.3 (31.1) 4028.2 (63.9) 292.8 (4.6)    Urine (mL/kg/hr) 1520 (1) 605 (0.4) 100 (0.7)    Emesis/NG output 425      Stool 0 0     Total Output  "1945 605 100    Net +16.3 +3423.2 +192.8           Unmeasured Stool Occurrence 1 x 2 x             Height and Weights   Height: 5' 5\" (165.1 cm)  IBW (Ideal Body Weight): 57 kg  Body mass index is 23.11 kg/m².  Weight (last 2 days)       Date/Time Weight    04/11/24 0600 63 (138.89)    04/10/24 0600 63.1 (139.11)    04/09/24 0541 63 (138.89)              Nutrition       Diet Orders   (From admission, onward)                 Start     Ordered    04/09/24 0904  Diet Enteral/Parenteral; Tube Feeding No Oral Diet; Jevity 1.5; Continuous; 40; 200; Water; Every 4 hours  Diet effective now        References:    Adult Nutrition Support Algorithm    RD Therapeutic Diet Order Protocol   Question Answer Comment   Diet Type Enteral/Parenteral    Enteral/Parenteral Tube Feeding No Oral Diet    Tube Feeding Formula: Jevity 1.5    Bolus/Cyclic/Continuous Continuous    Tube Feeding Goal Rate (mL/hr): 40    Tube Feeding flush (mL): 200    Water Flush type: Water    Water flush frequency: Every 4 hours    RD to adjust diet per protocol? Yes        04/09/24 0905                      Active Medications  Scheduled Meds:  Current Facility-Administered Medications   Medication Dose Route Frequency Provider Last Rate    acetaminophen  650 mg Oral Q6H PRN JANNETH Paz      Albumin 25%  12.5 g Intravenous BID (diuretic) Dennis Knowles MD Stopped (04/11/24 0902)    chlorhexidine  15 mL Mouth/Throat Q12H [facility] JANNETH Paz      fentaNYL  12.5 mcg Intravenous Q2H PRN Dennis Knowles MD      fentaNYL  12.5 mcg Intravenous Q3H PRN Dennis Knowles MD      furosemide  40 mg Intravenous BID (diuretic) Dennis Knowles MD      heparin (porcine)  5,000 Units Subcutaneous Q8H [facility] JANNETH Paz      ipratropium-albuterol  3 mL Nebulization Q4H PRN Max 6/day JANNETH Paz      lidocaine  2 patch Topical Daily JANNETH Paz      midodrine  10 mg Oral TID AC Dennis Knowles MD      norepinephrine  1-30 mcg/min " "Intravenous Titrated JANNETH Paz 9 mcg/min (04/11/24 0730)    oxyCODONE  15 mg Oral Q6H Dennis Knowles MD      polyethylene glycol  17 g Oral Daily JANNETH Ureña      senna  8.8 mg Oral HS JANNETH Ureña      vancomycin  1,250 mg Intravenous Q12H Dennis Knowles MD Stopped (04/10/24 2200)     Continuous Infusions:  norepinephrine, 1-30 mcg/min, Last Rate: 9 mcg/min (04/11/24 0730)      PRN Meds:   acetaminophen, 650 mg, Q6H PRN  fentaNYL, 12.5 mcg, Q2H PRN  fentaNYL, 12.5 mcg, Q3H PRN  ipratropium-albuterol, 3 mL, Q4H PRN Max 6/day        Invasive Devices Review  Invasive Devices       Peripheral Intravenous Line  Duration             Peripheral IV 04/08/24 Left;Upper;Ventral (anterior) Arm 3 days    Peripheral IV 04/11/24 Dorsal (posterior);Right Forearm <1 day              Drain  Duration             Pleural Effusion Long-Term Catheter 16 Fr. 77 days    NG/OG/Enteral Tube Orogastric 16 Fr Center mouth 4 days    Urethral Catheter Latex 14 Fr. 4 days              Airway  Duration             ETT  Cuffed 4 days                    Rationale for remaining devices: PIVs for access while admitted. ETT for acute respiratory failure. OGT while intubated for TF. [place] while intubated and sedated. Pleurex catheter chronic, no plans for removal.   ---------------------------------------------------------------------------------------  Advance Directive and Living Will:      Power of :    POLST:    ---------------------------------------------------------------------------------------      Dennis Knowles MD      Portions of the record may have been created with voice recognition software.  Occasional wrong word or \"sound a like\" substitutions may have occurred due to the inherent limitations of voice recognition software.  Read the chart carefully and recognize, using context, where substitutions have occurred     "

## 2024-04-11 NOTE — CONSULTS
Brief Ethics assessment:    Consult has been placed to have Ethics provide guidance in provision of advanced cares in severe and progressive cancer disease.  We note that there is a pending family meeting tomorrow, 4/12/24, which we highly recommend should continue as planned.  Prior to this meeting, we have the following suggestions:    - the care team should carefully and compassionately identify any terminal illness to the family.  Documentation in Epic of the specific terminal diagnosis, and education of same to family, is of great legal importance.  (If no terminal illness is felt to exist, please disregard this advice.)   = A terminal illness is one for which the patient is not expected to live 6mos, with or without application of advanced cares   = A terminal illness is certainly one for which hospice has been, or would be, considered.    - Having identified to the family the terminal illness(es), care should be taken to offer empathy, compassion, and education on the diagnosis.  The family may need time to accept the diagnosis, and rally support.    - Having granted family time, support, and education on terminality, physicians may agree on treatments that will be withheld when burdens outweigh benefits.  For example, Dr. Villavicencio of Med[facility]Onc has already noted in consultation that chemo and immunotherapies are not indicated, would not provide benefit, and will not be offered.  There should be broad agreement amongst providers on non-provision of treatments if they are to be withheld.  Withheld treatments must be specific, (such as withholding norepi in a setting of CKD5 and shock, but not a blanket statement about withholding ALL pressors)    - SPECIAL NOTE - Family are the only persons legally entitled to change code status.   - Family are entitled to second opinion at their request.    Kayden Perkins MD  Co-chair - Bioethics  You can find me on [employer]!     This note was not shared with the patient  due to privacy exception: note includes other individuals

## 2024-04-11 NOTE — ASSESSMENT & PLAN NOTE
Metastatic breast cancer. Hormone receptor positive and HER2 negative diagnosed 2013. Prior history of neoadjuvant chemotherapy followed by mastectomy. She is s/p Hysterectomy and oophorectomy.   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 Completed radiation at [facility].     Recent CT-scan showing extensive right thoracic pleural carcinomatosis, extensive associated mass effect.   GOC conversations are ongoing with family  Palliative care on board, appreciate recommendations  Oncology on board, pt is not a candidate for palliative chemo and hospice care encouraged  CM on board, appreciate assistance coordinating family meeting

## 2024-04-11 NOTE — ASSESSMENT & PLAN NOTE
Tmax 100.8  Persistent sinus tachycardia  Persistent hypotension requiring low-dose Levo  CT chest findings included new opacities in the GURMEET, edema vs infection  No leukocytosis  Procal elevated 2.23    Bcx and SpCx collected 4/9, now growing Staph aureus, susceptibilities pending    Suspect primary pulmonary source of  bacteremia    Continue vanc (D3)  D/C cefepime  F/u susceptibilities  Monitor fever and WBC curves

## 2024-04-12 NOTE — ASSESSMENT & PLAN NOTE
Metastatic breast cancer. Hormone receptor positive and HER2 negative diagnosed 2013. Prior history of neoadjuvant chemotherapy followed by mastectomy. She is s/p Hysterectomy and oophorectomy.   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 Completed radiation at [facility].     Recent CT-scan showing extensive right thoracic pleural carcinomatosis, extensive associated mass effect.   GOC conversations are ongoing with family  Palliative care on board, appreciate recommendations  Oncology on board, pt is not a candidate for palliative chemo and hospice care encouraged  CM on board, appreciate assistance coordinating family meeting  Family meeting scheduled 4/12/24 13:00

## 2024-04-12 NOTE — ASSESSMENT & PLAN NOTE
2/2 cancer related pain and chronic back pain  PDMP reviewed.  Patient has Rx for Oxycodone   Restarted oxycodone  Continue fentanyl prns  Fent gtt off  Will monitor pain control and sedation and adjust regimen as needed  Bowel regimen with senna and miralax  Monitor for constipation--BM x2 yesterday

## 2024-04-12 NOTE — ASSESSMENT & PLAN NOTE
Per ICU team, despite maximal vent support and other appropriate interventions, the patient continues to decline and respiratory status today appears worse  Per ICU, patient is now with end stage ventilation-dependent respiratory failure from extensive thoracic pleural carcinomatosis with complete opacification of the R hemithorax secondary to a combination of atelectasis and effusion and moderate L pleural effusion with subjacent compressive atelectasis. GGO of GURMEET and lingula and likely will die today/next few hours

## 2024-04-12 NOTE — ACP (ADVANCE CARE PLANNING)
I had a prolonged family meeting to discuss advance care planning with the family this morning, my resident Dr. Holden Fuchs helped in interpretation.  The meeting included the , the daughter, the son and another family member.  The daughter understands English but still very used interpretation.  Also on the medical side my 2 residents with me.  I explained to the family in details that the patient condition is deteriorating quickly especially today, she had advanced metastatic cancer that is taking over her whole right lung and there is no further treatment as per oncology even if she gets better which is not expected given her current situation.  Today her respiratory status progressed quickly and she required bagging for prolonged periods of time intermittently and in between even she did not have good saturation on mechanical ventilation.  I was honest and clear with the family that the patient is actively dying and there is no further intervention can be offered and regardless of her respiratory status her cancer is not treatable at all.  There is no further interventions to optimize her respiratory status at this point.  I offered him to consider withdrawal of care/comfort measures but they declined, they wanted to wait to discuss with other family members especially her sister who is visiting from [facility], initially was arriving today but now will be on Monday.  After the whole discussion we decided to continue with current management, will continue mechanical ventilation support but we are currently very limited and most likely she will continue to have desaturation regardless of maximal support.    They verbalized understanding and I answered all their questions to satisfaction.    We still have a formal meeting scheduled for 1:00 this afternoon and palliative care will be here.    Added critical ACP time 20 minutes.

## 2024-04-12 NOTE — ASSESSMENT & PLAN NOTE
"2/2 chronic effusion and invasive carcinomatosis.  4/6 CTA chest: negative PE.  \"Re-demonstration of extensive thoracic pleural carcinomatosis with complete opacification of the R hemithorax.  Findings are also secondary to a combination of atelectasis and effusion and worsened since prior.  Moderate L pleural effusion with subjacent compressive atelectasis. GGO of GURMEET and lingula.  Extensive osseous metastatic disease with multilevel compression deformities\"  4/6 Patient intubated 2/2 cardiac arrest, likely in the setting of arhythmia precipitated by hypoxia  Vent: CMV 16/300/6/50  Daily SAT/SBT  Ascept cath in place R pleura-- no drainage.   To consider thoracentesis pending further GOC conversation with family. POCUS shows narrow but accessible pocket of pleural fluid. Risks and benefits have been discussed with family.  Started empiric abx with vanc/cefepime on 4/9 for suspected PNA  Sputum Cx growing MSSA. Narrowed to cefazolin 4/11/24. Day 4 of appropriate abx coverage  Repeat CXR 4/12 shows increased opacities in the L lung, suspect worsening volume overload, given Day 4 abx coverage and volume overload on exam, +8 L this admission on I/Os  "

## 2024-04-12 NOTE — PROGRESS NOTES
"[facility]  Progress Note  Name: Shannan Vee I  MRN: [id number]  Unit/Bed#: ICU 05 I Date of Admission: 4/6/2024   Date of Service: 4/12/2024 I Hospital Day: 6    Assessment/Plan   * Acute on chronic respiratory failure (HCC)  Assessment & Plan  2/2 chronic effusion and invasive carcinomatosis.  4/6 CTA chest: negative PE.  \"Re-demonstration of extensive thoracic pleural carcinomatosis with complete opacification of the R hemithorax.  Findings are also secondary to a combination of atelectasis and effusion and worsened since prior.  Moderate L pleural effusion with subjacent compressive atelectasis. GGO of GURMEET and lingula.  Extensive osseous metastatic disease with multilevel compression deformities\"  4/6 Patient intubated 2/2 cardiac arrest, likely in the setting of arhythmia precipitated by hypoxia  Vent: CMV 16/300/6/50  Daily SAT/SBT  Ascept cath in place R pleura-- no drainage.   To consider thoracentesis pending further GOC conversation with family. POCUS shows narrow but accessible pocket of pleural fluid. Risks and benefits have been discussed with family.  Started empiric abx with vanc/cefepime on 4/9 for suspected PNA  Sputum Cx growing MSSA. Narrowed to cefazolin 4/11/24. Day 4 of appropriate abx coverage  Repeat CXR 4/12 shows increased opacities in the L lung, suspect worsening volume overload, given Day 4 abx coverage and volume overload on exam, +8 L this admission on I/Os    Staphylococcus aureus bacteremia  Assessment & Plan  Tmax 100.8 this admission, Tmax 100.1 over 24h  Persistent sinus tachycardia. Developed afib w rvr in the setting of hypoxia on morning of 4/12  Persistent hypotension requiring low-dose Levo  CT chest findings included new opacities in the GURMEET, edema vs infection  No leukocytosis  Procal elevated 2.23    Bcx and SpCx collected 4/9, now growing MSSA  Repeat Bcx collected 4/11    Suspect primary pulmonary source of  bacteremia    Continue cefazolin " (D4 abx)  D/C'd vanc, cefepime  Monitor fever and WBC curves  F/u repeat cultures  F/u TTE read for vegetations. Will need eventual DAREK if TTE negative and pt remains medically directed in GOC    [facility] ([facility])  Assessment & Plan  In the setting of sepsis d/t Staph bacteremia and PNA  Started on levophed 4/6, weaned off 4/8, but had to be restarted o/n 4/9.  Titrate for goal MAP > 65 mmHg  Holding home Toprol XL in the setting of hypotension  Echo shows LVEF 65%, normal RV systolic function, some evidence of RV diastolic collapse in apical region 2/2 pericardial effusion. No definite evidence of cardiac tamponade.  New fever overnight 4/9 to T max of 100.8. Tmax 100.1 o/n 4/10.  Procal elevated 2.23  Sputum culture from tracheal aspirate preliminarily shows 4+ Staph aureus  Bcx x2: 1 of 2 growing Staph aureus  MRSA nares cx in process    Cefazolin for MSSA, day 4 of appropriate abx coverage  F/u susceptibilities  Monitor WBC and fever curve  Weaning Levo as able--down to 1 mcg/min morning of 4/12    Staph aureus pneumonia  Assessment & Plan  GURMEET opacity, new this admission, in addition to other findings noted on CT chest  SpCx positive for Staph aureus  Bcx positive for Staph aureus    Continue cefazolin (D4 abx)  Remains intubated, with poor performance on daily SBT  Repeated CXR morning of 4/12 appears to show increased opacities of the L lung, suspect due to increased pulmonary edema vs less likely worsening PNA    Anasarca  Assessment & Plan  3+ pitting edema in all 4 extremities  +8.7 liters this admission    Continue Lasix 40 mg IV BID   Pretreat w 25% alvumin  Monitor UOP, volume status, renal indices, lytes while diuresing     Chronic, continuous use of opioids  Assessment & Plan  2/2 cancer related pain and chronic back pain  PDMP reviewed.  Patient has Rx for Oxycodone   Restarted oxycodone  Continue fentanyl prns  Fent gtt off  Will monitor pain control and sedation and adjust regimen as needed  Bowel  regimen with senna and miralax  Monitor for constipation--BM x2 yesterday    Cardiac arrest (HCC)  Assessment & Plan  4/6/24 In-hospital witnessed cardiac arrest. Patient was ambulating from toilet back to bed with nursing staff and became unresponsive. ACLS with 1 round epinephrine/CPR and ROSC was obtained. Suspect PEA 2/2 hypoxia.  Echo 4/7: Mild concentric left ventricular hypertrophy, normal left ventricular systolic function, normal diastolic function.  EF 65%. Normal right ventricular size, normal right ventricular systolic function.  Some evidence of right ventricular diastolic collapse involving the apical region secondary to pericardial effusion  Holding home Toprol XL in the setting of hypotension  Noted to be in new Afib on 4/6, received digoxin on 4/6 and 4/7, now in sinus tachycardia  Maintain MAP > 65 mmHg  Continue telemetry  Avoid further hypoxia  Dig level supra-therapeutic 4/8. Repeat dig level within therapeutic range 4/9.   Continue to monitor tele while in ICU  Went into Afib w RVR after devloping hypoxia on morning of 4/12, ventricular rate 160s-190s . Amiodarone bolus 150 mg with improvement in rates 110s-130s.     Severe protein-calorie malnutrition (HCC)  Assessment & Plan  Malnutrition Findings:   Adult Malnutrition type: Chronic illness  Adult Degree of Malnutrition: Other severe protein calorie malnutrition  Malnutrition Characteristics: Fat loss, Muscle loss, Inadequate energy, Weight loss                  360 Statement: Chronic severe malnutrition related to catabolic illness, poor oral intake as evidenced by < 75% estimated energy intake > 1 mo, severe muscle loss to temporalis muscles, severe fat loss to buccal pads, 17.8% weight loss x ~7 mo (9/20/23 68kg, 4/6/24 55.9kg). Treated with: see RD progress note for TF recommendations. Recommend daily weights for nutrition monitoring.    BMI Findings:           Body mass index is 23.33 kg/m².     During GOC conversation, discussed with  family that we would not recommend placement of NGT or PEG tube, as these would increase pt discomfort without significantly improving prognosis.  Continue Tube feeds while OGT in place with ETT    Pleural effusion  Assessment & Plan  CTA ED chest PE Study 4/6/2024:   No definite evidence for pulmonary embolism. Redemonstration of extensive thoracic pleural carcinomatosis with complete opacification of the right hemithorax. Findings are also secondary to a combination of atelectasis and effusion and worsened since the prior exam. Right-sided chest tube in place. Moderate left pleural effusion with subjacent compressive atelectasis, worsened. Groundglass opacities within the left upper lobe and lingula are new and may be on the basis of infection or edema. Extensive osseous metastatic disease with multilevel compression deformities are stable.    Recently evaluated by IR and their impression is that patient has extensive tumor of the right lung with some encasing the catheter. IR did not believe that further manipulation of the catheter would provide any clinical benefit.     Moderate pleural effusion on left, to consider palliative thoracentesis if desired by family; have discussed risks and benefits and conversation is ongoing.    Recurrent malignant neoplasm of breast (HCC)  Assessment & Plan  Metastatic breast cancer. Hormone receptor positive and HER2 negative diagnosed 2013. Prior history of neoadjuvant chemotherapy followed by mastectomy. She is s/p Hysterectomy and oophorectomy.   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 Completed radiation at [facility].     Recent CT-scan showing extensive right thoracic pleural carcinomatosis, extensive associated mass effect.   C conversations are ongoing with family  Palliative care on board, appreciate recommendations  Oncology on board, pt is not a candidate for palliative chemo and hospice care encouraged  JAUN  on board, appreciate assistance coordinating family meeting  Family meeting scheduled 24 13:00       ----------------------------------------------------------------------------------------  HPI/24hr events: Last night, transfused 1 U pRBC, with improvement of BP and decreased pressor requirement. Failed wean this AM with marked desat after about 1 minute, remained hypoxic, bagged up to 90s and then started on pressure control, however developed Afib w RVR w ventricular rate 160s-190s. Amiodarone bolus given followed by drip. Rate improved to 120s-130s.    Patient appropriate for transfer out of the ICU today?: No  Disposition: Continue Critical Care   Code Status: Level 2 - DNAR: but accepts endotracheal intubation  ---------------------------------------------------------------------------------------  SUBJECTIVE  This morning, Ms. Vee is seen fully alert, intubated, responding appropriately to questions by nodding and shaking her head. Denies discomfort but endorses that she agrees with plan for increased sedation to help better tolerate vent, given high peak pressures following wean this morning.    Review of Systems  Review of systems was unable to be performed secondary to intubation  ---------------------------------------------------------------------------------------  OBJECTIVE    Vitals   Vitals:    24 0738 24 0746 24 0802 24 0835   BP:    137/87   BP Location:       Pulse:    (!) 166   Resp:    (!) 34   Temp:       TempSrc:       SpO2: 94% (!) 86% 96% (!) 83%   Weight:       Height:         Temp (24hrs), Av.8 °F (37.1 °C), Min:98.2 °F (36.8 °C), Max:100.1 °F (37.8 °C)  Current: Temperature: 98.4 °F (36.9 °C)          Respiratory:  SpO2: SpO2: (!) 83 %, SpO2 Activity: SpO2 Activity: At Rest, SpO2 Device: O2 Device: Ventilator, Capnography:    Nasal Cannula O2 Flow Rate (L/min): 4 L/min    Invasive/non-invasive ventilation settings   Respiratory      Lab Data (Last 4  hours)      None           O2/Vent Data (Last 4 hours)        04/12 0738          Patient safety screen outcome: Passed       RSBI 320                       Physical Exam  Vitals and nursing note reviewed.   Constitutional:       General: She is in acute distress.      Appearance: She is ill-appearing. She is not toxic-appearing or diaphoretic.   HENT:      Head: Normocephalic and atraumatic.   Eyes:      General: No scleral icterus.        Right eye: No discharge.         Left eye: No discharge.      Conjunctiva/sclera: Conjunctivae normal.   Cardiovascular:      Rate and Rhythm: Tachycardia present. Rhythm irregular.      Pulses: Normal pulses.      Heart sounds: Normal heart sounds. No murmur heard.     No friction rub. No gallop.   Pulmonary:      Comments: Markedly diminished breath sounds on R. Very coarse upper airway breath sounds when auscultation left chest. Tachypnea.  Abdominal:      General: There is no distension.      Palpations: Abdomen is soft.      Tenderness: There is no abdominal tenderness. There is no guarding or rebound.      Comments: Decreased breath sounds   Musculoskeletal:         General: Swelling (4+ pitting edema in all extremities) present.      Right lower leg: Edema present.      Left lower leg: Edema present.   Skin:     General: Skin is warm and dry.   Neurological:      Mental Status: She is alert.      Comments: RASS 0 to +2         Laboratory and Diagnostics:  Results from last 7 days   Lab Units 04/12/24  0430 04/11/24  0524 04/10/24  1053 04/09/24  0558 04/09/24  0148 04/08/24  0545 04/07/24  0607 04/06/24  1113 04/06/24  0453   WBC Thousand/uL 8.18 4.45 6.30 6.51  --  6.87 8.55 7.18 6.59   HEMOGLOBIN g/dL 8.4* 7.0* 7.5* 7.6* 8.1* 7.6* 8.3* 10.0* 9.3*   HEMATOCRIT % 28.2* 25.1* 26.5* 26.2* 27.8* 26.2* 28.0* 36.1 32.3*   PLATELETS Thousands/uL 158 179 193 206  --  203 234 261 272   SEGS PCT % 71 74  --   --   --   --   --   --  72   BANDS PCT %  --   --  23*  --  23*  --   --    --   --    MONO PCT % 7 6 5  --  8  --   --  8 14*   EOS PCT % 1 0 0  --   --   --   --  0 0     Results from last 7 days   Lab Units 04/12/24  0430 04/11/24  0524 04/10/24  0427 04/09/24  0758 04/08/24  1854 04/08/24  0545 04/07/24  0607 04/06/24  0453   SODIUM mmol/L 145 147 147 149* 148* 147 147 144   POTASSIUM mmol/L 4.3 4.6 3.4* 4.6 3.7 3.9 4.4 4.2   CHLORIDE mmol/L 102 105 102 103 103 103 101 96   CO2 mmol/L 38* 38* 36* 34* 32 34* 30 36*   ANION GAP mmol/L 5 4 9 12 13 10 16* 12   BUN mg/dL 24 26* 26* 24 22 21 24 19   CREATININE mg/dL 0.61 0.53* 0.54* 0.52* 0.50* 0.43* 0.47* 0.44*   CALCIUM mg/dL 8.8 8.5 8.4 7.7* 8.3* 8.0* 8.6 8.9   GLUCOSE RANDOM mg/dL 162* 189* 194* 124 117 108 108 117   ALT U/L  --  22  --   --   --   --   --  26   AST U/L  --  94*  --   --   --   --   --  178*   ALK PHOS U/L  --  107*  --   --   --   --   --  187*   ALBUMIN g/dL  --  2.6*  --   --   --   --   --  3.2*   TOTAL BILIRUBIN mg/dL  --  0.45  --   --   --   --   --  0.48     Results from last 7 days   Lab Units 04/12/24  0430 04/11/24  0524 04/10/24  0427 04/08/24  1854 04/06/24  0453   MAGNESIUM mg/dL 1.9 2.0 2.1 1.9 1.9   PHOSPHORUS mg/dL 2.4* 2.7 2.3* 2.3*  --       Results from last 7 days   Lab Units 04/06/24  1113 04/06/24  0626   INR  1.30* 1.17   PTT seconds  --  20*          Results from last 7 days   Lab Units 04/07/24  1346 04/07/24  1050   LACTIC ACID mmol/L 2.1* 2.4*     ABG:  Results from last 7 days   Lab Units 04/08/24  0440   PH ART  7.495*   PCO2 ART mm Hg 39.3   PO2 ART mm Hg 88.8   HCO3 ART mmol/L 29.6*   BASE EXC ART mmol/L 5.9   ABG SOURCE  Radial, Right     VBG:  Results from last 7 days   Lab Units 04/12/24  0430 04/09/24  0520 04/08/24  0440   PH ALEXANDRE  7.360   < >  --    PCO2 ALEXANDRE mm Hg 49.7   < >  --    PO2 ALEXANDRE mm Hg 71.8*   < >  --    HCO3 ALEXANDRE mmol/L 27.4   < >  --    BASE EXC ALEXANDRE mmol/L 1.6   < >  --    ABG SOURCE   --   --  Radial, Right    < > = values in this interval not displayed.     Results from  "last 7 days   Lab Units 04/09/24  1250   PROCALCITONIN ng/ml 2.23*       Micro  Results from last 7 days   Lab Units 04/11/24  1251 04/09/24  1954 04/09/24  1253 04/09/24  1249 04/09/24  0840   BLOOD CULTURE  Received in Microbiology Lab. Culture in Progress.  Received in Microbiology Lab. Culture in Progress.  --   --  No Growth at 48 hrs. No Growth at 48 hrs.   SPUTUM CULTURE   --   --  4+ Growth of Staphylococcus aureus*  4+ Growth of  --   --    GRAM STAIN RESULT   --   --  No Epithelial cells per low power field*  Rare Polys*  3+ Gram positive cocci in pairs, chains and clusters*  1+ Gram positive rods*  Rare Gram negative rods* Gram positive cocci in clusters*  --    MRSA CULTURE ONLY   --  Culture results to follow.  --   --   --        EKG: Afib w RVR 120s-130s  Imaging: I have personally reviewed pertinent reports.   and I have personally reviewed pertinent films in PACS    Intake and Output  I/O         04/10 0701 04/11 0700 04/11 0701 04/12 0700 04/12 0701 04/13 0700    P.O.  0     I.V. (mL/kg) 486.2 (7.7) 786.6 (12.4)     Blood  350     NG/GT 1210 1145     IV Piggyback 1250 50     Feedings 1082 914     Total Intake(mL/kg) 4028.2 (63.9) 3245.6 (51)     Urine (mL/kg/hr) 605 (0.4) 1210 (0.8)     Emesis/NG output       Stool 0      Total Output 605 1210     Net +3423.2 +2035.6            Unmeasured Stool Occurrence 2 x              Height and Weights   Height: 5' 5\" (165.1 cm)  IBW (Ideal Body Weight): 57 kg  Body mass index is 23.33 kg/m².  Weight (last 2 days)       Date/Time Weight    04/12/24 0532 63.6 (140.21)    04/11/24 1615 62.6 (138)    04/11/24 0600 63 (138.89)    04/10/24 0600 63.1 (139.11)              Nutrition       Diet Orders   (From admission, onward)                 Start     Ordered    04/09/24 0904  Diet Enteral/Parenteral; Tube Feeding No Oral Diet; Jevity 1.5; Continuous; 40; 200; Water; Every 4 hours  Diet effective now        References:    Adult Nutrition Support Algorithm   "  RD Therapeutic Diet Order Protocol   Question Answer Comment   Diet Type Enteral/Parenteral    Enteral/Parenteral Tube Feeding No Oral Diet    Tube Feeding Formula: Jevity 1.5    Bolus/Cyclic/Continuous Continuous    Tube Feeding Goal Rate (mL/hr): 40    Tube Feeding flush (mL): 200    Water Flush type: Water    Water flush frequency: Every 4 hours    RD to adjust diet per protocol? Yes        04/09/24 0905                      Active Medications  Scheduled Meds:  Current Facility-Administered Medications   Medication Dose Route Frequency Provider Last Rate    acetaminophen  650 mg Oral Q6H PRN JANNETH Paz      Albumin 25%  12.5 g Intravenous BID (diuretic) Dennis Knowles MD 0 g (04/11/24 0902)    amiodarone (CORDARONE) 900 mg in dextrose 5 % 500 mL infusion  1 mg/min Intravenous Continuous Dennis Knowles MD      Followed by    amiodarone (CORDARONE) 900 mg in dextrose 5 % 500 mL infusion  0.5 mg/min Intravenous Continuous Dennis Knowles MD      cefazolin  2,000 mg Intravenous Q8H Dennis Knowles MD 2,000 mg (04/12/24 0343)    chlorhexidine  15 mL Mouth/Throat Q12H [facility] JANNETH Paz      fentaNYL  12.5 mcg Intravenous Q2H PRN Dennis Knowles MD      fentaNYL  12.5 mcg Intravenous Q3H PRN Dennis Knowles MD      furosemide  40 mg Intravenous BID (diuretic) Dennis Knowles MD      heparin (porcine)  5,000 Units Subcutaneous Q8H [facility] JANNETH Paz      ipratropium-albuterol  3 mL Nebulization Q4H PRN Max 6/day JANNETH Paz      lidocaine  2 patch Topical Daily JANNETH Paz      midodrine  10 mg Oral Q8H Dennis Knowles MD      norepinephrine  1-30 mcg/min Intravenous Titrated JANNETH Paz Stopped (04/12/24 0840)    oxyCODONE  10 mg Oral Q6H Dennis Knowles MD      polyethylene glycol  17 g Oral Daily JANNETH Ureña      potassium-sodium phosphateS  2 tablet Oral Q4H Dennis Knowles MD      senna  8.8 mg Oral HS JANNETH Ureña       Continuous  "Infusions:  amiodarone (CORDARONE) 900 mg in dextrose 5 % 500 mL infusion, 1 mg/min   Followed by  amiodarone (CORDARONE) 900 mg in dextrose 5 % 500 mL infusion, 0.5 mg/min  norepinephrine, 1-30 mcg/min, Last Rate: Stopped (04/12/24 0840)      PRN Meds:   acetaminophen, 650 mg, Q6H PRN  fentaNYL, 12.5 mcg, Q2H PRN  fentaNYL, 12.5 mcg, Q3H PRN  ipratropium-albuterol, 3 mL, Q4H PRN Max 6/day        Invasive Devices Review  Invasive Devices       Peripheral Intravenous Line  Duration             Peripheral IV 04/08/24 Left;Upper;Ventral (anterior) Arm 4 days    Peripheral IV 04/11/24 Dorsal (posterior);Right Forearm 1 day              Drain  Duration             Pleural Effusion Long-Term Catheter 16 Fr. 78 days    NG/OG/Enteral Tube Orogastric 16 Fr Center mouth 5 days    Urethral Catheter Latex 14 Fr. 5 days              Airway  Duration             ETT  Cuffed 5 days                    Rationale for remaining devices: PIVs for access while admitted. ETT for acute respiratory failure. OGT while intubated for TF. [place] while intubated and sedated. Pleurex catheter chronic, no plans for removal.   ---------------------------------------------------------------------------------------  Advance Directive and Living Will:      Power of :    POLST:    ---------------------------------------------------------------------------------------      Dennis Knowles MD      Portions of the record may have been created with voice recognition software.  Occasional wrong word or \"sound a like\" substitutions may have occurred due to the inherent limitations of voice recognition software.  Read the chart carefully and recognize, using context, where substitutions have occurred      "

## 2024-04-12 NOTE — SOCIAL WORK
Palliative LSW saw the patient at the bedside today. LSW appreciates the opportunity to provider patient and her family with inpatient emotional support and guidance while patient continues to receive medical attention from the medical team     Topics discussed: LSW joined the medical team, and patient's family, to review the patient's current medical status, answer all questions and concerns, and discuss next steps. All meeting details were translated in Nauruan and explained at length. Time was spent discussing disease focused care, in addition to hospice care - in reflection to the patient's current medical status/decline. After extensive review and processing, the patient's sister advised that other family were coming to see the patient today - and that another sister was coming from [facility] (arriving Sunday). We encouraged all visitors to come to see the patient at their earliest availability, and those who may not be able to make it today - to coordinate telephone calls/face time.  The family is going to take time to discuss and reflect on what was reviewed today and will update the medical team on what they decide.     Areas that need follow-up: Ongoing emotional support.     Resources given: Request for comfort cart.    Others present:  Deborah (Nurse)Isadora (Case Management), MD Janie Carter Kaitlyn (Palliative Social Work).    I have spent 45 minutes with Patient and family today in which greater than 50% of this time was spent in counseling/coordination of care regarding  Ongoing emotional support .       LSW will continue to follow as requested by the medical team, patient, or family

## 2024-04-12 NOTE — RESPIRATORY THERAPY NOTE
RT Ventilator Management Note  Shannan Vee 53 y.o. female MRN: [id number]  Unit/Bed#: ICU 05 Encounter: [id number]      Daily Screen         4/11/2024  0805 4/12/2024  0738          Patient safety screen outcome:: -- Passed      Spont breathing trial outcome:: Failed Failed      Spont breathing trial reason failed: RR > 35 bpm;RSBI > 100 RR > 35 bpm;Oxygen saturation < 88% > than 5 mins;Dyspnea;RSBI > 100      Previous settings resumed: Yes Yes      Name of Medical Team Notified:: -- --      RSBI: [id number]               04/12/24 0738   Respiratory Assessment   Assessment Type Assess only   Respiratory Pattern Assisted   Chest Assessment Chest expansion symmetrical   Bilateral Breath Sounds Rales;Coarse   Vent Information   Vent ID [id number]   Vent type Crain G5   Crain Vent Mode (S)CMV   $ Pulse Oximetry Spot Check Charge Completed   SpO2 94 %   (S)CMV Settings   Resp Rate (BPM) 16 BPM   VT (mL) 300 mL   FIO2 (%) 50 %   PEEP (cmH2O) 6 cmH2O   I:E Ratio 1:2.7   Insp Time (%) 1 %   Flow Trigger (LPM) 2   Humidification Heater   Heater Temperature (Set) 98.6 °F (37 °C)   Pause Time (%) 0 %   (S)CMV Actuals   Resp Rate (BPM) 17 BPM   VT (mL) 295   MV 5   MAP (cmH2O) 14 cmH2O   Peak Pressure (cmH2O) 49 cmH2O   I:E Ratio (Obs) 1:2.8   Insp Resistance 45   Heater Temperature (Obs) 98.6 °F (37 °C)   Static Compliance (mL/cmH20) 8 mL/cmH2O   Plateau Pressure (cm H2O) 40.4 cm H2O   (S)CMV Alarms   High Peak Pressure (cmH2O) 55   Low Pressure (cmH2O) 5 cm H2O   High Resp Rate (BPM) 50 BPM   Low Resp Rate (BPM) 8 BPM   High MV (L/min) 15 L/min   Low MV (L/min) 3 L/min   High VT (mL) 750 mL   Low VT (mL) 150 mL   Leak (%) 0 %   Apnea Time (s) 20 S   Maintenance   Resuscitation bag with peep valve at bedside Yes   Water bag changed No   Circuit changed No   Daily Screen   Patient safety screen outcome: Passed   Spont breathing trial outcome: Failed   Spont breathing trial reason failed RR > 35 bpm;Oxygen saturation < 88% >  than 5 mins;Dyspnea;RSBI > 100   Previous settings resumed Yes   Name of Medical Team Notified:   (Resident)   RSBI 320   IHI Ventilator Associated Pneumonia Bundle   Daily Assessment of Readiness to Extubate Yes   ETT  Cuffed   Placement Date/Time: 04/06/24 (c) 0955   Mask Ventilation: (c)   Preoxygenated: Yes  Technique: Video laryngoscopy  Type: Cuffed  Laryngoscope: (c)   Blade Size: 3  Location: Oral  Grade View: Full view of the glottis  Placement Verification: Ausculta...   Secured at (cm) 20   Measured from Lips   Secured Location Right   Repositioned Right to Left   Secured by Commercial tube saldana   Cuff Pressure (color) Green   HI-LO Suction  Intermittent suction   HI-LO Secretions Scant   HI-LO Intervention Patent         Physical Exam:   Assessment Type: Assess only  Respiratory Pattern: Assisted  Chest Assessment: Chest expansion symmetrical  Bilateral Breath Sounds: Rales, Coarse    Resp Comments: Received pt on S(CMV). Plan to attempt wean today.

## 2024-04-12 NOTE — ASSESSMENT & PLAN NOTE
Tmax 100.8 this admission, Tmax 100.1 over 24h  Persistent sinus tachycardia. Developed afib w rvr in the setting of hypoxia on morning of 4/12  Persistent hypotension requiring low-dose Levo  CT chest findings included new opacities in the GURMEET, edema vs infection  No leukocytosis  Procal elevated 2.23    Bcx and SpCx collected 4/9, now growing MSSA  Repeat Bcx collected 4/11    Suspect primary pulmonary source of  bacteremia    Continue cefazolin (D4 abx)  D/C'd vanc, cefepime  Monitor fever and WBC curves  F/u repeat cultures  F/u TTE read for vegetations. Will need eventual DAREK if TTE negative and pt remains medically directed in C

## 2024-04-12 NOTE — PLAN OF CARE
Problem: Potential for Falls  Goal: Patient will remain free of falls  Description: INTERVENTIONS:  - Educate patient/family on patient safety including physical limitations  - Instruct patient to call for assistance with activity   - Consult OT/PT to assist with strengthening/mobility   - Keep Call bell within reach  - Keep bed low and locked with side rails adjusted as appropriate  - Keep care items and personal belongings within reach  - Initiate and maintain comfort rounds  - Make Fall Risk Sign visible to staff  - Offer Toileting every 2 Hours, in advance of need  - Initiate/Maintain bed alarm  - Obtain necessary fall risk management equipment  - Apply yellow socks and bracelet for high fall risk patients  - Consider moving patient to room near nurses station  Outcome: Progressing     Problem: Nutrition/Hydration-ADULT  Goal: Nutrient/Hydration intake appropriate for improving, restoring or maintaining nutritional needs  Description: Monitor and assess patient's nutrition/hydration status for malnutrition. Collaborate with interdisciplinary team and initiate plan and interventions as ordered.  Monitor patient's weight and dietary intake as ordered or per policy. Utilize nutrition screening tool and intervene as necessary. Determine patient's food preferences and provide high-protein, high-caloric foods as appropriate.     INTERVENTIONS:  - Monitor oral intake, urinary output, labs, and treatment plans  - Assess nutrition and hydration status and recommend course of action  - Evaluate amount of meals eaten  - Assist patient with eating if necessary   - Allow adequate time for meals  - Recommend/ encourage appropriate diets, oral nutritional supplements, and vitamin/mineral supplements  - Order, calculate, and assess calorie counts as needed  - Recommend, monitor, and adjust tube feedings and TPN/PPN based on assessed needs  - Assess need for intravenous fluids  - Provide specific nutrition/hydration education  as appropriate  - Include patient/family/caregiver in decisions related to nutrition  Outcome: Progressing     Problem: Prexisting or High Potential for Compromised Skin Integrity  Goal: Skin integrity is maintained or improved  Description: INTERVENTIONS:  - Identify patients at risk for skin breakdown  - Assess and monitor skin integrity  - Assess and monitor nutrition and hydration status  - Monitor labs   - Assess for incontinence   - Turn and reposition patient  - Assist with mobility/ambulation  - Relieve pressure over bony prominences  - Avoid friction and shearing  - Provide appropriate hygiene as needed including keeping skin clean and dry  - Evaluate need for skin moisturizer/barrier cream  - Collaborate with interdisciplinary team   - Patient/family teaching  - Consider wound care consult   Outcome: Progressing     Problem: CARDIOVASCULAR - ADULT  Goal: Maintains optimal cardiac output and hemodynamic stability  Description: INTERVENTIONS:  - Monitor I/O, vital signs and rhythm  - Monitor for S/S and trends of decreased cardiac output  - Administer and titrate ordered vasoactive medications to optimize hemodynamic stability  - Assess quality of pulses, skin color and temperature  - Assess for signs of decreased coronary artery perfusion  - Instruct patient to report change in severity of symptoms  Outcome: Progressing  Goal: Absence of cardiac dysrhythmias or at baseline rhythm  Description: INTERVENTIONS:  - Continuous cardiac monitoring, vital signs, obtain 12 lead EKG if ordered  - Administer antiarrhythmic and heart rate control medications as ordered  - Monitor electrolytes and administer replacement therapy as ordered  Outcome: Progressing     Problem: RESPIRATORY - ADULT  Goal: Achieves optimal ventilation and oxygenation  Description: INTERVENTIONS:  - Assess for changes in respiratory status  - Assess for changes in mentation and behavior  - Position to facilitate oxygenation and minimize  respiratory effort  - Oxygen administered by appropriate delivery if ordered  - Initiate smoking cessation education as indicated  - Encourage broncho-pulmonary hygiene including cough, deep breathe, Incentive Spirometry  - Assess the need for suctioning and aspirate as needed  - Assess and instruct to report SOB or any respiratory difficulty  - Respiratory Therapy support as indicated  Outcome: Progressing     Problem: PAIN - ADULT  Goal: Verbalizes/displays adequate comfort level or baseline comfort level  Description: Interventions:  - Encourage patient to monitor pain and request assistance  - Assess pain using appropriate pain scale  - Administer analgesics based on type and severity of pain and evaluate response  - Implement non-pharmacological measures as appropriate and evaluate response  - Consider cultural and social influences on pain and pain management  - Notify physician/advanced practitioner if interventions unsuccessful or patient reports new pain  Outcome: Progressing     Problem: SAFETY ADULT  Goal: Patient will remain free of falls  Description: INTERVENTIONS:  - Educate patient/family on patient safety including physical limitations  - Instruct patient to call for assistance with activity   - Consult OT/PT to assist with strengthening/mobility   - Keep Call bell within reach  - Keep bed low and locked with side rails adjusted as appropriate  - Keep care items and personal belongings within reach  - Initiate and maintain comfort rounds  - Make Fall Risk Sign visible to staff  - Offer Toileting every 2 Hours, in advance of need  - Initiate/Maintain bed alarm  - Obtain necessary fall risk management equipment  - Apply yellow socks and bracelet for high fall risk patients  - Consider moving patient to room near nurses station  Outcome: Progressing  Goal: Maintain or return to baseline ADL function  Description: INTERVENTIONS:  -  Assess patient's ability to carry out ADLs; assess patient's baseline  for ADL function and identify physical deficits which impact ability to perform ADLs (bathing, care of mouth/teeth, toileting, grooming, dressing, etc.)  - Assess/evaluate cause of self-care deficits   - Assess range of motion  - Assess patient's mobility; develop plan if impaired  - Assess patient's need for assistive devices and provide as appropriate  - Encourage maximum independence but intervene and supervise when necessary  - Involve family in performance of ADLs  - Assess for home care needs following discharge   - Consider OT consult to assist with ADL evaluation and planning for discharge  - Provide patient education as appropriate  Outcome: Progressing  Goal: Maintains/Returns to pre admission functional level  Description: INTERVENTIONS:  - Perform AM-PAC 6 Click Basic Mobility/ Daily Activity assessment daily.  - Set and communicate daily mobility goal to care team and patient/family/caregiver.   - Collaborate with rehabilitation services on mobility goals if consulted  - Perform Range of Motion 3 times a day.  - Reposition patient every 2 hours.  - Dangle patient 3 times a day  - Stand patient 3 times a day  - Ambulate patient 3 times a day  - Out of bed to chair 3 times a day   - Out of bed for meals 3 times a day  - Out of bed for toileting  - Record patient progress and toleration of activity level   Outcome: Progressing     Problem: Knowledge Deficit  Goal: Patient/family/caregiver demonstrates understanding of disease process, treatment plan, medications, and discharge instructions  Description: Complete learning assessment and assess knowledge base.  Interventions:  - Provide teaching at level of understanding  - Provide teaching via preferred learning methods  Outcome: Progressing     Problem: SAFETY,RESTRAINT: NV/NON-SELF DESTRUCTIVE BEHAVIOR  Goal: Remains free of harm/injury (restraint for non violent/non self-detsructive behavior)  Description: INTERVENTIONS:  - Instruct patient/family  regarding restraint use   - Assess and monitor physiologic and psychological status   - Provide interventions and comfort measures to meet assessed patient needs   - Identify and implement measures to help patient regain control  - Assess readiness for release of restraint   Outcome: Progressing  Goal: Returns to optimal restraint-free functioning  Description: INTERVENTIONS:  - Assess the patient's behavior and symptoms that indicate continued need for restraint  - Identify and implement measures to help patient regain control  - Assess readiness for release of restraint   Outcome: Progressing     Problem: COPING  Goal: Pt/Family able to verbalize concerns and demonstrate effective coping strategies  Description: INTERVENTIONS:  - Assist patient/family to identify coping skills, available support systems and cultural and spiritual values  - Provide emotional support, including active listening and acknowledgement of concerns of patient and caregivers  - Reduce environmental stimuli, as able  - Provide patient education  - Assess for spiritual pain/suffering and initiate spiritual care, including notification of Pastoral Care or nicholas based community as needed  - Assess effectiveness of coping strategies  Outcome: Progressing  Goal: Will report anxiety at manageable levels  Description: INTERVENTIONS:  - Administer medication as ordered  - Teach and encourage coping skills  - Provide emotional support  - Assess patient/family for anxiety and ability to cope  Outcome: Progressing

## 2024-04-12 NOTE — ASSESSMENT & PLAN NOTE
GURMEET opacity, new this admission, in addition to other findings noted on CT chest  SpCx positive for Staph aureus  Bcx positive for Staph aureus    Continue cefazolin (D4 abx)  Remains intubated, with poor performance on daily SBT  Repeated CXR morning of 4/12 appears to show increased opacities of the L lung, suspect due to increased pulmonary edema vs less likely worsening PNA

## 2024-04-12 NOTE — ASSESSMENT & PLAN NOTE
4/6/24 In-hospital witnessed cardiac arrest. Patient was ambulating from toilet back to bed with nursing staff and became unresponsive. ACLS with 1 round epinephrine/CPR and ROSC was obtained. Suspect PEA 2/2 hypoxia.  Echo 4/7: Mild concentric left ventricular hypertrophy, normal left ventricular systolic function, normal diastolic function.  EF 65%. Normal right ventricular size, normal right ventricular systolic function.  Some evidence of right ventricular diastolic collapse involving the apical region secondary to pericardial effusion  Holding home Toprol XL in the setting of hypotension  Noted to be in new Afib on 4/6, received digoxin on 4/6 and 4/7, now in sinus tachycardia  Maintain MAP > 65 mmHg  Continue telemetry  Avoid further hypoxia  Dig level supra-therapeutic 4/8. Repeat dig level within therapeutic range 4/9.   Continue to monitor tele while in ICU  Went into Afib w RVR after devloping hypoxia on morning of 4/12, ventricular rate 160s-190s . Amiodarone bolus 150 mg with improvement in rates 110s-130s.

## 2024-04-12 NOTE — ASSESSMENT & PLAN NOTE
She is no longer a candidate for further cares given declining ECOG status weeks before this admission  She has evidence of disease progression in March 2024

## 2024-04-12 NOTE — ASSESSMENT & PLAN NOTE
Malnutrition Findings:   Adult Malnutrition type: Chronic illness  Adult Degree of Malnutrition: Other severe protein calorie malnutrition  Malnutrition Characteristics: Fat loss, Muscle loss, Inadequate energy, Weight loss                  360 Statement: Chronic severe malnutrition related to catabolic illness, poor oral intake as evidenced by < 75% estimated energy intake > 1 mo, severe muscle loss to temporalis muscles, severe fat loss to buccal pads, 17.8% weight loss x ~7 mo (9/20/23 68kg, 4/6/24 55.9kg). Treated with: see RD progress note for TF recommendations. Recommend daily weights for nutrition monitoring.    BMI Findings:           Body mass index is 23.33 kg/m².     During GOC conversation, discussed with family that we would not recommend placement of NGT or PEG tube, as these would increase pt discomfort without significantly improving prognosis.  Continue Tube feeds while OGT in place with ETT

## 2024-04-12 NOTE — RESPIRATORY THERAPY NOTE
RT Ventilator Management Note  Shannan Vee 53 y.o. female MRN: [id number]  Unit/Bed#: ICU 05 Encounter: [id number]      Daily Screen         4/11/2024  0755 4/11/2024  0805          Patient safety screen outcome:: Passed --      Spont breathing trial outcome:: -- Failed      Spont breathing trial reason failed: -- RR > 35 bpm;RSBI > 100      Previous settings resumed: -- Yes      Name of Medical Team Notified:: -- --      RSBI: -- 394               04/11/24 1958   Respiratory Assessment   Assessment Type Assess only   General Appearance Sedated   Respiratory Pattern Assisted   Chest Assessment Chest expansion symmetrical   Bilateral Breath Sounds Rales;Coarse   Resp Comments receiced intubated and on vent   Vent Information   Vent ID [id number]   Vent type Crain G5   Crain Vent Mode (S)CMV   $ Pulse Oximetry Spot Check Charge Completed   SpO2 95 %   (S)CMV Settings   Resp Rate (BPM) 15 BPM   VT (mL) 300 mL   FIO2 (%) 50 %   PEEP (cmH2O) 6 cmH2O   I:E Ratio 1:2.1   Insp Time (%) 1 %   Flow Trigger (LPM) 2   Humidification Heater   Heater Temperature (Set) 98.6 °F (37 °C)   Pause Time (%) 0 %   (S)CMV Actuals   Resp Rate (BPM) 16 BPM   VT (mL) 299   MV 4.8   MAP (cmH2O) 14 cmH2O   Peak Pressure (cmH2O) 48 cmH2O   I:E Ratio (Obs) 1:2.8   Insp Resistance 24   Heater Temperature (Obs) 98.6 °F (37 °C)   Static Compliance (mL/cmH20) 7.3 mL/cmH2O   Plateau Pressure (cm H2O) 38 cm H2O   (S)CMV Alarms   High Peak Pressure (cmH2O) 55   Low Pressure (cmH2O) 5 cm H2O   High Resp Rate (BPM) 50 BPM   Low Resp Rate (BPM) 8 BPM   High MV (L/min) 15 L/min   Low MV (L/min) 3 L/min   High VT (mL) 750 mL   Low VT (mL) 150 mL   Leak (%) 0 %   Apnea Time (s) 20 S   Maintenance   Alarm (pink) cable attached No   Resuscitation bag with peep valve at bedside Yes   Water bag changed No   Circuit changed No   ETT  Cuffed   Placement Date/Time: 04/06/24 (c) 0955   Mask Ventilation: (c)   Preoxygenated: Yes  Technique: Video laryngoscopy   Type: Cuffed  Laryngoscope: (c)   Blade Size: 3  Location: Oral  Grade View: Full view of the glottis  Placement Verification: Ausculta...   Secured at (cm) 20   Measured from Lips   Secured Location Center   Secured by Commercial tube saldana   Site Condition Dry   HI-LO Suction  Intermittent suction   HI-LO Secretions Scant   HI-LO Intervention Patent         Physical Exam:   Assessment Type: Assess only  General Appearance: Sedated  Respiratory Pattern: Assisted  Chest Assessment: Chest expansion symmetrical  Bilateral Breath Sounds: Rales, Coarse      Resp Comments: receiced intubated and on vent

## 2024-04-12 NOTE — PROGRESS NOTES
Progress Note - Palliative & Supportive Care  Shannan Vee  53 y.o.  female  ICU 05/ICU 05   MRN: [id number]  Encounter: [id number]     ASSESSMENT:    Patient Active Problem List   Diagnosis    Carcinoma of left breast, estrogen receptor positive (HCC)    Use of anastrozole (Arimidex)    Anasarca    Malignant neoplasm of overlapping sites of left female breast (HCC)    Uterine leiomyoma    Personal history of malignant neoplasm of breast    Overweight (BMI 25.0-29.9)    Urinary frequency    Bladder wall thickening    Recurrent malignant neoplasm of breast (HCC)    Right lower lobe lung mass    Pain from bone metastases (HCC)    Other specified anemias    Pleural effusion    Chronic bilateral low back pain without sciatica    Cancer associated pain    Goals of care, counseling/discussion    Severe protein-calorie malnutrition (HCC)    Drug induced constipation    Hyponatremia    Encounter for home hospice care    Cardiac arrest (HCC)    Acute on chronic respiratory failure (HCC)    Shock (HCC)    Chronic, continuous use of opioids    Staphylococcus aureus bacteremia    Staph aureus pneumonia       Active problems addressed:  Metastatic triple negative breast cancer with extensive R thoracic pleural carcinomatosis  Acute hypoxic respiratory failure  Brief PEA cardiac arrest 2/2 respiratory arrest/hypoxia  R sided malignant effusion s/p pleurX catheter  Goals of care    Consult is for goals of care    PALLIATIVE AND SUPPORTIVE CARE FAMILY CONFERENCE:    Time of Meetin:20pm    Participants:   Patient's family including her , her sister and her family ( and son), her son, her daughter, her son and his significant other   ICU MD/Dr. Knowles  ICU RN  PSC MD/Dr. Briones  [facility]/Abi Worley    Patient Participation: none, sedated and intubated    Patient Support System: family as above     Meeting Location: inside her room in the ICU    Advanced Directive of POLST available: not applicable    A family  meeting was held for goals of care. This meeting was necessary for determine the appropriate course of treatment. Conversation was had in both English and Occitan through a person providing live translations    Topics of Discussion:  Discussed that patient's condition has now reached terminality and that despite maximal supportive cares on the vent and ICU cares, that patient's condition, especially today, continues to deteriorate and prior to today, has not been getting significantly better.   ICU earlier discussed with them their concerns about patient passing away in a matter of a few hours. We discussed that the chances of her dying today is very high despite being on the vent.   Discussed that the most compassionate care we can offer now is that of purely comfort cares off the vent to afford her some last dignity until her last breath.   Discussed what comfort cares mean and how we will provide her medications such as morphine or oxycodone to address air hunger/dyspnea/pain/or other signs/symptoms of discomfort as she approaches the end of her life.   Discussed importance of having family members who still want to visit and say their last goodbyes to do so as soon as possible and rely on video calls or telephone calls if unable to make it in person.     Other Content of Meeting:  Provided support to family    PLAN:  Patient's family to decide on palliative extubation on comfort cares versus continued medical care. Family aware that the chance of death remain very high despite continued medical care.  She is already a DNR    Time Involved in Meetinmins, beginning at approximately  1:20p and ending at approximately 2pm.   Code status: Level 2 - DNAR: but accepts endotracheal intubation   Decisional apparatus:  Patient does not have capacity to make medical decisions on my exam today. If such capacity is lost, patient's substitute decision maker would default to  by [place] Act 169.   Advance Directive /  "Living Will / POLST:  none on file    3. Prognosis: very poor; likely hours    We appreciate the opportunity to participate in this patient's care. We will continue to follow. Please do not hesitate to contact our on-call provider through our clinic answering service at [phone] should you have acute symptom control concerns.    INTERVAL HISTORY:  Chart reviewed. No acute events overnight.     Patient was seen sedated and intubated on the vent. Discussed case with ICU team prior to meeting. This morning, patient's respiratory status declined and was only saturating around 50% for a good while/about half an hour with frequent episodes of further desaturation, and needing to be bagged. Patient had to be sedated with propofol due to dyssynchrony with the vent on fentanyl. Currently on a high pressure-assist control and only saturating max at 80%.  Per ICU,  was upset that she had to be sedated again when she was apparently more awake this morning.     Review of Systems   Unable to perform ROS: Intubated       MEDICATIONS / ALLERGIES:  all current active meds have been reviewed    Allergies   Allergen Reactions    Penicillins Hives     And cough    Aspirin Rash     Other reaction(s): Palpitations       OBJECTIVE:  BP (!) 77/57   Pulse 104   Temp 98.4 °F (36.9 °C) (Axillary)   Resp 16   Ht 5' 5\" (1.651 m)   Wt 63.6 kg (140 lb 3.4 oz)   SpO2 (!) 84%   BMI 23.33 kg/m²   Physical Exam:    Physical Exam  Constitutional:       Comments: Acutely and chronically ill looking, sedated on the vent, frail looking   HENT:      Head: Normocephalic and atraumatic.   Eyes:      Comments: Eyes closed, lids normal   Pulmonary:      Effort: Pulmonary effort is normal. No respiratory distress.   Abdominal:      General: Abdomen is flat.   Musculoskeletal:         General: No swelling.   Skin:     General: Skin is warm and dry.      Coloration: Skin is pale. Skin is not jaundiced.   Neurological:      Comments: Sedated on " the [facility]   Psychiatric:      Comments: Unable to assess fully       Lab Results:   I have personally reviewed pertinent labs.  Imaging Studies: I have personally reviewed pertinent reports.    EKG, Pathology, and Other Studies: I have personally reviewed pertinent reports.      Counseling / Coordination of Care  Total floor / unit time spent today 60 minutes. Greater than 50% of total time was spent with the patient and / or family counseling and / or coordination of care. A description of the counseling / coordination of care: provided medical updates, discussed hospice and comfort care, determined competency, determined goals of care, determined POA, determined social/family support, discussed plans of care, discussed symptom management, provided psychosocial support.    Janie Briones MD  [facility] Palliative and Supportive Care  [phone]

## 2024-04-12 NOTE — ASSESSMENT & PLAN NOTE
3+ pitting edema in all 4 extremities  +8.7 liters this admission    Continue Lasix 40 mg IV BID   Pretreat w 25% alvumin  Monitor UOP, volume status, renal indices, lytes while diuresing

## 2024-04-12 NOTE — ASSESSMENT & PLAN NOTE
In the setting of sepsis d/t Staph bacteremia and PNA  Started on levophed 4/6, weaned off 4/8, but had to be restarted o/n 4/9.  Titrate for goal MAP > 65 mmHg  Holding home Toprol XL in the setting of hypotension  Echo shows LVEF 65%, normal RV systolic function, some evidence of RV diastolic collapse in apical region 2/2 pericardial effusion. No definite evidence of cardiac tamponade.  New fever overnight 4/9 to T max of 100.8. Tmax 100.1 o/n 4/10.  Procal elevated 2.23  Sputum culture from tracheal aspirate preliminarily shows 4+ Staph aureus  Bcx x2: 1 of 2 growing Staph aureus  MRSA nares cx in process    Cefazolin for MSSA, day 4 of appropriate abx coverage  F/u susceptibilities  Monitor WBC and fever curve  Weaning Levo as able--down to 1 mcg/min morning of 4/12

## 2024-04-12 NOTE — PLAN OF CARE
Problem: Potential for Falls  Goal: Patient will remain free of falls  Description: INTERVENTIONS:  - Educate patient/family on patient safety including physical limitations  - Instruct patient to call for assistance with activity   - Consult OT/PT to assist with strengthening/mobility   - Keep Call bell within reach  - Keep bed low and locked with side rails adjusted as appropriate  - Keep care items and personal belongings within reach  - Initiate and maintain comfort rounds  - Make Fall Risk Sign visible to staff  - Initiate/Maintain bed alarm  - Apply yellow socks and bracelet for high fall risk patients  - Consider moving patient to room near nurses station  Outcome: Progressing     Problem: Nutrition/Hydration-ADULT  Goal: Nutrient/Hydration intake appropriate for improving, restoring or maintaining nutritional needs  Description: Monitor and assess patient's nutrition/hydration status for malnutrition. Collaborate with interdisciplinary team and initiate plan and interventions as ordered.  Monitor patient's weight and dietary intake as ordered or per policy. Utilize nutrition screening tool and intervene as necessary. Determine patient's food preferences and provide high-protein, high-caloric foods as appropriate.     INTERVENTIONS:  - Monitor oral intake, urinary output, labs, and treatment plans  - Assess nutrition and hydration status and recommend course of action  - Evaluate amount of meals eaten  - Assist patient with eating if necessary   - Allow adequate time for meals  - Recommend/ encourage appropriate diets, oral nutritional supplements, and vitamin/mineral supplements  - Order, calculate, and assess calorie counts as needed  - Recommend, monitor, and adjust tube feedings and TPN/PPN based on assessed needs  - Assess need for intravenous fluids  - Provide specific nutrition/hydration education as appropriate  - Include patient/family/caregiver in decisions related to nutrition  Outcome:  Progressing     Problem: Prexisting or High Potential for Compromised Skin Integrity  Goal: Skin integrity is maintained or improved  Description: INTERVENTIONS:  - Identify patients at risk for skin breakdown  - Assess and monitor skin integrity  - Assess and monitor nutrition and hydration status  - Monitor labs   - Assess for incontinence   - Turn and reposition patient  - Assist with mobility/ambulation  - Relieve pressure over bony prominences  - Avoid friction and shearing  - Provide appropriate hygiene as needed including keeping skin clean and dry  - Evaluate need for skin moisturizer/barrier cream  - Collaborate with interdisciplinary team   - Patient/family teaching  - Consider wound care consult   Outcome: Progressing     Problem: CARDIOVASCULAR - ADULT  Goal: Maintains optimal cardiac output and hemodynamic stability  Description: INTERVENTIONS:  - Monitor I/O, vital signs and rhythm  - Monitor for S/S and trends of decreased cardiac output  - Administer and titrate ordered vasoactive medications to optimize hemodynamic stability  - Assess quality of pulses, skin color and temperature  - Assess for signs of decreased coronary artery perfusion  - Instruct patient to report change in severity of symptoms  Outcome: Progressing  Goal: Absence of cardiac dysrhythmias or at baseline rhythm  Description: INTERVENTIONS:  - Continuous cardiac monitoring, vital signs, obtain 12 lead EKG if ordered  - Administer antiarrhythmic and heart rate control medications as ordered  - Monitor electrolytes and administer replacement therapy as ordered  Outcome: Progressing     Problem: RESPIRATORY - ADULT  Goal: Achieves optimal ventilation and oxygenation  Description: INTERVENTIONS:  - Assess for changes in respiratory status  - Assess for changes in mentation and behavior  - Position to facilitate oxygenation and minimize respiratory effort  - Oxygen administered by appropriate delivery if ordered  - Initiate smoking  cessation education as indicated  - Encourage broncho-pulmonary hygiene including cough, deep breathe, Incentive Spirometry  - Assess the need for suctioning and aspirate as needed  - Assess and instruct to report SOB or any respiratory difficulty  - Respiratory Therapy support as indicated  Outcome: Progressing     Problem: PAIN - ADULT  Goal: Verbalizes/displays adequate comfort level or baseline comfort level  Description: Interventions:  - Encourage patient to monitor pain and request assistance  - Assess pain using appropriate pain scale  - Administer analgesics based on type and severity of pain and evaluate response  - Implement non-pharmacological measures as appropriate and evaluate response  - Consider cultural and social influences on pain and pain management  - Notify physician/advanced practitioner if interventions unsuccessful or patient reports new pain  Outcome: Progressing     Problem: SAFETY ADULT  Goal: Patient will remain free of falls  Description: INTERVENTIONS:  - Educate patient/family on patient safety including physical limitations  - Instruct patient to call for assistance with activity   - Consult OT/PT to assist with strengthening/mobility   - Keep Call bell within reach  - Keep bed low and locked with side rails adjusted as appropriate  - Keep care items and personal belongings within reach  - Initiate and maintain comfort rounds  - Make Fall Risk Sign visible to staff  - Initiate/Maintain bed alarm  - Apply yellow socks and bracelet for high fall risk patients  - Consider moving patient to room near nurses station  Outcome: Progressing  Goal: Maintain or return to baseline ADL function  Description: INTERVENTIONS:  -  Assess patient's ability to carry out ADLs; assess patient's baseline for ADL function and identify physical deficits which impact ability to perform ADLs (bathing, care of mouth/teeth, toileting, grooming, dressing, etc.)  - Assess/evaluate cause of self-care deficits    - Assess range of motion  - Assess patient's mobility; develop plan if impaired  - Assess patient's need for assistive devices and provide as appropriate  - Encourage maximum independence but intervene and supervise when necessary  - Involve family in performance of ADLs  - Assess for home care needs following discharge   - Consider OT consult to assist with ADL evaluation and planning for discharge  - Provide patient education as appropriate  Outcome: Progressing  Goal: Maintains/Returns to pre admission functional level  Description: INTERVENTIONS:  - Perform AM-PAC 6 Click Basic Mobility/ Daily Activity assessment daily.  - Set and communicate daily mobility goal to care team and patient/family/caregiver.   - Collaborate with rehabilitation services on mobility goals if consulted  - Perform Range of Motion 3 times a day.  - Reposition patient every 2 hours.  - Record patient progress and toleration of activity level   Outcome: Progressing     Problem: Knowledge Deficit  Goal: Patient/family/caregiver demonstrates understanding of disease process, treatment plan, medications, and discharge instructions  Description: Complete learning assessment and assess knowledge base.  Interventions:  - Provide teaching at level of understanding  - Provide teaching via preferred learning methods  Outcome: Progressing     Problem: SAFETY,RESTRAINT: NV/NON-SELF DESTRUCTIVE BEHAVIOR  Goal: Remains free of harm/injury (restraint for non violent/non self-detsructive behavior)  Description: INTERVENTIONS:  - Instruct patient/family regarding restraint use   - Assess and monitor physiologic and psychological status   - Provide interventions and comfort measures to meet assessed patient needs   - Identify and implement measures to help patient regain control  - Assess readiness for release of restraint   Outcome: Progressing  Goal: Returns to optimal restraint-free functioning  Description: INTERVENTIONS:  - Assess the patient's  behavior and symptoms that indicate continued need for restraint  - Identify and implement measures to help patient regain control  - Assess readiness for release of restraint   Outcome: Progressing     Problem: COPING  Goal: Pt/Family able to verbalize concerns and demonstrate effective coping strategies  Description: INTERVENTIONS:  - Assist patient/family to identify coping skills, available support systems and cultural and spiritual values  - Provide emotional support, including active listening and acknowledgement of concerns of patient and caregivers  - Reduce environmental stimuli, as able  - Provide patient education  - Assess for spiritual pain/suffering and initiate spiritual care, including notification of Pastoral Care or nicholas based community as needed  - Assess effectiveness of coping strategies  Outcome: Progressing  Goal: Will report anxiety at manageable levels  Description: INTERVENTIONS:  - Administer medication as ordered  - Teach and encourage coping skills  - Provide emotional support  - Assess patient/family for anxiety and ability to cope  Outcome: Progressing

## 2024-04-12 NOTE — TELEPHONE ENCOUNTER
04/12/24    FORM: [facility] / CERTIFICATION PERIOD 3/28/24-5/26/24   AND [facility] / ORDER # [id number]    FORM FAXED AND CONFIRMED THAT IT WAS RECEIVED 04/11/24.    FAX # [phone]    FORMS SCANNED INTO PT CHART

## 2024-04-13 LAB
BACTERIA BLD CULT: ABNORMAL
GRAM STN SPEC: ABNORMAL
MRSA NOSE QL CULT: NORMAL
S AUREUS DNA BLD POS QL NAA+NON-PROBE: DETECTED

## 2024-04-13 NOTE — DEATH NOTE
INPATIENT DEATH NOTE    Shannan Vee 53 y.o. female MRN: [id number]  Unit/Bed#: ICU 05 Encounter: [id number]    Date, Time and Cause of Death    Date of Death: 24  Time of Death:  9:02 PM  Entered by: Soraida LAGUNAS[AL1.1]       Attribution       AL1.1 JANNETH Pichardo 24 21:09             Patient's Information  Pronounced by: Soraida LAGUNAS  Did the patient's death occur in the ED?: No  Did the patient's death occur in the OR?: No  Did the patient's death occur less than 10 days post-op?: No  Did the patient's death occur within 24 hours of admission?: No  Was code status DNR at the time of death?: Yes      PHYSICAL EXAM:  Unresponsive to noxious stimuli, Spontaneous respirations absent, Breath sounds absent, Carotid pulse absent, Heart sounds absent, Pupillary light reflex absent, and Corneal blink reflex absent      Medical Examiner's office notified?:  Yes   Medical Examiner accepted case?:  No  Name of Medical Examiner: Wally Olson     Family Notification  Was the family notified?: Yes  Date Notified: 24  Time Notified:   Notified by: Soraida LAGUNAS  Name of Family Notified of Death:  Bobby   Relationship to Patient: Spouse  Family Notification Route: At bedside  Was the family told to contact a  home?: Yes    Autopsy Options:  Post-mortem examination declined by next of kin    Primary Service Attending Physician notified?:  yes - Attending: Stanislaw

## 2024-04-13 NOTE — DISCHARGE SUMMARY
"Discharge Summary -     Shannan Vee 53 y.o. female MRN: [id number]    Unit/Bed#: ICU 05 Encounter: [id number] PCP: Todd Bañuelos MD    Admission Date:   Admission Orders (From admission, onward)       Ordered        04/06/24 0630  INPATIENT ADMISSION  Once                            Admitting Diagnosis: Shortness of breath [R06.02]  Atrial flutter with rapid ventricular response (HCC) [I48.92]  Atrial fib/flutter, transient (HCC) [I48.91, I48.92]  Large pleural effusion [J90]    HPI:    \"Shannan Vee is a 53 y.o. female with a PMH of  who presents with PMHx of   metastatic breast cancer with extensive metastasis/carcinomatosis, chronic anemia, loculated effusions with Pleurx catheter, severe malnutrition, hypertension who presents to the hospital with shortness of breath.   Pt with metastatic breast cancer, hormone receptor positive and HER2 negative diagnosed 2013. Prior history of neoadjuvant chemotherapy followed by mastectomy. She is s/p Hysterectomy and oophorectomy. 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 Completed radiation at [facility].   At home uses 3 L of nasal cannula, now requires 4 L.   Chest x-ray today shows worsening of pleural effusion  Recent CT scan of the chest demonstrated stable right-sided pleural effusion, still with pleural carcinomatosis. She continues with thoracentesis as needed. Recently evaluated by IR and their impression is that patient has extensive tumor of the right lung with some encasing the catheter. IR did not believe that further manipulation of the catheter would provide any clinical benefit. Position of the catheter is well evaluated at this time. tPA could be considered to break up loculations but the concern is bleeding especially with anemia.   Spoke with the family at the bedside and patient, they want to continue treatment and requesting IR consult.   On my evaluation patient not in acute distress, " "hemodynamically stable.  Patient denies chest pain, fever, chills, abdominal pain, changes in urinary/bowel habits, nausea, vomiting.\"    Procedures Performed: No orders of the defined types were placed in this encounter.      Summary of Hospital Course:     Shannan Vee is a 53 y.o. female patient with PMHx most significant for recurrent breast cancer with extensive metastatic disease including to lungs and bone complicated by recurrent metastatic effusions s/p Pleurex catheter in R pleural space, opiate dependence 2/2 chronic pain of bone mets, and severe malnutrition. She presented to the hospital on 4/6/2024 due to shortness of breath. CT of the chest re-demonstrated extensive thoracic pleural carcinomatosis with complete opacification of the R hemithorax with a combination of atelectasis and effusion that had worsened since prior exam, as well as pleural effusion and atelectasis on the left. Initially admitted to [facility], however rapid response called on 4/6 when she went unresponsive in the company of her nurse, then progressed to Code Blue due to cardiac arrest, suspect arrhythmia 2/2 hypoxia. Emergently intubated. ROSC achieved in approximately 5 minutes with one round of epinephrine/CPR. Transferred to the ICU following arrest, Levo started for pressure support.    --4/7: Drowsy, negative RSBI (170-190) on SBT, did not attempt to extubate.  --4/8: High RSBI on AM SBT, did not attempt extubation. Family meeting with Palliative Care, remains therapeutically directed in medical care per family's wishes. Weaned off Levo.  --4/9: Levo restarted overnight. High RSBI on AM SBT, did not attempt extubation. New low-grade fever, procal elevated, possible GURMEET on prior imaging. Empiric vanc/cefepime started. Cultures sent.  --4/10: High RSBI this AM, did not attempt extubation. Added scheduled oral oxy to pain regimen. Changed vent setting for severe alkalemia with subsequent improvement.  --4/11: MSSA-positive blood and " sputum cultures. Abx narrowed to cefazolin. TTE ordered.  --4/12: Echo negative for vegetations, other findings c/w volume overload. Pt failed AM SBT after approximately 60 seconds due to marked desaturation to 70s with RSBI>300. Difficult to recover after resuming mechanical ventilation, developed Afib w RVR 2/2 hypoxia, bolused amiodarone and started drip with improved rates 120s-130s, required sedation and then paralysis due to persistent hypoxia, dyssynchronous with the vent, high peak pressures, with sats only improved by continuous bag mask ventilation. Vent settings changed to increase peep to 14, decrease tidal volume to 180, and max FiO2 at 100%, with improvement of sats to 80s. Family updated regarding grave prognosis and family meeting held at bedside with Palliative Care team. Echo c/w increased RV pressure and volume overload.    Patient continued to decompensate on the evening of 4/12, both worsening hypercarbic respiratory failure, family members including , children, extended family were at bedside, goals of care discussion was had with everyone present and family was agreeable to make the patient comfort care.  Patient was compassionately extubated and passed away peacefully early after extubation with family at bedside.    Significant Findings, Care, Treatment and Services Provided:     XR chest portable ICU 4/10/2024: Persistent opacified right hemithorax with chest tube near the apex. Patchy infiltrates in the left lung suggested. Occluded bronchus seen on CT. Bronchoscopy as indicated.  XR chest portable ICU 4/7/2024: Tip of endotracheal tube is well-positioned above luna. Endogastric tube extends into the abdomen. Opacified right hemithorax. Left pleural effusion and left basilar consolidation.  XR chest 1 view portable 4/6/2024: Complete opacification of the right hemithorax which represents progression of abnormality since the prior study. To what extent this is pleural tumor or  effusion or lung consolidation cannot be determined. Left pleural effusion and interstitial prominence in the left lung. This could be edema or viral syndrome or tumor spread. Numerous lytic lesions. No gross evidence of acute pathologic fractures as near as can be determined.   CTA ED chest PE Study 2024: No definite evidence for pulmonary embolism. Redemonstration of extensive thoracic pleural carcinomatosis with complete opacification of the right hemithorax. Findings are also secondary to a combination of atelectasis and effusion and worsened since the prior exam. Right-sided chest tube in place. Moderate left pleural effusion with subjacent compressive atelectasis, worsened. Groundglass opacities within the left upper lobe and lingula are new and may be on the basis of infection or edema. Extensive osseous metastatic disease with multilevel compression deformities are stable.    Sputum Cx growing 4+ MSSA   Bcx 1 of 2 positive for GPC in clusters, Blood Culture Identification Panel positive for probable MSSA          Disposition:        Medical Problems       Resolved Problems  Date Reviewed: 2024   None         Condition at Time of Death: Comfort Care       Date, Time and Cause of Death    Date of Death: 24  Time of Death:  9:02 PM  Entered by: Soraida LAGUNAS[AL1.1]       Attribution       AL1.1 JANNETH Pichardo 24 21:09            Death Note:    INPATIENT DEATH NOTE    Shannan Vee 53 y.o. female MRN: [id number]  Unit/Bed#: ICU 05 Encounter: [id number]    Date, Time and Cause of Death    Date of Death: 24  Time of Death:  9:02 PM  Entered by: Soraida LAGUNAS[AL1.1]       Attribution       AL1.1 JANNETH Pichardo 24 21:09             Patient's Information  Pronounced by: Soraida LAGUNAS  Did the patient's death occur in the ED?: No  Did the patient's death occur in the OR?: No  Did the patient's death occur less than 10 days post-op?:  No  Did the patient's death occur within 24 hours of admission?: No  Was code status DNR at the time of death?: Yes      PHYSICAL EXAM:  Unresponsive to noxious stimuli, Spontaneous respirations absent, Breath sounds absent, Carotid pulse absent, Heart sounds absent, Pupillary light reflex absent, and Corneal blink reflex absent      Medical Examiner's office notified?:  Yes   Medical Examiner accepted case?:  No  Name of Medical Examiner: Wally Olson     Family Notification  Was the family notified?: Yes  Date Notified: 24  Time Notified:   Notified by: Soraida LAGUNAS  Name of Family Notified of Death:  Bobby   Relationship to Patient: Spouse  Family Notification Route: At bedside  Was the family told to contact a  home?: Yes    Autopsy Options:  Post-mortem examination declined by next of kin    Primary Service Attending Physician notified?:  yes - Attending: Stanislaw

## 2024-04-13 NOTE — RESPIRATORY THERAPY NOTE
RT Ventilator Management Note  Shannan Vee 53 y.o. female MRN: [id number]  Unit/Bed#: ICU 05 Encounter: [id number]      Daily Screen         4/11/2024  0805 4/12/2024  0738          Patient safety screen outcome:: -- Passed      Spont breathing trial outcome:: Failed Failed      Spont breathing trial reason failed: RR > 35 bpm;RSBI > 100 RR > 35 bpm;Oxygen saturation < 88% > than 5 mins;Dyspnea;RSBI > 100      Previous settings resumed: Yes Yes      Name of Medical Team Notified:: -- --      RSBI: [id number] 320               04/12/24 2038   Respiratory Assessment   Assessment Type Assess only   General Appearance Sedated   Respiratory Pattern Assisted   Chest Assessment Chest expansion symmetrical   Resp Comments remains intubated and on vent   Vent Information   Vent ID [id number]   Vent type Crain G5   Crain Vent Mode (S)CMV   $ Pulse Oximetry Spot Check Charge Completed   SpO2 93 %   (S)CMV Settings   Resp Rate (BPM) 20 BPM   VT (mL) 190 mL   FIO2 (%) 100 %   PEEP (cmH2O) 14 cmH2O   I:E Ratio 1:2.0   Insp Time (%) 1 %   Flow Trigger (LPM) 2   Humidification Heater   Heater Temperature (Set) 98.6 °F (37 °C)   Pause Time (%) 0 %   (S)CMV Actuals   Resp Rate (BPM) 20 BPM   VT (mL) 198   MV 4   MAP (cmH2O) 22 cmH2O   Peak Pressure (cmH2O) 47 cmH2O   I:E Ratio (Obs) 1:2.0   Insp Resistance 19   Heater Temperature (Obs) 98.6 °F (37 °C)   Static Compliance (mL/cmH20) 6.2 mL/cmH2O   Plateau Pressure (cm H2O) 42 cm H2O   (S)CMV Alarms   High Peak Pressure (cmH2O) 55   Low Pressure (cmH2O) 5 cm H2O   High Resp Rate (BPM) 50 BPM   Low Resp Rate (BPM) 8 BPM   High MV (L/min) 15 L/min   Low MV (L/min) 2 L/min   High VT (mL) 750 mL   Low VT (mL) 150 mL   Leak (%) 0 %   Apnea Time (s) 20 S   Maintenance   Alarm (pink) cable attached No   Resuscitation bag with peep valve at bedside Yes   Water bag changed No         Physical Exam:   Assessment Type: Assess only  General Appearance: Sedated  Respiratory Pattern: Assisted  Chest  Assessment: Chest expansion symmetrical  Bilateral Breath Sounds: Coarse, Rales      Resp Comments: remains intubated and on vent

## 2024-04-14 LAB — BACTERIA BLD CULT: NORMAL

## 2024-04-15 ENCOUNTER — TELEPHONE (OUTPATIENT)
Dept: PALLIATIVE MEDICINE | Facility: CLINIC | Age: 54
End: 2024-04-15

## 2024-04-15 NOTE — TELEPHONE ENCOUNTER
Patients  calling , patient passed on 04/12/2024 and need help financially and help with purchasing food , would like to speak to someone regarding this . Please advise .

## 2024-04-16 LAB
BACTERIA BLD CULT: NORMAL
BACTERIA BLD CULT: NORMAL

## 2024-04-17 ENCOUNTER — TELEPHONE (OUTPATIENT)
Dept: PALLIATIVE MEDICINE | Facility: CLINIC | Age: 54
End: 2024-04-17

## 2024-04-17 NOTE — TELEPHONE ENCOUNTER
LSW received a task to contact Epifanio/Fabian to discuss financial assistance/guidance in relation to the patient's  arrangements in addition to financial support/assistance with food costs.     LSW used  [employer] #[id number] to contact Epifanio at [phone].  [employer] informed this writer that there was no answer, no voicemail set up to leave a message.     LSW did allocate some resources:    Contacting the [facility] Assistance Office: [phone].     Contacting the patient's insurance company to see if there are any supports available.     [employer]: [phone].  Location: [address] [place] [address]. [address].    [facility]: [phone].  Location: [address] [place] [address]. [address].        Further questions to coordinate guidance:    Are they affiliated with any Congregational entities that may be able to support/coordinate arrangements?    *If patient/family is receptive, this writer can mail these resources to their residence along with local food booker/supports*

## 2024-04-17 NOTE — TELEPHONE ENCOUNTER
Received call from a gentleman by the name of Epifanio requesting a call back to discuss financial assistance/guidance on pt's  arrangements. Fabian, pt's son, then took over call as he's a bit more fluent in speaking English. He stated Epifanio is pt's spouse.   Both son and Epifanio would like to discuss next steps.

## 2024-04-26 NOTE — TELEPHONE ENCOUNTER
Patients spouse returning call , would like to speak to Abi . Although I did mention these suggestion to patients  , he insisted to speak to a  .

## 2024-05-06 ENCOUNTER — TELEPHONE (OUTPATIENT)
Dept: PALLIATIVE MEDICINE | Facility: CLINIC | Age: 54
End: 2024-05-06

## 2024-05-06 NOTE — TELEPHONE ENCOUNTER
Paco is calling stated you told him if he needed assistance or help you would get him services economically? He would like a call back to discuss. He is Lithuanian speaking. He stated he is suffering and her death is hard on him.

## 2024-05-07 NOTE — TELEPHONE ENCOUNTER
No. We never offered any economic assistance and we are already putting a stop to these calls. Abi was there during our last family meeting and we never said anything like that. I will raise this issue with management

## 2024-05-25 LAB

## 2024-07-12 LAB

## 2024-11-29 ENCOUNTER — TELEPHONE (OUTPATIENT)
Dept: FAMILY MEDICINE CLINIC | Facility: CLINIC | Age: 54
End: 2024-11-29

## 2024-11-29 NOTE — TELEPHONE ENCOUNTER
PCP SIGNATURE NEEDED FOR [facility] FORM RECEIVED VIA FAX AND PLACED IN PCP FOLDER TO BE DELIVERED AT ASSIGNED TIMES.    Order number [id number]  Order number [id number]

## 2024-11-29 NOTE — TELEPHONE ENCOUNTER
PCP SIGNATURE NEEDED FOR [facility]  FORM RECEIVED VIA FAX AND PLACED IN PCP FOLDER TO BE DELIVERED AT ASSIGNED TIMES.    Order Number: [id number]

## 2024-12-02 NOTE — TELEPHONE ENCOUNTER
FAXED ON 12/02/24 TO [facility]  at [phone]. FAX CONFIRMATION RECEIVED.    Order#: [id number]

## 2024-12-02 NOTE — TELEPHONE ENCOUNTER
FAXED ON 12/02/24 TO [facility]  at [phone]. FAX CONFIRMATION RECEIVED.    Order#: [id number]

## 2024-12-02 NOTE — TELEPHONE ENCOUNTER
FAXED ON 12/02/24 TO [facility]  at [phone]. FAX CONFIRMATION RECEIVED.